# Patient Record
Sex: MALE | Race: WHITE | NOT HISPANIC OR LATINO | Employment: OTHER | URBAN - METROPOLITAN AREA
[De-identification: names, ages, dates, MRNs, and addresses within clinical notes are randomized per-mention and may not be internally consistent; named-entity substitution may affect disease eponyms.]

---

## 2018-09-05 PROBLEM — K63.5 POLYP OF TRANSVERSE COLON: Status: ACTIVE | Noted: 2018-09-05

## 2022-07-11 ENCOUNTER — HOSPITAL ENCOUNTER (INPATIENT)
Facility: HOSPITAL | Age: 62
LOS: 12 days | Discharge: HOME | DRG: 234 | End: 2022-07-23
Attending: EMERGENCY MEDICINE | Admitting: INTERNAL MEDICINE
Payer: COMMERCIAL

## 2022-07-11 DIAGNOSIS — R07.9 CHEST PAIN: ICD-10-CM

## 2022-07-11 DIAGNOSIS — I20.0 UNSTABLE ANGINA: ICD-10-CM

## 2022-07-11 DIAGNOSIS — I49.9 ABNORMAL HEART RHYTHM: ICD-10-CM

## 2022-07-11 DIAGNOSIS — I25.110 ATHEROSCLEROSIS OF NATIVE CORONARY ARTERY OF NATIVE HEART WITH UNSTABLE ANGINA PECTORIS: ICD-10-CM

## 2022-07-11 DIAGNOSIS — I21.4 NON-STEMI (NON-ST ELEVATED MYOCARDIAL INFARCTION): ICD-10-CM

## 2022-07-11 DIAGNOSIS — E11.65 TYPE 2 DIABETES MELLITUS WITH HYPERGLYCEMIA, WITHOUT LONG-TERM CURRENT USE OF INSULIN: ICD-10-CM

## 2022-07-11 DIAGNOSIS — Z99.11 ENCOUNTER FOR WEANING FROM VENTILATOR: ICD-10-CM

## 2022-07-11 DIAGNOSIS — E66.01 MORBID OBESITY WITH BMI OF 45.0-49.9, ADULT: ICD-10-CM

## 2022-07-11 DIAGNOSIS — I25.10 CORONARY ARTERY DISEASE: ICD-10-CM

## 2022-07-11 DIAGNOSIS — I25.110 ATHEROSCLEROTIC HEART DISEASE OF NATIVE CORONARY ARTERY WITH UNSTABLE ANGINA PECTORIS: ICD-10-CM

## 2022-07-11 DIAGNOSIS — E78.2 MIXED HYPERLIPIDEMIA: ICD-10-CM

## 2022-07-11 DIAGNOSIS — I50.22 HEART FAILURE WITH MID-RANGE EJECTION FRACTION: ICD-10-CM

## 2022-07-11 DIAGNOSIS — I49.9 ARRHYTHMIA: ICD-10-CM

## 2022-07-11 DIAGNOSIS — E78.5 HYPERLIPIDEMIA, UNSPECIFIED HYPERLIPIDEMIA TYPE: ICD-10-CM

## 2022-07-11 DIAGNOSIS — E66.01 CLASS 2 SEVERE OBESITY DUE TO EXCESS CALORIES WITH SERIOUS COMORBIDITY AND BODY MASS INDEX (BMI) OF 37.0 TO 37.9 IN ADULT: ICD-10-CM

## 2022-07-11 DIAGNOSIS — I10 HTN (HYPERTENSION), BENIGN: ICD-10-CM

## 2022-07-11 DIAGNOSIS — I21.4 NSTEMI (NON-ST ELEVATED MYOCARDIAL INFARCTION): ICD-10-CM

## 2022-07-11 DIAGNOSIS — Z95.1 S/P CABG (CORONARY ARTERY BYPASS GRAFT): Primary | ICD-10-CM

## 2022-07-11 DIAGNOSIS — Z98.890 STATUS POST CARDIAC SURGERY: ICD-10-CM

## 2022-07-11 PROBLEM — I77.819 DILATATION OF AORTA: Status: ACTIVE | Noted: 2022-07-11

## 2022-07-11 PROBLEM — E11.9 TYPE 2 DIABETES MELLITUS: Status: ACTIVE | Noted: 2022-07-11

## 2022-07-11 LAB
ABO + RH BLD: NORMAL
ALBUMIN SERPL BCP-MCNC: 4 G/DL (ref 3.5–5.2)
ALP SERPL-CCNC: 81 U/L (ref 55–135)
ALT SERPL W/O P-5'-P-CCNC: 34 U/L (ref 10–44)
ANION GAP SERPL CALC-SCNC: 16 MMOL/L (ref 8–16)
APTT BLDCRRT: 38.5 SEC (ref 21–32)
AST SERPL-CCNC: 23 U/L (ref 10–40)
BASOPHILS # BLD AUTO: 0.14 K/UL (ref 0–0.2)
BASOPHILS NFR BLD: 1.2 % (ref 0–1.9)
BILIRUB SERPL-MCNC: 0.4 MG/DL (ref 0.1–1)
BLD GP AB SCN CELLS X3 SERPL QL: NORMAL
BNP SERPL-MCNC: 14 PG/ML (ref 0–99)
BUN SERPL-MCNC: 12 MG/DL (ref 8–23)
CALCIUM SERPL-MCNC: 9.9 MG/DL (ref 8.7–10.5)
CHLORIDE SERPL-SCNC: 98 MMOL/L (ref 95–110)
CO2 SERPL-SCNC: 20 MMOL/L (ref 23–29)
CREAT SERPL-MCNC: 1 MG/DL (ref 0.5–1.4)
DIFFERENTIAL METHOD: ABNORMAL
EOSINOPHIL # BLD AUTO: 0.1 K/UL (ref 0–0.5)
EOSINOPHIL NFR BLD: 0.9 % (ref 0–8)
ERYTHROCYTE [DISTWIDTH] IN BLOOD BY AUTOMATED COUNT: 14.6 % (ref 11.5–14.5)
EST. GFR  (AFRICAN AMERICAN): >60 ML/MIN/1.73 M^2
EST. GFR  (NON AFRICAN AMERICAN): >60 ML/MIN/1.73 M^2
GLUCOSE SERPL-MCNC: 307 MG/DL (ref 70–110)
HCT VFR BLD AUTO: 52 % (ref 40–54)
HGB BLD-MCNC: 16.8 G/DL (ref 14–18)
IMM GRANULOCYTES # BLD AUTO: 0.05 K/UL (ref 0–0.04)
IMM GRANULOCYTES NFR BLD AUTO: 0.4 % (ref 0–0.5)
INR PPP: 1.1 (ref 0.8–1.2)
LYMPHOCYTES # BLD AUTO: 3.6 K/UL (ref 1–4.8)
LYMPHOCYTES NFR BLD: 31 % (ref 18–48)
MCH RBC QN AUTO: 30.9 PG (ref 27–31)
MCHC RBC AUTO-ENTMCNC: 32.3 G/DL (ref 32–36)
MCV RBC AUTO: 96 FL (ref 82–98)
MONOCYTES # BLD AUTO: 0.9 K/UL (ref 0.3–1)
MONOCYTES NFR BLD: 7.7 % (ref 4–15)
NEUTROPHILS # BLD AUTO: 6.8 K/UL (ref 1.8–7.7)
NEUTROPHILS NFR BLD: 58.8 % (ref 38–73)
NRBC BLD-RTO: 0 /100 WBC
PLATELET # BLD AUTO: 421 K/UL (ref 150–450)
PMV BLD AUTO: 9.4 FL (ref 9.2–12.9)
POCT GLUCOSE: 197 MG/DL (ref 70–110)
POCT GLUCOSE: 221 MG/DL (ref 70–110)
POTASSIUM SERPL-SCNC: 4.5 MMOL/L (ref 3.5–5.1)
PROT SERPL-MCNC: 7.6 G/DL (ref 6–8.4)
PROTHROMBIN TIME: 11 SEC (ref 9–12.5)
RBC # BLD AUTO: 5.44 M/UL (ref 4.6–6.2)
SODIUM SERPL-SCNC: 134 MMOL/L (ref 136–145)
TROPONIN I SERPL DL<=0.01 NG/ML-MCNC: 0.04 NG/ML (ref 0–0.03)
WBC # BLD AUTO: 11.5 K/UL (ref 3.9–12.7)

## 2022-07-11 PROCEDURE — 36415 COLL VENOUS BLD VENIPUNCTURE: CPT | Performed by: INTERNAL MEDICINE

## 2022-07-11 PROCEDURE — 93458 PR CATH PLACE/CORON ANGIO, IMG SUPER/INTERP,W LEFT HEART VENTRICULOGRAPHY: ICD-10-PCS | Mod: 26,,, | Performed by: INTERNAL MEDICINE

## 2022-07-11 PROCEDURE — 63600175 PHARM REV CODE 636 W HCPCS: Performed by: EMERGENCY MEDICINE

## 2022-07-11 PROCEDURE — 25000003 PHARM REV CODE 250: Performed by: INTERNAL MEDICINE

## 2022-07-11 PROCEDURE — 84484 ASSAY OF TROPONIN QUANT: CPT | Performed by: EMERGENCY MEDICINE

## 2022-07-11 PROCEDURE — 20600001 HC STEP DOWN PRIVATE ROOM

## 2022-07-11 PROCEDURE — 85610 PROTHROMBIN TIME: CPT | Performed by: EMERGENCY MEDICINE

## 2022-07-11 PROCEDURE — C1894 INTRO/SHEATH, NON-LASER: HCPCS | Performed by: INTERNAL MEDICINE

## 2022-07-11 PROCEDURE — 93567 NJX CAR CTH SPRVLV AORTGRPHY: CPT | Mod: ,,, | Performed by: INTERNAL MEDICINE

## 2022-07-11 PROCEDURE — C1887 CATHETER, GUIDING: HCPCS | Performed by: INTERNAL MEDICINE

## 2022-07-11 PROCEDURE — 93010 EKG 12-LEAD: ICD-10-PCS | Mod: ,,, | Performed by: INTERNAL MEDICINE

## 2022-07-11 PROCEDURE — 25000003 PHARM REV CODE 250

## 2022-07-11 PROCEDURE — 85025 COMPLETE CBC W/AUTO DIFF WBC: CPT | Performed by: EMERGENCY MEDICINE

## 2022-07-11 PROCEDURE — 93458 L HRT ARTERY/VENTRICLE ANGIO: CPT | Mod: 26,,, | Performed by: INTERNAL MEDICINE

## 2022-07-11 PROCEDURE — 93010 ELECTROCARDIOGRAM REPORT: CPT | Mod: ,,, | Performed by: INTERNAL MEDICINE

## 2022-07-11 PROCEDURE — 25000003 PHARM REV CODE 250: Performed by: EMERGENCY MEDICINE

## 2022-07-11 PROCEDURE — 99153 MOD SED SAME PHYS/QHP EA: CPT | Performed by: INTERNAL MEDICINE

## 2022-07-11 PROCEDURE — 85730 THROMBOPLASTIN TIME PARTIAL: CPT | Performed by: EMERGENCY MEDICINE

## 2022-07-11 PROCEDURE — 93005 ELECTROCARDIOGRAM TRACING: CPT

## 2022-07-11 PROCEDURE — 99152 MOD SED SAME PHYS/QHP 5/>YRS: CPT | Performed by: INTERNAL MEDICINE

## 2022-07-11 PROCEDURE — 25000242 PHARM REV CODE 250 ALT 637 W/ HCPCS: Performed by: INTERNAL MEDICINE

## 2022-07-11 PROCEDURE — 25000003 PHARM REV CODE 250: Performed by: STUDENT IN AN ORGANIZED HEALTH CARE EDUCATION/TRAINING PROGRAM

## 2022-07-11 PROCEDURE — 25000242 PHARM REV CODE 250 ALT 637 W/ HCPCS

## 2022-07-11 PROCEDURE — 83880 ASSAY OF NATRIURETIC PEPTIDE: CPT | Performed by: EMERGENCY MEDICINE

## 2022-07-11 PROCEDURE — 25500020 PHARM REV CODE 255: Performed by: INTERNAL MEDICINE

## 2022-07-11 PROCEDURE — 63600175 PHARM REV CODE 636 W HCPCS: Performed by: INTERNAL MEDICINE

## 2022-07-11 PROCEDURE — 99223 1ST HOSP IP/OBS HIGH 75: CPT | Mod: ,,, | Performed by: INTERNAL MEDICINE

## 2022-07-11 PROCEDURE — 99223 PR INITIAL HOSPITAL CARE,LEVL III: ICD-10-PCS | Mod: ,,, | Performed by: INTERNAL MEDICINE

## 2022-07-11 PROCEDURE — C9399 UNCLASSIFIED DRUGS OR BIOLOG: HCPCS | Performed by: STUDENT IN AN ORGANIZED HEALTH CARE EDUCATION/TRAINING PROGRAM

## 2022-07-11 PROCEDURE — 99152 PR MOD CONSCIOUS SEDATION, SAME PHYS, 5+ YRS, FIRST 15 MIN: ICD-10-PCS | Mod: ,,, | Performed by: INTERNAL MEDICINE

## 2022-07-11 PROCEDURE — 99291 CRITICAL CARE FIRST HOUR: CPT

## 2022-07-11 PROCEDURE — 93567 PR INJECT SUPRAVALVULAR AORTOGRAPHY DURING HEART CATH: ICD-10-PCS | Mod: ,,, | Performed by: INTERNAL MEDICINE

## 2022-07-11 PROCEDURE — 93567 NJX CAR CTH SPRVLV AORTGRPHY: CPT | Performed by: INTERNAL MEDICINE

## 2022-07-11 PROCEDURE — 80053 COMPREHEN METABOLIC PANEL: CPT | Performed by: EMERGENCY MEDICINE

## 2022-07-11 PROCEDURE — 93458 L HRT ARTERY/VENTRICLE ANGIO: CPT | Performed by: INTERNAL MEDICINE

## 2022-07-11 PROCEDURE — C1769 GUIDE WIRE: HCPCS | Performed by: INTERNAL MEDICINE

## 2022-07-11 PROCEDURE — 99291 CRITICAL CARE FIRST HOUR: CPT | Mod: CS,,, | Performed by: EMERGENCY MEDICINE

## 2022-07-11 PROCEDURE — 99152 MOD SED SAME PHYS/QHP 5/>YRS: CPT | Mod: ,,, | Performed by: INTERNAL MEDICINE

## 2022-07-11 PROCEDURE — 86850 RBC ANTIBODY SCREEN: CPT | Performed by: EMERGENCY MEDICINE

## 2022-07-11 PROCEDURE — 99291 PR CRITICAL CARE, E/M 30-74 MINUTES: ICD-10-PCS | Mod: CS,,, | Performed by: EMERGENCY MEDICINE

## 2022-07-11 RX ORDER — GLUCAGON 1 MG
1 KIT INJECTION
Status: DISCONTINUED | OUTPATIENT
Start: 2022-07-11 | End: 2022-07-18

## 2022-07-11 RX ORDER — LIDOCAINE HYDROCHLORIDE 20 MG/ML
INJECTION, SOLUTION INFILTRATION; PERINEURAL
Status: DISCONTINUED | OUTPATIENT
Start: 2022-07-11 | End: 2022-07-12

## 2022-07-11 RX ORDER — METFORMIN HYDROCHLORIDE 1000 MG/1
1000 TABLET ORAL 2 TIMES DAILY WITH MEALS
COMMUNITY
End: 2022-09-30 | Stop reason: SDUPTHER

## 2022-07-11 RX ORDER — HEPARIN SODIUM,PORCINE/D5W 25000/250
0-40 INTRAVENOUS SOLUTION INTRAVENOUS CONTINUOUS
Status: DISCONTINUED | OUTPATIENT
Start: 2022-07-11 | End: 2022-07-18

## 2022-07-11 RX ORDER — MORPHINE SULFATE 2 MG/ML
2 INJECTION, SOLUTION INTRAMUSCULAR; INTRAVENOUS ONCE
Status: COMPLETED | OUTPATIENT
Start: 2022-07-11 | End: 2022-07-11

## 2022-07-11 RX ORDER — SODIUM CHLORIDE 0.9 % (FLUSH) 0.9 %
10 SYRINGE (ML) INJECTION
Status: DISCONTINUED | OUTPATIENT
Start: 2022-07-11 | End: 2022-07-23 | Stop reason: HOSPADM

## 2022-07-11 RX ORDER — ATORVASTATIN CALCIUM 20 MG/1
40 TABLET, FILM COATED ORAL DAILY
Status: DISCONTINUED | OUTPATIENT
Start: 2022-07-12 | End: 2022-07-11

## 2022-07-11 RX ORDER — ASPIRIN 325 MG
325 TABLET ORAL
Status: ACTIVE | OUTPATIENT
Start: 2022-07-11 | End: 2022-07-12

## 2022-07-11 RX ORDER — NITROGLYCERIN 0.4 MG/1
0.4 TABLET SUBLINGUAL
Status: COMPLETED | OUTPATIENT
Start: 2022-07-11 | End: 2022-07-11

## 2022-07-11 RX ORDER — NITROGLYCERIN 0.4 MG/1
0.4 TABLET SUBLINGUAL EVERY 5 MIN PRN
Status: DISCONTINUED | OUTPATIENT
Start: 2022-07-11 | End: 2022-07-18

## 2022-07-11 RX ORDER — INSULIN ASPART 100 [IU]/ML
0-5 INJECTION, SOLUTION INTRAVENOUS; SUBCUTANEOUS
Status: DISCONTINUED | OUTPATIENT
Start: 2022-07-11 | End: 2022-07-11

## 2022-07-11 RX ORDER — MIDAZOLAM HYDROCHLORIDE 1 MG/ML
INJECTION, SOLUTION INTRAMUSCULAR; INTRAVENOUS
Status: DISCONTINUED | OUTPATIENT
Start: 2022-07-11 | End: 2022-07-12

## 2022-07-11 RX ORDER — INSULIN ASPART 100 [IU]/ML
1-10 INJECTION, SOLUTION INTRAVENOUS; SUBCUTANEOUS
Status: DISCONTINUED | OUTPATIENT
Start: 2022-07-11 | End: 2022-07-18

## 2022-07-11 RX ORDER — HEPARIN SODIUM 1000 [USP'U]/ML
INJECTION, SOLUTION INTRAVENOUS; SUBCUTANEOUS
Status: DISCONTINUED | OUTPATIENT
Start: 2022-07-11 | End: 2022-07-12

## 2022-07-11 RX ORDER — DIPHENHYDRAMINE HCL 50 MG
50 CAPSULE ORAL ONCE
Status: CANCELLED | OUTPATIENT
Start: 2022-07-11 | End: 2022-07-11

## 2022-07-11 RX ORDER — LOSARTAN POTASSIUM 50 MG/1
50 TABLET ORAL DAILY
Status: DISCONTINUED | OUTPATIENT
Start: 2022-07-12 | End: 2022-07-12

## 2022-07-11 RX ORDER — ASPIRIN 325 MG
325 TABLET ORAL
Status: COMPLETED | OUTPATIENT
Start: 2022-07-11 | End: 2022-07-11

## 2022-07-11 RX ORDER — AMLODIPINE BESYLATE 10 MG/1
10 TABLET ORAL DAILY
Status: DISCONTINUED | OUTPATIENT
Start: 2022-07-11 | End: 2022-07-13

## 2022-07-11 RX ORDER — IBUPROFEN 200 MG
24 TABLET ORAL
Status: DISCONTINUED | OUTPATIENT
Start: 2022-07-11 | End: 2022-07-18

## 2022-07-11 RX ORDER — DIPHENHYDRAMINE HCL 50 MG
50 CAPSULE ORAL
Status: COMPLETED | OUTPATIENT
Start: 2022-07-11 | End: 2022-07-11

## 2022-07-11 RX ORDER — HYDROCHLOROTHIAZIDE 12.5 MG/1
12.5 TABLET ORAL DAILY
Status: DISCONTINUED | OUTPATIENT
Start: 2022-07-12 | End: 2022-07-12

## 2022-07-11 RX ORDER — NAPROXEN SODIUM 220 MG/1
81 TABLET, FILM COATED ORAL DAILY
Status: DISCONTINUED | OUTPATIENT
Start: 2022-07-12 | End: 2022-07-17

## 2022-07-11 RX ORDER — IBUPROFEN 200 MG
16 TABLET ORAL
Status: DISCONTINUED | OUTPATIENT
Start: 2022-07-11 | End: 2022-07-18

## 2022-07-11 RX ORDER — ATORVASTATIN CALCIUM 20 MG/1
40 TABLET, FILM COATED ORAL DAILY
Status: DISCONTINUED | OUTPATIENT
Start: 2022-07-11 | End: 2022-07-18

## 2022-07-11 RX ORDER — FENTANYL CITRATE 50 UG/ML
INJECTION, SOLUTION INTRAMUSCULAR; INTRAVENOUS
Status: DISCONTINUED | OUTPATIENT
Start: 2022-07-11 | End: 2022-07-13

## 2022-07-11 RX ORDER — HEPARIN SOD,PORCINE/0.9 % NACL 1000/500ML
INTRAVENOUS SOLUTION INTRAVENOUS
Status: DISCONTINUED | OUTPATIENT
Start: 2022-07-11 | End: 2022-07-12

## 2022-07-11 RX ORDER — SODIUM CHLORIDE 9 MG/ML
INJECTION, SOLUTION INTRAVENOUS CONTINUOUS
Status: ACTIVE | OUTPATIENT
Start: 2022-07-11 | End: 2022-07-11

## 2022-07-11 RX ADMIN — ATORVASTATIN CALCIUM 40 MG: 20 TABLET, FILM COATED ORAL at 05:07

## 2022-07-11 RX ADMIN — ASPIRIN 325 MG ORAL TABLET 325 MG: 325 PILL ORAL at 11:07

## 2022-07-11 RX ADMIN — DIPHENHYDRAMINE HYDROCHLORIDE 50 MG: 50 CAPSULE ORAL at 01:07

## 2022-07-11 RX ADMIN — NITROGLYCERIN 0.4 MG: 0.4 TABLET, ORALLY DISINTEGRATING SUBLINGUAL at 08:07

## 2022-07-11 RX ADMIN — TICAGRELOR 180 MG: 90 TABLET ORAL at 01:07

## 2022-07-11 RX ADMIN — INSULIN ASPART 4 UNITS: 100 INJECTION, SOLUTION INTRAVENOUS; SUBCUTANEOUS at 05:07

## 2022-07-11 RX ADMIN — HEPARIN SODIUM 12 UNITS/KG/HR: 5000 INJECTION INTRAVENOUS; SUBCUTANEOUS at 06:07

## 2022-07-11 RX ADMIN — NITROGLYCERIN 0.4 MG: 0.4 TABLET, ORALLY DISINTEGRATING SUBLINGUAL at 11:07

## 2022-07-11 RX ADMIN — SODIUM CHLORIDE: 0.9 INJECTION, SOLUTION INTRAVENOUS at 05:07

## 2022-07-11 RX ADMIN — NITROGLYCERIN 0.4 MG: 0.4 TABLET, ORALLY DISINTEGRATING SUBLINGUAL at 10:07

## 2022-07-11 RX ADMIN — AMLODIPINE BESYLATE 10 MG: 10 TABLET ORAL at 05:07

## 2022-07-11 RX ADMIN — INSULIN DETEMIR 8 UNITS: 100 INJECTION, SOLUTION SUBCUTANEOUS at 08:07

## 2022-07-11 RX ADMIN — INSULIN ASPART 1 UNITS: 100 INJECTION, SOLUTION INTRAVENOUS; SUBCUTANEOUS at 08:07

## 2022-07-11 RX ADMIN — MORPHINE SULFATE 2 MG: 2 INJECTION, SOLUTION INTRAMUSCULAR; INTRAVENOUS at 10:07

## 2022-07-11 NOTE — BRIEF OP NOTE
Brief Operative Note:    : Bjorn Cheatham MD     Referring Physician: Self,Aaareferral     All Operators: Surgeon(s):  Bjorn Cheatham MD     Preoperative Diagnosis: Chest pain [R07.9]NSTEMI (non-ST elevated myocardial infarction) [I21.4]     Postop Diagnosis: Chest pain [R07.9]NSTEMI (non-ST elevated myocardial infarction) [I21.4]    Treatments/Procedures: Procedure(s) (LRB):  Left heart cath (N/A)  AORTOGRAM (N/A)    Access: Right radial artery    Findings:Severe coronary artery disease is present.     See catheterization report for full details.    Intervention: none     See catheterization report for full details.    Closure device: TR band        Plan:  - Post cath protocol   - IVF @ 100 cc/kg/hr x 2 hours  - Bed rest x 2 hours   - Continue aspirin 81 mg daily   - Hold Ticagrelor ( to be evaluated for surgery )   - Echo to assess LV function and to assess for bicuspid aortic valve  - Recommend surgical evaluation for CABG ( 3 V CAD ) and for aortic root repair ( dilated aortic root )     Estimated Blood loss: 20 cc    Specimens removed: No    Britni Carlin MD

## 2022-07-11 NOTE — SUBJECTIVE & OBJECTIVE
Past Medical History:   Diagnosis Date    Diabetes mellitus type 2, uncontrolled, without complications     6.7% Metf 1 g qd, eye 2015, [facility]-2015,     Elevated platelet count 7/23/2015    H/O splenectomy 4/24/2015    doesn't like pneumovax bc caused side effects    HLD (hyperlipidemia) 1/22/2015    goal LDL < 100, reluctant to take statin    HTN (hypertension), benign 1/22/2015    losartan 100    Morbid obesity with BMI of 45.0-49.9, adult 6/11/2014    Polyp of transverse colon 9/5/2018 2018, repeat 2023    Severe uncontrolled hypertension 6/11/2014       Past Surgical History:   Procedure Laterality Date    splenecectomy         Review of patient's allergies indicates:   Allergen Reactions    Penicillins      Other reaction(s): Itching  Other reaction(s): Hives       No current facility-administered medications on file prior to encounter.     Current Outpatient Medications on File Prior to Encounter   Medication Sig    amlodipine (NORVASC) 10 MG tablet Take 1 tablet (10 mg total) by mouth once daily.    LISINOPRIL ORAL Take 12.5 mg by mouth once daily.    losartan (COZAAR) 100 MG tablet Take 1 tablet (100 mg total) by mouth once daily.    meloxicam (MOBIC) 15 MG tablet TAKE 1 TABLET BY MOUTH EVERY DAY FOR 14 DAYS WITH FOOD (Patient taking differently:  Pt using PRN)    metFORMIN (GLUCOPHAGE) 1000 MG tablet Take 1,000 mg by mouth 2 (two) times daily with meals.    naproxen sodium (ANAPROX) 220 MG tablet Take 220 mg by mouth every 12 (twelve) hours.     Family History       Problem Relation (Age of Onset)    Colon cancer Father (65)    Heart attack Mother (58)    Heart disease Mother    Heart failure Mother    Hyperlipidemia Mother    Hypertension Mother    Lung cancer Mother    Stroke Mother          Tobacco Use    Smoking status: Former Smoker     Types: Cigarettes    Smokeless tobacco: Never Used   Substance and Sexual Activity    Alcohol use: Yes     Alcohol/week: 0.0 standard drinks     Comment: 1 a week     Drug use: Not on file    Sexual activity: Not on file     Review of Systems   Constitutional: Negative for chills and fever.   Cardiovascular:  Positive for chest pain and dyspnea on exertion. Negative for leg swelling.   Respiratory:  Positive for shortness of breath. Negative for cough.    Endocrine: Negative for cold intolerance.   Musculoskeletal:  Negative for back pain and falls.   Gastrointestinal:  Negative for abdominal pain.   Objective:     Vital Signs (Most Recent):  Temp: 96.7 °F (35.9 °C) (07/11/22 1644)  Pulse: 84 (07/11/22 1645)  Resp: (!) 21 (07/11/22 1630)  BP: (!) 157/97 (07/11/22 1645)  SpO2: 96 % (07/11/22 1645)   Vital Signs (24h Range):  Temp:  [96.7 °F (35.9 °C)-98.7 °F (37.1 °C)] 96.7 °F (35.9 °C)  Pulse:  [55-90] 84  Resp:  [18-22] 21  SpO2:  [94 %-97 %] 96 %  BP: (111-181)/() 157/97     Weight: 130.6 kg (288 lb)  Body mass index is 39.06 kg/m².    SpO2: 96 %  O2 Device (Oxygen Therapy): nasal cannula    No intake or output data in the 24 hours ending 07/11/22 1706    Lines/Drains/Airways       Peripheral Intravenous Line  Duration                  Peripheral IV - Single Lumen 07/11/22 1109 20 G Left Antecubital <1 day                    Physical Exam  Vitals and nursing note reviewed.   Constitutional:       General: He is not in acute distress.     Appearance: He is not ill-appearing.   HENT:      Head: Normocephalic and atraumatic.      Mouth/Throat:      Mouth: Mucous membranes are moist.   Eyes:      Extraocular Movements: Extraocular movements intact.      Pupils: Pupils are equal, round, and reactive to light.   Cardiovascular:      Rate and Rhythm: Normal rate and regular rhythm.      Pulses:           Radial pulses are 2+ on the right side and 2+ on the left side.        Dorsalis pedis pulses are 2+ on the right side and 1+ on the left side.        Posterior tibial pulses are 2+ on the right side and 2+ on the left side.      Heart sounds: No murmur heard.  Pulmonary:       Effort: Pulmonary effort is normal.      Breath sounds: Normal breath sounds.   Abdominal:      General: Abdomen is flat.      Palpations: Abdomen is soft.   Musculoskeletal:      Right lower leg: No edema.      Left lower leg: No edema.       Significant Labs: All pertinent lab results from the last 24 hours have been reviewed.    Significant Imaging:  Reviewed

## 2022-07-11 NOTE — ASSESSMENT & PLAN NOTE
Last A1C 6.6. takes metformin 1000 BID at home.     - repeat A1C  - LDSSI  - detemir 8 qhs  - poct glucose ac/hs   - goal 140-180

## 2022-07-11 NOTE — HPI
62-year-old male with NIDDM2, HTN, HLD, known moderate CAD who presents to the emergency department for midsternal, throbbing, chest pain that started yesterday while at rest.  Patient does not remember what happened with onset of pain.  He denies any associated nausea, vomiting, cough, shortness of breath.  He notes severe radiation of pain to his bilateral upper arms, right worse than left.  He has been able to walk up stairs or do chores without pain but does admit to having heavy arm pain for the last few weeks. He denies, LE swelling, calf pain, or back pain.  Patient states that he was postop follow-up in December with his cardiologist who he sees at [facility].  States he did not have this follow-up, but is scheduled for this month.  He notes distant history of cigarette smoking, over 30 years ago.  He took 2 Aleve prior to arrival to the emergency department today without relief of pain. In the ED his BP was elevated to 180/98 but otherwise he was HDS. Troponin was 0.037. ECG with twi and dynamic st changes. He was taken for LHC which showed multivessel disease as well as dilated ascending aorta. He was admitted to CCU for further workup and evaluation.

## 2022-07-11 NOTE — CONSULTS
Cardiac Surgery   Consultation Note      SUBJECTIVE:     Chief Complaint/Reason for Admission: chest pain    History of Present Illness: Isaiah Dorsey Jr. is a 62 y.o. male with past medical history significant for hypertension, diabetes, and coronary artery disease who presents with acute onset chest pain with radiation to his upper extremities. Patient reports pain began evening prior to admission. He reports no exacerbating or alleviating factors, though he did try OTC NSAIDs. No prior history of similar pain, however he reports intermittent dizziness and feeling light-headed for many months. Presented to the emergency department for further medical evaluation.       No current facility-administered medications on file prior to encounter.     Current Outpatient Medications on File Prior to Encounter   Medication Sig    amlodipine (NORVASC) 10 MG tablet Take 1 tablet (10 mg total) by mouth once daily.    LISINOPRIL ORAL Take 12.5 mg by mouth once daily.    losartan (COZAAR) 100 MG tablet Take 1 tablet (100 mg total) by mouth once daily.    meloxicam (MOBIC) 15 MG tablet TAKE 1 TABLET BY MOUTH EVERY DAY FOR 14 DAYS WITH FOOD (Patient taking differently:  Pt using PRN)    metFORMIN (GLUCOPHAGE) 1000 MG tablet Take 1,000 mg by mouth 2 (two) times daily with meals.    naproxen sodium (ANAPROX) 220 MG tablet Take 220 mg by mouth every 12 (twelve) hours.       Review of patient's allergies indicates:   Allergen Reactions    Penicillins      Other reaction(s): Itching  Other reaction(s): Hives       Past Medical History:   Diagnosis Date    Diabetes mellitus type 2, uncontrolled, without complications     6.7% Metf 1 g qd, eye 2015, [facility]2015,     Elevated platelet count 7/23/2015    H/O splenectomy 4/24/2015    doesn't like pneumovax bc caused side effects    HLD (hyperlipidemia) 1/22/2015    goal LDL < 100, reluctant to take statin    HTN (hypertension), benign 1/22/2015    losartan 100    Morbid obesity with BMI of  45.0-49.9, adult 6/11/2014    Polyp of transverse colon 9/5/2018 2018, repeat 2023    Severe uncontrolled hypertension 6/11/2014     Past Surgical History:   Procedure Laterality Date    splenecectomy       Family History   Problem Relation Age of Onset    Colon cancer Father 65    Lung cancer Mother     Hypertension Mother     Heart attack Mother 58    Heart disease Mother     Stroke Mother     Heart failure Mother     Hyperlipidemia Mother      Social History     Tobacco Use    Smoking status: Former Smoker     Types: Cigarettes    Smokeless tobacco: Never Used   Substance Use Topics    Alcohol use: Yes     Alcohol/week: 0.0 standard drinks     Comment: 1 a week        Review of Systems   Constitutional: Negative.    HENT: Negative.    Eyes: Negative.    Respiratory: Negative.    Cardiovascular: Positive for chest pain.   Gastrointestinal: Negative.    Endocrine: Negative.    Genitourinary: Negative.    Musculoskeletal: Negative.    Skin: Negative.    Allergic/Immunologic: Negative.    Neurological: Positive for dizziness and light-headedness.   Hematological: Negative.    Psychiatric/Behavioral: Negative.      OBJECTIVE:     Vital Signs (Most Recent)  Temp: 96.7 °F (35.9 °C) (07/11/22 1644)  Pulse: 84 (07/11/22 1645)  Resp: (!) 21 (07/11/22 1630)  BP: (!) 157/97 (07/11/22 1645)  SpO2: 96 % (07/11/22 1645)    Physical Exam  Constitutional:       General: He is not in acute distress.     Appearance: Normal appearance. He is obese. He is not ill-appearing, toxic-appearing or diaphoretic.   HENT:      Head: Normocephalic and atraumatic.      Right Ear: Tympanic membrane normal.      Left Ear: Tympanic membrane normal.      Nose: Nose normal.      Mouth/Throat:      Mouth: Mucous membranes are dry.      Pharynx: No oropharyngeal exudate.   Eyes:      General: No scleral icterus.        Right eye: No discharge.         Left eye: No discharge.      Extraocular Movements: Extraocular movements intact.       Conjunctiva/sclera: Conjunctivae normal.      Pupils: Pupils are equal, round, and reactive to light.   Cardiovascular:      Rate and Rhythm: Normal rate and regular rhythm.      Pulses: Normal pulses.      Heart sounds: Normal heart sounds.   Pulmonary:      Effort: Pulmonary effort is normal.      Breath sounds: Normal breath sounds.   Abdominal:      General: Abdomen is flat.      Palpations: Abdomen is soft.   Musculoskeletal:         General: No swelling. Normal range of motion.      Cervical back: Normal range of motion and neck supple.   Skin:     General: Skin is warm and dry.      Capillary Refill: Capillary refill takes less than 2 seconds.      Coloration: Skin is not jaundiced.   Neurological:      General: No focal deficit present.      Mental Status: He is alert.   Psychiatric:         Mood and Affect: Mood normal.       Laboratory  I have reviewed all pertinent lab results within the past 24 hours.    Diagnostic Results:  I have reviewed all pertinent diagnostic results within the past 24 hours.    ASSESSMENT/PLAN:     A/P:  Mr. Dorsey is a 62 year old man with multi-vessel severe coronary artery disease who was admitted with NSTEMI (troponin max 0.037). Given ticagrelor on July 11, 2022 at 1315 and underwent diagnostic angiogram, which was personally reviewed. He also has a history of diabetes and takes metformin at home (recent HbA1c pending). Remote history of smoking from approximately 18 to 30 years of age (~1/2 pack per day). BMI 39.     Angiogram findings discussed with patient. Will obtain non-contrast CT chest, echocardiogram, and carotid duplex to complete pre-operative workup. Please hold plavix and continue heparin infusion.     Discussed with Dr. Rhodes  Further plan of care to follow     I have seen the patient and reviewed the fellow's note above. I have personally interviewed and examined the patient at bedside and agree with the findings.     Mr. Dorsey is a pleasant 62 year old male  with hypertension, diabetes (HbA1C 7.6), and BMI of 39, who presented with unstable angina (troponin 0.037) vs NSTEMI, given Brilinta, and underwent coronary angiogram showing multivessel coronary artery disease.  Coronary angiogram details: 90% early mid LAD lesion with a moderate sized mid and distal LAD (wrap around LAD), 99% ostial LCx which has diffuse disease, and 90% proximal lesion in a small to moderate sized right posterolateral ventricular artery.      Given the severity of disease and the symptoms, I recommend coronary artery bypass surgery x 1 or 2 (LIMA-LAD, possible SVG-RPL), on pump vs off pump.  I had a lengthy discussion with him about the risks vs. benefits of the surgery.  We discussed the risks including the predicted chance of mortality as well as morbidity such as stroke, kidney injury, respiratory failure, limb ischemia, myocardial infarction, sternal wound infection, and bleeding.  The Society of Thoracic Surgery (STS) risk score was also discussed.  With this history, I noted the patient has a higher chance of sternal complications including wound infection due to his BMI of 39 and diabetes.  He has a higher chance of respiratory complications due to his BMI of 39  Additionally, we discussed the likely length of stay in the ICU and in the hospital, as well as the overall recovery period.  Mr. Dorsey is in agreement and we will proceed with surgery on Monday, July 18, 2022, since he was given Brilinta on July 11, 2022, and the risk of bleeding remains high for about one week after receiving this medication.    We will obtain a CT chest noncontrast, carotid ultrasound, and transthoracic echocardiogram prior to surgery.  He should continue heparin drip or therapeutic lovenox until midnight before surgery and should stay admitted due to the severity of the disease.      Blu Rhodes MD  Cardiothoracic Surgery  [facility]

## 2022-07-11 NOTE — PLAN OF CARE
Pt admitted with chest pain with radiation to BUEs. Troponin was 0.037 and pt was taken for LCH which showed multivessel dx and dilated ascending aorta. Admitted for CABG eval. Dsg to angio site on R wrist c/d/i. Pt started on heparin drip, next PTT due 0000. Blood sugar checks ACHS.Pt ambulates ind to the bathroom with steady assist. All patient needs met at this time.

## 2022-07-11 NOTE — ED PROVIDER NOTES
Encounter Date: 7/11/2022       History     Chief Complaint   Patient presents with    Chest Pain     BEGAN YESTERDAY, diaphoretic in triage      62-year-old male with history of diabetes, hypertension, hyperlipidemia, known coronary blockage (reported 65%) presents to the emergency department for midsternal chest pain that started yesterday.  Patient does not remember what happened with onset of pain.  He denies any associated nausea, vomiting, cough, shortness of breath.  He notes severe radiation of pain to his bilateral upper arms, right worse than left.  No lower extremity swelling or calf pain.  Patient states that he was postop follow-up in December with his cardiologist who he sees at [facility].  States he did not have this follow-up, but is scheduled for this month.  He notes distant history of cigarette smoking, over 30 years ago.  He took 2 Aleve prior to arrival to the emergency department today without relief of pain.    The history is provided by the patient. No  was used.     Review of patient's allergies indicates:   Allergen Reactions    Penicillins      Other reaction(s): Itching  Other reaction(s): Hives     Past Medical History:   Diagnosis Date    Diabetes mellitus type 2, uncontrolled, without complications     6.7% Metf 1 g qd, eye 2015, [facility],     Elevated platelet count 7/23/2015    H/O splenectomy 4/24/2015    doesn't like pneumovax bc caused side effects    HLD (hyperlipidemia) 1/22/2015    goal LDL < 100, reluctant to take statin    HTN (hypertension), benign 1/22/2015    losartan 100    Morbid obesity with BMI of 45.0-49.9, adult 6/11/2014    Polyp of transverse colon 9/5/2018 2018, repeat 2023    Severe uncontrolled hypertension 6/11/2014     Past Surgical History:   Procedure Laterality Date    splenecectomy       Family History   Problem Relation Age of Onset    Colon cancer Father 65    Lung cancer Mother     Hypertension Mother      Heart attack Mother 58    Heart disease Mother     Stroke Mother     Heart failure Mother     Hyperlipidemia Mother      Social History     Tobacco Use    Smoking status: Former Smoker     Types: Cigarettes    Smokeless tobacco: Never Used   Substance Use Topics    Alcohol use: Yes     Alcohol/week: 0.0 standard drinks     Comment: 1 a week     Review of Systems   Constitutional: Positive for diaphoresis. Negative for chills and fever.   HENT: Negative for rhinorrhea and sore throat.    Respiratory: Negative for cough and shortness of breath.    Cardiovascular: Positive for chest pain. Negative for leg swelling.   Gastrointestinal: Negative for abdominal pain, diarrhea, nausea and vomiting.   Genitourinary: Negative for dysuria and hematuria.   Musculoskeletal: Negative for back pain and neck pain.        Bilateral arm pain   Skin: Negative for rash and wound.   Neurological: Positive for weakness. Negative for seizures and headaches.   Hematological: Does not bruise/bleed easily.   Psychiatric/Behavioral: Negative for agitation and behavioral problems.       Physical Exam     Initial Vitals [07/11/22 1030]   BP Pulse Resp Temp SpO2   (!) 181/98 (!) 55 20 98.7 °F (37.1 °C) 97 %      MAP       --         Physical Exam    Nursing note and vitals reviewed.  Constitutional: He appears well-developed and well-nourished. He is not diaphoretic. No distress.   HENT:   Head: Normocephalic and atraumatic.   Eyes: Conjunctivae and EOM are normal.   Neck: Neck supple.   Normal range of motion.  Cardiovascular: Normal rate, regular rhythm, normal heart sounds and intact distal pulses.   Pulmonary/Chest: Breath sounds normal. No respiratory distress. He has no wheezes. He has no rhonchi. He has no rales.   Abdominal: Abdomen is soft. Bowel sounds are normal. He exhibits no distension. There is no abdominal tenderness. There is no rebound and no guarding.   Musculoskeletal:         General: No tenderness or edema. Normal  range of motion.      Cervical back: Normal range of motion and neck supple.     Neurological: He is alert. He has normal strength.   Skin: Skin is warm and dry.   Psychiatric: Thought content normal.   Appears anxious         ED Course   Critical Care    Date/Time: 7/11/2022 1:00 PM  Performed by: Lola Figueroa MD  Authorized by: Lola Figueroa MD   Direct patient critical care time: 10 minutes  Additional history critical care time: 10 minutes  Ordering / reviewing critical care time: 10 minutes  Documentation critical care time: 10 minutes  Consulting other physicians critical care time: 5 minutes  Total critical care time (exclusive of procedural time) : 45 minutes  Critical care time was exclusive of separately billable procedures and treating other patients and teaching time.  Critical care was necessary to treat or prevent imminent or life-threatening deterioration of the following conditions: cardiac failure.  Critical care was time spent personally by me on the following activities: development of treatment plan with patient or surrogate, discussions with consultants, interpretation of cardiac output measurements, evaluation of patient's response to treatment, examination of patient, obtaining history from patient or surrogate, ordering and performing treatments and interventions, ordering and review of laboratory studies, ordering and review of radiographic studies, pulse oximetry, re-evaluation of patient's condition and review of old charts.        Labs Reviewed   CBC W/ AUTO DIFFERENTIAL - Abnormal; Notable for the following components:       Result Value    RDW 14.6 (*)     Immature Grans (Abs) 0.05 (*)     All other components within normal limits   COMPREHENSIVE METABOLIC PANEL - Abnormal; Notable for the following components:    Sodium 134 (*)     CO2 20 (*)     Glucose 307 (*)     All other components within normal limits   TROPONIN I - Abnormal; Notable for the following components:     Troponin I 0.037 (*)     All other components within normal limits   B-TYPE NATRIURETIC PEPTIDE   APTT   PROTIME-INR   URINALYSIS, REFLEX TO URINE CULTURE   SARS-COV-2 RDRP GENE   TYPE & SCREEN     EKG Readings: (Independently Interpreted)   Initial Reading: No STEMI. Previous EKG: Compared with most recent EKG Previous EKG Date: 2016. Rhythm: Normal Sinus Rhythm. Heart Rate: 60. ST Segment Depression: V3. T Waves Flipped: III, V1 and V2.   Only recent EKG is from 2016 in the system. There are new changes since this EKG.      ECG Results          EKG 12-lead (Final result)  Result time 07/11/22 11:38:05    Final result by Interface, Lab In [facility] (07/11/22 11:38:05)                 Narrative:    Test Reason : R07.9,    Vent. Rate : 060 BPM     Atrial Rate : 060 BPM     P-R Int : 170 ms          QRS Dur : 098 ms      QT Int : 418 ms       P-R-T Axes : 049 025 014 degrees     QTc Int : 418 ms    Sinus rhythm with occasional Premature ventricular complexes  Possible Left atrial enlargement  Nonspecific ST and T wave abnormality  Abnormal ECG  When compared with ECG of 08-JUL-2016 15:30,  Vent. rate has decreased BY  35 BPM  ST now depressed in Anterior leads  T wave inversion now evident in Anterior leads  Confirmed by Donnell Concepcion MD ([id number]) on 7/11/2022 11:37:55 AM    Referred By: [email or web address]   SELF           Confirmed By:Osvaldo Concepcion MD                            Imaging Results          X-Ray Chest AP Portable (Final result)  Result time 07/11/22 11:45:34    Final result by Damian Palacio MD (07/11/22 11:45:34)                 Impression:      No significant intrathoracic abnormality directly referable to the current history of chest pain.  Allowing for differences in projection and an even poorer inspiratory depth level on the current exam, there has been no significant detrimental interval change in the appearance of the chest since 06/05/2014.      Electronically signed by: Damian Palacio  MD  Date:    07/11/2022  Time:    11:45             Narrative:    EXAMINATION:  XR CHEST AP PORTABLE    COMPARISON:  Comparison is made to 06/05/2014.  Clinical information of chest pain.    FINDINGS:  Cardiomediastinal silhouette is magnified both by projection and by a poor inspiratory depth level, and allowing for these technical factors I do not believe that the heart is significantly enlarged or that the cardiomediastinal silhouette has changed appreciably since the examination referenced above.  Pulmonary vascularity is normal.  Lung zones are essentially clear allowing for the poor inspiratory depth level, and are free of significant airspace consolidation or volume loss.  No pleural fluid.  No pneumothorax.  Surgical clip in the left upper abdominal quadrant again incidentally noted.                                 Medications   aspirin tablet 325 mg ( Oral Canceled Entry 7/11/22 1315)   heparin 25,000 units in dextrose 5% (100 units/ml) IV bolus from bag INITIAL BOLUS (max bolus 4000 units) (has no administration in time range)   heparin 25,000 units in dextrose 5% 250 mL (100 units/mL) infusion LOW INTENSITY nomogram - [facility] (has no administration in time range)   heparin 25,000 units in dextrose 5% (100 units/ml) IV bolus from bag - ADDITIONAL PRN BOLUS - 60 units/kg (max bolus 4000 units) (has no administration in time range)   heparin 25,000 units in dextrose 5% (100 units/ml) IV bolus from bag - ADDITIONAL PRN BOLUS - 30 units/kg (max bolus 4000 units) (has no administration in time range)   sodium chloride 0.9% flush 10 mL (has no administration in time range)   heparin infusion 1,000 units/500 ml in 0.9% NaCl (on sterile field) (1,500 mLs Intra-Catheter Given 7/11/22 1433)   heparin (porcine) 750 Units, verapamiL 1.25 mg, nitroGLYCERIN 1.25 mg in sodium chloride 0.9% 250 mL (250 mLs Intra-arterial Given 7/11/22 1433)   LIDOcaine HCL 20 mg/ml (2%) injection (10 mLs Other Given 7/11/22 1434)   fentaNYL  50 mcg/mL injection (50 mcg Intravenous Given 7/11/22 1443)   midazolam injection (0.5 mg  Given 7/11/22 1504)   nitroglycerin 200mcg/mL syringe (3 mLs Intravenous Given 7/11/22 1447)   heparin (porcine) injection (5,000 Units Intravenous Given 7/11/22 1449)   iohexoL (OMNIPAQUE 350) injection (80 mLs Intra-arterial Given 7/11/22 1514)   0.9%  NaCl infusion (has no administration in time range)   nitroGLYCERIN SL tablet 0.4 mg (0.4 mg Sublingual Given 7/11/22 1157)   aspirin tablet 325 mg (325 mg Oral Given 7/11/22 1157)   diphenhydrAMINE capsule 50 mg (50 mg Oral Given 7/11/22 1315)   ticagrelor tablet 180 mg (180 mg Oral Given 7/11/22 1315)     Medical Decision Making:   Initial Assessment:   62-year-old male with history of diabetes, hypertension, hyperlipidemia, known coronary blockage (reported 65%) presents to the emergency department for midsternal chest pain that started yesterday .  Initial vital signs stable.  Differential Diagnosis:   ACS, pneumonia, PE, pneumothorax, pulmonary edema/CHF, pericarditis  Clinical Tests:   Lab Tests: Reviewed and Ordered       <> Summary of Lab:   Troponin elevated.  ED Management:    Patient with new EKG depressions compared to prior from 2016.  Cardiology was consulted for his persistent chest pain despite nitroglycerin.  Had significant drop in blood pressure after nitroglycerin, will hold further doses at this time.  Plan for admission to cardiology service for angiogram.            Attending Attestation:   Physician Attestation Statement for Resident:  As the supervising MD   Physician Attestation Statement: I have personally seen and examined this patient.   I agree with the above history. -:   As the supervising MD I agree with the above PE.    As the supervising MD I agree with the above treatment, course, plan, and disposition.   -: D/w Cardiology, they evaluated pt and recommend starting ACS protocol for unstable angina and they plan to take pt to cath lab.   I  have reviewed and agree with the residents interpretation of the following: lab data, x-rays and EKG.  I have reviewed the following: old records at this facility.                ED Course as of 07/11/22 1545   Mon Jul 11, 2022   1126 WBC: 11.50 [KL]   1159 BNP: 14 [KL]   1212 Troponin I(!): 0.037 [KL]   1212 Discussed with charge to have patient moved to monitored bed. Making space now.  [KL]   1216 Patient had NTG X2. BP is stable. Pain from 10 now to a 7.  [KL]   1230 Discussed with cardiology.  They will come evaluate the patient. [KL]   1232 EKG after nitroglycerin without changes. [KL]      ED Course User Index  [KL] Rose Lebron MD             Clinical Impression:   Final diagnoses:  [R07.9] Chest pain  [I20.0] Unstable angina          ED Disposition Condition    Admit               Rose Lebron MD  Resident  07/11/22 1542       Lola Figueroa MD  07/11/22 1986

## 2022-07-11 NOTE — Clinical Note
80 ml of contrast were injected throughout the case. 120 mL of contrast was the total wasted during the case. 200 mL was the total amount used during the case.

## 2022-07-11 NOTE — ASSESSMENT & PLAN NOTE
BP checked in both arms and equivalent. Strong pulses in both upper and lower extremities. Low concern for dissection however will rule out given dilation of ascending aorta seen on LHC + elevated BP on arrival.     - fu CTA  - BP control

## 2022-07-11 NOTE — Clinical Note
The catheter was inserted into the aorta. An angiography was performed of the aorta. The angiography was performed via power injection. The injected amount was 50 mL contrast at 25 mL/s. The PSI from the power injection was 1000.

## 2022-07-11 NOTE — LETTER
July 12, 2022                 [facility]  [address] [place]  [address]  Phone: [phone]  Fax: [phone] July 12, 2022     Patient: Isaiah Dorsey Jr.   YOB: 1960       To Whom It May Concern:    Isaiah Dorsey was admitted to the hospital on 7/11/2022 11:02 AM. Please excuse his son, Gurmeet Dorsey, from work from 7/11/22 - 7/25/22 to help his father through his illness and recovery. If you have any questions please do not hesitate to reach out.       Sincerely,    Vivian Horton DO  Department of Hospital Medicine

## 2022-07-11 NOTE — NURSING
Pt arrived to room 0326 from cath lab w/ tele monitor on- changed pt's monitor to CICU monitor CTM department is aware new pt's arrival to floor. Pt has rt radial d- statr incision  with no bleeding or hematoma noted. Initiate post cath orders. Pt verbalizes complete understanding of post cath care. Admit incomplete - informed primary nurse status of home medications-awaiting for wife to bring updated list to bedside.

## 2022-07-11 NOTE — H&P (VIEW-ONLY)
Cardiac Surgery   Consultation Note      SUBJECTIVE:     Chief Complaint/Reason for Admission: chest pain    History of Present Illness: Isaiah Dorsey Jr. is a 62 y.o. male with past medical history significant for hypertension, diabetes, and coronary artery disease who presents with acute onset chest pain with radiation to his upper extremities. Patient reports pain began evening prior to admission. He reports no exacerbating or alleviating factors, though he did try OTC NSAIDs. No prior history of similar pain, however he reports intermittent dizziness and feeling light-headed for many months. Presented to the emergency department for further medical evaluation.       No current facility-administered medications on file prior to encounter.     Current Outpatient Medications on File Prior to Encounter   Medication Sig    amlodipine (NORVASC) 10 MG tablet Take 1 tablet (10 mg total) by mouth once daily.    LISINOPRIL ORAL Take 12.5 mg by mouth once daily.    losartan (COZAAR) 100 MG tablet Take 1 tablet (100 mg total) by mouth once daily.    meloxicam (MOBIC) 15 MG tablet TAKE 1 TABLET BY MOUTH EVERY DAY FOR 14 DAYS WITH FOOD (Patient taking differently:  Pt using PRN)    metFORMIN (GLUCOPHAGE) 1000 MG tablet Take 1,000 mg by mouth 2 (two) times daily with meals.    naproxen sodium (ANAPROX) 220 MG tablet Take 220 mg by mouth every 12 (twelve) hours.       Review of patient's allergies indicates:   Allergen Reactions    Penicillins      Other reaction(s): Itching  Other reaction(s): Hives       Past Medical History:   Diagnosis Date    Diabetes mellitus type 2, uncontrolled, without complications     6.7% Metf 1 g qd, eye 2015, [facility]-2015,     Elevated platelet count 7/23/2015    H/O splenectomy 4/24/2015    doesn't like pneumovax bc caused side effects    HLD (hyperlipidemia) 1/22/2015    goal LDL < 100, reluctant to take statin    HTN (hypertension), benign 1/22/2015    losartan 100    Morbid obesity with BMI of  45.0-49.9, adult 6/11/2014    Polyp of transverse colon 9/5/2018 2018, repeat 2023    Severe uncontrolled hypertension 6/11/2014     Past Surgical History:   Procedure Laterality Date    splenecectomy       Family History   Problem Relation Age of Onset    Colon cancer Father 65    Lung cancer Mother     Hypertension Mother     Heart attack Mother 58    Heart disease Mother     Stroke Mother     Heart failure Mother     Hyperlipidemia Mother      Social History     Tobacco Use    Smoking status: Former Smoker     Types: Cigarettes    Smokeless tobacco: Never Used   Substance Use Topics    Alcohol use: Yes     Alcohol/week: 0.0 standard drinks     Comment: 1 a week        Review of Systems   Constitutional: Negative.    HENT: Negative.    Eyes: Negative.    Respiratory: Negative.    Cardiovascular: Positive for chest pain.   Gastrointestinal: Negative.    Endocrine: Negative.    Genitourinary: Negative.    Musculoskeletal: Negative.    Skin: Negative.    Allergic/Immunologic: Negative.    Neurological: Positive for dizziness and light-headedness.   Hematological: Negative.    Psychiatric/Behavioral: Negative.      OBJECTIVE:     Vital Signs (Most Recent)  Temp: 96.7 °F (35.9 °C) (07/11/22 1644)  Pulse: 84 (07/11/22 1645)  Resp: (!) 21 (07/11/22 1630)  BP: (!) 157/97 (07/11/22 1645)  SpO2: 96 % (07/11/22 1645)    Physical Exam  Constitutional:       General: He is not in acute distress.     Appearance: Normal appearance. He is obese. He is not ill-appearing, toxic-appearing or diaphoretic.   HENT:      Head: Normocephalic and atraumatic.      Right Ear: Tympanic membrane normal.      Left Ear: Tympanic membrane normal.      Nose: Nose normal.      Mouth/Throat:      Mouth: Mucous membranes are dry.      Pharynx: No oropharyngeal exudate.   Eyes:      General: No scleral icterus.        Right eye: No discharge.         Left eye: No discharge.      Extraocular Movements: Extraocular movements intact.       Conjunctiva/sclera: Conjunctivae normal.      Pupils: Pupils are equal, round, and reactive to light.   Cardiovascular:      Rate and Rhythm: Normal rate and regular rhythm.      Pulses: Normal pulses.      Heart sounds: Normal heart sounds.   Pulmonary:      Effort: Pulmonary effort is normal.      Breath sounds: Normal breath sounds.   Abdominal:      General: Abdomen is flat.      Palpations: Abdomen is soft.   Musculoskeletal:         General: No swelling. Normal range of motion.      Cervical back: Normal range of motion and neck supple.   Skin:     General: Skin is warm and dry.      Capillary Refill: Capillary refill takes less than 2 seconds.      Coloration: Skin is not jaundiced.   Neurological:      General: No focal deficit present.      Mental Status: He is alert.   Psychiatric:         Mood and Affect: Mood normal.       Laboratory  I have reviewed all pertinent lab results within the past 24 hours.    Diagnostic Results:  I have reviewed all pertinent diagnostic results within the past 24 hours.    ASSESSMENT/PLAN:     A/P:  Mr. Dorsey is a 62 year old man with multi-vessel severe coronary artery disease who was admitted with NSTEMI (troponin max 0.037). Given ticagrelor on July 11, 2022 at 1315 and underwent diagnostic angiogram, which was personally reviewed. He also has a history of diabetes and takes metformin at home (recent HbA1c pending). Remote history of smoking from approximately 18 to 30 years of age (~1/2 pack per day). BMI 39.     Angiogram findings discussed with patient. Will obtain non-contrast CT chest, echocardiogram, and carotid duplex to complete pre-operative workup. Please hold plavix and continue heparin infusion.     Discussed with Dr. Rhodes  Further plan of care to follow     I have seen the patient and reviewed the fellow's note above. I have personally interviewed and examined the patient at bedside and agree with the findings.     Mr. Dorsey is a pleasant 62 year old male  with hypertension, diabetes (HbA1C 7.6), and BMI of 39, who presented with unstable angina (troponin 0.037) vs NSTEMI, given Brilinta, and underwent coronary angiogram showing multivessel coronary artery disease.  Coronary angiogram details: 90% early mid LAD lesion with a moderate sized mid and distal LAD (wrap around LAD), 99% ostial LCx which has diffuse disease, and 90% proximal lesion in a small to moderate sized right posterolateral ventricular artery.      Given the severity of disease and the symptoms, I recommend coronary artery bypass surgery x 1 or 2 (LIMA-LAD, possible SVG-RPL), on pump vs off pump.  I had a lengthy discussion with him about the risks vs. benefits of the surgery.  We discussed the risks including the predicted chance of mortality as well as morbidity such as stroke, kidney injury, respiratory failure, limb ischemia, myocardial infarction, sternal wound infection, and bleeding.  The Society of Thoracic Surgery (STS) risk score was also discussed.  With this history, I noted the patient has a higher chance of sternal complications including wound infection due to his BMI of 39 and diabetes.  He has a higher chance of respiratory complications due to his BMI of 39  Additionally, we discussed the likely length of stay in the ICU and in the hospital, as well as the overall recovery period.  Mr. Dorsey is in agreement and we will proceed with surgery on Monday, July 18, 2022, since he was given Brilinta on July 11, 2022, and the risk of bleeding remains high for about one week after receiving this medication.    We will obtain a CT chest noncontrast, carotid ultrasound, and transthoracic echocardiogram prior to surgery.  He should continue heparin drip or therapeutic lovenox until midnight before surgery and should stay admitted due to the severity of the disease.      Blu Rhoeds MD  Cardiothoracic Surgery  [facility]

## 2022-07-11 NOTE — ASSESSMENT & PLAN NOTE
Home regimen: amlodipine 10, HCTZ, ACE/ARB    - will need to clarify home regimen  - resume norvasc and HCTZ

## 2022-07-11 NOTE — ASSESSMENT & PLAN NOTE
Hx of DM2, HTN, obesity here with chest pain and elevated troponin. LHC on 7/11 with diffuse disease. LAD with 90% ostial to prox and 70% mid LAD, D1 70%, D2 60%, ost to prox LCX with 99% stenosis.     - continue heparin gtt  - TTE  - aspirin 81 mg qd  - atorvastatin 40 mg qd. Check LDL. Goal <70.  - CTS consulted, appreciate assistance   - check TTE

## 2022-07-11 NOTE — H&P
[facility] - Cardiology Stepdown  Cardiology  History and Physical     Patient Name: Isaiah Dorsey Jr.  MRN: [id number]  Admission Date: 7/11/2022  Code Status: Full Code   Attending Provider: Ivan Del Cid MD   Primary Care Physician: Primary Doctor No  Principal Problem:Atherosclerotic heart disease of native coronary artery with unstable angina pectoris    Patient information was obtained from patient and ER records.     Subjective:     Chief Complaint:  Chest Pain     HPI:  62-year-old male with NIDDM2, HTN, HLD, known moderate CAD who presents to the emergency department for midsternal, throbbing, chest pain that started yesterday while at rest.  Patient does not remember what happened with onset of pain.  He denies any associated nausea, vomiting, cough, shortness of breath.  He notes severe radiation of pain to his bilateral upper arms, right worse than left.  He has been able to walk up stairs or do chores without pain but does admit to having heavy arm pain for the last few weeks. He denies, LE swelling, calf pain, or back pain.  Patient states that he was postop follow-up in December with his cardiologist who he sees at [facility].  States he did not have this follow-up, but is scheduled for this month.  He notes distant history of cigarette smoking, over 30 years ago.  He took 2 Aleve prior to arrival to the emergency department today without relief of pain. In the ED his BP was elevated to 180/98 but otherwise he was HDS. Troponin was 0.037. ECG with twi and dynamic st changes. He was taken for LHC which showed multivessel disease as well as dilated ascending aorta. He was admitted to CCU for further workup and evaluation.       Past Medical History:   Diagnosis Date    Diabetes mellitus type 2, uncontrolled, without complications     6.7% Metf 1 g qd, eye 2015, [facility]-2015,     Elevated platelet count 7/23/2015    H/O splenectomy 4/24/2015    doesn't like pneumovax bc caused side effects    HLD  (hyperlipidemia) 1/22/2015    goal LDL < 100, reluctant to take statin    HTN (hypertension), benign 1/22/2015    losartan 100    Morbid obesity with BMI of 45.0-49.9, adult 6/11/2014    Polyp of transverse colon 9/5/2018 2018, repeat 2023    Severe uncontrolled hypertension 6/11/2014       Past Surgical History:   Procedure Laterality Date    splenecectomy         Review of patient's allergies indicates:   Allergen Reactions    Penicillins      Other reaction(s): Itching  Other reaction(s): Hives       No current facility-administered medications on file prior to encounter.     Current Outpatient Medications on File Prior to Encounter   Medication Sig    amlodipine (NORVASC) 10 MG tablet Take 1 tablet (10 mg total) by mouth once daily.    LISINOPRIL ORAL Take 12.5 mg by mouth once daily.    losartan (COZAAR) 100 MG tablet Take 1 tablet (100 mg total) by mouth once daily.    meloxicam (MOBIC) 15 MG tablet TAKE 1 TABLET BY MOUTH EVERY DAY FOR 14 DAYS WITH FOOD (Patient taking differently:  Pt using PRN)    metFORMIN (GLUCOPHAGE) 1000 MG tablet Take 1,000 mg by mouth 2 (two) times daily with meals.    naproxen sodium (ANAPROX) 220 MG tablet Take 220 mg by mouth every 12 (twelve) hours.     Family History       Problem Relation (Age of Onset)    Colon cancer Father (65)    Heart attack Mother (58)    Heart disease Mother    Heart failure Mother    Hyperlipidemia Mother    Hypertension Mother    Lung cancer Mother    Stroke Mother          Tobacco Use    Smoking status: Former Smoker     Types: Cigarettes    Smokeless tobacco: Never Used   Substance and Sexual Activity    Alcohol use: Yes     Alcohol/week: 0.0 standard drinks     Comment: 1 a week    Drug use: Not on file    Sexual activity: Not on file     Review of Systems   Constitutional: Negative for chills and fever.   Cardiovascular:  Positive for chest pain and dyspnea on exertion. Negative for leg swelling.   Respiratory:  Positive for  shortness of breath. Negative for cough.    Endocrine: Negative for cold intolerance.   Musculoskeletal:  Negative for back pain and falls.   Gastrointestinal:  Negative for abdominal pain.   Objective:     Vital Signs (Most Recent):  Temp: 96.7 °F (35.9 °C) (07/11/22 1644)  Pulse: 84 (07/11/22 1645)  Resp: (!) 21 (07/11/22 1630)  BP: (!) 157/97 (07/11/22 1645)  SpO2: 96 % (07/11/22 1645)   Vital Signs (24h Range):  Temp:  [96.7 °F (35.9 °C)-98.7 °F (37.1 °C)] 96.7 °F (35.9 °C)  Pulse:  [55-90] 84  Resp:  [18-22] 21  SpO2:  [94 %-97 %] 96 %  BP: (111-181)/() 157/97     Weight: 130.6 kg (288 lb)  Body mass index is 39.06 kg/m².    SpO2: 96 %  O2 Device (Oxygen Therapy): nasal cannula    No intake or output data in the 24 hours ending 07/11/22 1706    Lines/Drains/Airways       Peripheral Intravenous Line  Duration                  Peripheral IV - Single Lumen 07/11/22 1109 20 G Left Antecubital <1 day                    Physical Exam  Vitals and nursing note reviewed.   Constitutional:       General: He is not in acute distress.     Appearance: He is not ill-appearing.   HENT:      Head: Normocephalic and atraumatic.      Mouth/Throat:      Mouth: Mucous membranes are moist.   Eyes:      Extraocular Movements: Extraocular movements intact.      Pupils: Pupils are equal, round, and reactive to light.   Cardiovascular:      Rate and Rhythm: Normal rate and regular rhythm.      Pulses:           Radial pulses are 2+ on the right side and 2+ on the left side.        Dorsalis pedis pulses are 2+ on the right side and 1+ on the left side.        Posterior tibial pulses are 2+ on the right side and 2+ on the left side.      Heart sounds: No murmur heard.  Pulmonary:      Effort: Pulmonary effort is normal.      Breath sounds: Normal breath sounds.   Abdominal:      General: Abdomen is flat.      Palpations: Abdomen is soft.   Musculoskeletal:      Right lower leg: No edema.      Left lower leg: No edema.        Significant Labs: All pertinent lab results from the last 24 hours have been reviewed.    Significant Imaging:  Reviewed    Assessment and Plan:     * Atherosclerotic heart disease of native coronary artery with unstable angina pectoris  Hx of DM2, HTN, obesity here with chest pain and elevated troponin. [facility] on 7/11 with diffuse disease. LAD with 90% ostial to prox and 70% mid LAD, D1 70%, D2 60%, ost to prox LCX with 99% stenosis.     - continue heparin gtt  - TTE  - aspirin 81 mg qd  - atorvastatin 40 mg qd. Check LDL. Goal <70.  - CTS consulted, appreciate assistance   - check TTE           Dilatation of aorta  BP checked in both arms and equivalent. Strong pulses in both upper and lower extremities. Low concern for dissection however will rule out given dilation of ascending aorta seen on [facility] + elevated BP on arrival.     - fu CTA  - BP control    Type 2 diabetes mellitus  Last A1C 6.6. takes metformin 1000 BID at home.     - repeat A1C  - LDSSI  - detemir 8 qhs  - poct glucose ac/hs   - goal 140-180    HLD (hyperlipidemia)  - HI statin  - recheck lipid panel  - goal <70    HTN (hypertension), benign  Home regimen: amlodipine 10, HCTZ, ACE/ARB    - will need to clarify home regimen  - resume norvasc and HCTZ        VTE Risk Mitigation (From admission, onward)         Ordered     heparin (porcine) injection  As needed (PRN)         07/11/22 1449     heparin (porcine) 750 Units, verapamiL 1.25 mg, nitroGLYCERIN 1.25 mg in sodium chloride 0.9% 250 mL  As needed (PRN)         07/11/22 1434     heparin infusion 1,000 units/500 ml in 0.9% NaCl (on sterile field)  As needed (PRN)         07/11/22 1433     heparin 25,000 units in dextrose 5% (100 units/ml) IV bolus from bag - ADDITIONAL PRN BOLUS - 60 units/kg (max bolus 4000 units)  As needed (PRN)        Question:  Heparin Infusion Adjustment (DO NOT MODIFY ANSWER)  Answer:  [email or web address]\epic\Images\Pharmacy\HeparinInfusions\heparin LOW INTENSITY nomogram for  [facility] [id number]    07/11/22 1300     heparin 25,000 units in dextrose 5% (100 units/ml) IV bolus from bag - ADDITIONAL PRN BOLUS - 30 units/kg (max bolus 4000 units)  As needed (PRN)        Question:  Heparin Infusion Adjustment (DO NOT MODIFY ANSWER)  Answer:  [email or web address]\[email or web address]\Images\Pharmacy\HeparinInfusions\heparin LOW INTENSITY nomogram for [facility] [id number]    07/11/22 1300     heparin 25,000 units in dextrose 5% (100 units/ml) IV bolus from bag INITIAL BOLUS (max bolus 4000 units)  Once        Question:  Heparin Infusion Adjustment (DO NOT MODIFY ANSWER)  Answer:  [email or web address]\epic\Images\Pharmacy\HeparinInfusions\heparin LOW INTENSITY nomogram for [facility] [id number]    07/11/22 1300     heparin 25,000 units in dextrose 5% 250 mL (100 units/mL) infusion LOW INTENSITY nomogram - [facility]  Continuous        Question Answer Comment   Heparin Infusion Adjustment (DO NOT MODIFY ANSWER) \\[email or web address]\epic\Images\Pharmacy\HeparinInfusions\heparin LOW INTENSITY nomogram for [facility] [id number]    Begin at (in units/kg/hr) 12        07/11/22 1300     IP VTE HIGH RISK PATIENT  Once         07/11/22 1318     Place sequential compression device  Until discontinued         07/11/22 1318                Vivian Horton DO  Cardiology   [facility] - Cardiology Stepdown

## 2022-07-12 DIAGNOSIS — I25.10 CORONARY ARTERY DISEASE INVOLVING NATIVE CORONARY ARTERY OF NATIVE HEART WITHOUT ANGINA PECTORIS: Primary | ICD-10-CM

## 2022-07-12 LAB
ALBUMIN SERPL BCP-MCNC: 3.6 G/DL (ref 3.5–5.2)
ALP SERPL-CCNC: 73 U/L (ref 55–135)
ALT SERPL W/O P-5'-P-CCNC: 47 U/L (ref 10–44)
ANION GAP SERPL CALC-SCNC: 13 MMOL/L (ref 8–16)
APTT BLDCRRT: 27.4 SEC (ref 21–32)
APTT BLDCRRT: 29.3 SEC (ref 21–32)
APTT BLDCRRT: 31.6 SEC (ref 21–32)
APTT BLDCRRT: 32.5 SEC (ref 21–32)
ASCENDING AORTA: 4.79 CM
AST SERPL-CCNC: 180 U/L (ref 10–40)
AV INDEX (PROSTH): 0.68
AV MEAN GRADIENT: 5 MMHG
AV PEAK GRADIENT: 11 MMHG
AV VALVE AREA: 2.67 CM2
AV VELOCITY RATIO: 0.63
BASOPHILS # BLD AUTO: 0.11 K/UL (ref 0–0.2)
BASOPHILS # BLD AUTO: 0.12 K/UL (ref 0–0.2)
BASOPHILS NFR BLD: 0.7 % (ref 0–1.9)
BASOPHILS NFR BLD: 0.7 % (ref 0–1.9)
BILIRUB SERPL-MCNC: 0.5 MG/DL (ref 0.1–1)
BSA FOR ECHO PROCEDURE: 2.56 M2
BUN SERPL-MCNC: 10 MG/DL (ref 8–23)
CALCIUM SERPL-MCNC: 9.5 MG/DL (ref 8.7–10.5)
CHLORIDE SERPL-SCNC: 99 MMOL/L (ref 95–110)
CHOLEST SERPL-MCNC: 180 MG/DL (ref 120–199)
CHOLEST/HDLC SERPL: 4.5 {RATIO} (ref 2–5)
CO2 SERPL-SCNC: 20 MMOL/L (ref 23–29)
CREAT SERPL-MCNC: 0.7 MG/DL (ref 0.5–1.4)
CV ECHO LV RWT: 0.42 CM
DIFFERENTIAL METHOD: ABNORMAL
DIFFERENTIAL METHOD: ABNORMAL
DOP CALC AO PEAK VEL: 1.69 M/S
DOP CALC AO VTI: 25.35 CM
DOP CALC LVOT AREA: 3.9 CM2
DOP CALC LVOT DIAMETER: 2.23 CM
DOP CALC LVOT PEAK VEL: 1.07 M/S
DOP CALC LVOT STROKE VOLUME: 67.61 CM3
DOP CALCLVOT PEAK VEL VTI: 17.32 CM
E WAVE DECELERATION TIME: 145.19 MSEC
E/A RATIO: 0.6
E/E' RATIO: 5.58 M/S
ECHO LV POSTERIOR WALL: 1.06 CM (ref 0.6–1.1)
EJECTION FRACTION: 45 %
EOSINOPHIL # BLD AUTO: 0.1 K/UL (ref 0–0.5)
EOSINOPHIL # BLD AUTO: 0.1 K/UL (ref 0–0.5)
EOSINOPHIL NFR BLD: 0.4 % (ref 0–8)
EOSINOPHIL NFR BLD: 0.6 % (ref 0–8)
ERYTHROCYTE [DISTWIDTH] IN BLOOD BY AUTOMATED COUNT: 14.6 % (ref 11.5–14.5)
ERYTHROCYTE [DISTWIDTH] IN BLOOD BY AUTOMATED COUNT: 14.8 % (ref 11.5–14.5)
EST. GFR  (AFRICAN AMERICAN): >60 ML/MIN/1.73 M^2
EST. GFR  (NON AFRICAN AMERICAN): >60 ML/MIN/1.73 M^2
ESTIMATED AVG GLUCOSE: 171 MG/DL (ref 68–131)
FRACTIONAL SHORTENING: 29 % (ref 28–44)
GLUCOSE SERPL-MCNC: 229 MG/DL (ref 70–110)
HBA1C MFR BLD: 7.6 % (ref 4–5.6)
HCT VFR BLD AUTO: 46.6 % (ref 40–54)
HCT VFR BLD AUTO: 49.3 % (ref 40–54)
HDLC SERPL-MCNC: 40 MG/DL (ref 40–75)
HDLC SERPL: 22.2 % (ref 20–50)
HGB BLD-MCNC: 15.4 G/DL (ref 14–18)
HGB BLD-MCNC: 16.5 G/DL (ref 14–18)
IMM GRANULOCYTES # BLD AUTO: 0.05 K/UL (ref 0–0.04)
IMM GRANULOCYTES # BLD AUTO: 0.06 K/UL (ref 0–0.04)
IMM GRANULOCYTES NFR BLD AUTO: 0.3 % (ref 0–0.5)
IMM GRANULOCYTES NFR BLD AUTO: 0.4 % (ref 0–0.5)
INTERVENTRICULAR SEPTUM: 1.19 CM (ref 0.6–1.1)
IVRT: 105.61 MSEC
LA MAJOR: 5.98 CM
LA MINOR: 5.8 CM
LA WIDTH: 4.05 CM
LDLC SERPL CALC-MCNC: 108.8 MG/DL (ref 63–159)
LEFT ATRIUM SIZE: 4.37 CM
LEFT ATRIUM VOLUME INDEX MOD: 22.8 ML/M2
LEFT ATRIUM VOLUME INDEX: 35.7 ML/M2
LEFT ATRIUM VOLUME MOD: 56.46 CM3
LEFT ATRIUM VOLUME: 88.59 CM3
LEFT CBA DIAS: 11 CM/S
LEFT CBA SYS: 37 CM/S
LEFT CCA DIST DIAS: 16 CM/S
LEFT CCA DIST SYS: 57 CM/S
LEFT CCA MID DIAS: 18 CM/S
LEFT CCA MID SYS: 58 CM/S
LEFT CCA PROX DIAS: 15 CM/S
LEFT CCA PROX SYS: 68 CM/S
LEFT ECA DIAS: 29 CM/S
LEFT ECA SYS: 150 CM/S
LEFT ICA DIST DIAS: 25 CM/S
LEFT ICA DIST SYS: 60 CM/S
LEFT ICA MID DIAS: 22 CM/S
LEFT ICA MID SYS: 65 CM/S
LEFT ICA PROX DIAS: 25 CM/S
LEFT ICA PROX SYS: 60 CM/S
LEFT INTERNAL DIMENSION IN SYSTOLE: 3.6 CM (ref 2.1–4)
LEFT VENTRICLE DIASTOLIC VOLUME INDEX: 47.7 ML/M2
LEFT VENTRICLE DIASTOLIC VOLUME: 118.3 ML
LEFT VENTRICLE MASS INDEX: 89 G/M2
LEFT VENTRICLE SYSTOLIC VOLUME INDEX: 16.7 ML/M2
LEFT VENTRICLE SYSTOLIC VOLUME: 41.4 ML
LEFT VENTRICULAR INTERNAL DIMENSION IN DIASTOLE: 5.1 CM (ref 3.5–6)
LEFT VENTRICULAR MASS: 220.59 G
LEFT VERTEBRAL DIAS: 9 CM/S
LEFT VERTEBRAL SYS: 47 CM/S
LV LATERAL E/E' RATIO: 5.3 M/S
LV SEPTAL E/E' RATIO: 5.89 M/S
LYMPHOCYTES # BLD AUTO: 3.9 K/UL (ref 1–4.8)
LYMPHOCYTES # BLD AUTO: 4.4 K/UL (ref 1–4.8)
LYMPHOCYTES NFR BLD: 23.1 % (ref 18–48)
LYMPHOCYTES NFR BLD: 27.1 % (ref 18–48)
MAGNESIUM SERPL-MCNC: 1.8 MG/DL (ref 1.6–2.6)
MCH RBC QN AUTO: 30.3 PG (ref 27–31)
MCH RBC QN AUTO: 30.5 PG (ref 27–31)
MCHC RBC AUTO-ENTMCNC: 33 G/DL (ref 32–36)
MCHC RBC AUTO-ENTMCNC: 33.5 G/DL (ref 32–36)
MCV RBC AUTO: 91 FL (ref 82–98)
MCV RBC AUTO: 92 FL (ref 82–98)
MONOCYTES # BLD AUTO: 1.4 K/UL (ref 0.3–1)
MONOCYTES # BLD AUTO: 1.4 K/UL (ref 0.3–1)
MONOCYTES NFR BLD: 8.2 % (ref 4–15)
MONOCYTES NFR BLD: 8.7 % (ref 4–15)
MV A" WAVE DURATION": 19.12 MSEC
MV PEAK A VEL: 0.88 M/S
MV PEAK E VEL: 0.53 M/S
MV STENOSIS PRESSURE HALF TIME: 42.11 MS
MV VALVE AREA P 1/2 METHOD: 5.22 CM2
NEUTROPHILS # BLD AUTO: 10 K/UL (ref 1.8–7.7)
NEUTROPHILS # BLD AUTO: 11.2 K/UL (ref 1.8–7.7)
NEUTROPHILS NFR BLD: 62.6 % (ref 38–73)
NEUTROPHILS NFR BLD: 67.2 % (ref 38–73)
NONHDLC SERPL-MCNC: 140 MG/DL
NRBC BLD-RTO: 0 /100 WBC
NRBC BLD-RTO: 0 /100 WBC
OHS CV CAROTID RIGHT ICA EDV HIGHEST: 19
OHS CV CAROTID ULTRASOUND LEFT ICA/CCA RATIO: 1.14
OHS CV CAROTID ULTRASOUND RIGHT ICA/CCA RATIO: 0.66
OHS CV PV CAROTID LEFT HIGHEST CCA: 68
OHS CV PV CAROTID LEFT HIGHEST ICA: 65
OHS CV PV CAROTID RIGHT HIGHEST CCA: 71
OHS CV PV CAROTID RIGHT HIGHEST ICA: 47
OHS CV US CAROTID LEFT HIGHEST EDV: 25
PHOSPHATE SERPL-MCNC: 2.7 MG/DL (ref 2.7–4.5)
PISA TR MAX VEL: 2.14 M/S
PLATELET # BLD AUTO: 405 K/UL (ref 150–450)
PLATELET # BLD AUTO: 414 K/UL (ref 150–450)
PMV BLD AUTO: 9.4 FL (ref 9.2–12.9)
PMV BLD AUTO: 9.5 FL (ref 9.2–12.9)
POCT GLUCOSE: 217 MG/DL (ref 70–110)
POCT GLUCOSE: 228 MG/DL (ref 70–110)
POCT GLUCOSE: 231 MG/DL (ref 70–110)
POCT GLUCOSE: 279 MG/DL (ref 70–110)
POTASSIUM SERPL-SCNC: 4 MMOL/L (ref 3.5–5.1)
PROT SERPL-MCNC: 6.7 G/DL (ref 6–8.4)
PULM VEIN S/D RATIO: 1.74
PV PEAK D VEL: 0.19 M/S
PV PEAK S VEL: 0.33 M/S
RA MAJOR: 5.04 CM
RA PRESSURE: 3 MMHG
RA WIDTH: 2.89 CM
RBC # BLD AUTO: 5.08 M/UL (ref 4.6–6.2)
RBC # BLD AUTO: 5.41 M/UL (ref 4.6–6.2)
RIGHT ARM DIASTOLIC BLOOD PRESSURE: 101 MMHG
RIGHT ARM SYSTOLIC BLOOD PRESSURE: 160 MMHG
RIGHT CBA DIAS: 15 CM/S
RIGHT CBA SYS: 54 CM/S
RIGHT CCA DIST DIAS: 17 CM/S
RIGHT CCA DIST SYS: 71 CM/S
RIGHT CCA MID DIAS: 14 CM/S
RIGHT CCA MID SYS: 57 CM/S
RIGHT CCA PROX DIAS: 10 CM/S
RIGHT CCA PROX SYS: 59 CM/S
RIGHT ECA DIAS: 15 CM/S
RIGHT ECA SYS: 90 CM/S
RIGHT ICA DIST DIAS: 16 CM/S
RIGHT ICA DIST SYS: 43 CM/S
RIGHT ICA MID DIAS: 19 CM/S
RIGHT ICA MID SYS: 47 CM/S
RIGHT ICA PROX DIAS: 8 CM/S
RIGHT ICA PROX SYS: 38 CM/S
RIGHT VENTRICULAR END-DIASTOLIC DIMENSION: 3.28 CM
RIGHT VERTEBRAL DIAS: 10 CM/S
RIGHT VERTEBRAL SYS: 35 CM/S
RV TISSUE DOPPLER FREE WALL SYSTOLIC VELOCITY 1 (APICAL 4 CHAMBER VIEW): 20.85 CM/S
SINUS: 4.11 CM
SODIUM SERPL-SCNC: 132 MMOL/L (ref 136–145)
STJ: 3.68 CM
TDI LATERAL: 0.1 M/S
TDI SEPTAL: 0.09 M/S
TDI: 0.1 M/S
TR MAX PG: 18 MMHG
TRICUSPID ANNULAR PLANE SYSTOLIC EXCURSION: 2.77 CM
TRIGL SERPL-MCNC: 156 MG/DL (ref 30–150)
TROPONIN I SERPL DL<=0.01 NG/ML-MCNC: 17.18 NG/ML (ref 0–0.03)
TV REST PULMONARY ARTERY PRESSURE: 21 MMHG
WBC # BLD AUTO: 16.03 K/UL (ref 3.9–12.7)
WBC # BLD AUTO: 16.65 K/UL (ref 3.9–12.7)

## 2022-07-12 PROCEDURE — 84484 ASSAY OF TROPONIN QUANT: CPT | Performed by: NURSE PRACTITIONER

## 2022-07-12 PROCEDURE — 63600175 PHARM REV CODE 636 W HCPCS: Performed by: EMERGENCY MEDICINE

## 2022-07-12 PROCEDURE — 84100 ASSAY OF PHOSPHORUS: CPT | Performed by: INTERNAL MEDICINE

## 2022-07-12 PROCEDURE — 85730 THROMBOPLASTIN TIME PARTIAL: CPT | Mod: 91 | Performed by: INTERNAL MEDICINE

## 2022-07-12 PROCEDURE — 85025 COMPLETE CBC W/AUTO DIFF WBC: CPT | Mod: 91 | Performed by: EMERGENCY MEDICINE

## 2022-07-12 PROCEDURE — 25000003 PHARM REV CODE 250: Performed by: EMERGENCY MEDICINE

## 2022-07-12 PROCEDURE — 63600175 PHARM REV CODE 636 W HCPCS: Performed by: INTERNAL MEDICINE

## 2022-07-12 PROCEDURE — 20600001 HC STEP DOWN PRIVATE ROOM

## 2022-07-12 PROCEDURE — 80053 COMPREHEN METABOLIC PANEL: CPT | Performed by: INTERNAL MEDICINE

## 2022-07-12 PROCEDURE — 83036 HEMOGLOBIN GLYCOSYLATED A1C: CPT | Performed by: STUDENT IN AN ORGANIZED HEALTH CARE EDUCATION/TRAINING PROGRAM

## 2022-07-12 PROCEDURE — 80061 LIPID PANEL: CPT | Performed by: EMERGENCY MEDICINE

## 2022-07-12 PROCEDURE — 25000003 PHARM REV CODE 250: Performed by: STUDENT IN AN ORGANIZED HEALTH CARE EDUCATION/TRAINING PROGRAM

## 2022-07-12 PROCEDURE — 85730 THROMBOPLASTIN TIME PARTIAL: CPT | Performed by: EMERGENCY MEDICINE

## 2022-07-12 PROCEDURE — 63600175 PHARM REV CODE 636 W HCPCS: Performed by: STUDENT IN AN ORGANIZED HEALTH CARE EDUCATION/TRAINING PROGRAM

## 2022-07-12 PROCEDURE — 36415 COLL VENOUS BLD VENIPUNCTURE: CPT | Performed by: NURSE PRACTITIONER

## 2022-07-12 PROCEDURE — 25000003 PHARM REV CODE 250: Performed by: INTERNAL MEDICINE

## 2022-07-12 PROCEDURE — 85025 COMPLETE CBC W/AUTO DIFF WBC: CPT | Performed by: STUDENT IN AN ORGANIZED HEALTH CARE EDUCATION/TRAINING PROGRAM

## 2022-07-12 PROCEDURE — 83735 ASSAY OF MAGNESIUM: CPT | Performed by: INTERNAL MEDICINE

## 2022-07-12 PROCEDURE — 36415 COLL VENOUS BLD VENIPUNCTURE: CPT | Performed by: INTERNAL MEDICINE

## 2022-07-12 RX ORDER — HYDROCHLOROTHIAZIDE 12.5 MG/1
25 TABLET ORAL DAILY
Status: DISCONTINUED | OUTPATIENT
Start: 2022-07-13 | End: 2022-07-13

## 2022-07-12 RX ORDER — ACETAMINOPHEN 325 MG/1
650 TABLET ORAL EVERY 6 HOURS PRN
Status: DISCONTINUED | OUTPATIENT
Start: 2022-07-12 | End: 2022-07-18

## 2022-07-12 RX ORDER — INSULIN ASPART 100 [IU]/ML
3 INJECTION, SOLUTION INTRAVENOUS; SUBCUTANEOUS
Status: DISCONTINUED | OUTPATIENT
Start: 2022-07-12 | End: 2022-07-13

## 2022-07-12 RX ORDER — LOSARTAN POTASSIUM 50 MG/1
100 TABLET ORAL DAILY
Status: DISCONTINUED | OUTPATIENT
Start: 2022-07-12 | End: 2022-07-17

## 2022-07-12 RX ADMIN — INSULIN ASPART 3 UNITS: 100 INJECTION, SOLUTION INTRAVENOUS; SUBCUTANEOUS at 05:07

## 2022-07-12 RX ADMIN — INSULIN ASPART 3 UNITS: 100 INJECTION, SOLUTION INTRAVENOUS; SUBCUTANEOUS at 12:07

## 2022-07-12 RX ADMIN — HEPARIN SODIUM 15 UNITS/KG/HR: 5000 INJECTION INTRAVENOUS; SUBCUTANEOUS at 07:07

## 2022-07-12 RX ADMIN — AMLODIPINE BESYLATE 10 MG: 10 TABLET ORAL at 09:07

## 2022-07-12 RX ADMIN — ASPIRIN 81 MG CHEWABLE TABLET 81 MG: 81 TABLET CHEWABLE at 09:07

## 2022-07-12 RX ADMIN — ACETAMINOPHEN 650 MG: 325 TABLET ORAL at 09:07

## 2022-07-12 RX ADMIN — LOSARTAN POTASSIUM 100 MG: 50 TABLET, FILM COATED ORAL at 09:07

## 2022-07-12 RX ADMIN — HYDROCHLOROTHIAZIDE 12.5 MG: 12.5 TABLET ORAL at 09:07

## 2022-07-12 RX ADMIN — INSULIN ASPART 6 UNITS: 100 INJECTION, SOLUTION INTRAVENOUS; SUBCUTANEOUS at 05:07

## 2022-07-12 RX ADMIN — NITROGLYCERIN 0.4 MG: 0.4 TABLET, ORALLY DISINTEGRATING SUBLINGUAL at 09:07

## 2022-07-12 RX ADMIN — ATORVASTATIN CALCIUM 40 MG: 20 TABLET, FILM COATED ORAL at 09:07

## 2022-07-12 RX ADMIN — INSULIN ASPART 4 UNITS: 100 INJECTION, SOLUTION INTRAVENOUS; SUBCUTANEOUS at 09:07

## 2022-07-12 RX ADMIN — INSULIN ASPART 4 UNITS: 100 INJECTION, SOLUTION INTRAVENOUS; SUBCUTANEOUS at 12:07

## 2022-07-12 RX ADMIN — ACETAMINOPHEN 650 MG: 325 TABLET ORAL at 12:07

## 2022-07-12 RX ADMIN — INSULIN ASPART 2 UNITS: 100 INJECTION, SOLUTION INTRAVENOUS; SUBCUTANEOUS at 09:07

## 2022-07-12 RX ADMIN — HEPARIN SODIUM 15 UNITS/KG/HR: 5000 INJECTION INTRAVENOUS; SUBCUTANEOUS at 02:07

## 2022-07-12 RX ADMIN — HEPARIN SODIUM 21 UNITS/KG/HR: 5000 INJECTION INTRAVENOUS; SUBCUTANEOUS at 09:07

## 2022-07-12 NOTE — HOSPITAL COURSE
Admitted to CCU after presenting to the hospital for NSTEMI with troponin elevation to 0.037 and ecg with twi in lead III and st depressions anteroseptal leads. He was found to have multi-vessel coronary disease on LHC with LAD with 90% ostial to prox and 70% mid LAD, D1 70%, D2 60%, ost to prox LCX with 99% stenosis. Cardiothoracic surgery was consulted and recommended to proceed with CABG this admission. He was continued on a heparin gtt while awaiting surgery. He was found to have a dilated aorta during LHC with aneurysm of 4.7cm was seen on ct chest. His LV systolic function resulted at 40-45% in the territory of the left circumflex. He had a persistent leukocytosis without left shift and sinus tachycardia so was started on broad spectrum antibiotics vanc/cefepime. His culture data was negative and peripheral smear did not demonstrate any blasts and was more consistent with a reactive process.

## 2022-07-12 NOTE — PROGRESS NOTES
"[facility] - Cardiology Stepdown  Cardiology  Progress Note    Patient Name: Isaiah Dorsey Jr.  MRN: [id number]  Admission Date: 7/11/2022  Hospital Length of Stay: 1 days  Code Status: Full Code   Attending Physician: Ivan Del Cid MD   Primary Care Physician: Primary Doctor No  Expected Discharge Date:   Principal Problem:Atherosclerotic heart disease of native coronary artery with unstable angina pectoris    Subjective:     Hospital Course:   Admitted to CCU after presenting to the hospital for NSTEMI with troponin elevation to 0.037 and ecg with twi in lead III and st depressions anteroseptal leads. He was found to have multi-vessel coronary disease on LHC with LAD with 90% ostial to prox and 70% mid LAD, D1 70%, D2 60%, ost to prox LCX with 99% stenosis. Cardiothoracic surgery was consulted and recommended to proceed with CABG this admission. He was continued on a heparin gtt while awaiting surgery. He was found to have a dilated aorta during LHC so CTA ordered to evaluate for dissection.       Interval History: overnight patient had substernal chest pressure with radiation to bilateral arms which improved with NG. Ecg without dynamic changes. This AM he reports "soreness" in his chest but no pressure. His BP remains elevated >150 systolic.     Review of Systems   Constitutional: Negative for chills and fever.   Cardiovascular:  Positive for chest pain and dyspnea on exertion. Negative for leg swelling.   Respiratory:  Positive for shortness of breath. Negative for cough.    Endocrine: Negative for cold intolerance.   Musculoskeletal:  Negative for back pain and falls.   Gastrointestinal:  Negative for abdominal pain.   Objective:     Vital Signs (Most Recent):  Temp: 97.5 °F (36.4 °C) (07/12/22 1241)  Pulse: 104 (07/12/22 1241)  Resp: 18 (07/12/22 1241)  BP: (!) 160/101 (07/12/22 1241)  SpO2: 95 % (07/12/22 1241)   Vital Signs (24h Range):  Temp:  [96.7 °F (35.9 °C)-98.8 °F (37.1 °C)] 97.5 °F (36.4 °C)  Pulse:  " [] 104  Resp:  [12-24] 18  SpO2:  [89 %-98 %] 95 %  BP: (133-188)/() 160/101     Weight: 129.3 kg (285 lb 0.9 oz)  Body mass index is 38.66 kg/m².     SpO2: 95 %  O2 Device (Oxygen Therapy): nasal cannula    No intake or output data in the 24 hours ending 07/12/22 1307    Lines/Drains/Airways       Peripheral Intravenous Line  Duration                  Peripheral IV - Single Lumen 07/11/22 1109 20 G Left Antecubital 1 day         Peripheral IV - Single Lumen 07/12/22 1010 18 G;1 3/4 in Anterior;Right Forearm <1 day                    Physical Exam  Vitals and nursing note reviewed.   Constitutional:       General: He is not in acute distress.     Appearance: He is not ill-appearing.   HENT:      Head: Normocephalic and atraumatic.      Mouth/Throat:      Mouth: Mucous membranes are moist.   Eyes:      Extraocular Movements: Extraocular movements intact.      Pupils: Pupils are equal, round, and reactive to light.   Cardiovascular:      Rate and Rhythm: Normal rate and regular rhythm.      Pulses:           Radial pulses are 2+ on the right side and 2+ on the left side.        Dorsalis pedis pulses are 2+ on the right side and 1+ on the left side.        Posterior tibial pulses are 2+ on the right side and 2+ on the left side.      Heart sounds: No murmur heard.  Pulmonary:      Effort: Pulmonary effort is normal.      Breath sounds: Normal breath sounds.   Abdominal:      General: Abdomen is flat.      Palpations: Abdomen is soft.   Musculoskeletal:      Right lower leg: No edema.      Left lower leg: No edema.       Significant Labs: All pertinent lab results from the last 24 hours have been reviewed.    Significant Imaging:  Reviewed    Assessment and Plan:       * Atherosclerotic heart disease of native coronary artery with unstable angina pectoris  Hx of DM2, HTN, obesity here with NSTEMI with trop at 0.03 and st/t changes. [facility] on 7/11 with diffuse disease. LAD with 90% ostial to prox and 70% mid  LAD, D1 70%, D2 60%, ost to prox LCX with 99% stenosis. CTS evaluated patient, will undergo CABG this admission.     - heparin gtt  - fu TTE  - aspirin 81 mg qd  - atorvastatin 40 mg qd. . Goal <70.  - CTS following - CABG evaluation underway           Dilatation of aorta  BP checked in both arms and equivalent. Strong pulses in both upper and lower extremities. Low concern for dissection however will rule out given dilation of ascending aorta seen on LHC + elevated BP on arrival.     - fu CTA  - BP control    Type 2 diabetes mellitus  Last A1C 6.6. takes metformin 1000 BID at home.     - repeat A1C  - LDSSI  - detemir 8 qhs  - poct glucose ac/hs   - goal 140-180    HLD (hyperlipidemia)  .    - HI statin  - goal <70    HTN (hypertension), benign  Home regimen: amlodipine 10, HCTZ, ACE/ARB    - amlodipine 10 mg qd  - HCTZ 25 mg qd  - losartan 100 mg daily   - goal <130/80        VTE Risk Mitigation (From admission, onward)         Ordered     heparin 25,000 units in dextrose 5% (100 units/ml) IV bolus from bag - ADDITIONAL PRN BOLUS - 60 units/kg (max bolus 4000 units)  As needed (PRN)        Question:  Heparin Infusion Adjustment (DO NOT MODIFY ANSWER)  Answer:  [email or web address]\Images\Pharmacy\HeparinInfusions\heparin LOW INTENSITY nomogram for [facility] [id number]    07/11/22 1300     heparin 25,000 units in dextrose 5% (100 units/ml) IV bolus from bag - ADDITIONAL PRN BOLUS - 30 units/kg (max bolus 4000 units)  As needed (PRN)        Question:  Heparin Infusion Adjustment (DO NOT MODIFY ANSWER)  Answer:  [email or web address]\[email or web address]\Images\Pharmacy\HeparinInfusions\heparin LOW INTENSITY nomogram for [facility] [id number].pdf    07/11/22 1300     heparin 25,000 units in dextrose 5% 250 mL (100 units/mL) infusion LOW INTENSITY nomogram - [facility]  Continuous        Question Answer Comment   Heparin Infusion Adjustment (DO NOT MODIFY ANSWER) \\[email or web address]\epic\Images\Pharmacy\HeparinInfusions\heparin LOW INTENSITY nomogram for [facility]  [id number]    Begin at (in units/kg/hr) 12        07/11/22 1300     IP VTE HIGH RISK PATIENT  Once         07/11/22 1318     Place sequential compression device  Until discontinued         07/11/22 1318                Vivian Horton DO  Cardiology  [facility] - Cardiology Stepdown

## 2022-07-12 NOTE — ASSESSMENT & PLAN NOTE
Hx of DM2, HTN, obesity here with NSTEMI with trop at 0.03 and st/t changes. LHC on 7/11 with diffuse disease. LAD with 90% ostial to prox and 70% mid LAD, D1 70%, D2 60%, ost to prox LCX with 99% stenosis. CTS evaluated patient, will undergo CABG this admission.     - heparin gtt  - fu TTE  - aspirin 81 mg qd  - atorvastatin 40 mg qd. . Goal <70.  - CTS following - CABG evaluation underway

## 2022-07-12 NOTE — ASSESSMENT & PLAN NOTE
Home regimen: amlodipine 10, HCTZ, ACE/ARB    - amlodipine 10 mg qd  - HCTZ 25 mg qd  - losartan 100 mg daily   - goal <130/80

## 2022-07-12 NOTE — PROGRESS NOTES
Upon entrance of patient's room patient complaining of 6/10 Chest pain. Patient assessed. MD notified. Orders to be placed. Will administer medication and continue to monitor.      07/11/22 2006   Vital Signs   Temp 98.8 °F (37.1 °C)   Temp src Oral   Pulse 97   Heart Rate Source Monitor   Resp (!) 22   SpO2 96 %   BP (!) 174/107   MAP (mmHg) 135   BP Location Left arm   Patient Position Lying   Assessments (Pre/Post)   Level of Consciousness (AVPU) alert

## 2022-07-12 NOTE — PLAN OF CARE
Patient remains free of falls and injury throughout shift. VSS except hypertension. Continuous telemetry monitoring remains in place. Patient complaints of CP. MD notified. Orders placed Nitroglycerin administered x3 and Morphine administered per MD orders with some relief. Patient not taken to CT due to chest pain complaints at the time of transportation assistance's arrival. Patient Radial puncture site remains CDI without any obvious signs of hematoma formation. Heparin gtt continues to infuse at titrated rate of 15 units/kg/hr.  Patient OOB with assistance to restroom. Bed in low position. Call light within patient's reach.  POC reviewed with patient. Fall precautions maintained. Will continue to monitor.

## 2022-07-12 NOTE — SUBJECTIVE & OBJECTIVE
"Interval History: overnight patient had substernal chest pressure with radiation to bilateral arms which improved with NG. Ecg without dynamic changes. This AM he reports "soreness" in his chest but no pressure. His BP remains elevated >150 systolic.     Review of Systems   Constitutional: Negative for chills and fever.   Cardiovascular:  Positive for chest pain and dyspnea on exertion. Negative for leg swelling.   Respiratory:  Positive for shortness of breath. Negative for cough.    Endocrine: Negative for cold intolerance.   Musculoskeletal:  Negative for back pain and falls.   Gastrointestinal:  Negative for abdominal pain.   Objective:     Vital Signs (Most Recent):  Temp: 97.5 °F (36.4 °C) (07/12/22 1241)  Pulse: 104 (07/12/22 1241)  Resp: 18 (07/12/22 1241)  BP: (!) 160/101 (07/12/22 1241)  SpO2: 95 % (07/12/22 1241)   Vital Signs (24h Range):  Temp:  [96.7 °F (35.9 °C)-98.8 °F (37.1 °C)] 97.5 °F (36.4 °C)  Pulse:  [] 104  Resp:  [12-24] 18  SpO2:  [89 %-98 %] 95 %  BP: (133-188)/() 160/101     Weight: 129.3 kg (285 lb 0.9 oz)  Body mass index is 38.66 kg/m².     SpO2: 95 %  O2 Device (Oxygen Therapy): nasal cannula    No intake or output data in the 24 hours ending 07/12/22 1307    Lines/Drains/Airways       Peripheral Intravenous Line  Duration                  Peripheral IV - Single Lumen 07/11/22 1109 20 G Left Antecubital 1 day         Peripheral IV - Single Lumen 07/12/22 1010 18 G;1 3/4 in Anterior;Right Forearm <1 day                    Physical Exam  Vitals and nursing note reviewed.   Constitutional:       General: He is not in acute distress.     Appearance: He is not ill-appearing.   HENT:      Head: Normocephalic and atraumatic.      Mouth/Throat:      Mouth: Mucous membranes are moist.   Eyes:      Extraocular Movements: Extraocular movements intact.      Pupils: Pupils are equal, round, and reactive to light.   Cardiovascular:      Rate and Rhythm: Normal rate and regular rhythm.    "   Pulses:           Radial pulses are 2+ on the right side and 2+ on the left side.        Dorsalis pedis pulses are 2+ on the right side and 1+ on the left side.        Posterior tibial pulses are 2+ on the right side and 2+ on the left side.      Heart sounds: No murmur heard.  Pulmonary:      Effort: Pulmonary effort is normal.      Breath sounds: Normal breath sounds.   Abdominal:      General: Abdomen is flat.      Palpations: Abdomen is soft.   Musculoskeletal:      Right lower leg: No edema.      Left lower leg: No edema.       Significant Labs: All pertinent lab results from the last 24 hours have been reviewed.    Significant Imaging:  Reviewed

## 2022-07-12 NOTE — PLAN OF CARE
[facility] - Cardiology Stepdown  Initial Discharge Assessment       Primary Care Provider: Primary Doctor No    Admission Diagnosis: Unstable angina [I20.0]  Non-STEMI (non-ST elevated myocardial infarction) [I21.4]  NSTEMI (non-ST elevated myocardial infarction) [I21.4]  Chest pain [R07.9]    Admission Date: 7/11/2022  Expected Discharge Date:     Discharge Barriers Identified: None    Payor: BLUE CROSS BLUE SHIELD / Plan: BLUE CONNECT / Product Type: HMO /     Extended Emergency Contact Information  Primary Emergency Contact: Angie Dorsey  Address: [address] [place] [address] [facility]  Home Phone: [phone]  Mobile Phone: [phone]  Relation: Son    Discharge Plan A: Home with family  Discharge Plan B: Home      [facility]  for [place], [place] - [address] [facility]  [address] [facility] [address]  Phone: [phone] Fax: [phone]      Initial Assessment (most recent)     Adult Discharge Assessment - 07/12/22 1026        Discharge Assessment    Assessment Type Discharge Planning Assessment     Confirmed/corrected address, phone number and insurance Yes     Confirmed Demographics Correct on Facesheet     Source of Information patient     Communicated STELLA with patient/caregiver Yes     Lives With spouse;child(kentrell), adult     Current cognitive status: Alert/Oriented     Walking or Climbing Stairs Difficulty none     Dressing/Bathing Difficulty none     Equipment Currently Used at Home none     Readmission within 30 days? No     Patient currently being followed by outpatient case management? No     Do you currently have service(s) that help you manage your care at home? No     Is the pt/caregiver preference to resume services with current agency No     Do you take prescription medications? Yes     Do you have prescription coverage? Yes     Coverage BCBS     Do you have any problems affording any of your prescribed medications? No     How do you  get to doctors appointments? car, drives self     Are you on dialysis? No     Do you take coumadin? No     Discharge Plan A Home with family     Discharge Plan B Home     DME Needed Upon Discharge  none     Discharge Plan discussed with: Patient;Adult children;Spouse/sig other     Discharge Barriers Identified None                    Pt admitted 7/11/2022 11:02 AM    For Unstable angina [I20.0]  Non-STEMI (non-ST elevated myocardial infarction) [I21.4]  NSTEMI (non-ST elevated myocardial infarction) [I21.4]  Chest pain [R07.9]       DPEA completed with pt, spouse Rica [phone] and son Angie by bedside. Pt lives at home with spouse and is independent in his ADLS. Scheduled to have surgery Monday but as of now plan is home no need.   Discharge packet provided to pt, all questions answered prior to leaving room and contact number provided to reach CM.      PCP [facility] at [facility]     Future Appointments   Date Time Provider Department Center   7/12/2022  3[facility]   7/12/2022  4:00 PM VASCULAR, CARDIOLOGY [facility] VASCCRD [facility]   7/13/2022  8:00 AM Janet Pierce DPM [facility] POD [facility]   7/18/2022  8:15 AM Janet Pierce DPM [facility] POD [facility]   7/26/2022 10:00 AM Gage Tobias MD [facility] CARDIO [facility]         FLOR VallejoN, RN   Case Management [phone]          ]

## 2022-07-12 NOTE — NURSING
T-band  Remove T r band to rt wrist. Applied dressing to rt wrist. No hematoma noted or swelling. Updated primary nurse.

## 2022-07-12 NOTE — PROGRESS NOTES
07/12/22 0408   Vital Signs   Temp 97.8 °F (36.6 °C)   Temp src Oral   Pulse 84   Heart Rate Source Monitor   Resp 12   SpO2 96 %   Pulse Oximetry Type Intermittent   BP (!) 188/103   MAP (mmHg) 139   BP Location Left arm   BP Method Automatic   Patient Position Sitting        Cardiac/Telemetry Details / Alarms   Cardiac/Telemetry Monitor On Yes   Cardiac/Telemetry Audible Yes   Cardiac/Telemetry Alarms Set Yes   Assessments (Pre/Post)   Level of Consciousness (AVPU) alert       Attempted to notify physician on call via the  of Patient's elevated BP and lack of Anti-Hypertensive agents available as PRN medications on the MAR. Return call has yet to be received from MD on call after calling the  twice (0451 and 0525 AM). Patient lying in bed asleep at this time and denies a headache or worsening CP or SOB at this time. Will continue to monitor.

## 2022-07-13 PROBLEM — I50.22 HEART FAILURE WITH MID-RANGE EJECTION FRACTION: Status: ACTIVE | Noted: 2022-07-13

## 2022-07-13 PROBLEM — R65.10 SIRS (SYSTEMIC INFLAMMATORY RESPONSE SYNDROME): Status: ACTIVE | Noted: 2022-07-13

## 2022-07-13 LAB
ALBUMIN SERPL BCP-MCNC: 3.6 G/DL (ref 3.5–5.2)
ALP SERPL-CCNC: 81 U/L (ref 55–135)
ALT SERPL W/O P-5'-P-CCNC: 54 U/L (ref 10–44)
ANION GAP SERPL CALC-SCNC: 10 MMOL/L (ref 8–16)
APTT BLDCRRT: 39 SEC (ref 21–32)
APTT BLDCRRT: 39 SEC (ref 21–32)
AST SERPL-CCNC: 157 U/L (ref 10–40)
BASOPHILS # BLD AUTO: 0.09 K/UL (ref 0–0.2)
BASOPHILS # BLD AUTO: 0.11 K/UL (ref 0–0.2)
BASOPHILS NFR BLD: 0.5 % (ref 0–1.9)
BASOPHILS NFR BLD: 0.7 % (ref 0–1.9)
BILIRUB SERPL-MCNC: 0.8 MG/DL (ref 0.1–1)
BILIRUB UR QL STRIP: NEGATIVE
BUN SERPL-MCNC: 6 MG/DL (ref 8–23)
CALCIUM SERPL-MCNC: 9.4 MG/DL (ref 8.7–10.5)
CHLORIDE SERPL-SCNC: 97 MMOL/L (ref 95–110)
CLARITY UR REFRACT.AUTO: CLEAR
CO2 SERPL-SCNC: 23 MMOL/L (ref 23–29)
COLOR UR AUTO: YELLOW
CREAT SERPL-MCNC: 0.7 MG/DL (ref 0.5–1.4)
DIFFERENTIAL METHOD: ABNORMAL
DIFFERENTIAL METHOD: ABNORMAL
EOSINOPHIL # BLD AUTO: 0 K/UL (ref 0–0.5)
EOSINOPHIL # BLD AUTO: 0 K/UL (ref 0–0.5)
EOSINOPHIL NFR BLD: 0.2 % (ref 0–8)
EOSINOPHIL NFR BLD: 0.2 % (ref 0–8)
ERYTHROCYTE [DISTWIDTH] IN BLOOD BY AUTOMATED COUNT: 14.6 % (ref 11.5–14.5)
ERYTHROCYTE [DISTWIDTH] IN BLOOD BY AUTOMATED COUNT: 14.7 % (ref 11.5–14.5)
EST. GFR  (AFRICAN AMERICAN): >60 ML/MIN/1.73 M^2
EST. GFR  (NON AFRICAN AMERICAN): >60 ML/MIN/1.73 M^2
GLUCOSE SERPL-MCNC: 229 MG/DL (ref 70–110)
GLUCOSE UR QL STRIP: ABNORMAL
HCT VFR BLD AUTO: 48.8 % (ref 40–54)
HCT VFR BLD AUTO: 49.8 % (ref 40–54)
HGB BLD-MCNC: 16.2 G/DL (ref 14–18)
HGB BLD-MCNC: 16.6 G/DL (ref 14–18)
HGB UR QL STRIP: NEGATIVE
IMM GRANULOCYTES # BLD AUTO: 0.07 K/UL (ref 0–0.04)
IMM GRANULOCYTES # BLD AUTO: 0.09 K/UL (ref 0–0.04)
IMM GRANULOCYTES NFR BLD AUTO: 0.4 % (ref 0–0.5)
IMM GRANULOCYTES NFR BLD AUTO: 0.5 % (ref 0–0.5)
KETONES UR QL STRIP: NEGATIVE
LEUKOCYTE ESTERASE UR QL STRIP: NEGATIVE
LYMPHOCYTES # BLD AUTO: 4.1 K/UL (ref 1–4.8)
LYMPHOCYTES # BLD AUTO: 4.2 K/UL (ref 1–4.8)
LYMPHOCYTES NFR BLD: 24 % (ref 18–48)
LYMPHOCYTES NFR BLD: 25.2 % (ref 18–48)
MAGNESIUM SERPL-MCNC: 1.9 MG/DL (ref 1.6–2.6)
MCH RBC QN AUTO: 30.2 PG (ref 27–31)
MCH RBC QN AUTO: 30.2 PG (ref 27–31)
MCHC RBC AUTO-ENTMCNC: 33.2 G/DL (ref 32–36)
MCHC RBC AUTO-ENTMCNC: 33.3 G/DL (ref 32–36)
MCV RBC AUTO: 91 FL (ref 82–98)
MCV RBC AUTO: 91 FL (ref 82–98)
MONOCYTES # BLD AUTO: 1.7 K/UL (ref 0.3–1)
MONOCYTES # BLD AUTO: 1.8 K/UL (ref 0.3–1)
MONOCYTES NFR BLD: 10.5 % (ref 4–15)
MONOCYTES NFR BLD: 10.6 % (ref 4–15)
NEUTROPHILS # BLD AUTO: 10.4 K/UL (ref 1.8–7.7)
NEUTROPHILS # BLD AUTO: 11 K/UL (ref 1.8–7.7)
NEUTROPHILS NFR BLD: 62.9 % (ref 38–73)
NEUTROPHILS NFR BLD: 64.3 % (ref 38–73)
NITRITE UR QL STRIP: NEGATIVE
NRBC BLD-RTO: 0 /100 WBC
NRBC BLD-RTO: 0 /100 WBC
PATH REV BLD -IMP: NORMAL
PH UR STRIP: 6 [PH] (ref 5–8)
PHOSPHATE SERPL-MCNC: 2.5 MG/DL (ref 2.7–4.5)
PLATELET # BLD AUTO: 386 K/UL (ref 150–450)
PLATELET # BLD AUTO: 410 K/UL (ref 150–450)
PMV BLD AUTO: 9.5 FL (ref 9.2–12.9)
PMV BLD AUTO: 9.7 FL (ref 9.2–12.9)
POCT GLUCOSE: 200 MG/DL (ref 70–110)
POCT GLUCOSE: 276 MG/DL (ref 70–110)
POCT GLUCOSE: 289 MG/DL (ref 70–110)
POTASSIUM SERPL-SCNC: 3.5 MMOL/L (ref 3.5–5.1)
PROT SERPL-MCNC: 7.4 G/DL (ref 6–8.4)
PROT UR QL STRIP: NEGATIVE
RBC # BLD AUTO: 5.37 M/UL (ref 4.6–6.2)
RBC # BLD AUTO: 5.49 M/UL (ref 4.6–6.2)
SODIUM SERPL-SCNC: 130 MMOL/L (ref 136–145)
SP GR UR STRIP: 1.01 (ref 1–1.03)
TSH SERPL DL<=0.005 MIU/L-ACNC: 2.84 UIU/ML (ref 0.4–4)
URN SPEC COLLECT METH UR: ABNORMAL
WBC # BLD AUTO: 16.52 K/UL (ref 3.9–12.7)
WBC # BLD AUTO: 17.19 K/UL (ref 3.9–12.7)

## 2022-07-13 PROCEDURE — 85025 COMPLETE CBC W/AUTO DIFF WBC: CPT | Performed by: EMERGENCY MEDICINE

## 2022-07-13 PROCEDURE — 63600175 PHARM REV CODE 636 W HCPCS: Performed by: INTERNAL MEDICINE

## 2022-07-13 PROCEDURE — 94761 N-INVAS EAR/PLS OXIMETRY MLT: CPT

## 2022-07-13 PROCEDURE — 87040 BLOOD CULTURE FOR BACTERIA: CPT | Performed by: STUDENT IN AN ORGANIZED HEALTH CARE EDUCATION/TRAINING PROGRAM

## 2022-07-13 PROCEDURE — 99233 PR SUBSEQUENT HOSPITAL CARE,LEVL III: ICD-10-PCS | Mod: ,,, | Performed by: INTERNAL MEDICINE

## 2022-07-13 PROCEDURE — 83735 ASSAY OF MAGNESIUM: CPT | Performed by: INTERNAL MEDICINE

## 2022-07-13 PROCEDURE — 99233 SBSQ HOSP IP/OBS HIGH 50: CPT | Mod: ,,, | Performed by: INTERNAL MEDICINE

## 2022-07-13 PROCEDURE — 25000003 PHARM REV CODE 250: Performed by: INTERNAL MEDICINE

## 2022-07-13 PROCEDURE — 25000003 PHARM REV CODE 250: Performed by: EMERGENCY MEDICINE

## 2022-07-13 PROCEDURE — 85730 THROMBOPLASTIN TIME PARTIAL: CPT | Mod: 91 | Performed by: INTERNAL MEDICINE

## 2022-07-13 PROCEDURE — 63600175 PHARM REV CODE 636 W HCPCS: Performed by: EMERGENCY MEDICINE

## 2022-07-13 PROCEDURE — 85025 COMPLETE CBC W/AUTO DIFF WBC: CPT | Mod: 91 | Performed by: STUDENT IN AN ORGANIZED HEALTH CARE EDUCATION/TRAINING PROGRAM

## 2022-07-13 PROCEDURE — 84100 ASSAY OF PHOSPHORUS: CPT | Performed by: INTERNAL MEDICINE

## 2022-07-13 PROCEDURE — 85060 PATHOLOGIST REVIEW: ICD-10-PCS | Mod: ,,, | Performed by: PATHOLOGY

## 2022-07-13 PROCEDURE — 85060 BLOOD SMEAR INTERPRETATION: CPT | Mod: ,,, | Performed by: PATHOLOGY

## 2022-07-13 PROCEDURE — 85730 THROMBOPLASTIN TIME PARTIAL: CPT | Performed by: EMERGENCY MEDICINE

## 2022-07-13 PROCEDURE — 63600175 PHARM REV CODE 636 W HCPCS: Performed by: STUDENT IN AN ORGANIZED HEALTH CARE EDUCATION/TRAINING PROGRAM

## 2022-07-13 PROCEDURE — 81003 URINALYSIS AUTO W/O SCOPE: CPT | Performed by: STUDENT IN AN ORGANIZED HEALTH CARE EDUCATION/TRAINING PROGRAM

## 2022-07-13 PROCEDURE — 25000003 PHARM REV CODE 250: Performed by: STUDENT IN AN ORGANIZED HEALTH CARE EDUCATION/TRAINING PROGRAM

## 2022-07-13 PROCEDURE — 36415 COLL VENOUS BLD VENIPUNCTURE: CPT | Performed by: INTERNAL MEDICINE

## 2022-07-13 PROCEDURE — 84443 ASSAY THYROID STIM HORMONE: CPT | Performed by: STUDENT IN AN ORGANIZED HEALTH CARE EDUCATION/TRAINING PROGRAM

## 2022-07-13 PROCEDURE — 93010 EKG 12-LEAD: ICD-10-PCS | Mod: ,,, | Performed by: INTERNAL MEDICINE

## 2022-07-13 PROCEDURE — 80053 COMPREHEN METABOLIC PANEL: CPT | Performed by: INTERNAL MEDICINE

## 2022-07-13 PROCEDURE — 20600001 HC STEP DOWN PRIVATE ROOM

## 2022-07-13 PROCEDURE — 93005 ELECTROCARDIOGRAM TRACING: CPT

## 2022-07-13 PROCEDURE — 93010 ELECTROCARDIOGRAM REPORT: CPT | Mod: ,,, | Performed by: INTERNAL MEDICINE

## 2022-07-13 PROCEDURE — 36415 COLL VENOUS BLD VENIPUNCTURE: CPT | Performed by: STUDENT IN AN ORGANIZED HEALTH CARE EDUCATION/TRAINING PROGRAM

## 2022-07-13 RX ORDER — MAGNESIUM SULFATE 1 G/100ML
1 INJECTION INTRAVENOUS ONCE
Status: COMPLETED | OUTPATIENT
Start: 2022-07-13 | End: 2022-07-13

## 2022-07-13 RX ORDER — HYDROCHLOROTHIAZIDE 12.5 MG/1
12.5 TABLET ORAL DAILY
Status: DISCONTINUED | OUTPATIENT
Start: 2022-07-14 | End: 2022-07-17

## 2022-07-13 RX ORDER — INSULIN ASPART 100 [IU]/ML
6 INJECTION, SOLUTION INTRAVENOUS; SUBCUTANEOUS
Status: DISCONTINUED | OUTPATIENT
Start: 2022-07-13 | End: 2022-07-14

## 2022-07-13 RX ORDER — POTASSIUM CHLORIDE 20 MEQ/1
40 TABLET, EXTENDED RELEASE ORAL ONCE
Status: COMPLETED | OUTPATIENT
Start: 2022-07-13 | End: 2022-07-13

## 2022-07-13 RX ORDER — CEFEPIME HYDROCHLORIDE 1 G/50ML
2 INJECTION, SOLUTION INTRAVENOUS
Status: DISCONTINUED | OUTPATIENT
Start: 2022-07-13 | End: 2022-07-15

## 2022-07-13 RX ORDER — CARVEDILOL 6.25 MG/1
6.25 TABLET ORAL 2 TIMES DAILY
Status: DISCONTINUED | OUTPATIENT
Start: 2022-07-13 | End: 2022-07-13

## 2022-07-13 RX ADMIN — CEFEPIME 2 G: 2 INJECTION, POWDER, FOR SOLUTION INTRAVENOUS at 01:07

## 2022-07-13 RX ADMIN — INSULIN ASPART 6 UNITS: 100 INJECTION, SOLUTION INTRAVENOUS; SUBCUTANEOUS at 12:07

## 2022-07-13 RX ADMIN — LOSARTAN POTASSIUM 100 MG: 50 TABLET, FILM COATED ORAL at 08:07

## 2022-07-13 RX ADMIN — INSULIN ASPART 6 UNITS: 100 INJECTION, SOLUTION INTRAVENOUS; SUBCUTANEOUS at 05:07

## 2022-07-13 RX ADMIN — ACETAMINOPHEN 650 MG: 325 TABLET ORAL at 07:07

## 2022-07-13 RX ADMIN — ATORVASTATIN CALCIUM 40 MG: 20 TABLET, FILM COATED ORAL at 08:07

## 2022-07-13 RX ADMIN — HEPARIN SODIUM 21 UNITS/KG/HR: 5000 INJECTION INTRAVENOUS; SUBCUTANEOUS at 08:07

## 2022-07-13 RX ADMIN — ASPIRIN 81 MG CHEWABLE TABLET 81 MG: 81 TABLET CHEWABLE at 08:07

## 2022-07-13 RX ADMIN — INSULIN ASPART 2 UNITS: 100 INJECTION, SOLUTION INTRAVENOUS; SUBCUTANEOUS at 08:07

## 2022-07-13 RX ADMIN — INSULIN ASPART 2 UNITS: 100 INJECTION, SOLUTION INTRAVENOUS; SUBCUTANEOUS at 09:07

## 2022-07-13 RX ADMIN — POTASSIUM CHLORIDE 40 MEQ: 20 TABLET, EXTENDED RELEASE ORAL at 08:07

## 2022-07-13 RX ADMIN — VANCOMYCIN HYDROCHLORIDE 2500 MG: 1.25 INJECTION, POWDER, LYOPHILIZED, FOR SOLUTION INTRAVENOUS at 02:07

## 2022-07-13 RX ADMIN — CEFEPIME 2 G: 2 INJECTION, POWDER, FOR SOLUTION INTRAVENOUS at 09:07

## 2022-07-13 RX ADMIN — MAGNESIUM SULFATE HEPTAHYDRATE 1 G: 500 INJECTION, SOLUTION INTRAMUSCULAR; INTRAVENOUS at 08:07

## 2022-07-13 RX ADMIN — HEPARIN SODIUM 21 UNITS/KG/HR: 5000 INJECTION INTRAVENOUS; SUBCUTANEOUS at 10:07

## 2022-07-13 RX ADMIN — HYDROCHLOROTHIAZIDE 25 MG: 12.5 TABLET ORAL at 08:07

## 2022-07-13 RX ADMIN — AMLODIPINE BESYLATE 10 MG: 10 TABLET ORAL at 08:07

## 2022-07-13 NOTE — ASSESSMENT & PLAN NOTE
Likely ischemic cardiomyopathy. TTE with EF 40-45% and normal RV function. He is not acutely decompensated.    - start on beta blocker when able. Holding for now given concern for developing infection.   - continue losartan 100 mg qd  - cardiac diet

## 2022-07-13 NOTE — PROGRESS NOTES
07/13/22 1306   Vital Signs   Pulse (!) 126   Heart Rate Source Monitor   /75   MAP (mmHg) 86   BP Location Left arm   BP Method Automatic   Patient Position Lying        Cardiac/Telemetry Details / Alarms   Cardiac/Telemetry Monitor On Yes   Cardiac/Telemetry Audible Yes   Cardiac/Telemetry Alarms Set Yes   Assessments (Pre/Post)   Level of Consciousness (AVPU) alert   CCU provider notified of pt's HR sustaining in 120's-140's. STAT EKG done. Awaiting further orders. Pt voices no complaints at this time.

## 2022-07-13 NOTE — ASSESSMENT & PLAN NOTE
Last A1C 7.6. takes metformin 1000 BID at home.     - LDSSI  - adjust insulin regimen as needed  - poct glucose ac/hs   - goal 140-180

## 2022-07-13 NOTE — ASSESSMENT & PLAN NOTE
Home regimen: amlodipine 10, HCTZ, ACE/ARB. Given our concern for underlying infection, will scale back blood pressure medications.    - hctz 12.5 mg qd  - losartan 100 mg qd  - will need beta blocker for HFmrEF (40-45%)

## 2022-07-13 NOTE — PROGRESS NOTES
[facility] - Cardiology Stepdown  Cardiology  Progress Note    Patient Name: Isaiah Dorsey Jr.  MRN: [id number]  Admission Date: 7/11/2022  Hospital Length of Stay: 2 days  Code Status: Full Code   Attending Physician: Ivan Del Cid MD   Primary Care Physician: Primary Doctor No  Expected Discharge Date:   Principal Problem:Atherosclerotic heart disease of native coronary artery with unstable angina pectoris    Subjective:     Hospital Course:   Admitted to CCU after presenting to the hospital for NSTEMI with troponin elevation to 0.037 and ecg with twi in lead III and st depressions anteroseptal leads. He was found to have multi-vessel coronary disease on LHC with LAD with 90% ostial to prox and 70% mid LAD, D1 70%, D2 60%, ost to prox LCX with 99% stenosis. Cardiothoracic surgery was consulted and recommended to proceed with CABG this admission. He was continued on a heparin gtt while awaiting surgery. He was found to have a dilated aorta during LHC with aneurysm seen on ct chest. He had a persistent leukocytosis and sinus tachycardia so was started on broad spectrum antibiotics.       Interval History: Overnight had episode of chest pain relieved with NG. His HR has been elevated to the 140s and BP 120s systolic. He denies any dysuria, fevers, productive cough, or skin rashes.      Review of Systems   Constitutional: Negative for chills and fever.   Cardiovascular:  Positive for chest pain and dyspnea on exertion. Negative for leg swelling.   Respiratory:  Positive for shortness of breath. Negative for cough.    Endocrine: Negative for cold intolerance.   Musculoskeletal:  Negative for back pain and falls.   Gastrointestinal:  Negative for abdominal pain.   Objective:     Vital Signs (Most Recent):  Temp: 98.7 °F (37.1 °C) (07/13/22 1520)  Pulse: (!) 115 (07/13/22 1520)  Resp: 15 (07/13/22 1520)  BP: 111/68 (07/13/22 1520)  SpO2: (!) 93 % (07/13/22 1520)   Vital Signs (24h Range):  Temp:  [97.3 °F (36.3 °C)-98.7  °F (37.1 °C)] 98.7 °F (37.1 °C)  Pulse:  [] 115  Resp:  [15-20] 15  SpO2:  [93 %-97 %] 93 %  BP: ()/(48-95) 111/68     Weight: 126.7 kg (279 lb 5.2 oz)  Body mass index is 37.88 kg/m².     SpO2: (!) 93 %  O2 Device (Oxygen Therapy): room air      Intake/Output Summary (Last 24 hours) at 7/13/2022 1645  Last data filed at 7/13/2022 1522  Gross per 24 hour   Intake 2044.51 ml   Output 1300 ml   Net 744.51 ml       Lines/Drains/Airways       Peripheral Intravenous Line  Duration                  Peripheral IV - Single Lumen 07/11/22 1109 20 G Left Antecubital 2 days         Peripheral IV - Single Lumen 07/12/22 1010 18 G;1 3/4 in Anterior;Right Forearm 1 day                    Physical Exam  Vitals and nursing note reviewed.   Constitutional:       General: He is not in acute distress.     Appearance: He is not ill-appearing.   HENT:      Head: Normocephalic and atraumatic.      Mouth/Throat:      Mouth: Mucous membranes are moist.   Eyes:      Extraocular Movements: Extraocular movements intact.      Pupils: Pupils are equal, round, and reactive to light.   Cardiovascular:      Rate and Rhythm: Normal rate and regular rhythm.      Pulses:           Radial pulses are 2+ on the right side and 2+ on the left side.        Dorsalis pedis pulses are 2+ on the right side and 1+ on the left side.        Posterior tibial pulses are 2+ on the right side and 2+ on the left side.      Heart sounds: No murmur heard.  Pulmonary:      Effort: Pulmonary effort is normal.      Breath sounds: Normal breath sounds.   Abdominal:      General: Abdomen is flat.      Palpations: Abdomen is soft.   Musculoskeletal:      Right lower leg: No edema.      Left lower leg: No edema.       Significant Labs: All pertinent lab results from the last 24 hours have been reviewed.    Significant Imaging:  Reviewed    Assessment and Plan:         * Atherosclerotic heart disease of native coronary artery with unstable angina pectoris  Hx of  DM2, HTN, obesity here with NSTEMI with trop at 0.03 and st/t changes. [facility] on 7/11 with diffuse disease. LAD with 90% ostial to prox and 70% mid LAD, D1 70%, D2 60%, ost to prox LCX with 99% stenosis. CTS evaluated patient, will undergo CABG this admission. TTE with EF of 40-45% and normal RV systolic function.     - heparin gtt  - aspirin 81 mg qd  - atorvastatin 40 mg qd. . Goal <70.  - CTS following - CABG evaluation underway. Scheduled for 7/18           Heart failure with mid-range ejection fraction  Likely ischemic cardiomyopathy. TTE with EF 40-45% and normal RV function. He is not acutely decompensated.    - start on beta blocker when able. Holding for now given concern for developing infection.   - continue losartan 100 mg qd  - cardiac diet    SIRS (systemic inflammatory response syndrome)  Up-trending leukocytosis w/ sinus tachycardia. Given his hx of splenectomy and recent procedure, concern for underlying infection. He denies symptoms concerning for acute VTE and physical exam unremarkable for localizing infection.    - blood culture, CXR, UA  - broad spectrum abx vanc/cefepime  - monitor closely for signs of developing infection        Dilatation of aorta  BP checked in both arms and equivalent. Strong pulses in both upper and lower extremities. Low concern for dissection; CT chest with mid ascending aortic aneurysm of 4.7 cm.     - maintain adequate blood pressure control   - if he develops signs of dissection would get CTA    Type 2 diabetes mellitus  Last A1C 7.6. takes metformin 1000 BID at home.     - LDSSI  - adjust insulin regimen as needed  - poct glucose ac/hs   - goal 140-180    HLD (hyperlipidemia)  .    - HI statin  - goal <70    HTN (hypertension), benign  Home regimen: amlodipine 10, HCTZ, ACE/ARB. Given our concern for underlying infection, will scale back blood pressure medications.    - hctz 12.5 mg qd  - losartan 100 mg qd  - will need beta blocker for HFmrEF  (40-45%)        VTE Risk Mitigation (From admission, onward)         Ordered     heparin 25,000 units in dextrose 5% (100 units/ml) IV bolus from bag - ADDITIONAL PRN BOLUS - 60 units/kg (max bolus 4000 units)  As needed (PRN)        Question:  Heparin Infusion Adjustment (DO NOT MODIFY ANSWER)  Answer:  [email or web address]\epic\Images\Pharmacy\HeparinInfusions\heparin LOW INTENSITY nomogram for [facility] [id number].pdf    07/11/22 1300     heparin 25,000 units in dextrose 5% (100 units/ml) IV bolus from bag - ADDITIONAL PRN BOLUS - 30 units/kg (max bolus 4000 units)  As needed (PRN)        Question:  Heparin Infusion Adjustment (DO NOT MODIFY ANSWER)  Answer:  \\[email or web address]\epic\Images\Pharmacy\HeparinInfusions\heparin LOW INTENSITY nomogram for [facility] [id number].pdf    07/11/22 1300     heparin 25,000 units in dextrose 5% 250 mL (100 units/mL) infusion LOW INTENSITY nomogram - [facility]  Continuous        Question Answer Comment   Heparin Infusion Adjustment (DO NOT MODIFY ANSWER) \\[email or web address]\epic\Images\Pharmacy\HeparinInfusions\heparin LOW INTENSITY nomogram for [facility] [id number].pdf    Begin at (in units/kg/hr) 12        07/11/22 1300     IP VTE HIGH RISK PATIENT  Once         07/11/22 1318     Place sequential compression device  Until discontinued         07/11/22 1318                Vivian Horton DO  Cardiology  [facility] - Cardiology Stepdown

## 2022-07-13 NOTE — PROGRESS NOTES
Pharmacokinetic Initial Assessment: IV Vancomycin    Assessment/Plan:    Initiate intravenous vancomycin with loading dose of 2500 mg once followed by a maintenance dose of vancomycin 2000mg IV every 12 hours  Desired empiric serum trough concentration is 10 to 20 mcg/mL  Draw vancomycin trough level 60 min prior to third dose on 07/14/22 at approximately 1400  Pharmacy will continue to follow and monitor vancomycin.      Please contact pharmacy at extension [phone] with any questions regarding this assessment.     Thank you for the consult,   Nadine Burr       Patient brief summary:  Isaiah Dorsey Jr. is a 62 y.o. male initiated on antimicrobial therapy with IV Vancomycin for treatment of suspected bacteremia    Drug Allergies:   Review of patient's allergies indicates:   Allergen Reactions    Penicillins      Other reaction(s): Itching  Other reaction(s): Hives       Actual Body Weight:   126.7 kg    Renal Function:   Estimated Creatinine Clearance: 148.5 mL/min (based on SCr of 0.7 mg/dL).,     Dialysis Method (if applicable):  N/A    CBC (last 72 hours):  Recent Labs   Lab Result Units 07/11/22  1109 07/12/22  0038 07/12/22  0436 07/13/22  0120   WBC K/uL 11.50 16.65* 16.03* 17.19*   Hemoglobin g/dL 16.8 15.4 16.5 16.6   Hemoglobin A1C %  --   --  7.6*  --    Hematocrit % 52.0 46.6 49.3 49.8   Platelets K/uL 421 405 414 410   Gran % % 58.8 67.2 62.6 64.3   Lymph % % 31.0 23.1 27.1 24.0   Mono % % 7.7 8.2 8.7 10.6   Eosinophil % % 0.9 0.4 0.6 0.2   Basophil % % 1.2 0.7 0.7 0.5   Differential Method  Automated Automated Automated Automated       Metabolic Panel (last 72 hours):  Recent Labs   Lab Result Units 07/11/22  1109 07/12/22  0436 07/13/22  0120   Sodium mmol/L 134* 132* 130*   Potassium mmol/L 4.5 4.0 3.5   Chloride mmol/L 98 99 97   CO2 mmol/L 20* 20* 23   Glucose mg/dL 307* 229* 229*   BUN mg/dL 12 10 6*   Creatinine mg/dL 1.0 0.7 0.7   Albumin g/dL 4.0 3.6 3.6   Total Bilirubin mg/dL 0.4 0.5  0.8   Alkaline Phosphatase U/L 81 73 81   AST U/L 23 180* 157*   ALT U/L 34 47* 54*   Magnesium mg/dL  --  1.8 1.9   Phosphorus mg/dL  --  2.7 2.5*       Drug levels (last 3 results):  No results for input(s): VANCOMYCINRA, VANCORANDOM, VANCOMYCINPE, VANCOPEAK, VANCOMYCINTR, VANCOTROUGH in the last 72 hours.    Microbiologic Results:  Microbiology Results (last 7 days)     Procedure Component Value Units Date/Time    Blood culture [[id number]] Collected: 07/13/22 1055    Order Status: Sent Specimen: Blood Updated: 07/13/22 1153    Narrative:      Lft hand    Blood culture [[id number]] Collected: 07/13/22 1055    Order Status: Sent Specimen: Blood Updated: 07/13/22 1153    Narrative:      Lft wrist

## 2022-07-13 NOTE — ASSESSMENT & PLAN NOTE
BP checked in both arms and equivalent. Strong pulses in both upper and lower extremities. Low concern for dissection; CT chest with mid ascending aortic aneurysm of 4.7 cm.     - maintain adequate blood pressure control   - if he develops signs of dissection would get CTA

## 2022-07-13 NOTE — ASSESSMENT & PLAN NOTE
Hx of DM2, HTN, obesity here with NSTEMI with trop at 0.03 and st/t changes. LHC on 7/11 with diffuse disease. LAD with 90% ostial to prox and 70% mid LAD, D1 70%, D2 60%, ost to prox LCX with 99% stenosis. CTS evaluated patient, will undergo CABG this admission. TTE with EF of 40-45% and normal RV systolic function.     - heparin gtt  - aspirin 81 mg qd  - atorvastatin 40 mg qd. . Goal <70.  - CTS following - CABG evaluation underway. Scheduled for 7/18

## 2022-07-13 NOTE — PLAN OF CARE
Problem: Adult Inpatient Plan of Care  Goal: Patient-Specific Goal (Individualized)  Outcome: Ongoing, Progressing  Flowsheets (Taken 7/13/2022 3527)  Anxieties, Fears or Concerns: CP  Individualized Care Needs: MOnitor and manage CP. Monitor for symptoms related to elevated HR. Maintain ABX protocol. Maintain BG protocol. Monitor APPT VALUES AND HEPARIN GTT. Pt has had two theurapeutic values.   Patient-Specific Goals (Include Timeframe): Pt will be free of falls and and injuries throughout my shift.       Plan of care discussed with patient. Patient is free of fall/trauma/injury. Denies CP, SOB, or pain/discomfort. All questions addressed. Will continue to monitor throughout my shift.     *please read previous notes.

## 2022-07-13 NOTE — PROGRESS NOTES
07/12/22 2114 07/12/22 2116 07/12/22 2119   Vital Signs   Pulse 104 (!) 111 (!) 113   BP (!) 142/69 124/64 (!) 106/48   MAP (mmHg) 98  --  69   BP Location Right arm Right arm Right arm   Patient Position Lying Lying Lying      07/12/22 2123 07/12/22 2129   Vital Signs   Pulse 106 (!) 116   BP (!) 91/52 (!) 123/95   MAP (mmHg) 67 105   BP Location Right arm Right arm   Patient Position Lying Lying     Episode of sub sternal chest pain radiating to his bilateral arms. MD Loomis notified, eventually relieved to 1/10 pain. Also notified regarding the change in BP after the NG admin.

## 2022-07-13 NOTE — SUBJECTIVE & OBJECTIVE
Interval History: Overnight had episode of chest pain relieved with NG. His HR has been elevated to the 140s and BP 120s systolic. He denies any dysuria, fevers, productive cough, or skin rashes.      Review of Systems   Constitutional: Negative for chills and fever.   Cardiovascular:  Positive for chest pain and dyspnea on exertion. Negative for leg swelling.   Respiratory:  Positive for shortness of breath. Negative for cough.    Endocrine: Negative for cold intolerance.   Musculoskeletal:  Negative for back pain and falls.   Gastrointestinal:  Negative for abdominal pain.   Objective:     Vital Signs (Most Recent):  Temp: 98.7 °F (37.1 °C) (07/13/22 1520)  Pulse: (!) 115 (07/13/22 1520)  Resp: 15 (07/13/22 1520)  BP: 111/68 (07/13/22 1520)  SpO2: (!) 93 % (07/13/22 1520)   Vital Signs (24h Range):  Temp:  [97.3 °F (36.3 °C)-98.7 °F (37.1 °C)] 98.7 °F (37.1 °C)  Pulse:  [] 115  Resp:  [15-20] 15  SpO2:  [93 %-97 %] 93 %  BP: ()/(48-95) 111/68     Weight: 126.7 kg (279 lb 5.2 oz)  Body mass index is 37.88 kg/m².     SpO2: (!) 93 %  O2 Device (Oxygen Therapy): room air      Intake/Output Summary (Last 24 hours) at 7/13/2022 1645  Last data filed at 7/13/2022 1522  Gross per 24 hour   Intake 2044.51 ml   Output 1300 ml   Net 744.51 ml       Lines/Drains/Airways       Peripheral Intravenous Line  Duration                  Peripheral IV - Single Lumen 07/11/22 1109 20 G Left Antecubital 2 days         Peripheral IV - Single Lumen 07/12/22 1010 18 G;1 3/4 in Anterior;Right Forearm 1 day                    Physical Exam  Vitals and nursing note reviewed.   Constitutional:       General: He is not in acute distress.     Appearance: He is not ill-appearing.   HENT:      Head: Normocephalic and atraumatic.      Mouth/Throat:      Mouth: Mucous membranes are moist.   Eyes:      Extraocular Movements: Extraocular movements intact.      Pupils: Pupils are equal, round, and reactive to light.   Cardiovascular:       Rate and Rhythm: Normal rate and regular rhythm.      Pulses:           Radial pulses are 2+ on the right side and 2+ on the left side.        Dorsalis pedis pulses are 2+ on the right side and 1+ on the left side.        Posterior tibial pulses are 2+ on the right side and 2+ on the left side.      Heart sounds: No murmur heard.  Pulmonary:      Effort: Pulmonary effort is normal.      Breath sounds: Normal breath sounds.   Abdominal:      General: Abdomen is flat.      Palpations: Abdomen is soft.   Musculoskeletal:      Right lower leg: No edema.      Left lower leg: No edema.       Significant Labs: All pertinent lab results from the last 24 hours have been reviewed.    Significant Imaging:  Reviewed

## 2022-07-13 NOTE — ASSESSMENT & PLAN NOTE
Up-trending leukocytosis w/ sinus tachycardia. Given his hx of splenectomy and recent procedure, concern for underlying infection. He denies symptoms concerning for acute VTE and physical exam unremarkable for localizing infection.    - blood culture, CXR, UA  - broad spectrum abx vanc/cefepime  - monitor closely for signs of developing infection

## 2022-07-14 LAB
ALBUMIN SERPL BCP-MCNC: 3.2 G/DL (ref 3.5–5.2)
ALP SERPL-CCNC: 72 U/L (ref 55–135)
ALT SERPL W/O P-5'-P-CCNC: 45 U/L (ref 10–44)
ANION GAP SERPL CALC-SCNC: 11 MMOL/L (ref 8–16)
APTT BLDCRRT: 34.5 SEC (ref 21–32)
APTT BLDCRRT: 36.8 SEC (ref 21–32)
AST SERPL-CCNC: 55 U/L (ref 10–40)
BASOPHILS # BLD AUTO: 0.16 K/UL (ref 0–0.2)
BASOPHILS NFR BLD: 0.9 % (ref 0–1.9)
BILIRUB SERPL-MCNC: 1 MG/DL (ref 0.1–1)
BUN SERPL-MCNC: 11 MG/DL (ref 8–23)
CALCIUM SERPL-MCNC: 9.2 MG/DL (ref 8.7–10.5)
CHLORIDE SERPL-SCNC: 99 MMOL/L (ref 95–110)
CO2 SERPL-SCNC: 21 MMOL/L (ref 23–29)
CREAT SERPL-MCNC: 0.8 MG/DL (ref 0.5–1.4)
CRP SERPL-MCNC: 116.7 MG/L (ref 0–8.2)
DIFFERENTIAL METHOD: ABNORMAL
EOSINOPHIL # BLD AUTO: 0.1 K/UL (ref 0–0.5)
EOSINOPHIL NFR BLD: 0.3 % (ref 0–8)
ERYTHROCYTE [DISTWIDTH] IN BLOOD BY AUTOMATED COUNT: 14.6 % (ref 11.5–14.5)
ERYTHROCYTE [SEDIMENTATION RATE] IN BLOOD BY PHOTOMETRIC METHOD: 67 MM/HR (ref 0–23)
EST. GFR  (AFRICAN AMERICAN): >60 ML/MIN/1.73 M^2
EST. GFR  (NON AFRICAN AMERICAN): >60 ML/MIN/1.73 M^2
GLUCOSE SERPL-MCNC: 265 MG/DL (ref 70–110)
HCT VFR BLD AUTO: 48.2 % (ref 40–54)
HGB BLD-MCNC: 16 G/DL (ref 14–18)
IMM GRANULOCYTES # BLD AUTO: 0.1 K/UL (ref 0–0.04)
IMM GRANULOCYTES NFR BLD AUTO: 0.6 % (ref 0–0.5)
LYMPHOCYTES # BLD AUTO: 3.7 K/UL (ref 1–4.8)
LYMPHOCYTES NFR BLD: 21.6 % (ref 18–48)
MAGNESIUM SERPL-MCNC: 2 MG/DL (ref 1.6–2.6)
MCH RBC QN AUTO: 30.1 PG (ref 27–31)
MCHC RBC AUTO-ENTMCNC: 33.2 G/DL (ref 32–36)
MCV RBC AUTO: 91 FL (ref 82–98)
MONOCYTES # BLD AUTO: 1.9 K/UL (ref 0.3–1)
MONOCYTES NFR BLD: 11.1 % (ref 4–15)
NEUTROPHILS # BLD AUTO: 11.2 K/UL (ref 1.8–7.7)
NEUTROPHILS NFR BLD: 65.5 % (ref 38–73)
NRBC BLD-RTO: 0 /100 WBC
PATH REV BLD -IMP: NORMAL
PHOSPHATE SERPL-MCNC: 2.2 MG/DL (ref 2.7–4.5)
PLATELET # BLD AUTO: 407 K/UL (ref 150–450)
PMV BLD AUTO: 10 FL (ref 9.2–12.9)
POCT GLUCOSE: 203 MG/DL (ref 70–110)
POCT GLUCOSE: 211 MG/DL (ref 70–110)
POCT GLUCOSE: 217 MG/DL (ref 70–110)
POCT GLUCOSE: 237 MG/DL (ref 70–110)
POCT GLUCOSE: 259 MG/DL (ref 70–110)
POTASSIUM SERPL-SCNC: 3.6 MMOL/L (ref 3.5–5.1)
PROCALCITONIN SERPL IA-MCNC: 0.06 NG/ML
PROT SERPL-MCNC: 7.1 G/DL (ref 6–8.4)
RBC # BLD AUTO: 5.32 M/UL (ref 4.6–6.2)
SODIUM SERPL-SCNC: 131 MMOL/L (ref 136–145)
VANCOMYCIN TROUGH SERPL-MCNC: 9.9 UG/ML (ref 10–22)
WBC # BLD AUTO: 17.1 K/UL (ref 3.9–12.7)

## 2022-07-14 PROCEDURE — 36415 COLL VENOUS BLD VENIPUNCTURE: CPT | Performed by: EMERGENCY MEDICINE

## 2022-07-14 PROCEDURE — 99231 SBSQ HOSP IP/OBS SF/LOW 25: CPT | Mod: ,,, | Performed by: INTERNAL MEDICINE

## 2022-07-14 PROCEDURE — 85652 RBC SED RATE AUTOMATED: CPT | Performed by: STUDENT IN AN ORGANIZED HEALTH CARE EDUCATION/TRAINING PROGRAM

## 2022-07-14 PROCEDURE — 84145 PROCALCITONIN (PCT): CPT | Performed by: STUDENT IN AN ORGANIZED HEALTH CARE EDUCATION/TRAINING PROGRAM

## 2022-07-14 PROCEDURE — 25000003 PHARM REV CODE 250: Performed by: EMERGENCY MEDICINE

## 2022-07-14 PROCEDURE — 20600001 HC STEP DOWN PRIVATE ROOM

## 2022-07-14 PROCEDURE — 25000003 PHARM REV CODE 250: Performed by: STUDENT IN AN ORGANIZED HEALTH CARE EDUCATION/TRAINING PROGRAM

## 2022-07-14 PROCEDURE — 63600175 PHARM REV CODE 636 W HCPCS: Performed by: STUDENT IN AN ORGANIZED HEALTH CARE EDUCATION/TRAINING PROGRAM

## 2022-07-14 PROCEDURE — C9399 UNCLASSIFIED DRUGS OR BIOLOG: HCPCS | Performed by: STUDENT IN AN ORGANIZED HEALTH CARE EDUCATION/TRAINING PROGRAM

## 2022-07-14 PROCEDURE — 84100 ASSAY OF PHOSPHORUS: CPT | Performed by: INTERNAL MEDICINE

## 2022-07-14 PROCEDURE — 83735 ASSAY OF MAGNESIUM: CPT | Performed by: INTERNAL MEDICINE

## 2022-07-14 PROCEDURE — 85730 THROMBOPLASTIN TIME PARTIAL: CPT | Mod: 91 | Performed by: INTERNAL MEDICINE

## 2022-07-14 PROCEDURE — 86140 C-REACTIVE PROTEIN: CPT | Performed by: STUDENT IN AN ORGANIZED HEALTH CARE EDUCATION/TRAINING PROGRAM

## 2022-07-14 PROCEDURE — 63600175 PHARM REV CODE 636 W HCPCS: Performed by: EMERGENCY MEDICINE

## 2022-07-14 PROCEDURE — 36415 COLL VENOUS BLD VENIPUNCTURE: CPT | Performed by: INTERNAL MEDICINE

## 2022-07-14 PROCEDURE — 80202 ASSAY OF VANCOMYCIN: CPT | Performed by: INTERNAL MEDICINE

## 2022-07-14 PROCEDURE — 25000003 PHARM REV CODE 250: Performed by: INTERNAL MEDICINE

## 2022-07-14 PROCEDURE — 85730 THROMBOPLASTIN TIME PARTIAL: CPT | Performed by: EMERGENCY MEDICINE

## 2022-07-14 PROCEDURE — 99231 PR SUBSEQUENT HOSPITAL CARE,LEVL I: ICD-10-PCS | Mod: ,,, | Performed by: INTERNAL MEDICINE

## 2022-07-14 PROCEDURE — 85025 COMPLETE CBC W/AUTO DIFF WBC: CPT | Performed by: EMERGENCY MEDICINE

## 2022-07-14 PROCEDURE — 94760 N-INVAS EAR/PLS OXIMETRY 1: CPT

## 2022-07-14 PROCEDURE — 36415 COLL VENOUS BLD VENIPUNCTURE: CPT | Performed by: STUDENT IN AN ORGANIZED HEALTH CARE EDUCATION/TRAINING PROGRAM

## 2022-07-14 PROCEDURE — 63600175 PHARM REV CODE 636 W HCPCS: Performed by: INTERNAL MEDICINE

## 2022-07-14 PROCEDURE — 80053 COMPREHEN METABOLIC PANEL: CPT | Performed by: INTERNAL MEDICINE

## 2022-07-14 RX ORDER — POTASSIUM CHLORIDE 20 MEQ/1
40 TABLET, EXTENDED RELEASE ORAL ONCE
Status: COMPLETED | OUTPATIENT
Start: 2022-07-14 | End: 2022-07-14

## 2022-07-14 RX ORDER — INSULIN ASPART 100 [IU]/ML
8 INJECTION, SOLUTION INTRAVENOUS; SUBCUTANEOUS
Status: DISCONTINUED | OUTPATIENT
Start: 2022-07-14 | End: 2022-07-15

## 2022-07-14 RX ADMIN — HYDROCHLOROTHIAZIDE 12.5 MG: 12.5 TABLET ORAL at 09:07

## 2022-07-14 RX ADMIN — INSULIN ASPART 4 UNITS: 100 INJECTION, SOLUTION INTRAVENOUS; SUBCUTANEOUS at 09:07

## 2022-07-14 RX ADMIN — INSULIN DETEMIR 7 UNITS: 100 INJECTION, SOLUTION SUBCUTANEOUS at 09:07

## 2022-07-14 RX ADMIN — ACETAMINOPHEN 650 MG: 325 TABLET ORAL at 03:07

## 2022-07-14 RX ADMIN — INSULIN ASPART 4 UNITS: 100 INJECTION, SOLUTION INTRAVENOUS; SUBCUTANEOUS at 07:07

## 2022-07-14 RX ADMIN — LOSARTAN POTASSIUM 100 MG: 50 TABLET, FILM COATED ORAL at 09:07

## 2022-07-14 RX ADMIN — CEFEPIME 2 G: 2 INJECTION, POWDER, FOR SOLUTION INTRAVENOUS at 02:07

## 2022-07-14 RX ADMIN — POTASSIUM CHLORIDE 40 MEQ: 20 TABLET, EXTENDED RELEASE ORAL at 09:07

## 2022-07-14 RX ADMIN — INSULIN ASPART 4 UNITS: 100 INJECTION, SOLUTION INTRAVENOUS; SUBCUTANEOUS at 02:07

## 2022-07-14 RX ADMIN — CEFEPIME 2 G: 2 INJECTION, POWDER, FOR SOLUTION INTRAVENOUS at 06:07

## 2022-07-14 RX ADMIN — ATORVASTATIN CALCIUM 40 MG: 20 TABLET, FILM COATED ORAL at 09:07

## 2022-07-14 RX ADMIN — ASPIRIN 81 MG CHEWABLE TABLET 81 MG: 81 TABLET CHEWABLE at 09:07

## 2022-07-14 RX ADMIN — CEFEPIME 2 G: 2 INJECTION, POWDER, FOR SOLUTION INTRAVENOUS at 11:07

## 2022-07-14 RX ADMIN — HEPARIN SODIUM 23 UNITS/KG/HR: 5000 INJECTION INTRAVENOUS; SUBCUTANEOUS at 11:07

## 2022-07-14 RX ADMIN — VANCOMYCIN HYDROCHLORIDE 2000 MG: 500 INJECTION, POWDER, LYOPHILIZED, FOR SOLUTION INTRAVENOUS at 03:07

## 2022-07-14 RX ADMIN — INSULIN ASPART 8 UNITS: 100 INJECTION, SOLUTION INTRAVENOUS; SUBCUTANEOUS at 07:07

## 2022-07-14 NOTE — ASSESSMENT & PLAN NOTE
Up-trending leukocytosis w/ sinus tachycardia. Given his hx of splenectomy and recent procedure, concern for underlying infection. He denies symptoms concerning for acute VTE and physical exam unremarkable for localizing infection. Upon chart review, patient has had a leukocytosis without left shift    - fu culture data.  - broad spectrum abx vanc/cefepime (day 2)  - monitor closely for signs of developing infection  - check inflammatory markers, pct  - peripheral smear

## 2022-07-14 NOTE — PLAN OF CARE
Patient remains free from falls and injuries through out shift. Patient AAO. Patient denies chest pain and SOB. IV abx tolerated well. Heparin gtt maintained per order. Plan of care reviewed with patient. Patient verbalizes understanding of plan.  Will continue to monitor.

## 2022-07-14 NOTE — NURSING
Notified CCU team regarding APtt result of this am 34.5 result, advised to follow nomogram protocol of bolus 30 units and increase rate by 2.orders for APTT lab draw put in for 6 pm

## 2022-07-14 NOTE — PROGRESS NOTES
[facility] - Cardiology Stepdown  Cardiology  Progress Note    Patient Name: Isaiah Dorsey Jr.  MRN: [id number]  Admission Date: 7/11/2022  Hospital Length of Stay: 3 days  Code Status: Full Code   Attending Physician: Lisa Baca MD   Primary Care Physician: Primary Doctor No  Expected Discharge Date:   Principal Problem:Atherosclerotic heart disease of native coronary artery with unstable angina pectoris    Subjective:     Hospital Course:   Admitted to CCU after presenting to the hospital for NSTEMI with troponin elevation to 0.037 and ecg with twi in lead III and st depressions anteroseptal leads. He was found to have multi-vessel coronary disease on LHC with LAD with 90% ostial to prox and 70% mid LAD, D1 70%, D2 60%, ost to prox LCX with 99% stenosis. Cardiothoracic surgery was consulted and recommended to proceed with CABG this admission. He was continued on a heparin gtt while awaiting surgery. He was found to have a dilated aorta during LHC with aneurysm seen on ct chest. He had a persistent leukocytosis without left shift and sinus tachycardia so was started on broad spectrum antibiotics vanc/cefepime.       Interval History: No events overnight, denies fever, cough, rash, or dysuria. He continues to have arm pain but denies chest pain. HR low 100s, /65, SpO2 95%.     Review of Systems   Constitutional: Negative for chills and fever.   Cardiovascular:  Negative for chest pain, dyspnea on exertion and leg swelling.   Respiratory:  Negative for cough and shortness of breath.    Endocrine: Negative for cold intolerance.   Musculoskeletal:  Negative for back pain and falls.   Gastrointestinal:  Negative for abdominal pain.   Objective:     Vital Signs (Most Recent):  Temp: 98.6 °F (37 °C) (07/14/22 0817)  Pulse: 102 (07/14/22 0551)  Resp: 20 (07/14/22 0817)  BP: 105/65 (07/14/22 0907)  SpO2: 95 % (07/14/22 0400) Vital Signs (24h Range):  Temp:  [97.8 °F (36.6 °C)-98.7 °F (37.1 °C)] 98.6 °F (37  °C)  Pulse:  [] 102  Resp:  [15-25] 20  SpO2:  [93 %-97 %] 95 %  BP: ()/(60-81) 105/65     Weight: 127 kg (279 lb 15.8 oz)  Body mass index is 37.97 kg/m².     SpO2: 95 %  O2 Device (Oxygen Therapy): room air      Intake/Output Summary (Last 24 hours) at 7/14/2022 0915  Last data filed at 7/14/2022 0551  Gross per 24 hour   Intake 2368.48 ml   Output 2150 ml   Net 218.48 ml       Lines/Drains/Airways       Peripheral Intravenous Line  Duration                  Peripheral IV - Single Lumen 07/12/22 1010 18 G;1 3/4 in Anterior;Right Forearm 1 day         Peripheral IV - Single Lumen 07/14/22 0553 18 G Left;Posterior Forearm <1 day                    Physical Exam  Vitals and nursing note reviewed.   Constitutional:       General: He is not in acute distress.     Appearance: He is not ill-appearing.   HENT:      Head: Normocephalic and atraumatic.      Mouth/Throat:      Mouth: Mucous membranes are moist.   Eyes:      Extraocular Movements: Extraocular movements intact.      Pupils: Pupils are equal, round, and reactive to light.   Cardiovascular:      Rate and Rhythm: Regular rhythm. Tachycardia present.      Pulses:           Radial pulses are 2+ on the right side and 2+ on the left side.        Dorsalis pedis pulses are 2+ on the right side and 1+ on the left side.        Posterior tibial pulses are 2+ on the right side and 2+ on the left side.      Heart sounds: No murmur heard.  Pulmonary:      Effort: Pulmonary effort is normal.      Breath sounds: Normal breath sounds.   Abdominal:      General: Abdomen is flat.      Palpations: Abdomen is soft.   Musculoskeletal:      Right lower leg: No edema.      Left lower leg: No edema.       Significant Labs: All pertinent lab results from the last 24 hours have been reviewed.    Significant Imaging:  Reviewed    Assessment and Plan:         * Atherosclerotic heart disease of native coronary artery with unstable angina pectoris  Hx of DM2, HTN, obesity here  with NSTEMI with trop at 0.03 and st/t changes. [facility] on 7/11 with diffuse disease. LAD with 90% ostial to prox and 70% mid LAD, D1 70%, D2 60%, ost to prox LCX with 99% stenosis. CTS evaluated patient, will undergo CABG this admission. TTE with mid range EF of 40-45% and normal RV systolic function.     - heparin gtt  - aspirin 81 mg qd  - atorvastatin 40 mg qd. . Goal <70.  - CTS following - CABG evaluation underway. Scheduled for 7/18           Heart failure with mid-range ejection fraction  Likely ischemic cardiomyopathy. TTE with EF 40-45% and normal RV function. He is not acutely decompensated.    - start on beta blocker when able. Holding for now given concern for developing infection; plan to start after surgery  - continue losartan 100 mg qd  - cardiac diet    SIRS (systemic inflammatory response syndrome)  Up-trending leukocytosis w/ sinus tachycardia. Given his hx of splenectomy and recent procedure, concern for underlying infection. He denies symptoms concerning for acute VTE and physical exam unremarkable for localizing infection. Upon chart review, patient has had a leukocytosis without left shift    - fu culture data.  - broad spectrum abx vanc/cefepime (day 2)  - monitor closely for signs of developing infection  - check inflammatory markers, pct  - peripheral smear      Dilatation of aorta  BP checked in both arms and equivalent. Strong pulses in both upper and lower extremities. Low concern for dissection; CT chest with mid ascending aortic aneurysm of 4.7 cm.     - maintain adequate blood pressure control   - if he develops signs of dissection would get CTA    Type 2 diabetes mellitus  Last A1C 7.6. takes metformin 1000 BID at home.     - LDSSI  - adjust insulin regimen as needed  - poct glucose ac/hs   - goal 140-180    HLD (hyperlipidemia)  .    - HI statin  - goal <70    HTN (hypertension), benign  Home regimen: amlodipine 10, HCTZ, ACE/ARB. Given our concern for underlying  infection, will scale back blood pressure medications.    - hctz 12.5 mg qd  - losartan 100 mg qd  - will need beta blocker for HFmrEF (40-45%); will start after surgery.        VTE Risk Mitigation (From admission, onward)         Ordered     heparin 25,000 units in dextrose 5% (100 units/ml) IV bolus from bag - ADDITIONAL PRN BOLUS - 60 units/kg (max bolus 4000 units)  As needed (PRN)        Question:  Heparin Infusion Adjustment (DO NOT MODIFY ANSWER)  Answer:  [email or web address]\epic\Images\Pharmacy\HeparinInfusions\heparin LOW INTENSITY nomogram for [facility] [id number]    07/11/22 1300     heparin 25,000 units in dextrose 5% (100 units/ml) IV bolus from bag - ADDITIONAL PRN BOLUS - 30 units/kg (max bolus 4000 units)  As needed (PRN)        Question:  Heparin Infusion Adjustment (DO NOT MODIFY ANSWER)  Answer:  [email or web address]\epic\Images\Pharmacy\HeparinInfusions\heparin LOW INTENSITY nomogram for [facility] [id number]    07/11/22 1300     heparin 25,000 units in dextrose 5% 250 mL (100 units/mL) infusion LOW INTENSITY nomogram - [facility]  Continuous        Question Answer Comment   Heparin Infusion Adjustment (DO NOT MODIFY ANSWER) \\[email or web address]\epic\Images\Pharmacy\HeparinInfusions\heparin LOW INTENSITY nomogram for [facility] [id number].pdf    Begin at (in units/kg/hr) 12        07/11/22 1300     IP VTE HIGH RISK PATIENT  Once         07/11/22 1318     Place sequential compression device  Until discontinued         07/11/22 1318                Vivian Horton DO  Cardiology  [facility] - [facility]

## 2022-07-14 NOTE — SUBJECTIVE & OBJECTIVE
Interval History: No events overnight, denies fever, cough, rash, or dysuria. He continues to have arm pain but denies chest pain. HR low 100s, /65, SpO2 95%.     Review of Systems   Constitutional: Negative for chills and fever.   Cardiovascular:  Negative for chest pain, dyspnea on exertion and leg swelling.   Respiratory:  Negative for cough and shortness of breath.    Endocrine: Negative for cold intolerance.   Musculoskeletal:  Negative for back pain and falls.   Gastrointestinal:  Negative for abdominal pain.   Objective:     Vital Signs (Most Recent):  Temp: 98.6 °F (37 °C) (07/14/22 0817)  Pulse: 102 (07/14/22 0551)  Resp: 20 (07/14/22 0817)  BP: 105/65 (07/14/22 0907)  SpO2: 95 % (07/14/22 0400) Vital Signs (24h Range):  Temp:  [97.8 °F (36.6 °C)-98.7 °F (37.1 °C)] 98.6 °F (37 °C)  Pulse:  [] 102  Resp:  [15-25] 20  SpO2:  [93 %-97 %] 95 %  BP: ()/(60-81) 105/65     Weight: 127 kg (279 lb 15.8 oz)  Body mass index is 37.97 kg/m².     SpO2: 95 %  O2 Device (Oxygen Therapy): room air      Intake/Output Summary (Last 24 hours) at 7/14/2022 0915  Last data filed at 7/14/2022 0551  Gross per 24 hour   Intake 2368.48 ml   Output 2150 ml   Net 218.48 ml       Lines/Drains/Airways       Peripheral Intravenous Line  Duration                  Peripheral IV - Single Lumen 07/12/22 1010 18 G;1 3/4 in Anterior;Right Forearm 1 day         Peripheral IV - Single Lumen 07/14/22 0553 18 G Left;Posterior Forearm <1 day                    Physical Exam  Vitals and nursing note reviewed.   Constitutional:       General: He is not in acute distress.     Appearance: He is not ill-appearing.   HENT:      Head: Normocephalic and atraumatic.      Mouth/Throat:      Mouth: Mucous membranes are moist.   Eyes:      Extraocular Movements: Extraocular movements intact.      Pupils: Pupils are equal, round, and reactive to light.   Cardiovascular:      Rate and Rhythm: Regular rhythm. Tachycardia present.      Pulses:            Radial pulses are 2+ on the right side and 2+ on the left side.        Dorsalis pedis pulses are 2+ on the right side and 1+ on the left side.        Posterior tibial pulses are 2+ on the right side and 2+ on the left side.      Heart sounds: No murmur heard.  Pulmonary:      Effort: Pulmonary effort is normal.      Breath sounds: Normal breath sounds.   Abdominal:      General: Abdomen is flat.      Palpations: Abdomen is soft.   Musculoskeletal:      Right lower leg: No edema.      Left lower leg: No edema.       Significant Labs: All pertinent lab results from the last 24 hours have been reviewed.    Significant Imaging:  Reviewed

## 2022-07-14 NOTE — ASSESSMENT & PLAN NOTE
Home regimen: amlodipine 10, HCTZ, ACE/ARB. Given our concern for underlying infection, will scale back blood pressure medications.    - hctz 12.5 mg qd  - losartan 100 mg qd  - will need beta blocker for HFmrEF (40-45%); will start after surgery.

## 2022-07-14 NOTE — ASSESSMENT & PLAN NOTE
Likely ischemic cardiomyopathy. TTE with EF 40-45% and normal RV function. He is not acutely decompensated.    - start on beta blocker when able. Holding for now given concern for developing infection; plan to start after surgery  - continue losartan 100 mg qd  - cardiac diet

## 2022-07-14 NOTE — ASSESSMENT & PLAN NOTE
Hx of DM2, HTN, obesity here with NSTEMI with trop at 0.03 and st/t changes. LHC on 7/11 with diffuse disease. LAD with 90% ostial to prox and 70% mid LAD, D1 70%, D2 60%, ost to prox LCX with 99% stenosis. CTS evaluated patient, will undergo CABG this admission. TTE with mid range EF of 40-45% and normal RV systolic function.     - heparin gtt  - aspirin 81 mg qd  - atorvastatin 40 mg qd. . Goal <70.  - CTS following - CABG evaluation underway. Scheduled for 7/18

## 2022-07-15 ENCOUNTER — ANESTHESIA EVENT (OUTPATIENT)
Dept: SURGERY | Facility: HOSPITAL | Age: 62
DRG: 234 | End: 2022-07-15
Payer: COMMERCIAL

## 2022-07-15 LAB
ALBUMIN SERPL BCP-MCNC: 3 G/DL (ref 3.5–5.2)
ALP SERPL-CCNC: 75 U/L (ref 55–135)
ALT SERPL W/O P-5'-P-CCNC: 36 U/L (ref 10–44)
ANION GAP SERPL CALC-SCNC: 11 MMOL/L (ref 8–16)
APTT BLDCRRT: 32.5 SEC (ref 21–32)
APTT BLDCRRT: 36.2 SEC (ref 21–32)
APTT BLDCRRT: 43.5 SEC (ref 21–32)
AST SERPL-CCNC: 32 U/L (ref 10–40)
BASOPHILS # BLD AUTO: 0.16 K/UL (ref 0–0.2)
BASOPHILS NFR BLD: 0.9 % (ref 0–1.9)
BILIRUB SERPL-MCNC: 0.9 MG/DL (ref 0.1–1)
BUN SERPL-MCNC: 10 MG/DL (ref 8–23)
CALCIUM SERPL-MCNC: 9 MG/DL (ref 8.7–10.5)
CHLORIDE SERPL-SCNC: 98 MMOL/L (ref 95–110)
CO2 SERPL-SCNC: 23 MMOL/L (ref 23–29)
CREAT SERPL-MCNC: 0.8 MG/DL (ref 0.5–1.4)
DIFFERENTIAL METHOD: ABNORMAL
EOSINOPHIL # BLD AUTO: 0.1 K/UL (ref 0–0.5)
EOSINOPHIL NFR BLD: 0.4 % (ref 0–8)
ERYTHROCYTE [DISTWIDTH] IN BLOOD BY AUTOMATED COUNT: 14.7 % (ref 11.5–14.5)
EST. GFR  (AFRICAN AMERICAN): >60 ML/MIN/1.73 M^2
EST. GFR  (NON AFRICAN AMERICAN): >60 ML/MIN/1.73 M^2
GLUCOSE SERPL-MCNC: 261 MG/DL (ref 70–110)
HCT VFR BLD AUTO: 47.1 % (ref 40–54)
HGB BLD-MCNC: 15.4 G/DL (ref 14–18)
IMM GRANULOCYTES # BLD AUTO: 0.13 K/UL (ref 0–0.04)
IMM GRANULOCYTES NFR BLD AUTO: 0.8 % (ref 0–0.5)
LYMPHOCYTES # BLD AUTO: 4.1 K/UL (ref 1–4.8)
LYMPHOCYTES NFR BLD: 24.3 % (ref 18–48)
MAGNESIUM SERPL-MCNC: 2.1 MG/DL (ref 1.6–2.6)
MCH RBC QN AUTO: 30.5 PG (ref 27–31)
MCHC RBC AUTO-ENTMCNC: 32.7 G/DL (ref 32–36)
MCV RBC AUTO: 93 FL (ref 82–98)
MONOCYTES # BLD AUTO: 2 K/UL (ref 0.3–1)
MONOCYTES NFR BLD: 11.6 % (ref 4–15)
NEUTROPHILS # BLD AUTO: 10.5 K/UL (ref 1.8–7.7)
NEUTROPHILS NFR BLD: 62 % (ref 38–73)
NRBC BLD-RTO: 0 /100 WBC
PHOSPHATE SERPL-MCNC: 2.4 MG/DL (ref 2.7–4.5)
PLATELET # BLD AUTO: 395 K/UL (ref 150–450)
PMV BLD AUTO: 9.6 FL (ref 9.2–12.9)
POCT GLUCOSE: 225 MG/DL (ref 70–110)
POCT GLUCOSE: 270 MG/DL (ref 70–110)
POCT GLUCOSE: 333 MG/DL (ref 70–110)
POTASSIUM SERPL-SCNC: 3.7 MMOL/L (ref 3.5–5.1)
PROT SERPL-MCNC: 6.9 G/DL (ref 6–8.4)
RBC # BLD AUTO: 5.05 M/UL (ref 4.6–6.2)
SODIUM SERPL-SCNC: 132 MMOL/L (ref 136–145)
VANCOMYCIN SERPL-MCNC: 29.1 UG/ML
WBC # BLD AUTO: 16.89 K/UL (ref 3.9–12.7)

## 2022-07-15 PROCEDURE — 25000003 PHARM REV CODE 250: Performed by: STUDENT IN AN ORGANIZED HEALTH CARE EDUCATION/TRAINING PROGRAM

## 2022-07-15 PROCEDURE — C9399 UNCLASSIFIED DRUGS OR BIOLOG: HCPCS | Performed by: STUDENT IN AN ORGANIZED HEALTH CARE EDUCATION/TRAINING PROGRAM

## 2022-07-15 PROCEDURE — 99231 PR SUBSEQUENT HOSPITAL CARE,LEVL I: ICD-10-PCS | Mod: ,,, | Performed by: INTERNAL MEDICINE

## 2022-07-15 PROCEDURE — 80053 COMPREHEN METABOLIC PANEL: CPT | Performed by: INTERNAL MEDICINE

## 2022-07-15 PROCEDURE — 80202 ASSAY OF VANCOMYCIN: CPT | Performed by: INTERNAL MEDICINE

## 2022-07-15 PROCEDURE — 84100 ASSAY OF PHOSPHORUS: CPT | Performed by: INTERNAL MEDICINE

## 2022-07-15 PROCEDURE — 85025 COMPLETE CBC W/AUTO DIFF WBC: CPT | Performed by: EMERGENCY MEDICINE

## 2022-07-15 PROCEDURE — 63600175 PHARM REV CODE 636 W HCPCS: Performed by: INTERNAL MEDICINE

## 2022-07-15 PROCEDURE — 25000003 PHARM REV CODE 250: Performed by: INTERNAL MEDICINE

## 2022-07-15 PROCEDURE — 25000003 PHARM REV CODE 250: Performed by: EMERGENCY MEDICINE

## 2022-07-15 PROCEDURE — 63600175 PHARM REV CODE 636 W HCPCS: Performed by: EMERGENCY MEDICINE

## 2022-07-15 PROCEDURE — 63600175 PHARM REV CODE 636 W HCPCS: Performed by: STUDENT IN AN ORGANIZED HEALTH CARE EDUCATION/TRAINING PROGRAM

## 2022-07-15 PROCEDURE — 20600001 HC STEP DOWN PRIVATE ROOM

## 2022-07-15 PROCEDURE — 99231 SBSQ HOSP IP/OBS SF/LOW 25: CPT | Mod: ,,, | Performed by: INTERNAL MEDICINE

## 2022-07-15 PROCEDURE — 85730 THROMBOPLASTIN TIME PARTIAL: CPT | Mod: 91 | Performed by: INTERNAL MEDICINE

## 2022-07-15 PROCEDURE — 83735 ASSAY OF MAGNESIUM: CPT | Performed by: INTERNAL MEDICINE

## 2022-07-15 PROCEDURE — 36415 COLL VENOUS BLD VENIPUNCTURE: CPT | Performed by: INTERNAL MEDICINE

## 2022-07-15 PROCEDURE — 94760 N-INVAS EAR/PLS OXIMETRY 1: CPT

## 2022-07-15 PROCEDURE — 85730 THROMBOPLASTIN TIME PARTIAL: CPT | Performed by: EMERGENCY MEDICINE

## 2022-07-15 RX ORDER — VANCOMYCIN HCL IN 5 % DEXTROSE 1G/250ML
1000 PLASTIC BAG, INJECTION (ML) INTRAVENOUS
Status: DISCONTINUED | OUTPATIENT
Start: 2022-07-15 | End: 2022-07-15

## 2022-07-15 RX ORDER — INSULIN ASPART 100 [IU]/ML
10 INJECTION, SOLUTION INTRAVENOUS; SUBCUTANEOUS
Status: DISCONTINUED | OUTPATIENT
Start: 2022-07-15 | End: 2022-07-16

## 2022-07-15 RX ORDER — POTASSIUM CHLORIDE 750 MG/1
30 CAPSULE, EXTENDED RELEASE ORAL ONCE
Status: COMPLETED | OUTPATIENT
Start: 2022-07-15 | End: 2022-07-15

## 2022-07-15 RX ADMIN — POTASSIUM CHLORIDE 30 MEQ: 10 CAPSULE, COATED, EXTENDED RELEASE ORAL at 09:07

## 2022-07-15 RX ADMIN — VANCOMYCIN HYDROCHLORIDE 2000 MG: 500 INJECTION, POWDER, LYOPHILIZED, FOR SOLUTION INTRAVENOUS at 11:07

## 2022-07-15 RX ADMIN — INSULIN ASPART 10 UNITS: 100 INJECTION, SOLUTION INTRAVENOUS; SUBCUTANEOUS at 11:07

## 2022-07-15 RX ADMIN — HEPARIN SODIUM 25 UNITS/KG/HR: 5000 INJECTION INTRAVENOUS; SUBCUTANEOUS at 06:07

## 2022-07-15 RX ADMIN — HEPARIN SODIUM 25 UNITS/KG/HR: 5000 INJECTION INTRAVENOUS; SUBCUTANEOUS at 08:07

## 2022-07-15 RX ADMIN — INSULIN ASPART 8 UNITS: 100 INJECTION, SOLUTION INTRAVENOUS; SUBCUTANEOUS at 04:07

## 2022-07-15 RX ADMIN — INSULIN ASPART 6 UNITS: 100 INJECTION, SOLUTION INTRAVENOUS; SUBCUTANEOUS at 11:07

## 2022-07-15 RX ADMIN — ATORVASTATIN CALCIUM 40 MG: 20 TABLET, FILM COATED ORAL at 09:07

## 2022-07-15 RX ADMIN — LOSARTAN POTASSIUM 100 MG: 50 TABLET, FILM COATED ORAL at 09:07

## 2022-07-15 RX ADMIN — INSULIN ASPART 4 UNITS: 100 INJECTION, SOLUTION INTRAVENOUS; SUBCUTANEOUS at 08:07

## 2022-07-15 RX ADMIN — INSULIN ASPART 2 UNITS: 100 INJECTION, SOLUTION INTRAVENOUS; SUBCUTANEOUS at 10:07

## 2022-07-15 RX ADMIN — HEPARIN SODIUM 25 UNITS/KG/HR: 5000 INJECTION INTRAVENOUS; SUBCUTANEOUS at 07:07

## 2022-07-15 RX ADMIN — CEFEPIME 2 G: 2 INJECTION, POWDER, FOR SOLUTION INTRAVENOUS at 06:07

## 2022-07-15 RX ADMIN — HYDROCHLOROTHIAZIDE 12.5 MG: 12.5 TABLET ORAL at 09:07

## 2022-07-15 RX ADMIN — INSULIN ASPART 10 UNITS: 100 INJECTION, SOLUTION INTRAVENOUS; SUBCUTANEOUS at 04:07

## 2022-07-15 RX ADMIN — VANCOMYCIN HYDROCHLORIDE 2000 MG: 500 INJECTION, POWDER, LYOPHILIZED, FOR SOLUTION INTRAVENOUS at 12:07

## 2022-07-15 RX ADMIN — INSULIN DETEMIR 3 UNITS: 100 INJECTION, SOLUTION SUBCUTANEOUS at 01:07

## 2022-07-15 RX ADMIN — ASPIRIN 81 MG CHEWABLE TABLET 81 MG: 81 TABLET CHEWABLE at 09:07

## 2022-07-15 NOTE — PROGRESS NOTES
Therapy with vancomycin complete and discontinued by provider.  Pharmacy will sign off, please re-consult as needed.

## 2022-07-15 NOTE — SUBJECTIVE & OBJECTIVE
Interval History: No events overnight; continues to have arm pain but denies chest pain, orthopnea, dyspnea, or cough/rashes/dyspnea. His HR is in the low 100s and /75 this morning.     Review of Systems   Constitutional: Negative for chills and fever.   Cardiovascular:  Negative for chest pain, dyspnea on exertion and leg swelling.   Respiratory:  Negative for cough and shortness of breath.    Endocrine: Negative for cold intolerance.   Musculoskeletal:  Negative for back pain and falls.   Gastrointestinal:  Negative for abdominal pain.   Objective:     Vital Signs (Most Recent):  Temp: 98.5 °F (36.9 °C) (07/15/22 0813)  Pulse: 94 (07/15/22 0813)  Resp: 18 (07/15/22 0813)  BP: 125/82 (07/15/22 0813)  SpO2: 99 % (07/15/22 0813) Vital Signs (24h Range):  Temp:  [98 °F (36.7 °C)-99.5 °F (37.5 °C)] 98.5 °F (36.9 °C)  Pulse:  [] 94  Resp:  [18-20] 18  SpO2:  [95 %-99 %] 99 %  BP: (105-138)/(70-92) 125/82     Weight: 126.4 kg (278 lb 10.6 oz)  Body mass index is 37.79 kg/m².     SpO2: 99 %  O2 Device (Oxygen Therapy): room air      Intake/Output Summary (Last 24 hours) at 7/15/2022 1036  Last data filed at 7/15/2022 0825  Gross per 24 hour   Intake 480 ml   Output 2050 ml   Net -1570 ml       Lines/Drains/Airways       Peripheral Intravenous Line  Duration                  Peripheral IV - Single Lumen 07/14/22 0553 18 G Left;Posterior Forearm 1 day         Peripheral IV - Single Lumen 07/14/22 2350 22 G Anterior;Left;Proximal Forearm <1 day                    Physical Exam  Vitals and nursing note reviewed.   Constitutional:       General: He is not in acute distress.     Appearance: He is not ill-appearing.   HENT:      Head: Normocephalic and atraumatic.      Mouth/Throat:      Mouth: Mucous membranes are moist.   Eyes:      Extraocular Movements: Extraocular movements intact.      Pupils: Pupils are equal, round, and reactive to light.   Cardiovascular:      Rate and Rhythm: Regular rhythm. Tachycardia  present.      Pulses:           Radial pulses are 2+ on the right side and 2+ on the left side.        Dorsalis pedis pulses are 2+ on the right side and 1+ on the left side.        Posterior tibial pulses are 2+ on the right side and 2+ on the left side.      Heart sounds: No murmur heard.  Pulmonary:      Effort: Pulmonary effort is normal.      Breath sounds: Normal breath sounds.   Abdominal:      General: Abdomen is flat.      Palpations: Abdomen is soft.   Musculoskeletal:      Right lower leg: No edema.      Left lower leg: No edema.       Significant Labs: All pertinent lab results from the last 24 hours have been reviewed.    Significant Imaging:  Reviewed

## 2022-07-15 NOTE — PLAN OF CARE
[facility] - Cardiology Stepdown  Discharge Reassessment    Primary Care Provider: Primary Doctor No    Expected Discharge Date: 7/22/2022    Reassessment (most recent)     Discharge Reassessment - 07/15/22 1410        Discharge Reassessment    Assessment Type Discharge Planning Reassessment     Communicated STELLA with patient/caregiver Date not available/Unable to determine     Discharge Plan A Home;Home with family     Discharge Plan B Home Health     Discharge Barriers Identified None     Why the patient remains in the hospital Requires continued medical care             CABG scheduled Monday 7/18.  Discharge disposition to be determined after surgery.    Nadine Ramirez LMSW  [facility]  [id number]

## 2022-07-15 NOTE — ASSESSMENT & PLAN NOTE
NIDDM2, takes metformin 1000 BID daily. Last A1C 7.6.  His sugars have persistently elevated during hospital stay.    - LDSSI  - adjust insulin regimen as needed  - poct glucose ac/hs   - goal 140-180

## 2022-07-15 NOTE — ANESTHESIA PREPROCEDURE EVALUATION
[facility]  Anesthesia Pre-Operative Evaluation         Patient Name: Isaiah Dorsey Jr.  YOB: 1960  MRN: [id number]    SUBJECTIVE:     Pre-operative evaluation for Procedure(s) (LRB):  CORONARY ARTERY BYPASS GRAFT (CABG) (Left)  HARVEST-VEIN-ENDOVASCULAR (Left)     07/15/2022    Isaiah Dorsey Jr. is a 62 y.o. male w/ HTN, HLD, DM2, aortic dilatation, CHF (EF 40-45%), and BMI 38 who was admitted to [facility] for NSTEMI.  He was found to have multi-vessel coronary disease on [facility] with LAD with 90% ostial to prox and 70% mid LAD, D1 70%, D2 60%, ost to prox LCX with 99% stenosis. Cardiothoracic surgery was consulted and recommended to proceed with CABG this admission. He now presents for the above procedure.    Echo 7/11/22  · The left ventricle is normal in size with mildly decreased systolic function. The estimated ejection fraction is 40-45%.  · There are segmental left ventricular wall motion abnormalities in LCX distribution.  · Grade I left ventricular diastolic dysfunction.  · Normal right ventricular size with normal right ventricular systolic function.  · Mild left atrial enlargement.  · Mild tricuspid regurgitation.  · The estimated PA systolic pressure is 21 mmHg.  · Normal central venous pressure (3 mmHg).    [facility] 7/11/22  · The pre-procedure left ventricular end diastolic pressure was 25.  · The Ost LAD to Prox LAD lesion was 90% stenosed.  · The Mid LAD lesion was 70% stenosed.  · The 1st Diag lesion was 90% stenosed.  · The 2nd Diag lesion was 60% stenosed.  · The RPAV lesion was 90% stenosed.  · The Ost Cx to Prox Cx lesion was 99% stenosed.  · The estimated blood loss was none.  · Aortogram demonstrated no obvious dissection and a dilated ascending aorta.    LDA:  18G PIVC L post forearm  22G PIVC L prox forearm    Prev airway: None documented.     Drips:   Heparin gtt    Patient Active Problem List   Diagnosis    Morbid obesity with BMI of 45.0-49.9, adult    Diabetes  mellitus type 2, uncontrolled, without complications    HTN (hypertension), benign    HLD (hyperlipidemia)    H/O splenectomy    Elevated platelet count    Polyp of transverse colon    Atherosclerotic heart disease of native coronary artery with unstable angina pectoris    Type 2 diabetes mellitus    Dilatation of aorta    SIRS (systemic inflammatory response syndrome)    Heart failure with mid-range ejection fraction       Review of patient's allergies indicates:   Allergen Reactions    Penicillins      Other reaction(s): Itching  Other reaction(s): Hives       Current Inpatient Medications:   aspirin  81 mg Oral Daily    atorvastatin  40 mg Oral Daily    ceFEPime (MAXIPIME) IVPB  2 g Intravenous Q8H    hydroCHLOROthiazide  12.5 mg Oral Daily    insulin aspart U-100  10 Units Subcutaneous TIDWM    insulin detemir U-100  10 Units Subcutaneous BID    losartan  100 mg Oral Daily    potassium chloride  30 mEq Oral Once    vancomycin (VANCOCIN) IVPB  15 mg/kg Intravenous Q12H       No current facility-administered medications on file prior to encounter.     Current Outpatient Medications on File Prior to Encounter   Medication Sig Dispense Refill    amlodipine (NORVASC) 10 MG tablet Take 1 tablet (10 mg total) by mouth once daily. 90 tablet 3    LISINOPRIL ORAL Take 12.5 mg by mouth once daily.      losartan (COZAAR) 100 MG tablet Take 1 tablet (100 mg total) by mouth once daily. 90 tablet 3    meloxicam (MOBIC) 15 MG tablet TAKE 1 TABLET BY MOUTH EVERY DAY FOR 14 DAYS WITH FOOD (Patient taking differently:  Pt using PRN) 30 tablet 0    metFORMIN (GLUCOPHAGE) 1000 MG tablet Take 1,000 mg by mouth 2 (two) times daily with meals.      naproxen sodium (ANAPROX) 220 MG tablet Take 220 mg by mouth every 12 (twelve) hours.         Past Surgical History:   Procedure Laterality Date    AORTOGRAPHY N/A 7/11/2022    Procedure: AORTOGRAM;  Surgeon: Bjorn Cheatham MD;  Location: [facility] CATH LAB;   Service: Cardiology;  Laterality: N/A;    LEFT HEART CATHETERIZATION N/A 7/11/2022    Procedure: Left heart cath;  Surgeon: Bjorn Cheatham MD;  Location: [facility] CATH LAB;  Service: Cardiology;  Laterality: N/A;    splenecectomy         Social History     Socioeconomic History    Marital status:    Tobacco Use    Smoking status: Former Smoker     Types: Cigarettes    Smokeless tobacco: Never Used   Substance and Sexual Activity    Alcohol use: Yes     Alcohol/week: 0.0 standard drinks     Comment: 1 a week   Social History Narrative     , Quit smoking 20 yrs, ETOH 2 beers on weekend, colonoscopy 2011       OBJECTIVE:     Vital Signs Range (Last 24H):  Temp:  [36.7 °C (98 °F)-37.5 °C (99.5 °F)]   Pulse:  []   Resp:  [18-20]   BP: (105-138)/(65-92)   SpO2:  [95 %-99 %]       CBC:   Recent Labs     07/14/22  0545 07/15/22  0317   WBC 17.10* 16.89*   RBC 5.32 5.05   HGB 16.0 15.4   HCT 48.2 47.1    395   MCV 91 93   MCH 30.1 30.5   MCHC 33.2 32.7       CMP:   Recent Labs     07/14/22  0545 07/15/22  0317   * 132*   K 3.6 3.7   CL 99 98   CO2 21* 23   BUN 11 10   CREATININE 0.8 0.8   * 261*   MG 2.0 2.1   PHOS 2.2* 2.4*   CALCIUM 9.2 9.0   ALBUMIN 3.2* 3.0*   PROT 7.1 6.9   ALKPHOS 72 75   ALT 45* 36   AST 55* 32   BILITOT 1.0 0.9       INR:  Recent Labs     07/14/22  0545 07/14/22  1803 07/15/22  0317   APTT 34.5* 36.8* 32.5*       Diagnostic Studies: No relevant studies.    EKG:   Results for orders placed or performed during the hospital encounter of 07/11/22   EKG 12-lead    Collection Time: 07/13/22  1:05 PM    Narrative    Test Reason : I49.9,    Vent. Rate : 124 BPM     Atrial Rate : 124 BPM     P-R Int : 148 ms          QRS Dur : 092 ms      QT Int : 324 ms       P-R-T Axes : 044 083 020 degrees     QTc Int : 465 ms    Sinus tachycardia with Premature atrial complexes  Possible Left atrial enlargement  Nonspecific ST abnormality  Abnormal ECG  When compared with ECG  of 11-JUL-2022 20:14,  Premature atrial complexes are now Present  Confirmed by Neftaly MEYER MD ([id number]) on 7/13/2022 2:52:06 PM    Referred By: KEISHA   SELF           Confirmed By:Neftaly MEYER MD        2D ECHO:   Results for orders placed during the hospital encounter of 07/11/22    Echo    Interpretation Summary  · The left ventricle is normal in size with mildly decreased systolic function. The estimated ejection fraction is 40-45%.  · There are segmental left ventricular wall motion abnormalities in LCX distribution.  · Grade I left ventricular diastolic dysfunction.  · Normal right ventricular size with normal right ventricular systolic function.  · Mild left atrial enlargement.  · Mild tricuspid regurgitation.  · The estimated PA systolic pressure is 21 mmHg.  · Normal central venous pressure (3 mmHg).         ASSESSMENT/PLAN:         Pre-op Assessment    I have reviewed the Patient Summary Reports.     I have reviewed the Nursing Notes. I have reviewed the NPO Status.   I have reviewed the Medications.     Review of Systems  Anesthesia Hx:  No problems with previous Anesthesia Denies Hx of Anesthetic complications  History of prior surgery of interest to airway management or planning: Denies Family Hx of Anesthesia complications.   Denies Personal Hx of Anesthesia complications.   Social:  Non-Smoker, No Alcohol Use    Hematology/Oncology:         -- Denies Anemia: Denies Current/Recent Cancer   Cardiovascular:   Hypertension Past MI Denies CAD.    Denies Dysrhythmias.   Denies CHF. hyperlipidemia    Pulmonary:   Denies COPD.  Denies Asthma.  Denies Sleep Apnea.    Renal/:   Denies Chronic Renal Disease.     Hepatic/GI:   Denies GERD. Denies Liver Disease.    Musculoskeletal:   Denies Arthritis.     Neurological:   Denies CVA. Denies Neuromuscular Disease.  Denies Seizures.   Denies Chronic Pain Syndrome   Endocrine:   Diabetes, type 2 Denies Hyperthyroidism.  Obesity / BMI > 30  Psych:   denies anxiety  denies depression          Physical Exam  General: Well nourished, Cooperative, Alert and Oriented    Airway:  Mallampati: I / I  Mouth Opening: Normal  TM Distance: Normal  Tongue: Normal  Neck ROM: Normal ROM    Dental:  Intact, Caps / Implants  Implants on bottom left teeth   Chest/Lungs:  Clear to auscultation, Normal Respiratory Rate    Heart:  Rate: Normal  Rhythm: Regular Rhythm        Anesthesia Plan  Type of Anesthesia, risks & benefits discussed:    Anesthesia Type: Gen ETT  Intra-op Monitoring Plan: Standard ASA Monitors, Art Line, LISSETTE and Central Line  Post Op Pain Control Plan: multimodal analgesia and IV/PO Opioids PRN  Induction:  IV  Airway Plan: Direct and Video, Post-Induction  Informed Consent: Informed consent signed with the Patient and all parties understand the risks and agree with anesthesia plan.  All questions answered.   ASA Score: 4  Day of Surgery Review of History & Physical: H&P Update referred to the surgeon/provider.    Ready For Surgery From Anesthesia Perspective.     .

## 2022-07-15 NOTE — PLAN OF CARE
Problem: Adult Inpatient Plan of Care  Goal: Plan of Care Review  Outcome: Ongoing, Progressing     Problem: Adult Inpatient Plan of Care  Goal: Patient-Specific Goal (Individualized)  Outcome: Ongoing, Progressing     Problem: Diabetes Comorbidity  Goal: Blood Glucose Level Within Targeted Range  Outcome: Ongoing, Progressing     Problem: Chest Pain  Goal: Resolution of Chest Pain Symptoms  Outcome: Ongoing, Progressing    Patient AAOx 3 and able to make needs known.  Patient denies pain and ambulates without assistance.  APTT: 32.5 @ 0317 and @1245 43.5.  No changes to heparin drip, heparin drip infusing at 25 units/kg/hr at 24.6 ml.hr. Next APTT is due at 1845. CBG monitored and CBG ranges 225-270,  stat dose of det arnaud 3 units administered.  Determir  13 units and as part increased to 10 units. DC cefepime . Last Vanc trough 7/14: 9.8.  Next Vanc trough due 7/16 @1100. Spouse at bedside.

## 2022-07-15 NOTE — PROGRESS NOTES
Pharmacokinetic Assessment Follow Up: IV Vancomycin    Vancomycin serum concentration assessment(s):    The trough level was drawn correctly and can be used to guide therapy at this time. The measurement is below the desired definitive target range of 10 to 20 mcg/mL.    Vancomycin Regimen Plan:    Continue regimen to Vancomycin 2000 mg IV every 12 hours with next concentration measured with AM draw on 07/15.    Drug levels (last 3 results):  Recent Labs   Lab Result Units 07/14/22  1332   Vancomycin-Trough ug/mL 9.9*       Pharmacy will continue to follow and monitor vancomycin.    Please contact pharmacy at extension [phone] for questions regarding this assessment.    Thank you for the consult,   Mian Gregorio       Patient brief summary:  Isaiah Dorsey Jr. is a 62 y.o. male initiated on antimicrobial therapy with IV Vancomycin for treatment of bacteremia        Drug Allergies:   Review of patient's allergies indicates:   Allergen Reactions    Penicillins      Other reaction(s): Itching  Other reaction(s): Hives       Actual Body Weight:   127 kg    Renal Function:   Estimated Creatinine Clearance: 130.2 mL/min (based on SCr of 0.8 mg/dL).,       CBC (last 72 hours):  Recent Labs   Lab Result Units 07/12/22  0038 07/12/22  0436 07/13/22  0120 07/13/22  1825 07/14/22  0545   WBC K/uL 16.65* 16.03* 17.19* 16.52* 17.10*   Hemoglobin g/dL 15.4 16.5 16.6 16.2 16.0   Hemoglobin A1C %  --  7.6*  --   --   --    Hematocrit % 46.6 49.3 49.8 48.8 48.2   Platelets K/uL 405 414 410 386 407   Gran % % 67.2 62.6 64.3 62.9 65.5   Lymph % % 23.1 27.1 24.0 25.2 21.6   Mono % % 8.2 8.7 10.6 10.5 11.1   Eosinophil % % 0.4 0.6 0.2 0.2 0.3   Basophil % % 0.7 0.7 0.5 0.7 0.9   Differential Method  Automated Automated Automated Automated Automated       Metabolic Panel (last 72 hours):  Recent Labs   Lab Result Units 07/12/22  0436 07/13/22  0120 07/13/22  1909 07/14/22  0545   Sodium mmol/L 132* 130*  --  131*   Potassium mmol/L 4.0 3.5   --  3.6   Chloride mmol/L 99 97  --  99   CO2 mmol/L 20* 23  --  21*   Glucose mg/dL 229* 229*  --  265*   Glucose, UA   --   --  2+*  --    BUN mg/dL 10 6*  --  11   Creatinine mg/dL 0.7 0.7  --  0.8   Albumin g/dL 3.6 3.6  --  3.2*   Total Bilirubin mg/dL 0.5 0.8  --  1.0   Alkaline Phosphatase U/L 73 81  --  72   AST U/L 180* 157*  --  55*   ALT U/L 47* 54*  --  45*   Magnesium mg/dL 1.8 1.9  --  2.0   Phosphorus mg/dL 2.7 2.5*  --  2.2*       Vancomycin Administrations:  vancomycin given in the last 96 hours                     vancomycin 2 g in dextrose 5 % 500 mL IVPB (mg) 2,000 mg New Bag 07/14/22 0309    vancomycin (VANCOCIN) 2,500 mg in dextrose 5 % 500 mL IVPB (mg) 2,500 mg New Bag 07/13/22 1440                    Microbiologic Results:  Microbiology Results (last 7 days)       Procedure Component Value Units Date/Time    Blood culture [[id number]] Collected: 07/13/22 1055    Order Status: Completed Specimen: Blood Updated: 07/14/22 1412     Blood Culture, Routine No Growth to date      No Growth to date    Narrative:      Lft hand    Blood culture [[id number]] Collected: 07/13/22 1055    Order Status: Completed Specimen: Blood Updated: 07/14/22 1412     Blood Culture, Routine No Growth to date      No Growth to date    Narrative:      Lft wrist

## 2022-07-15 NOTE — ASSESSMENT & PLAN NOTE
Persistent leukocytosis w/ sinus tachycardia. Given his hx of splenectomy and recent procedure, there was initial concern for underlying infection though his ROS was grossly negative. He denies symptoms concerning for acute VTE and physical exam unremarkable for localizing infection. Upon chart review, patient has had a leukocytosis without left shift in the past.   Peripheral smear - no blasts, consistent w/ reactive process; pct negative; ESR/CRP elevated    - fu culture data - if remains negative x48 hrs will dc abx as leukocytosis is likely a stress response from his infarct  - broad spectrum abx vanc/cefepime  - monitor closely for signs of developing infection

## 2022-07-15 NOTE — ASSESSMENT & PLAN NOTE
Hx of DM2, HTN, obesity here with NSTEMI with trop at 0.03 and st/t changes. LHC on 7/11 with diffuse disease. LAD with 90% ostial to prox and 70% mid LAD, D1 70%, D2 60%, ost to prox LCX with 99% stenosis. CTS evaluated patient, will undergo CABG this admission. TTE with mid range EF of 40-45% and normal RV systolic function.     - heparin gtt  - aspirin 81 mg qd  - atorvastatin 40 mg qd. . Goal <70.  - losartan 100 mg qd  - will need beta blocker after surgery  - CTS following - CABG evaluation underway. Scheduled for 7/18

## 2022-07-15 NOTE — PROGRESS NOTES
[facility]  Cardiology  Progress Note    Patient Name: Isaiah Dorsey Jr.  MRN: [id number]  Admission Date: 7/11/2022  Hospital Length of Stay: 4 days  Code Status: Full Code   Attending Physician: Lisa Baca MD   Primary Care Physician: Primary Doctor No  Expected Discharge Date:   Principal Problem:Atherosclerotic heart disease of native coronary artery with unstable angina pectoris    Subjective:     Hospital Course:   Admitted to CCU after presenting to the hospital for NSTEMI with troponin elevation to 0.037 and ecg with twi in lead III and st depressions anteroseptal leads. He was found to have multi-vessel coronary disease on LHC with LAD with 90% ostial to prox and 70% mid LAD, D1 70%, D2 60%, ost to prox LCX with 99% stenosis. Cardiothoracic surgery was consulted and recommended to proceed with CABG this admission. He was continued on a heparin gtt while awaiting surgery. He was found to have a dilated aorta during LHC with aneurysm of 4.7cm was seen on ct chest. His LV systolic function resulted at 40-45% in the territory of the left circumflex. He had a persistent leukocytosis without left shift and sinus tachycardia so was started on broad spectrum antibiotics vanc/cefepime. His culture data was negative and peripheral smear did not demonstrate any blasts and was more consistent with a reactive process.       Interval History: No events overnight; continues to have arm pain but denies chest pain, orthopnea, dyspnea, or cough/rashes/dyspnea. His HR is in the low 100s and /75 this morning.     Review of Systems   Constitutional: Negative for chills and fever.   Cardiovascular:  Negative for chest pain, dyspnea on exertion and leg swelling.   Respiratory:  Negative for cough and shortness of breath.    Endocrine: Negative for cold intolerance.   Musculoskeletal:  Negative for back pain and falls.   Gastrointestinal:  Negative for abdominal pain.   Objective:     Vital Signs  (Most Recent):  Temp: 98.5 °F (36.9 °C) (07/15/22 0813)  Pulse: 94 (07/15/22 0813)  Resp: 18 (07/15/22 0813)  BP: 125/82 (07/15/22 0813)  SpO2: 99 % (07/15/22 0813) Vital Signs (24h Range):  Temp:  [98 °F (36.7 °C)-99.5 °F (37.5 °C)] 98.5 °F (36.9 °C)  Pulse:  [] 94  Resp:  [18-20] 18  SpO2:  [95 %-99 %] 99 %  BP: (105-138)/(70-92) 125/82     Weight: 126.4 kg (278 lb 10.6 oz)  Body mass index is 37.79 kg/m².     SpO2: 99 %  O2 Device (Oxygen Therapy): room air      Intake/Output Summary (Last 24 hours) at 7/15/2022 1036  Last data filed at 7/15/2022 0825  Gross per 24 hour   Intake 480 ml   Output 2050 ml   Net -1570 ml       Lines/Drains/Airways       Peripheral Intravenous Line  Duration                  Peripheral IV - Single Lumen 07/14/22 0553 18 G Left;Posterior Forearm 1 day         Peripheral IV - Single Lumen 07/14/22 2350 22 G Anterior;Left;Proximal Forearm <1 day                    Physical Exam  Vitals and nursing note reviewed.   Constitutional:       General: He is not in acute distress.     Appearance: He is not ill-appearing.   HENT:      Head: Normocephalic and atraumatic.      Mouth/Throat:      Mouth: Mucous membranes are moist.   Eyes:      Extraocular Movements: Extraocular movements intact.      Pupils: Pupils are equal, round, and reactive to light.   Cardiovascular:      Rate and Rhythm: Regular rhythm. Tachycardia present.      Pulses:           Radial pulses are 2+ on the right side and 2+ on the left side.        Dorsalis pedis pulses are 2+ on the right side and 1+ on the left side.        Posterior tibial pulses are 2+ on the right side and 2+ on the left side.      Heart sounds: No murmur heard.  Pulmonary:      Effort: Pulmonary effort is normal.      Breath sounds: Normal breath sounds.   Abdominal:      General: Abdomen is flat.      Palpations: Abdomen is soft.   Musculoskeletal:      Right lower leg: No edema.      Left lower leg: No edema.       Significant Labs: All  pertinent lab results from the last 24 hours have been reviewed.    Significant Imaging:  Reviewed    Assessment and Plan:       * Atherosclerotic heart disease of native coronary artery with unstable angina pectoris  Hx of DM2, HTN, obesity here with NSTEMI with trop at 0.03 and st/t changes. C on 7/11 with diffuse disease. LAD with 90% ostial to prox and 70% mid LAD, D1 70%, D2 60%, ost to prox LCX with 99% stenosis. CTS evaluated patient, will undergo CABG this admission. TTE with mid range EF of 40-45% and normal RV systolic function.     - heparin gtt  - aspirin 81 mg qd  - atorvastatin 40 mg qd. . Goal <70.  - losartan 100 mg qd  - will need beta blocker after surgery  - CTS following - CABG evaluation underway. Scheduled for 7/18           Heart failure with mid-range ejection fraction  Likely ischemic cardiomyopathy. TTE with EF 40-45% and normal RV function. He is not acutely decompensated.    - start on beta blocker when able. Holding for now given concern for developing infection; plan to start after surgery  - continue losartan 100 mg qd  - cardiac diet    SIRS (systemic inflammatory response syndrome)  Persistent leukocytosis w/ sinus tachycardia. Given his hx of splenectomy and recent procedure, there was initial concern for underlying infection though his ROS was grossly negative. He denies symptoms concerning for acute VTE and physical exam unremarkable for localizing infection. Upon chart review, patient has had a leukocytosis without left shift in the past.   Peripheral smear - no blasts, consistent w/ reactive process; pct negative; ESR/CRP elevated    - fu culture data - if remains negative x48 hrs will dc abx as leukocytosis is likely a stress response from his infarct  - broad spectrum abx vanc/cefepime  - monitor closely for signs of developing infection      Dilatation of aorta  BP checked in both arms and equivalent. Strong pulses in both upper and lower extremities. Low concern for  dissection; CT chest with mid ascending aortic aneurysm of 4.7 cm.     - maintain adequate blood pressure control   - if he develops signs of dissection would get CTA    Type 2 diabetes mellitus  NIDDM2, takes metformin 1000 BID daily. Last A1C 7.6.  His sugars have persistently elevated during hospital stay.    - LDSSI  - adjust insulin regimen as needed  - poct glucose ac/hs   - goal 140-180    HLD (hyperlipidemia)  .    - HI statin  - goal <70    HTN (hypertension), benign  Home regimen: amlodipine 10, HCTZ, ACE/ARB. Given our concern for underlying infection, will scale back blood pressure medications.    - hctz 12.5 mg qd  - losartan 100 mg qd  - will need beta blocker for HFmrEF (40-45%); will start after surgery.        VTE Risk Mitigation (From admission, onward)         Ordered     heparin 25,000 units in dextrose 5% (100 units/ml) IV bolus from bag - ADDITIONAL PRN BOLUS - 60 units/kg (max bolus 4000 units)  As needed (PRN)        Question:  Heparin Infusion Adjustment (DO NOT MODIFY ANSWER)  Answer:  \[employer] [facility]\epic\Images\Pharmacy\HeparinInfusions\heparin LOW INTENSITY nomogram for [facility] [id number]    07/11/22 1300     heparin 25,000 units in dextrose 5% (100 units/ml) IV bolus from bag - ADDITIONAL PRN BOLUS - 30 units/kg (max bolus 4000 units)  As needed (PRN)        Question:  Heparin Infusion Adjustment (DO NOT MODIFY ANSWER)  Answer:  \[email or web address]\epic\Images\Pharmacy\HeparinInfusions\heparin LOW INTENSITY nomogram for [facility] [id number].pdf    07/11/22 1300     heparin 25,000 units in dextrose 5% 250 mL (100 units/mL) infusion LOW INTENSITY nomogram - [facility]  Continuous        Question Answer Comment   Heparin Infusion Adjustment (DO NOT MODIFY ANSWER) \\[email or web address]\epic\Images\Pharmacy\HeparinInfusions\heparin LOW INTENSITY nomogram for [facility] [id number]    Begin at (in units/kg/hr) 12        07/11/22 1300     IP VTE HIGH RISK PATIENT  Once         07/11/22 1318     Place sequential compression device   Until discontinued         07/11/22 3094                Vivian Horton DO  Cardiology  [facility]

## 2022-07-15 NOTE — PROGRESS NOTES
Pharmacokinetic Assessment Follow Up: IV Vancomycin    Vancomycin serum concentration assessment(s):    · The trough level was drawn incorrectly after the dose was given, and is therefore falsely high, and cannot be used to guide therapy at this time.  · Renal function is stable.  · Goal 15-20mcg/mL while infectious etiology is unknown.   · The trough level prior of 9.9mg/dL on 07/14 served as a pre third dose trough. This level was drawn early and therefore true trough was likely slightly lower. Since this level was below target range patient likely requires an increased frequency.      Vancomycin Regimen Plan:    · Change regimen of Vancomycin to 1000 mg IV every 8 hours starting 8 hours from the 2000mg given around noon today, with next serum trough concentration measured at 11:00h 60 minutes prior to the third dose to ensure not accumulating and heading toward therapeutic range.     Drug levels (last 3 results):  Recent Labs   Lab Result Units 07/14/22  1332 07/15/22  0317   Vancomycin, Random ug/mL  --  29.1   Vancomycin-Trough ug/mL 9.9*  --        Pharmacy will continue to follow and monitor vancomycin.    Please contact pharmacy at extension [phone] for questions regarding this assessment.    Thank you for the consult,   Olesya Patel, PharmD, BCPS, BCCP Cardiology Clinical Pharmacy Specialist  EXT [phone]       Patient brief summary:  Isaiah Dorsey Jr. is a 62 y.o. male initiated on antimicrobial therapy with IV Vancomycin for treatment of bacteremia/infection of unknown etiology.    The patient's current regimen was vancomycin 2000mg IV q12h     Drug Allergies:   Review of patient's allergies indicates:   Allergen Reactions    Penicillins      Other reaction(s): Itching  Other reaction(s): Hives       Actual Body Weight:   126.4kg    Renal Function:   Estimated Creatinine Clearance: 129.8 mL/min (based on SCr of 0.8 mg/dL).,     Dialysis Method (if applicable):  N/A    CBC (last 72  hours):  Recent Labs   Lab Result Units 07/13/22  0120 07/13/22  1825 07/14/22  0545 07/15/22  0317   WBC K/uL 17.19* 16.52* 17.10* 16.89*   Hemoglobin g/dL 16.6 16.2 16.0 15.4   Hematocrit % 49.8 48.8 48.2 47.1   Platelets K/uL 410 386 407 395   Gran % % 64.3 62.9 65.5 62.0   Lymph % % 24.0 25.2 21.6 24.3   Mono % % 10.6 10.5 11.1 11.6   Eosinophil % % 0.2 0.2 0.3 0.4   Basophil % % 0.5 0.7 0.9 0.9   Differential Method  Automated Automated Automated Automated       Metabolic Panel (last 72 hours):  Recent Labs   Lab Result Units 07/13/22  0120 07/13/22  1909 07/14/22  0545 07/15/22  0317   Sodium mmol/L 130*  --  131* 132*   Potassium mmol/L 3.5  --  3.6 3.7   Chloride mmol/L 97  --  99 98   CO2 mmol/L 23  --  21* 23   Glucose mg/dL 229*  --  265* 261*   Glucose, UA   --  2+*  --   --    BUN mg/dL 6*  --  11 10   Creatinine mg/dL 0.7  --  0.8 0.8   Albumin g/dL 3.6  --  3.2* 3.0*   Total Bilirubin mg/dL 0.8  --  1.0 0.9   Alkaline Phosphatase U/L 81  --  72 75   AST U/L 157*  --  55* 32   ALT U/L 54*  --  45* 36   Magnesium mg/dL 1.9  --  2.0 2.1   Phosphorus mg/dL 2.5*  --  2.2* 2.4*       Vancomycin Administrations:  vancomycin given in the last 96 hours                   vancomycin 2 g in dextrose 5 % 500 mL IVPB (mg) 2,000 mg New Bag 07/15/22 1146    vancomycin 2 g in dextrose 5 % 500 mL IVPB (mg) 2,000 mg New Bag 07/15/22 0001     2,000 mg New Bag 07/14/22 0309    vancomycin (VANCOCIN) 2,500 mg in dextrose 5 % 500 mL IVPB (mg) 2,500 mg New Bag 07/13/22 1440                Microbiologic Results:  Microbiology Results (last 7 days)     Procedure Component Value Units Date/Time    Blood culture [[id number]] Collected: 07/13/22 1055    Order Status: Completed Specimen: Blood Updated: 07/14/22 1412     Blood Culture, Routine No Growth to date      No Growth to date    Narrative:      Lft hand    Blood culture [[id number]] Collected: 07/13/22 1055    Order Status: Completed Specimen: Blood Updated: 07/14/22 1412      Blood Culture, Routine No Growth to date      No Growth to date    Narrative:      Lft wrist

## 2022-07-16 LAB
ALBUMIN SERPL BCP-MCNC: 2.9 G/DL (ref 3.5–5.2)
ALP SERPL-CCNC: 75 U/L (ref 55–135)
ALT SERPL W/O P-5'-P-CCNC: 28 U/L (ref 10–44)
ANION GAP SERPL CALC-SCNC: 9 MMOL/L (ref 8–16)
APTT BLDCRRT: 45.1 SEC (ref 21–32)
APTT BLDCRRT: 60 SEC (ref 21–32)
AST SERPL-CCNC: 22 U/L (ref 10–40)
BASOPHILS # BLD AUTO: 0.14 K/UL (ref 0–0.2)
BASOPHILS NFR BLD: 1 % (ref 0–1.9)
BILIRUB SERPL-MCNC: 0.8 MG/DL (ref 0.1–1)
BUN SERPL-MCNC: 10 MG/DL (ref 8–23)
CALCIUM SERPL-MCNC: 9.3 MG/DL (ref 8.7–10.5)
CHLORIDE SERPL-SCNC: 97 MMOL/L (ref 95–110)
CO2 SERPL-SCNC: 26 MMOL/L (ref 23–29)
CREAT SERPL-MCNC: 0.8 MG/DL (ref 0.5–1.4)
DIFFERENTIAL METHOD: ABNORMAL
EOSINOPHIL # BLD AUTO: 0.1 K/UL (ref 0–0.5)
EOSINOPHIL NFR BLD: 0.9 % (ref 0–8)
ERYTHROCYTE [DISTWIDTH] IN BLOOD BY AUTOMATED COUNT: 14.7 % (ref 11.5–14.5)
EST. GFR  (AFRICAN AMERICAN): >60 ML/MIN/1.73 M^2
EST. GFR  (NON AFRICAN AMERICAN): >60 ML/MIN/1.73 M^2
GLUCOSE SERPL-MCNC: 195 MG/DL (ref 70–110)
HCT VFR BLD AUTO: 46.3 % (ref 40–54)
HGB BLD-MCNC: 15.3 G/DL (ref 14–18)
IMM GRANULOCYTES # BLD AUTO: 0.08 K/UL (ref 0–0.04)
IMM GRANULOCYTES NFR BLD AUTO: 0.6 % (ref 0–0.5)
LYMPHOCYTES # BLD AUTO: 3.7 K/UL (ref 1–4.8)
LYMPHOCYTES NFR BLD: 26.1 % (ref 18–48)
MAGNESIUM SERPL-MCNC: 2.1 MG/DL (ref 1.6–2.6)
MCH RBC QN AUTO: 30.4 PG (ref 27–31)
MCHC RBC AUTO-ENTMCNC: 33 G/DL (ref 32–36)
MCV RBC AUTO: 92 FL (ref 82–98)
MONOCYTES # BLD AUTO: 1.7 K/UL (ref 0.3–1)
MONOCYTES NFR BLD: 11.7 % (ref 4–15)
NEUTROPHILS # BLD AUTO: 8.6 K/UL (ref 1.8–7.7)
NEUTROPHILS NFR BLD: 59.7 % (ref 38–73)
NRBC BLD-RTO: 0 /100 WBC
PHOSPHATE SERPL-MCNC: 2.9 MG/DL (ref 2.7–4.5)
PLATELET # BLD AUTO: 449 K/UL (ref 150–450)
PMV BLD AUTO: 9.5 FL (ref 9.2–12.9)
POCT GLUCOSE: 150 MG/DL (ref 70–110)
POCT GLUCOSE: 172 MG/DL (ref 70–110)
POCT GLUCOSE: 174 MG/DL (ref 70–110)
POCT GLUCOSE: 235 MG/DL (ref 70–110)
POCT GLUCOSE: 265 MG/DL (ref 70–110)
POTASSIUM SERPL-SCNC: 3.5 MMOL/L (ref 3.5–5.1)
POTASSIUM SERPL-SCNC: 3.8 MMOL/L (ref 3.5–5.1)
PROT SERPL-MCNC: 7.1 G/DL (ref 6–8.4)
RBC # BLD AUTO: 5.04 M/UL (ref 4.6–6.2)
SODIUM SERPL-SCNC: 132 MMOL/L (ref 136–145)
VANCOMYCIN TROUGH SERPL-MCNC: 6.4 UG/ML (ref 10–22)
WBC # BLD AUTO: 14.35 K/UL (ref 3.9–12.7)

## 2022-07-16 PROCEDURE — 84100 ASSAY OF PHOSPHORUS: CPT | Performed by: INTERNAL MEDICINE

## 2022-07-16 PROCEDURE — 20600001 HC STEP DOWN PRIVATE ROOM

## 2022-07-16 PROCEDURE — 80202 ASSAY OF VANCOMYCIN: CPT | Performed by: INTERNAL MEDICINE

## 2022-07-16 PROCEDURE — 85025 COMPLETE CBC W/AUTO DIFF WBC: CPT | Performed by: EMERGENCY MEDICINE

## 2022-07-16 PROCEDURE — 25000003 PHARM REV CODE 250: Performed by: EMERGENCY MEDICINE

## 2022-07-16 PROCEDURE — 25000003 PHARM REV CODE 250

## 2022-07-16 PROCEDURE — 83735 ASSAY OF MAGNESIUM: CPT | Performed by: INTERNAL MEDICINE

## 2022-07-16 PROCEDURE — 25000003 PHARM REV CODE 250: Performed by: INTERNAL MEDICINE

## 2022-07-16 PROCEDURE — 84132 ASSAY OF SERUM POTASSIUM: CPT

## 2022-07-16 PROCEDURE — 85730 THROMBOPLASTIN TIME PARTIAL: CPT | Performed by: INTERNAL MEDICINE

## 2022-07-16 PROCEDURE — 36415 COLL VENOUS BLD VENIPUNCTURE: CPT | Performed by: INTERNAL MEDICINE

## 2022-07-16 PROCEDURE — 25000003 PHARM REV CODE 250: Performed by: STUDENT IN AN ORGANIZED HEALTH CARE EDUCATION/TRAINING PROGRAM

## 2022-07-16 PROCEDURE — 63600175 PHARM REV CODE 636 W HCPCS: Performed by: EMERGENCY MEDICINE

## 2022-07-16 PROCEDURE — 63600175 PHARM REV CODE 636 W HCPCS

## 2022-07-16 PROCEDURE — 80053 COMPREHEN METABOLIC PANEL: CPT | Performed by: INTERNAL MEDICINE

## 2022-07-16 PROCEDURE — 99231 SBSQ HOSP IP/OBS SF/LOW 25: CPT | Mod: ,,, | Performed by: INTERNAL MEDICINE

## 2022-07-16 PROCEDURE — 36415 COLL VENOUS BLD VENIPUNCTURE: CPT

## 2022-07-16 PROCEDURE — 99231 PR SUBSEQUENT HOSPITAL CARE,LEVL I: ICD-10-PCS | Mod: ,,, | Performed by: INTERNAL MEDICINE

## 2022-07-16 RX ORDER — FUROSEMIDE 10 MG/ML
40 INJECTION INTRAMUSCULAR; INTRAVENOUS ONCE
Status: COMPLETED | OUTPATIENT
Start: 2022-07-16 | End: 2022-07-16

## 2022-07-16 RX ORDER — INSULIN ASPART 100 [IU]/ML
12 INJECTION, SOLUTION INTRAVENOUS; SUBCUTANEOUS
Status: DISCONTINUED | OUTPATIENT
Start: 2022-07-16 | End: 2022-07-18

## 2022-07-16 RX ORDER — POTASSIUM CHLORIDE 750 MG/1
30 CAPSULE, EXTENDED RELEASE ORAL 3 TIMES DAILY
Status: COMPLETED | OUTPATIENT
Start: 2022-07-16 | End: 2022-07-16

## 2022-07-16 RX ADMIN — FUROSEMIDE 40 MG: 10 INJECTION, SOLUTION INTRAMUSCULAR; INTRAVENOUS at 10:07

## 2022-07-16 RX ADMIN — POTASSIUM CHLORIDE 30 MEQ: 10 CAPSULE, COATED, EXTENDED RELEASE ORAL at 02:07

## 2022-07-16 RX ADMIN — LOSARTAN POTASSIUM 100 MG: 50 TABLET, FILM COATED ORAL at 10:07

## 2022-07-16 RX ADMIN — INSULIN ASPART 6 UNITS: 100 INJECTION, SOLUTION INTRAVENOUS; SUBCUTANEOUS at 12:07

## 2022-07-16 RX ADMIN — INSULIN ASPART 1 UNITS: 100 INJECTION, SOLUTION INTRAVENOUS; SUBCUTANEOUS at 09:07

## 2022-07-16 RX ADMIN — HYDROCHLOROTHIAZIDE 12.5 MG: 12.5 TABLET ORAL at 10:07

## 2022-07-16 RX ADMIN — HEPARIN SODIUM 27 UNITS/KG/HR: 5000 INJECTION INTRAVENOUS; SUBCUTANEOUS at 05:07

## 2022-07-16 RX ADMIN — INSULIN ASPART 12 UNITS: 100 INJECTION, SOLUTION INTRAVENOUS; SUBCUTANEOUS at 12:07

## 2022-07-16 RX ADMIN — INSULIN ASPART 12 UNITS: 100 INJECTION, SOLUTION INTRAVENOUS; SUBCUTANEOUS at 04:07

## 2022-07-16 RX ADMIN — HEPARIN SODIUM 27 UNITS/KG/HR: 5000 INJECTION INTRAVENOUS; SUBCUTANEOUS at 04:07

## 2022-07-16 RX ADMIN — ASPIRIN 81 MG CHEWABLE TABLET 81 MG: 81 TABLET CHEWABLE at 10:07

## 2022-07-16 RX ADMIN — POTASSIUM CHLORIDE 30 MEQ: 10 CAPSULE, COATED, EXTENDED RELEASE ORAL at 10:07

## 2022-07-16 RX ADMIN — POTASSIUM CHLORIDE 30 MEQ: 10 CAPSULE, COATED, EXTENDED RELEASE ORAL at 09:07

## 2022-07-16 RX ADMIN — ATORVASTATIN CALCIUM 40 MG: 20 TABLET, FILM COATED ORAL at 10:07

## 2022-07-16 NOTE — PROGRESS NOTES
[facility] - Cardiology Stepdown  Cardiology  Progress Note    Patient Name: Isaiah Dorsey Jr.  MRN: [id number]  Admission Date: 7/11/2022  Hospital Length of Stay: 5 days  Code Status: Full Code   Attending Physician: Lisa Baca MD   Primary Care Physician: Primary Doctor No  Expected Discharge Date: 7/22/2022  Principal Problem:Atherosclerotic heart disease of native coronary artery with unstable angina pectoris    Subjective:     Hospital Course:   Admitted to CCU after presenting to the hospital for NSTEMI with troponin elevation to 0.037 and ecg with twi in lead III and st depressions anteroseptal leads. He was found to have multi-vessel coronary disease on LHC with LAD with 90% ostial to prox and 70% mid LAD, D1 70%, D2 60%, ost to prox LCX with 99% stenosis. Cardiothoracic surgery was consulted and recommended to proceed with CABG this admission. He was continued on a heparin gtt while awaiting surgery. He was found to have a dilated aorta during LHC with aneurysm of 4.7cm was seen on ct chest. His LV systolic function resulted at 40-45% in the territory of the left circumflex. He had a persistent leukocytosis without left shift and sinus tachycardia so was started on broad spectrum antibiotics vanc/cefepime. His culture data was negative and peripheral smear did not demonstrate any blasts and was more consistent with a reactive process.       Interval History: Infectious workup negative, antibiotics discontinued. Pt w/o complaints this AM. No PRASAD, SOB, CP, edema.    Review of Systems   Constitutional: Negative for chills and fever.   Cardiovascular:  Negative for chest pain, dyspnea on exertion and leg swelling.   Respiratory:  Negative for cough and shortness of breath.    Endocrine: Negative for cold intolerance.   Musculoskeletal:  Negative for back pain and falls.   Gastrointestinal:  Negative for abdominal pain.   Objective:     Vital Signs (Most Recent):  Temp: 98 °F (36.7 °C) (07/16/22  1203)  Pulse: 104 (07/16/22 1203)  Resp: 17 (07/16/22 1203)  BP: 116/79 (07/16/22 1203)  SpO2: (!) 93 % (07/16/22 1203)   Vital Signs (24h Range):  Temp:  [98 °F (36.7 °C)-98.8 °F (37.1 °C)] 98 °F (36.7 °C)  Pulse:  [] 104  Resp:  [16-20] 17  SpO2:  [93 %-95 %] 93 %  BP: (109-116)/(61-79) 116/79     Weight: 126.2 kg (278 lb 3.5 oz)  Body mass index is 37.73 kg/m².     SpO2: (!) 93 %  O2 Device (Oxygen Therapy): room air      Intake/Output Summary (Last 24 hours) at 7/16/2022 1421  Last data filed at 7/16/2022 1239  Gross per 24 hour   Intake 300 ml   Output 1975 ml   Net -1675 ml       Lines/Drains/Airways       Peripheral Intravenous Line  Duration                  Peripheral IV - Single Lumen 07/14/22 0553 18 G Left;Posterior Forearm 2 days         Peripheral IV - Single Lumen 07/14/22 2350 22 G Anterior;Left;Proximal Forearm 1 day                    Physical Exam  Vitals and nursing note reviewed.   Constitutional:       General: He is not in acute distress.     Appearance: He is not ill-appearing.   HENT:      Head: Normocephalic and atraumatic.      Mouth/Throat:      Mouth: Mucous membranes are moist.   Eyes:      Extraocular Movements: Extraocular movements intact.      Pupils: Pupils are equal, round, and reactive to light.   Cardiovascular:      Rate and Rhythm: Regular rhythm. Tachycardia present.      Pulses:           Radial pulses are 2+ on the right side and 2+ on the left side.        Dorsalis pedis pulses are 2+ on the right side and 1+ on the left side.        Posterior tibial pulses are 2+ on the right side and 2+ on the left side.      Heart sounds: No murmur heard.  Pulmonary:      Effort: Pulmonary effort is normal.      Breath sounds: Normal breath sounds.   Abdominal:      General: Abdomen is flat.      Palpations: Abdomen is soft.   Musculoskeletal:      Right lower leg: No edema.      Left lower leg: No edema.       Significant Labs: Blood Culture: No results for input(s): LABBLOO in  the last 48 hours., CMP   Recent Labs   Lab 07/15/22  0317 07/16/22  0318   * 132*   K 3.7 3.5   CL 98 97   CO2 23 26   * 195*   BUN 10 10   CREATININE 0.8 0.8   CALCIUM 9.0 9.3   PROT 6.9 7.1   ALBUMIN 3.0* 2.9*   BILITOT 0.9 0.8   ALKPHOS 75 75   AST 32 22   ALT 36 28   ANIONGAP 11 9   ESTGFRAFRICA >60.0 >60.0   EGFRNONAA >60.0 >60.0   , and CBC   Recent Labs   Lab 07/15/22  0317 07/16/22  0318   WBC 16.89* 14.35*   HGB 15.4 15.3   HCT 47.1 46.3    449       Significant Imaging: No new imaging    Assessment and Plan:     * Atherosclerotic heart disease of native coronary artery with unstable angina pectoris  Hx of DM2, HTN, obesity here with NSTEMI with trop at 0.03 and st/t changes. C on 7/11 with diffuse disease. LAD with 90% ostial to prox and 70% mid LAD, D1 70%, D2 60%, ost to prox LCX with 99% stenosis. CTS evaluated patient, will undergo CABG this admission. TTE with mid range EF of 40-45% and normal RV systolic function.     - heparin gtt  - aspirin 81 mg qd  - atorvastatin 40 mg qd. . Goal <70.  - losartan 100 mg qd  - will need beta blocker after surgery  - CTS following - CABG evaluation underway. Scheduled for 7/18           Heart failure with mid-range ejection fraction  Likely ischemic cardiomyopathy. TTE with EF 40-45% and normal RV function. He is not acutely decompensated.    - start on beta blocker when able. Holding for now given concern for developing infection; plan to start after surgery  - continue losartan 100 mg qd  - cardiac diet    SIRS (systemic inflammatory response syndrome)  Persistent leukocytosis w/ sinus tachycardia. Given his hx of splenectomy and recent procedure, there was initial concern for underlying infection though his ROS was grossly negative. He denies symptoms concerning for acute VTE and physical exam unremarkable for localizing infection. Upon chart review, patient has had a leukocytosis without left shift in the past.   Peripheral smear -  no blasts, consistent w/ reactive process; pct negative; ESR/CRP elevated    - fu culture data - if remains negative x48 hrs will dc abx as leukocytosis is likely a stress response from his infarct  - broad spectrum abx vanc/cefepime  - monitor closely for signs of developing infection      Dilatation of aorta  BP checked in both arms and equivalent. Strong pulses in both upper and lower extremities. Low concern for dissection; CT chest with mid ascending aortic aneurysm of 4.7 cm.     - maintain adequate blood pressure control   - if he develops signs of dissection would get CTA    Type 2 diabetes mellitus  NIDDM2, takes metformin 1000 BID daily. Last A1C 7.6.  His sugars have persistently elevated during hospital stay.    - LDSSI  - adjust insulin regimen as needed  - poct glucose ac/hs   - goal 140-180    HLD (hyperlipidemia)  .    - HI statin  - goal <70    HTN (hypertension), benign  Home regimen: amlodipine 10, HCTZ, ACE/ARB. Given our concern for underlying infection, will scale back blood pressure medications.    - hctz 12.5 mg qd  - losartan 100 mg qd  - will need beta blocker for HFmrEF (40-45%); will start after surgery.        VTE Risk Mitigation (From admission, onward)         Ordered     heparin 25,000 units in dextrose 5% (100 units/ml) IV bolus from bag - ADDITIONAL PRN BOLUS - 60 units/kg (max bolus 4000 units)  As needed (PRN)        Question:  Heparin Infusion Adjustment (DO NOT MODIFY ANSWER)  Answer:  \[email or web address]\epic\Images\Pharmacy\HeparinInfusions\heparin LOW INTENSITY nomogram for [facility] [id number].pdf    07/11/22 1300     heparin 25,000 units in dextrose 5% (100 units/ml) IV bolus from bag - ADDITIONAL PRN BOLUS - 30 units/kg (max bolus 4000 units)  As needed (PRN)        Question:  Heparin Infusion Adjustment (DO NOT MODIFY ANSWER)  Answer:  \[email or web address]\epic\Images\Pharmacy\HeparinInfusions\heparin LOW INTENSITY nomogram for [facility] [id number].pdf    07/11/22 1300     heparin 25,000 units in  dextrose 5% 250 mL (100 units/mL) infusion LOW INTENSITY nomogram - [facility]  Continuous        Question Answer Comment   Heparin Infusion Adjustment (DO NOT MODIFY ANSWER) \\[email or web address]\epic\Images\Pharmacy\HeparinInfusions\heparin LOW INTENSITY nomogram for [facility] [id number].pdf    Begin at (in units/kg/hr) 12        07/11/22 1300     IP VTE HIGH RISK PATIENT  Once         07/11/22 1318     Place sequential compression device  Until discontinued         07/11/22 1318                Yuri Ba MD  Cardiology  [facility] - Cardiology Stepdown

## 2022-07-16 NOTE — SUBJECTIVE & OBJECTIVE
Interval History: Infectious workup negative, antibiotics discontinued. Pt w/o complaints this AM. No PRASAD, SOB, CP, edema.    Review of Systems   Constitutional: Negative for chills and fever.   Cardiovascular:  Negative for chest pain, dyspnea on exertion and leg swelling.   Respiratory:  Negative for cough and shortness of breath.    Endocrine: Negative for cold intolerance.   Musculoskeletal:  Negative for back pain and falls.   Gastrointestinal:  Negative for abdominal pain.   Objective:     Vital Signs (Most Recent):  Temp: 98 °F (36.7 °C) (07/16/22 1203)  Pulse: 104 (07/16/22 1203)  Resp: 17 (07/16/22 1203)  BP: 116/79 (07/16/22 1203)  SpO2: (!) 93 % (07/16/22 1203)   Vital Signs (24h Range):  Temp:  [98 °F (36.7 °C)-98.8 °F (37.1 °C)] 98 °F (36.7 °C)  Pulse:  [] 104  Resp:  [16-20] 17  SpO2:  [93 %-95 %] 93 %  BP: (109-116)/(61-79) 116/79     Weight: 126.2 kg (278 lb 3.5 oz)  Body mass index is 37.73 kg/m².     SpO2: (!) 93 %  O2 Device (Oxygen Therapy): room air      Intake/Output Summary (Last 24 hours) at 7/16/2022 1421  Last data filed at 7/16/2022 1239  Gross per 24 hour   Intake 300 ml   Output 1975 ml   Net -1675 ml       Lines/Drains/Airways       Peripheral Intravenous Line  Duration                  Peripheral IV - Single Lumen 07/14/22 0553 18 G Left;Posterior Forearm 2 days         Peripheral IV - Single Lumen 07/14/22 2350 22 G Anterior;Left;Proximal Forearm 1 day                    Physical Exam  Vitals and nursing note reviewed.   Constitutional:       General: He is not in acute distress.     Appearance: He is not ill-appearing.   HENT:      Head: Normocephalic and atraumatic.      Mouth/Throat:      Mouth: Mucous membranes are moist.   Eyes:      Extraocular Movements: Extraocular movements intact.      Pupils: Pupils are equal, round, and reactive to light.   Cardiovascular:      Rate and Rhythm: Regular rhythm. Tachycardia present.      Pulses:           Radial pulses are 2+ on the  right side and 2+ on the left side.        Dorsalis pedis pulses are 2+ on the right side and 1+ on the left side.        Posterior tibial pulses are 2+ on the right side and 2+ on the left side.      Heart sounds: No murmur heard.  Pulmonary:      Effort: Pulmonary effort is normal.      Breath sounds: Normal breath sounds.   Abdominal:      General: Abdomen is flat.      Palpations: Abdomen is soft.   Musculoskeletal:      Right lower leg: No edema.      Left lower leg: No edema.       Significant Labs: Blood Culture: No results for input(s): LABBLOO in the last 48 hours., CMP   Recent Labs   Lab 07/15/22  0317 07/16/22  0318   * 132*   K 3.7 3.5   CL 98 97   CO2 23 26   * 195*   BUN 10 10   CREATININE 0.8 0.8   CALCIUM 9.0 9.3   PROT 6.9 7.1   ALBUMIN 3.0* 2.9*   BILITOT 0.9 0.8   ALKPHOS 75 75   AST 32 22   ALT 36 28   ANIONGAP 11 9   ESTGFRAFRICA >60.0 >60.0   EGFRNONAA >60.0 >60.0   , and CBC   Recent Labs   Lab 07/15/22  0317 07/16/22  0318   WBC 16.89* 14.35*   HGB 15.4 15.3   HCT 47.1 46.3    449       Significant Imaging: No new imaging

## 2022-07-17 LAB
ALBUMIN SERPL BCP-MCNC: 3 G/DL (ref 3.5–5.2)
ALP SERPL-CCNC: 74 U/L (ref 55–135)
ALT SERPL W/O P-5'-P-CCNC: 26 U/L (ref 10–44)
ANION GAP SERPL CALC-SCNC: 10 MMOL/L (ref 8–16)
APTT BLDCRRT: 31 SEC (ref 21–32)
APTT BLDCRRT: 44.4 SEC (ref 21–32)
APTT BLDCRRT: 46.1 SEC (ref 21–32)
AST SERPL-CCNC: 17 U/L (ref 10–40)
BASOPHILS # BLD AUTO: 0.15 K/UL (ref 0–0.2)
BASOPHILS NFR BLD: 1.2 % (ref 0–1.9)
BILIRUB SERPL-MCNC: 0.7 MG/DL (ref 0.1–1)
BUN SERPL-MCNC: 12 MG/DL (ref 8–23)
CALCIUM SERPL-MCNC: 9.8 MG/DL (ref 8.7–10.5)
CHLORIDE SERPL-SCNC: 99 MMOL/L (ref 95–110)
CO2 SERPL-SCNC: 26 MMOL/L (ref 23–29)
CREAT SERPL-MCNC: 0.8 MG/DL (ref 0.5–1.4)
DIFFERENTIAL METHOD: ABNORMAL
EOSINOPHIL # BLD AUTO: 0.2 K/UL (ref 0–0.5)
EOSINOPHIL NFR BLD: 1.8 % (ref 0–8)
ERYTHROCYTE [DISTWIDTH] IN BLOOD BY AUTOMATED COUNT: 14.7 % (ref 11.5–14.5)
EST. GFR  (AFRICAN AMERICAN): >60 ML/MIN/1.73 M^2
EST. GFR  (NON AFRICAN AMERICAN): >60 ML/MIN/1.73 M^2
GLUCOSE SERPL-MCNC: 156 MG/DL (ref 70–110)
HCT VFR BLD AUTO: 50.7 % (ref 40–54)
HGB BLD-MCNC: 16.3 G/DL (ref 14–18)
IMM GRANULOCYTES # BLD AUTO: 0.08 K/UL (ref 0–0.04)
IMM GRANULOCYTES NFR BLD AUTO: 0.6 % (ref 0–0.5)
LYMPHOCYTES # BLD AUTO: 3.6 K/UL (ref 1–4.8)
LYMPHOCYTES NFR BLD: 29.3 % (ref 18–48)
MAGNESIUM SERPL-MCNC: 2.2 MG/DL (ref 1.6–2.6)
MCH RBC QN AUTO: 30.7 PG (ref 27–31)
MCHC RBC AUTO-ENTMCNC: 32.1 G/DL (ref 32–36)
MCV RBC AUTO: 96 FL (ref 82–98)
MONOCYTES # BLD AUTO: 1.6 K/UL (ref 0.3–1)
MONOCYTES NFR BLD: 12.6 % (ref 4–15)
NEUTROPHILS # BLD AUTO: 6.7 K/UL (ref 1.8–7.7)
NEUTROPHILS NFR BLD: 54.5 % (ref 38–73)
NRBC BLD-RTO: 0 /100 WBC
PHOSPHATE SERPL-MCNC: 3.1 MG/DL (ref 2.7–4.5)
PLATELET # BLD AUTO: 438 K/UL (ref 150–450)
PMV BLD AUTO: 10.4 FL (ref 9.2–12.9)
POCT GLUCOSE: 178 MG/DL (ref 70–110)
POCT GLUCOSE: 221 MG/DL (ref 70–110)
POCT GLUCOSE: 236 MG/DL (ref 70–110)
POTASSIUM SERPL-SCNC: 4.4 MMOL/L (ref 3.5–5.1)
PROT SERPL-MCNC: 7.2 G/DL (ref 6–8.4)
RBC # BLD AUTO: 5.31 M/UL (ref 4.6–6.2)
SODIUM SERPL-SCNC: 135 MMOL/L (ref 136–145)
WBC # BLD AUTO: 12.34 K/UL (ref 3.9–12.7)

## 2022-07-17 PROCEDURE — 85730 THROMBOPLASTIN TIME PARTIAL: CPT | Mod: 91 | Performed by: THORACIC SURGERY (CARDIOTHORACIC VASCULAR SURGERY)

## 2022-07-17 PROCEDURE — 85025 COMPLETE CBC W/AUTO DIFF WBC: CPT | Performed by: EMERGENCY MEDICINE

## 2022-07-17 PROCEDURE — 63600175 PHARM REV CODE 636 W HCPCS: Performed by: EMERGENCY MEDICINE

## 2022-07-17 PROCEDURE — 25000003 PHARM REV CODE 250: Performed by: INTERNAL MEDICINE

## 2022-07-17 PROCEDURE — 83735 ASSAY OF MAGNESIUM: CPT | Performed by: INTERNAL MEDICINE

## 2022-07-17 PROCEDURE — 80053 COMPREHEN METABOLIC PANEL: CPT | Performed by: INTERNAL MEDICINE

## 2022-07-17 PROCEDURE — 20600001 HC STEP DOWN PRIVATE ROOM

## 2022-07-17 PROCEDURE — 85730 THROMBOPLASTIN TIME PARTIAL: CPT | Performed by: EMERGENCY MEDICINE

## 2022-07-17 PROCEDURE — 36415 COLL VENOUS BLD VENIPUNCTURE: CPT | Performed by: THORACIC SURGERY (CARDIOTHORACIC VASCULAR SURGERY)

## 2022-07-17 PROCEDURE — 25000003 PHARM REV CODE 250: Performed by: EMERGENCY MEDICINE

## 2022-07-17 PROCEDURE — 84100 ASSAY OF PHOSPHORUS: CPT | Performed by: INTERNAL MEDICINE

## 2022-07-17 PROCEDURE — 36415 COLL VENOUS BLD VENIPUNCTURE: CPT | Performed by: EMERGENCY MEDICINE

## 2022-07-17 RX ADMIN — INSULIN ASPART 2 UNITS: 100 INJECTION, SOLUTION INTRAVENOUS; SUBCUTANEOUS at 09:07

## 2022-07-17 RX ADMIN — HEPARIN SODIUM 27 UNITS/KG/HR: 5000 INJECTION INTRAVENOUS; SUBCUTANEOUS at 03:07

## 2022-07-17 RX ADMIN — INSULIN ASPART 4 UNITS: 100 INJECTION, SOLUTION INTRAVENOUS; SUBCUTANEOUS at 01:07

## 2022-07-17 RX ADMIN — ATORVASTATIN CALCIUM 40 MG: 20 TABLET, FILM COATED ORAL at 09:07

## 2022-07-17 RX ADMIN — HEPARIN SODIUM 27 UNITS/KG/HR: 5000 INJECTION INTRAVENOUS; SUBCUTANEOUS at 01:07

## 2022-07-17 RX ADMIN — INSULIN ASPART 12 UNITS: 100 INJECTION, SOLUTION INTRAVENOUS; SUBCUTANEOUS at 01:07

## 2022-07-17 RX ADMIN — INSULIN ASPART 12 UNITS: 100 INJECTION, SOLUTION INTRAVENOUS; SUBCUTANEOUS at 05:07

## 2022-07-17 RX ADMIN — INSULIN ASPART 12 UNITS: 100 INJECTION, SOLUTION INTRAVENOUS; SUBCUTANEOUS at 09:07

## 2022-07-17 RX ADMIN — INSULIN ASPART 4 UNITS: 100 INJECTION, SOLUTION INTRAVENOUS; SUBCUTANEOUS at 05:07

## 2022-07-17 NOTE — PLAN OF CARE
BG monitored . Continue on heparin gtt 27 units/kg/hr. NPO start at midnight. Will notify night RN heparin gtt need to be hold at midnight per order. Pt educated on fall risk and remained free from falls/trauma/injury. Denies chest pain, SOB, palpitations, dizziness, pain, or discomfort. Plan of care reviewed with pt, all questions answered. Bed locked in lowest position, call bell within reach, no acute distress noted, will continue to monitor.

## 2022-07-17 NOTE — PROGRESS NOTES
Cardiac Surgery Progress Note     ADELE overnight     Gtts  -     A/P  Preop CABG     Transfer to CTS team  Heparin gtt, hold at midnight  NPO at midnight  Consents obtained  To OR tomorrow    Arlyn Koenig MD, PGY-6  Cardiothoracic Surgery   [phone]

## 2022-07-18 ENCOUNTER — ANESTHESIA (OUTPATIENT)
Dept: SURGERY | Facility: HOSPITAL | Age: 62
DRG: 234 | End: 2022-07-18
Payer: COMMERCIAL

## 2022-07-18 PROBLEM — Z95.1 S/P CABG (CORONARY ARTERY BYPASS GRAFT): Status: ACTIVE | Noted: 2022-07-18

## 2022-07-18 PROBLEM — E11.65 TYPE 2 DIABETES MELLITUS WITH HYPERGLYCEMIA, WITHOUT LONG-TERM CURRENT USE OF INSULIN: Status: ACTIVE | Noted: 2022-07-11

## 2022-07-18 LAB
ABO + RH BLD: NORMAL
ALBUMIN SERPL BCP-MCNC: 2.5 G/DL (ref 3.5–5.2)
ALBUMIN SERPL BCP-MCNC: 3 G/DL (ref 3.5–5.2)
ALBUMIN SERPL BCP-MCNC: 3.7 G/DL (ref 3.5–5.2)
ALLENS TEST: ABNORMAL
ALP SERPL-CCNC: 47 U/L (ref 55–135)
ALP SERPL-CCNC: 64 U/L (ref 55–135)
ALP SERPL-CCNC: 73 U/L (ref 55–135)
ALT SERPL W/O P-5'-P-CCNC: 17 U/L (ref 10–44)
ALT SERPL W/O P-5'-P-CCNC: 21 U/L (ref 10–44)
ALT SERPL W/O P-5'-P-CCNC: 24 U/L (ref 10–44)
ANION GAP SERPL CALC-SCNC: 10 MMOL/L (ref 8–16)
ANION GAP SERPL CALC-SCNC: 12 MMOL/L (ref 8–16)
ANION GAP SERPL CALC-SCNC: 14 MMOL/L (ref 8–16)
APTT BLDCRRT: 21.3 SEC (ref 21–32)
APTT BLDCRRT: 25.5 SEC (ref 21–32)
APTT BLDCRRT: 28.5 SEC (ref 21–32)
AST SERPL-CCNC: 16 U/L (ref 10–40)
AST SERPL-CCNC: 20 U/L (ref 10–40)
AST SERPL-CCNC: 20 U/L (ref 10–40)
BACTERIA BLD CULT: NORMAL
BACTERIA BLD CULT: NORMAL
BASOPHILS # BLD AUTO: 0.03 K/UL (ref 0–0.2)
BASOPHILS # BLD AUTO: 0.14 K/UL (ref 0–0.2)
BASOPHILS # BLD AUTO: 0.14 K/UL (ref 0–0.2)
BASOPHILS NFR BLD: 0.2 % (ref 0–1.9)
BASOPHILS NFR BLD: 0.5 % (ref 0–1.9)
BASOPHILS NFR BLD: 1.1 % (ref 0–1.9)
BILIRUB SERPL-MCNC: 0.3 MG/DL (ref 0.1–1)
BILIRUB SERPL-MCNC: 0.5 MG/DL (ref 0.1–1)
BILIRUB SERPL-MCNC: 0.6 MG/DL (ref 0.1–1)
BLD GP AB SCN CELLS X3 SERPL QL: NORMAL
BUN SERPL-MCNC: 10 MG/DL (ref 8–23)
BUN SERPL-MCNC: 11 MG/DL (ref 8–23)
BUN SERPL-MCNC: 8 MG/DL (ref 8–23)
CALCIUM SERPL-MCNC: 8.6 MG/DL (ref 8.7–10.5)
CALCIUM SERPL-MCNC: 8.8 MG/DL (ref 8.7–10.5)
CALCIUM SERPL-MCNC: 9.8 MG/DL (ref 8.7–10.5)
CHLORIDE SERPL-SCNC: 100 MMOL/L (ref 95–110)
CHLORIDE SERPL-SCNC: 102 MMOL/L (ref 95–110)
CHLORIDE SERPL-SCNC: 98 MMOL/L (ref 95–110)
CO2 SERPL-SCNC: 16 MMOL/L (ref 23–29)
CO2 SERPL-SCNC: 19 MMOL/L (ref 23–29)
CO2 SERPL-SCNC: 25 MMOL/L (ref 23–29)
CREAT SERPL-MCNC: 0.8 MG/DL (ref 0.5–1.4)
DELSYS: ABNORMAL
DIFFERENTIAL METHOD: ABNORMAL
EOSINOPHIL # BLD AUTO: 0 K/UL (ref 0–0.5)
EOSINOPHIL # BLD AUTO: 0.1 K/UL (ref 0–0.5)
EOSINOPHIL # BLD AUTO: 0.2 K/UL (ref 0–0.5)
EOSINOPHIL NFR BLD: 0 % (ref 0–8)
EOSINOPHIL NFR BLD: 0.4 % (ref 0–8)
EOSINOPHIL NFR BLD: 1.8 % (ref 0–8)
ERYTHROCYTE [DISTWIDTH] IN BLOOD BY AUTOMATED COUNT: 14.4 % (ref 11.5–14.5)
ERYTHROCYTE [DISTWIDTH] IN BLOOD BY AUTOMATED COUNT: 14.5 % (ref 11.5–14.5)
ERYTHROCYTE [DISTWIDTH] IN BLOOD BY AUTOMATED COUNT: 14.6 % (ref 11.5–14.5)
ERYTHROCYTE [SEDIMENTATION RATE] IN BLOOD BY WESTERGREN METHOD: 18 MM/H
ERYTHROCYTE [SEDIMENTATION RATE] IN BLOOD BY WESTERGREN METHOD: 20 MM/H
ERYTHROCYTE [SEDIMENTATION RATE] IN BLOOD BY WESTERGREN METHOD: 24 MM/H
ERYTHROCYTE [SEDIMENTATION RATE] IN BLOOD BY WESTERGREN METHOD: 25 MM/H
ERYTHROCYTE [SEDIMENTATION RATE] IN BLOOD BY WESTERGREN METHOD: 25 MM/H
ERYTHROCYTE [SEDIMENTATION RATE] IN BLOOD BY WESTERGREN METHOD: 26 MM/H
ERYTHROCYTE [SEDIMENTATION RATE] IN BLOOD BY WESTERGREN METHOD: 28 MM/H
EST. GFR  (AFRICAN AMERICAN): >60 ML/MIN/1.73 M^2
EST. GFR  (NON AFRICAN AMERICAN): >60 ML/MIN/1.73 M^2
FIBRINOGEN PPP-MCNC: 446 MG/DL (ref 182–400)
FIBRINOGEN PPP-MCNC: 540 MG/DL (ref 182–400)
FIO2: 100
FIO2: 100
FIO2: 40
FIO2: 60
FIO2: 60
FIO2: 80
FIO2: 91
FLOW: 5
FLOW: 5
GLUCOSE SERPL-MCNC: 160 MG/DL (ref 70–110)
GLUCOSE SERPL-MCNC: 218 MG/DL (ref 70–110)
GLUCOSE SERPL-MCNC: 248 MG/DL (ref 70–110)
GLUCOSE SERPL-MCNC: 250 MG/DL (ref 70–110)
HCO3 UR-SCNC: 15.7 MMOL/L (ref 24–28)
HCO3 UR-SCNC: 16.4 MMOL/L (ref 24–28)
HCO3 UR-SCNC: 16.9 MMOL/L (ref 24–28)
HCO3 UR-SCNC: 17.1 MMOL/L (ref 24–28)
HCO3 UR-SCNC: 17.1 MMOL/L (ref 24–28)
HCO3 UR-SCNC: 17.2 MMOL/L (ref 24–28)
HCO3 UR-SCNC: 17.5 MMOL/L (ref 24–28)
HCO3 UR-SCNC: 17.6 MMOL/L (ref 24–28)
HCO3 UR-SCNC: 17.8 MMOL/L (ref 24–28)
HCO3 UR-SCNC: 18.6 MMOL/L (ref 24–28)
HCO3 UR-SCNC: 18.7 MMOL/L (ref 24–28)
HCO3 UR-SCNC: 20.8 MMOL/L (ref 24–28)
HCO3 UR-SCNC: 23.1 MMOL/L (ref 24–28)
HCO3 UR-SCNC: 26 MMOL/L (ref 24–28)
HCT VFR BLD AUTO: 31.6 % (ref 40–54)
HCT VFR BLD AUTO: 41.4 % (ref 40–54)
HCT VFR BLD AUTO: 46.5 % (ref 40–54)
HCT VFR BLD CALC: 31 %PCV (ref 36–54)
HCT VFR BLD CALC: 33 %PCV (ref 36–54)
HCT VFR BLD CALC: 33 %PCV (ref 36–54)
HCT VFR BLD CALC: 35 %PCV (ref 36–54)
HCT VFR BLD CALC: 36 %PCV (ref 36–54)
HCT VFR BLD CALC: 37 %PCV (ref 36–54)
HCT VFR BLD CALC: 38 %PCV (ref 36–54)
HCT VFR BLD CALC: 38 %PCV (ref 36–54)
HCT VFR BLD CALC: 40 %PCV (ref 36–54)
HCT VFR BLD CALC: 40 %PCV (ref 36–54)
HCT VFR BLD CALC: 41 %PCV (ref 36–54)
HCT VFR BLD CALC: 45 %PCV (ref 36–54)
HGB BLD-MCNC: 10.6 G/DL (ref 14–18)
HGB BLD-MCNC: 13.2 G/DL (ref 14–18)
HGB BLD-MCNC: 14.9 G/DL (ref 14–18)
IMM GRANULOCYTES # BLD AUTO: 0.09 K/UL (ref 0–0.04)
IMM GRANULOCYTES # BLD AUTO: 0.1 K/UL (ref 0–0.04)
IMM GRANULOCYTES # BLD AUTO: 0.32 K/UL (ref 0–0.04)
IMM GRANULOCYTES NFR BLD AUTO: 0.6 % (ref 0–0.5)
IMM GRANULOCYTES NFR BLD AUTO: 0.7 % (ref 0–0.5)
IMM GRANULOCYTES NFR BLD AUTO: 1.1 % (ref 0–0.5)
INR PPP: 1.1 (ref 0.8–1.2)
INR PPP: 1.1 (ref 0.8–1.2)
LDH SERPL L TO P-CCNC: 0.81 MMOL/L (ref 0.36–1.25)
LDH SERPL L TO P-CCNC: 1.49 MMOL/L (ref 0.36–1.25)
LDH SERPL L TO P-CCNC: 4 MMOL/L (ref 0.36–1.25)
LDH SERPL L TO P-CCNC: 4.14 MMOL/L (ref 0.36–1.25)
LDH SERPL L TO P-CCNC: 4.17 MMOL/L (ref 0.36–1.25)
LDH SERPL L TO P-CCNC: 4.6 MMOL/L (ref 0.36–1.25)
LDH SERPL L TO P-CCNC: 5.3 MMOL/L (ref 0.36–1.25)
LDH SERPL L TO P-CCNC: 5.71 MMOL/L (ref 0.36–1.25)
LDH SERPL L TO P-CCNC: 6.83 MMOL/L (ref 0.36–1.25)
LDH SERPL L TO P-CCNC: 7.12 MMOL/L (ref 0.36–1.25)
LDH SERPL L TO P-CCNC: 7.82 MMOL/L (ref 0.36–1.25)
LDH SERPL L TO P-CCNC: 8.16 MMOL/L (ref 0.36–1.25)
LDH SERPL L TO P-CCNC: 8.88 MMOL/L (ref 0.36–1.25)
LDH SERPL L TO P-CCNC: 9.03 MMOL/L (ref 0.36–1.25)
LDH SERPL L TO P-CCNC: 9.73 MMOL/L (ref 0.36–1.25)
LYMPHOCYTES # BLD AUTO: 0.9 K/UL (ref 1–4.8)
LYMPHOCYTES # BLD AUTO: 2.6 K/UL (ref 1–4.8)
LYMPHOCYTES # BLD AUTO: 3 K/UL (ref 1–4.8)
LYMPHOCYTES NFR BLD: 24.2 % (ref 18–48)
LYMPHOCYTES NFR BLD: 5.2 % (ref 18–48)
LYMPHOCYTES NFR BLD: 9 % (ref 18–48)
MAGNESIUM SERPL-MCNC: 2 MG/DL (ref 1.6–2.6)
MAGNESIUM SERPL-MCNC: 2.2 MG/DL (ref 1.6–2.6)
MAGNESIUM SERPL-MCNC: 2.9 MG/DL (ref 1.6–2.6)
MCH RBC QN AUTO: 30.2 PG (ref 27–31)
MCH RBC QN AUTO: 30.5 PG (ref 27–31)
MCH RBC QN AUTO: 30.8 PG (ref 27–31)
MCHC RBC AUTO-ENTMCNC: 31.9 G/DL (ref 32–36)
MCHC RBC AUTO-ENTMCNC: 32 G/DL (ref 32–36)
MCHC RBC AUTO-ENTMCNC: 33.5 G/DL (ref 32–36)
MCV RBC AUTO: 90 FL (ref 82–98)
MCV RBC AUTO: 95 FL (ref 82–98)
MCV RBC AUTO: 97 FL (ref 82–98)
MODE: ABNORMAL
MONOCYTES # BLD AUTO: 1.3 K/UL (ref 0.3–1)
MONOCYTES # BLD AUTO: 1.7 K/UL (ref 0.3–1)
MONOCYTES # BLD AUTO: 1.7 K/UL (ref 0.3–1)
MONOCYTES NFR BLD: 13.2 % (ref 4–15)
MONOCYTES NFR BLD: 6.1 % (ref 4–15)
MONOCYTES NFR BLD: 7.5 % (ref 4–15)
NEUTROPHILS # BLD AUTO: 15.1 K/UL (ref 1.8–7.7)
NEUTROPHILS # BLD AUTO: 23.4 K/UL (ref 1.8–7.7)
NEUTROPHILS # BLD AUTO: 7.4 K/UL (ref 1.8–7.7)
NEUTROPHILS NFR BLD: 59 % (ref 38–73)
NEUTROPHILS NFR BLD: 82.9 % (ref 38–73)
NEUTROPHILS NFR BLD: 86.5 % (ref 38–73)
NRBC BLD-RTO: 0 /100 WBC
PCO2 BLDA: 31.3 MMHG (ref 35–45)
PCO2 BLDA: 31.5 MMHG (ref 35–45)
PCO2 BLDA: 31.8 MMHG (ref 35–45)
PCO2 BLDA: 32.1 MMHG (ref 35–45)
PCO2 BLDA: 32.4 MMHG (ref 35–45)
PCO2 BLDA: 33.8 MMHG (ref 35–45)
PCO2 BLDA: 35.4 MMHG (ref 35–45)
PCO2 BLDA: 36 MMHG (ref 35–45)
PCO2 BLDA: 36.7 MMHG (ref 35–45)
PCO2 BLDA: 40 MMHG (ref 35–45)
PCO2 BLDA: 41.4 MMHG (ref 35–45)
PCO2 BLDA: 43.8 MMHG (ref 35–45)
PCO2 BLDA: 44.8 MMHG (ref 35–45)
PCO2 BLDA: 45.3 MMHG (ref 35–45)
PEEP: 10
PEEP: 12
PH SMN: 7.25 [PH] (ref 7.35–7.45)
PH SMN: 7.26 [PH] (ref 7.35–7.45)
PH SMN: 7.28 [PH] (ref 7.35–7.45)
PH SMN: 7.3 [PH] (ref 7.35–7.45)
PH SMN: 7.31 [PH] (ref 7.35–7.45)
PH SMN: 7.32 [PH] (ref 7.35–7.45)
PH SMN: 7.32 [PH] (ref 7.35–7.45)
PH SMN: 7.33 [PH] (ref 7.35–7.45)
PH SMN: 7.35 [PH] (ref 7.35–7.45)
PH SMN: 7.38 [PH] (ref 7.35–7.45)
PHOSPHATE SERPL-MCNC: 2.6 MG/DL (ref 2.7–4.5)
PHOSPHATE SERPL-MCNC: 2.9 MG/DL (ref 2.7–4.5)
PHOSPHATE SERPL-MCNC: 3.7 MG/DL (ref 2.7–4.5)
PLATELET # BLD AUTO: 308 K/UL (ref 150–450)
PLATELET # BLD AUTO: 376 K/UL (ref 150–450)
PLATELET # BLD AUTO: 503 K/UL (ref 150–450)
PMV BLD AUTO: 10 FL (ref 9.2–12.9)
PMV BLD AUTO: 9.3 FL (ref 9.2–12.9)
PMV BLD AUTO: 9.9 FL (ref 9.2–12.9)
PO2 BLDA: 124 MMHG (ref 80–100)
PO2 BLDA: 137 MMHG (ref 80–100)
PO2 BLDA: 176 MMHG (ref 80–100)
PO2 BLDA: 256 MMHG (ref 80–100)
PO2 BLDA: 49 MMHG (ref 80–100)
PO2 BLDA: 67 MMHG (ref 80–100)
PO2 BLDA: 79 MMHG (ref 80–100)
PO2 BLDA: 82 MMHG (ref 80–100)
PO2 BLDA: 85 MMHG (ref 80–100)
PO2 BLDA: 88 MMHG (ref 80–100)
PO2 BLDA: 92 MMHG (ref 80–100)
PO2 BLDA: 93 MMHG (ref 80–100)
PO2 BLDA: 94 MMHG (ref 80–100)
PO2 BLDA: 95 MMHG (ref 80–100)
POC BE: -10 MMOL/L
POC BE: -11 MMOL/L
POC BE: -3 MMOL/L
POC BE: -6 MMOL/L
POC BE: -7 MMOL/L
POC BE: -8 MMOL/L
POC BE: -9 MMOL/L
POC BE: 1 MMOL/L
POC IONIZED CALCIUM: 1.04 MMOL/L (ref 1.06–1.42)
POC IONIZED CALCIUM: 1.1 MMOL/L (ref 1.06–1.42)
POC IONIZED CALCIUM: 1.12 MMOL/L (ref 1.06–1.42)
POC IONIZED CALCIUM: 1.12 MMOL/L (ref 1.06–1.42)
POC IONIZED CALCIUM: 1.13 MMOL/L (ref 1.06–1.42)
POC IONIZED CALCIUM: 1.14 MMOL/L (ref 1.06–1.42)
POC IONIZED CALCIUM: 1.15 MMOL/L (ref 1.06–1.42)
POC IONIZED CALCIUM: 1.16 MMOL/L (ref 1.06–1.42)
POC IONIZED CALCIUM: 1.16 MMOL/L (ref 1.06–1.42)
POC IONIZED CALCIUM: 1.21 MMOL/L (ref 1.06–1.42)
POC SATURATED O2: 100 % (ref 95–100)
POC SATURATED O2: 81 % (ref 95–100)
POC SATURATED O2: 92 % (ref 95–100)
POC SATURATED O2: 94 % (ref 95–100)
POC SATURATED O2: 94 % (ref 95–100)
POC SATURATED O2: 96 % (ref 95–100)
POC SATURATED O2: 97 % (ref 95–100)
POC SATURATED O2: 98 % (ref 95–100)
POC SATURATED O2: 99 % (ref 95–100)
POC SATURATED O2: 99 % (ref 95–100)
POC TCO2: 17 MMOL/L (ref 23–27)
POC TCO2: 17 MMOL/L (ref 23–27)
POC TCO2: 18 MMOL/L (ref 23–27)
POC TCO2: 19 MMOL/L (ref 23–27)
POC TCO2: 20 MMOL/L (ref 23–27)
POC TCO2: 20 MMOL/L (ref 23–27)
POC TCO2: 22 MMOL/L (ref 23–27)
POC TCO2: 24 MMOL/L (ref 23–27)
POC TCO2: 27 MMOL/L (ref 23–27)
POCT GLUCOSE: 154 MG/DL (ref 70–110)
POCT GLUCOSE: 169 MG/DL (ref 70–110)
POCT GLUCOSE: 204 MG/DL (ref 70–110)
POCT GLUCOSE: 212 MG/DL (ref 70–110)
POCT GLUCOSE: 218 MG/DL (ref 70–110)
POCT GLUCOSE: 249 MG/DL (ref 70–110)
POCT GLUCOSE: 259 MG/DL (ref 70–110)
POCT GLUCOSE: 273 MG/DL (ref 70–110)
POCT GLUCOSE: 277 MG/DL (ref 70–110)
POCT GLUCOSE: 279 MG/DL (ref 70–110)
POCT GLUCOSE: 297 MG/DL (ref 70–110)
POCT GLUCOSE: 298 MG/DL (ref 70–110)
POCT GLUCOSE: 299 MG/DL (ref 70–110)
POCT GLUCOSE: 303 MG/DL (ref 70–110)
POCT GLUCOSE: 432 MG/DL (ref 70–110)
POTASSIUM BLD-SCNC: 2.9 MMOL/L (ref 3.5–5.1)
POTASSIUM BLD-SCNC: 3.1 MMOL/L (ref 3.5–5.1)
POTASSIUM BLD-SCNC: 3.2 MMOL/L (ref 3.5–5.1)
POTASSIUM BLD-SCNC: 3.3 MMOL/L (ref 3.5–5.1)
POTASSIUM BLD-SCNC: 3.4 MMOL/L (ref 3.5–5.1)
POTASSIUM BLD-SCNC: 3.5 MMOL/L (ref 3.5–5.1)
POTASSIUM BLD-SCNC: 3.5 MMOL/L (ref 3.5–5.1)
POTASSIUM BLD-SCNC: 3.7 MMOL/L (ref 3.5–5.1)
POTASSIUM BLD-SCNC: 4 MMOL/L (ref 3.5–5.1)
POTASSIUM BLD-SCNC: 4.7 MMOL/L (ref 3.5–5.1)
POTASSIUM SERPL-SCNC: 3.9 MMOL/L (ref 3.5–5.1)
POTASSIUM SERPL-SCNC: 4 MMOL/L (ref 3.5–5.1)
POTASSIUM SERPL-SCNC: 4 MMOL/L (ref 3.5–5.1)
PROT SERPL-MCNC: 5.9 G/DL (ref 6–8.4)
PROT SERPL-MCNC: 6.4 G/DL (ref 6–8.4)
PROT SERPL-MCNC: 7.2 G/DL (ref 6–8.4)
PROTHROMBIN TIME: 11.4 SEC (ref 9–12.5)
PROTHROMBIN TIME: 11.6 SEC (ref 9–12.5)
PS: 5
RBC # BLD AUTO: 3.51 M/UL (ref 4.6–6.2)
RBC # BLD AUTO: 4.28 M/UL (ref 4.6–6.2)
RBC # BLD AUTO: 4.88 M/UL (ref 4.6–6.2)
SAMPLE: ABNORMAL
SAMPLE: NORMAL
SARS-COV-2 RDRP RESP QL NAA+PROBE: NEGATIVE
SITE: ABNORMAL
SODIUM BLD-SCNC: 133 MMOL/L (ref 136–145)
SODIUM BLD-SCNC: 135 MMOL/L (ref 136–145)
SODIUM BLD-SCNC: 136 MMOL/L (ref 136–145)
SODIUM BLD-SCNC: 137 MMOL/L (ref 136–145)
SODIUM BLD-SCNC: 138 MMOL/L (ref 136–145)
SODIUM BLD-SCNC: 140 MMOL/L (ref 136–145)
SODIUM SERPL-SCNC: 131 MMOL/L (ref 136–145)
SODIUM SERPL-SCNC: 132 MMOL/L (ref 136–145)
SODIUM SERPL-SCNC: 133 MMOL/L (ref 136–145)
SP02: 92
SP02: 92
SP02: 97
SP02: 98
SPONT RATE: 20
VT: 530
WBC # BLD AUTO: 12.47 K/UL (ref 3.9–12.7)
WBC # BLD AUTO: 17.42 K/UL (ref 3.9–12.7)
WBC # BLD AUTO: 28.21 K/UL (ref 3.9–12.7)

## 2022-07-18 PROCEDURE — 80053 COMPREHEN METABOLIC PANEL: CPT | Mod: 91 | Performed by: THORACIC SURGERY (CARDIOTHORACIC VASCULAR SURGERY)

## 2022-07-18 PROCEDURE — 63600175 PHARM REV CODE 636 W HCPCS: Mod: JG

## 2022-07-18 PROCEDURE — 93010 ELECTROCARDIOGRAM REPORT: CPT | Mod: ,,, | Performed by: INTERNAL MEDICINE

## 2022-07-18 PROCEDURE — 36620 INSERTION CATHETER ARTERY: CPT | Mod: 59,,, | Performed by: ANESTHESIOLOGY

## 2022-07-18 PROCEDURE — 83605 ASSAY OF LACTIC ACID: CPT

## 2022-07-18 PROCEDURE — 27000191 HC C-V MONITORING

## 2022-07-18 PROCEDURE — 25000003 PHARM REV CODE 250: Performed by: STUDENT IN AN ORGANIZED HEALTH CARE EDUCATION/TRAINING PROGRAM

## 2022-07-18 PROCEDURE — P9045 ALBUMIN (HUMAN), 5%, 250 ML: HCPCS | Mod: JG

## 2022-07-18 PROCEDURE — 94761 N-INVAS EAR/PLS OXIMETRY MLT: CPT

## 2022-07-18 PROCEDURE — 80053 COMPREHEN METABOLIC PANEL: CPT | Mod: 91 | Performed by: STUDENT IN AN ORGANIZED HEALTH CARE EDUCATION/TRAINING PROGRAM

## 2022-07-18 PROCEDURE — 85730 THROMBOPLASTIN TIME PARTIAL: CPT | Mod: 91 | Performed by: EMERGENCY MEDICINE

## 2022-07-18 PROCEDURE — 37000008 HC ANESTHESIA 1ST 15 MINUTES: Performed by: THORACIC SURGERY (CARDIOTHORACIC VASCULAR SURGERY)

## 2022-07-18 PROCEDURE — 84100 ASSAY OF PHOSPHORUS: CPT | Mod: 91 | Performed by: STUDENT IN AN ORGANIZED HEALTH CARE EDUCATION/TRAINING PROGRAM

## 2022-07-18 PROCEDURE — 82330 ASSAY OF CALCIUM: CPT

## 2022-07-18 PROCEDURE — 99900026 HC AIRWAY MAINTENANCE (STAT)

## 2022-07-18 PROCEDURE — 63600175 PHARM REV CODE 636 W HCPCS: Performed by: THORACIC SURGERY (CARDIOTHORACIC VASCULAR SURGERY)

## 2022-07-18 PROCEDURE — 85384 FIBRINOGEN ACTIVITY: CPT | Mod: 91 | Performed by: STUDENT IN AN ORGANIZED HEALTH CARE EDUCATION/TRAINING PROGRAM

## 2022-07-18 PROCEDURE — 99291 CRITICAL CARE FIRST HOUR: CPT | Mod: ,,, | Performed by: ANESTHESIOLOGY

## 2022-07-18 PROCEDURE — 27000221 HC OXYGEN, UP TO 24 HOURS

## 2022-07-18 PROCEDURE — 85025 COMPLETE CBC W/AUTO DIFF WBC: CPT | Mod: 91 | Performed by: EMERGENCY MEDICINE

## 2022-07-18 PROCEDURE — 82962 GLUCOSE BLOOD TEST: CPT | Performed by: THORACIC SURGERY (CARDIOTHORACIC VASCULAR SURGERY)

## 2022-07-18 PROCEDURE — U0002 COVID-19 LAB TEST NON-CDC: HCPCS | Performed by: THORACIC SURGERY (CARDIOTHORACIC VASCULAR SURGERY)

## 2022-07-18 PROCEDURE — 25000003 PHARM REV CODE 250: Performed by: THORACIC SURGERY (CARDIOTHORACIC VASCULAR SURGERY)

## 2022-07-18 PROCEDURE — 93325 DOPPLER ECHO COLOR FLOW MAPG: CPT | Mod: 26,,, | Performed by: ANESTHESIOLOGY

## 2022-07-18 PROCEDURE — 63600175 PHARM REV CODE 636 W HCPCS: Performed by: ANESTHESIOLOGY

## 2022-07-18 PROCEDURE — 85384 FIBRINOGEN ACTIVITY: CPT

## 2022-07-18 PROCEDURE — 86920 COMPATIBILITY TEST SPIN: CPT | Performed by: ANESTHESIOLOGY

## 2022-07-18 PROCEDURE — 27202608 HC CANNULA, MISC

## 2022-07-18 PROCEDURE — 27000175 HC ADULT BYPASS PUMP

## 2022-07-18 PROCEDURE — 36556 PR INSERT NON-TUNNEL CV CATH 5+ YRS OLD: ICD-10-PCS | Mod: 59,,, | Performed by: ANESTHESIOLOGY

## 2022-07-18 PROCEDURE — 27200953 HC CARDIOPLEGIA SYSTEM

## 2022-07-18 PROCEDURE — 25000003 PHARM REV CODE 250

## 2022-07-18 PROCEDURE — 37000009 HC ANESTHESIA EA ADD 15 MINS: Performed by: THORACIC SURGERY (CARDIOTHORACIC VASCULAR SURGERY)

## 2022-07-18 PROCEDURE — 85025 COMPLETE CBC W/AUTO DIFF WBC: CPT | Mod: 91 | Performed by: STUDENT IN AN ORGANIZED HEALTH CARE EDUCATION/TRAINING PROGRAM

## 2022-07-18 PROCEDURE — 85014 HEMATOCRIT: CPT

## 2022-07-18 PROCEDURE — 93010 EKG 12-LEAD: ICD-10-PCS | Mod: ,,, | Performed by: INTERNAL MEDICINE

## 2022-07-18 PROCEDURE — 93312 ECHO TRANSESOPHAGEAL: CPT | Mod: 26,59,, | Performed by: ANESTHESIOLOGY

## 2022-07-18 PROCEDURE — 93005 ELECTROCARDIOGRAM TRACING: CPT

## 2022-07-18 PROCEDURE — 20000000 HC ICU ROOM

## 2022-07-18 PROCEDURE — 99291 PR CRITICAL CARE, E/M 30-74 MINUTES: ICD-10-PCS | Mod: ,,, | Performed by: ANESTHESIOLOGY

## 2022-07-18 PROCEDURE — 36620 PR INSERT CATH,ART,PERCUT,SHORTTERM: ICD-10-PCS | Mod: 59,,, | Performed by: ANESTHESIOLOGY

## 2022-07-18 PROCEDURE — 63600175 PHARM REV CODE 636 W HCPCS

## 2022-07-18 PROCEDURE — 25000003 PHARM REV CODE 250: Performed by: ANESTHESIOLOGY

## 2022-07-18 PROCEDURE — 83735 ASSAY OF MAGNESIUM: CPT | Mod: 91 | Performed by: INTERNAL MEDICINE

## 2022-07-18 PROCEDURE — 94002 VENT MGMT INPAT INIT DAY: CPT

## 2022-07-18 PROCEDURE — 86850 RBC ANTIBODY SCREEN: CPT | Performed by: STUDENT IN AN ORGANIZED HEALTH CARE EDUCATION/TRAINING PROGRAM

## 2022-07-18 PROCEDURE — C1729 CATH, DRAINAGE: HCPCS | Performed by: THORACIC SURGERY (CARDIOTHORACIC VASCULAR SURGERY)

## 2022-07-18 PROCEDURE — 27201423 OPTIME MED/SURG SUP & DEVICES STERILE SUPPLY: Performed by: THORACIC SURGERY (CARDIOTHORACIC VASCULAR SURGERY)

## 2022-07-18 PROCEDURE — 63600531 PHARM REV CODE 636 NO ALT 250 W HCPCS: Mod: JG | Performed by: ANESTHESIOLOGY

## 2022-07-18 PROCEDURE — 93320 DOPPLER ECHO COMPLETE: CPT | Mod: 26,,, | Performed by: ANESTHESIOLOGY

## 2022-07-18 PROCEDURE — 27100088 HC CELL SAVER

## 2022-07-18 PROCEDURE — 27100026 HC SHUNT SENSOR, TERUMO

## 2022-07-18 PROCEDURE — 99223 PR INITIAL HOSPITAL CARE,LEVL III: ICD-10-PCS | Mod: ,,, | Performed by: NURSE PRACTITIONER

## 2022-07-18 PROCEDURE — 85025 COMPLETE CBC W/AUTO DIFF WBC: CPT

## 2022-07-18 PROCEDURE — 36415 COLL VENOUS BLD VENIPUNCTURE: CPT | Performed by: EMERGENCY MEDICINE

## 2022-07-18 PROCEDURE — D9220A PRA ANESTHESIA: Mod: ,,, | Performed by: ANESTHESIOLOGY

## 2022-07-18 PROCEDURE — 84295 ASSAY OF SERUM SODIUM: CPT

## 2022-07-18 PROCEDURE — 85610 PROTHROMBIN TIME: CPT

## 2022-07-18 PROCEDURE — 85610 PROTHROMBIN TIME: CPT | Mod: 91 | Performed by: STUDENT IN AN ORGANIZED HEALTH CARE EDUCATION/TRAINING PROGRAM

## 2022-07-18 PROCEDURE — 33533 PR CABG, ARTERIAL, SINGLE: ICD-10-PCS | Mod: ,,, | Performed by: THORACIC SURGERY (CARDIOTHORACIC VASCULAR SURGERY)

## 2022-07-18 PROCEDURE — 84100 ASSAY OF PHOSPHORUS: CPT

## 2022-07-18 PROCEDURE — 82803 BLOOD GASES ANY COMBINATION: CPT

## 2022-07-18 PROCEDURE — 85730 THROMBOPLASTIN TIME PARTIAL: CPT | Mod: 91 | Performed by: STUDENT IN AN ORGANIZED HEALTH CARE EDUCATION/TRAINING PROGRAM

## 2022-07-18 PROCEDURE — 85730 THROMBOPLASTIN TIME PARTIAL: CPT

## 2022-07-18 PROCEDURE — 37799 UNLISTED PX VASCULAR SURGERY: CPT

## 2022-07-18 PROCEDURE — 84100 ASSAY OF PHOSPHORUS: CPT | Mod: 91 | Performed by: INTERNAL MEDICINE

## 2022-07-18 PROCEDURE — 36000712 HC OR TIME LEV V 1ST 15 MIN: Performed by: THORACIC SURGERY (CARDIOTHORACIC VASCULAR SURGERY)

## 2022-07-18 PROCEDURE — 83735 ASSAY OF MAGNESIUM: CPT | Mod: 91 | Performed by: STUDENT IN AN ORGANIZED HEALTH CARE EDUCATION/TRAINING PROGRAM

## 2022-07-18 PROCEDURE — 93312 TEE: ICD-10-PCS | Mod: 26,59,, | Performed by: ANESTHESIOLOGY

## 2022-07-18 PROCEDURE — D9220A PRA ANESTHESIA: ICD-10-PCS | Mod: ,,, | Performed by: ANESTHESIOLOGY

## 2022-07-18 PROCEDURE — 36556 INSERT NON-TUNNEL CV CATH: CPT | Mod: 59,,, | Performed by: ANESTHESIOLOGY

## 2022-07-18 PROCEDURE — 85520 HEPARIN ASSAY: CPT

## 2022-07-18 PROCEDURE — 80053 COMPREHEN METABOLIC PANEL: CPT | Performed by: INTERNAL MEDICINE

## 2022-07-18 PROCEDURE — 93325 PR DOPPLER COLOR FLOW VELOCITY MAP: ICD-10-PCS | Mod: 26,,, | Performed by: ANESTHESIOLOGY

## 2022-07-18 PROCEDURE — 83735 ASSAY OF MAGNESIUM: CPT

## 2022-07-18 PROCEDURE — 33533 CABG ARTERIAL SINGLE: CPT | Mod: ,,, | Performed by: THORACIC SURGERY (CARDIOTHORACIC VASCULAR SURGERY)

## 2022-07-18 PROCEDURE — 84132 ASSAY OF SERUM POTASSIUM: CPT

## 2022-07-18 PROCEDURE — 99900035 HC TECH TIME PER 15 MIN (STAT)

## 2022-07-18 PROCEDURE — 36000713 HC OR TIME LEV V EA ADD 15 MIN: Performed by: THORACIC SURGERY (CARDIOTHORACIC VASCULAR SURGERY)

## 2022-07-18 PROCEDURE — 27201037 HC PRESSURE MONITORING SET UP

## 2022-07-18 PROCEDURE — 93320 CENTRAL LINE: ICD-10-PCS | Mod: 26,,, | Performed by: ANESTHESIOLOGY

## 2022-07-18 PROCEDURE — 99223 1ST HOSP IP/OBS HIGH 75: CPT | Mod: ,,, | Performed by: NURSE PRACTITIONER

## 2022-07-18 RX ORDER — OXYCODONE HYDROCHLORIDE 5 MG/1
5 TABLET ORAL EVERY 4 HOURS PRN
Status: DISCONTINUED | OUTPATIENT
Start: 2022-07-18 | End: 2022-07-23 | Stop reason: HOSPADM

## 2022-07-18 RX ORDER — LIDOCAINE HYDROCHLORIDE 20 MG/ML
INJECTION INTRAVENOUS
Status: DISCONTINUED | OUTPATIENT
Start: 2022-07-18 | End: 2022-07-18

## 2022-07-18 RX ORDER — PROTAMINE SULFATE 10 MG/ML
INJECTION, SOLUTION INTRAVENOUS
Status: DISCONTINUED | OUTPATIENT
Start: 2022-07-18 | End: 2022-07-18

## 2022-07-18 RX ORDER — DEXMEDETOMIDINE HYDROCHLORIDE 4 UG/ML
0-1.4 INJECTION, SOLUTION INTRAVENOUS CONTINUOUS
Status: DISCONTINUED | OUTPATIENT
Start: 2022-07-18 | End: 2022-07-19

## 2022-07-18 RX ORDER — HEPARIN SOD,PORCINE/0.9 % NACL 1000/500ML
INTRAVENOUS SOLUTION INTRAVENOUS
Status: DISCONTINUED | OUTPATIENT
Start: 2022-07-18 | End: 2022-07-18 | Stop reason: HOSPADM

## 2022-07-18 RX ORDER — PROPOFOL 10 MG/ML
VIAL (ML) INTRAVENOUS
Status: DISCONTINUED | OUTPATIENT
Start: 2022-07-18 | End: 2022-07-18

## 2022-07-18 RX ORDER — OXYCODONE HYDROCHLORIDE 10 MG/1
10 TABLET ORAL EVERY 4 HOURS PRN
Status: DISCONTINUED | OUTPATIENT
Start: 2022-07-18 | End: 2022-07-23 | Stop reason: HOSPADM

## 2022-07-18 RX ORDER — HEPARIN SODIUM 1000 [USP'U]/ML
INJECTION, SOLUTION INTRAVENOUS; SUBCUTANEOUS
Status: DISCONTINUED | OUTPATIENT
Start: 2022-07-18 | End: 2022-07-18

## 2022-07-18 RX ORDER — INDOMETHACIN 25 MG/1
50 CAPSULE ORAL ONCE
Status: COMPLETED | OUTPATIENT
Start: 2022-07-18 | End: 2022-07-18

## 2022-07-18 RX ORDER — NITROGLYCERIN 5 MG/ML
INJECTION, SOLUTION INTRAVENOUS
Status: DISCONTINUED | OUTPATIENT
Start: 2022-07-18 | End: 2022-07-18

## 2022-07-18 RX ORDER — PHENYLEPHRINE HYDROCHLORIDE 10 MG/ML
INJECTION INTRAVENOUS
Status: DISCONTINUED | OUTPATIENT
Start: 2022-07-18 | End: 2022-07-18

## 2022-07-18 RX ORDER — TRANEXAMIC ACID 100 MG/ML
INJECTION, SOLUTION INTRAVENOUS CONTINUOUS PRN
Status: DISCONTINUED | OUTPATIENT
Start: 2022-07-18 | End: 2022-07-18

## 2022-07-18 RX ORDER — PAPAVERINE HYDROCHLORIDE 30 MG/ML
INJECTION INTRAMUSCULAR; INTRAVENOUS
Status: DISCONTINUED | OUTPATIENT
Start: 2022-07-18 | End: 2022-07-18 | Stop reason: HOSPADM

## 2022-07-18 RX ORDER — POTASSIUM CHLORIDE 14.9 MG/ML
20 INJECTION INTRAVENOUS
Status: DISCONTINUED | OUTPATIENT
Start: 2022-07-18 | End: 2022-07-21

## 2022-07-18 RX ORDER — LIDOCAINE HYDROCHLORIDE 10 MG/ML
100 INJECTION INFILTRATION; PERINEURAL ONCE
Status: DISCONTINUED | OUTPATIENT
Start: 2022-07-18 | End: 2022-07-23 | Stop reason: HOSPADM

## 2022-07-18 RX ORDER — NOREPINEPHRINE BITARTRATE/D5W 4MG/250ML
0-3 PLASTIC BAG, INJECTION (ML) INTRAVENOUS CONTINUOUS
Status: DISCONTINUED | OUTPATIENT
Start: 2022-07-18 | End: 2022-07-19

## 2022-07-18 RX ORDER — ALBUMIN HUMAN 50 G/1000ML
SOLUTION INTRAVENOUS
Status: COMPLETED
Start: 2022-07-18 | End: 2022-07-18

## 2022-07-18 RX ORDER — ONDANSETRON 2 MG/ML
INJECTION INTRAMUSCULAR; INTRAVENOUS
Status: DISCONTINUED | OUTPATIENT
Start: 2022-07-18 | End: 2022-07-18

## 2022-07-18 RX ORDER — EPINEPHRINE 0.1 MG/ML
INJECTION INTRAVENOUS
Status: DISCONTINUED | OUTPATIENT
Start: 2022-07-18 | End: 2022-07-18

## 2022-07-18 RX ORDER — CALCIUM GLUCONATE 20 MG/ML
1 INJECTION, SOLUTION INTRAVENOUS
Status: DISCONTINUED | OUTPATIENT
Start: 2022-07-18 | End: 2022-07-21

## 2022-07-18 RX ORDER — DOCUSATE SODIUM 100 MG/1
100 CAPSULE, LIQUID FILLED ORAL 2 TIMES DAILY
Status: DISCONTINUED | OUTPATIENT
Start: 2022-07-18 | End: 2022-07-23 | Stop reason: HOSPADM

## 2022-07-18 RX ORDER — SODIUM CHLORIDE 9 MG/ML
INJECTION, SOLUTION INTRAVENOUS CONTINUOUS
Status: CANCELLED | OUTPATIENT
Start: 2022-07-18

## 2022-07-18 RX ORDER — ROCURONIUM BROMIDE 10 MG/ML
INJECTION, SOLUTION INTRAVENOUS
Status: DISCONTINUED | OUTPATIENT
Start: 2022-07-18 | End: 2022-07-18

## 2022-07-18 RX ORDER — BISACODYL 10 MG
10 SUPPOSITORY, RECTAL RECTAL DAILY PRN
Status: DISCONTINUED | OUTPATIENT
Start: 2022-07-18 | End: 2022-07-23 | Stop reason: HOSPADM

## 2022-07-18 RX ORDER — TRANEXAMIC ACID 100 MG/ML
INJECTION, SOLUTION INTRAVENOUS
Status: DISCONTINUED | OUTPATIENT
Start: 2022-07-18 | End: 2022-07-18

## 2022-07-18 RX ORDER — PROPOFOL 10 MG/ML
0-50 INJECTION, EMULSION INTRAVENOUS CONTINUOUS
Status: DISCONTINUED | OUTPATIENT
Start: 2022-07-18 | End: 2022-07-19

## 2022-07-18 RX ORDER — KETAMINE HYDROCHLORIDE 100 MG/ML
INJECTION, SOLUTION INTRAMUSCULAR; INTRAVENOUS
Status: DISCONTINUED | OUTPATIENT
Start: 2022-07-18 | End: 2022-07-18

## 2022-07-18 RX ORDER — ALBUMIN HUMAN 50 G/1000ML
25 SOLUTION INTRAVENOUS ONCE
Status: COMPLETED | OUTPATIENT
Start: 2022-07-18 | End: 2022-07-18

## 2022-07-18 RX ORDER — MILRINONE LACTATE 0.2 MG/ML
0.12 INJECTION, SOLUTION INTRAVENOUS CONTINUOUS
Status: DISCONTINUED | OUTPATIENT
Start: 2022-07-18 | End: 2022-07-20

## 2022-07-18 RX ORDER — VASOPRESSIN 20 [USP'U]/ML
INJECTION, SOLUTION INTRAMUSCULAR; SUBCUTANEOUS
Status: DISCONTINUED | OUTPATIENT
Start: 2022-07-18 | End: 2022-07-18

## 2022-07-18 RX ORDER — ONDANSETRON 2 MG/ML
4 INJECTION INTRAMUSCULAR; INTRAVENOUS EVERY 12 HOURS PRN
Status: DISCONTINUED | OUTPATIENT
Start: 2022-07-18 | End: 2022-07-23 | Stop reason: HOSPADM

## 2022-07-18 RX ORDER — MORPHINE SULFATE 2 MG/ML
2 INJECTION, SOLUTION INTRAMUSCULAR; INTRAVENOUS
Status: DISCONTINUED | OUTPATIENT
Start: 2022-07-18 | End: 2022-07-18

## 2022-07-18 RX ORDER — POTASSIUM CHLORIDE 14.9 MG/ML
60 INJECTION INTRAVENOUS
Status: DISCONTINUED | OUTPATIENT
Start: 2022-07-18 | End: 2022-07-21

## 2022-07-18 RX ORDER — FENTANYL CITRATE 50 UG/ML
INJECTION, SOLUTION INTRAMUSCULAR; INTRAVENOUS
Status: DISCONTINUED | OUTPATIENT
Start: 2022-07-18 | End: 2022-07-18

## 2022-07-18 RX ORDER — MUPIROCIN 20 MG/G
OINTMENT TOPICAL 2 TIMES DAILY
Status: DISPENSED | OUTPATIENT
Start: 2022-07-18 | End: 2022-07-23

## 2022-07-18 RX ORDER — ESMOLOL HYDROCHLORIDE 10 MG/ML
INJECTION INTRAVENOUS
Status: DISCONTINUED | OUTPATIENT
Start: 2022-07-18 | End: 2022-07-18

## 2022-07-18 RX ORDER — LIDOCAINE HYDROCHLORIDE 20 MG/ML
INJECTION INTRAVENOUS
Status: COMPLETED
Start: 2022-07-18 | End: 2022-07-18

## 2022-07-18 RX ORDER — CISATRACURIUM BESYLATE 2 MG/ML
20 INJECTION, SOLUTION INTRAVENOUS ONCE
Status: COMPLETED | OUTPATIENT
Start: 2022-07-18 | End: 2022-07-18

## 2022-07-18 RX ORDER — ASPIRIN 325 MG
325 TABLET ORAL ONCE
Status: COMPLETED | OUTPATIENT
Start: 2022-07-18 | End: 2022-07-18

## 2022-07-18 RX ORDER — MAGNESIUM SULFATE HEPTAHYDRATE 40 MG/ML
2 INJECTION, SOLUTION INTRAVENOUS
Status: DISCONTINUED | OUTPATIENT
Start: 2022-07-18 | End: 2022-07-21

## 2022-07-18 RX ORDER — ATORVASTATIN CALCIUM 20 MG/1
40 TABLET, FILM COATED ORAL NIGHTLY
Status: DISCONTINUED | OUTPATIENT
Start: 2022-07-18 | End: 2022-07-23 | Stop reason: HOSPADM

## 2022-07-18 RX ORDER — ASPIRIN 325 MG
325 TABLET ORAL DAILY
Status: DISCONTINUED | OUTPATIENT
Start: 2022-07-19 | End: 2022-07-23 | Stop reason: HOSPADM

## 2022-07-18 RX ORDER — ALBUMIN HUMAN 50 G/1000ML
12.5 SOLUTION INTRAVENOUS ONCE
Status: COMPLETED | OUTPATIENT
Start: 2022-07-18 | End: 2022-07-18

## 2022-07-18 RX ORDER — CLINDAMYCIN PHOSPHATE 900 MG/50ML
INJECTION, SOLUTION INTRAVENOUS
Status: DISCONTINUED | OUTPATIENT
Start: 2022-07-18 | End: 2022-07-18

## 2022-07-18 RX ORDER — ACETAMINOPHEN 500 MG
1000 TABLET ORAL EVERY 8 HOURS
Status: DISCONTINUED | OUTPATIENT
Start: 2022-07-18 | End: 2022-07-23 | Stop reason: HOSPADM

## 2022-07-18 RX ORDER — MAGNESIUM SULFATE HEPTAHYDRATE 40 MG/ML
INJECTION, SOLUTION INTRAVENOUS
Status: DISCONTINUED | OUTPATIENT
Start: 2022-07-18 | End: 2022-07-18

## 2022-07-18 RX ORDER — CALCIUM GLUCONATE 20 MG/ML
3 INJECTION, SOLUTION INTRAVENOUS
Status: DISCONTINUED | OUTPATIENT
Start: 2022-07-18 | End: 2022-07-21

## 2022-07-18 RX ORDER — CALCIUM GLUCONATE 20 MG/ML
2 INJECTION, SOLUTION INTRAVENOUS
Status: DISCONTINUED | OUTPATIENT
Start: 2022-07-18 | End: 2022-07-21

## 2022-07-18 RX ORDER — HYDROMORPHONE HYDROCHLORIDE 1 MG/ML
1 INJECTION, SOLUTION INTRAMUSCULAR; INTRAVENOUS; SUBCUTANEOUS
Status: DISCONTINUED | OUTPATIENT
Start: 2022-07-18 | End: 2022-07-19

## 2022-07-18 RX ORDER — CLINDAMYCIN PHOSPHATE 900 MG/50ML
900 INJECTION, SOLUTION INTRAVENOUS
Status: COMPLETED | OUTPATIENT
Start: 2022-07-18 | End: 2022-07-20

## 2022-07-18 RX ORDER — POTASSIUM CHLORIDE 29.8 MG/ML
40 INJECTION INTRAVENOUS
Status: DISCONTINUED | OUTPATIENT
Start: 2022-07-18 | End: 2022-07-21

## 2022-07-18 RX ORDER — MAGNESIUM SULFATE HEPTAHYDRATE 40 MG/ML
4 INJECTION, SOLUTION INTRAVENOUS
Status: DISCONTINUED | OUTPATIENT
Start: 2022-07-18 | End: 2022-07-21

## 2022-07-18 RX ORDER — ACETAMINOPHEN 10 MG/ML
INJECTION, SOLUTION INTRAVENOUS
Status: DISCONTINUED | OUTPATIENT
Start: 2022-07-18 | End: 2022-07-18

## 2022-07-18 RX ADMIN — PHENYLEPHRINE HYDROCHLORIDE 100 MCG: 10 INJECTION INTRAVENOUS at 09:07

## 2022-07-18 RX ADMIN — MUPIROCIN: 20 OINTMENT TOPICAL at 09:07

## 2022-07-18 RX ADMIN — SODIUM CHLORIDE 1 MG/KG/HR: 9 INJECTION, SOLUTION INTRAVENOUS at 07:07

## 2022-07-18 RX ADMIN — FENTANYL CITRATE 150 MCG: 50 INJECTION, SOLUTION INTRAMUSCULAR; INTRAVENOUS at 07:07

## 2022-07-18 RX ADMIN — LIDOCAINE HYDROCHLORIDE 100 MG: 20 INJECTION, SOLUTION INTRAVENOUS at 09:07

## 2022-07-18 RX ADMIN — CISATRACURIUM BESYLATE 20 MG: 2 INJECTION, SOLUTION INTRAVENOUS at 11:07

## 2022-07-18 RX ADMIN — SODIUM BICARBONATE 50 MEQ: 84 INJECTION, SOLUTION INTRAVENOUS at 03:07

## 2022-07-18 RX ADMIN — ROCURONIUM BROMIDE 25 MG: 10 INJECTION, SOLUTION INTRAVENOUS at 08:07

## 2022-07-18 RX ADMIN — ASPIRIN 325 MG ORAL TABLET 325 MG: 325 PILL ORAL at 03:07

## 2022-07-18 RX ADMIN — KETAMINE HYDROCHLORIDE 20 MG: 100 INJECTION, SOLUTION, CONCENTRATE INTRAMUSCULAR; INTRAVENOUS at 07:07

## 2022-07-18 RX ADMIN — ANTITHROMBIN III (HUMAN) 2000 UNITS: KIT at 09:07

## 2022-07-18 RX ADMIN — ACETAMINOPHEN 1000 MG: 10 INJECTION, SOLUTION INTRAVENOUS at 08:07

## 2022-07-18 RX ADMIN — PHENYLEPHRINE HYDROCHLORIDE 100 MCG: 10 INJECTION INTRAVENOUS at 07:07

## 2022-07-18 RX ADMIN — ALBUMIN (HUMAN) 25 G: 12.5 SOLUTION INTRAVENOUS at 12:07

## 2022-07-18 RX ADMIN — EPINEPHRINE 20 MCG: 0.1 INJECTION, SOLUTION ENDOTRACHEAL; INTRACARDIAC; INTRAVENOUS at 09:07

## 2022-07-18 RX ADMIN — ACETAMINOPHEN 1000 MG: 500 TABLET ORAL at 09:07

## 2022-07-18 RX ADMIN — LIDOCAINE HYDROCHLORIDE 100 MG: 20 INJECTION, SOLUTION INTRAVENOUS at 07:07

## 2022-07-18 RX ADMIN — AMIODARONE HYDROCHLORIDE 1 MG/MIN: 1.8 INJECTION, SOLUTION INTRAVENOUS at 11:07

## 2022-07-18 RX ADMIN — ROCURONIUM BROMIDE 25 MG: 10 INJECTION, SOLUTION INTRAVENOUS at 09:07

## 2022-07-18 RX ADMIN — ATORVASTATIN CALCIUM 40 MG: 20 TABLET, FILM COATED ORAL at 09:07

## 2022-07-18 RX ADMIN — MILRINONE LACTATE IN DEXTROSE 0.12 MCG/KG/MIN: 200 INJECTION, SOLUTION INTRAVENOUS at 08:07

## 2022-07-18 RX ADMIN — ALBUMIN HUMAN 25 G: 50 SOLUTION INTRAVENOUS at 08:07

## 2022-07-18 RX ADMIN — FENTANYL CITRATE 100 MCG: 50 INJECTION, SOLUTION INTRAMUSCULAR; INTRAVENOUS at 08:07

## 2022-07-18 RX ADMIN — INSULIN HUMAN 20.5 UNITS/HR: 1 INJECTION, SOLUTION INTRAVENOUS at 06:07

## 2022-07-18 RX ADMIN — ALBUMIN HUMAN 25 G: 50 SOLUTION INTRAVENOUS at 11:07

## 2022-07-18 RX ADMIN — CALCIUM CHLORIDE 1 G: 100 INJECTION, SOLUTION INTRAVENOUS at 09:07

## 2022-07-18 RX ADMIN — MAGNESIUM SULFATE 2 G: 2 INJECTION INTRAVENOUS at 09:07

## 2022-07-18 RX ADMIN — HYDROMORPHONE HYDROCHLORIDE 1 MG: 1 INJECTION, SOLUTION INTRAMUSCULAR; INTRAVENOUS; SUBCUTANEOUS at 07:07

## 2022-07-18 RX ADMIN — PROTAMINE SULFATE 400 MG: 10 INJECTION, SOLUTION INTRAVENOUS at 10:07

## 2022-07-18 RX ADMIN — NITROGLYCERIN 50 MCG: 5 INJECTION, SOLUTION INTRAVENOUS at 10:07

## 2022-07-18 RX ADMIN — ALBUMIN (HUMAN) 25 G: 12.5 SOLUTION INTRAVENOUS at 08:07

## 2022-07-18 RX ADMIN — EPINEPHRINE 0.06 MCG/KG/MIN: 1 INJECTION INTRAMUSCULAR; INTRAVENOUS; SUBCUTANEOUS at 09:07

## 2022-07-18 RX ADMIN — AMIODARONE HYDROCHLORIDE 1 MG/MIN: 1.8 INJECTION, SOLUTION INTRAVENOUS at 02:07

## 2022-07-18 RX ADMIN — MILRINONE LACTATE IN DEXTROSE 0.12 MCG/KG/MIN: 200 INJECTION, SOLUTION INTRAVENOUS at 05:07

## 2022-07-18 RX ADMIN — LIDOCAINE HYDROCHLORIDE 100 MG: 20 INJECTION INTRAVENOUS at 11:07

## 2022-07-18 RX ADMIN — HEPARIN SODIUM 58000 UNITS: 1000 INJECTION, SOLUTION INTRAVENOUS; SUBCUTANEOUS at 08:07

## 2022-07-18 RX ADMIN — ALBUMIN (HUMAN) 12.5 G: 12.5 SOLUTION INTRAVENOUS at 03:07

## 2022-07-18 RX ADMIN — AMIODARONE HYDROCHLORIDE 150 MG: 1.5 INJECTION, SOLUTION INTRAVENOUS at 11:07

## 2022-07-18 RX ADMIN — NOREPINEPHRINE BITARTRATE 0.05 MCG/KG/MIN: 1 INJECTION, SOLUTION, CONCENTRATE INTRAVENOUS at 07:07

## 2022-07-18 RX ADMIN — DEXMEDETOMIDINE HYDROCHLORIDE 0.6 MCG/KG/HR: 4 INJECTION INTRAVENOUS at 03:07

## 2022-07-18 RX ADMIN — OXYCODONE HYDROCHLORIDE 10 MG: 10 TABLET ORAL at 09:07

## 2022-07-18 RX ADMIN — CLINDAMYCIN PHOSPHATE 900 MG: 18 INJECTION, SOLUTION INTRAVENOUS at 08:07

## 2022-07-18 RX ADMIN — AMIODARONE HYDROCHLORIDE 0.5 MG/MIN: 1.8 INJECTION, SOLUTION INTRAVENOUS at 10:07

## 2022-07-18 RX ADMIN — PHENYLEPHRINE HYDROCHLORIDE 100 MCG: 10 INJECTION INTRAVENOUS at 08:07

## 2022-07-18 RX ADMIN — PROPOFOL 50 MCG/KG/MIN: 10 INJECTION, EMULSION INTRAVENOUS at 10:07

## 2022-07-18 RX ADMIN — ROCURONIUM BROMIDE 100 MG: 10 INJECTION, SOLUTION INTRAVENOUS at 07:07

## 2022-07-18 RX ADMIN — ALBUMIN (HUMAN) 25 G: 12.5 SOLUTION INTRAVENOUS at 11:07

## 2022-07-18 RX ADMIN — HEPARIN SODIUM 10000 UNITS: 1000 INJECTION, SOLUTION INTRAVENOUS; SUBCUTANEOUS at 09:07

## 2022-07-18 RX ADMIN — SODIUM CHLORIDE 2 UNITS/HR: 9 INJECTION, SOLUTION INTRAVENOUS at 08:07

## 2022-07-18 RX ADMIN — TRANEXAMIC ACID 972 MG: 100 INJECTION, SOLUTION INTRAVENOUS at 07:07

## 2022-07-18 RX ADMIN — MILRINONE LACTATE IN DEXTROSE 0.12 MCG/KG/MIN: 200 INJECTION, SOLUTION INTRAVENOUS at 06:07

## 2022-07-18 RX ADMIN — PROPOFOL 50 MCG/KG/MIN: 10 INJECTION, EMULSION INTRAVENOUS at 09:07

## 2022-07-18 RX ADMIN — KETAMINE HYDROCHLORIDE 10 MG: 100 INJECTION, SOLUTION, CONCENTRATE INTRAMUSCULAR; INTRAVENOUS at 09:07

## 2022-07-18 RX ADMIN — KETAMINE HYDROCHLORIDE 10 MG: 100 INJECTION, SOLUTION, CONCENTRATE INTRAMUSCULAR; INTRAVENOUS at 10:07

## 2022-07-18 RX ADMIN — PROPOFOL 50 MCG/KG/MIN: 10 INJECTION, EMULSION INTRAVENOUS at 12:07

## 2022-07-18 RX ADMIN — KETAMINE HYDROCHLORIDE 10 MG: 100 INJECTION, SOLUTION, CONCENTRATE INTRAMUSCULAR; INTRAVENOUS at 08:07

## 2022-07-18 RX ADMIN — PROPOFOL 20 MG: 10 INJECTION, EMULSION INTRAVENOUS at 08:07

## 2022-07-18 RX ADMIN — EPINEPHRINE 0.02 MCG/KG/MIN: 1 INJECTION INTRAMUSCULAR; INTRAVENOUS; SUBCUTANEOUS at 08:07

## 2022-07-18 RX ADMIN — POTASSIUM CHLORIDE 40 MEQ: 29.8 INJECTION, SOLUTION INTRAVENOUS at 05:07

## 2022-07-18 RX ADMIN — ESMOLOL HYDROCHLORIDE 10 MG: 10 INJECTION INTRAVENOUS at 07:07

## 2022-07-18 RX ADMIN — VASOPRESSIN 2 UNITS: 20 INJECTION, SOLUTION INTRAMUSCULAR; SUBCUTANEOUS at 09:07

## 2022-07-18 RX ADMIN — PROPOFOL 30 MG: 10 INJECTION, EMULSION INTRAVENOUS at 08:07

## 2022-07-18 RX ADMIN — PROPOFOL 100 MG: 10 INJECTION, EMULSION INTRAVENOUS at 07:07

## 2022-07-18 RX ADMIN — MUPIROCIN: 20 OINTMENT TOPICAL at 12:07

## 2022-07-18 RX ADMIN — DOCUSATE SODIUM 100 MG: 100 CAPSULE ORAL at 09:07

## 2022-07-18 RX ADMIN — DEXMEDETOMIDINE HYDROCHLORIDE 1.4 MCG/KG/HR: 4 INJECTION INTRAVENOUS at 08:07

## 2022-07-18 RX ADMIN — CLINDAMYCIN IN 5 PERCENT DEXTROSE 900 MG: 18 INJECTION, SOLUTION INTRAVENOUS at 03:07

## 2022-07-18 NOTE — SUBJECTIVE & OBJECTIVE
Interval HPI:   Overnight events: Admitted to SICU s/p CABG. Remains intubated. BG above goal ranges on IV intensive insulin protocol.   Eating:   NPO  Nausea: No  Hypoglycemia and intervention: No  Fever: No  TPN and/or TF: No  If yes, type of TF/TPN and rate: n/a    PMH, PSH, FH, SH reviewed     ROS:  Unable to obtain due to: Sedation,Intubation,Altered mental status,Critical illness,Reviewed ROS from note dated 7/18/22 by  Laureano Goode MD    Review of Systems    Current Medications and/or Treatments Impacting Glycemic Control  Immunotherapy:    Immunosuppressants       None          Steroids:   Hormones (From admission, onward)                None          Pressors:    Autonomic Drugs (From admission, onward)                Start     Stop Route Frequency Ordered    07/18/22 1145  EPINEPHrine 5 mg in dextrose 5% 250 mL infusion (premix)        Question Answer Comment   Begin at (in mcg/kg/min): 0.01    Titrate by: (in mcg/kg/min) 0.01    Titrate interval: (in minutes) 5    Titrate to maintain: (SBP or MAP or Cardiac Index) CARDIAC INDEX    Greater than: (in mmHg)     Cardiac index greater than: (in L/min) 2.2    Maximum dose: (in mcg/kg/min) 1        -- IV Continuous 07/18/22 1125    07/18/22 1145  NORepinephrine 4 mg in dextrose 5% 250 mL infusion (premix) (titrating)        Question Answer Comment   Begin at (in mcg/kg/min): 0.02    Titrate by: (in mcg/kg/min) 0.02    Titrate interval: (in minutes) 5    Titrate to maintain: (MAP or SBP) MAP    Greater than: (in mmHg) 65    Maximum dose: (in mcg/kg/min) 2        -- IV Continuous 07/18/22 1125          Hyperglycemia/Diabetes Medications:   Antihyperglycemics (From admission, onward)                Start     Stop Route Frequency Ordered    07/18/22 1145  insulin regular in 0.9 % NaCl 100 unit/100 mL (1 unit/mL) infusion        Question Answer Comment   Insulin rate changes (DO NOT MODIFY ANSWER) \\[email or web address]\[email or web address]\Images\Pharmacy\InsulinInfusions\CTS INSULIN  [id number].pdf    Enter initial dose (Units/hr): 1        -- IV Continuous 07/18/22 1124             PHYSICAL EXAMINATION:  Vitals:    07/18/22 1418   BP:    Pulse: 89   Resp: (!) 28   Temp: (!) 95.9 °F (35.5 °C)     Body mass index is 37.85 kg/m².    Physical Exam  Constitutional: Well developed, obese, NAD.  ENT: External ears no masses with nose patent  Neck: Supple; trachea midline  Cardiovascular: Normal heart sounds, no LE edema. DP +2 bilaterally.  Lungs: Normal effort; lungs anterior bilaterally clear to auscultation.  Intubated on a ventilator.  Abdomen: Soft, no masses, no hernias.  Hypoactive BS noted.  MS: No clubbing or cyanosis of nails noted; unable to assess gait.  Skin: No rashes, lesions, or ulcers; no nodules. Mid-sternal incision with telfa island dressing, CDI.  CT x 2.  LLE wrapped with ACE wrap.  Psychiatric: ODETTE  Neurological: ODETTE  Foot: Nails in good condition, no amputations noted

## 2022-07-18 NOTE — PROGRESS NOTES
Pt clipped, showered, and in new gown. Covid Rapid back with negative result. Report given to [facility] RN. See pre-op checklist

## 2022-07-18 NOTE — TRANSFER OF CARE
Anesthesia Transfer of Care Note    Patient: Isaiah Dorsey Jr.    Procedure(s) Performed: Procedure(s) (LRB):  CORONARY ARTERY BYPASS GRAFT (CABG) (Left)  HARVEST-VEIN-ENDOVASCULAR (Left)    Patient location: ICU    Anesthesia Type: general    Transport from OR: Transported from OR intubated on 100% O2 by AMBU with adequate controlled ventilation    Post pain: adequate analgesia    Post assessment: no apparent anesthetic complications and tolerated procedure well    Post vital signs: unstable    Level of consciousness: sedated    Nausea/Vomiting: no nausea/vomiting    Complications: none    Transfer of care protocol was followedComments: Patient with AF which resolved following defib X2.       Last vitals:   Visit Vitals  BP (!) 89/51 (BP Location: Right arm, Patient Position: Lying)   Pulse (!) 147   Temp 36.9 °C (98.5 °F) (Oral)   Resp (!) 28   Ht 6' (1.829 m)   Wt 126.6 kg (279 lb 1.6 oz)   SpO2 (!) 73%   BMI 37.85 kg/m²

## 2022-07-18 NOTE — INTERVAL H&P NOTE
The patient has been examined and the H&P has been reviewed:    I concur with the findings and no changes have occurred since H&P was written.    Surgery risks, benefits and alternative options discussed and understood by patient/family.          Active Hospital Problems    Diagnosis  POA    *Atherosclerotic heart disease of native coronary artery with unstable angina pectoris [I25.110]  Unknown    SIRS (systemic inflammatory response syndrome) [R65.10]  Unknown    Heart failure with mid-range ejection fraction [I50.9]  Unknown    Type 2 diabetes mellitus [E11.9]  Unknown    Dilatation of aorta [I77.819]  Unknown    HTN (hypertension), benign [I10]  Yes    HLD (hyperlipidemia) [E78.5]  Yes      Resolved Hospital Problems   No resolved problems to display.

## 2022-07-18 NOTE — PLAN OF CARE
Pt AAOx4 and VSS. Pt scheduled for CABG on 7/18. Heparin gtt stopped at midnight. Pt educated on fall risk overnight,pt remained free from falls/trauma/injury. Denies chest pain, SOB, palpitations, dizziness, pain, or discomfort. Plan of care reviewed with pt, all questions answered. Bed locked in lowest position, call bell within reach, no acute distress noted, will continue to monitor.

## 2022-07-18 NOTE — ASSESSMENT & PLAN NOTE
BG goal 110-140 (CTS protocol)     Continue IV insulin infusion protocol  Requires intensive BG monitoring while on protocol     ** Please call Endocrine for any BG related issues **    ** Please notify Endocrine for any change and/or advance in diet**    Discharge planning: TBD    Lab Results   Component Value Date    HGBA1C 7.6 (H) 07/12/2022

## 2022-07-18 NOTE — HPI
Reason for Consult: Management of T2DM, Hyperglycemia     Surgical Procedure and Date: CABG on 7/18/22    Diabetes diagnosis year: ODETTE (intubated)     Home Diabetes Medications:  Metformin (per chart review) unsure of dose    How often checking glucose at home?  ODETTE    BG readings on regimen: ODETTE  Hypoglycemia on the regimen?  ODETTE  Missed doses on regimen? ODETTE    Diabetes Complications include:     Hyperglycemia    Complicating diabetes co morbidities:   History of MI, CHF, HTN, HLD       HPI:   Patient is a 62 y.o. male with a diagnosis of HTN, HLD, DM2, aortic dilatation, CHF (EF 40-45%), and BMI 38 who was admitted to [facility] for NSTEMI.  He was found to have multi-vessel coronary disease on [facility] with LAD with 90% ostial to prox and 70% mid LAD, D1 70%, D2 60%, ost to prox LCX with 99% stenosis. Cardiothoracic surgery was consulted and recommended to proceed with CABG this admission. He now presents for the above procedure. Endocrinology consulted for management of T2DM.      Lab Results   Component Value Date    HGBA1C 7.6 (H) 07/12/2022

## 2022-07-18 NOTE — RESPIRATORY THERAPY
Received patient from the OR intubated with 7.5 ETT secured at 23 cm at the lips. Placed on ventilator with documented settings. ABG done. EKG done. Ambu and mask at bedside. Will continue to monitor.

## 2022-07-18 NOTE — ANESTHESIA PROCEDURE NOTES
Central Line    Diagnosis: CAD  Patient location during procedure: done in OR  Timeout: 7/18/2022 7:30 AM  Procedure end time: 7/18/2022 7:45 AM    Staffing  Authorizing Provider: Keila Sanchez MD  Performing Provider: Keila Sanchez MD    Anesthesiologist was present at the time of the procedure.  Preanesthetic Checklist  Completed: patient identified, IV checked, site marked, risks and benefits discussed, surgical consent, monitors and equipment checked, pre-op evaluation, timeout performed and anesthesia consent given  Indication   Indication: hemodynamic monitoring, vascular access, med administration     Anesthesia   general anesthesia    Central Line   Skin Prep: skin prepped with ChloraPrep, skin prep agent completely dried prior to procedure  Sterile Barriers Followed: Yes    All five maximal barriers used- gloves, gown, cap, mask, and large sterile sheet    hand hygiene performed prior to central venous catheter insertion  Location: right internal jugular.   Catheter type: double lumen  Catheter Size: 9 Fr  Ultrasound: vascular probe with ultrasound   Vessel Caliber: large, patent  Needle advanced into vessel with real time Ultrasound guidance.  Guidewire confirmed in vessel.  sterile gel and probe cover used in ultrasound-guided central venous catheter insertion  Manometry: none  Insertion Attempts: 1   Securement:line sutured, chlorhexidine patch, sterile dressing applied and blood return through all ports    Post-Procedure    Adverse Events:none      Guidewire Guidewire removed intact.

## 2022-07-18 NOTE — CONSULTS
[facility] - Surgical Intensive Care  Endocrinology  Diabetes Consult Note    Consult Requested by: Blu Rhodes MD   Reason for admit: Atherosclerotic heart disease of native coronary artery with unstable angina pectoris    HISTORY OF PRESENT ILLNESS:  Reason for Consult: Management of T2DM, Hyperglycemia     Surgical Procedure and Date: CABG on 7/18/22    Diabetes diagnosis year: ODETTE (intubated)     Home Diabetes Medications:  Metformin (per chart review) unsure of dose    How often checking glucose at home?  ODETTE    BG readings on regimen: ODETTE  Hypoglycemia on the regimen?  ODETTE  Missed doses on regimen? ODETTE    Diabetes Complications include:     Hyperglycemia    Complicating diabetes co morbidities:   History of MI, CHF, HTN, HLD       HPI:   Patient is a 62 y.o. male with a diagnosis of HTN, HLD, DM2, aortic dilatation, CHF (EF 40-45%), and BMI 38 who [place] [facility] [place].  He was found to have multi-vessel coronary disease on [facility] with LAD with 90% ostial to prox and 70% mid LAD, D1 70%, D2 60%, ost to prox LCX with 99% stenosis. Cardiothoracic surgery was consulted and recommended to proceed with CABG this admission. He now presents for the above procedure. Endocrinology consulted for management of T2DM.      Lab Results   Component Value Date    HGBA1C 7.6 (H) 07/12/2022           Interval HPI:   Overnight events: Admitted to SICU s/p CABG. Remains intubated. BG above goal ranges on IV intensive insulin protocol.   Eating:   NPO  Nausea: No  Hypoglycemia and intervention: No  Fever: No  TPN and/or TF: No  If yes, type of TF/TPN and rate: n/a    PMH, PSH, FH, SH reviewed     ROS:  Unable to obtain due to: Sedation,Intubation,Altered mental status,Critical illness,Reviewed ROS from note dated 7/18/22 by  Laureano Goode MD    Review of Systems    Current Medications and/or Treatments Impacting Glycemic Control  Immunotherapy:    Immunosuppressants       None          Steroids:   Hormones (From  admission, onward)                None          Pressors:    Autonomic Drugs (From admission, onward)                Start     Stop Route Frequency Ordered    07/18/22 1145  EPINEPHrine 5 mg in dextrose 5% 250 mL infusion (premix)        Question Answer Comment   Begin at (in mcg/kg/min): 0.01    Titrate by: (in mcg/kg/min) 0.01    Titrate interval: (in minutes) 5    Titrate to maintain: (SBP or MAP or Cardiac Index) CARDIAC INDEX    Greater than: (in mmHg)     Cardiac index greater than: (in L/min) 2.2    Maximum dose: (in mcg/kg/min) 1        -- IV Continuous 07/18/22 1125    07/18/22 1145  NORepinephrine 4 mg in dextrose 5% 250 mL infusion (premix) (titrating)        Question Answer Comment   Begin at (in mcg/kg/min): 0.02    Titrate by: (in mcg/kg/min) 0.02    Titrate interval: (in minutes) 5    Titrate to maintain: (MAP or SBP) MAP    Greater than: (in mmHg) 65    Maximum dose: (in mcg/kg/min) 2        -- IV Continuous 07/18/22 1125          Hyperglycemia/Diabetes Medications:   Antihyperglycemics (From admission, onward)                Start     Stop Route Frequency Ordered    07/18/22 1145  insulin regular in 0.9 % NaCl 100 unit/100 mL (1 unit/mL) infusion        Question Answer Comment   Insulin rate changes (DO NOT MODIFY ANSWER) [email or web address] [id number]    Enter initial dose (Units/hr): 1        -- IV Continuous 07/18/22 1124             PHYSICAL EXAMINATION:  Vitals:    07/18/22 1418   BP:    Pulse: 89   Resp: (!) 28   Temp: (!) 95.9 °F (35.5 °C)     Body mass index is 37.85 kg/m².    Physical Exam  Constitutional: Well developed, obese, NAD.  ENT: External ears no masses with nose patent  Neck: Supple; trachea midline  Cardiovascular: Normal heart sounds, no LE edema. DP +2 bilaterally.  Lungs: Normal effort; lungs anterior bilaterally clear to auscultation.  Intubated on a ventilator.  Abdomen: Soft, no masses, no hernias.  Hypoactive BS noted.  MS: No  clubbing or cyanosis of nails noted; unable to assess gait.  Skin: No rashes, lesions, or ulcers; no nodules. Mid-sternal incision with telfa island dressing, CDI.  CT x 2.  LLE wrapped with ACE wrap.  Psychiatric: ODETTE  Neurological: ODETTE  Foot: Nails in good condition, no amputations noted        Labs Reviewed and Include   Recent Labs   Lab 07/18/22  1129   *   CALCIUM 8.8   ALBUMIN 2.5*   PROT 5.9*   *   K 3.9   CO2 19*      BUN 10   CREATININE 0.8   ALKPHOS 64   ALT 21   AST 20   BILITOT 0.5     Lab Results   Component Value Date    WBC 28.21 (H) 07/18/2022    HGB 13.2 (L) 07/18/2022    HCT 38 07/18/2022    MCV 97 07/18/2022     07/18/2022     Recent Labs   Lab 07/13/22  1825   TSH 2.840     Lab Results   Component Value Date    HGBA1C 7.6 (H) 07/12/2022       Nutritional status:   Body mass index is 37.85 kg/m².  Lab Results   Component Value Date    ALBUMIN 2.5 (L) 07/18/2022    ALBUMIN 3.0 (L) 07/18/2022    ALBUMIN 3.0 (L) 07/17/2022     No results found for: PREALBUMIN    Estimated Creatinine Clearance: 129.9 mL/min (based on SCr of 0.8 mg/dL).    Accu-Checks  Recent Labs     07/16/22  2148 07/17/22  0750 07/17/22  1149 07/17/22  1550 07/18/22  0651 07/18/22  1126 07/18/22  1211 07/18/22  1311 07/18/22  1413 07/18/22  1500   POCTGLUCOSE 174* 178* 236* 221* 169* 259* 297* 204* 277* 298*        ASSESSMENT and PLAN    * Atherosclerotic heart disease of native coronary artery with unstable angina pectoris  Managed per primary team  S/p CABG        Type 2 diabetes mellitus with hyperglycemia, without long-term current use of insulin  BG goal 110-140 (CTS protocol)     Continue IV insulin infusion protocol  Requires intensive BG monitoring while on protocol     ** Please call Endocrine for any BG related issues **    ** Please notify Endocrine for any change and/or advance in diet**    Discharge planning: TBD    Lab Results   Component Value Date    HGBA1C 7.6 (H) 07/12/2022         S/P  CABG (coronary artery bypass graft)  Managed per primary team  Optimize BG control        HLD (hyperlipidemia)  May increase insulin resistance.         Class 2 severe obesity due to excess calories with serious comorbidity in adult  Body mass index is 37.85 kg/m².  May increase insulin resistance.             Plan discussed with patient, family, and RN at bedside.     Viktoriya Dougherty NP  Endocrinology  [facility] - Surgical Intensive [facility]

## 2022-07-18 NOTE — ANESTHESIA PROCEDURE NOTES
Intubation    Date/Time: 7/18/2022 7:17 AM  Performed by: Keila Sanchez MD  Authorized by: Keila Sanchez MD     Intubation:     Intubated:  Postinduction    Mask Ventilation:  Easy with oral airway    Attempts:  1    Attempted By:  Staff anesthesiologist    Method of Intubation:  Direct    Blade:  Wander 3    Laryngeal View Grade: Grade IIA - cords partially seen      Difficult Airway Encountered?: No      Complications:  None    Airway Device:  Oral endotracheal tube    Airway Device Size:  7.5    Style/Cuff Inflation:  Cuffed    Inflation Amount (mL):  10    Tube secured:  23    Secured at:  The lips    Placement Verified By:  Capnometry    Complicating Factors:  None    Findings Post-Intubation:  BS equal bilateral

## 2022-07-18 NOTE — ANESTHESIA PROCEDURE NOTES
Arterial    Diagnosis: CAD    Patient location during procedure: done in OR    Staffing  Authorizing Provider: Keila Sanchez MD  Performing Provider: Gómez Salinas MD    Anesthesiologist was present at the time of the procedure.  Arterial  Skin Prep: chlorhexidine gluconate  Local Infiltration: lidocaine  Orientation: left  Location: radial    Catheter Size: 20 G  Catheter placement by Ultrasound guidance. Heme positive aspiration all ports.   Vessel Caliber: small, patent, compressibility poor, normal  Needle advanced into vessel with real time Ultrasound guidance.Insertion Attempts: 1  Assessment  Dressing: secured with tape and tegaderm  Patient: Tolerated well

## 2022-07-18 NOTE — ADDENDUM NOTE
Addendum  created 07/18/22 4080 by Keila Sanchez MD    Clinical Note Signed, Intraprocedure Blocks edited, SmartForm saved

## 2022-07-18 NOTE — OP NOTE
[facility]  Cardiothoracic Surgery Operative Report    Patient Name:  Isaiah Dorsey Jr.; [id number]    Preoperative Diagnosis: Coronary artery disease, unstable angina, ischemic cardiomyopathy, systolic heart failure    Postoperative Diagnosis:  Same    Date of Operation:  07/18/2022     Operation:  Single vessel coronary artery bypass grafting  · Left internal mammary artery to the distal left anterior descending artery  Endoscopic saphenous vein harvest from the left lower extremity    Surgeon:  Blu Rhodes MD    Assistant Surgeon:  none    Fellow:  none    Anesthesiologist:  Keila Sanchez MD    ---------------------------------------------------------------------------------------------------------------------      Indications for surgery: Mr. Dorsey is a pleasant 62 year old male with hypertension, diabetes (HbA1C 7.6), and BMI of 39, who presented with unstable angina (troponin 0.037) vs NSTEMI, given Brilinta, and underwent coronary angiogram showing multivessel coronary artery disease.  Coronary angiogram details: 90% early mid LAD lesion with a moderate sized mid and distal LAD (wrap around LAD), 99% ostial LCx which has diffuse disease, and 90% proximal lesion in a small to moderate sized right posterolateral ventricular artery.  Transthoracic echocardiogram showed 40-45% ejection fraction.     Given the severity of disease and the symptoms, I recommend coronary artery bypass surgery x 1 or 2 (LIMA-LAD, possible SVG-RPL), on pump vs off pump.  The risks and benefits were explained and informed consent was obtained.     Gross findings: No pericardial adhesions.  Small to moderate LAD target, small and nonbypassable RPL.  Moderate left ventricular dysfunction pre and post bypass.      Procedure:  The patient was brought to the holding area and the indications for surgery were reviewed.  Afterwards, the patient was placed supine on the operating room table and prepped and draped in the usual  "sterile fashion. A surgical time out was performed.     A midline incision was followed with division of the sternum. The left internal mammary artery was harvested. Simultaneously, one piece of saphenous vein was harvested endoscopically from the leg. ACT guided heparinization was administered.  The ascending aorta and right atrium were cannulated.  Cardiopulmonary bypass was commenced.  The ascending aorta was cannulated for administration of cardioplegia.  A cross-clamp was applied and 1500cc of antegrade cold blood cardioplegia was administered.     Attention was turned to the distal left anterior descending artery. The heart was repositioned and the LAD was identified. The vessel was opened and the arteriotomy elongated with scissors.  The left internal mammary artery was brought to the field and prepared for use.  The left internal mammary artery was sewn end to side to the LAD with continuous 7-0 prolene. The vessel was briefly unclamped to assess for hemostasis.     A dose of warm "hot shot" cardioplegia was given antegrade.  Air was evacuated and the cross-clamp was removed. All anastomoses were checked for hemostasis and the patient was weaned from bypass on a low to moderate amount of inotropic support.  The total cardiopulmonary bypass time was 24 minutes and cross clamp time was 15 minutes.  The cannulae were removed and heparin was reversed.  Temporary ventricular pacing wires were placed on the heart.  Drainage tubes were placed behind the sternum and in the pleural space. The chest was closed with steel wires and the soft tissue was closed with absorbable suture.  A sterile dressing was applied and the patient was brought to the ICU in stable condition.      I was present in the operating room for the entire operation and immediately available thereafter.    "

## 2022-07-18 NOTE — PROGRESS NOTES
Autotransfusion/Rapid Infusion Record:      07/18/2022  Autotransfusionist:  Emile Benson Jr    Surgeon(s) and Role:     * Blu Rhodes MD - Primary  Anesthesiologist:  Keila Sanchez MD    Past Medical History:   Diagnosis Date    Diabetes mellitus type 2, uncontrolled, without complications     6.7% Metf 1 g qd, eye 2015, [facility]-2015,     Elevated platelet count 7/23/2015    H/O splenectomy 4/24/2015    doesn't like pneumovax bc caused side effects    HLD (hyperlipidemia) 1/22/2015    goal LDL < 100, reluctant to take statin    HTN (hypertension), benign 1/22/2015    losartan 100    Morbid obesity with BMI of 45.0-49.9, adult 6/11/2014    Polyp of transverse colon 9/5/2018 2018, repeat 2023    Severe uncontrolled hypertension 6/11/2014       Procedure(s) (LRB):  CORONARY ARTERY BYPASS GRAFT (CABG) (Left)  HARVEST-VEIN-ENDOVASCULAR (Left)     10:45 AM    Equipment:    Cell Saver     R.I.S.  : [employer] Model: CATSmart or CATSplus : TouchTen   Model: [id number]     Serial number: [id number]   Serial number:    Disposable lot #: [id number] Disposable lot #:          Solutions:  Anticoagulant: ACD-A   Expiration date: 10/2023 Volume used: 200   Wash solution: 0.9% NaCl   Expiration date: 5/2025 Volume used:2148     Cell saver checklist  Time completed: 0736          [x]   Circuit assembled correctly     [x]   Cell saver powered and operational     [x]   Vacuum connected, functional, adjust to max -150mmHg     [x]   Anticoagulant drip rate adjusted     [x]   Transfer bag properly labeled with patient name, expiration time, volume,       anticoagulant, OR number, and initials     [x]   Cell saver disinfected after use (completed at end of case)       Cell Saver volumes:    Total volume processed:     1180 mL     Total volume pRBCs recovered     272 mL     Volume pRBCs infused     272 mL         RIS checklist   Time completed:  []   RIS circuit assembled correctly     []   RIS power and  operational     []   RIS disinfected after use (completed at end of case)       RIS volumes:    Total volume infused:    (see anesthesia record for blood       product information)   mL       Additional comments:    CPB pump volume processed once chest was closed.

## 2022-07-18 NOTE — ANESTHESIA POSTPROCEDURE EVALUATION
Anesthesia Post Evaluation    Patient: Isaiah Dorsey JrAnel    Procedure(s) Performed: Procedure(s) (LRB):  CORONARY ARTERY BYPASS GRAFT (CABG) (Left)  HARVEST-VEIN-ENDOVASCULAR (Left)    Final Anesthesia Type: general      Patient location during evaluation: ICU  Patient participation: No - Unable to Participate, Sedation  Level of consciousness: sedated  Post-procedure vital signs: reviewed and stable  Pain management: adequate  Airway patency: patent    PONV status at discharge: No PONV  Anesthetic complications: no      Cardiovascular status: hemodynamically stable  Respiratory status: intubated            Vitals Value Taken Time   BP 95/55 07/18/22 1147   Temp 98.0 07/18/22 1157   Pulse 87 07/18/22 1157   Resp 20 07/18/22 1157   SpO2 98 % 07/18/22 1157   Vitals shown include unvalidated device data.      No case tracking events are documented in the log.      Pain/Hamilton Score: No data recorded

## 2022-07-18 NOTE — ASSESSMENT & PLAN NOTE
Isaiah Dorsey Jr. is a 62 y.o. male with NIDDM, HTN, HLD, and BMI 37.8 who is now s/p CABG x1 on 7/18/22.      Neuro/Psych:   -- Sedation: Propofol. Wean as tolerated with plans to extubate    -- Pain: PRN Oxy + Dilaudid  -- Scheduled Tylenol             Cards:   -- S/P CABG x1 on 7/18/22  -- HDS on Epi 0.05 and levo 0.14  -- Continue Amio gtt  -- Ventricular pacing wires. Back-up paced at 45 BPM  -- MAP > 65, Syst < 140  -- Cleviprex PRN  -- Aspirin 325mg tonight pending postoperative coags and chest tube output      Pulm:   -- Goal O2 sat > 90%  -- ABG Q1hr x6 then Q3hrs  -- Wean vent as tolerated  -- Plan to extubate tonight if stable  -- Mediastinal chest tubes x2 and pleural chest tube x1 to suction      Renal:  -- Keep soler for strict I/O  -- Trend BUN/Cr       FEN / GI:   -- Replace lytes as needed  -- Nutrition: NPO  -- GI ppx: famotidine  -- Bowel reg: miralax  -- 1500mL 5% Albumin in first 12 hours post-op      ID:   -- Tm: afebrile; WBC stable  -- Michelle-op ancef      Heme/Onc:   -- H/H stable   -- Daily CBC  -- 700mL Cell saver given back  -- Post-op coags pending  -- Aspirin 325mg QD      Endo:   -- BG goal 140-180  -- Insulin gtt      PPx:   Feeding: NPO  Analgesia/Sedation: Propofol / PRN Oxy + Dilaudid  Thromboembolic prevention: SCDs  HOB >30: Yes  Stress Ulcer ppx: famotidine  Glucose control: Critical care goal 140-180 g/dl, ISS     Lines/Drains/Airway: CVC RIJ, Soler, Chest tubes x 3, ventricular pacing wires, R Fem A-line      Dispo/Code Status/Palliative:   -- SICU / Full Code.

## 2022-07-18 NOTE — HPI
Isaiah Dorsey Jr. Is a 62 y.o. male with medical problems including HTN, DM (A1c 7.6), BMI 39 who presented on 7/11/2022 with unstable angina vs NSTEMI who underwent LHC demonstrating multivessel disease. He was given ticagrelor on the 11th. Coronary angiogram details: 90% early mid LAD lesion with a moderate sized mid and distal LAD (wrap around LAD), 99% ostial LCx which has diffuse disease, and 90% proximal lesion in a small to moderate sized right posterolateral ventricular artery.  LISSETTE demonstrated mildly decreased LV function with EF 40-45% and normal right ventricular function. He was initiated on a heparin drip and has remained as an inpatient since admission. He had a persistent leukocytosis without left shift and sinus tachycardia so was started on broad spectrum antibiotics vanc/cefepime on 7/13. His culture data was negative and peripheral smear did not demonstrate any blasts and was more consistent with a reactive process and the antibiotics were discontinued on 7/15.    Mr. Dorsey is now s/p elective CABGx1 on 7/18/22. The procedure was performed without apparent complication and he was transferred to the SICU for postoperative care and management. Intraoperatively he received 2L of crystalloid and 275mL of Cell Saver back. The reported urine output during the case was 250mL. The postoperative echocardiogram revealed normal biventricular function with mild mitral regurgitation. Mr. Dorsey arrives to the SICU intubated, sedated, and hemodynamically unstable on 0.05 of epi, 0.08 of levo, and 0.25 of milrinone.    He was was noted to be tachycardic to the 160s with MAPs in the 50-60s and SpO2 in the 70s. EKG demonstrated AFRVR ABG with pO2 47. CXR confirmed correct placement of the ET tube. He was re-paralyzed and PEEP increased to 12 with recovery of SpO2 to the mid 90s. He underwent electrical cardioversion at 11:42 w/ recovery of MAPs however reverted back to A fib within 5 minutes. He was given  lidocaine and amio bolus, however maintained in in unstable AFRVR and was cardioverted again at 11:53AM. His pressures recovered with MAPs > 65.

## 2022-07-18 NOTE — NURSING
Report received from anesthesia. Pt transported to SICU [id number]. Portable telemetry monitor used for transport. Pt remained intubated; transported with ambubag.  and  at bedside. Upon arrival; pt MAP in 50s, pt in afib in 160s, and O2 sat in 70s. ABG drawn and labs sent; see results for details. Lidocaine given for arrythmia per ; pt rate/rhythm did not respond. Pads put on pt and pt cardioverted x2. Pt rate/rhythm responded briefly, but then returned back into afib. Amio bolus and gtt ordered and started by . Pt then cardioverted for a third time under the order of . Pt now in NSR, pressors titrated down, and oxygenation improved. Will continue q1h gas. All orders received and implemented. \    Skin: No breakdown noted. Waffle inflated. Foams in place.

## 2022-07-18 NOTE — SUBJECTIVE & OBJECTIVE
Follow-up For: Procedure(s) (LRB):  CORONARY ARTERY BYPASS GRAFT (CABG) (Left)  HARVEST-VEIN-ENDOVASCULAR (Left)    Post-Operative Day: Day of Surgery     Past Medical History:   Diagnosis Date    Diabetes mellitus type 2, uncontrolled, without complications     6.7% Metf 1 g qd, eye 2015, [facility]- [facility]-2015,     Elevated platelet count 7/23/2015    H/O splenectomy 4/24/2015    doesn't like pneumovax bc caused side effects    HLD (hyperlipidemia) 1/22/2015    goal LDL < 100, reluctant to take statin    HTN (hypertension), benign 1/22/2015    losartan 100    Morbid obesity with BMI of 45.0-49.9, adult 6/11/2014    Polyp of transverse colon 9/5/2018 2018, repeat 2023    Severe uncontrolled hypertension 6/11/2014       Past Surgical History:   Procedure Laterality Date    AORTOGRAPHY N/A 7/11/2022    Procedure: AORTOGRAM;  Surgeon: Bjorn Cheatham MD;  Location: [facility] CATH LAB;  Service: Cardiology;  Laterality: N/A;    LEFT HEART CATHETERIZATION N/A 7/11/2022    Procedure: Left heart cath;  Surgeon: Bjorn Cheatham MD;  Location: [facility] CATH LAB;  Service: Cardiology;  Laterality: N/A;    splenecectomy         Review of patient's allergies indicates:   Allergen Reactions    Penicillins      Other reaction(s): Itching  Other reaction(s): Hives       Family History       Problem Relation (Age of Onset)    Colon cancer Father (65)    Heart attack Mother (58)    Heart disease Mother    Heart failure Mother    Hyperlipidemia Mother    Hypertension Mother    Lung cancer Mother    Stroke Mother          Tobacco Use    Smoking status: Former Smoker     Types: Cigarettes    Smokeless tobacco: Never Used   Substance and Sexual Activity    Alcohol use: Yes     Alcohol/week: 0.0 standard drinks     Comment: 1 a week    Drug use: Not on file    Sexual activity: Not on file      Review of Systems   Unable to perform ROS: Intubated   Objective:     Vital Signs (Most Recent):  Temp: 98.5 °F (36.9 °C) (07/18/22 0658)  Pulse: (!) 147  (07/18/22 1130)  Resp: (!) 28 (07/18/22 1130)  BP: (!) 89/51 (07/18/22 1130)  SpO2: (!) 73 % (07/18/22 1130)   Vital Signs (24h Range):  Temp:  [97.9 °F (36.6 °C)-98.8 °F (37.1 °C)] 98.5 °F (36.9 °C)  Pulse:  [] 147  Resp:  [12-28] 28  SpO2:  [73 %-96 %] 73 %  BP: ()/(51-84) 89/51     Weight: 126.6 kg (279 lb 1.6 oz)  Body mass index is 37.85 kg/m².      Intake/Output Summary (Last 24 hours) at 7/18/2022 1206  Last data filed at 7/18/2022 1146  Gross per 24 hour   Intake 1234 ml   Output 2010 ml   Net -776 ml       Physical Exam  Vitals reviewed.   Constitutional:       Comments: Intubated paralyzed and sedated   HENT:      Head: Normocephalic and atraumatic.   Cardiovascular:      Rate and Rhythm: Normal rate and regular rhythm.      Comments: Midline island dressing C/D/I.  Mediastinal tubes x2, L pleural to suction   Ventricular Pacing wires present  Pulmonary:      Comments: Intubated  Genitourinary:     Comments: Lal  Skin:     General: Skin is dry.       Vents:  Vent Mode: A/C (07/18/22 1125)  Ventilator Initiated: Yes (07/18/22 1117)  Set Rate: 20 BPM (07/18/22 1125)  Vt Set: 530 mL (07/18/22 1125)  PEEP/CPAP: 10 cmH20 (07/18/22 1125)  Oxygen Concentration (%): 100 (07/18/22 1130)  Peak Airway Pressure: 24 cmH2O (07/18/22 1125)  Plateau Pressure: 28 cmH20 (07/18/22 1125)  Total Ve: 14.5 mL (07/18/22 1125)  Negative Inspiratory Force (cm H2O): 0 (07/18/22 1125)  F/VT Ratio<105 (RSBI): (!) 49.47 (07/18/22 1125)    Lines/Drains/Airways       Central Venous Catheter Line  Duration              Introducer with Double Lumen 07/18/22 0800 right internal jugular <1 day    Percutaneous Central Line Insertion/Assessment - Triple Lumen  07/18/22 0800 right internal jugular <1 day              Drain  Duration                  Chest Tube 07/18/22 0958 3 Left Pleural 19 Fr. <1 day         NG/OG Tube 07/18/22 1203 orogastric 18 Fr. Center mouth <1 day         Urethral Catheter 07/18/22 0727  Non-latex;Straight-tip;Temperature probe 14 Fr. <1 day         Y Chest Tube 1 and 2 07/18/22 0958 1 Right Mediastinal 19 Fr. 2 Left Mediastinal 19 Fr. <1 day              Airway  Duration                  Airway - Non-Surgical 07/18/22 0717 <1 day              Arterial Line  Duration             Arterial Line 07/18/22 0944 Left Radial <1 day    Arterial Line 07/18/22 1100 Right Femoral <1 day              Line  Duration                  Pacer Wires 07/18/22 0946 <1 day              Peripheral Intravenous Line  Duration                  Peripheral IV - Single Lumen 07/14/22 0553 18 G Left;Posterior Forearm 4 days         Peripheral IV - Single Lumen 07/14/22 2350 20 G Anterior;Left;Proximal Forearm 3 days                    Significant Labs:    CBC/Anemia Profile:  Recent Labs   Lab 07/17/22  0442 07/18/22  0419 07/18/22  0950 07/18/22  1125 07/18/22  1128   WBC 12.34 12.47  --   --  28.21*   HGB 16.3 14.9  --   --  13.2*   HCT 50.7 46.5 36 41 41.4    503*  --   --  376   MCV 96 95  --   --  97   RDW 14.7* 14.4  --   --  14.5        Chemistries:  Recent Labs   Lab 07/16/22  1932 07/17/22 0442 07/18/22  0419   NA  --  135* 133*   K 3.8 4.4 4.0   CL  --  99 98   CO2  --  26 25   BUN  --  12 11   CREATININE  --  0.8 0.8   CALCIUM  --  9.8 9.8   ALBUMIN  --  3.0* 3.0*   PROT  --  7.2 7.2   BILITOT  --  0.7 0.6   ALKPHOS  --  74 73   ALT  --  26 24   AST  --  17 16   MG  --  2.2 2.0   PHOS  --  3.1 2.9       All pertinent labs within the past 24 hours have been reviewed.    Significant Imaging: I have reviewed all pertinent imaging results/findings within the past 24 hours.

## 2022-07-18 NOTE — ANESTHESIA PROCEDURE NOTES
LISSETTE    Diagnosis: CAD  Patient location during procedure: OR  Exam type: Baseline    Staffing  Performed: anesthesiologist     Anesthesiologist: Keila Sanchez MD        Anesthesiologist Present  Yes      Setup & Induction  Patient preparation: bite block inserted  Probe Insertion: easy  Exam: completeDoppler Echo: 2D, 3D, color flow mapping, continuous wave Doppler and pulse wave Doppler.  Exam     Left Heart  Left Atruim: normal    Left Ventricle: cm, normal (men 4.2-5.9; women 3.8-5.2)  LV Wall Thickness (posterior wall):cm, normal (men 0.6-1.0 cm; women 0.6-0.9 cm)    LVAD:no  FINDINGS - ESTIMATED EJECTION FRACTION: 40-45%  FINDINGS - REGIONAL WALL MOTION ABNORMALITIES: mild hypokinesis of inferolateral and anterolateral walls.            Right Heart  Right Ventricle: normal  Right Ventricle Function: normal  Right Atria:  normal    Intra Atrial Septum  PFO: no shunt by color flow doppler  no IAS aneurysm  lipomatous hypertrophy  no Atrial Septal Defect (Asd)    Right Ventricle  Size: normal, Free Wall Thickness: normal    Aortic Valve:  Morphology: trileaflet    Regurgitation: trace      Mitral Valve:   Morphology:normal  Jet Description: trace    Tricuspid Valve:  Morphology: normal    Pulmonic Valve:  PULMONIC REGURGITATION: trace.    Great Vessels  Ascending Aorta Atherosclerosis: 2=mild dz (<3mm)  Aortic Arch Atherosclerosis: 2=mild dz (<3mm)  Descending Aorta Atherosclerosis: 2=mild dz (<3mm)      Effusions none    SummaryFindings discussed with surgeon.    Other Findings   Diagnostic intraoperative LISSETTE performed at the request of Dr. Rhodes for CABG.    Baseline exam:  - LV systolic function is mildly depressed with LVEF ~40-45%  - RV systolic function is normal  - Mild hypokinesis of the inferolateral and anterolateral LV walls  - Trace mitral, tricuspid, and aortic insufficiency   - Pulmonic valve is within normal limits in structure and function  - No PFO via color flow doppler  - Ascending aorta  measuring 4.2 cm in ME ascending aorta short axis view at the level of the PA and 4.7 cm in the ME long axis view.   - No effusions    Post-procedure exam:  - s/p 1v LIMA to LAD CABG  - LV systolic function is low normal with LVEF ~50-55%  - RV systolic function is normal  - No regional wall motion abnormalities  - Mild central mitral regurgitation  - Trace tricuspid regurgitation  - Trace aortic insufficiency  - Pulmonic valve is within normal limits in structure and function  - No PFO via color flow doppler  - No new aortic pathology  - No effusions    Stable s/p chest closure  Probe removed atraumatically

## 2022-07-18 NOTE — H&P
[facility] - Surgical Intensive Care  Critical Care - Surgery  History & Physical    Patient Name: Isaiah Dorsey Jr.  MRN: [id number]  Admission Date: 7/11/2022  Code Status: Full Code  Attending Physician: Blu Rhodes MD   Primary Care Provider: Primary Doctor No   Principal Problem: Atherosclerotic heart disease of native coronary artery with unstable angina pectoris    Subjective:     HPI:  Isaiah Dorsey Jr. Is a 62 y.o. male with medical problems including HTN, DM (A1c 7.6), BMI 39 who presented on 7/11/2022 with unstable angina vs NSTEMI who underwent LHC demonstrating multivessel disease. He was given ticagrelor on the 11th. Coronary angiogram details: 90% early mid LAD lesion with a moderate sized mid and distal LAD (wrap around LAD), 99% ostial LCx which has diffuse disease, and 90% proximal lesion in a small to moderate sized right posterolateral ventricular artery.  LISSETTE demonstrated mildly decreased LV function with EF 40-45% and normal right ventricular function. He was initiated on a heparin drip and has remained as an inpatient since admission. He had a persistent leukocytosis without left shift and sinus tachycardia so was started on broad spectrum antibiotics vanc/cefepime on 7/13. His culture data was negative and peripheral smear did not demonstrate any blasts and was more consistent with a reactive process and the antibiotics were discontinued on 7/15.    Mr. Dorsey is now s/p elective CABGx1 on 7/18/22. The procedure was performed without apparent complication and he was transferred to the SICU for postoperative care and management. Intraoperatively he received 2L of crystalloid and 275mL of Cell Saver back. The reported urine output during the case was 250mL. The postoperative echocardiogram revealed normal biventricular function with mild mitral regurgitation. Mr. Dorsey arrives to the SICU intubated, sedated, and hemodynamically unstable on 0.05 of epi, 0.08 of levo, and 0.25 of  milrinone.    He was was noted to be tachycardic to the 160s with MAPs in the 50-60s and SpO2 in the 70s. EKG demonstrated AFRVR ABG with pO2 47. CXR confirmed correct placement of the ET tube. He was re-paralyzed and PEEP increased to 12 with recovery of SpO2 to the mid 90s. He underwent electrical cardioversion at 11:42 w/ recovery of MAPs however reverted back to A fib within 5 minutes. He was given lidocaine and amio bolus, however maintained in in unstable AFRVR and was cardioverted again at 11:53AM. His pressures recovered with MAPs > 65.          Hospital/ICU Course:  No notes on file    Follow-up For: Procedure(s) (LRB):  CORONARY ARTERY BYPASS GRAFT (CABG) (Left)  HARVEST-VEIN-ENDOVASCULAR (Left)    Post-Operative Day: Day of Surgery     Past Medical History:   Diagnosis Date    Diabetes mellitus type 2, uncontrolled, without complications     6.7% Metf 1 g qd, eye 2015, [facility]- [facility]-2015,     Elevated platelet count 7/23/2015    H/O splenectomy 4/24/2015    doesn't like pneumovax bc caused side effects    HLD (hyperlipidemia) 1/22/2015    goal LDL < 100, reluctant to take statin    HTN (hypertension), benign 1/22/2015    losartan 100    Morbid obesity with BMI of 45.0-49.9, adult 6/11/2014    Polyp of transverse colon 9/5/2018 2018, repeat 2023    Severe uncontrolled hypertension 6/11/2014       Past Surgical History:   Procedure Laterality Date    AORTOGRAPHY N/A 7/11/2022    Procedure: AORTOGRAM;  Surgeon: Bjorn Cheatham MD;  Location: [facility] CATH LAB;  Service: Cardiology;  Laterality: N/A;    LEFT HEART CATHETERIZATION N/A 7/11/2022    Procedure: Left heart cath;  Surgeon: Bjorn Cheatham MD;  Location: [facility] CATH LAB;  Service: Cardiology;  Laterality: N/A;    splenecectomy         Review of patient's allergies indicates:   Allergen Reactions    Penicillins      Other reaction(s): Itching  Other reaction(s): Hives       Family History       Problem Relation (Age of Onset)    Colon cancer  Father (65)    Heart attack Mother (58)    Heart disease Mother    Heart failure Mother    Hyperlipidemia Mother    Hypertension Mother    Lung cancer Mother    Stroke Mother          Tobacco Use    Smoking status: Former Smoker     Types: Cigarettes    Smokeless tobacco: Never Used   Substance and Sexual Activity    Alcohol use: Yes     Alcohol/week: 0.0 standard drinks     Comment: 1 a week    Drug use: Not on file    Sexual activity: Not on file      Review of Systems   Unable to perform ROS: Intubated   Objective:     Vital Signs (Most Recent):  Temp: 98.5 °F (36.9 °C) (07/18/22 0658)  Pulse: (!) 147 (07/18/22 1130)  Resp: (!) 28 (07/18/22 1130)  BP: (!) 89/51 (07/18/22 1130)  SpO2: (!) 73 % (07/18/22 1130)   Vital Signs (24h Range):  Temp:  [97.9 °F (36.6 °C)-98.8 °F (37.1 °C)] 98.5 °F (36.9 °C)  Pulse:  [] 147  Resp:  [12-28] 28  SpO2:  [73 %-96 %] 73 %  BP: ()/(51-84) 89/51     Weight: 126.6 kg (279 lb 1.6 oz)  Body mass index is 37.85 kg/m².      Intake/Output Summary (Last 24 hours) at 7/18/2022 1206  Last data filed at 7/18/2022 1146  Gross per 24 hour   Intake 1234 ml   Output 2010 ml   Net -776 ml       Physical Exam  Vitals reviewed.   Constitutional:       Comments: Intubated paralyzed and sedated   HENT:      Head: Normocephalic and atraumatic.   Cardiovascular:      Rate and Rhythm: Normal rate and regular rhythm.      Comments: Midline island dressing C/D/I.  Mediastinal tubes x2, L pleural to suction   Ventricular Pacing wires present  Pulmonary:      Comments: Intubated  Genitourinary:     Comments: Lal  Skin:     General: Skin is dry.       Vents:  Vent Mode: A/C (07/18/22 1125)  Ventilator Initiated: Yes (07/18/22 1117)  Set Rate: 20 BPM (07/18/22 1125)  Vt Set: 530 mL (07/18/22 1125)  PEEP/CPAP: 10 cmH20 (07/18/22 1125)  Oxygen Concentration (%): 100 (07/18/22 1130)  Peak Airway Pressure: 24 cmH2O (07/18/22 1125)  Plateau Pressure: 28 cmH20 (07/18/22 1125)  Total Ve: 14.5 mL  (07/18/22 1125)  Negative Inspiratory Force (cm H2O): 0 (07/18/22 1125)  F/VT Ratio<105 (RSBI): (!) 49.47 (07/18/22 1125)    Lines/Drains/Airways       Central Venous Catheter Line  Duration              Introducer with Double Lumen 07/18/22 0800 right internal jugular <1 day    Percutaneous Central Line Insertion/Assessment - Triple Lumen  07/18/22 0800 right internal jugular <1 day              Drain  Duration                  Chest Tube 07/18/22 0958 3 Left Pleural 19 Fr. <1 day         NG/OG Tube 07/18/22 1203 orogastric 18 Fr. Center mouth <1 day         Urethral Catheter 07/18/22 0727 Non-latex;Straight-tip;Temperature probe 14 Fr. <1 day         Y Chest Tube 1 and 2 07/18/22 0958 1 Right Mediastinal 19 Fr. 2 Left Mediastinal 19 Fr. <1 day              Airway  Duration                  Airway - Non-Surgical 07/18/22 0717 <1 day              Arterial Line  Duration             Arterial Line 07/18/22 0944 Left Radial <1 day    Arterial Line 07/18/22 1100 Right Femoral <1 day              Line  Duration                  Pacer Wires 07/18/22 0946 <1 day              Peripheral Intravenous Line  Duration                  Peripheral IV - Single Lumen 07/14/22 0553 18 G Left;Posterior Forearm 4 days         Peripheral IV - Single Lumen 07/14/22 2350 20 G Anterior;Left;Proximal Forearm 3 days                    Significant Labs:    CBC/Anemia Profile:  Recent Labs   Lab 07/17/22 0442 07/18/22  0419 07/18/22  0950 07/18/22  1125 07/18/22  1128   WBC 12.34 12.47  --   --  28.21*   HGB 16.3 14.9  --   --  13.2*   HCT 50.7 46.5 36 41 41.4    503*  --   --  376   MCV 96 95  --   --  97   RDW 14.7* 14.4  --   --  14.5        Chemistries:  Recent Labs   Lab 07/16/22  1932 07/17/22 0442 07/18/22  0419   NA  --  135* 133*   K 3.8 4.4 4.0   CL  --  99 98   CO2  --  26 25   BUN  --  12 11   CREATININE  --  0.8 0.8   CALCIUM  --  9.8 9.8   ALBUMIN  --  3.0* 3.0*   PROT  --  7.2 7.2   BILITOT  --  0.7 0.6   ALKPHOS  --   74 73   ALT  --  26 24   AST  --  17 16   MG  --  2.2 2.0   PHOS  --  3.1 2.9       All pertinent labs within the past 24 hours have been reviewed.    Significant Imaging: I have reviewed all pertinent imaging results/findings within the past 24 hours.    Assessment/Plan:     * Atherosclerotic heart disease of native coronary artery with unstable angina pectoris  Isaiah Dorsey Jr. is a 62 y.o. male with NIDDM, HTN, HLD, and BMI 37.8 who is now s/p CABG x1 on 7/18/22.      Neuro/Psych:   -- Sedation: Propofol. Wean as tolerated with plans to extubate    -- Pain: PRN Oxy + Dilaudid  -- Scheduled Tylenol             Cards:   -- S/P CABG x1 on 7/18/22  -- HDS on Epi 0.05 and levo 0.14  -- Continue Amio gtt  -- Ventricular pacing wires. Back-up paced at 45 BPM  -- MAP > 65, Syst < 140  -- Cleviprex PRN  -- Aspirin 325mg tonight pending postoperative coags and chest tube output      Pulm:   -- Goal O2 sat > 90%  -- ABG Q1hr x6 then Q3hrs  -- Wean vent as tolerated  -- Plan to extubate tonight if stable  -- Mediastinal chest tubes x2 and pleural chest tube x1 to suction      Renal:  -- Keep soler for strict I/O  -- Trend BUN/Cr       FEN / GI:   -- Replace lytes as needed  -- Nutrition: NPO  -- GI ppx: famotidine  -- Bowel reg: miralax  -- 1500mL 5% Albumin in first 12 hours post-op      ID:   -- Tm: afebrile; WBC stable  -- Michlele-op ancef      Heme/Onc:   -- H/H stable   -- Daily CBC  -- 700mL Cell saver given back  -- Post-op coags pending  -- Aspirin 325mg QD      Endo:   -- BG goal 140-180  -- Insulin gtt      PPx:   Feeding: NPO  Analgesia/Sedation: Propofol / PRN Oxy + Dilaudid  Thromboembolic prevention: SCDs  HOB >30: Yes  Stress Ulcer ppx: famotidine  Glucose control: Critical care goal 140-180 g/dl, ISS     Lines/Drains/Airway: CVC RIJ, Soler, Chest tubes x 3, ventricular pacing wires, R Fem A-line      Dispo/Code Status/Palliative:   -- SICU / Full Code.          Isrrael Fiore MD  Critical Care -  Surgery  [facility] - Surgical Intensive Care

## 2022-07-19 DIAGNOSIS — Z95.1 S/P CABG X 3: Primary | ICD-10-CM

## 2022-07-19 LAB
ALBUMIN SERPL BCP-MCNC: 3.5 G/DL (ref 3.5–5.2)
ALLENS TEST: ABNORMAL
ALP SERPL-CCNC: 44 U/L (ref 55–135)
ALT SERPL W/O P-5'-P-CCNC: 15 U/L (ref 10–44)
ANION GAP SERPL CALC-SCNC: 10 MMOL/L (ref 8–16)
APTT BLDCRRT: 26.1 SEC (ref 21–32)
AST SERPL-CCNC: 22 U/L (ref 10–40)
BASOPHILS # BLD AUTO: 0.04 K/UL (ref 0–0.2)
BASOPHILS NFR BLD: 0.2 % (ref 0–1.9)
BILIRUB SERPL-MCNC: 0.3 MG/DL (ref 0.1–1)
BUN SERPL-MCNC: 8 MG/DL (ref 8–23)
CA-I BLDV-SCNC: 1.19 MMOL/L (ref 1.06–1.42)
CALCIUM SERPL-MCNC: 8.6 MG/DL (ref 8.7–10.5)
CHLORIDE SERPL-SCNC: 104 MMOL/L (ref 95–110)
CO2 SERPL-SCNC: 19 MMOL/L (ref 23–29)
CREAT SERPL-MCNC: 0.7 MG/DL (ref 0.5–1.4)
DELSYS: ABNORMAL
DELSYS: ABNORMAL
DIFFERENTIAL METHOD: ABNORMAL
EOSINOPHIL # BLD AUTO: 0 K/UL (ref 0–0.5)
EOSINOPHIL NFR BLD: 0 % (ref 0–8)
ERYTHROCYTE [DISTWIDTH] IN BLOOD BY AUTOMATED COUNT: 14.5 % (ref 11.5–14.5)
ERYTHROCYTE [SEDIMENTATION RATE] IN BLOOD BY WESTERGREN METHOD: 20 MM/H
ERYTHROCYTE [SEDIMENTATION RATE] IN BLOOD BY WESTERGREN METHOD: 36 MM/H
EST. GFR  (AFRICAN AMERICAN): >60 ML/MIN/1.73 M^2
EST. GFR  (NON AFRICAN AMERICAN): >60 ML/MIN/1.73 M^2
FLOW: 4
FLOW: 4
GLUCOSE SERPL-MCNC: 178 MG/DL (ref 70–110)
HCO3 UR-SCNC: 18.6 MMOL/L (ref 24–28)
HCO3 UR-SCNC: 21 MMOL/L (ref 24–28)
HCT VFR BLD AUTO: 31.3 % (ref 40–54)
HCT VFR BLD CALC: 28 %PCV (ref 36–54)
HCT VFR BLD CALC: 35 %PCV (ref 36–54)
HGB BLD-MCNC: 10.5 G/DL (ref 14–18)
IMM GRANULOCYTES # BLD AUTO: 0.09 K/UL (ref 0–0.04)
IMM GRANULOCYTES NFR BLD AUTO: 0.5 % (ref 0–0.5)
INR PPP: 1.1 (ref 0.8–1.2)
LDH SERPL L TO P-CCNC: 1.78 MMOL/L (ref 0.36–1.25)
LDH SERPL L TO P-CCNC: 2.22 MMOL/L (ref 0.36–1.25)
LYMPHOCYTES # BLD AUTO: 1.5 K/UL (ref 1–4.8)
LYMPHOCYTES NFR BLD: 8.8 % (ref 18–48)
MAGNESIUM SERPL-MCNC: 2 MG/DL (ref 1.6–2.6)
MAGNESIUM SERPL-MCNC: 2.1 MG/DL (ref 1.6–2.6)
MCH RBC QN AUTO: 30.2 PG (ref 27–31)
MCHC RBC AUTO-ENTMCNC: 33.5 G/DL (ref 32–36)
MCV RBC AUTO: 90 FL (ref 82–98)
MODE: ABNORMAL
MODE: ABNORMAL
MONOCYTES # BLD AUTO: 1.8 K/UL (ref 0.3–1)
MONOCYTES NFR BLD: 10.3 % (ref 4–15)
NEUTROPHILS # BLD AUTO: 14 K/UL (ref 1.8–7.7)
NEUTROPHILS NFR BLD: 80.2 % (ref 38–73)
NRBC BLD-RTO: 0 /100 WBC
PCO2 BLDA: 33.5 MMHG (ref 35–45)
PCO2 BLDA: 38.2 MMHG (ref 35–45)
PH SMN: 7.35 [PH] (ref 7.35–7.45)
PH SMN: 7.35 [PH] (ref 7.35–7.45)
PHOSPHATE SERPL-MCNC: 2.7 MG/DL (ref 2.7–4.5)
PHOSPHATE SERPL-MCNC: 3.4 MG/DL (ref 2.7–4.5)
PLATELET # BLD AUTO: 315 K/UL (ref 150–450)
PMV BLD AUTO: 9 FL (ref 9.2–12.9)
PO2 BLDA: 67 MMHG (ref 80–100)
PO2 BLDA: 76 MMHG (ref 80–100)
POC BE: -5 MMOL/L
POC BE: -7 MMOL/L
POC IONIZED CALCIUM: 1.1 MMOL/L (ref 1.06–1.42)
POC IONIZED CALCIUM: 1.17 MMOL/L (ref 1.06–1.42)
POC SATURATED O2: 92 % (ref 95–100)
POC SATURATED O2: 95 % (ref 95–100)
POC TCO2: 20 MMOL/L (ref 23–27)
POC TCO2: 22 MMOL/L (ref 23–27)
POCT GLUCOSE: 116 MG/DL (ref 70–110)
POCT GLUCOSE: 123 MG/DL (ref 70–110)
POCT GLUCOSE: 133 MG/DL (ref 70–110)
POCT GLUCOSE: 149 MG/DL (ref 70–110)
POCT GLUCOSE: 161 MG/DL (ref 70–110)
POCT GLUCOSE: 163 MG/DL (ref 70–110)
POCT GLUCOSE: 172 MG/DL (ref 70–110)
POCT GLUCOSE: 174 MG/DL (ref 70–110)
POCT GLUCOSE: 179 MG/DL (ref 70–110)
POCT GLUCOSE: 189 MG/DL (ref 70–110)
POCT GLUCOSE: 205 MG/DL (ref 70–110)
POCT GLUCOSE: 205 MG/DL (ref 70–110)
POCT GLUCOSE: 224 MG/DL (ref 70–110)
POCT GLUCOSE: 75 MG/DL (ref 70–110)
POCT GLUCOSE: 91 MG/DL (ref 70–110)
POCT GLUCOSE: <20 MG/DL (ref 70–110)
POTASSIUM BLD-SCNC: 3.6 MMOL/L (ref 3.5–5.1)
POTASSIUM BLD-SCNC: 3.8 MMOL/L (ref 3.5–5.1)
POTASSIUM SERPL-SCNC: 4.2 MMOL/L (ref 3.5–5.1)
POTASSIUM SERPL-SCNC: 4.5 MMOL/L (ref 3.5–5.1)
PROT SERPL-MCNC: 6.2 G/DL (ref 6–8.4)
PROTHROMBIN TIME: 11.3 SEC (ref 9–12.5)
RBC # BLD AUTO: 3.48 M/UL (ref 4.6–6.2)
SAMPLE: ABNORMAL
SITE: ABNORMAL
SODIUM BLD-SCNC: 138 MMOL/L (ref 136–145)
SODIUM BLD-SCNC: 141 MMOL/L (ref 136–145)
SODIUM SERPL-SCNC: 133 MMOL/L (ref 136–145)
SP02: 93
SP02: 94
WBC # BLD AUTO: 17.44 K/UL (ref 3.9–12.7)

## 2022-07-19 PROCEDURE — 63600175 PHARM REV CODE 636 W HCPCS

## 2022-07-19 PROCEDURE — 83605 ASSAY OF LACTIC ACID: CPT

## 2022-07-19 PROCEDURE — 82803 BLOOD GASES ANY COMBINATION: CPT

## 2022-07-19 PROCEDURE — 25000003 PHARM REV CODE 250: Performed by: NURSE PRACTITIONER

## 2022-07-19 PROCEDURE — 82330 ASSAY OF CALCIUM: CPT

## 2022-07-19 PROCEDURE — 99291 CRITICAL CARE FIRST HOUR: CPT | Mod: ,,, | Performed by: ANESTHESIOLOGY

## 2022-07-19 PROCEDURE — 94799 UNLISTED PULMONARY SVC/PX: CPT

## 2022-07-19 PROCEDURE — 20000000 HC ICU ROOM

## 2022-07-19 PROCEDURE — 99900035 HC TECH TIME PER 15 MIN (STAT)

## 2022-07-19 PROCEDURE — 82330 ASSAY OF CALCIUM: CPT | Performed by: THORACIC SURGERY (CARDIOTHORACIC VASCULAR SURGERY)

## 2022-07-19 PROCEDURE — 85730 THROMBOPLASTIN TIME PARTIAL: CPT

## 2022-07-19 PROCEDURE — 80053 COMPREHEN METABOLIC PANEL: CPT

## 2022-07-19 PROCEDURE — 63600175 PHARM REV CODE 636 W HCPCS: Performed by: THORACIC SURGERY (CARDIOTHORACIC VASCULAR SURGERY)

## 2022-07-19 PROCEDURE — 84100 ASSAY OF PHOSPHORUS: CPT

## 2022-07-19 PROCEDURE — 83735 ASSAY OF MAGNESIUM: CPT | Mod: 91 | Performed by: INTERNAL MEDICINE

## 2022-07-19 PROCEDURE — 85025 COMPLETE CBC W/AUTO DIFF WBC: CPT

## 2022-07-19 PROCEDURE — 27000221 HC OXYGEN, UP TO 24 HOURS

## 2022-07-19 PROCEDURE — 94761 N-INVAS EAR/PLS OXIMETRY MLT: CPT

## 2022-07-19 PROCEDURE — 25000003 PHARM REV CODE 250

## 2022-07-19 PROCEDURE — 97162 PT EVAL MOD COMPLEX 30 MIN: CPT

## 2022-07-19 PROCEDURE — 63600175 PHARM REV CODE 636 W HCPCS: Performed by: NURSE PRACTITIONER

## 2022-07-19 PROCEDURE — 99232 PR SUBSEQUENT HOSPITAL CARE,LEVL II: ICD-10-PCS | Mod: ,,, | Performed by: NURSE PRACTITIONER

## 2022-07-19 PROCEDURE — 84132 ASSAY OF SERUM POTASSIUM: CPT

## 2022-07-19 PROCEDURE — 85014 HEMATOCRIT: CPT

## 2022-07-19 PROCEDURE — 97530 THERAPEUTIC ACTIVITIES: CPT

## 2022-07-19 PROCEDURE — 85610 PROTHROMBIN TIME: CPT

## 2022-07-19 PROCEDURE — 25000003 PHARM REV CODE 250: Performed by: STUDENT IN AN ORGANIZED HEALTH CARE EDUCATION/TRAINING PROGRAM

## 2022-07-19 PROCEDURE — 37799 UNLISTED PX VASCULAR SURGERY: CPT

## 2022-07-19 PROCEDURE — 99232 SBSQ HOSP IP/OBS MODERATE 35: CPT | Mod: ,,, | Performed by: NURSE PRACTITIONER

## 2022-07-19 PROCEDURE — 83735 ASSAY OF MAGNESIUM: CPT

## 2022-07-19 PROCEDURE — 99291 PR CRITICAL CARE, E/M 30-74 MINUTES: ICD-10-PCS | Mod: ,,, | Performed by: ANESTHESIOLOGY

## 2022-07-19 PROCEDURE — 25000003 PHARM REV CODE 250: Performed by: THORACIC SURGERY (CARDIOTHORACIC VASCULAR SURGERY)

## 2022-07-19 RX ORDER — INSULIN ASPART 100 [IU]/ML
5-8 INJECTION, SOLUTION INTRAVENOUS; SUBCUTANEOUS
Status: DISCONTINUED | OUTPATIENT
Start: 2022-07-19 | End: 2022-07-20

## 2022-07-19 RX ORDER — HYDROMORPHONE HYDROCHLORIDE 1 MG/ML
1 INJECTION, SOLUTION INTRAMUSCULAR; INTRAVENOUS; SUBCUTANEOUS
Status: DISCONTINUED | OUTPATIENT
Start: 2022-07-19 | End: 2022-07-21

## 2022-07-19 RX ORDER — AMIODARONE HYDROCHLORIDE 200 MG/1
400 TABLET ORAL 3 TIMES DAILY
Status: DISCONTINUED | OUTPATIENT
Start: 2022-07-19 | End: 2022-07-23 | Stop reason: HOSPADM

## 2022-07-19 RX ORDER — INSULIN ASPART 100 [IU]/ML
0-10 INJECTION, SOLUTION INTRAVENOUS; SUBCUTANEOUS
Status: DISCONTINUED | OUTPATIENT
Start: 2022-07-19 | End: 2022-07-22

## 2022-07-19 RX ORDER — GLUCAGON 1 MG
1 KIT INJECTION
Status: DISCONTINUED | OUTPATIENT
Start: 2022-07-19 | End: 2022-07-22

## 2022-07-19 RX ORDER — FUROSEMIDE 10 MG/ML
20 INJECTION INTRAMUSCULAR; INTRAVENOUS ONCE
Status: COMPLETED | OUTPATIENT
Start: 2022-07-19 | End: 2022-07-19

## 2022-07-19 RX ORDER — ENOXAPARIN SODIUM 100 MG/ML
40 INJECTION SUBCUTANEOUS EVERY 24 HOURS
Status: DISCONTINUED | OUTPATIENT
Start: 2022-07-19 | End: 2022-07-23 | Stop reason: HOSPADM

## 2022-07-19 RX ORDER — IBUPROFEN 200 MG
16 TABLET ORAL
Status: DISCONTINUED | OUTPATIENT
Start: 2022-07-19 | End: 2022-07-22

## 2022-07-19 RX ORDER — IBUPROFEN 200 MG
24 TABLET ORAL
Status: DISCONTINUED | OUTPATIENT
Start: 2022-07-19 | End: 2022-07-22

## 2022-07-19 RX ORDER — TALC
6 POWDER (GRAM) TOPICAL NIGHTLY PRN
Status: DISCONTINUED | OUTPATIENT
Start: 2022-07-19 | End: 2022-07-23 | Stop reason: HOSPADM

## 2022-07-19 RX ADMIN — ACETAMINOPHEN 1000 MG: 500 TABLET ORAL at 06:07

## 2022-07-19 RX ADMIN — CLINDAMYCIN IN 5 PERCENT DEXTROSE 900 MG: 18 INJECTION, SOLUTION INTRAVENOUS at 05:07

## 2022-07-19 RX ADMIN — CLINDAMYCIN IN 5 PERCENT DEXTROSE 900 MG: 18 INJECTION, SOLUTION INTRAVENOUS at 08:07

## 2022-07-19 RX ADMIN — INSULIN HUMAN 2 UNITS/HR: 1 INJECTION, SOLUTION INTRAVENOUS at 02:07

## 2022-07-19 RX ADMIN — FUROSEMIDE 20 MG: 10 INJECTION, SOLUTION INTRAMUSCULAR; INTRAVENOUS at 05:07

## 2022-07-19 RX ADMIN — INSULIN ASPART 2 UNITS: 100 INJECTION, SOLUTION INTRAVENOUS; SUBCUTANEOUS at 05:07

## 2022-07-19 RX ADMIN — MILRINONE LACTATE IN DEXTROSE 0.12 MCG/KG/MIN: 200 INJECTION, SOLUTION INTRAVENOUS at 06:07

## 2022-07-19 RX ADMIN — CLINDAMYCIN IN 5 PERCENT DEXTROSE 900 MG: 18 INJECTION, SOLUTION INTRAVENOUS at 12:07

## 2022-07-19 RX ADMIN — HYDROMORPHONE HYDROCHLORIDE 1 MG: 1 INJECTION, SOLUTION INTRAMUSCULAR; INTRAVENOUS; SUBCUTANEOUS at 03:07

## 2022-07-19 RX ADMIN — AMIODARONE HYDROCHLORIDE 400 MG: 200 TABLET ORAL at 08:07

## 2022-07-19 RX ADMIN — OXYCODONE HYDROCHLORIDE 10 MG: 10 TABLET ORAL at 02:07

## 2022-07-19 RX ADMIN — HYDROMORPHONE HYDROCHLORIDE 1 MG: 1 INJECTION, SOLUTION INTRAMUSCULAR; INTRAVENOUS; SUBCUTANEOUS at 04:07

## 2022-07-19 RX ADMIN — INSULIN HUMAN 9.6 UNITS/HR: 1 INJECTION, SOLUTION INTRAVENOUS at 06:07

## 2022-07-19 RX ADMIN — EPINEPHRINE 0.03 MCG/KG/MIN: 1 INJECTION INTRAMUSCULAR; INTRAVENOUS; SUBCUTANEOUS at 09:07

## 2022-07-19 RX ADMIN — DOCUSATE SODIUM 100 MG: 100 CAPSULE ORAL at 08:07

## 2022-07-19 RX ADMIN — HYDROMORPHONE HYDROCHLORIDE 1 MG: 1 INJECTION, SOLUTION INTRAMUSCULAR; INTRAVENOUS; SUBCUTANEOUS at 02:07

## 2022-07-19 RX ADMIN — AMIODARONE HYDROCHLORIDE 400 MG: 200 TABLET ORAL at 02:07

## 2022-07-19 RX ADMIN — ATORVASTATIN CALCIUM 40 MG: 20 TABLET, FILM COATED ORAL at 08:07

## 2022-07-19 RX ADMIN — ASPIRIN 325 MG ORAL TABLET 325 MG: 325 PILL ORAL at 08:07

## 2022-07-19 RX ADMIN — MUPIROCIN: 20 OINTMENT TOPICAL at 08:07

## 2022-07-19 RX ADMIN — OXYCODONE HYDROCHLORIDE 10 MG: 10 TABLET ORAL at 06:07

## 2022-07-19 RX ADMIN — AMIODARONE HYDROCHLORIDE 400 MG: 200 TABLET ORAL at 06:07

## 2022-07-19 RX ADMIN — OXYCODONE HYDROCHLORIDE 10 MG: 10 TABLET ORAL at 07:07

## 2022-07-19 RX ADMIN — INSULIN ASPART 8 UNITS: 100 INJECTION, SOLUTION INTRAVENOUS; SUBCUTANEOUS at 05:07

## 2022-07-19 RX ADMIN — SODIUM PHOSPHATE, MONOBASIC, MONOHYDRATE 30 MMOL: 276; 142 INJECTION, SOLUTION INTRAVENOUS at 01:07

## 2022-07-19 RX ADMIN — ACETAMINOPHEN 1000 MG: 500 TABLET ORAL at 02:07

## 2022-07-19 RX ADMIN — HYDROMORPHONE HYDROCHLORIDE 1 MG: 1 INJECTION, SOLUTION INTRAMUSCULAR; INTRAVENOUS; SUBCUTANEOUS at 08:07

## 2022-07-19 RX ADMIN — OXYCODONE HYDROCHLORIDE 10 MG: 10 TABLET ORAL at 10:07

## 2022-07-19 RX ADMIN — MILRINONE LACTATE IN DEXTROSE 0.12 MCG/KG/MIN: 200 INJECTION, SOLUTION INTRAVENOUS at 08:07

## 2022-07-19 RX ADMIN — INSULIN HUMAN 22.25 UNITS/HR: 1 INJECTION, SOLUTION INTRAVENOUS at 12:07

## 2022-07-19 RX ADMIN — INSULIN ASPART 4 UNITS: 100 INJECTION, SOLUTION INTRAVENOUS; SUBCUTANEOUS at 10:07

## 2022-07-19 RX ADMIN — ACETAMINOPHEN 1000 MG: 500 TABLET ORAL at 10:07

## 2022-07-19 RX ADMIN — ENOXAPARIN SODIUM 40 MG: 100 INJECTION SUBCUTANEOUS at 05:07

## 2022-07-19 RX ADMIN — NOREPINEPHRINE BITARTRATE 0.01 MCG/KG/MIN: 4 INJECTION, SOLUTION INTRAVENOUS at 12:07

## 2022-07-19 NOTE — RESPIRATORY THERAPY
Per md extubated on these settings:  Spontaneous/ peep of 8, pressure support of 5, and fio2 of 40. Patient tolerated well and was put on 4L NC.

## 2022-07-19 NOTE — PROGRESS NOTES
[facility]  Critical Care - Surgery  Progress Note    Patient Name: Isaiah Dorsey Jr.  MRN: [id number]  Admission Date: 7/11/2022  Hospital Length of Stay: 8 days  Code Status: Full Code  Attending Provider: Blu Rhodes MD  Primary Care Provider: Primary Doctor No   Principal Problem: Atherosclerotic heart disease of native coronary artery with unstable angina pectoris    Subjective:     Hospital/ICU Course:  No notes on file    Interval History/Significant Events: Maintained in NSR/sinus tach after cardioversion and amio bolus and gtt. Lactate peaked and continued to downtrend, 1.78 this AM. Extubated to 4L by facemask without difficulty. Mr. Dorsey endorses chest soreness this AM which he attributes to the operation.     Follow-up For: Procedure(s) (LRB):  CORONARY ARTERY BYPASS GRAFT (CABG) (Left)  HARVEST-VEIN-ENDOVASCULAR (Left)    Post-Operative Day: 1 Day Post-Op    Objective:     Vital Signs (Most Recent):  Temp: 98.78 °F (37.1 °C) (07/19/22 0430)  Pulse: 105 (07/19/22 0430)  Resp: (!) 22 (07/19/22 0612)  BP: 98/63 (07/19/22 0430)  SpO2: (!) 94 % (07/19/22 0430)   Vital Signs (24h Range):  Temp:  [95.72 °F (35.4 °C)-98.78 °F (37.1 °C)] 98.78 °F (37.1 °C)  Pulse:  [] 105  Resp:  [4-40] 22  SpO2:  [73 %-100 %] 94 %  BP: ()/(51-83) 98/63  Arterial Line BP: ()/(51-85) 105/57     Weight: 126.6 kg (279 lb 1.6 oz)  Body mass index is 37.85 kg/m².      Intake/Output Summary (Last 24 hours) at 7/19/2022 0615  Last data filed at 7/19/2022 0400  Gross per 24 hour   Intake 7394.32 ml   Output 1573 ml   Net 5821.32 ml       Physical Exam  Vitals reviewed.   Constitutional:       General: He is not in acute distress.     Appearance: He is not ill-appearing.   HENT:      Head: Normocephalic and atraumatic.   Eyes:      Extraocular Movements: Extraocular movements intact.      Conjunctiva/sclera: Conjunctivae normal.   Cardiovascular:      Rate and Rhythm: Regular rhythm.  Tachycardia present.      Comments: Midline island dressing C/D/I.  Mediastinal tubes x2, L pleural to suction   Ventricular Pacing wires present  Pulmonary:      Effort: Pulmonary effort is normal. No respiratory distress.      Comments: On 4L by facemask  Genitourinary:     Comments: Lal  Skin:     General: Skin is warm and dry.   Neurological:      General: No focal deficit present.      Mental Status: He is alert and oriented to person, place, and time.     Lines/Drains/Airways       Central Venous Catheter Line  Duration              Introducer with Double Lumen 07/18/22 0800 right internal jugular <1 day    Percutaneous Central Line Insertion/Assessment - Triple Lumen  07/18/22 0800 right internal jugular <1 day              Drain  Duration                  Chest Tube 07/18/22 0958 3 Left Pleural 19 Fr. <1 day         Urethral Catheter 07/18/22 0727 Non-latex;Straight-tip;Temperature probe 14 Fr. <1 day         Y Chest Tube 1 and 2 07/18/22 0958 1 Right Mediastinal 19 Fr. 2 Left Mediastinal 19 Fr. <1 day              Arterial Line  Duration             Arterial Line 07/18/22 0944 Left Radial <1 day    Arterial Line 07/18/22 1100 Right Femoral <1 day              Line  Duration                  Pacer Wires 07/18/22 0946 <1 day              Peripheral Intravenous Line  Duration                  Peripheral IV - Single Lumen 07/14/22 0553 18 G Left;Posterior Forearm 5 days         Peripheral IV - Single Lumen 07/14/22 2350 20 G Anterior;Left;Proximal Forearm 4 days                    Significant Labs:    CBC/Anemia Profile:  Recent Labs   Lab 07/18/22  1128 07/18/22  1212 07/18/22  2111 07/18/22  2223 07/19/22  0208 07/19/22  0233 07/19/22  0542   WBC 28.21*  --  17.42*  --   --  17.44*  --    HGB 13.2*  --  10.6*  --   --  10.5*  --    HCT 41.4   < > 31.6*   < > 28* 31.3* 35*     --  308  --   --  315  --    MCV 97  --  90  --   --  90  --    RDW 14.5  --  14.6*  --   --  14.5  --     < > = values in  this interval not displayed.        Chemistries:  Recent Labs   Lab 07/18/22  1128 07/18/22  1129 07/18/22  2111 07/19/22  0233   NA  --  131* 132* 133*   K  --  3.9 4.0 4.2   CL  --  100 102 104   CO2  --  19* 16* 19*   BUN  --  10 8 8   CREATININE  --  0.8 0.8 0.7   CALCIUM  --  8.8 8.6* 8.6*   ALBUMIN  --  2.5* 3.7 3.5   PROT  --  5.9* 6.4 6.2   BILITOT  --  0.5 0.3 0.3   ALKPHOS  --  64 47* 44*   ALT  --  21 17 15   AST  --  20 20 22   MG 2.9*  --  2.2 2.0   PHOS 3.7  --  2.6* 3.4       All pertinent labs within the past 24 hours have been reviewed.    Significant Imaging:  I have reviewed all pertinent imaging results/findings within the past 24 hours.    Assessment/Plan:     * Atherosclerotic heart disease of native coronary artery with unstable angina pectoris  Isaiah Dorsey . is a 62 y.o. male with NIDDM, HTN, HLD, and BMI 37.8 who is now s/p CABG x1 on 7/18/22.      Neuro/Psych:   -- Sedation: None  -- Pain: PRN Oxy + Dilaudid  -- Scheduled Tylenol             Cards:   -- S/P CABG x1 on 7/18/22  -- HDS on Epi 0.06 and milrinone 0.125; Will plan to wean epi Q30 min by 0.01 if MAP > 65  -- Transition to Amio PO in setting of AFRVR  -- Ventricular pacing wires. Back-up paced at 45 BPM  -- MAP > 65, Syst < 140  -- Cleviprex PRN  -- Aspirin 325mg      Pulm:   -- Goal O2 sat > 90%  -- Wean supplemental O2 as tolerated  -- Mediastinal chest tubes x2 and pleural chest tube x1 to suction      Renal:  -- Keep soler for strict I/O  -- Trend BUN/Cr       FEN / GI:   -- Replace lytes as needed  -- Nutrition: CLD, 1500cc ADAT  -- GI ppx: famotidine  -- Bowel reg: miralax  -- Post-op albumin completed      ID:   -- Tm: afebrile; WBC stable  -- Michelle-op ancef      Heme/Onc:   -- H/H stable   -- Daily CBC  -- Aspirin 325mg QD      Endo:   -- BG goal 140-180  -- Insulin gtt      PPx:   Feeding: CLD, ADAT  Analgesia/Sedation: PRN Oxy + Dilaudid  Thromboembolic prevention: SCDs  HOB >30: Yes  Stress Ulcer ppx:  famotidine  Glucose control: Critical care goal 140-180 g/dl, ISS     Lines/Drains/Airway: CVC RIJ, Lal, Chest tubes x 3, ventricular pacing wires, L radial A line; remove right femoral A line.       Dispo/Code Status/Palliative:   -- SICU / Full Code.           Isrrael Fiore MD  Critical Care - Surgery  [facility]

## 2022-07-19 NOTE — PROGRESS NOTES
[facility]  Endocrinology  Progress Note    Admit Date: 7/11/2022     Reason for Consult: Management of T2DM, Hyperglycemia     Surgical Procedure and Date: CABG on 7/18/22    Diabetes diagnosis year: ODETTE (intubated)     Home Diabetes Medications:  Metformin (per chart review) unsure of dose    How often checking glucose at home?  ODETTE    BG readings on regimen: ODETTE  Hypoglycemia on the regimen?  ODETTE  Missed doses on regimen? ODETTE    Diabetes Complications include:     Hyperglycemia    Complicating diabetes co morbidities:   History of MI, CHF, HTN, HLD       HPI:   Patient is a 62 y.o. male with a diagnosis of HTN, HLD, DM2, aortic dilatation, CHF (EF 40-45%), and BMI 38 who was admitted to [facility] for NSTEMI.  He was found to have multi-vessel coronary disease on C with LAD with 90% ostial to prox and 70% mid LAD, D1 70%, D2 60%, ost to prox LCX with 99% stenosis. Cardiothoracic surgery was consulted and recommended to proceed with CABG this admission. He now presents for the above procedure. Endocrinology consulted for management of T2DM.      Lab Results   Component Value Date    HGBA1C 7.6 (H) 07/12/2022           Interval HPI:   Overnight events: Remains in SICU. POD 1. BG well controlled on IV intensive insulin protocol with infusion rates ranging from 9-22 u/hr. Diet Clear liquid (no sugar)/Bariatric Fluid - 1500mL  Eating:    clear liquids  Nausea: No  Hypoglycemia and intervention: No  Fever: No  TPN and/or TF: No  If yes, type of TF/TPN and rate: n/a    /77   Pulse 109   Temp 99.32 °F (37.4 °C)   Resp (!) 30   Ht 6' (1.829 m)   Wt 126.6 kg (279 lb 1.6 oz)   SpO2 (!) 93%   BMI 37.85 kg/m²     Labs Reviewed and Include    Recent Labs   Lab 07/19/22  0233   *   CALCIUM 8.6*   ALBUMIN 3.5   PROT 6.2   *   K 4.2   CO2 19*      BUN 8   CREATININE 0.7   ALKPHOS 44*   ALT 15   AST 22   BILITOT 0.3     Lab Results   Component Value Date    WBC 17.44 (H)  07/19/2022    HGB 10.5 (L) 07/19/2022    HCT 35 (L) 07/19/2022    MCV 90 07/19/2022     07/19/2022     Recent Labs   Lab 07/13/22  1825   TSH 2.840     Lab Results   Component Value Date    HGBA1C 7.6 (H) 07/12/2022       Nutritional status:   Body mass index is 37.85 kg/m².  Lab Results   Component Value Date    ALBUMIN 3.5 07/19/2022    ALBUMIN 3.7 07/18/2022    ALBUMIN 2.5 (L) 07/18/2022     No results found for: PREALBUMIN    Estimated Creatinine Clearance: 148.5 mL/min (based on SCr of 0.7 mg/dL).    Accu-Checks  Recent Labs     07/18/22  2329 07/19/22  0004 07/19/22  0109 07/19/22  0210 07/19/22  0304 07/19/22  0424 07/19/22  0536 07/19/22  0620 07/19/22  0646 07/19/22  0825   POCTGLUCOSE 218* 205* 163* 179* 123* 133* 116* 91 75 149*       Current Medications and/or Treatments Impacting Glycemic Control  Immunotherapy:    Immunosuppressants       None          Steroids:   Hormones (From admission, onward)                None          Pressors:    Autonomic Drugs (From admission, onward)                Start     Stop Route Frequency Ordered    07/18/22 1145  EPINEPHrine 5 mg in dextrose 5% 250 mL infusion (premix)        Question Answer Comment   Begin at (in mcg/kg/min): 0.01    Titrate by: (in mcg/kg/min) 0.01    Titrate interval: (in minutes) 5    Titrate to maintain: (SBP or MAP or Cardiac Index) CARDIAC INDEX    Greater than: (in mmHg)     Cardiac index greater than: (in L/min) 2.2    Maximum dose: (in mcg/kg/min) 1        -- IV Continuous 07/18/22 1125          Hyperglycemia/Diabetes Medications:   Antihyperglycemics (From admission, onward)                Start     Stop Route Frequency Ordered    07/18/22 1145  insulin regular in 0.9 % NaCl 100 unit/100 mL (1 unit/mL) infusion        Question Answer Comment   Insulin rate changes (DO NOT MODIFY ANSWER) [email or web address] [email or web address]    Enter initial dose (Units/hr): 1        -- IV Continuous 07/18/22  1124            ASSESSMENT and PLAN    * Atherosclerotic heart disease of native coronary artery with unstable angina pectoris  Managed per primary team  S/p CABG        Type 2 diabetes mellitus with hyperglycemia, without long-term current use of insulin  BG goal 110-140 (CTS protocol)   Diet diabetic [facility]; 1500 Calorie; Isolation Tray - Regular [place]; Fluid - 1500mL    Discontinue IV insulin infusion protocol (diet progressed)   Start Transition IV insulin infusion at 2 u/hr with stepdown parameters (rate based off of current IV insulin requirements)  Start Novolog 5-8 units TID with meals (0.3-0.4 u/kg dosing)   Moderate Dose Correction Scale   BG monitoring ac/hs/0200    ** Please call Endocrine for any BG related issues **    ** Please notify Endocrine for any change and/or advance in diet**    Discharge planning: TBD    Lab Results   Component Value Date    HGBA1C 7.6 (H) 07/12/2022         S/P CABG (coronary artery bypass graft)  Managed per primary team  Optimize BG control        HLD (hyperlipidemia)  May increase insulin resistance.         Class 2 severe obesity due to excess calories with serious comorbidity and body mass index (BMI) of 37.0 to 37.9 in adult  Body mass index is 37.85 kg/m².  May increase insulin resistance.             Viktoriya Dougherty NP  Endocrinology  [facility]

## 2022-07-19 NOTE — PLAN OF CARE
PT eval complete, plan of care established    2022    Problem: Physical Therapy  Goal: Physical Therapy Goal  Description: Goals to be met by: 2022     Patient will increase functional independence with mobility by performin. Supine to sit with modified independence  2. Sit to supine with modified independence  3. Sit to stand transfer with modified independence  4. Gait  x 80 feet with supervision using platform rolling walker if needed  5. Ascend/descend 3 stair with no handrails supervision using no AD  6. Lower extremity exercise program x10 reps per handout, with independence   Outcome: Ongoing, Progressing

## 2022-07-19 NOTE — SUBJECTIVE & OBJECTIVE
Interval History/Significant Events: Maintained in NSR/sinus tach after cardioversion and amio bolus and gtt. Lactate peaked and continued to downtrend, 1.78 this AM. Extubated to 4L by facemask without difficulty. Mr. Dorsey endorses chest soreness this AM which he attributes to the operation.     Follow-up For: Procedure(s) (LRB):  CORONARY ARTERY BYPASS GRAFT (CABG) (Left)  HARVEST-VEIN-ENDOVASCULAR (Left)    Post-Operative Day: 1 Day Post-Op    Objective:     Vital Signs (Most Recent):  Temp: 98.78 °F (37.1 °C) (07/19/22 0430)  Pulse: 105 (07/19/22 0430)  Resp: (!) 22 (07/19/22 0612)  BP: 98/63 (07/19/22 0430)  SpO2: (!) 94 % (07/19/22 0430)   Vital Signs (24h Range):  Temp:  [95.72 °F (35.4 °C)-98.78 °F (37.1 °C)] 98.78 °F (37.1 °C)  Pulse:  [] 105  Resp:  [4-40] 22  SpO2:  [73 %-100 %] 94 %  BP: ()/(51-83) 98/63  Arterial Line BP: ()/(51-85) 105/57     Weight: 126.6 kg (279 lb 1.6 oz)  Body mass index is 37.85 kg/m².      Intake/Output Summary (Last 24 hours) at 7/19/2022 0615  Last data filed at 7/19/2022 0400  Gross per 24 hour   Intake 7394.32 ml   Output 1573 ml   Net 5821.32 ml       Physical Exam  Vitals reviewed.   Constitutional:       General: He is not in acute distress.     Appearance: He is not ill-appearing.   HENT:      Head: Normocephalic and atraumatic.   Eyes:      Extraocular Movements: Extraocular movements intact.      Conjunctiva/sclera: Conjunctivae normal.   Cardiovascular:      Rate and Rhythm: Regular rhythm. Tachycardia present.      Comments: Midline island dressing C/D/I.  Mediastinal tubes x2, L pleural to suction   Ventricular Pacing wires present  Pulmonary:      Effort: Pulmonary effort is normal. No respiratory distress.      Comments: On 4L by facemask  Genitourinary:     Comments: Lukasz  Skin:     General: Skin is warm and dry.   Neurological:      General: No focal deficit present.      Mental Status: He is alert and oriented to person, place, and time.      Lines/Drains/Airways       Central Venous Catheter Line  Duration              Introducer with Double Lumen 07/18/22 0800 right internal jugular <1 day    Percutaneous Central Line Insertion/Assessment - Triple Lumen  07/18/22 0800 right internal jugular <1 day              Drain  Duration                  Chest Tube 07/18/22 0958 3 Left Pleural 19 Fr. <1 day         Urethral Catheter 07/18/22 0727 Non-latex;Straight-tip;Temperature probe 14 Fr. <1 day         Y Chest Tube 1 and 2 07/18/22 0958 1 Right Mediastinal 19 Fr. 2 Left Mediastinal 19 Fr. <1 day              Arterial Line  Duration             Arterial Line 07/18/22 0944 Left Radial <1 day    Arterial Line 07/18/22 1100 Right Femoral <1 day              Line  Duration                  Pacer Wires 07/18/22 0946 <1 day              Peripheral Intravenous Line  Duration                  Peripheral IV - Single Lumen 07/14/22 0553 18 G Left;Posterior Forearm 5 days         Peripheral IV - Single Lumen 07/14/22 2350 20 G Anterior;Left;Proximal Forearm 4 days                    Significant Labs:    CBC/Anemia Profile:  Recent Labs   Lab 07/18/22  1128 07/18/22  1212 07/18/22 2111 07/18/22  2223 07/19/22  0208 07/19/22  0233 07/19/22  0542   WBC 28.21*  --  17.42*  --   --  17.44*  --    HGB 13.2*  --  10.6*  --   --  10.5*  --    HCT 41.4   < > 31.6*   < > 28* 31.3* 35*     --  308  --   --  315  --    MCV 97  --  90  --   --  90  --    RDW 14.5  --  14.6*  --   --  14.5  --     < > = values in this interval not displayed.        Chemistries:  Recent Labs   Lab 07/18/22  1128 07/18/22  1129 07/18/22 2111 07/19/22  0233   NA  --  131* 132* 133*   K  --  3.9 4.0 4.2   CL  --  100 102 104   CO2  --  19* 16* 19*   BUN  --  10 8 8   CREATININE  --  0.8 0.8 0.7   CALCIUM  --  8.8 8.6* 8.6*   ALBUMIN  --  2.5* 3.7 3.5   PROT  --  5.9* 6.4 6.2   BILITOT  --  0.5 0.3 0.3   ALKPHOS  --  64 47* 44*   ALT  --  21 17 15   AST  --  20 20 22   MG 2.9*  --  2.2 2.0    PHOS 3.7  --  2.6* 3.4       All pertinent labs within the past 24 hours have been reviewed.    Significant Imaging:  I have reviewed all pertinent imaging results/findings within the past 24 hours.

## 2022-07-19 NOTE — PLAN OF CARE
Recommendations    1) ADAT to cardiac/diabetic diet. Fluid per physician. Appropriate diet education provided 7/19.    2) If pt eating <50% of meals, consider adding Boost Glucose Control BID to optimize PO intake.     3) RD to monitor and f/u.    Goals: Meet % of kcal/protein needs by RD f/u date  Nutrition Goal Status: new  Communication of RD Recs:  (POC)

## 2022-07-19 NOTE — ASSESSMENT & PLAN NOTE
Isaiah Dorsey Jr. is a 62 y.o. male with NIDDM, HTN, HLD, and BMI 37.8 who is now s/p CABG x1 on 7/18/22.      Neuro/Psych:   -- Sedation: None  -- Pain: PRN Oxy + Dilaudid  -- Scheduled Tylenol             Cards:   -- S/P CABG x1 on 7/18/22  -- HDS off epi and milrinone 0.125; discontinue milrinone this am  -- Continue to Amio PO   -- Ventricular pacing wires. Back-up paced at 45 BPM  -- MAP > 65, Syst < 140  -- Cleviprex PRN  -- Metop, Aspirin 325mg      Pulm:   -- Goal O2 sat > 90%  -- Wean supplemental O2 as tolerated  -- Mediastinal chest tubes x2 and pleural chest tube x1 to suction      Renal:  -- Keep soler for strict I/O  -- Trend BUN/Cr   -- Lasix 20 BID      FEN / GI:   -- Replace lytes as needed  -- Nutrition: Cardiac diet, 1500cc  -- GI ppx: famotidine  -- Bowel reg: miralax  -- Post-op albumin completed      ID:   -- Tm: afebrile; WBC stable  -- Michelle-op ancef      Heme/Onc:   -- H/H stable   -- Daily CBC  -- Aspirin 325mg QD      Endo:   -- BG goal 140-180  -- Insulin gtt, endocrine consulted appreciate recommendations      PPx:   Feeding: Cardiac diet  Analgesia/Sedation: PRN Oxy + Dilaudid  Thromboembolic prevention: SCDs, LVX  HOB >30: Yes  Stress Ulcer ppx: famotidine  Glucose control: Critical care goal 140-180 g/dl, ISS     Lines/Drains/Airway: CVC Lukasz BAR, Chest tubes x 3, ventricular pacing wires, L radial A line; remove right femoral A line.       Dispo/Code Status/Palliative:   -- SD to floor today / Full Code.

## 2022-07-19 NOTE — ASSESSMENT & PLAN NOTE
Isaiah Dorsey Jr. is a 62 y.o. male with NIDDM, HTN, HLD, and BMI 37.8 who is now s/p CABG x1 on 7/18/22.      Neuro/Psych:   -- Sedation: None  -- Pain: PRN Oxy + Dilaudid  -- Scheduled Tylenol             Cards:   -- S/P CABG x1 on 7/18/22  -- HDS on Epi 0.06 and milrinone 0.125; Will plan to wean epi Q30 min by 0.01 if MAP > 65  -- Transition to Amio PO in setting of AFRVR  -- Ventricular pacing wires. Back-up paced at 45 BPM  -- MAP > 65, Syst < 140  -- Cleviprex PRN  -- Aspirin 325mg      Pulm:   -- Goal O2 sat > 90%  -- Wean supplemental O2 as tolerated  -- Mediastinal chest tubes x2 and pleural chest tube x1 to suction      Renal:  -- Keep soler for strict I/O  -- Trend BUN/Cr       FEN / GI:   -- Replace lytes as needed  -- Nutrition: CLD, 1500cc ADAT  -- GI ppx: famotidine  -- Bowel reg: miralax  -- Post-op albumin completed      ID:   -- Tm: afebrile; WBC stable  -- Michelle-op ancef      Heme/Onc:   -- H/H stable   -- Daily CBC  -- Aspirin 325mg QD      Endo:   -- BG goal 140-180  -- Insulin gtt      PPx:   Feeding: CLD, ADAT  Analgesia/Sedation: PRN Oxy + Dilaudid  Thromboembolic prevention: SCDs  HOB >30: Yes  Stress Ulcer ppx: famotidine  Glucose control: Critical care goal 140-180 g/dl, ISS     Lines/Drains/Airway: CVC RIJ, Soler, Chest tubes x 3, ventricular pacing wires, L radial A line; remove right femoral A line.       Dispo/Code Status/Palliative:   -- SICU / Full Code.

## 2022-07-19 NOTE — PLAN OF CARE
Patient requires continued medical care  [facility] Surgical Intensive Care  Initial Discharge Assessment       Primary Care Provider: Primary Doctor No    Admission Diagnosis: Unstable angina [I20.0]  Non-STEMI (non-ST elevated myocardial infarction) [I21.4]  NSTEMI (non-ST elevated myocardial infarction) [I21.4]  Chest pain [R07.9]    Admission Date: 7/11/2022  Expected Discharge Date: 7/25/2022    Discharge Barriers Identified: None    Payor: [employer] BLUE SHIELD / Plan: BLUE CONNECT / Product Type: HMO /     Extended Emergency Contact Information  Primary Emergency Contact: Angie Dorsey  Address: [address] [place] [address] [place]  Home Phone: [phone]  Mobile Phone: [phone]  Relation: Son    Discharge Plan A: Home, Home with family  Discharge Plan B: Home Health      [facility]  for [place], [place] - [address]  [address] [address]  Phone: [phone] Fax: [phone]      Initial Assessment (most recent)     Adult Discharge Assessment - 07/12/22 1026        Discharge Assessment    Assessment Type Discharge Planning Assessment     Confirmed/corrected address, phone number and insurance Yes     Confirmed Demographics Correct on Facesheet     Source of Information patient     Communicated STELLA with patient/caregiver Yes     Lives With spouse;child(kentrell), adult     Current cognitive status: Alert/Oriented     Walking or Climbing Stairs Difficulty none     Dressing/Bathing Difficulty none     Equipment Currently Used at Home none     Readmission within 30 days? No     Patient currently being followed by outpatient case management? No     Do you currently have service(s) that help you manage your care at home? No     Is the pt/caregiver preference to resume services with current agency No     Do you take prescription medications? Yes     Do you have prescription coverage? Yes     Coverage BCBS     Do you have any  problems affording any of your prescribed medications? No     How do you get to doctors appointments? car, drives self     Are you on dialysis? No     Do you take coumadin? No     Discharge Plan A Home with family     Discharge Plan B Home     DME Needed Upon Discharge  none     Discharge Plan discussed with: Patient;Adult children;Spouse/sig other     Discharge Barriers Identified None

## 2022-07-19 NOTE — ASSESSMENT & PLAN NOTE
BG goal 110-140 (CTS protocol)   Diet diabetic [facility]; 1500 Calorie; Isolation Tray - Regular [place]; Fluid - 1500mL    Discontinue IV insulin infusion protocol (diet progressed)   Start Transition IV insulin infusion at 2 u/hr with stepdown parameters (rate based off of current IV insulin requirements)  Start Novolog 5-8 units TID with meals (0.3-0.4 u/kg dosing)   Moderate Dose Correction Scale   BG monitoring ac/hs/0200    ** Please call Endocrine for any BG related issues **    ** Please notify Endocrine for any change and/or advance in diet**    Discharge planning: TBD    Lab Results   Component Value Date    HGBA1C 7.6 (H) 07/12/2022

## 2022-07-19 NOTE — CONSULTS
[facility] - [facility] Intensive Care  Adult Nutrition  Consult Note    SUMMARY     Recommendations    1) ADAT to cardiac/diabetic diet. Fluid per physician. Appropriate diet education provided 7/19.    2) If pt eating <50% of meals, consider adding Boost Glucose Control BID to optimize PO intake.     3) RD to monitor and f/u.    Goals: Meet % of kcal/protein needs by RD f/u date  Nutrition Goal Status: new  Communication of RD Recs:  (POC)    Assessment and Plan  Nutrition Problem  Inadequate energy intake    Related to (etiology):   Decreased ability to consume sufficient energy     Signs and Symptoms (as evidenced by):   Clear liquid diet s/p surgery     Interventions/Recommendations (treatment strategy):  Collaboration of nutrition care with other providers  Diet advancement as medically feasible    Nutrition Diagnosis Status:   New    Reason for Assessment    Reason For Assessment: consult (s/p CABG)  Diagnosis:  (atherosclerotic heart disease of native coronary artery with unstable angina pectoris s/p CABG 7/18)  Relevant Medical History: NIDDM, HTN, HLD  Interdisciplinary Rounds: did not attend    General Information Comments: Pt s/p CABG POD#1. Diet advanced to clear liquids this morning, pt tolerating well. Pt reports good appetite/PO intake PTA with no exisiting chewing/swallowing difficulties. # - pt reports intentional ~70# weight loss over the past 5 years after making healthy changes to his diet. NFPE not warranted, pt has no s/s of malnutrition at this time. Of note - pt amenable to cardiac diet education; thorough heart healthy/low sodium and fluid restriction diet education reviewed and handouts were provided. Pt verbalized understanding of materials. Encouraged pt to call with questions.  Nutrition Discharge Planning: adequate nutrition, cardiac diet    Nutrition Risk Screen    Nutrition Risk Screen: no indicators present    Nutrition/Diet History    Spiritual, Cultural Beliefs,  Zoroastrianism Practices, Values that Affect Care: no  Food Allergies: NKFA    Anthropometrics    Temp: 99.5 °F (37.5 °C)  Height Method: Stated  Height: 6' (182.9 cm)  Height (inches): 72 in  Weight Method: Stated  Weight: 126.6 kg (279 lb 1.6 oz)  Weight (lb): 279.11 lb  Ideal Body Weight (IBW), Male: 178 lb  % Ideal Body Weight, Male (lb): 156.8 %  BMI (Calculated): 37.8  BMI Grade: 35 - 39.9 - obesity - grade II    Lab/Procedures/Meds    Pertinent Labs Reviewed: reviewed  Pertinent Labs Comments: Na 133, Glu 178, Ca 8.6  Pertinent Medications Reviewed: reviewed  Pertinent Medications Comments: epinephrine    Estimated/Assessed Needs    Weight Used For Calorie Calculations: 80.9 kg (178 lb 5.6 oz) (IBW used 2/2 obesity)  Energy Calorie Requirements (kcal): 5739-6035 kcal/day (25-30 kcal/kg of IBW)  Energy Need Method: Kcal/kg  Protein Requirements: 122-146g pro/day (1.5-1.8 g/kg of IBW)  Weight Used For Protein Calculations: 80.9 kg (178 lb 5.6 oz) (IBW)  Estimated Fluid Requirement Method:  (1ml/kcal or fluid per physician)  RDA Method (mL): 2023    Nutrition Prescription Ordered    Current Diet Order: clear liquid (no sugar)/bariatric    Evaluation of Received Nutrient/Fluid Intake    I/O: -3.3L since admit  Comments: LBM: 7/17  Tolerance: tolerating  % Intake of Estimated Energy Needs: 0 - 25 %  % Meal Intake: 50 - 75 %    Nutrition Risk    Level of Risk/Frequency of Follow-up:  (1x/week)     Monitor and Evaluation    Food and Nutrient Intake: energy intake, food and beverage intake  Food and Nutrient Adminstration: diet order  Knowledge/Beliefs/Attitudes: food and nutrition knowledge/skill  Physical Activity and Function: nutrition-related ADLs and IADLs  Anthropometric Measurements: weight, weight change  Biochemical Data, Medical Tests and Procedures: electrolyte and renal panel, gastrointestinal profile, glucose/endocrine profile, inflammatory profile, lipid profile  Nutrition-Focused Physical Findings: overall  appearance, extremities, muscles and bones     Nutrition Follow-Up    RD Follow-up?: Yes

## 2022-07-19 NOTE — NURSING
SICU PLAN OF CARE NOTE    SHIFT EVENTS:  See previous notes for shift events. POC reviewed with patient; questions and concerns addressed. See flow sheets for full assessment details. Will continue to monitor patient closely.      Dx: Atherosclerotic heart disease of native coronary artery with unstable angina pectoris    Vital Signs: /67   Pulse 91   Temp 97.16 °F (36.2 °C)   Resp (!) 23   Ht 6' (1.829 m)   Wt 126.6 kg (279 lb 1.6 oz)   SpO2 97%   BMI 37.85 kg/m²     Neuro: Follows Commands and Moves All Extremities    Respiratory: Ventilator Spont at 40%/10 PEEP    Cardiac: NSR    Diet: NPO    Gtts: {SICU GTTS:60112}     Urine Output: Urinary Catheter 940cc/shift    Drains:     Chest Tube, total output 118 cc /  shift    Labs/Accuchecks: ***.    SKIN NOTE:  Skin: ***    Skin precautions maintained including:  Sacrum and heels with foam dressing in place for pressure protection. Frequent weight shift encouraged / assistance provided Q2 hr to prevent further breakdown. Bed plugged in and mattress inflated; continuous rotational turning bed feature / [employer] bed utilized. Adhesive use limited. Heels elevated off bed. Positioned off wounds. Pressure points protected and positioning supports utilized.  Skin-to-device areas padded. Skin-to-skin areas padded

## 2022-07-19 NOTE — PLAN OF CARE
SICU PLAN OF CARE NOTE    SHIFT EVENTS:  POC reviewed with patient and spouse; questions and concerns addressed. PRN oxy and dilaudid administered for pain relief, effective. K and Phos repleted. Epi and Levo titrated to meet map goal >65.See flow sheets for full assessment details. Will continue to monitor patient closely.      Dx: Atherosclerotic heart disease of native coronary artery with unstable angina pectoris    Vital Signs: /74   Pulse 106   Temp 99.14 °F (37.3 °C)   Resp (!) 34   Ht 6' (1.829 m)   Wt 126.6 kg (279 lb 1.6 oz)   SpO2 (!) 93%   BMI 37.85 kg/m²     Neuro: AAO x4, Follows Commands and Moves All Extremities    Respiratory: 4L Simple face mask    Cardiac: NSR/ST/ Map goal >65 / SBP <140    Diet: Clear Liquid    Gtts: Epinephrine, Milrinone and Abx     Urine Output: Urinary Catheter 500 cc/shift    Drains:     2M / 1P CT  85cc serosang output     Labs/Accuchecks q1h  SKIN NOTE:  Skin: Pt has a midsternal incision with island border dressing in place, CDI. Pt has a LLE graft site with compression dressing in place, CDI.    Skin precautions maintained including:  Sacrum and heels with foam dressing in place for pressure protection. Frequent weight shift assistance provided Q2 hr to prevent breakdown.  Adhesive use limited. Heels elevated off bed. Positioned off wounds. Pressure points protected and positioning supports utilized.  Skin-to-device areas padded. Skin-to-skin areas padded

## 2022-07-19 NOTE — NURSING
at bedside for rounds; verbal orders to keep epi at 0.06 mcg/kg/min, restart primacor at 0.125, and to continue to trend ABG with lytes and lactic. All orders received and implemented.

## 2022-07-19 NOTE — PT/OT/SLP EVAL
"Physical Therapy Evaluation and Treatment    Patient Name:  Isaiah Dorsey Jr.   MRN:  [id number]    Recommendations:     Discharge Recommendations:  home health PT   Discharge Equipment Recommendations: none   Barriers to discharge: Inaccessible home    Assessment:     Isaiah Dorsey Jr. is a 62 y.o. male admitted with a medical diagnosis of Atherosclerotic heart disease of native coronary artery with unstable angina pectoris.  He presents with the following impairments/functional limitations:  weakness, impaired endurance, impaired self care skills, impaired functional mobility, gait instability, impaired balance, decreased upper extremity function, pain, decreased lower extremity function, impaired cardiopulmonary response to activity. BP monitored throughout session,  when sitting forward in chair, rechecked and was 168, goal was <140. Scooted back in chair and when scooting back arterial line disconnected at sensor. RN notified and to bedside to address. Notified about high BP with limited mobility. Will progress mobility as appropriate at next session.    Rehab Prognosis: Good; patient would benefit from acute skilled PT services 5 x/week to address these deficits and reach maximum level of function.  Recent Surgery: Procedure(s) (LRB):  CORONARY ARTERY BYPASS GRAFT (CABG) (Left)  HARVEST-VEIN-ENDOVASCULAR (Left) 1 Day Post-Op    Plan:     During this hospitalization, patient to be seen 5 x/week to address the identified rehab impairments via gait training, therapeutic activities, therapeutic exercises, neuromuscular re-education and progress toward the following goals:    · Plan of Care Expires:  08/19/22    Subjective     Chief Complaint: Chest pain  Patient/Family Comments/Goals: "I think you should have a second person"  Pain/Comfort:  · Pain Rating 1: 5/10  · Location - Orientation 1: midline  · Location 1: chest (and R shoulder)  · Pain Addressed 1: Distraction, Cessation of Activity  · Pain Rating " Post-Intervention 1:  (not rated)    Patients cultural, spiritual, Hindu conflicts given the current situation: no    Living Environment:  Patient lives with their spouse (limited in ability to assist) in a 2-story house, number of outside stairs: 2 with no HR, number of inside stairs: 10 with R HR, walk-in shower.  Prior to admission, patient was independent with ADLs, driving, working as an . Patient uses DME as follows: none. DME owned (not currently used): none. Upon discharge, patient will have assistance from significant other.    Objective:     Communicated with nursing prior to session.  Patient found up in chair with telemetry, pulse ox (continuous), blood pressure cuff, peripheral IV, arterial line, chest tube, soler catheter, central line upon PT entry to room.    General Precautions: Standard, fall, sternal   Orthopedic Precautions:N/A   Braces: N/A    Exams:  · Cognitive Exam:  Patient is oriented to Person, Place, Time, Situation, follows commands 100% of the time  · RLE ROM: WFL  · RLE Strength: WFL  · LLE ROM: WFL  · LLE Strength: WFL  · Sensation: Intact light touch to BLEs    Functional Mobility:  · Bed Mobility:     · Seated in chair at start of session and returned to chair  · Transfers:  Deferred due to high BP, scooted back in chair 1 time with contact guard assistance  · Balance:   · Static Sitting: Good, able to maintain for 5 minute(s) with supervision  · Dynamic Sitting: Fair: Patient accepts minimal challenge, stand by assistance    Therapeutic Activities and Exercises:  Patient educated on role of acute care PT and PT POC, safety while in hospital including calling nurse for mobility and call light usage  Educated about sternal precautions, patient verbalized understanding    AM-PAC 6 CLICK MOBILITY  Total Score:15     Patient left up in chair with all lines intact, call button in reach and RN and significant other present.    GOALS:   Multidisciplinary Problems      Physical Therapy Goals        Problem: Physical Therapy    Goal Priority Disciplines Outcome Goal Variances Interventions   Physical Therapy Goal     PT, PT/OT Ongoing, Progressing     Description: Goals to be met by: 2022     Patient will increase functional independence with mobility by performin. Supine to sit with modified independence  2. Sit to supine with modified independence  3. Sit to stand transfer with modified independence  4. Gait  x 80 feet with supervision using platform rolling walker if needed  5. Ascend/descend 3 stair with no handrails supervision using no AD  6. Lower extremity exercise program x10 reps per handout, with independence                    History:     Past Medical History:   Diagnosis Date    Diabetes mellitus type 2, uncontrolled, without complications     6.7% Metf 1 g qd, eye , [facility]     Elevated platelet count 2015    H/O splenectomy 2015    doesn't like pneumovax bc caused side effects    HLD (hyperlipidemia) 2015    goal LDL < 100, reluctant to take statin    HTN (hypertension), benign 2015    losartan 100    Morbid obesity with BMI of 45.0-49.9, adult 2014    Polyp of transverse colon 2018, repeat     Severe uncontrolled hypertension 2014       Past Surgical History:   Procedure Laterality Date    AORTOGRAPHY N/A 2022    Procedure: AORTOGRAM;  Surgeon: Bjorn Cheatham MD;  Location: [facility] CATH LAB;  Service: Cardiology;  Laterality: N/A;    CORONARY ARTERY BYPASS GRAFT (CABG) Left 2022    Procedure: CORONARY ARTERY BYPASS GRAFT (CABG);  Surgeon: Blu Rhodes MD;  Location: [facility] OR [address] [place] [address];  Service: Cardiovascular;  Laterality: Left;  CABG x 1 (LIMA to LAD)    ENDOSCOPIC HARVEST OF VEIN Left 2022    Procedure: HARVEST-VEIN-ENDOVASCULAR;  Surgeon: Blu Rhodes MD;  Location: [facility] OR [address] [place] [address];  Service: Cardiovascular;  Laterality: Left;  Vein [place] Start time:  0823am  Vein [place] Stop time: 0836am    Vein [place] Start time: 0848am  Vein [place] Stop time: 0904am    LEFT HEART CATHETERIZATION N/A 7/11/2022    Procedure: Left heart cath;  Surgeon: Bjorn Cheatham MD;  Location: [facility] C[facility] LAB;  Service: Cardiology;  Laterality: N/A;    splenecectomy         Time Tracking:     PT Received On: 07/19/22  PT Start Time: 1103     PT Stop Time: 1131  PT Total Time (min): 28 min     Billable Minutes: Evaluation 10 min Therapeutic Activity 10 min     07/19/2022

## 2022-07-19 NOTE — SUBJECTIVE & OBJECTIVE
Interval HPI:   Overnight events: Remains in SICU. POD 1. BG well controlled on IV intensive insulin protocol with infusion rates ranging from 9-22 u/hr. Diet Clear liquid (no sugar)/Bariatric Fluid - 1500mL  Eating:    clear liquids  Nausea: No  Hypoglycemia and intervention: No  Fever: No  TPN and/or TF: No  If yes, type of TF/TPN and rate: n/a    /77   Pulse 109   Temp 99.32 °F (37.4 °C)   Resp (!) 30   Ht 6' (1.829 m)   Wt 126.6 kg (279 lb 1.6 oz)   SpO2 (!) 93%   BMI 37.85 kg/m²     Labs Reviewed and Include    Recent Labs   Lab 07/19/22  0233   *   CALCIUM 8.6*   ALBUMIN 3.5   PROT 6.2   *   K 4.2   CO2 19*      BUN 8   CREATININE 0.7   ALKPHOS 44*   ALT 15   AST 22   BILITOT 0.3     Lab Results   Component Value Date    WBC 17.44 (H) 07/19/2022    HGB 10.5 (L) 07/19/2022    HCT 35 (L) 07/19/2022    MCV 90 07/19/2022     07/19/2022     Recent Labs   Lab 07/13/22  1825   TSH 2.840     Lab Results   Component Value Date    HGBA1C 7.6 (H) 07/12/2022       Nutritional status:   Body mass index is 37.85 kg/m².  Lab Results   Component Value Date    ALBUMIN 3.5 07/19/2022    ALBUMIN 3.7 07/18/2022    ALBUMIN 2.5 (L) 07/18/2022     No results found for: PREALBUMIN    Estimated Creatinine Clearance: 148.5 mL/min (based on SCr of 0.7 mg/dL).    Accu-Checks  Recent Labs     07/18/22  2329 07/19/22  0004 07/19/22  0109 07/19/22  0210 07/19/22  0304 07/19/22  0424 07/19/22  0536 07/19/22  0620 07/19/22  0646 07/19/22  0825   POCTGLUCOSE 218* 205* 163* 179* 123* 133* 116* 91 75 149*       Current Medications and/or Treatments Impacting Glycemic Control  Immunotherapy:    Immunosuppressants       None          Steroids:   Hormones (From admission, onward)                None          Pressors:    Autonomic Drugs (From admission, onward)                Start     Stop Route Frequency Ordered    07/18/22 1145  EPINEPHrine 5 mg in dextrose 5% 250 mL infusion (premix)        Question Answer  Comment   Begin at (in mcg/kg/min): 0.01    Titrate by: (in mcg/kg/min) 0.01    Titrate interval: (in minutes) 5    Titrate to maintain: (SBP or MAP or Cardiac Index) CARDIAC INDEX    Greater than: (in mmHg)     Cardiac index greater than: (in L/min) 2.2    Maximum dose: (in mcg/kg/min) 1        -- IV Continuous 07/18/22 1125          Hyperglycemia/Diabetes Medications:   Antihyperglycemics (From admission, onward)                Start     Stop Route Frequency Ordered    07/18/22 1145  insulin regular in 0.9 % NaCl 100 unit/100 mL (1 unit/mL) infusion        Question Answer Comment   Insulin rate changes (DO NOT MODIFY ANSWER) [email or web address]\CTS [email or web address] [id number]    Enter initial dose (Units/hr): 1        -- IV Continuous 07/18/22 1124

## 2022-07-20 ENCOUNTER — RESEARCH ENCOUNTER (OUTPATIENT)
Dept: RESEARCH | Facility: HOSPITAL | Age: 62
End: 2022-07-20
Payer: COMMERCIAL

## 2022-07-20 LAB
ALBUMIN SERPL BCP-MCNC: 3 G/DL (ref 3.5–5.2)
ALP SERPL-CCNC: 54 U/L (ref 55–135)
ALT SERPL W/O P-5'-P-CCNC: 17 U/L (ref 10–44)
ANION GAP SERPL CALC-SCNC: 11 MMOL/L (ref 8–16)
APTT BLDCRRT: 24.2 SEC (ref 21–32)
AST SERPL-CCNC: 24 U/L (ref 10–40)
BASOPHILS # BLD AUTO: 0.13 K/UL (ref 0–0.2)
BASOPHILS NFR BLD: 0.6 % (ref 0–1.9)
BILIRUB SERPL-MCNC: 0.7 MG/DL (ref 0.1–1)
BLD PROD TYP BPU: NORMAL
BLD PROD TYP BPU: NORMAL
BLOOD UNIT EXPIRATION DATE: NORMAL
BLOOD UNIT EXPIRATION DATE: NORMAL
BLOOD UNIT TYPE CODE: 5100
BLOOD UNIT TYPE CODE: 5100
BLOOD UNIT TYPE: NORMAL
BLOOD UNIT TYPE: NORMAL
BUN SERPL-MCNC: 13 MG/DL (ref 8–23)
CALCIUM SERPL-MCNC: 8.6 MG/DL (ref 8.7–10.5)
CHLORIDE SERPL-SCNC: 98 MMOL/L (ref 95–110)
CO2 SERPL-SCNC: 22 MMOL/L (ref 23–29)
CODING SYSTEM: NORMAL
CODING SYSTEM: NORMAL
CREAT SERPL-MCNC: 0.7 MG/DL (ref 0.5–1.4)
DIFFERENTIAL METHOD: ABNORMAL
DISPENSE STATUS: NORMAL
DISPENSE STATUS: NORMAL
EOSINOPHIL # BLD AUTO: 0.3 K/UL (ref 0–0.5)
EOSINOPHIL NFR BLD: 1.5 % (ref 0–8)
ERYTHROCYTE [DISTWIDTH] IN BLOOD BY AUTOMATED COUNT: 15 % (ref 11.5–14.5)
EST. GFR  (AFRICAN AMERICAN): >60 ML/MIN/1.73 M^2
EST. GFR  (NON AFRICAN AMERICAN): >60 ML/MIN/1.73 M^2
GLUCOSE SERPL-MCNC: 187 MG/DL (ref 70–110)
GLUCOSE SERPL-MCNC: 209 MG/DL (ref 70–110)
GLUCOSE SERPL-MCNC: 210 MG/DL (ref 70–110)
GLUCOSE SERPL-MCNC: 226 MG/DL (ref 70–110)
HCO3 UR-SCNC: 24.8 MMOL/L (ref 24–28)
HCO3 UR-SCNC: 25.4 MMOL/L (ref 24–28)
HCO3 UR-SCNC: 28.1 MMOL/L (ref 24–28)
HCT VFR BLD AUTO: 31.3 % (ref 40–54)
HCT VFR BLD CALC: 38 %PCV (ref 36–54)
HCT VFR BLD CALC: 43 %PCV (ref 36–54)
HCT VFR BLD CALC: 44 %PCV (ref 36–54)
HGB BLD-MCNC: 10.2 G/DL (ref 14–18)
IMM GRANULOCYTES # BLD AUTO: 0.12 K/UL (ref 0–0.04)
IMM GRANULOCYTES NFR BLD AUTO: 0.6 % (ref 0–0.5)
INR PPP: 1.1 (ref 0.8–1.2)
LYMPHOCYTES # BLD AUTO: 1.9 K/UL (ref 1–4.8)
LYMPHOCYTES NFR BLD: 9.4 % (ref 18–48)
MAGNESIUM SERPL-MCNC: 2 MG/DL (ref 1.6–2.6)
MCH RBC QN AUTO: 29.8 PG (ref 27–31)
MCHC RBC AUTO-ENTMCNC: 32.6 G/DL (ref 32–36)
MCV RBC AUTO: 92 FL (ref 82–98)
MONOCYTES # BLD AUTO: 1.5 K/UL (ref 0.3–1)
MONOCYTES NFR BLD: 7.5 % (ref 4–15)
NEUTROPHILS # BLD AUTO: 16.1 K/UL (ref 1.8–7.7)
NEUTROPHILS NFR BLD: 80.4 % (ref 38–73)
NRBC BLD-RTO: 0 /100 WBC
PCO2 BLDA: 44.7 MMHG (ref 35–45)
PCO2 BLDA: 45.2 MMHG (ref 35–45)
PCO2 BLDA: 53.8 MMHG (ref 35–45)
PH SMN: 7.28 [PH] (ref 7.35–7.45)
PH SMN: 7.35 [PH] (ref 7.35–7.45)
PH SMN: 7.41 [PH] (ref 7.35–7.45)
PHOSPHATE SERPL-MCNC: 3.7 MG/DL (ref 2.7–4.5)
PLATELET # BLD AUTO: 333 K/UL (ref 150–450)
PMV BLD AUTO: 9.8 FL (ref 9.2–12.9)
PO2 BLDA: 106 MMHG (ref 80–100)
PO2 BLDA: 396 MMHG (ref 80–100)
PO2 BLDA: 91 MMHG (ref 80–100)
POC BE: -1 MMOL/L
POC BE: -1 MMOL/L
POC BE: 3 MMOL/L
POC IONIZED CALCIUM: 1.13 MMOL/L (ref 1.06–1.42)
POC IONIZED CALCIUM: 1.17 MMOL/L (ref 1.06–1.42)
POC IONIZED CALCIUM: 1.34 MMOL/L (ref 1.06–1.42)
POC SATURATED O2: 100 % (ref 95–100)
POC SATURATED O2: 96 % (ref 95–100)
POC SATURATED O2: 98 % (ref 95–100)
POC TCO2: 26 MMOL/L (ref 23–27)
POC TCO2: 27 MMOL/L (ref 23–27)
POC TCO2: 29 MMOL/L (ref 23–27)
POCT GLUCOSE: 195 MG/DL (ref 70–110)
POCT GLUCOSE: 200 MG/DL (ref 70–110)
POCT GLUCOSE: 213 MG/DL (ref 70–110)
POCT GLUCOSE: 217 MG/DL (ref 70–110)
POTASSIUM BLD-SCNC: 3.9 MMOL/L (ref 3.5–5.1)
POTASSIUM BLD-SCNC: 4.1 MMOL/L (ref 3.5–5.1)
POTASSIUM BLD-SCNC: 4.2 MMOL/L (ref 3.5–5.1)
POTASSIUM SERPL-SCNC: 4.3 MMOL/L (ref 3.5–5.1)
PROT SERPL-MCNC: 6 G/DL (ref 6–8.4)
PROTHROMBIN TIME: 11.4 SEC (ref 9–12.5)
RBC # BLD AUTO: 3.42 M/UL (ref 4.6–6.2)
SAMPLE: ABNORMAL
SODIUM BLD-SCNC: 133 MMOL/L (ref 136–145)
SODIUM BLD-SCNC: 133 MMOL/L (ref 136–145)
SODIUM BLD-SCNC: 134 MMOL/L (ref 136–145)
SODIUM SERPL-SCNC: 131 MMOL/L (ref 136–145)
TRANS ERYTHROCYTES VOL PATIENT: NORMAL ML
TRANS ERYTHROCYTES VOL PATIENT: NORMAL ML
WBC # BLD AUTO: 20.07 K/UL (ref 3.9–12.7)

## 2022-07-20 PROCEDURE — 97165 OT EVAL LOW COMPLEX 30 MIN: CPT

## 2022-07-20 PROCEDURE — 93010 EKG 12-LEAD: ICD-10-PCS | Mod: ,,, | Performed by: INTERNAL MEDICINE

## 2022-07-20 PROCEDURE — 99232 SBSQ HOSP IP/OBS MODERATE 35: CPT | Mod: ,,, | Performed by: NURSE PRACTITIONER

## 2022-07-20 PROCEDURE — 85730 THROMBOPLASTIN TIME PARTIAL: CPT

## 2022-07-20 PROCEDURE — 93010 ELECTROCARDIOGRAM REPORT: CPT | Mod: ,,, | Performed by: INTERNAL MEDICINE

## 2022-07-20 PROCEDURE — 25000003 PHARM REV CODE 250: Performed by: THORACIC SURGERY (CARDIOTHORACIC VASCULAR SURGERY)

## 2022-07-20 PROCEDURE — 63600175 PHARM REV CODE 636 W HCPCS

## 2022-07-20 PROCEDURE — 25000003 PHARM REV CODE 250

## 2022-07-20 PROCEDURE — 80053 COMPREHEN METABOLIC PANEL: CPT

## 2022-07-20 PROCEDURE — 99900035 HC TECH TIME PER 15 MIN (STAT)

## 2022-07-20 PROCEDURE — 97530 THERAPEUTIC ACTIVITIES: CPT

## 2022-07-20 PROCEDURE — 97116 GAIT TRAINING THERAPY: CPT

## 2022-07-20 PROCEDURE — 93005 ELECTROCARDIOGRAM TRACING: CPT

## 2022-07-20 PROCEDURE — 84100 ASSAY OF PHOSPHORUS: CPT

## 2022-07-20 PROCEDURE — 99291 PR CRITICAL CARE, E/M 30-74 MINUTES: ICD-10-PCS | Mod: ,,, | Performed by: ANESTHESIOLOGY

## 2022-07-20 PROCEDURE — 99232 PR SUBSEQUENT HOSPITAL CARE,LEVL II: ICD-10-PCS | Mod: ,,, | Performed by: NURSE PRACTITIONER

## 2022-07-20 PROCEDURE — 27000221 HC OXYGEN, UP TO 24 HOURS

## 2022-07-20 PROCEDURE — 25000003 PHARM REV CODE 250: Performed by: STUDENT IN AN ORGANIZED HEALTH CARE EDUCATION/TRAINING PROGRAM

## 2022-07-20 PROCEDURE — 63600175 PHARM REV CODE 636 W HCPCS: Performed by: THORACIC SURGERY (CARDIOTHORACIC VASCULAR SURGERY)

## 2022-07-20 PROCEDURE — 99291 CRITICAL CARE FIRST HOUR: CPT | Mod: ,,, | Performed by: ANESTHESIOLOGY

## 2022-07-20 PROCEDURE — 85610 PROTHROMBIN TIME: CPT

## 2022-07-20 PROCEDURE — 85025 COMPLETE CBC W/AUTO DIFF WBC: CPT

## 2022-07-20 PROCEDURE — 83735 ASSAY OF MAGNESIUM: CPT

## 2022-07-20 PROCEDURE — 94761 N-INVAS EAR/PLS OXIMETRY MLT: CPT

## 2022-07-20 PROCEDURE — 97535 SELF CARE MNGMENT TRAINING: CPT

## 2022-07-20 PROCEDURE — 20600001 HC STEP DOWN PRIVATE ROOM

## 2022-07-20 RX ORDER — FUROSEMIDE 10 MG/ML
40 INJECTION INTRAMUSCULAR; INTRAVENOUS 2 TIMES DAILY
Status: DISCONTINUED | OUTPATIENT
Start: 2022-07-20 | End: 2022-07-22

## 2022-07-20 RX ORDER — FUROSEMIDE 10 MG/ML
40 INJECTION INTRAMUSCULAR; INTRAVENOUS ONCE
Status: COMPLETED | OUTPATIENT
Start: 2022-07-20 | End: 2022-07-20

## 2022-07-20 RX ORDER — METOPROLOL SUCCINATE 50 MG/1
50 TABLET, EXTENDED RELEASE ORAL DAILY
Status: DISCONTINUED | OUTPATIENT
Start: 2022-07-20 | End: 2022-07-21

## 2022-07-20 RX ORDER — FUROSEMIDE 10 MG/ML
20 INJECTION INTRAMUSCULAR; INTRAVENOUS 2 TIMES DAILY
Status: DISCONTINUED | OUTPATIENT
Start: 2022-07-20 | End: 2022-07-20

## 2022-07-20 RX ORDER — INSULIN ASPART 100 [IU]/ML
8-12 INJECTION, SOLUTION INTRAVENOUS; SUBCUTANEOUS
Status: DISCONTINUED | OUTPATIENT
Start: 2022-07-20 | End: 2022-07-21

## 2022-07-20 RX ORDER — POTASSIUM CHLORIDE 20 MEQ/1
20 TABLET, EXTENDED RELEASE ORAL DAILY
Status: DISCONTINUED | OUTPATIENT
Start: 2022-07-20 | End: 2022-07-23 | Stop reason: HOSPADM

## 2022-07-20 RX ORDER — INSULIN ASPART 100 [IU]/ML
6-10 INJECTION, SOLUTION INTRAVENOUS; SUBCUTANEOUS
Status: DISCONTINUED | OUTPATIENT
Start: 2022-07-20 | End: 2022-07-20

## 2022-07-20 RX ADMIN — AMIODARONE HYDROCHLORIDE 400 MG: 200 TABLET ORAL at 04:07

## 2022-07-20 RX ADMIN — FUROSEMIDE 40 MG: 10 INJECTION, SOLUTION INTRAMUSCULAR; INTRAVENOUS at 07:07

## 2022-07-20 RX ADMIN — AMIODARONE HYDROCHLORIDE 1 MG/MIN: 1.8 INJECTION, SOLUTION INTRAVENOUS at 05:07

## 2022-07-20 RX ADMIN — FUROSEMIDE 40 MG: 10 INJECTION, SOLUTION INTRAMUSCULAR; INTRAVENOUS at 04:07

## 2022-07-20 RX ADMIN — INSULIN ASPART 4 UNITS: 100 INJECTION, SOLUTION INTRAVENOUS; SUBCUTANEOUS at 06:07

## 2022-07-20 RX ADMIN — ENOXAPARIN SODIUM 40 MG: 100 INJECTION SUBCUTANEOUS at 04:07

## 2022-07-20 RX ADMIN — AMIODARONE HYDROCHLORIDE 400 MG: 200 TABLET ORAL at 08:07

## 2022-07-20 RX ADMIN — ACETAMINOPHEN 1000 MG: 500 TABLET ORAL at 03:07

## 2022-07-20 RX ADMIN — AMIODARONE HYDROCHLORIDE 150 MG: 1.5 INJECTION, SOLUTION INTRAVENOUS at 04:07

## 2022-07-20 RX ADMIN — ASPIRIN 325 MG ORAL TABLET 325 MG: 325 PILL ORAL at 08:07

## 2022-07-20 RX ADMIN — OXYCODONE HYDROCHLORIDE 10 MG: 10 TABLET ORAL at 08:07

## 2022-07-20 RX ADMIN — ATORVASTATIN CALCIUM 40 MG: 20 TABLET, FILM COATED ORAL at 08:07

## 2022-07-20 RX ADMIN — INSULIN ASPART 6 UNITS: 100 INJECTION, SOLUTION INTRAVENOUS; SUBCUTANEOUS at 12:07

## 2022-07-20 RX ADMIN — INSULIN ASPART 8 UNITS: 100 INJECTION, SOLUTION INTRAVENOUS; SUBCUTANEOUS at 06:07

## 2022-07-20 RX ADMIN — MUPIROCIN: 20 OINTMENT TOPICAL at 08:07

## 2022-07-20 RX ADMIN — CLINDAMYCIN IN 5 PERCENT DEXTROSE 900 MG: 18 INJECTION, SOLUTION INTRAVENOUS at 12:07

## 2022-07-20 RX ADMIN — OXYCODONE HYDROCHLORIDE 10 MG: 10 TABLET ORAL at 04:07

## 2022-07-20 RX ADMIN — OXYCODONE HYDROCHLORIDE 10 MG: 10 TABLET ORAL at 12:07

## 2022-07-20 RX ADMIN — DOCUSATE SODIUM 100 MG: 100 CAPSULE ORAL at 08:07

## 2022-07-20 RX ADMIN — INSULIN ASPART 2 UNITS: 100 INJECTION, SOLUTION INTRAVENOUS; SUBCUTANEOUS at 08:07

## 2022-07-20 RX ADMIN — METOPROLOL SUCCINATE 50 MG: 50 TABLET, EXTENDED RELEASE ORAL at 06:07

## 2022-07-20 RX ADMIN — FUROSEMIDE 20 MG: 10 INJECTION, SOLUTION INTRAMUSCULAR; INTRAVENOUS at 08:07

## 2022-07-20 RX ADMIN — SODIUM PHOSPHATE, MONOBASIC, MONOHYDRATE 30 MMOL: 276; 142 INJECTION, SOLUTION INTRAVENOUS at 01:07

## 2022-07-20 RX ADMIN — INSULIN HUMAN 1.5 UNITS/HR: 1 INJECTION, SOLUTION INTRAVENOUS at 10:07

## 2022-07-20 RX ADMIN — INSULIN ASPART 4 UNITS: 100 INJECTION, SOLUTION INTRAVENOUS; SUBCUTANEOUS at 02:07

## 2022-07-20 RX ADMIN — ACETAMINOPHEN 1000 MG: 500 TABLET ORAL at 10:07

## 2022-07-20 RX ADMIN — ACETAMINOPHEN 1000 MG: 500 TABLET ORAL at 05:07

## 2022-07-20 RX ADMIN — INSULIN ASPART 2 UNITS: 100 INJECTION, SOLUTION INTRAVENOUS; SUBCUTANEOUS at 12:07

## 2022-07-20 RX ADMIN — MILRINONE LACTATE IN DEXTROSE 0.12 MCG/KG/MIN: 200 INJECTION, SOLUTION INTRAVENOUS at 04:07

## 2022-07-20 RX ADMIN — INSULIN ASPART 5 UNITS: 100 INJECTION, SOLUTION INTRAVENOUS; SUBCUTANEOUS at 08:07

## 2022-07-20 RX ADMIN — POTASSIUM CHLORIDE 20 MEQ: 1500 TABLET, EXTENDED RELEASE ORAL at 08:07

## 2022-07-20 NOTE — SUBJECTIVE & OBJECTIVE
Interval History/Significant Events: JUAQUIN. Returned to AFRVR this am after moving to chair with associated dizziness. Amio bolus given x1 with return to NSR.     Follow-up For: Procedure(s) (LRB):  CORONARY ARTERY BYPASS GRAFT (CABG) (Left)  HARVEST-VEIN-ENDOVASCULAR (Left)    Post-Operative Day: 2 Days Post-Op    Objective:     Vital Signs (Most Recent):  Temp: 97.9 °F (36.6 °C) (07/20/22 0730)  Pulse: 73 (07/20/22 1100)  Resp: (!) 27 (07/20/22 1100)  BP: 110/76 (07/20/22 1100)  SpO2: (!) 92 % (07/20/22 1100)   Vital Signs (24h Range):  Temp:  [97.5 °F (36.4 °C)-100.22 °F (37.9 °C)] 97.9 °F (36.6 °C)  Pulse:  [] 73  Resp:  [18-42] 27  SpO2:  [88 %-95 %] 92 %  BP: (101-153)/(63-97) 110/76  Arterial Line BP: ()/() 101/65     Weight: 126.6 kg (279 lb 1.6 oz)  Body mass index is 37.85 kg/m².      Intake/Output Summary (Last 24 hours) at 7/20/2022 1127  Last data filed at 7/20/2022 1100  Gross per 24 hour   Intake 2299.13 ml   Output 1975 ml   Net 324.13 ml       Physical Exam  Vitals reviewed.   Constitutional:       General: He is not in acute distress.     Appearance: He is not ill-appearing.   HENT:      Head: Normocephalic and atraumatic.   Eyes:      Extraocular Movements: Extraocular movements intact.      Conjunctiva/sclera: Conjunctivae normal.   Cardiovascular:      Rate and Rhythm: Regular rhythm. Tachycardia present.      Comments: Midline island dressing C/D/I.  Mediastinal tubes x2, L pleural to suction   Ventricular Pacing wires present  Pulmonary:      Effort: Pulmonary effort is normal. No respiratory distress.      Comments: On 5L by facemask  Genitourinary:     Comments: Lal  Skin:     General: Skin is warm and dry.   Neurological:      General: No focal deficit present.      Mental Status: He is alert and oriented to person, place, and time.       Vents:  Vent Mode: Spont (07/18/22 2301)  Ventilator Initiated: Yes (07/18/22 1117)  Set Rate: 18 BPM (07/18/22 1601)  Vt Set: 530 mL  (07/18/22 1601)  Pressure Support: 5 cmH20 (07/18/22 2301)  PEEP/CPAP: 8 cmH20 (07/18/22 2301)  Oxygen Concentration (%): 100 (07/19/22 2030)  Peak Airway Pressure: 14 cmH2O (07/18/22 2301)  Plateau Pressure: 26 cmH20 (07/18/22 2301)  Total Ve: 1.15 mL (07/18/22 2301)  Negative Inspiratory Force (cm H2O): 0 (07/18/22 2301)  F/VT Ratio<105 (RSBI): (!) 32.21 (07/18/22 2110)    Lines/Drains/Airways       Central Venous Catheter Line  Duration              Introducer with Double Lumen 07/18/22 0800 right internal jugular 2 days    Percutaneous Central Line Insertion/Assessment - Triple Lumen  07/18/22 0800 right internal jugular 2 days              Drain  Duration                  Chest Tube 07/18/22 0958 3 Left Pleural 19 Fr. 2 days         Urethral Catheter 07/18/22 0727 Non-latex;Straight-tip;Temperature probe 14 Fr. 2 days         Y Chest Tube 1 and 2 07/18/22 0958 1 Right Mediastinal 19 Fr. 2 Left Mediastinal 19 Fr. 2 days              Arterial Line  Duration             Arterial Line 07/18/22 0944 Left Radial 2 days              Line  Duration                  Pacer Wires 07/18/22 0946 2 days              Peripheral Intravenous Line  Duration                  Peripheral IV - Single Lumen 07/14/22 0553 18 G Left;Posterior Forearm 6 days         Peripheral IV - Single Lumen 07/14/22 2350 20 G Anterior;Left;Proximal Forearm 5 days                    Significant Labs:    CBC/Anemia Profile:  Recent Labs   Lab 07/18/22 2111 07/18/22 2223 07/19/22 0233 07/19/22  0542 07/20/22  0237   WBC 17.42*  --  17.44*  --  20.07*   HGB 10.6*  --  10.5*  --  10.2*   HCT 31.6*   < > 31.3* 35* 31.3*     --  315  --  333   MCV 90  --  90  --  92   RDW 14.6*  --  14.5  --  15.0*    < > = values in this interval not displayed.        Chemistries:  Recent Labs   Lab 07/18/22 2111 07/19/22 0233 07/19/22  2220 07/20/22 0237   * 133*  --  131*   K 4.0 4.2 4.5 4.3    104  --  98   CO2 16* 19*  --  22*   BUN 8 8  --   13   CREATININE 0.8 0.7  --  0.7   CALCIUM 8.6* 8.6*  --  8.6*   ALBUMIN 3.7 3.5  --  3.0*   PROT 6.4 6.2  --  6.0   BILITOT 0.3 0.3  --  0.7   ALKPHOS 47* 44*  --  54*   ALT 17 15  --  17   AST 20 22  --  24   MG 2.2 2.0 2.1 2.0   PHOS 2.6* 3.4 2.7 3.7       All pertinent labs within the past 24 hours have been reviewed.    Significant Imaging:  I have reviewed all pertinent imaging results/findings within the past 24 hours.

## 2022-07-20 NOTE — PROGRESS NOTES
[facility] - Surgical Intensive Care  Endocrinology  Progress Note    Admit Date: 2022     Reason for Consult: Management of T2DM, Hyperglycemia     Surgical Procedure and Date: CABG on 22    Diabetes diagnosis year: ODETTE (intubated)     Home Diabetes Medications:  Metformin (per chart review) unsure of dose    How often checking glucose at home?  ODETTE    BG readings on regimen: ODETTE  Hypoglycemia on the regimen?  ODETTE  Missed doses on regimen? ODETTE    Diabetes Complications include:     Hyperglycemia    Complicating diabetes co morbidities:   History of MI, CHF, HTN, HLD       HPI:   Patient is a 62 y.o. male with a diagnosis of HTN, HLD, DM2, aortic dilatation, CHF (EF 40-45%), and BMI 38 who was admitted to [facility] for NSTEMI.  He was found to have multi-vessel coronary disease on C with LAD with 90% ostial to prox and 70% mid LAD, D1 70%, D2 60%, ost to prox LCX with 99% stenosis. Cardiothoracic surgery was consulted and recommended to proceed with CABG this admission. He now presents for the above procedure. Endocrinology consulted for management of T2DM.      Lab Results   Component Value Date    HGBA1C 7.6 (H) 2022           Interval HPI:   Overnight events: Remains in SICU. POD 2. BG at higher end or slightly above goal ranges on current IV/SQ insulin regimen. Diet diabetic [facility]; 1500 Calorie; Isolation Tray - Regular [place]; Fluid - 1500mL  Eatin%  Nausea: No  Hypoglycemia and intervention: No  Fever: No  TPN and/or TF: No  If yes, type of TF/TPN and rate: n/a    BP (!) 122/92 (BP Location: Right arm, Patient Position: Sitting)   Pulse 85   Temp 97.9 °F (36.6 °C) (Oral)   Resp (!) 32   Ht 6' (1.829 m)   Wt 126.6 kg (279 lb 1.6 oz)   SpO2 (!) 92%   BMI 37.85 kg/m²     Labs Reviewed and Include    Recent Labs   Lab 22  0237   *   CALCIUM 8.6*   ALBUMIN 3.0*   PROT 6.0   *   K 4.3   CO2 22*   CL 98   BUN 13   CREATININE 0.7   ALKPHOS 54*   ALT 17   AST 24    BILITOT 0.7     Lab Results   Component Value Date    WBC 20.07 (H) 07/20/2022    HGB 10.2 (L) 07/20/2022    HCT 31.3 (L) 07/20/2022    MCV 92 07/20/2022     07/20/2022     Recent Labs   Lab 07/13/22  1825   TSH 2.840     Lab Results   Component Value Date    HGBA1C 7.6 (H) 07/12/2022       Nutritional status:   Body mass index is 37.85 kg/m².  Lab Results   Component Value Date    ALBUMIN 3.0 (L) 07/20/2022    ALBUMIN 3.5 07/19/2022    ALBUMIN 3.7 07/18/2022     No results found for: PREALBUMIN    Estimated Creatinine Clearance: 148.5 mL/min (based on SCr of 0.7 mg/dL).    Accu-Checks  Recent Labs     07/19/22  0825 07/19/22  1004 07/19/22  1005 07/19/22  1200 07/19/22  1316 07/19/22  1434 07/19/22  1715 07/19/22  2224 07/20/22  0236 07/20/22  0737   POCTGLUCOSE 149* <20* 224* 172* 161* 189* 174* 205* 217* 195*       Current Medications and/or Treatments Impacting Glycemic Control  Immunotherapy:    Immunosuppressants       None          Steroids:   Hormones (From admission, onward)                Start     Stop Route Frequency Ordered    07/19/22 1833  melatonin tablet 6 mg         -- Oral Nightly PRN 07/19/22 1733          Pressors:    Autonomic Drugs (From admission, onward)                None          Hyperglycemia/Diabetes Medications:   Antihyperglycemics (From admission, onward)                Start     Stop Route Frequency Ordered    07/19/22 1645  insulin aspart U-100 pen 5-8 Units         -- SubQ 3 times daily with meals 07/19/22 1424    07/19/22 1530  insulin regular in 0.9 % NaCl 100 unit/100 mL (1 unit/mL) infusion        Question:  Enter initial dose (Units/hr):  Answer:  2    -- IV Continuous 07/19/22 1424    07/19/22 1524  insulin aspart U-100 pen 0-10 Units         -- SubQ As needed (PRN) 07/19/22 1424            ASSESSMENT and PLAN    * Atherosclerotic heart disease of native coronary artery with unstable angina pectoris  Managed per primary team  S/p CABG        Type 2 diabetes mellitus  with hyperglycemia, without long-term current use of insulin  BG goal 110-140 (CTS protocol)   Diet diabetic [facility]; 1500 Calorie; Isolation Tray - Regular [place]; Fluid - 1500mL    Continue Transition IV insulin infusion at 2 u/hr with stepdown parameters (rate based off of current IV insulin requirements)  Increase Novolog to 8-12 units TID with meals (based on prn correction scale requirements)   Moderate Dose Correction Scale   BG monitoring ac/hs/0200    ** Please call Endocrine for any BG related issues **    ** Please notify Endocrine for any change and/or advance in diet**    Discharge planning: TBD    Lab Results   Component Value Date    HGBA1C 7.6 (H) 07/12/2022         S/P CABG (coronary artery bypass graft)  Managed per primary team  Optimize BG control        HLD (hyperlipidemia)  May increase insulin resistance.         Class 2 severe obesity due to excess calories with serious comorbidity and body mass index (BMI) of 37.0 to 37.9 in adult  Body mass index is 37.85 kg/m².  May increase insulin resistance.             Viktoriya Dougherty NP  Endocrinology  [facility] - Surgical Intensive Care

## 2022-07-20 NOTE — PT/OT/SLP EVAL
"Occupational Therapy   Evaluation and Treatment with PT  Co-treat performed due to acuity and complexity of pt's medical status with 2 skilled disciplines needed to optimize pts functional performance in ICU setting.    Name: Isaiah Dorsey Jr.  MRN: [id number]  Admitting Diagnosis:  Atherosclerotic heart disease of native coronary artery with unstable angina pectoris 2 Days Post-Op  Length of Stay: 9 days    Recommendations:     Discharge Recommendations: home health OT  Discharge Equipment Recommendations:  none  Barriers to discharge:  None    Plan:     Patient to be seen 5 x/week to address the above listed problems via self-care/home management, therapeutic activities, therapeutic exercises  · Plan of Care Expires: 08/19/22  · Plan of Care Reviewed with: patient, spouse    Assessment:     Isaiah Dorsey Jr. is a 62 y.o. male with a medical diagnosis of Atherosclerotic heart disease of native coronary artery with unstable angina pectoris.  He presents with the following performance deficits affecting function: weakness, impaired endurance, impaired self care skills, impaired functional mobility, gait instability, impaired balance, decreased upper extremity function, decreased lower extremity function, decreased safety awareness, pain, impaired cardiopulmonary response to activity.      Rehab Prognosis: Good; patient would benefit from acute skilled OT services to address these deficits and reach maximum level of function.       Subjective   Communicated with: RN prior to session.  Patient found up in chair with arterial line, blood pressure cuff, chest tube, soler catheter, oxygen, peripheral IV, pulse ox (continuous), telemetry, central line upon OT entry to room.    Chief Complaint: Chest Pain (BEGAN YESTERDAY, diaphoretic in triage )    Patient/Family Comments/goals: "aren't we going to walk?"     Pain/Comfort:  · Pain Rating 1: 2/10  · Location - Orientation 1: midline  · Location 1: chest  · Pain Addressed 1: " Reposition, Distraction, Cessation of Activity, Pre-medicate for activity  · Pain Rating Post-Intervention 1: 0/10    Patients cultural, spiritual, Rastafari conflicts given the current situation: no    Occupational Profile:  Living Environment: pt lives with spouse in 2STH, 2STE, no HR, walk in shower. Steps to 2nd floor, 10 w RHR.   Prior Level of Function: Patient reports being IND with mobility & with ADLs.   Patient uses DME as follows: none.   DME owned (not currently used): none.  Roles/Repsonsibilities:   Hand Dominance: right   Work: yes, .   Drive: yes.   Managing Medicines/Managing Home: yes.   Hobbies:  Enjoys being active and independent.  Equipment Used at Home:  none    Patient reports they will have LIMITED PHYSICAL assistance from spouse upon discharge. But spouse able to be present 24/7.       Objective:     Patient found with: arterial line, blood pressure cuff, chest tube, soler catheter, oxygen, peripheral IV, pulse ox (continuous), telemetry, central line   General Precautions: Standard, Cardiac fall, sternal   Orthopedic Precautions:N/A   Braces: N/A   Respiratory Status:    Nasal cannula, flow 5-6 L/min  Vitals: /76 (BP Location: Right arm, Patient Position: Sitting)   Pulse 85   Temp 98.1 °F (36.7 °C) (Oral)   Resp (!) 30   Ht 6' (1.829 m)   Wt 126.6 kg (279 lb 1.6 oz)   SpO2 95%   BMI 37.85 kg/m²     Cognitive and Psychosocial Function:   · AxOx4 --    · Follows Commands/attention:easily distracted by dyspnea on exertion and follows two-step commands  · Communication:  clear/fluent  · Memory: No Deficits noted  · Safety awareness/insight to disability: intact   · Mood/Affect/Coping skills/emotional control: Cooperative    Hearing: Impaired: mild [place]    Vision:  Intact visual fields    Physical Exam:  · Pt seated in chair upon entry    Functional Mobility/Transfers:   Patient completed Sit <> Stand Transfer with minimum assistance  with  hand-held assist   o x2  trials from bedside chair and from bedside commode   Static Standing Balance: CGA w HH assist  o Pt unable to complete standing ADLs 2* increased respiratory rate, vitals otherwise stable  o Pt assisted to bedside commode at sink level for seated ADLs   Dynamic Standing Balance: CGA w B HH assist   Patient completed Bed <> Chair Transfer using Step Transfer technique with contact guard assistance with hand-held assist   Functional mobility: Pt ambulated bathroom distance with CGA w HH assist to complete grooming in standing at sink, 2* increased dyspnea and audible wheezing, pt assisted to seated rest break at commode to complete ADLs. Pt then ambulated within room household distance with CGA w HH assist, pt with noted shuffled gait, impaired endurance and decreased dynamic balance.      Activities of Daily Living:  · Feeding:  modified independence set up seated in chair  · Grooming: contact guard assistance seated at sink level on bedside commode, pt unable to tolerate completing in standing  · Lower Body Dressing: maximal assistance donning socks seated in chair 2* midline pain  · Toileting:  Lal in place      AMPAC 6 Click ADL:  AMPAC Total Score: 19    Treatment & Education:  -Sternal precautions and application to functional tasks / ADLs reviewed:  including no lifting > 5 lbs, pulling or pushing with BUEs; Modifying daily activities and functional mobility tasks to maintain sternal precautions appropriately; Importance of participation in therapy and engaging in increased OOB mobility with assistance to improve endurance  -OT POC, safety during ADLs and mobility   -Education on energy conservation and task modification to maximize safety and independence  -Questions answered within OT scope of practice.      Patient left up in chair with all lines intact, call button in reach, RN notified and family present    GOALS:   Multidisciplinary Problems     Occupational Therapy Goals        Problem:  Occupational Therapy    Goal Priority Disciplines Outcome Interventions   Occupational Therapy Goal     OT, PT/OT Ongoing, Progressing    Description: Goals set on 7/20 with expiration date 8/3:  Patient will increase functional independence with ADLs by performing:    Supine <> Sit with [place] adhering to sternal precautions.  Grooming while standing at sink with Mod [place].  UB Dressing with Mod [place].  LB Dressing with Mod [place].  Step transfer with Mod [place] with DME as needed.  Pt will demonstrate understanding of education provided regarding energy conservation and task modification through teach-back method.   Pt will demonstrate understanding of education provided regarding sternal precautions and application to functional tasks / ADLs reviewed:  including no lifting > 5 lbs, pulling or pushing with BUEs; Modifying daily activities and functional mobility tasks to maintain sternal precautions appropriately                        History:     Past Medical History:   Diagnosis Date    Diabetes mellitus type 2, uncontrolled, without complications     6.7% Metf 1 g qd, eye 2015, [facility],     Elevated platelet count 7/23/2015    H/O splenectomy 4/24/2015    doesn't like pneumovax bc caused side effects    HLD (hyperlipidemia) 1/22/2015    goal LDL < 100, reluctant to take statin    HTN (hypertension), benign 1/22/2015    losartan 100    Morbid obesity with BMI of 45.0-49.9, adult 6/11/2014    Polyp of transverse colon 9/5/2018 2018, repeat 2023    Severe uncontrolled hypertension 6/11/2014       Past Surgical History:   Procedure Laterality Date    AORTOGRAPHY N/A 7/11/2022    Procedure: AORTOGRAM;  Surgeon: Bjorn Cheatham MD;  Location: [facility] CATH LAB;  Service: Cardiology;  Laterality: N/A;    CORONARY ARTERY BYPASS GRAFT (CABG) Left 7/18/2022    Procedure: CORONARY ARTERY BYPASS GRAFT (CABG);  Surgeon: Blu Rhodes MD;  [facility];   Service: Cardiovascular;  Laterality: Left;  CABG x 1 (LIMA to LAD)    ENDOSCOPIC HARVEST OF VEIN Left 7/18/2022    Procedure: HARVEST-VEIN-ENDOVASCULAR;  Surgeon: Blu Rhodes MD;  Location: [address];  Service: Cardiovascular;  Laterality: Left;  Vein [place] Start time: 0823am  Vein [place] Stop time: 0836am    Vein [place] Start time: 0848am  Vein [place] Stop time: 0904am    LEFT HEART CATHETERIZATION N/A 7/11/2022    Procedure: Left heart cath;  Surgeon: Bjorn Cheatham MD;  [facility];  Service: Cardiology;  Laterality: N/A;    splenecectomy         Time Tracking:       OT Date of Treatment: 07/20/22  OT Start Time: 0913  OT Stop Time: 0953  OT Total Time (min): 40 min  Additional staff present: PT      Billable Minutes:Evaluation 10  Self Care/Home Management 30      7/20/2022

## 2022-07-20 NOTE — PROGRESS NOTES
Study name: Anticoagulation for New-Onset Post-Operative Atrial Fibrillation after CABG- PACES    IRB:  2019.273    PI: Dr. JENARO Holguin    Study Visit: Initial Contact    Who was Present:  Patient, family member, Corrinamimi Curtis, CRC and JUDY Espinoza     07/20/2022: I met with the patient to introduce the PACES study and ask if the patient would be interested in participating in a clinical research trial.     Patient informed that he will be followed daily by the research team postoperatively to assess for atrial fibrillation. Patient and family member were informed that randomization to either a therapy of antiplatelet with an oral anticoagulation medication OR antiplatelet therapy without an oral anticoagulation medication will take place after a protocol defined episode atrial fibrillation has occurred.  He verbalized understanding.    Oral anticoagulation regimen to be ordered at time of randomization as chosen per physician: Dr. Rhodes     Education provided:  Protocol, required testing/procedures, and follow-up requirements/schedule.     Materials dispensed: Coordinator contact information, study brochure, and copy of the ICF.

## 2022-07-20 NOTE — NURSING
When Pt was OOBTC this AM, Pt noted to be in Afib w/RVR on monitor. Notfied CTS, Erika Fiore and John at bedside. EKG obtained and Amio bolus administered. Pt transitioned to Amio gtt 1mg/min.

## 2022-07-20 NOTE — ASSESSMENT & PLAN NOTE
BG goal 110-140 (CTS protocol)   Diet diabetic [facility]; 1500 Calorie; Isolation Tray - Regular [place]; Fluid - 1500mL    Continue Transition IV insulin infusion at 2 u/hr with stepdown parameters (rate based off of current IV insulin requirements)  Increase Novolog to 8-12 units TID with meals (based on prn correction scale requirements)   Moderate Dose Correction Scale   BG monitoring ac/hs/0200    ** Please call Endocrine for any BG related issues **    ** Please notify Endocrine for any change and/or advance in diet**    Discharge planning: TBD    Lab Results   Component Value Date    HGBA1C 7.6 (H) 07/12/2022

## 2022-07-20 NOTE — PLAN OF CARE
Problem: Occupational Therapy  Goal: Occupational Therapy Goal  Description: Goals set on 7/20 with expiration date 8/3:  Patient will increase functional independence with ADLs by performing:    Supine <> Sit with [place] adhering to sternal precautions.  Grooming while standing at sink with Mod [place].  UB Dressing with Mod [place].  LB Dressing with Mod [place].  Step transfer with Mod [place] with DME as needed.  Pt will demonstrate understanding of education provided regarding energy conservation and task modification through teach-back method.   Pt will demonstrate understanding of education provided regarding sternal precautions and application to functional tasks / ADLs reviewed:  including no lifting > 5 lbs, pulling or pushing with BUEs; Modifying daily activities and functional mobility tasks to maintain sternal precautions appropriately       Outcome: Ongoing, Progressing

## 2022-07-20 NOTE — PT/OT/SLP PROGRESS
"Physical Therapy Co-Treatment    Patient Name:  Isaiah Dorsey Jr.   MRN:  [id number]    Recommendations:     Discharge Recommendations:  home health PT   Discharge Equipment Recommendations: none   Barriers to discharge: Inaccessible home    Assessment:     Isaiah Dorsey Jr. is a 62 y.o. male admitted with a medical diagnosis of Atherosclerotic heart disease of native coronary artery with unstable angina pectoris. Patient tolerated session well. Demonstrated SOB with wheezing noted during mobility, provided rest breaks as needed.     He presents with the following impairments/functional limitations: weakness, impaired endurance, impaired self care skills, impaired functional mobility, gait instability, impaired balance, decreased upper extremity function, pain, decreased lower extremity function, impaired cardiopulmonary response to activity. Once medically stable, recommending pt discharge to home health PT.    Rehab Prognosis: Good; patient continues to benefit from acute skilled PT services to address these deficits and reach maximum level of function.  Recent Surgery: Procedure(s) (LRB):  CORONARY ARTERY BYPASS GRAFT (CABG) (Left)  HARVEST-VEIN-ENDOVASCULAR (Left) 2 Days Post-Op    Plan:     During this hospitalization, patient to be seen 5 x/week to address the identified rehab impairments via gait training, therapeutic activities, therapeutic exercises, neuromuscular re-education and progress toward the following goals:    · Plan of Care Expires:  08/19/22    Subjective     Chief Complaint: None verbalized  Patient/Family Comments/Goals: "Aren't we going to walk?"  Pain/Comfort:  · Pain Rating 1: 0/10    Objective:     Communicated with RN prior to session. Patient found up in chair with arterial line, blood pressure cuff, chest tube, soler catheter, oxygen, peripheral IV, pulse ox (continuous), telemetry, central line upon PT entry to room.     General Precautions: Standard, fall, sternal   Orthopedic " Precautions:N/A   Braces: N/A    Functional Mobility:  · Bed Mobility:     · Seated in chair at start of session and returned to chair  · Transfers:     · Sit to Stand: minimum assistance with hand-held assist, 2 trials  · Gait: Patient ambulated 2x10 ft, 20 ft with unilateral-bilateral hand-held assist and contact guard assistance. Patient demonstrates decreased step length, decreased foot clearance and decreased jose. Cued for increased step size. All lines remained intact throughout ambulation trial.  · Balance:   · Static Sitting: Good, supervision  · Dynamic Sitting: not assessed this visit  · Static Standing: Good, contact guard assistance  · Dynamic Standing: Fair: Patient accepts minimal challenge, contact guard assistance    AM-PAC 6 CLICK MOBILITY  Turning over in bed (including adjusting bedclothes, sheets and blankets)?: 2  Sitting down on and standing up from a chair with arms (e.g., wheelchair, bedside commode, etc.): 3  Moving from lying on back to sitting on the side of the bed?: 2  Moving to and from a bed to a chair (including a wheelchair)?: 3  Need to walk in hospital room?: 3  Climbing 3-5 steps with a railing?: 2  Basic Mobility Total Score: 15     Therapeutic Activities and Exercises:  Patient educated on role of acute care PT and PT POC and call light usage  Patient educated on Post-op sternal precautions, including no lifting > 5 lbs, pulling or pushing with BUEs. Patient able to voice 2/3 precautions without assistance.  Patient is clear to stand pivot transfer with RN/PCT, assist x1, clear for BSC  Educated about pursed lip breathing technique and cued for use throughout mobility    Patient left up in chair with all lines intact, call button in reach, RN notified and spouse present.    GOALS:   Multidisciplinary Problems     Physical Therapy Goals        Problem: Physical Therapy    Goal Priority Disciplines Outcome Goal Variances Interventions   Physical Therapy Goal     PT, PT/OT  Ongoing, Progressing     Description: Goals to be met by: 2022     Patient will increase functional independence with mobility by performin. Supine to sit with modified independence  2. Sit to supine with modified independence  3. Sit to stand transfer with modified independence  4. Gait  x 80 feet with supervision using platform rolling walker if needed  5. Ascend/descend 3 stair with no handrails supervision using no AD  6. Lower extremity exercise program x10 reps per handout, with independence                    Time Tracking:     PT Received On: 22  PT Start Time: 913     PT Stop Time: 953  PT Total Time (min): 40 min     Billable Minutes: Gait Training 15 min and Therapeutic Activity 25 min     Treatment Type: Evaluation  PT/PTA: PT     PTA Visit Number: 0     2022    Co-treatment performed for this visit due to patient need for two skilled therapists to ensure patient and staff safety and to accommodate for patient activity tolerance/pain management

## 2022-07-20 NOTE — PLAN OF CARE
SICU PLAN OF CARE NOTE    SHIFT EVENTS:  POC reviewed with patient and family; questions and concerns addressed. Phos repleted overnight. Prn oxy administered for pain relief. Pt noted in NS after amio bolus. Pt up in chair this AM. See flow sheets for full assessment details. Will continue to monitor patient closely.      Dx: Atherosclerotic heart disease of native coronary artery with unstable angina pectoris    Vital Signs: /70   Pulse 89   Temp 97.5 °F (36.4 °C) (Oral)   Resp (!) 23   Ht 6' (1.829 m)   Wt 126.6 kg (279 lb 1.6 oz)   SpO2 (!) 92%   BMI 37.85 kg/m²     Neuro: AAO x4, Follows Commands and Moves All Extremities    Respiratory: Simple Face Mask 10L    Cardiac: NSR    Diet: Diabetic Diet    Gtts: Insulin and Abx     Urine Output: Urinary Catheter 1260 cc/shift with lasix ivp    Drains:     CT 60cc serous output     Labs daily/Accuchecks achs and 0200     SKIN NOTE:  Skin: Pt has no new skin issues.    Skin precautions maintained including:  Sacrum and heels with foam dressing in place for pressure protection. Frequent weight shift encouraged Q2 hr to prevent  breakdown. Bed plugged in and mattress inflated. Adhesive use limited. Heels elevated off bed. Pressure points protected and positioning supports utilized.  Skin-to-device areas padded. Skin-to-skin areas padded

## 2022-07-20 NOTE — SUBJECTIVE & OBJECTIVE
Interval HPI:   Overnight events: Remains in SICU. POD 2. BG at higher end or slightly above goal ranges on current IV/SQ insulin regimen. Diet diabetic [facility]; 1500 Calorie; Isolation Tray - Regular [place]; Fluid - 1500mL  Eatin%  Nausea: No  Hypoglycemia and intervention: No  Fever: No  TPN and/or TF: No  If yes, type of TF/TPN and rate: n/a    BP (!) 122/92 (BP Location: Right arm, Patient Position: Sitting)   Pulse 85   Temp 97.9 °F (36.6 °C) (Oral)   Resp (!) 32   Ht 6' (1.829 m)   Wt 126.6 kg (279 lb 1.6 oz)   SpO2 (!) 92%   BMI 37.85 kg/m²     Labs Reviewed and Include    Recent Labs   Lab 22  0237   *   CALCIUM 8.6*   ALBUMIN 3.0*   PROT 6.0   *   K 4.3   CO2 22*   CL 98   BUN 13   CREATININE 0.7   ALKPHOS 54*   ALT 17   AST 24   BILITOT 0.7     Lab Results   Component Value Date    WBC 20.07 (H) 2022    HGB 10.2 (L) 2022    HCT 31.3 (L) 2022    MCV 92 2022     2022     Recent Labs   Lab 22  1825   TSH 2.840     Lab Results   Component Value Date    HGBA1C 7.6 (H) 2022       Nutritional status:   Body mass index is 37.85 kg/m².  Lab Results   Component Value Date    ALBUMIN 3.0 (L) 2022    ALBUMIN 3.5 2022    ALBUMIN 3.7 2022     No results found for: PREALBUMIN    Estimated Creatinine Clearance: 148.5 mL/min (based on SCr of 0.7 mg/dL).    Accu-Checks  Recent Labs     22  0825 22  1004 22  1005 22  1200 22  1316 22  1434 22  1715 22  2224 22  0236 22  0737   POCTGLUCOSE 149* <20* 224* 172* 161* 189* 174* 205* 217* 195*       Current Medications and/or Treatments Impacting Glycemic Control  Immunotherapy:    Immunosuppressants       None          Steroids:   Hormones (From admission, onward)                Start     Stop Route Frequency Ordered    22 1833  melatonin tablet 6 mg         -- Oral Nightly PRN 22 1735           Pressors:    Autonomic Drugs (From admission, onward)                None          Hyperglycemia/Diabetes Medications:   Antihyperglycemics (From admission, onward)                Start     Stop Route Frequency Ordered    07/19/22 1645  insulin aspart U-100 pen 5-8 Units         -- SubQ 3 times daily with meals 07/19/22 1424 07/19/22 1530  insulin regular in 0.9 % NaCl 100 unit/100 mL (1 unit/mL) infusion        Question:  Enter initial dose (Units/hr):  Answer:  2    -- IV Continuous 07/19/22 1424 07/19/22 1524  insulin aspart U-100 pen 0-10 Units         -- SubQ As needed (PRN) 07/19/22 1424

## 2022-07-21 PROBLEM — I48.0 PAROXYSMAL ATRIAL FIBRILLATION: Status: ACTIVE | Noted: 2022-07-21

## 2022-07-21 PROBLEM — D62 ACUTE BLOOD LOSS ANEMIA: Status: ACTIVE | Noted: 2022-07-21

## 2022-07-21 LAB
ALBUMIN SERPL BCP-MCNC: 3.2 G/DL (ref 3.5–5.2)
ALP SERPL-CCNC: 87 U/L (ref 55–135)
ALT SERPL W/O P-5'-P-CCNC: 56 U/L (ref 10–44)
ANION GAP SERPL CALC-SCNC: 13 MMOL/L (ref 8–16)
APTT BLDCRRT: 28 SEC (ref 21–32)
AST SERPL-CCNC: 53 U/L (ref 10–40)
BASOPHILS # BLD AUTO: 0.13 K/UL (ref 0–0.2)
BASOPHILS NFR BLD: 0.8 % (ref 0–1.9)
BILIRUB SERPL-MCNC: 0.7 MG/DL (ref 0.1–1)
BUN SERPL-MCNC: 17 MG/DL (ref 8–23)
CALCIUM SERPL-MCNC: 9.3 MG/DL (ref 8.7–10.5)
CHLORIDE SERPL-SCNC: 95 MMOL/L (ref 95–110)
CO2 SERPL-SCNC: 24 MMOL/L (ref 23–29)
CREAT SERPL-MCNC: 0.8 MG/DL (ref 0.5–1.4)
DIFFERENTIAL METHOD: ABNORMAL
EOSINOPHIL # BLD AUTO: 0.5 K/UL (ref 0–0.5)
EOSINOPHIL NFR BLD: 2.9 % (ref 0–8)
ERYTHROCYTE [DISTWIDTH] IN BLOOD BY AUTOMATED COUNT: 14.6 % (ref 11.5–14.5)
EST. GFR  (AFRICAN AMERICAN): >60 ML/MIN/1.73 M^2
EST. GFR  (NON AFRICAN AMERICAN): >60 ML/MIN/1.73 M^2
GLUCOSE SERPL-MCNC: 122 MG/DL (ref 70–110)
GLUCOSE SERPL-MCNC: 155 MG/DL (ref 70–110)
GLUCOSE SERPL-MCNC: 241 MG/DL (ref 70–110)
HCT VFR BLD AUTO: 36.1 % (ref 40–54)
HGB BLD-MCNC: 11.5 G/DL (ref 14–18)
IMM GRANULOCYTES # BLD AUTO: 0.15 K/UL (ref 0–0.04)
IMM GRANULOCYTES NFR BLD AUTO: 0.9 % (ref 0–0.5)
INR PPP: 1.1 (ref 0.8–1.2)
LYMPHOCYTES # BLD AUTO: 2.4 K/UL (ref 1–4.8)
LYMPHOCYTES NFR BLD: 14.5 % (ref 18–48)
MAGNESIUM SERPL-MCNC: 2 MG/DL (ref 1.6–2.6)
MCH RBC QN AUTO: 30 PG (ref 27–31)
MCHC RBC AUTO-ENTMCNC: 31.9 G/DL (ref 32–36)
MCV RBC AUTO: 94 FL (ref 82–98)
MONOCYTES # BLD AUTO: 1.3 K/UL (ref 0.3–1)
MONOCYTES NFR BLD: 7.6 % (ref 4–15)
NEUTROPHILS # BLD AUTO: 12.3 K/UL (ref 1.8–7.7)
NEUTROPHILS NFR BLD: 73.3 % (ref 38–73)
NRBC BLD-RTO: 0 /100 WBC
PHOSPHATE SERPL-MCNC: 3.2 MG/DL (ref 2.7–4.5)
PHOSPHATE SERPL-MCNC: 3.2 MG/DL (ref 2.7–4.5)
PLATELET # BLD AUTO: 453 K/UL (ref 150–450)
PMV BLD AUTO: 9.9 FL (ref 9.2–12.9)
POCT GLUCOSE: 134 MG/DL (ref 70–110)
POCT GLUCOSE: 155 MG/DL (ref 70–110)
POTASSIUM SERPL-SCNC: 4.3 MMOL/L (ref 3.5–5.1)
PROT SERPL-MCNC: 7.1 G/DL (ref 6–8.4)
PROTHROMBIN TIME: 11.4 SEC (ref 9–12.5)
RBC # BLD AUTO: 3.83 M/UL (ref 4.6–6.2)
SODIUM SERPL-SCNC: 132 MMOL/L (ref 136–145)
WBC # BLD AUTO: 16.78 K/UL (ref 3.9–12.7)

## 2022-07-21 PROCEDURE — 84100 ASSAY OF PHOSPHORUS: CPT

## 2022-07-21 PROCEDURE — 85730 THROMBOPLASTIN TIME PARTIAL: CPT

## 2022-07-21 PROCEDURE — 63600175 PHARM REV CODE 636 W HCPCS: Performed by: THORACIC SURGERY (CARDIOTHORACIC VASCULAR SURGERY)

## 2022-07-21 PROCEDURE — 25000003 PHARM REV CODE 250: Performed by: STUDENT IN AN ORGANIZED HEALTH CARE EDUCATION/TRAINING PROGRAM

## 2022-07-21 PROCEDURE — 83735 ASSAY OF MAGNESIUM: CPT

## 2022-07-21 PROCEDURE — 20600001 HC STEP DOWN PRIVATE ROOM

## 2022-07-21 PROCEDURE — 85610 PROTHROMBIN TIME: CPT

## 2022-07-21 PROCEDURE — 99232 PR SUBSEQUENT HOSPITAL CARE,LEVL II: ICD-10-PCS | Mod: ,,, | Performed by: NURSE PRACTITIONER

## 2022-07-21 PROCEDURE — 99232 SBSQ HOSP IP/OBS MODERATE 35: CPT | Mod: ,,, | Performed by: NURSE PRACTITIONER

## 2022-07-21 PROCEDURE — 63600175 PHARM REV CODE 636 W HCPCS

## 2022-07-21 PROCEDURE — 80053 COMPREHEN METABOLIC PANEL: CPT

## 2022-07-21 PROCEDURE — 85025 COMPLETE CBC W/AUTO DIFF WBC: CPT

## 2022-07-21 PROCEDURE — 97530 THERAPEUTIC ACTIVITIES: CPT

## 2022-07-21 PROCEDURE — 63600175 PHARM REV CODE 636 W HCPCS: Performed by: NURSE PRACTITIONER

## 2022-07-21 PROCEDURE — 97535 SELF CARE MNGMENT TRAINING: CPT

## 2022-07-21 PROCEDURE — 25000003 PHARM REV CODE 250

## 2022-07-21 PROCEDURE — 25000003 PHARM REV CODE 250: Performed by: THORACIC SURGERY (CARDIOTHORACIC VASCULAR SURGERY)

## 2022-07-21 PROCEDURE — 97110 THERAPEUTIC EXERCISES: CPT

## 2022-07-21 RX ORDER — METOPROLOL SUCCINATE 100 MG/1
100 TABLET, EXTENDED RELEASE ORAL DAILY
Status: DISCONTINUED | OUTPATIENT
Start: 2022-07-21 | End: 2022-07-23 | Stop reason: HOSPADM

## 2022-07-21 RX ORDER — INSULIN ASPART 100 [IU]/ML
8 INJECTION, SOLUTION INTRAVENOUS; SUBCUTANEOUS
Status: DISCONTINUED | OUTPATIENT
Start: 2022-07-21 | End: 2022-07-22

## 2022-07-21 RX ADMIN — ACETAMINOPHEN 1000 MG: 500 TABLET ORAL at 05:07

## 2022-07-21 RX ADMIN — AMIODARONE HYDROCHLORIDE 400 MG: 200 TABLET ORAL at 02:07

## 2022-07-21 RX ADMIN — INSULIN ASPART 4 UNITS: 100 INJECTION, SOLUTION INTRAVENOUS; SUBCUTANEOUS at 11:07

## 2022-07-21 RX ADMIN — INSULIN ASPART 2 UNITS: 100 INJECTION, SOLUTION INTRAVENOUS; SUBCUTANEOUS at 09:07

## 2022-07-21 RX ADMIN — AMIODARONE HYDROCHLORIDE 400 MG: 200 TABLET ORAL at 09:07

## 2022-07-21 RX ADMIN — ENOXAPARIN SODIUM 40 MG: 100 INJECTION SUBCUTANEOUS at 04:07

## 2022-07-21 RX ADMIN — INSULIN ASPART 2 UNITS: 100 INJECTION, SOLUTION INTRAVENOUS; SUBCUTANEOUS at 04:07

## 2022-07-21 RX ADMIN — MUPIROCIN: 20 OINTMENT TOPICAL at 09:07

## 2022-07-21 RX ADMIN — ACETAMINOPHEN 1000 MG: 500 TABLET ORAL at 02:07

## 2022-07-21 RX ADMIN — FUROSEMIDE 40 MG: 10 INJECTION, SOLUTION INTRAMUSCULAR; INTRAVENOUS at 09:07

## 2022-07-21 RX ADMIN — ACETAMINOPHEN 1000 MG: 500 TABLET ORAL at 09:07

## 2022-07-21 RX ADMIN — ATORVASTATIN CALCIUM 40 MG: 20 TABLET, FILM COATED ORAL at 09:07

## 2022-07-21 RX ADMIN — INSULIN ASPART 12 UNITS: 100 INJECTION, SOLUTION INTRAVENOUS; SUBCUTANEOUS at 07:07

## 2022-07-21 RX ADMIN — INSULIN ASPART 8 UNITS: 100 INJECTION, SOLUTION INTRAVENOUS; SUBCUTANEOUS at 12:07

## 2022-07-21 RX ADMIN — DOCUSATE SODIUM 100 MG: 100 CAPSULE ORAL at 09:07

## 2022-07-21 RX ADMIN — FUROSEMIDE 40 MG: 10 INJECTION, SOLUTION INTRAMUSCULAR; INTRAVENOUS at 05:07

## 2022-07-21 RX ADMIN — ASPIRIN 325 MG ORAL TABLET 325 MG: 325 PILL ORAL at 09:07

## 2022-07-21 RX ADMIN — OXYCODONE 5 MG: 5 TABLET ORAL at 12:07

## 2022-07-21 RX ADMIN — METOPROLOL SUCCINATE 100 MG: 100 TABLET, EXTENDED RELEASE ORAL at 09:07

## 2022-07-21 RX ADMIN — OXYCODONE 5 MG: 5 TABLET ORAL at 09:07

## 2022-07-21 RX ADMIN — INSULIN HUMAN 1.5 UNITS/HR: 1 INJECTION, SOLUTION INTRAVENOUS at 07:07

## 2022-07-21 RX ADMIN — INSULIN ASPART 8 UNITS: 100 INJECTION, SOLUTION INTRAVENOUS; SUBCUTANEOUS at 04:07

## 2022-07-21 RX ADMIN — POTASSIUM CHLORIDE 20 MEQ: 1500 TABLET, EXTENDED RELEASE ORAL at 09:07

## 2022-07-21 RX ADMIN — OXYCODONE 5 MG: 5 TABLET ORAL at 11:07

## 2022-07-21 NOTE — PLAN OF CARE
Problem: Occupational Therapy  Goal: Occupational Therapy Goal  Description: Goals set on 7/20 with expiration date 8/3:  Patient will increase functional independence with ADLs by performing:    Supine <> Sit with Grimes adhering to sternal precautions.  Grooming while standing at sink with Mod Grimes.  UB Dressing with Mod Grimes.  LB Dressing with Mod Grimes.  Step transfer with Mod Grimes with DME as needed.  Pt will demonstrate understanding of education provided regarding energy conservation and task modification through teach-back method.   Pt will demonstrate understanding of education provided regarding sternal precautions and application to functional tasks / ADLs reviewed:  including no lifting > 5 lbs, pulling or pushing with BUEs; Modifying daily activities and functional mobility tasks to maintain sternal precautions appropriately       Outcome: Ongoing, Progressing

## 2022-07-21 NOTE — PROGRESS NOTES
[facility] - [facility] Intensive [facility]  Critical Care - Surgery  Progress Note    Patient Name: Isaiah Dorsey Jr.  MRN: [id number]  Admission Date: 7/11/2022  Hospital Length of Stay: 9 days  Code Status: Full Code  Attending Provider: Blu Rhodes MD  Primary Care Provider: Primary Doctor No   Principal Problem: Atherosclerotic heart disease of native coronary artery with unstable angina pectoris    Subjective:     Hospital/ICU Course:  No notes on file    Interval History/Significant Events: NAEON. Returned to AFRVR this am after moving to chair with associated dizziness. Amio bolus given x1 with return to NSR.     Follow-up For: Procedure(s) (LRB):  CORONARY ARTERY BYPASS GRAFT (CABG) (Left)  HARVEST-VEIN-ENDOVASCULAR (Left)    Post-Operative Day: 2 Days Post-Op    Objective:     Vital Signs (Most Recent):  Temp: 97.9 °F (36.6 °C) (07/20/22 0730)  Pulse: 73 (07/20/22 1100)  Resp: (!) 27 (07/20/22 1100)  BP: 110/76 (07/20/22 1100)  SpO2: (!) 92 % (07/20/22 1100)   Vital Signs (24h Range):  Temp:  [97.5 °F (36.4 °C)-100.22 °F (37.9 °C)] 97.9 °F (36.6 °C)  Pulse:  [] 73  Resp:  [18-42] 27  SpO2:  [88 %-95 %] 92 %  BP: (101-153)/(63-97) 110/76  Arterial Line BP: ()/() 101/65     Weight: 126.6 kg (279 lb 1.6 oz)  Body mass index is 37.85 kg/m².      Intake/Output Summary (Last 24 hours) at 7/20/2022 1127  Last data filed at 7/20/2022 1100  Gross per 24 hour   Intake 2299.13 ml   Output 1975 ml   Net 324.13 ml       Physical Exam  Vitals reviewed.   Constitutional:       General: He is not in acute distress.     Appearance: He is not ill-appearing.   HENT:      Head: Normocephalic and atraumatic.   Eyes:      Extraocular Movements: Extraocular movements intact.      Conjunctiva/sclera: Conjunctivae normal.   Cardiovascular:      Rate and Rhythm: Regular rhythm. Tachycardia present.      Comments: Midline island dressing C/D/I.  Mediastinal tubes x2, L pleural to suction   Ventricular Pacing wires  present  Pulmonary:      Effort: Pulmonary effort is normal. No respiratory distress.      Comments: On 5L by facemask  Genitourinary:     Comments: Lal  Skin:     General: Skin is warm and dry.   Neurological:      General: No focal deficit present.      Mental Status: He is alert and oriented to person, place, and time.       Vents:  Vent Mode: Spont (07/18/22 2301)  Ventilator Initiated: Yes (07/18/22 1117)  Set Rate: 18 BPM (07/18/22 1601)  Vt Set: 530 mL (07/18/22 1601)  Pressure Support: 5 cmH20 (07/18/22 2301)  PEEP/CPAP: 8 cmH20 (07/18/22 2301)  Oxygen Concentration (%): 100 (07/19/22 2030)  Peak Airway Pressure: 14 cmH2O (07/18/22 2301)  Plateau Pressure: 26 cmH20 (07/18/22 2301)  Total Ve: 1.15 mL (07/18/22 2301)  Negative Inspiratory Force (cm H2O): 0 (07/18/22 2301)  F/VT Ratio<105 (RSBI): (!) 32.21 (07/18/22 2110)    Lines/Drains/Airways       Central Venous Catheter Line  Duration              Introducer with Double Lumen 07/18/22 0800 right internal jugular 2 days    Percutaneous Central Line Insertion/Assessment - Triple Lumen  07/18/22 0800 right internal jugular 2 days              Drain  Duration                  Chest Tube 07/18/22 0958 3 Left Pleural 19 Fr. 2 days         Urethral Catheter 07/18/22 0727 Non-latex;Straight-tip;Temperature probe 14 Fr. 2 days         Y Chest Tube 1 and 2 07/18/22 0958 1 Right Mediastinal 19 Fr. 2 Left Mediastinal 19 Fr. 2 days              Arterial Line  Duration             Arterial Line 07/18/22 0944 Left Radial 2 days              Line  Duration                  Pacer Wires 07/18/22 0946 2 days              Peripheral Intravenous Line  Duration                  Peripheral IV - Single Lumen 07/14/22 0553 18 G Left;Posterior Forearm 6 days         Peripheral IV - Single Lumen 07/14/22 2350 20 G Anterior;Left;Proximal Forearm 5 days                    Significant Labs:    CBC/Anemia Profile:  Recent Labs   Lab 07/18/22  2111 07/18/22  2223 07/19/22  0233  07/19/22  0542 07/20/22  0237   WBC 17.42*  --  17.44*  --  20.07*   HGB 10.6*  --  10.5*  --  10.2*   HCT 31.6*   < > 31.3* 35* 31.3*     --  315  --  333   MCV 90  --  90  --  92   RDW 14.6*  --  14.5  --  15.0*    < > = values in this interval not displayed.        Chemistries:  Recent Labs   Lab 07/18/22  2111 07/19/22  0233 07/19/22  2220 07/20/22  0237   * 133*  --  131*   K 4.0 4.2 4.5 4.3    104  --  98   CO2 16* 19*  --  22*   BUN 8 8  --  13   CREATININE 0.8 0.7  --  0.7   CALCIUM 8.6* 8.6*  --  8.6*   ALBUMIN 3.7 3.5  --  3.0*   PROT 6.4 6.2  --  6.0   BILITOT 0.3 0.3  --  0.7   ALKPHOS 47* 44*  --  54*   ALT 17 15  --  17   AST 20 22  --  24   MG 2.2 2.0 2.1 2.0   PHOS 2.6* 3.4 2.7 3.7       All pertinent labs within the past 24 hours have been reviewed.    Significant Imaging:  I have reviewed all pertinent imaging results/findings within the past 24 hours.    Assessment/Plan:     * Atherosclerotic heart disease of native coronary artery with unstable angina pectoris  Isaiah Moorekiley Wells is a 62 y.o. male with NIDDM, HTN, HLD, and BMI 37.8 who is now s/p CABG x1 on 7/18/22.      Neuro/Psych:   -- Sedation: None  -- Pain: PRN Oxy + Dilaudid  -- Scheduled Tylenol             Cards:   -- S/P CABG x1 on 7/18/22  -- HDS off epi and milrinone 0.125; discontinue milrinone this am  -- Continue to Amio PO   -- Ventricular pacing wires. Back-up paced at 45 BPM  -- MAP > 65, Syst < 140  -- Cleviprex PRN  -- Metop, Aspirin 325mg      Pulm:   -- Goal O2 sat > 90%  -- Wean supplemental O2 as tolerated  -- Mediastinal chest tubes x2 and pleural chest tube x1 to suction      Renal:  -- Keep soler for strict I/O  -- Trend BUN/Cr   -- Lasix 20 BID      FEN / GI:   -- Replace lytes as needed  -- Nutrition: Cardiac diet, 1500cc  -- GI ppx: famotidine  -- Bowel reg: miralax  -- Post-op albumin completed      ID:   -- Tm: afebrile; WBC stable  -- Michelle-op ancef      Heme/Onc:   -- H/H stable   --  Daily CBC  -- Aspirin 325mg QD      Endo:   -- BG goal 140-180  -- Insulin gtt, endocrine consulted appreciate recommendations      PPx:   Feeding: Cardiac diet  Analgesia/Sedation: PRN Oxy + Dilaudid  Thromboembolic prevention: SCDs, LVX  HOB >30: Yes  Stress Ulcer ppx: famotidine  Glucose control: Critical care goal 140-180 g/dl, ISS     Lines/Drains/Airway: CVC RIJ, Lal, Chest tubes x 3, ventricular pacing wires, L radial A line; remove right femoral A line.       Dispo/Code Status/Palliative:   -- SD to floor today / Full Code.          Isrrael Fiore MD  Critical Care - Surgery  [facility] - [facility]

## 2022-07-21 NOTE — PROGRESS NOTES
[facility] - Cardiology Stepdown  Cardiothoracic Surgery  Progress Note    Patient Name: Isaiah Dorsey Jr.  MRN: [id number]  Admission Date: 7/11/2022  Hospital Length of Stay: 10 days  Code Status: Full Code   Attending Physician: Blu Rhodes MD   Referring Provider: Self, Aaareferral  Principal Problem:Atherosclerotic heart disease of native coronary artery with unstable angina pectoris      Subjective:     Post-Op Info:  Procedure(s) (LRB):  CORONARY ARTERY BYPASS GRAFT (CABG) (Left)  HARVEST-VEIN-ENDOVASCULAR (Left)   3 Days Post-Op     Interval History: NAEON. Stepped down yesterday. Chest tubes, pacer, and soler removed. Wean oxygen as tolerated.     Review of Systems   Constitutional: Negative for malaise/fatigue.   Cardiovascular:  Negative for chest pain and palpitations.   Respiratory:  Negative for shortness of breath.    Hematologic/Lymphatic: Negative for bleeding problem.   Neurological:  Negative for weakness.   Medications:  Continuous Infusions:   insulin regular 1 units/mL infusion orderable (TRANSFER) 1 Units/hr (07/21/22 1129)     Scheduled Meds:   acetaminophen  1,000 mg Oral Q8H    amiodarone  400 mg Oral TID    aspirin  325 mg Oral Daily    atorvastatin  40 mg Oral QHS    docusate sodium  100 mg Oral BID    enoxaparin  40 mg Subcutaneous Daily    furosemide (LASIX) injection  40 mg Intravenous BID    insulin aspart U-100  8 Units Subcutaneous TIDWM    LIDOcaine HCL 10 mg/ml (1%)  100 mg Other Once    metoprolol succinate  100 mg Oral Daily    mupirocin   Nasal BID    potassium chloride  20 mEq Oral Daily     PRN Meds:bisacodyL, calcium gluconate IVPB, calcium gluconate IVPB, calcium gluconate IVPB, dextrose 10%, dextrose 10%, glucagon (human recombinant), glucose, glucose, HYDROmorphone, insulin aspart U-100, magnesium sulfate IVPB, magnesium sulfate IVPB, melatonin, ondansetron, oxyCODONE, oxyCODONE, potassium chloride in water, potassium chloride in water, potassium chloride  in water, sodium chloride 0.9%     Objective:     Vital Signs (Most Recent):  Temp: 97.5 °F (36.4 °C) (07/21/22 0807)  Pulse: 91 (07/21/22 0807)  Resp: 18 (07/21/22 0915)  BP: (!) 135/90 (07/21/22 0807)  SpO2: 96 % (07/21/22 0817)   Vital Signs (24h Range):  Temp:  [97.5 °F (36.4 °C)-98.4 °F (36.9 °C)] 97.5 °F (36.4 °C)  Pulse:  [85-94] 91  Resp:  [17-39] 18  SpO2:  [89 %-96 %] 96 %  BP: (102-135)/(70-90) 135/90  Arterial Line BP: (107)/(68) 107/68     Weight: 131.7 kg (290 lb 5.5 oz)  Body mass index is 39.38 kg/m².    SpO2: 96 %  O2 Device (Oxygen Therapy): nasal cannula    Intake/Output - Last 3 Shifts         07/19 0700 07/20 0659 07/20 0700 07/21 0659 07/21 0700 07/22 0659    P.O. 1440 1440 480    I.V. (mL/kg) 272.2 (2.2) 25.8 (0.2)     Blood       IV Piggyback 473.1      Total Intake(mL/kg) 2185.3 (17.3) 1465.8 (11.6) 480 (3.6)    Urine (mL/kg/hr) 1655 (0.5) 2450 (0.8) 4100 (4.9)    Stool 0 0     Chest Tube 130 30 50    Total Output 1785 2480 4150    Net +400.3 -1014.2 -3670           Stool Occurrence 0 x 0 x             Lines/Drains/Airways       Drain  Duration                  Urethral Catheter 07/18/22 0727 Non-latex;Straight-tip;Temperature probe 14 Fr. 3 days              Line  Duration                  Pacer Wires 07/18/22 0946 3 days              Peripheral Intravenous Line  Duration                  Peripheral IV - Single Lumen 07/14/22 2350 20 G Anterior;Left;Proximal Forearm 6 days         Peripheral IV - Single Lumen 07/20/22 1515 20 G Left Hand <1 day         Peripheral IV - Single Lumen 07/20/22 1558 18 G Left Antecubital <1 day                    Physical Exam  Constitutional:       General: He is not in acute distress.     Appearance: He is obese.   HENT:      Head: Normocephalic and atraumatic.   Eyes:      Pupils: Pupils are equal, round, and reactive to light.   Cardiovascular:      Rate and Rhythm: Normal rate and regular rhythm.   Pulmonary:      Effort: Pulmonary effort is normal. No  respiratory distress.      Comments: NC  Abdominal:      General: There is no distension.   Musculoskeletal:         General: Normal range of motion.      Cervical back: Normal range of motion.   Skin:     Coloration: Skin is not pale.      Comments: Midline sternal incision c/d/i   Neurological:      General: No focal deficit present.      Mental Status: He is alert and oriented to person, place, and time.   Psychiatric:         Mood and Affect: Mood normal.         Behavior: Behavior normal.       Significant Labs:  CBC:   Recent Labs   Lab 07/21/22  0356   WBC 16.78*   RBC 3.83*   HGB 11.5*   HCT 36.1*   *   MCV 94   MCH 30.0   MCHC 31.9*     CMP:   Recent Labs   Lab 07/21/22  0356   *   CALCIUM 9.3   ALBUMIN 3.2*   PROT 7.1   *   K 4.3   CO2 24   CL 95   BUN 17   CREATININE 0.8   ALKPHOS 87   ALT 56*   AST 53*   BILITOT 0.7       Significant Diagnostics:  I have reviewed all pertinent imaging results/findings within the past 24 hours.    Assessment/Plan:     Paroxysmal atrial fibrillation  Post operative   Amiodarone   BB  NSR today     Acute blood loss anemia  Expected post operatively   CBC daily     S/P CABG (coronary artery bypass graft)  ASA   Statin   BB   Lasix and k   Pain regimen   Bowel regimen   SubQ Lovenox   Chest tubes and pacer wires removed   Wean oxygen as tolerated   D/c karlene   PT/OT    Type 2 diabetes mellitus with hyperglycemia, without long-term current use of insulin  Endocrine following     Elevated platelet count  CBC daily    HLD (hyperlipidemia)  Statin     HTN (hypertension), benign  Stable  BB    Morbid obesity with BMI of 45.0-49.9, adult  PT/OT     Dispo: CSU. D/C next couple days     Gabi Dash PA-C  Cardiothoracic Surgery  [facility] - Cardiology Stepdown

## 2022-07-21 NOTE — PLAN OF CARE
Problem: Adult Inpatient Plan of Care  Goal: Plan of Care Review  Outcome: Ongoing, Progressing     Problem: Adult Inpatient Plan of Care  Goal: Patient-Specific Goal (Individualized)  Outcome: Ongoing, Progressing     Problem: Diabetes Comorbidity  Goal: Blood Glucose Level Within Targeted Range  Outcome: Ongoing, Progressing     Problem: Pain Acute  Goal: Acceptable Pain Control and Functional Ability  Outcome: Ongoing, Progressing     Patient Alert  and oriented x 4, patient requires SBA when up to chair for meals.  Monitoring CBG ac/hs & 0200.   Patient appetite is good, patient remains on a diabetic , cardiac diet with 1500 fluid restriction.  Lal dc 'd and voided 300 cc of yellow urine.  Insulin drip decreased to 1 ml/hr and as part 8 units with meals based on diet consumption.  Medicated with Oxy 5 mg once during shift.   Chest tube and pacer wires removed per physician after daily CXR.  Spouse is at bedside.  Discharge is TBD.

## 2022-07-21 NOTE — SUBJECTIVE & OBJECTIVE
Interval History: NAEON. Stepped down yesterday. Chest tubes, pacer, and soler removed. Wean oxygen as tolerated.     Review of Systems   Constitutional: Negative for malaise/fatigue.   Cardiovascular:  Negative for chest pain and palpitations.   Respiratory:  Negative for shortness of breath.    Hematologic/Lymphatic: Negative for bleeding problem.   Neurological:  Negative for weakness.   Medications:  Continuous Infusions:   insulin regular 1 units/mL infusion orderable (TRANSFER) 1 Units/hr (07/21/22 1129)     Scheduled Meds:   acetaminophen  1,000 mg Oral Q8H    amiodarone  400 mg Oral TID    aspirin  325 mg Oral Daily    atorvastatin  40 mg Oral QHS    docusate sodium  100 mg Oral BID    enoxaparin  40 mg Subcutaneous Daily    furosemide (LASIX) injection  40 mg Intravenous BID    insulin aspart U-100  8 Units Subcutaneous TIDWM    LIDOcaine HCL 10 mg/ml (1%)  100 mg Other Once    metoprolol succinate  100 mg Oral Daily    mupirocin   Nasal BID    potassium chloride  20 mEq Oral Daily     PRN Meds:bisacodyL, calcium gluconate IVPB, calcium gluconate IVPB, calcium gluconate IVPB, dextrose 10%, dextrose 10%, glucagon (human recombinant), glucose, glucose, HYDROmorphone, insulin aspart U-100, magnesium sulfate IVPB, magnesium sulfate IVPB, melatonin, ondansetron, oxyCODONE, oxyCODONE, potassium chloride in water, potassium chloride in water, potassium chloride in water, sodium chloride 0.9%     Objective:     Vital Signs (Most Recent):  Temp: 97.5 °F (36.4 °C) (07/21/22 0807)  Pulse: 91 (07/21/22 0807)  Resp: 18 (07/21/22 0915)  BP: (!) 135/90 (07/21/22 0807)  SpO2: 96 % (07/21/22 0817)   Vital Signs (24h Range):  Temp:  [97.5 °F (36.4 °C)-98.4 °F (36.9 °C)] 97.5 °F (36.4 °C)  Pulse:  [85-94] 91  Resp:  [17-39] 18  SpO2:  [89 %-96 %] 96 %  BP: (102-135)/(70-90) 135/90  Arterial Line BP: (107)/(68) 107/68     Weight: 131.7 kg (290 lb 5.5 oz)  Body mass index is 39.38 kg/m².    SpO2: 96 %  O2 Device (Oxygen  Therapy): nasal cannula    Intake/Output - Last 3 Shifts         07/19 0700 07/20 0659 07/20 0700 07/21 0659 07/21 0700 07/22 0659    P.O. 1440 1440 480    I.V. (mL/kg) 272.2 (2.2) 25.8 (0.2)     Blood       IV Piggyback 473.1      Total Intake(mL/kg) 2185.3 (17.3) 1465.8 (11.6) 480 (3.6)    Urine (mL/kg/hr) 1655 (0.5) 2450 (0.8) 4100 (4.9)    Stool 0 0     Chest Tube 130 30 50    Total Output 1785 2480 4150    Net +400.3 -1014.2 -3670           Stool Occurrence 0 x 0 x             Lines/Drains/Airways       Drain  Duration                  Urethral Catheter 07/18/22 0727 Non-latex;Straight-tip;Temperature probe 14 Fr. 3 days              Line  Duration                  Pacer Wires 07/18/22 0946 3 days              Peripheral Intravenous Line  Duration                  Peripheral IV - Single Lumen 07/14/22 2350 20 G Anterior;Left;Proximal Forearm 6 days         Peripheral IV - Single Lumen 07/20/22 1515 20 G Left Hand <1 day         Peripheral IV - Single Lumen 07/20/22 1558 18 G Left Antecubital <1 day                    Physical Exam  Constitutional:       General: He is not in acute distress.     Appearance: He is obese.   HENT:      Head: Normocephalic and atraumatic.   Eyes:      Pupils: Pupils are equal, round, and reactive to light.   Cardiovascular:      Rate and Rhythm: Normal rate and regular rhythm.   Pulmonary:      Effort: Pulmonary effort is normal. No respiratory distress.      Comments: NC  Abdominal:      General: There is no distension.   Musculoskeletal:         General: Normal range of motion.      Cervical back: Normal range of motion.   Skin:     Coloration: Skin is not pale.      Comments: Midline sternal incision c/d/i   Neurological:      General: No focal deficit present.      Mental Status: He is alert and oriented to person, place, and time.   Psychiatric:         Mood and Affect: Mood normal.         Behavior: Behavior normal.       Significant Labs:  CBC:   Recent Labs   Lab  07/21/22  0356   WBC 16.78*   RBC 3.83*   HGB 11.5*   HCT 36.1*   *   MCV 94   MCH 30.0   MCHC 31.9*     CMP:   Recent Labs   Lab 07/21/22 0356   *   CALCIUM 9.3   ALBUMIN 3.2*   PROT 7.1   *   K 4.3   CO2 24   CL 95   BUN 17   CREATININE 0.8   ALKPHOS 87   ALT 56*   AST 53*   BILITOT 0.7       Significant Diagnostics:  I have reviewed all pertinent imaging results/findings within the past 24 hours.

## 2022-07-21 NOTE — ASSESSMENT & PLAN NOTE
BG goal 110-140 (CTS protocol)   Diet diabetic [facility]; 1500 Calorie; Isolation Tray - Regular [place]; Fluid - 1500mL    Reduce Transition IV insulin infusion at 1 u/hr with stepdown parameters. BG below goal th is AM. Will challenge pancrease.   rewrite Novolog to 8 units TID with meals.   Moderate Dose Correction Scale   BG monitoring ac/hs/0200    ** Please call Endocrine for any BG related issues **  ** Please notify Endocrine for any change and/or advance in diet**    Lab Results   Component Value Date    HGBA1C 7.6 (H) 07/12/2022     Discharge Planning:   TBD. Please notify endocrinology prior to discharge.

## 2022-07-21 NOTE — PLAN OF CARE
SICU PLAN OF CARE NOTE    Dx: Atherosclerotic heart disease of native coronary artery with unstable angina pectoris    Shift Events: OOBTC all shift. Ambulated at bedside today with standby assist x 1. CT with minimal serous output noted. Epi-pacer wires secure and isolated to chest. De-lined with PIV access x 2. Transition insulin gtt remains unchanged with SQ adjusted per endocrine AC/HS +0200. Advanced and tolerating Diabetic/cardiac diet but only eating <50%. 1500 ml FR and needs reinforcement due to tendency to drink a lot. IVP Lasix increased to 40 Mg with minimal increase to UOP. Remains on 5L NC with freq IS use. Transfer orders to CSU. Report given to night nurse for transfer after shift change.    Goals of Care: MAP >65, Sats >90%    Neuro: AAO x4, Follows Commands, and Moves All Extremities    Vital Signs: /70 (BP Location: Right arm, Patient Position: Lying)   Pulse 90   Temp 97.9 °F (36.6 °C) (Oral)   Resp (!) 30   Ht 6' (1.829 m)   Wt 126.6 kg (279 lb 1.6 oz)   SpO2 (!) 89%   BMI 37.85 kg/m²     Respiratory: Nasal Cannula 5L    Diet: Cardiac Diet and Diabetic Diet    Gtts: Insulin    Urine Output: Urinary Catheter 845 cc/shift    Drains: Chest Tube, total output 20 cc /  shift    Cardiac: NSR with PACs 70-90s     Labs/Accuchecks: Daily labs. Accuchecks AC/HS +0200    Skin: No new skin breakdown noted. Sacral and heel foams in place. See assessment for details.

## 2022-07-21 NOTE — ASSESSMENT & PLAN NOTE
ASA   Statin   BB   Lasix and k   Pain regimen   Bowel regimen   SubQ Lovenox   Chest tubes and pacer wires removed   Wean oxygen as tolerated   D/c soler   PT/OT

## 2022-07-21 NOTE — NURSING
Transfer Note:  Report called to EVITA Easley on CSU. Pt being transferred to  [id number] with portable telemetry and 5L Nasal Cannula. VSS prior to transfer. Patient and spouse updated on POC and all questions and concerns addressed.     SKIN NOTE:  Skin: Pt has a midsternal incision, SWEETIE, 3 CT with gauze dressing in place, pacing wires scured, and LLE site SWEETIE.    Skin precautions maintained including:  Sacrum and heels with foam dressing in place for pressure protection. Patient encouraged to turn Q2 hr to prevent breakdown. Adhesive use limited. Frequent weight shift assistance provided. Positioned off wounds. Pressure points protected. Positioning supports utilized. Skin-to-device areas padded. Skin-to-skin areas padded

## 2022-07-21 NOTE — PT/OT/SLP PROGRESS
Physical Therapy Treatment    Patient Name:  Isaiah Dorsey Jr.   MRN:  [id number]    Recommendations:     Discharge Recommendations:  home with home health   Discharge Equipment Recommendations: none   Barriers to discharge: Inaccessible home (pt has 2 steps to enter his home and 10 steps inside his home)    Assessment:     Isaiah Dorsey Jr. is a 62 y.o. male admitted with a medical diagnosis of Atherosclerotic heart disease of native coronary artery with unstable angina pectoris.  He presents with the following impairments/functional limitations:  weakness, impaired endurance, impaired self care skills, impaired functional mobility, gait instability, impaired balance, pain .     Pt cesar session well w/ good participation. He is making good progress towards as evident by inc'd gait distance and decreased assistance required for transfers on this date compared to previous session. He also maintains sternal precautions well w/ minimal cues required. He will continue to benefit from acute PT services in order to address the above listed deficits.    Rehab Prognosis: Good; patient would benefit from acute skilled PT services to address these deficits and reach maximum level of function.    Recent Surgery: Procedure(s) (LRB):  CORONARY ARTERY BYPASS GRAFT (CABG) (Left)  HARVEST-VEIN-ENDOVASCULAR (Left) 3 Days Post-Op    Plan:     During this hospitalization, patient to be seen 3 x/week to address the identified rehab impairments via gait training, therapeutic activities, therapeutic exercises, neuromuscular re-education and progress toward the following goals:    · Plan of Care Expires:  08/19/22    Subjective     Chief Complaint: weakness  Patient/Family Comments/goals: return to PLOF  Pain/Comfort:  · Pain Rating 1: 4/10  · Location - Orientation 1: midline  · Location 1: sternal  · Pain Addressed 1: Pre-medicate for activity  · Pain Rating Post-Intervention 1: 4/10      Objective:     Communicated with nursing prior to  session.  Patient found up in chair with soler catheter, oxygen, peripheral IV upon PT entry to room. Pt's wife present t/o session.    General Precautions: Standard, fall, sternal   Orthopedic Precautions:N/A   Braces: N/A  Respiratory Status: Nasal cannula, flow 4 L/min     Functional Mobility:  · Transfers:  · Sit<>Stand to/from bedside chair w/o AD w/ CGA  · Pt maintains sternal precautions  · Gait:   · >400ft w/ pt  Holding IV pole for stability and CGA for safety  · Swing through gait pattern noted  · Mild instability but no overt LOB      AM-PAC 6 CLICK MOBILITY  Turning over in bed (including adjusting bedclothes, sheets and blankets)?: 3  Sitting down on and standing up from a chair with arms (e.g., wheelchair, bedside commode, etc.): 3  Moving from lying on back to sitting on the side of the bed?: 3  Moving to and from a bed to a chair (including a wheelchair)?: 3  Need to walk in hospital room?: 3  Climbing 3-5 steps with a railing?: 3  Basic Mobility Total Score: 18       Therapeutic Activities and Exercises:  Pt was able to state 3/3 sternal precautions but then later asking if he can WB through his elbows for bed mobility and transfers. PT educated pt on not doing this in order to allow for sternal healing. Pt verb understanding.     PT discussed need for stair training next session in order to assure access to his home upon D/C. Both verb understanding.     Patient left up in chair with all lines intact and call button in reach..    GOALS:   Multidisciplinary Problems     Physical Therapy Goals        Problem: Physical Therapy    Goal Priority Disciplines Outcome Goal Variances Interventions   Physical Therapy Goal     PT, PT/OT Ongoing, Progressing     Description: Goals to be met by: 2022     Patient will increase functional independence with mobility by performin. Supine to sit with modified independence  2. Sit to supine with modified independence  3. Sit to stand transfer with modified  independence  4. Gait  x 80 feet with supervision using platform rolling walker if needed  5. Ascend/descend 3 stair with no handrails supervision using no AD  6. Lower extremity exercise program x10 reps per handout, with independence                    Time Tracking:     PT Received On: 07/21/22  PT Start Time: 1308     PT Stop Time: 1331  PT Total Time (min): 23 min     Billable Minutes: Therapeutic Activity 23 and Total Time 23    Treatment Type: Treatment  PT/PTA: PT     PTA Visit Number: 0     07/21/2022

## 2022-07-21 NOTE — PROGRESS NOTES
[facility]  Endocrinology  Progress Note    Admit Date: 2022     Reason for Consult: Management of T2DM, Hyperglycemia     Surgical Procedure and Date: CABG on 22    Diabetes diagnosis year: ODETTE (intubated)     Home Diabetes Medications:  Metformin (per chart review) unsure of dose    How often checking glucose at home?  ODETTE    BG readings on regimen: ODETTE  Hypoglycemia on the regimen?  ODETTE  Missed doses on regimen? ODETTE    Diabetes Complications include:     Hyperglycemia    Complicating diabetes co morbidities:   History of MI, CHF, HTN, HLD       HPI:   Patient is a 62 y.o. male with a diagnosis of HTN, HLD, DM2, aortic dilatation, CHF (EF 40-45%), and BMI 38 who [place] [facility] [place].  He was found to have multi-vessel coronary disease on C with LAD with 90% ostial to prox and 70% mid LAD, D1 70%, D2 60%, ost to prox LCX with 99% stenosis. Cardiothoracic surgery was consulted and recommended to proceed with CABG this admission. He now presents for the above procedure. Endocrinology consulted for management of T2DM.      Lab Results   Component Value Date    HGBA1C 7.6 (H) 2022           Interval HPI:   Overnight events:  BG stable and has trended downwards to slightly below goal overnight. Patient has stepped down on IV insulin infusion parameters overnight. Endocrinology will continue to follow, and manage glycemic control while inpatient.   Diet diabetic [facility]; 1500 Calorie; Isolation Tray - Regular [place]; Fluid - 1500mL  3 Days Post-Op    Eatin% - 100%  Nausea: No  Hypoglycemia and intervention: No  Fever: No  TPN and/or TF: No  If yes, type of TF/TPN and rate: None    BP (!) 135/90 (BP Location: Right arm, Patient Position: Lying)   Pulse 91   Temp 97.5 °F (36.4 °C) (Oral)   Resp 18   Ht 6' (1.829 m)   Wt 126.5 kg (278 lb 14.1 oz)   SpO2 96%   BMI 37.82 kg/m²     Labs Reviewed and Include    Recent Labs   Lab 22  0356   *    CALCIUM 9.3   ALBUMIN 3.2*   PROT 7.1   *   K 4.3   CO2 24   CL 95   BUN 17   CREATININE 0.8   ALKPHOS 87   ALT 56*   AST 53*   BILITOT 0.7     Lab Results   Component Value Date    WBC 16.78 (H) 07/21/2022    HGB 11.5 (L) 07/21/2022    HCT 36.1 (L) 07/21/2022    MCV 94 07/21/2022     (H) 07/21/2022     No results for input(s): TSH, FREET4 in the last 168 hours.  Lab Results   Component Value Date    HGBA1C 7.6 (H) 07/12/2022       Nutritional status:   Body mass index is 37.82 kg/m².  Lab Results   Component Value Date    ALBUMIN 3.2 (L) 07/21/2022    ALBUMIN 3.0 (L) 07/20/2022    ALBUMIN 3.5 07/19/2022     No results found for: PREALBUMIN    Estimated Creatinine Clearance: 129.9 mL/min (based on SCr of 0.8 mg/dL).    Accu-Checks  Recent Labs     07/19/22  1200 07/19/22  1316 07/19/22  1434 07/19/22  1715 07/19/22  2224 07/20/22  0236 07/20/22  0737 07/20/22  1215 07/20/22  1611 07/21/22  0754   POCTGLUCOSE 172* 161* 189* 174* 205* 217* 195* 200* 213* 134*       Current Medications and/or Treatments Impacting Glycemic Control  Immunotherapy:    Immunosuppressants       None          Steroids:   Hormones (From admission, onward)                Start     Stop Route Frequency Ordered    07/19/22 1833  melatonin tablet 6 mg         -- Oral Nightly PRN 07/19/22 1733          Pressors:    Autonomic Drugs (From admission, onward)                None          Hyperglycemia/Diabetes Medications:   Antihyperglycemics (From admission, onward)                Start     Stop Route Frequency Ordered    07/20/22 1645  insulin aspart U-100 pen 8-12 Units         -- SubQ 3 times daily with meals 07/20/22 1303    07/19/22 1530  insulin regular in 0.9 % NaCl 100 unit/100 mL (1 unit/mL) infusion        Question:  Enter initial dose (Units/hr):  Answer:  2    -- IV Continuous 07/19/22 1424    07/19/22 1524  insulin aspart U-100 pen 0-10 Units         -- SubQ As needed (PRN) 07/19/22 1424            ASSESSMENT and  PLAN    * Atherosclerotic heart disease of native coronary artery with unstable angina pectoris  Managed per primary team  S/p CABG        Type 2 diabetes mellitus with hyperglycemia, without long-term current use of insulin  BG goal 110-140 (CTS protocol)   Diet diabetic [facility]; 1500 Calorie; Isolation Tray - Regular [place]; Fluid - 1500mL    Reduce Transition IV insulin infusion at 1 u/hr with stepdown parameters. BG below goal th is AM. Will challenge pancrease.   rewrite Novolog to 8 units TID with meals.   Moderate Dose Correction Scale   BG monitoring ac/hs/0200    ** Please call Endocrine for any BG related issues **  ** Please notify Endocrine for any change and/or advance in diet**    Lab Results   Component Value Date    HGBA1C 7.6 (H) 07/12/2022     Discharge Planning:   TBD. Please notify endocrinology prior to discharge.       HTN (hypertension), benign  Managed per primary team  Condition may cause insulin resistance         HLD (hyperlipidemia)  May increase insulin resistance.         Class 2 severe obesity due to excess calories with serious comorbidity and body mass index (BMI) of 37.0 to 37.9 in adult  Body mass index is 37.82 kg/m².  May increase insulin resistance.                 Gabe Golden, NP  Endocrinology  [facility] - [facility]

## 2022-07-21 NOTE — SUBJECTIVE & OBJECTIVE
Interval HPI:   Overnight events:  BG stable and has trended downwards to slightly below goal overnight. Patient has stepped down on IV insulin infusion parameters overnight. Endocrinology will continue to follow, and manage glycemic control while inpatient.   Diet diabetic [facility]; 1500 Calorie; Isolation Tray - Regular [place]; Fluid - 1500mL  3 Days Post-Op    Eatin% - 100%  Nausea: No  Hypoglycemia and intervention: No  Fever: No  TPN and/or TF: No  If yes, type of TF/TPN and rate: None    BP (!) 135/90 (BP Location: Right arm, Patient Position: Lying)   Pulse 91   Temp 97.5 °F (36.4 °C) (Oral)   Resp 18   Ht 6' (1.829 m)   Wt 126.5 kg (278 lb 14.1 oz)   SpO2 96%   BMI 37.82 kg/m²     Labs Reviewed and Include    Recent Labs   Lab 22  0356   *   CALCIUM 9.3   ALBUMIN 3.2*   PROT 7.1   *   K 4.3   CO2 24   CL 95   BUN 17   CREATININE 0.8   ALKPHOS 87   ALT 56*   AST 53*   BILITOT 0.7     Lab Results   Component Value Date    WBC 16.78 (H) 2022    HGB 11.5 (L) 2022    HCT 36.1 (L) 2022    MCV 94 2022     (H) 2022     No results for input(s): TSH, FREET4 in the last 168 hours.  Lab Results   Component Value Date    HGBA1C 7.6 (H) 2022       Nutritional status:   Body mass index is 37.82 kg/m².  Lab Results   Component Value Date    ALBUMIN 3.2 (L) 2022    ALBUMIN 3.0 (L) 2022    ALBUMIN 3.5 2022     No results found for: PREALBUMIN    Estimated Creatinine Clearance: 129.9 mL/min (based on SCr of 0.8 mg/dL).    Accu-Checks  Recent Labs     22  1200 22  1316 22  1434 22  1715 22  2224 22  0236 22  0737 22  1215 22  1611 22  0754   POCTGLUCOSE 172* 161* 189* 174* 205* 217* 195* 200* 213* 134*       Current Medications and/or Treatments Impacting Glycemic Control  Immunotherapy:    Immunosuppressants       None          Steroids:   Hormones (From admission,  onward)                Start     Stop Route Frequency Ordered    07/19/22 1833  melatonin tablet 6 mg         -- Oral Nightly PRN 07/19/22 1733          Pressors:    Autonomic Drugs (From admission, onward)                None          Hyperglycemia/Diabetes Medications:   Antihyperglycemics (From admission, onward)                Start     Stop Route Frequency Ordered    07/20/22 1645  insulin aspart U-100 pen 8-12 Units         -- SubQ 3 times daily with meals 07/20/22 1303    07/19/22 1530  insulin regular in 0.9 % NaCl 100 unit/100 mL (1 unit/mL) infusion        Question:  Enter initial dose (Units/hr):  Answer:  2    -- IV Continuous 07/19/22 1424    07/19/22 1524  insulin aspart U-100 pen 0-10 Units         -- SubQ As needed (PRN) 07/19/22 1424

## 2022-07-21 NOTE — PT/OT/SLP PROGRESS
Occupational Therapy   Treatment    Name: Isaiah Dorsey Jr.  MRN: [id number]  Admitting Diagnosis:  Atherosclerotic heart disease of native coronary artery with unstable angina pectoris  3 Days Post-Op    Recommendations:     Discharge Recommendations: home  Discharge Equipment Recommendations:  none  Barriers to discharge:  None    Assessment:     Isaiah Dorsey Jr. is a 62 y.o. male with a medical diagnosis of Atherosclerotic heart disease of native coronary artery with unstable angina pectoris.  He presents with progress towards goals as evidenced by increased tolerance for activity as pt able to mobilize to/from bathroom for standing grooming tasks, then mobilize in room. Performance deficits affecting function are weakness, impaired balance, impaired endurance, impaired cardiopulmonary response to activity, impaired self care skills, impaired functional mobility, gait instability.     Rehab Prognosis:  Good; patient would benefit from acute skilled OT services to address these deficits and reach maximum level of function.       Plan:     Patient to be seen 5 x/week to address the above listed problems via self-care/home management, therapeutic activities, therapeutic exercises  · Plan of Care Expires: 08/19/22  · Plan of Care Reviewed with: patient    Subjective     Pain/Comfort:  · Pain Rating 1: 4/10  · Location - Orientation 1: midline  · Location 1: chest  · Pain Addressed 1: Reposition, Distraction  · Pain Rating Post-Intervention 1: 4/10    Objective:     Communicated with: RN prior to session.  Patient found supine with telemetry, chest tube, soler catheter, peripheral IV upon OT entry to room.    General Precautions: Standard, fall, sternal   Orthopedic Precautions:N/A   Braces:    Respiratory Status: Nasal cannula, flow 3 L/min     Occupational Performance:     Bed Mobility:    · Patient completed Supine to Sit with contact guard assistance     Functional Mobility/Transfers:  · Patient completed Sit <>  Stand Transfer with stand by assistance  with  no assistive device   · Patient completed Toilet Transfer Step Transfer technique with stand by assistance with  no AD  · Functional Mobility: SBA with no AD    Activities of Daily Living:  · Feeding:  independence    · Grooming: independence    · Lower Body Dressing: minimum assistance    · Toileting: minimum assistance        AMPAC 6 Click ADL: 20    Treatment & Education:  Pt ed on OT POC  Pt re-ed on sternal precautions during ADL  Pt ed on ROM ex's 3x daily for increased overall strength and endurance    Patient left up in chair with all lines intact, call button in reach and RN notifiedEducation:      GOALS:   Multidisciplinary Problems     Occupational Therapy Goals        Problem: Occupational Therapy    Goal Priority Disciplines Outcome Interventions   Occupational Therapy Goal     OT, PT/OT Ongoing, Progressing    Description: Goals set on 7/20 with expiration date 8/3:  Patient will increase functional independence with ADLs by performing:    Supine <> Sit with [place] adhering to sternal precautions.  Grooming while standing at sink with Mod [place].  UB Dressing with Mod [place].  LB Dressing with Mod [place].  Step transfer with Mod [place] with DME as needed.  Pt will demonstrate understanding of education provided regarding energy conservation and task modification through teach-back method.   Pt will demonstrate understanding of education provided regarding sternal precautions and application to functional tasks / ADLs reviewed:  including no lifting > 5 lbs, pulling or pushing with BUEs; Modifying daily activities and functional mobility tasks to maintain sternal precautions appropriately                        Time Tracking:     OT Date of Treatment: 07/21/22  OT Start Time: 0826  OT Stop Time: 0850  OT Total Time (min): 24 min    Billable Minutes:Self Care/Home Management 14  Therapeutic Exercise 10                7/21/2022

## 2022-07-22 ENCOUNTER — RESEARCH ENCOUNTER (OUTPATIENT)
Dept: RESEARCH | Facility: HOSPITAL | Age: 62
End: 2022-07-22
Payer: COMMERCIAL

## 2022-07-22 LAB
ALBUMIN SERPL BCP-MCNC: 2.9 G/DL (ref 3.5–5.2)
ALP SERPL-CCNC: 67 U/L (ref 55–135)
ALT SERPL W/O P-5'-P-CCNC: 50 U/L (ref 10–44)
ANION GAP SERPL CALC-SCNC: 11 MMOL/L (ref 8–16)
AST SERPL-CCNC: 32 U/L (ref 10–40)
BASOPHILS # BLD AUTO: 0.13 K/UL (ref 0–0.2)
BASOPHILS NFR BLD: 0.9 % (ref 0–1.9)
BILIRUB SERPL-MCNC: 0.7 MG/DL (ref 0.1–1)
BUN SERPL-MCNC: 20 MG/DL (ref 8–23)
CALCIUM SERPL-MCNC: 8.8 MG/DL (ref 8.7–10.5)
CHLORIDE SERPL-SCNC: 98 MMOL/L (ref 95–110)
CO2 SERPL-SCNC: 26 MMOL/L (ref 23–29)
CREAT SERPL-MCNC: 0.8 MG/DL (ref 0.5–1.4)
DIFFERENTIAL METHOD: ABNORMAL
EOSINOPHIL # BLD AUTO: 0.4 K/UL (ref 0–0.5)
EOSINOPHIL NFR BLD: 2.6 % (ref 0–8)
ERYTHROCYTE [DISTWIDTH] IN BLOOD BY AUTOMATED COUNT: 14.4 % (ref 11.5–14.5)
EST. GFR  (AFRICAN AMERICAN): >60 ML/MIN/1.73 M^2
EST. GFR  (NON AFRICAN AMERICAN): >60 ML/MIN/1.73 M^2
GLUCOSE SERPL-MCNC: 96 MG/DL (ref 70–110)
HCT VFR BLD AUTO: 36.4 % (ref 40–54)
HGB BLD-MCNC: 11.7 G/DL (ref 14–18)
IMM GRANULOCYTES # BLD AUTO: 0.17 K/UL (ref 0–0.04)
IMM GRANULOCYTES NFR BLD AUTO: 1.2 % (ref 0–0.5)
LYMPHOCYTES # BLD AUTO: 2.6 K/UL (ref 1–4.8)
LYMPHOCYTES NFR BLD: 18 % (ref 18–48)
MAGNESIUM SERPL-MCNC: 2 MG/DL (ref 1.6–2.6)
MCH RBC QN AUTO: 30.4 PG (ref 27–31)
MCHC RBC AUTO-ENTMCNC: 32.1 G/DL (ref 32–36)
MCV RBC AUTO: 95 FL (ref 82–98)
MONOCYTES # BLD AUTO: 1.1 K/UL (ref 0.3–1)
MONOCYTES NFR BLD: 7.8 % (ref 4–15)
NEUTROPHILS # BLD AUTO: 10.1 K/UL (ref 1.8–7.7)
NEUTROPHILS NFR BLD: 69.5 % (ref 38–73)
NRBC BLD-RTO: 0 /100 WBC
PHOSPHATE SERPL-MCNC: 3.2 MG/DL (ref 2.7–4.5)
PLATELET # BLD AUTO: 434 K/UL (ref 150–450)
PMV BLD AUTO: 9.7 FL (ref 9.2–12.9)
POCT GLUCOSE: 104 MG/DL (ref 70–110)
POCT GLUCOSE: 127 MG/DL (ref 70–110)
POCT GLUCOSE: 127 MG/DL (ref 70–110)
POCT GLUCOSE: 134 MG/DL (ref 70–110)
POCT GLUCOSE: 150 MG/DL (ref 70–110)
POCT GLUCOSE: 168 MG/DL (ref 70–110)
POCT GLUCOSE: 174 MG/DL (ref 70–110)
POCT GLUCOSE: 187 MG/DL (ref 70–110)
POCT GLUCOSE: 229 MG/DL (ref 70–110)
POCT GLUCOSE: 241 MG/DL (ref 70–110)
POTASSIUM SERPL-SCNC: 3.7 MMOL/L (ref 3.5–5.1)
PROT SERPL-MCNC: 6.6 G/DL (ref 6–8.4)
RBC # BLD AUTO: 3.85 M/UL (ref 4.6–6.2)
SODIUM SERPL-SCNC: 135 MMOL/L (ref 136–145)
WBC # BLD AUTO: 14.57 K/UL (ref 3.9–12.7)

## 2022-07-22 PROCEDURE — 20600001 HC STEP DOWN PRIVATE ROOM

## 2022-07-22 PROCEDURE — 63600175 PHARM REV CODE 636 W HCPCS: Performed by: NURSE PRACTITIONER

## 2022-07-22 PROCEDURE — 93010 EKG 12-LEAD: ICD-10-PCS | Mod: ,,, | Performed by: INTERNAL MEDICINE

## 2022-07-22 PROCEDURE — 83735 ASSAY OF MAGNESIUM: CPT

## 2022-07-22 PROCEDURE — 25000003 PHARM REV CODE 250: Performed by: THORACIC SURGERY (CARDIOTHORACIC VASCULAR SURGERY)

## 2022-07-22 PROCEDURE — 25000003 PHARM REV CODE 250: Performed by: STUDENT IN AN ORGANIZED HEALTH CARE EDUCATION/TRAINING PROGRAM

## 2022-07-22 PROCEDURE — 36415 COLL VENOUS BLD VENIPUNCTURE: CPT

## 2022-07-22 PROCEDURE — 63600175 PHARM REV CODE 636 W HCPCS

## 2022-07-22 PROCEDURE — 63600175 PHARM REV CODE 636 W HCPCS: Performed by: THORACIC SURGERY (CARDIOTHORACIC VASCULAR SURGERY)

## 2022-07-22 PROCEDURE — 99232 SBSQ HOSP IP/OBS MODERATE 35: CPT | Mod: ,,, | Performed by: NURSE PRACTITIONER

## 2022-07-22 PROCEDURE — 99232 PR SUBSEQUENT HOSPITAL CARE,LEVL II: ICD-10-PCS | Mod: ,,, | Performed by: NURSE PRACTITIONER

## 2022-07-22 PROCEDURE — 85025 COMPLETE CBC W/AUTO DIFF WBC: CPT

## 2022-07-22 PROCEDURE — 25000003 PHARM REV CODE 250

## 2022-07-22 PROCEDURE — 80053 COMPREHEN METABOLIC PANEL: CPT

## 2022-07-22 PROCEDURE — 25000003 PHARM REV CODE 250: Performed by: NURSE PRACTITIONER

## 2022-07-22 PROCEDURE — 93010 ELECTROCARDIOGRAM REPORT: CPT | Mod: ,,, | Performed by: INTERNAL MEDICINE

## 2022-07-22 PROCEDURE — 93005 ELECTROCARDIOGRAM TRACING: CPT

## 2022-07-22 PROCEDURE — 84100 ASSAY OF PHOSPHORUS: CPT

## 2022-07-22 RX ORDER — OXYCODONE HYDROCHLORIDE 5 MG/1
5 TABLET ORAL EVERY 4 HOURS PRN
Qty: 42 TABLET | Refills: 0 | Status: SHIPPED | OUTPATIENT
Start: 2022-07-22 | End: 2022-07-29

## 2022-07-22 RX ORDER — POTASSIUM CHLORIDE 20 MEQ/1
20 TABLET, EXTENDED RELEASE ORAL DAILY
Qty: 5 TABLET | Refills: 0 | Status: SHIPPED | OUTPATIENT
Start: 2022-07-23 | End: 2022-07-28

## 2022-07-22 RX ORDER — METOPROLOL SUCCINATE 100 MG/1
100 TABLET, EXTENDED RELEASE ORAL DAILY
Qty: 30 TABLET | Refills: 11 | Status: SHIPPED | OUTPATIENT
Start: 2022-07-23 | End: 2022-09-30

## 2022-07-22 RX ORDER — GLUCAGON 1 MG
1 KIT INJECTION
Status: DISCONTINUED | OUTPATIENT
Start: 2022-07-22 | End: 2022-07-23 | Stop reason: HOSPADM

## 2022-07-22 RX ORDER — ATORVASTATIN CALCIUM 40 MG/1
40 TABLET, FILM COATED ORAL NIGHTLY
Qty: 30 TABLET | Refills: 11 | Status: SHIPPED | OUTPATIENT
Start: 2022-07-22 | End: 2022-09-30

## 2022-07-22 RX ORDER — IBUPROFEN 200 MG
16 TABLET ORAL
Status: DISCONTINUED | OUTPATIENT
Start: 2022-07-22 | End: 2022-07-23 | Stop reason: HOSPADM

## 2022-07-22 RX ORDER — ASPIRIN 325 MG
325 TABLET ORAL DAILY
Qty: 30 TABLET | Refills: 11 | Status: SHIPPED | OUTPATIENT
Start: 2022-07-23 | End: 2022-12-16

## 2022-07-22 RX ORDER — FUROSEMIDE 40 MG/1
40 TABLET ORAL DAILY
Qty: 5 TABLET | Refills: 0 | Status: SHIPPED | OUTPATIENT
Start: 2022-07-22 | End: 2022-09-30

## 2022-07-22 RX ORDER — IBUPROFEN 200 MG
24 TABLET ORAL
Status: DISCONTINUED | OUTPATIENT
Start: 2022-07-22 | End: 2022-07-23 | Stop reason: HOSPADM

## 2022-07-22 RX ORDER — DOCUSATE SODIUM 100 MG/1
100 CAPSULE, LIQUID FILLED ORAL 2 TIMES DAILY PRN
Qty: 30 CAPSULE | Refills: 0 | Status: SHIPPED | OUTPATIENT
Start: 2022-07-22 | End: 2022-08-31

## 2022-07-22 RX ORDER — INSULIN ASPART 100 [IU]/ML
1-10 INJECTION, SOLUTION INTRAVENOUS; SUBCUTANEOUS
Status: DISCONTINUED | OUTPATIENT
Start: 2022-07-22 | End: 2022-07-23 | Stop reason: HOSPADM

## 2022-07-22 RX ORDER — AMIODARONE HYDROCHLORIDE 400 MG/1
TABLET ORAL
Qty: 30 TABLET | Refills: 3 | Status: SHIPPED | OUTPATIENT
Start: 2022-07-22 | End: 2022-08-31

## 2022-07-22 RX ORDER — ACETAMINOPHEN 500 MG
1000 TABLET ORAL EVERY 6 HOURS PRN
Qty: 30 TABLET | Refills: 0 | Status: SHIPPED | OUTPATIENT
Start: 2022-07-22 | End: 2022-08-31

## 2022-07-22 RX ORDER — FUROSEMIDE 10 MG/ML
20 INJECTION INTRAMUSCULAR; INTRAVENOUS 2 TIMES DAILY
Status: DISCONTINUED | OUTPATIENT
Start: 2022-07-22 | End: 2022-07-23 | Stop reason: HOSPADM

## 2022-07-22 RX ADMIN — FUROSEMIDE 20 MG: 10 INJECTION, SOLUTION INTRAMUSCULAR; INTRAVENOUS at 10:07

## 2022-07-22 RX ADMIN — AMIODARONE HYDROCHLORIDE 400 MG: 200 TABLET ORAL at 02:07

## 2022-07-22 RX ADMIN — FUROSEMIDE 20 MG: 10 INJECTION, SOLUTION INTRAMUSCULAR; INTRAVENOUS at 04:07

## 2022-07-22 RX ADMIN — ASPIRIN 325 MG ORAL TABLET 325 MG: 325 PILL ORAL at 10:07

## 2022-07-22 RX ADMIN — MUPIROCIN: 20 OINTMENT TOPICAL at 11:07

## 2022-07-22 RX ADMIN — INSULIN ASPART 4 UNITS: 100 INJECTION, SOLUTION INTRAVENOUS; SUBCUTANEOUS at 11:07

## 2022-07-22 RX ADMIN — SITAGLIPTIN 100 MG: 50 TABLET, FILM COATED ORAL at 11:07

## 2022-07-22 RX ADMIN — ENOXAPARIN SODIUM 40 MG: 100 INJECTION SUBCUTANEOUS at 04:07

## 2022-07-22 RX ADMIN — ONDANSETRON 4 MG: 2 INJECTION INTRAMUSCULAR; INTRAVENOUS at 11:07

## 2022-07-22 RX ADMIN — ACETAMINOPHEN 1000 MG: 500 TABLET ORAL at 11:07

## 2022-07-22 RX ADMIN — AMIODARONE HYDROCHLORIDE 400 MG: 200 TABLET ORAL at 10:07

## 2022-07-22 RX ADMIN — DOCUSATE SODIUM 100 MG: 100 CAPSULE ORAL at 11:07

## 2022-07-22 RX ADMIN — DOCUSATE SODIUM 100 MG: 100 CAPSULE ORAL at 10:07

## 2022-07-22 RX ADMIN — ACETAMINOPHEN 1000 MG: 500 TABLET ORAL at 05:07

## 2022-07-22 RX ADMIN — OXYCODONE HYDROCHLORIDE 10 MG: 10 TABLET ORAL at 11:07

## 2022-07-22 RX ADMIN — POTASSIUM CHLORIDE 20 MEQ: 1500 TABLET, EXTENDED RELEASE ORAL at 10:07

## 2022-07-22 RX ADMIN — AMIODARONE HYDROCHLORIDE 400 MG: 200 TABLET ORAL at 11:07

## 2022-07-22 RX ADMIN — METOPROLOL SUCCINATE 100 MG: 100 TABLET, EXTENDED RELEASE ORAL at 10:07

## 2022-07-22 RX ADMIN — ATORVASTATIN CALCIUM 40 MG: 20 TABLET, FILM COATED ORAL at 11:07

## 2022-07-22 NOTE — PROGRESS NOTES
[facility] - [facility]  Cardiothoracic Surgery  Progress Note    Patient Name: Isaiah Dorsey Jr.  MRN: [id number]  Admission Date: 7/11/2022  Hospital Length of Stay: 11 days  Code Status: Full Code   Attending Physician: Blu Rhodes MD   Referring Provider: Self, Aaareferral  Principal Problem:Atherosclerotic heart disease of native coronary artery with unstable angina pectoris      Subjective:     Post-Op Info:  Procedure(s) (LRB):  CORONARY ARTERY BYPASS GRAFT (CABG) (Left)  HARVEST-VEIN-ENDOVASCULAR (Left)   4 Days Post-Op     Interval History: NAEON. Off oxygen today. Insulin gtt turned off. Lasix decreased. Working well with therapy.      Review of Systems   Constitutional: Negative for malaise/fatigue.   Cardiovascular:  Negative for chest pain and palpitations.   Respiratory:  Negative for shortness of breath.    Neurological:  Negative for weakness.   Medications:  Continuous Infusions:  Scheduled Meds:   acetaminophen  1,000 mg Oral Q8H    amiodarone  400 mg Oral TID    aspirin  325 mg Oral Daily    atorvastatin  40 mg Oral QHS    docusate sodium  100 mg Oral BID    enoxaparin  40 mg Subcutaneous Daily    furosemide (LASIX) injection  20 mg Intravenous BID    LIDOcaine HCL 10 mg/ml (1%)  100 mg Other Once    metoprolol succinate  100 mg Oral Daily    mupirocin   Nasal BID    potassium chloride  20 mEq Oral Daily    SITagliptin  100 mg Oral Daily     PRN Meds:bisacodyL, dextrose 10%, dextrose 10%, glucagon (human recombinant), glucose, glucose, insulin aspart U-100, melatonin, ondansetron, oxyCODONE, oxyCODONE, sodium chloride 0.9%     Objective:     Vital Signs (Most Recent):  Temp: 97.5 °F (36.4 °C) (07/22/22 0826)  Pulse: 83 (07/22/22 0826)  Resp: 18 (07/22/22 0826)  BP: 119/78 (07/22/22 0826)  SpO2: 99 % (07/22/22 0826) Vital Signs (24h Range):  Temp:  [97.3 °F (36.3 °C)-98.9 °F (37.2 °C)] 97.5 °F (36.4 °C)  Pulse:  [80-88] 83  Resp:  [17-20] 18  SpO2:  [92 %-99 %] 99 %  BP:  (105-128)/(58-84) 119/78     Weight: 128.5 kg (283 lb 4.7 oz)  Body mass index is 38.42 kg/m².    SpO2: 99 %  O2 Device (Oxygen Therapy): nasal cannula    Intake/Output - Last 3 Shifts         07/20 0700  07/21 0659 07/21 0700 07/22 0659 07/22 0700 07/23 0659    P.O. 1440 1420     I.V. (mL/kg) 25.8 (0.2)      IV Piggyback       Total Intake(mL/kg) 1465.8 (11.6) 1420 (11.1)     Urine (mL/kg/hr) 2450 (0.8) 7675 (2.5)     Stool 0      Chest Tube 30 50     Total Output 2480 7725     Net -1014.2 -6305            Stool Occurrence 0 x              Lines/Drains/Airways       Line  Duration                  Pacer Wires 07/18/22 0946 4 days              Peripheral Intravenous Line  Duration                  Peripheral IV - Single Lumen 07/14/22 2350 20 G Anterior;Left;Proximal Forearm 7 days         Peripheral IV - Single Lumen 07/20/22 1515 20 G Left Hand 1 day         Peripheral IV - Single Lumen 07/20/22 1558 18 G Left Antecubital 1 day                    Physical Exam  Constitutional:       General: He is not in acute distress.  HENT:      Head: Normocephalic and atraumatic.   Eyes:      Pupils: Pupils are equal, round, and reactive to light.   Cardiovascular:      Rate and Rhythm: Normal rate and regular rhythm.   Pulmonary:      Effort: Pulmonary effort is normal. No respiratory distress.   Abdominal:      General: There is no distension.   Musculoskeletal:         General: No swelling. Normal range of motion.      Cervical back: Normal range of motion.   Skin:     Coloration: Skin is not pale.      Comments: Midline sternal incision c/d/i   Neurological:      General: No focal deficit present.      Mental Status: He is alert and oriented to person, place, and time.   Psychiatric:         Mood and Affect: Mood normal.         Behavior: Behavior normal.       Significant Labs:  CBC:   Recent Labs   Lab 07/22/22  0442   WBC 14.57*   RBC 3.85*   HGB 11.7*   HCT 36.4*      MCV 95   MCH 30.4   MCHC 32.1     CMP:    Recent Labs   Lab 07/22/22  0442   GLU 96   CALCIUM 8.8   ALBUMIN 2.9*   PROT 6.6   *   K 3.7   CO2 26   CL 98   BUN 20   CREATININE 0.8   ALKPHOS 67   ALT 50*   AST 32   BILITOT 0.7       Significant Diagnostics:  I have reviewed all pertinent imaging results/findings within the past 24 hours.    Assessment/Plan:     Paroxysmal atrial fibrillation  Post operative   Amiodarone   BB  NSR today     Acute blood loss anemia  Expected post operatively   CBC daily     S/P CABG (coronary artery bypass graft)  ASA   Statin   BB   Lasix and k   Pain regimen   Bowel regimen   SubQ Lovenox   Chest tubes and pacer wires removed   On room air   PT/OT    Type 2 diabetes mellitus with hyperglycemia, without long-term current use of insulin  Endocrine following     Elevated platelet count  CBC daily    HLD (hyperlipidemia)  Statin     HTN (hypertension), benign  Stable  BB    Morbid obesity with BMI of 45.0-49.9, adult  PT/OT       Dispo: CSU. D/C possibly tomorrow     Gabi Dash PA-C  Cardiothoracic Surgery  [facility]

## 2022-07-22 NOTE — SUBJECTIVE & OBJECTIVE
Interval History: NAEON. Off oxygen today. Insulin gtt turned off. Lasix decreased. Working well with therapy.      Review of Systems   Constitutional: Negative for malaise/fatigue.   Cardiovascular:  Negative for chest pain and palpitations.   Respiratory:  Negative for shortness of breath.    Neurological:  Negative for weakness.   Medications:  Continuous Infusions:  Scheduled Meds:   acetaminophen  1,000 mg Oral Q8H    amiodarone  400 mg Oral TID    aspirin  325 mg Oral Daily    atorvastatin  40 mg Oral QHS    docusate sodium  100 mg Oral BID    enoxaparin  40 mg Subcutaneous Daily    furosemide (LASIX) injection  20 mg Intravenous BID    LIDOcaine HCL 10 mg/ml (1%)  100 mg Other Once    metoprolol succinate  100 mg Oral Daily    mupirocin   Nasal BID    potassium chloride  20 mEq Oral Daily    SITagliptin  100 mg Oral Daily     PRN Meds:bisacodyL, dextrose 10%, dextrose 10%, glucagon (human recombinant), glucose, glucose, insulin aspart U-100, melatonin, ondansetron, oxyCODONE, oxyCODONE, sodium chloride 0.9%     Objective:     Vital Signs (Most Recent):  Temp: 97.5 °F (36.4 °C) (07/22/22 0826)  Pulse: 83 (07/22/22 0826)  Resp: 18 (07/22/22 0826)  BP: 119/78 (07/22/22 0826)  SpO2: 99 % (07/22/22 0826) Vital Signs (24h Range):  Temp:  [97.3 °F (36.3 °C)-98.9 °F (37.2 °C)] 97.5 °F (36.4 °C)  Pulse:  [80-88] 83  Resp:  [17-20] 18  SpO2:  [92 %-99 %] 99 %  BP: (105-128)/(58-84) 119/78     Weight: 128.5 kg (283 lb 4.7 oz)  Body mass index is 38.42 kg/m².    SpO2: 99 %  O2 Device (Oxygen Therapy): nasal cannula    Intake/Output - Last 3 Shifts         07/20 0700 07/21 0659 07/21 0700 07/22 0659 07/22 0700 07/23 0659    P.O. 1440 1420     I.V. (mL/kg) 25.8 (0.2)      IV Piggyback       Total Intake(mL/kg) 1465.8 (11.6) 1420 (11.1)     Urine (mL/kg/hr) 2450 (0.8) 7675 (2.5)     Stool 0      Chest Tube 30 50     Total Output 2480 7748     Net -1014.2 -6305            Stool Occurrence 0 x               Lines/Drains/Airways       Line  Duration                  Pacer Wires 07/18/22 0946 4 days              Peripheral Intravenous Line  Duration                  Peripheral IV - Single Lumen 07/14/22 2350 20 G Anterior;Left;Proximal Forearm 7 days         Peripheral IV - Single Lumen 07/20/22 1515 20 G Left Hand 1 day         Peripheral IV - Single Lumen 07/20/22 1558 18 G Left Antecubital 1 day                    Physical Exam  Constitutional:       General: He is not in acute distress.  HENT:      Head: Normocephalic and atraumatic.   Eyes:      Pupils: Pupils are equal, round, and reactive to light.   Cardiovascular:      Rate and Rhythm: Normal rate and regular rhythm.   Pulmonary:      Effort: Pulmonary effort is normal. No respiratory distress.   Abdominal:      General: There is no distension.   Musculoskeletal:         General: No swelling. Normal range of motion.      Cervical back: Normal range of motion.   Skin:     Coloration: Skin is not pale.      Comments: Midline sternal incision c/d/i   Neurological:      General: No focal deficit present.      Mental Status: He is alert and oriented to person, place, and time.   Psychiatric:         Mood and Affect: Mood normal.         Behavior: Behavior normal.       Significant Labs:  CBC:   Recent Labs   Lab 07/22/22 0442   WBC 14.57*   RBC 3.85*   HGB 11.7*   HCT 36.4*      MCV 95   MCH 30.4   MCHC 32.1     CMP:   Recent Labs   Lab 07/22/22 0442   GLU 96   CALCIUM 8.8   ALBUMIN 2.9*   PROT 6.6   *   K 3.7   CO2 26   CL 98   BUN 20   CREATININE 0.8   ALKPHOS 67   ALT 50*   AST 32   BILITOT 0.7       Significant Diagnostics:  I have reviewed all pertinent imaging results/findings within the past 24 hours.

## 2022-07-22 NOTE — HPI
Mr. Dorsey is a pleasant 62 year old male with hypertension, diabetes (HbA1C 7.6), and BMI of 39, who presented with unstable angina (troponin 0.037) vs NSTEMI, given Brilinta, and underwent coronary angiogram showing multivessel coronary artery disease.  Coronary angiogram details: 90% early mid LAD lesion with a moderate sized mid and distal LAD (wrap around LAD), 99% ostial LCx which has diffuse disease, and 90% proximal lesion in a small to moderate sized right posterolateral ventricular artery.  Transthoracic echocardiogram showed 40-45% ejection fraction.     Given the severity of disease and the symptoms, I recommend coronary artery bypass surgery x 1 or 2 (LIMA-LAD, possible SVG-RPL), on pump vs off pump.  The risks and benefits were explained and informed consent was obtained.

## 2022-07-22 NOTE — PLAN OF CARE
Problem: Adult Inpatient Plan of Care  Goal: Plan of Care Review  Outcome: Ongoing, Progressing     Problem: Diabetes Comorbidity  Goal: Blood Glucose Level Within Targeted Range  Outcome: Ongoing, Progressing  Intervention: Monitor and Manage Glycemia  Flowsheets (Taken 7/22/2022 0103)  Glycemic Management: blood glucose monitored     Problem: Fall Injury Risk  Goal: Absence of Fall and Fall-Related Injury  Outcome: Ongoing, Progressing  Intervention: Identify and Manage Contributors  Flowsheets (Taken 7/22/2022 0103)  Self-Care Promotion: independence encouraged  Medication Review/Management: medications reviewed  Intervention: Promote Injury-Free Environment  Flowsheets (Taken 7/22/2022 0103)  Safety Promotion/Fall Prevention:   assistive device/personal item within reach   side rails raised x 2   nonskid shoes/socks when out of bed     Problem: Infection  Goal: Absence of Infection Signs and Symptoms  Outcome: Ongoing, Progressing  Intervention: Prevent or Manage Infection  Flowsheets (Taken 7/22/2022 0103)  Infection Management: aseptic technique maintained  Isolation Precautions: precautions initiated     Problem: Skin Injury Risk Increased  Goal: Skin Health and Integrity  Outcome: Ongoing, Progressing  Intervention: Optimize Skin Protection  Flowsheets (Taken 7/22/2022 0103)  Pressure Reduction Techniques: frequent weight shift encouraged  Skin Protection:   adhesive use limited   incontinence pads utilized  Head of Bed (HOB) Positioning: HOB elevated

## 2022-07-22 NOTE — HOSPITAL COURSE
On 7/18/22, the patient was taken to the Operating Room for the above stated procedure. Please see the previously dictated operative report for complete details. Postoperatively, the patient was taken from the  Operating Room to the ICU where the vital signs were monitored and pain was kept under control. The patient was weaned from the drips and extubated in the ICU per protocol. Once hemodynamically stable, the patient was transferred to the Cardiac Step-Down floor for continued strengthening and ambulation. Post operative course complicated by atrial fibrillation for which patient was started on amiodarone. Is enrolled in the PACES trial for which patient will be sent home with on Aspirin and Xarelto. On postoperative day 5, the patient was ready for discharge to home. At the time of discharge, the patient was ambulating unassisted. Pain was well controlled with oral analgesics and the patient was tolerating the diet.     MOBILITY AND ACTIVITY: As tolerated. Patient may shower. No heavy lifting of greater than 5 pounds and no driving.     DIET: An 1800-calorie ADA with a 1500 mL fluid restriction.     WOUND CARE INSTRUCTIONS: Check for redness, swelling and drainage around the  incision or wound. Patient is to call for any obvious bleeding, drainage, pus from the wound, unusual problems or difficulties or temperature of greater than 101   degrees.     FOLLOWUP: Follow up with Dr. Rhodes in approximately 5 weeks. Prior to this  appointment, the patient will have an EKG.     Patient not placed on Ace-Inhibitor at the time of discharge due to potential for hypotension       DISCHARGE CONDITION: At the time of discharge, the patient was in sinus rhythm and afebrile with stable vital signs.

## 2022-07-22 NOTE — PROGRESS NOTES
Study name: Anticoagulation for New-Onset Post-Operative Atrial Fibrillation after CABG- PACES    IRB:  2019.273    Sponsored by: [employer] [place] ([employer])    PI: Dr. JENARO Holguin    Study Visit: Informed Consent     Who was present: Patient, family member, Corrina Hufftatianna, CRC and JUDY Espinoza     Date Consent signed: [id number]    Is LAR Consenting for Subject: NA    07/22/2022: I met with the patient for the PACES Study.  Patient has had 60 minutes of continuous atrial fibrillation and repeated episodes to meet criteria for randomization.     Prior to the Informed Consent (IC) being signed, or any study protocol required data collection, testing, procedure, or intervention being performed, the following was done and/or discussed:   Patient was given a copy of the IC for review    Purpose of the study and qualifications to participate    Study design, Follow up schedule, and tests or procedures done at each visit   Confidentiality and HIPAA Authorization for Release of Medical Records for the research trial/ subject's rights/research related injury   Risk, Benefits, Alternative Treatments, Compensation and Costs   Participation in the research trial is voluntary and patient may withdraw at anytime   Contact information for study related questions    Patient verbalizes understanding of the above: Yes  Contact information for CRC and PI given to patient: Yes  Patient able to adequately summarize: the purpose of the study, the risks associated with the study, and all procedures, testing, and follow-ups associated with the study: Yes    Patient signed the informed consent form for the Anticoagulation for New-Onset Post-Operative Atrial Fibrillation after CABG- PACES  research study with an IRB approval date of 11/29/2021 ([facility]) and 11/29/2021 ([facility]).  Each page of the consent form was reviewed with patient and family member; all questions answered satisfactorily. Family member  expressed concerns and questions address. Patient did not have any concerns at this time.  He spoke to Dr. Rhodes today and felt confident in participation. Patient signed the consent form and received a copy of same. The original consent was scanned into electronic medical records (EPIC) and filed into the subject's research study binder.       Randomization/treatment assignment: Oral anticoagulation therapy  Patient informed of randomization assignment and verbalized understanding.   Patient contact information reaffirmed and updated.     11:35 am:  JUDY Espinoza review patient Medical History, Demographics and Authorization for Release of Information with the patient.

## 2022-07-22 NOTE — ASSESSMENT & PLAN NOTE
BG goal 110-140 (CTS protocol)   Diet diabetic [facility]; 1500 Calorie; Isolation Tray - Regular [place]; Fluid - 1500mL    - Discontinue IV insulin orders.   - Discontinue Novolog 6-8 units TIDWM.   - Start Januvia 100 mg daily. Patient denies PMH of medullary thyroid CA and/or pancreatitis.   - BG Monitoring AC/HS     ** Please call Endocrine for any BG related issues **  ** Please notify Endocrine for any change and/or advance in diet**    Lab Results   Component Value Date    HGBA1C 7.6 (H) 07/12/2022     Discharge Planning:   - Insurance preferred diabetes testing supplies  - Continue Metformin 1000 mg BID.   - Start Januvia 100 mg daily (Insurance preferred DPP-4 inhibitor).   - BG Monitoring AC/HS   - Patient is to keep blood sugar logs, and follow-up with PCP for continued DM management and care.

## 2022-07-22 NOTE — PLAN OF CARE
07/22/22 1632   Post-Acute Status   Post-Acute Authorization Home Health   Home Health Status Set-up Complete/Auth obtained     Pt accepted by [facility] for admit Monday 7/25.    Nadine Ramirez LMSW  [facility]  [phone]

## 2022-07-22 NOTE — ASSESSMENT & PLAN NOTE
ASA   Statin   BB   Lasix and k   Pain regimen   Bowel regimen   SubQ Lovenox   Chest tubes and pacer wires removed   On room air   PT/OT

## 2022-07-22 NOTE — PROGRESS NOTES
[facility] - [facility] Step[facility]  Endocrinology  Progress Note    Admit Date: 2022     Reason for Consult: Management of T2DM, Hyperglycemia     Surgical Procedure and Date: CABG on 22    Diabetes diagnosis year: ODETTE (intubated)     Home Diabetes Medications:  Metformin (per chart review) unsure of dose    How often checking glucose at home?  ODETTE    BG readings on regimen: ODETTE  Hypoglycemia on the regimen?  ODETTE  Missed doses on regimen? ODETTE    Diabetes Complications include:     Hyperglycemia    Complicating diabetes co morbidities:   History of MI, CHF, HTN, HLD       HPI:   Patient is a 62 y.o. male with a diagnosis of HTN, HLD, DM2, aortic dilatation, CHF (EF 40-45%), and BMI 38 who [place] [facility].  He was found to have multi-vessel coronary disease on C with LAD with 90% ostial to prox and 70% mid LAD, D1 70%, D2 60%, ost to prox LCX with 99% stenosis. Cardiothoracic surgery was consulted and recommended to proceed with CABG this admission. He now presents for the above procedure. Endocrinology consulted for management of T2DM.      Lab Results   Component Value Date    HGBA1C 7.6 (H) 2022           Interval HPI:   Overnight events:   BG trends stable while on current SQ insulin regimen. Patient has stepped down on IV insulin infusion protocol overnight. Endocrinology will continue to follow, and manage glycemic control while inpatient.   Diet diabetic [facility]; 1500 Calorie; Isolation Tray - Regular [place]; Fluid - 1500mL  4 Days Post-Op    Eatin%  Nausea: No  Hypoglycemia and intervention: No  Fever: No  TPN and/or TF: No  If yes, type of TF/TPN and rate: None    /78 (BP Location: Right arm, Patient Position: Sitting)   Pulse 83   Temp 97.5 °F (36.4 °C) (Tympanic)   Resp 18   Ht 6' (1.829 m)   Wt 128.5 kg (283 lb 4.7 oz)   SpO2 99%   BMI 38.42 kg/m²     Labs Reviewed and Include    Recent Labs   Lab 22  0442   GLU 96   CALCIUM 8.8   ALBUMIN 2.9*    PROT 6.6   *   K 3.7   CO2 26   CL 98   BUN 20   CREATININE 0.8   ALKPHOS 67   ALT 50*   AST 32   BILITOT 0.7     Lab Results   Component Value Date    WBC 14.57 (H) 07/22/2022    HGB 11.7 (L) 07/22/2022    HCT 36.4 (L) 07/22/2022    MCV 95 07/22/2022     07/22/2022     No results for input(s): TSH, FREET4 in the last 168 hours.  Lab Results   Component Value Date    HGBA1C 7.6 (H) 07/12/2022       Nutritional status:   Body mass index is 38.42 kg/m².  Lab Results   Component Value Date    ALBUMIN 2.9 (L) 07/22/2022    ALBUMIN 3.2 (L) 07/21/2022    ALBUMIN 3.0 (L) 07/20/2022     No results found for: PREALBUMIN    Estimated Creatinine Clearance: 131 mL/min (based on SCr of 0.8 mg/dL).    Accu-Checks  Recent Labs     07/20/22  0236 07/20/22  0737 07/20/22  1215 07/20/22  1611 07/20/22  2221 07/21/22  0243 07/21/22  0754 07/21/22  1127 07/21/22  1515 07/22/22  0828   POCTGLUCOSE 217* 195* 200* 213* 127* 134* 134* 241* 155* 127*       Current Medications and/or Treatments Impacting Glycemic Control  Immunotherapy:    Immunosuppressants       None          Steroids:   Hormones (From admission, onward)                Start     Stop Route Frequency Ordered    07/19/22 1833  melatonin tablet 6 mg         -- Oral Nightly PRN 07/19/22 1733          Pressors:    Autonomic Drugs (From admission, onward)                None          Hyperglycemia/Diabetes Medications:   Antihyperglycemics (From admission, onward)                Start     Stop Route Frequency Ordered    07/21/22 1130  insulin aspart U-100 pen 8 Units         -- SubQ 3 times daily with meals 07/21/22 1014    07/21/22 1015  insulin regular in 0.9 % NaCl 100 unit/100 mL (1 unit/mL) infusion        Question:  Enter initial dose (Units/hr):  Answer:  1    -- IV Continuous 07/21/22 1014    07/19/22 1524  insulin aspart U-100 pen 0-10 Units         -- SubQ As needed (PRN) 07/19/22 1424            ASSESSMENT and PLAN    * Atherosclerotic heart disease of  native coronary artery with unstable angina pectoris  Managed per primary team  S/p CABG        Type 2 diabetes mellitus with hyperglycemia, without long-term current use of insulin  BG goal 110-140 (CTS protocol)   Diet diabetic [facility]; 1500 Calorie; Isolation Tray - Regular [place]; Fluid - 1500mL    - Discontinue IV insulin orders.   - Discontinue Novolog 6-8 units TIDWM.   - Start Januvia 100 mg daily. Patient denies PMH of medullary thyroid CA and/or pancreatitis.   - BG Monitoring AC/HS     ** Please call Endocrine for any BG related issues **  ** Please notify Endocrine for any change and/or advance in diet**    Lab Results   Component Value Date    HGBA1C 7.6 (H) 07/12/2022     Discharge Planning:   - Insurance preferred diabetes testing supplies  - Continue Metformin 1000 mg BID.   - Start Januvia 100 mg daily (Insurance preferred DPP-4 inhibitor).   - BG Monitoring AC/HS   - Patient is to keep blood sugar logs, and follow-up with PCP for continued DM management and care.       HTN (hypertension), benign  Managed per primary team  Condition may cause insulin resistance         HLD (hyperlipidemia)  May increase insulin resistance.         Morbid obesity with BMI of 45.0-49.9, adult  Body mass index is 38.42 kg/m².  May increase insulin resistance.                Gabe Golden, NP  Endocrinology  [facility]

## 2022-07-22 NOTE — PLAN OF CARE
[facility] - [facility]  Discharge Reassessment    Primary Care Provider: Primary Doctor No    Expected Discharge Date: 7/23/2022    Reassessment (most recent)     Discharge Reassessment - 07/22/22 1324        Discharge Reassessment    Assessment Type Discharge Planning Reassessment     Discharge Plan discussed with: Patient;Spouse/sig other     Communicated STELLA with patient/caregiver Yes     Discharge Plan A Home Health     Discharge Plan B Home with family;Home     Why the patient remains in the hospital Requires continued medical care        Post-Acute Status    Post-Acute Authorization Home Health     Home Health Status Referrals Sent               SW met with pt and wife at bedside who voiced agreement with plan for HH with no preference of provider.  Referrals sent to [facility] and [facility] via [employer].    Nadine Ramirez LMSW  [facility]  [phone]

## 2022-07-22 NOTE — SUBJECTIVE & OBJECTIVE
Interval HPI:   Overnight events:   BG trends stable while on current SQ insulin regimen. Patient has stepped down on IV insulin infusion protocol overnight. Endocrinology will continue to follow, and manage glycemic control while inpatient.   Diet diabetic [facility]; 1500 Calorie; Isolation Tray - Regular [place]; Fluid - 1500mL  4 Days Post-Op    Eatin%  Nausea: No  Hypoglycemia and intervention: No  Fever: No  TPN and/or TF: No  If yes, type of TF/TPN and rate: None    /78 (BP Location: Right arm, Patient Position: Sitting)   Pulse 83   Temp 97.5 °F (36.4 °C) (Tympanic)   Resp 18   Ht 6' (1.829 m)   Wt 128.5 kg (283 lb 4.7 oz)   SpO2 99%   BMI 38.42 kg/m²     Labs Reviewed and Include    Recent Labs   Lab 22  0442   GLU 96   CALCIUM 8.8   ALBUMIN 2.9*   PROT 6.6   *   K 3.7   CO2 26   CL 98   BUN 20   CREATININE 0.8   ALKPHOS 67   ALT 50*   AST 32   BILITOT 0.7     Lab Results   Component Value Date    WBC 14.57 (H) 2022    HGB 11.7 (L) 2022    HCT 36.4 (L) 2022    MCV 95 2022     2022     No results for input(s): TSH, FREET4 in the last 168 hours.  Lab Results   Component Value Date    HGBA1C 7.6 (H) 2022       Nutritional status:   Body mass index is 38.42 kg/m².  Lab Results   Component Value Date    ALBUMIN 2.9 (L) 2022    ALBUMIN 3.2 (L) 2022    ALBUMIN 3.0 (L) 2022     No results found for: PREALBUMIN    Estimated Creatinine Clearance: 131 mL/min (based on SCr of 0.8 mg/dL).    Accu-Checks  Recent Labs     22  0236 22  0737 22  1215 22  1611 22  2221 22  0243 22  0754 22  1127 22  1515 22  0828   POCTGLUCOSE 217* 195* 200* 213* 127* 134* 134* 241* 155* 127*       Current Medications and/or Treatments Impacting Glycemic Control  Immunotherapy:    Immunosuppressants       None          Steroids:   Hormones (From admission, onward)                Start      Stop Route Frequency Ordered    07/19/22 1833  melatonin tablet 6 mg         -- Oral Nightly PRN 07/19/22 1733          Pressors:    Autonomic Drugs (From admission, onward)                None          Hyperglycemia/Diabetes Medications:   Antihyperglycemics (From admission, onward)                Start     Stop Route Frequency Ordered    07/21/22 1130  insulin aspart U-100 pen 8 Units         -- SubQ 3 times daily with meals 07/21/22 1014    07/21/22 1015  insulin regular in 0.9 % NaCl 100 unit/100 mL (1 unit/mL) infusion        Question:  Enter initial dose (Units/hr):  Answer:  1    -- IV Continuous 07/21/22 1014    07/19/22 1524  insulin aspart U-100 pen 0-10 Units         -- SubQ As needed (PRN) 07/19/22 1272

## 2022-07-23 ENCOUNTER — NURSE TRIAGE (OUTPATIENT)
Dept: ADMINISTRATIVE | Facility: CLINIC | Age: 62
End: 2022-07-23
Payer: COMMERCIAL

## 2022-07-23 VITALS
SYSTOLIC BLOOD PRESSURE: 108 MMHG | RESPIRATION RATE: 16 BRPM | DIASTOLIC BLOOD PRESSURE: 67 MMHG | WEIGHT: 283.94 LBS | HEART RATE: 76 BPM | OXYGEN SATURATION: 95 % | TEMPERATURE: 99 F | HEIGHT: 72 IN | BODY MASS INDEX: 38.46 KG/M2

## 2022-07-23 LAB
ALBUMIN SERPL BCP-MCNC: 2.7 G/DL (ref 3.5–5.2)
ALP SERPL-CCNC: 65 U/L (ref 55–135)
ALT SERPL W/O P-5'-P-CCNC: 45 U/L (ref 10–44)
ANION GAP SERPL CALC-SCNC: 11 MMOL/L (ref 8–16)
AST SERPL-CCNC: 28 U/L (ref 10–40)
BASOPHILS # BLD AUTO: 0.11 K/UL (ref 0–0.2)
BASOPHILS NFR BLD: 0.9 % (ref 0–1.9)
BILIRUB SERPL-MCNC: 0.5 MG/DL (ref 0.1–1)
BUN SERPL-MCNC: 21 MG/DL (ref 8–23)
CALCIUM SERPL-MCNC: 8.7 MG/DL (ref 8.7–10.5)
CHLORIDE SERPL-SCNC: 98 MMOL/L (ref 95–110)
CO2 SERPL-SCNC: 26 MMOL/L (ref 23–29)
CREAT SERPL-MCNC: 0.8 MG/DL (ref 0.5–1.4)
DIFFERENTIAL METHOD: ABNORMAL
EOSINOPHIL # BLD AUTO: 0.3 K/UL (ref 0–0.5)
EOSINOPHIL NFR BLD: 2.3 % (ref 0–8)
ERYTHROCYTE [DISTWIDTH] IN BLOOD BY AUTOMATED COUNT: 14.6 % (ref 11.5–14.5)
EST. GFR  (AFRICAN AMERICAN): >60 ML/MIN/1.73 M^2
EST. GFR  (NON AFRICAN AMERICAN): >60 ML/MIN/1.73 M^2
GLUCOSE SERPL-MCNC: 124 MG/DL (ref 70–110)
HCT VFR BLD AUTO: 34.9 % (ref 40–54)
HGB BLD-MCNC: 11.7 G/DL (ref 14–18)
IMM GRANULOCYTES # BLD AUTO: 0.24 K/UL (ref 0–0.04)
IMM GRANULOCYTES NFR BLD AUTO: 1.9 % (ref 0–0.5)
LYMPHOCYTES # BLD AUTO: 2.8 K/UL (ref 1–4.8)
LYMPHOCYTES NFR BLD: 22 % (ref 18–48)
MAGNESIUM SERPL-MCNC: 1.9 MG/DL (ref 1.6–2.6)
MCH RBC QN AUTO: 30.4 PG (ref 27–31)
MCHC RBC AUTO-ENTMCNC: 33.5 G/DL (ref 32–36)
MCV RBC AUTO: 91 FL (ref 82–98)
MONOCYTES # BLD AUTO: 1.1 K/UL (ref 0.3–1)
MONOCYTES NFR BLD: 8.9 % (ref 4–15)
NEUTROPHILS # BLD AUTO: 8.2 K/UL (ref 1.8–7.7)
NEUTROPHILS NFR BLD: 64 % (ref 38–73)
NRBC BLD-RTO: 1 /100 WBC
PHOSPHATE SERPL-MCNC: 3.4 MG/DL (ref 2.7–4.5)
PLATELET # BLD AUTO: 565 K/UL (ref 150–450)
PMV BLD AUTO: 9.1 FL (ref 9.2–12.9)
POCT GLUCOSE: 153 MG/DL (ref 70–110)
POTASSIUM SERPL-SCNC: 4 MMOL/L (ref 3.5–5.1)
PROT SERPL-MCNC: 6.5 G/DL (ref 6–8.4)
RBC # BLD AUTO: 3.85 M/UL (ref 4.6–6.2)
SODIUM SERPL-SCNC: 135 MMOL/L (ref 136–145)
WBC # BLD AUTO: 12.78 K/UL (ref 3.9–12.7)

## 2022-07-23 PROCEDURE — 25000003 PHARM REV CODE 250: Performed by: STUDENT IN AN ORGANIZED HEALTH CARE EDUCATION/TRAINING PROGRAM

## 2022-07-23 PROCEDURE — 25000003 PHARM REV CODE 250

## 2022-07-23 PROCEDURE — 85025 COMPLETE CBC W/AUTO DIFF WBC: CPT

## 2022-07-23 PROCEDURE — 63600175 PHARM REV CODE 636 W HCPCS: Performed by: THORACIC SURGERY (CARDIOTHORACIC VASCULAR SURGERY)

## 2022-07-23 PROCEDURE — 80053 COMPREHEN METABOLIC PANEL: CPT

## 2022-07-23 PROCEDURE — 25000003 PHARM REV CODE 250: Performed by: NURSE PRACTITIONER

## 2022-07-23 PROCEDURE — 83735 ASSAY OF MAGNESIUM: CPT

## 2022-07-23 PROCEDURE — 36415 COLL VENOUS BLD VENIPUNCTURE: CPT

## 2022-07-23 PROCEDURE — 84100 ASSAY OF PHOSPHORUS: CPT

## 2022-07-23 PROCEDURE — 25000003 PHARM REV CODE 250: Performed by: THORACIC SURGERY (CARDIOTHORACIC VASCULAR SURGERY)

## 2022-07-23 RX ORDER — AMIODARONE HYDROCHLORIDE 200 MG/1
TABLET ORAL
Qty: 90 TABLET | Refills: 11 | Status: SHIPPED | OUTPATIENT
Start: 2022-07-23 | End: 2022-08-31

## 2022-07-23 RX ADMIN — DOCUSATE SODIUM 100 MG: 100 CAPSULE ORAL at 08:07

## 2022-07-23 RX ADMIN — SITAGLIPTIN 100 MG: 50 TABLET, FILM COATED ORAL at 08:07

## 2022-07-23 RX ADMIN — OXYCODONE 5 MG: 5 TABLET ORAL at 05:07

## 2022-07-23 RX ADMIN — ACETAMINOPHEN 1000 MG: 500 TABLET ORAL at 05:07

## 2022-07-23 RX ADMIN — ASPIRIN 325 MG ORAL TABLET 325 MG: 325 PILL ORAL at 08:07

## 2022-07-23 RX ADMIN — METOPROLOL SUCCINATE 100 MG: 100 TABLET, EXTENDED RELEASE ORAL at 08:07

## 2022-07-23 RX ADMIN — AMIODARONE HYDROCHLORIDE 400 MG: 200 TABLET ORAL at 08:07

## 2022-07-23 RX ADMIN — FUROSEMIDE 20 MG: 10 INJECTION, SOLUTION INTRAMUSCULAR; INTRAVENOUS at 08:07

## 2022-07-23 RX ADMIN — POTASSIUM CHLORIDE 20 MEQ: 1500 TABLET, EXTENDED RELEASE ORAL at 08:07

## 2022-07-23 RX ADMIN — INSULIN ASPART 2 UNITS: 100 INJECTION, SOLUTION INTRAVENOUS; SUBCUTANEOUS at 08:07

## 2022-07-23 NOTE — DISCHARGE SUMMARY
[facility] - Cardiology Stepdown  Cardiothoracic Surgery  Discharge Summary      Patient Name: Isaiah Dorsey Jr.  MRN: [id number]  Admission Date: 7/11/2022  Hospital Length of Stay: 12 days  Discharge Date and Time:  07/23/2022 10:16 AM  Attending Physician: Blu Rhodes MD   Discharging Provider: Arlyn Koenig MD  Primary Care Provider: Primary Doctor No    HPI:   Mr. Dorsey is a pleasant 62 year old male with hypertension, diabetes (HbA1C 7.6), and BMI of 39, who presented with unstable angina (troponin 0.037) vs NSTEMI, given Brilinta, and underwent coronary angiogram showing multivessel coronary artery disease.  Coronary angiogram details: 90% early mid LAD lesion with a moderate sized mid and distal LAD (wrap around LAD), 99% ostial LCx which has diffuse disease, and 90% proximal lesion in a small to moderate sized right posterolateral ventricular artery.  Transthoracic echocardiogram showed 40-45% ejection fraction.     Given the severity of disease and the symptoms, I recommend coronary artery bypass surgery x 1 or 2 (LIMA-LAD, possible SVG-RPL), on pump vs off pump.  The risks and benefits were explained and informed consent was obtained.       Procedure(s) (LRB):  CORONARY ARTERY BYPASS GRAFT (CABG) (Left)  HARVEST-VEIN-ENDOVASCULAR (Left)      Indwelling Lines/Drains at time of discharge:   Lines/Drains/Airways     Line  Duration                Pacer Wires 07/18/22 0946 5 days              Hospital Course: On 7/18/22, the patient was taken to the Operating Room for the above stated procedure. Please see the previously dictated operative report for complete details. Postoperatively, the patient was taken from the  Operating Room to the ICU where the vital signs were monitored and pain was kept under control. The patient was weaned from the drips and extubated in the ICU per protocol. Once hemodynamically stable, the patient was transferred to the Cardiac Step-Down floor for continued strengthening  and ambulation. Post operative course complicated by atrial fibrillation for which patient was started on amiodarone. Is enrolled in the PACES trial for which patient will be sent home with on Aspirin and Xarelto. On postoperative day 5, the patient was ready for discharge to home. At the time of discharge, the patient was ambulating unassisted. Pain was well controlled with oral analgesics and the patient was tolerating the diet.     MOBILITY AND ACTIVITY: As tolerated. Patient may shower. No heavy lifting of greater than 5 pounds and no driving.     DIET: An 1800-calorie ADA with a 1500 mL fluid restriction.     WOUND CARE INSTRUCTIONS: Check for redness, swelling and drainage around the  incision or wound. Patient is to call for any obvious bleeding, drainage, pus from the wound, unusual problems or difficulties or temperature of greater than 101   degrees.     FOLLOWUP: Follow up with Dr. Rhodes in approximately 5 weeks. Prior to this  appointment, the patient will have an EKG.     Patient not placed on Ace-Inhibitor at the time of discharge due to potential for hypotension       DISCHARGE CONDITION: At the time of discharge, the patient was in sinus rhythm and afebrile with stable vital signs.        Goals of Care Treatment Preferences:  Code Status: Full Code      Consults (From admission, onward)        Status Ordering Provider     Inpatient consult to Endocrinology  Once        Provider:  (Not yet assigned)    Completed ROBIN SZYMANSKI     Inpatient consult to Registered Dietitian/Nutritionist  Once        Provider:  (Not yet assigned)    Completed ROBIN SZYMANSKI     Inpatient consult to Midline team  Once        Provider:  (Not yet assigned)    Completed ARISTIDES POMPA     Inpatient consult to Cardiothoracic Surgery  Once        Provider:  (Not yet assigned)    Completed TERRY ZEPEDA          Significant Diagnostic Studies:     Pending Diagnostic Studies:     Procedure Component Value Units Date/Time     APTT [[id number]] Collected: 07/16/22 0318    Order Status: Sent Lab Status: In process Updated: 07/16/22 0318    Specimen: Blood           Paroxysmal atrial fibrillation  Post operative   Amiodarone   BB  NSR today     Acute blood loss anemia  Expected post operatively   CBC daily     S/P CABG (coronary artery bypass graft)  ASA   Statin   BB   Lasix and k   Pain regimen   Bowel regimen   SubQ Lovenox   Chest tubes and pacer wires removed   On room air   PT/OT    Type 2 diabetes mellitus with hyperglycemia, without long-term current use of insulin  Endocrine following     Elevated platelet count  CBC daily    HLD (hyperlipidemia)  Statin     HTN (hypertension), benign  Stable  BB    Morbid obesity with BMI of 45.0-49.9, adult  PT/OT       Final Active Diagnoses:    Diagnosis Date Noted POA    PRINCIPAL PROBLEM:  Atherosclerotic heart disease of native coronary artery with unstable angina pectoris [I25.110] 07/11/2022 Yes    Acute blood loss anemia [D62] 07/21/2022 Unknown    Paroxysmal atrial fibrillation [I48.0] 07/21/2022 Unknown    Encounter for weaning from ventilator [Z99.11]  Not Applicable    S/P CABG (coronary artery bypass graft) [Z95.1] 07/18/2022 Not Applicable    Non-STEMI (non-ST elevated myocardial infarction) [I21.4]  Yes    SIRS (systemic inflammatory response syndrome) [R65.10] 07/13/2022 Unknown    Heart failure with mid-range ejection fraction [I50.9] 07/13/2022 Unknown    Type 2 diabetes mellitus with hyperglycemia, without long-term current use of insulin [E11.65] 07/11/2022 Unknown    Dilatation of aorta [I77.819] 07/11/2022 Unknown    Elevated platelet count [R79.89] 07/23/2015 Yes    HTN (hypertension), benign [I10] 01/22/2015 Yes    HLD (hyperlipidemia) [E78.5] 01/22/2015 Yes    Morbid obesity with BMI of 45.0-49.9, adult [E66.01, Z68.42] 06/11/2014 Not Applicable      Problems Resolved During this Admission:      Discharged Condition: good    Disposition: Home or Self  Care    Follow Up:   Follow-up Information     [facility] Follow up.    Specialties: Home Health Services, Home Therapy Services, Home Living Aide Services  Why: They will contact you to schedule date and time.  Contact information:  [address] [address]  [phone]           Blu Rhodes MD Follow up in 5 week(s).    Specialties: Cardiothoracic Surgery, Transplant, Vascular Surgery  Contact information:  [address]  [facility] [address]  [phone]                       Patient Instructions:      CTA Chest Non Coronary   Standing Status: Future Standing Exp. Date: 07/11/23     Order Specific Question Answer Comments   Is the patient allergic to iodine or contrast? No    Is the patient on ANY Metformin drug such as Glucophage/Glucovance?           Should be off drug 48 hours after contrast. Check renal function before restart. No    History of Kidney Disease - including: decreased kidney function, dialysis, kidney transplay, single kidney, kidney cancer, kidney surgery? None    May the Radiologist modify the order per protocol to meet the clinical needs of the patient? Yes      Ambulatory referral/consult to Home Health   Standing Status: Future   Referral Priority: Routine Referral Type: Home Health   Referral Reason: Specialty Services Required   Requested Specialty: Home Health Services   Number of Visits Requested: 1     Diet Cardiac     Lifting restrictions   Order Comments: No lifting more than 5lbs for 6 weeks     No driving until:   Order Comments: 4 weeks     Notify your health care provider if you experience any of the following:  temperature >100.4     Notify your health care provider if you experience any of the following:  persistent nausea and vomiting or diarrhea     Notify your health care provider if you experience any of the following:  severe uncontrolled pain     Notify your health care provider if you experience any  of the following:  redness, tenderness, or signs of infection (pain, swelling, redness, odor or green/yellow discharge around incision site)     Notify your health care provider if you experience any of the following:  difficulty breathing or increased cough     No dressing needed     Medications:  Reconciled Home Medications:      Medication List      START taking these medications    acetaminophen 500 MG tablet  Commonly known as: TYLENOL  Take 2 tablets (1,000 mg total) by mouth every 6 (six) hours as needed for Pain.     amiodarone 400 MG tablet  Commonly known as: PACERONE  Take 400mg 3x daily until 7/25 THEN take 400mg 2x daily until 8/8 THEN take 200mg daily ending 9/5/22     aspirin 325 MG tablet  Take 1 tablet (325 mg total) by mouth once daily.     atorvastatin 40 MG tablet  Commonly known as: LIPITOR  Take 1 tablet (40 mg total) by mouth every evening.     docusate sodium 100 MG capsule  Commonly known as: COLACE  Take 1 capsule (100 mg total) by mouth 2 (two) times daily as needed for Constipation.     furosemide 40 MG tablet  Commonly known as: LASIX  Take 1 tablet (40 mg total) by mouth once daily. for 5 days     metoprolol succinate 100 MG 24 hr tablet  Commonly known as: TOPROL-XL  Take 1 tablet (100 mg total) by mouth once daily.     oxyCODONE 5 MG immediate release tablet  Commonly known as: ROXICODONE  Take 1 tablet (5 mg total) by mouth every 4 (four) hours as needed for Pain.     potassium chloride SA 20 MEQ tablet  Commonly known as: K-DUR,KLOR-CON  Take 1 tablet (20 mEq total) by mouth once daily. for 5 days     rivaroxaban 10 mg Tab  Commonly known as: XARELTO  Take 2 tablets (20 mg total) by mouth daily with dinner or evening meal.     SITagliptin 100 MG Tab  Commonly known as: JANUVIA  Take 1 tablet (100 mg total) by mouth once daily.        CONTINUE taking these medications    metFORMIN 1000 MG tablet  Commonly known as: GLUCOPHAGE  Take 1,000 mg by mouth 2 (two) times daily with meals.         STOP taking these medications    amLODIPine 10 MG tablet  Commonly known as: NORVASC     LISINOPRIL ORAL     losartan 100 MG tablet  Commonly known as: COZAAR     meloxicam 15 MG tablet  Commonly known as: MOBIC     naproxen sodium 220 MG tablet  Commonly known as: ANAPROX          Time spent on the discharge of patient: 30 minutes    Arlyn Koenig MD  Cardiothoracic Surgery  [facility] - Cardiology Stepdown

## 2022-07-23 NOTE — TELEPHONE ENCOUNTER
Patient was discharged from hospital today after coronary artery bypass graft on 7/18.  Spoke with Rica (spouse) states patient was prescribed xarelto.  Per Rica insurance will not cover medication unless it's 20 mg.   Prescription order was ordered as rivaroxaban (XARELTO) 10 mg Tab,Take 2 tablets (20 mg total) by mouth daily with dinner or evening meal.   Called  pharmacy for clarity spoke with Ria who states prescription should be sent for xarelto 20 mg tablet take 20 mg once a day.  Medication was ordered by Gabi Dash PA-C.   Attempted to reach provider no response via secure chat.  Spoke with on call provider Dr. Kole Harding whom states he will put in prescription.   Called Rica (wife) to notify her that medication was sent.  Rica then states patient was prescribed januvia 100 mg while in the hospital.  His metformin was increased to 1000 mg BID.  Wife states she has questions about januvia and metformin.  She also stated amiodarone will not be in stock until Monday. However "[employer] did tell Rica which store has medication in stock but she stated she cannot drive far to get it.  Connected patient with on call provider Dr. Kole Harding to discuss her concerns and questions.   Rica did not want to disclose to myself or the on call provider which [facility]'s location she was told that had the medication in stock.  Dr. Kole Harding offered to re-route medication (amiodarome) to [facility] per wife's request.  He instructed her to call office of Dr. Rhodes during office hours on Monday to discuss Januvia, metformin, and amoidarone.  Dr. Sharif told Rica for now patient should take metformin and Januvia.  Called [facility] spoke with Rory states they have amiodarone 200 mg in stock.  Notified Dr. Sharif who then stated he would send in prescription.  He states patient can start the amiodarone tomorrow.  Information was reviewed with wife.  Rica verbalized understanding.  Advised her to call back  with questions or concerns.     mReason for Disposition   [1] Caller has URGENT medicine question about med that PCP or specialist prescribed AND [2] triager unable to answer question    Protocols used: MEDICATION QUESTION CALL-A-AH

## 2022-07-23 NOTE — CARE UPDATE
BG goal 110-140 (CTS protocol)   Diet diabetic [facility]; 1500 Calorie; Isolation Tray - Regular [place]; Fluid - 1500mL     - Continue Januvia 100 mg daily. Patient denies PMH of medullary thyroid CA and/or pancreatitis.   - BG Monitoring AC/HS      ** Please call Endocrine for any BG related issues **  ** Please notify Endocrine for any change and/or advance in diet**           Lab Results   Component Value Date     HGBA1C 7.6 (H) 07/12/2022      Discharge Planning:   - Insurance preferred diabetes testing supplies  - Continue Metformin 1000 mg BID.   - Start Januvia 100 mg daily (Insurance preferred DPP-4 inhibitor).   - BG Monitoring AC/HS   - Patient is to keep blood sugar logs, and follow-up with PCP for continued DM management and care.

## 2022-07-23 NOTE — DISCHARGE INSTRUCTIONS
Discharge Instructions    The hospital and staff members extend their best wishes to you as you are discharged. Because we are most concerned with your health, we suggest you carefully read the following instructions.    Activity Instructions  Ambulate.  No strenuous Exercise   May shower with soap and water.  Do not let shower hit incision directly.  Dry well.       Restrictions:   Sternal precautions.  No heavy lifting more than 5lbs for 6 weeks.  No driving for 4 weeks.     Diet:   Low Salt/Chol/Fat diet    Wound Care Instruction  Keep incision/wound dry-no tub baths.  Clean with soap and water daily. Do not apply ointments or powder to incision sites.   Remove old chest tube dressing tomorrow and leave open to air unless drainage noted then change daily.      When to call the doctors:  For any obvious bleeding, Fever over 101 degrees, Redness or swelling around the incision, Drainage(pus) from the wound, Unusual problems or difficulties, Persistent pain not relieved by the pain medication.    Call [phone] to have the operators page the doctor on call for Cardiothoracic surgery after hours with questions or concerns      Current pain management regimen    IF PAIN INTENSIFIES OR PAIN IS UNRELIEVED WITH MEDICATIONS AS ORDERED, CALL YOUR DOCTOR

## 2022-07-23 NOTE — PLAN OF CARE
Problem: Adult Inpatient Plan of Care  Goal: Plan of Care Review  7/23/2022 1129 by Hina Rivera RN  Outcome: Met  7/23/2022 1129 by Hina Rivera RN  Outcome: Ongoing, Progressing  Goal: Patient-Specific Goal (Individualized)  7/23/2022 1129 by Hina Rivera RN  Outcome: Met  7/23/2022 1129 by Hina Rivera RN  Outcome: Ongoing, Progressing  Goal: Absence of Hospital-Acquired Illness or Injury  7/23/2022 1129 by Hina Rivera RN  Outcome: Met  7/23/2022 1129 by Hina Rivera RN  Outcome: Ongoing, Progressing  Goal: Optimal Comfort and Wellbeing  7/23/2022 1129 by Hina Rivera RN  Outcome: Met  7/23/2022 1129 by Hina Rivera RN  Outcome: Ongoing, Progressing  Goal: Readiness for Transition of Care  7/23/2022 1129 by Hina Rivera RN  Outcome: Met  7/23/2022 1129 by Hina Rivera RN  Outcome: Ongoing, Progressing     Problem: Diabetes Comorbidity  Goal: Blood Glucose Level Within Targeted Range  7/23/2022 1129 by Hina Rivera RN  Outcome: Met  7/23/2022 1129 by Hina Rivera RN  Outcome: Ongoing, Progressing     Problem: Fall Injury Risk  Goal: Absence of Fall and Fall-Related Injury  7/23/2022 1129 by Hina Rivera RN  Outcome: Met  7/23/2022 1129 by Hina Rivera RN  Outcome: Ongoing, Progressing     Problem: Chest Pain  Goal: Resolution of Chest Pain Symptoms  7/23/2022 1129 by Hina Rivera RN  Outcome: Met  7/23/2022 1129 by Hina Rivera RN  Outcome: Ongoing, Progressing     Problem: Infection  Goal: Absence of Infection Signs and Symptoms  7/23/2022 1129 by Hina Rivera RN  Outcome: Met  7/23/2022 1129 by Hina Rivera RN  Outcome: Ongoing, Progressing     Problem: Communication Impairment (Mechanical Ventilation, Invasive)  Goal: Effective Communication  7/23/2022 1129 by Hina Rivera RN  Outcome: Met  7/23/2022 1129 by Hina Zauha, RN  Outcome: Ongoing, Progressing     Problem: Device-Related Complication Risk (Mechanical Ventilation, Invasive)  Goal: Optimal  Device Function  7/23/2022 1129 by Hina Rivera RN  Outcome: Met  7/23/2022 1129 by Hina Rivera RN  Outcome: Ongoing, Progressing     Problem: Inability to Wean (Mechanical Ventilation, Invasive)  Goal: Mechanical Ventilation Liberation  7/23/2022 1129 by Hina Rivera RN  Outcome: Met  7/23/2022 1129 by Hina Rivera RN  Outcome: Ongoing, Progressing     Problem: Nutrition Impairment (Mechanical Ventilation, Invasive)  Goal: Optimal Nutrition Delivery  7/23/2022 1129 by Hina Rivera RN  Outcome: Met  7/23/2022 1129 by Hina Rivera RN  Outcome: Ongoing, Progressing     Problem: Skin and Tissue Injury (Mechanical Ventilation, Invasive)  Goal: Absence of Device-Related Skin and Tissue Injury  7/23/2022 1129 by Hina Rivera RN  Outcome: Met  7/23/2022 1129 by Hina Rivera RN  Outcome: Ongoing, Progressing     Problem: Ventilator-Induced Lung Injury (Mechanical Ventilation, Invasive)  Goal: Absence of Ventilator-Induced Lung Injury  7/23/2022 1129 by Hina Rivera RN  Outcome: Met  7/23/2022 1129 by Hina Rivera RN  Outcome: Ongoing, Progressing     Problem: Communication Impairment (Artificial Airway)  Goal: Effective Communication  7/23/2022 1129 by Hina Rivera RN  Outcome: Met  7/23/2022 1129 by Hina Rivera RN  Outcome: Ongoing, Progressing     Problem: Device-Related Complication Risk (Artificial Airway)  Goal: Optimal Device Function  7/23/2022 1129 by Hina Rivera RN  Outcome: Met  7/23/2022 1129 by Hina Rivera RN  Outcome: Ongoing, Progressing     Problem: Skin and Tissue Injury (Artificial Airway)  Goal: Absence of Device-Related Skin or Tissue Injury  7/23/2022 1129 by Hina Rivera RN  Outcome: Met  7/23/2022 1129 by Hina Rivera RN  Outcome: Ongoing, Progressing     Problem: Restraint, Nonbehavioral (Nonviolent)  Goal: Absence of Harm or Injury  7/23/2022 1129 by Hina Rivera RN  Outcome: Met  7/23/2022 1129 by Hina Rivera RN  Outcome: Ongoing,  Progressing     Problem: Skin Injury Risk Increased  Goal: Skin Health and Integrity  7/23/2022 1129 by Hina Rivera RN  Outcome: Met  7/23/2022 1129 by Hina Rivera RN  Outcome: Ongoing, Progressing     Problem: Pain Acute  Goal: Acceptable Pain Control and Functional Ability  7/23/2022 1129 by Hina Rivera RN  Outcome: Met  7/23/2022 1129 by Hina Rivera RN  Outcome: Ongoing, Progressing

## 2022-07-23 NOTE — NURSING
- Assumed care of pt at ~2200  - Pt oriented x4, pleasant   - Midsternal incision intact with dermabond. SWEETIE  - Pt up in chair during the night  - Telemetry monitoring continued   - PRN oxycodone administered x2  - [facility] accucheck schedule followed. , no SSI administered due to pt eating prior.   - Bed in lowest locked position, call light and personal items in reach, nonskid socks on, verbalized to call for assistance

## 2022-07-25 ENCOUNTER — RESEARCH ENCOUNTER (OUTPATIENT)
Dept: RESEARCH | Facility: HOSPITAL | Age: 62
End: 2022-07-25
Payer: COMMERCIAL

## 2022-07-25 PROCEDURE — G0180 PR HOME HEALTH MD CERTIFICATION: ICD-10-PCS | Mod: ,,, | Performed by: THORACIC SURGERY (CARDIOTHORACIC VASCULAR SURGERY)

## 2022-07-25 PROCEDURE — G0180 MD CERTIFICATION HHA PATIENT: HCPCS | Mod: ,,, | Performed by: THORACIC SURGERY (CARDIOTHORACIC VASCULAR SURGERY)

## 2022-07-25 NOTE — PROGRESS NOTES
Study name: Anticoagulation for New-Onset Post-Operative Atrial Fibrillation after CABG- PACES    IRB:  2019.273    PI: Dr. JENARO Holguin    Study Visit: Hospital Discharge Follow-Up phone call        [id number]: Left message with the patient RE: Hospital discharge follow up for the PACES Study. Asked patient to call Ethel at [phone].     Liz Bansal RN, CCM, CRC

## 2022-07-25 NOTE — PLAN OF CARE
[facility] - Cardiology Stepdown  Discharge Final Note    Primary Care Provider: Primary Doctor No    Expected Discharge Date: 7/23/2022    Final Discharge Note (most recent)     Final Note - 07/25/22 0826        Final Note    Assessment Type Final Discharge Note     Anticipated Discharge Disposition Home-Health Care [facility]     Hospital Resources/Appts/Education Provided Provided patient/caregiver with written discharge plan information;Appointments scheduled by Navigator/Coordinator;Post-Acute resouces added to AVS;Appointments scheduled and added to AVS                 Important Message from Medicare             Contact Info     [facility]   Specialty: Home Health Services, Home Therapy Services, Home Living Aide Services    [address] [id number]   Phone: [phone]       Next Steps: Follow up    Instructions: They will contact you to schedule date and time.    Blu Rhodes MD   Specialty: Cardiothoracic Surgery, Transplant, Vascular Surgery    [address] [facility] [address]   Phone: [phone]       Next Steps: Follow up in 5 week(s)      KIM Vallejo, RN    [phone]

## 2022-07-26 ENCOUNTER — OFFICE VISIT (OUTPATIENT)
Dept: CARDIOLOGY | Facility: CLINIC | Age: 62
End: 2022-07-26
Payer: COMMERCIAL

## 2022-07-26 ENCOUNTER — TELEPHONE (OUTPATIENT)
Dept: CARDIAC REHAB | Facility: CLINIC | Age: 62
End: 2022-07-26
Payer: COMMERCIAL

## 2022-07-26 VITALS
HEIGHT: 72 IN | HEART RATE: 68 BPM | SYSTOLIC BLOOD PRESSURE: 114 MMHG | BODY MASS INDEX: 37.27 KG/M2 | DIASTOLIC BLOOD PRESSURE: 72 MMHG | OXYGEN SATURATION: 97 % | WEIGHT: 275.13 LBS

## 2022-07-26 DIAGNOSIS — I10 HTN (HYPERTENSION), BENIGN: ICD-10-CM

## 2022-07-26 DIAGNOSIS — I48.0 PAROXYSMAL ATRIAL FIBRILLATION: ICD-10-CM

## 2022-07-26 DIAGNOSIS — I21.4 NON-STEMI (NON-ST ELEVATED MYOCARDIAL INFARCTION): Primary | ICD-10-CM

## 2022-07-26 DIAGNOSIS — Z95.1 S/P CABG (CORONARY ARTERY BYPASS GRAFT): ICD-10-CM

## 2022-07-26 DIAGNOSIS — G47.33 OSA (OBSTRUCTIVE SLEEP APNEA): ICD-10-CM

## 2022-07-26 DIAGNOSIS — E78.2 MIXED HYPERLIPIDEMIA: ICD-10-CM

## 2022-07-26 DIAGNOSIS — Z79.01 LONG TERM (CURRENT) USE OF ANTICOAGULANTS: ICD-10-CM

## 2022-07-26 DIAGNOSIS — E11.65 TYPE 2 DIABETES MELLITUS WITH HYPERGLYCEMIA, WITHOUT LONG-TERM CURRENT USE OF INSULIN: ICD-10-CM

## 2022-07-26 DIAGNOSIS — E66.01 MORBID OBESITY WITH BMI OF 45.0-49.9, ADULT: ICD-10-CM

## 2022-07-26 DIAGNOSIS — I77.819 DILATATION OF AORTA: ICD-10-CM

## 2022-07-26 PROCEDURE — 3008F PR BODY MASS INDEX (BMI) DOCUMENTED: ICD-10-PCS | Mod: CPTII,S$GLB,, | Performed by: INTERNAL MEDICINE

## 2022-07-26 PROCEDURE — 99999 PR PBB SHADOW E&M-EST. PATIENT-LVL V: ICD-10-PCS | Mod: PBBFAC,,, | Performed by: INTERNAL MEDICINE

## 2022-07-26 PROCEDURE — 3008F BODY MASS INDEX DOCD: CPT | Mod: CPTII,S$GLB,, | Performed by: INTERNAL MEDICINE

## 2022-07-26 PROCEDURE — 99214 PR OFFICE/OUTPT VISIT, EST, LEVL IV, 30-39 MIN: ICD-10-PCS | Mod: S$GLB,,, | Performed by: INTERNAL MEDICINE

## 2022-07-26 PROCEDURE — 3074F PR MOST RECENT SYSTOLIC BLOOD PRESSURE < 130 MM HG: ICD-10-PCS | Mod: CPTII,S$GLB,, | Performed by: INTERNAL MEDICINE

## 2022-07-26 PROCEDURE — 1159F PR MEDICATION LIST DOCUMENTED IN MEDICAL RECORD: ICD-10-PCS | Mod: CPTII,S$GLB,, | Performed by: INTERNAL MEDICINE

## 2022-07-26 PROCEDURE — 1111F PR DISCHARGE MEDS RECONCILED W/ CURRENT OUTPATIENT MED LIST: ICD-10-PCS | Mod: CPTII,S$GLB,, | Performed by: INTERNAL MEDICINE

## 2022-07-26 PROCEDURE — 3074F SYST BP LT 130 MM HG: CPT | Mod: CPTII,S$GLB,, | Performed by: INTERNAL MEDICINE

## 2022-07-26 PROCEDURE — 1111F DSCHRG MED/CURRENT MED MERGE: CPT | Mod: CPTII,S$GLB,, | Performed by: INTERNAL MEDICINE

## 2022-07-26 PROCEDURE — 1159F MED LIST DOCD IN RCRD: CPT | Mod: CPTII,S$GLB,, | Performed by: INTERNAL MEDICINE

## 2022-07-26 PROCEDURE — 3078F PR MOST RECENT DIASTOLIC BLOOD PRESSURE < 80 MM HG: ICD-10-PCS | Mod: CPTII,S$GLB,, | Performed by: INTERNAL MEDICINE

## 2022-07-26 PROCEDURE — 3051F PR MOST RECENT HEMOGLOBIN A1C LEVEL 7.0 - < 8.0%: ICD-10-PCS | Mod: CPTII,S$GLB,, | Performed by: INTERNAL MEDICINE

## 2022-07-26 PROCEDURE — 3078F DIAST BP <80 MM HG: CPT | Mod: CPTII,S$GLB,, | Performed by: INTERNAL MEDICINE

## 2022-07-26 PROCEDURE — 3051F HG A1C>EQUAL 7.0%<8.0%: CPT | Mod: CPTII,S$GLB,, | Performed by: INTERNAL MEDICINE

## 2022-07-26 PROCEDURE — 99214 OFFICE O/P EST MOD 30 MIN: CPT | Mod: S$GLB,,, | Performed by: INTERNAL MEDICINE

## 2022-07-26 PROCEDURE — 99999 PR PBB SHADOW E&M-EST. PATIENT-LVL V: CPT | Mod: PBBFAC,,, | Performed by: INTERNAL MEDICINE

## 2022-07-26 NOTE — LETTER
July 26, 2022    Isaiah Dorsey Jr.  [address] [address]             [facility]  [address]  [place] [address]  Phone: [phone]                                  [facility]   [address]   [place] [address]  [phone]         [facility]   [address] [place] [address] [phone]         [facility]    [address]  [place], [place] [address] [phone]   Re: Isaiah Dorsey Jr.  Clinic number: [id number]    Dear {MR/MRS/MS/DR:[id number]} Sunita:    You were recently admitted to an [facility] facility for cardiac (heart) problem.  Your physician has referred you to [facility].  Cardiac Rehab Phase 2 is an educational and exercise program, conducted in a outpatient setting, proven to help reduce your risk for recurrent heart events.    Cardiac rehab has two major parts:    1. Exercise training to help you achieve cardiovascular fitness while learning how to exercise safely and improve muscle strength and endurance.  Your exercise prescription will be based on the results of the cardiopulmonary stress test (CPX) which will be done before entering the program and at completion.  2. Education, counseling and training to help you understand your heart condition and find ways to reduce your risk of future heart problems.  The cardiac rehab team will help you learn how to cope with the stress of adjusting to a new lifestyle and to deal with your fears about the future.    Phase 2 is a 36-session program, meeting 3 times a week for 12 weeks.  Each session consists of an hour of exercise and half-hour dedicated to the educational topic of the day.  Class days vary per location.  Please contact your nearest facility for details.    Through cardiac rehab you will learn:  · About your heart condition, medical therapies, and medication  · Risk factors in y our lifestyle  contributing to heart disease  · New strategies to modify your risk factors  · About a healthy diet that can lower your blood cholesterol, control weight, help prevent or control high blood pressure, and diabetes  · How to stop smoking  · How to manage stress    If you are interested in getting started, call the [facility] of your choosing.     Sincerely,     [facility]

## 2022-07-26 NOTE — LETTER
July 26, 2022    Isaiah Dorsey Jr.  [address] [facility] [address]             [facility]  [address] [facility].  [place] [address]  Phone: [phone]                                  [facility]   [address] [facility]   [place] [address]  [phone]         [facility]   [address] [place] [address] [phone]         [facility]    [address]  [place], [place] [address] [phone]   Re: Isaiah Dorsey Jr.  Clinic number: [id number]    Dear {MR/MRS/MS/DR:[id number]} Sunita:    You were recently admitted to an [facility] facility for cardiac (heart) problem.  Your physician has referred you to [facility].  Cardiac Rehab Phase 2 is an educational and exercise program, conducted in a outpatient setting, proven to help reduce your risk for recurrent heart events.    Cardiac rehab has two major parts:    1. Exercise training to help you achieve cardiovascular fitness while learning how to exercise safely and improve muscle strength and endurance.  Your exercise prescription will be based on the results of the cardiopulmonary stress test (CPX) which will be done before entering the program and at completion.  2. Education, counseling and training to help you understand your heart condition and find ways to reduce your risk of future heart problems.  The cardiac rehab team will help you learn how to cope with the stress of adjusting to a new lifestyle and to deal with your fears about the future.    Phase 2 is a 36-session program, meeting 3 times a week for 12 weeks.  Each session consists of an hour of exercise and half-hour dedicated to the educational topic of the day.  Class days vary per location.  Please contact your nearest facility for details.    Through cardiac rehab you will learn:  · About your heart condition, medical therapies, and medication  · Risk factors in y our lifestyle  contributing to heart disease  · New strategies to modify your risk factors  · About a healthy diet that can lower your blood cholesterol, control weight, help prevent or control high blood pressure, and diabetes  · How to stop smoking  · How to manage stress    If you are interested in getting started, call the [facility] of your choosing.     Sincerely,     [facility]

## 2022-07-26 NOTE — LETTER
July 26, 2022    Isaiah Dorsey Jr.  [address] [facility] [address]             [facility]  [address] [facility].  [place] [address]  Phone: [phone]                                  [facility]   [address] [facility]   [place] [address]  [phone]         [facility]   [address] [place] [address] [phone]         [facility]    [address]  [place], [place] [address] [phone]   Re: Isaiha Dorsey JrAnel  Clinic number: [id number]    Dear Mr. Dorsey:    You were recently admitted to an [facility] facility for cardiac (heart) problem.  Your physician has referred you to [facility].  Cardiac Rehab Phase 2 is an educational and exercise program, conducted in a outpatient setting, proven to help reduce your risk for recurrent heart events.    Cardiac rehab has two major parts:    1. Exercise training to help you achieve cardiovascular fitness while learning how to exercise safely and improve muscle strength and endurance.  Your exercise prescription will be based on the results of the cardiopulmonary stress test (CPX) which will be done before entering the program and at completion.  2. Education, counseling and training to help you understand your heart condition and find ways to reduce your risk of future heart problems.  The cardiac rehab team will help you learn how to cope with the stress of adjusting to a new lifestyle and to deal with your fears about the future.    Phase 2 is a 36-session program, meeting 3 times a week for 12 weeks.  Each session consists of an hour of exercise and half-hour dedicated to the educational topic of the day.  Class days vary per location.  Please contact your nearest facility for details.    Through cardiac rehab you will learn:  · About your heart condition, medical therapies, and medication  · Risk factors in y our lifestyle contributing to heart  disease  · New strategies to modify your risk factors  · About a healthy diet that can lower your blood cholesterol, control weight, help prevent or control high blood pressure, and diabetes  · How to stop smoking  · How to manage stress    If you are interested in getting started, call the [facility] of your choosing.     Sincerely,     [facility]

## 2022-07-26 NOTE — PROGRESS NOTES
"Subjective:    Patient ID:  Isaiah Dorsey Jr. is a 62 y.o. male who presents for evaluation of CAD    HPI   Mr. Dorsey is a  62 year old male with hypertension, diabetes (HbA1C 7.6), and BMI of 39, who presented with  NSTEMI 7/11/22, given Brilinta, and underwent coronary angiogram showing multivessel coronary artery disease.  Coronary angiogram details: 90% early mid LAD lesion with a moderate sized mid and distal LAD (wrap around LAD), 99% ostial LCx which has diffuse disease, and 90% proximal lesion in a small to moderate sized right posterolateral ventricular artery.  Transthoracic echocardiogram showed 40-45% ejection fraction. He was brought to surgery 7/18/22 and had a LIMA to LAD. He developed atrial fibriliation post op requiring cardioversion X 3 and amiodarone. He was discharged 7/23/22. He has a very strong family of CAD. 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 He is a distant former smoker and and was on aspirin and a statin. He is an . His peak weight in the past was 354    Summary 7/11/22       · The pre-procedure left ventricular end diastolic pressure was 25.  · The Ost LAD to Prox LAD lesion was 90% stenosed.  · The Mid LAD lesion was 70% stenosed.  · The 1st Diag lesion was 90% stenosed.  · The 2nd Diag lesion was 60% stenosed.  · The RPAV lesion was 90% stenosed.  · The Ost Cx to Prox Cx lesion was 99% stenosed.  · The estimated blood loss was none.  · Aortogram demonstrated no obvious dissection and a dilated ascending aorta.     The procedure log was documented by Documenter: Bay Alvarenga and verified by Bjorn Cheatham MD.     Date: 7/11/2022  Time: 3:17 PM     .  Lab Results   Component Value Date     (L) 07/23/2022    K 4.0 07/23/2022    CL 98 07/23/2022    CO2 26 07/23/2022    BUN 21 07/23/2022    CREATININE 0.8 07/23/2022     (H) " 07/23/2022    HGBA1C 7.6 (H) 07/12/2022    MG 1.9 07/23/2022    AST 28 07/23/2022    ALT 45 (H) 07/23/2022    ALBUMIN 2.7 (L) 07/23/2022    PROT 6.5 07/23/2022    BILITOT 0.5 07/23/2022    WBC 12.78 (H) 07/23/2022    HGB 11.7 (L) 07/23/2022    HCT 34.9 (L) 07/23/2022    HCT 35 (L) 07/19/2022    MCV 91 07/23/2022     (H) 07/23/2022    INR 1.1 07/21/2022    PSA 0.74 06/05/2014    TSH 2.840 07/13/2022         Lab Results   Component Value Date    CHOL 180 07/12/2022    HDL 40 07/12/2022    TRIG 156 (H) 07/12/2022       Lab Results   Component Value Date    LDLCALC 108.8 07/12/2022       Past Medical History:   Diagnosis Date    Diabetes mellitus type 2, uncontrolled, without complications     6.7% Metf 1 g qd, eye 2015, [facility],     Elevated platelet count 7/23/2015    H/O splenectomy 4/24/2015    doesn't like pneumovax bc caused side effects    HLD (hyperlipidemia) 1/22/2015    goal LDL < 100, reluctant to take statin    HTN (hypertension), benign 1/22/2015    losartan 100    Morbid obesity with BMI of 45.0-49.9, adult 6/11/2014    Polyp of transverse colon 9/5/2018 2018, repeat 2023    Severe uncontrolled hypertension 6/11/2014       Current Outpatient Medications:     acetaminophen (TYLENOL) 500 MG tablet, Take 2 tablets (1,000 mg total) by mouth every 6 (six) hours as needed for Pain., Disp: 30 tablet, Rfl: 0    amiodarone (PACERONE) 200 MG Tab, Take 2 tablets (400 mg total) by mouth 3 (three) times daily until 7/25/22 then take 2 tablets (400 mg total) by mouth 2 (two) times daily until 8/8/22 THEN take 1 tablet (200 mg total) by mouth 1 (one) time daily ending 9/5/22, Disp: 90 tablet, Rfl: 11    amiodarone (PACERONE) 400 MG tablet, Take 400mg 3x daily until 7/25 THEN take 400mg 2x daily until 8/8 THEN take 200mg daily ending 9/5/22, Disp: 30 tablet, Rfl: 3    aspirin 325 MG tablet, Take 1 tablet (325 mg total) by mouth once daily., Disp: 30 tablet, Rfl: 11    atorvastatin (LIPITOR) 40 MG  tablet, Take 1 tablet (40 mg total) by mouth every evening., Disp: 30 tablet, Rfl: 11    docusate sodium (COLACE) 100 MG capsule, Take 1 capsule (100 mg total) by mouth 2 (two) times daily as needed for Constipation., Disp: 30 capsule, Rfl: 0    furosemide (LASIX) 40 MG tablet, Take 1 tablet (40 mg total) by mouth once daily. for 5 days, Disp: 5 tablet, Rfl: 0    metFORMIN (GLUCOPHAGE) 1000 MG tablet, Take 1,000 mg by mouth 2 (two) times daily with meals., Disp: , Rfl:     metoprolol succinate (TOPROL-XL) 100 MG 24 hr tablet, Take 1 tablet (100 mg total) by mouth once daily., Disp: 30 tablet, Rfl: 11    oxyCODONE (ROXICODONE) 5 MG immediate release tablet, Take 1 tablet (5 mg total) by mouth every 4 (four) hours as needed for Pain., Disp: 42 tablet, Rfl: 0    potassium chloride SA (K-DUR,KLOR-CON) 20 MEQ tablet, Take 1 tablet (20 mEq total) by mouth once daily. for 5 days, Disp: 5 tablet, Rfl: 0    rivaroxaban (XARELTO) 20 mg Tab, Take 1 tablet (20 mg total) by mouth daily with dinner or evening meal., Disp: 30 tablet, Rfl: 1    SITagliptin (JANUVIA) 100 MG Tab, Take 1 tablet (100 mg total) by mouth once daily., Disp: 30 tablet, Rfl: 11          Review of Systems   Constitutional: Positive for malaise/fatigue. Negative for decreased appetite, diaphoresis, fever, weight gain and weight loss.   HENT: Negative for congestion, ear discharge, ear pain and nosebleeds.    Eyes: Negative for blurred vision, double vision and visual disturbance.   Cardiovascular: Positive for chest pain (incisional). Negative for claudication, cyanosis, dyspnea on exertion, irregular heartbeat, leg swelling, near-syncope, orthopnea, palpitations, paroxysmal nocturnal dyspnea and syncope.   Respiratory: Positive for snoring. Negative for cough, hemoptysis, shortness of breath, sleep disturbances due to breathing, sputum production and wheezing.    Endocrine: Negative for polydipsia, polyphagia and polyuria.   Hematologic/Lymphatic:  Negative for adenopathy and bleeding problem. Does not bruise/bleed easily.   Skin: Negative for color change, nail changes, poor wound healing and rash.   Musculoskeletal: Negative for muscle cramps and muscle weakness.   Gastrointestinal: Negative for abdominal pain, anorexia, change in bowel habit, hematochezia, nausea and vomiting.   Genitourinary: Negative for dysuria, frequency and hematuria.   Neurological: Positive for excessive daytime sleepiness. Negative for brief paralysis, difficulty with concentration, dizziness, focal weakness, headaches, light-headedness, seizures, vertigo and weakness.   Psychiatric/Behavioral: Negative for altered mental status and depression.   Allergic/Immunologic: Negative for persistent infections.        Objective:/72 (BP Location: Left arm, Patient Position: Sitting, BP Method: Large (Automatic))   Pulse 68   Ht 6' (1.829 m)   Wt 124.8 kg (275 lb 2.2 oz)   SpO2 97%   BMI 37.31 kg/m²             Physical Exam  Constitutional:       Appearance: He is well-developed. He is obese.   HENT:      Head: Normocephalic.      Right Ear: External ear normal.      Left Ear: External ear normal.      Nose: Nose normal.   Eyes:      General: No scleral icterus.     Pupils: Pupils are equal, round, and reactive to light.   Neck:      Thyroid: No thyromegaly.      Vascular: No JVD.      Trachea: No tracheal deviation.   Cardiovascular:      Rate and Rhythm: Normal rate and regular rhythm.      Pulses: Intact distal pulses.           Carotid pulses are 2+ on the right side and 2+ on the left side.       Dorsalis pedis pulses are 2+ on the right side and 2+ on the left side.      Heart sounds: No murmur heard.    No friction rub. No gallop.      Comments: JVP normal  Pulmonary:      Effort: Pulmonary effort is normal.      Breath sounds: Normal breath sounds.   Chest:       Abdominal:      General: Bowel sounds are normal. There is no distension.      Tenderness: There is no  abdominal tenderness. There is no guarding.   Musculoskeletal:         General: No tenderness. Normal range of motion.      Cervical back: Normal range of motion and neck supple.   Lymphadenopathy:      Comments: Palpation of neck and groin lymph nodes normal   Skin:     General: Skin is dry.      Comments: Palpation of skin normal   Neurological:      Mental Status: He is alert and oriented to person, place, and time.      Cranial Nerves: No cranial nerve deficit.      Motor: No abnormal muscle tone.      Coordination: Coordination normal.   Psychiatric:         Behavior: Behavior normal.         Thought Content: Thought content normal.         Judgment: Judgment normal.           Assessment:       1. Non-STEMI (non-ST elevated myocardial infarction)    2. HTN (hypertension), benign    3. Mixed hyperlipidemia    4. Dilatation of aorta    5. Paroxysmal atrial fibrillation    6. S/P CABG (coronary artery bypass graft)    7. Morbid obesity with BMI of 45.0-49.9, adult    8. Type 2 diabetes mellitus with hyperglycemia, without long-term current use of insulin    9. RODRIGUEZ (obstructive sleep apnea)         Plan:       Isaiah was seen today for follow-up, establish care and nausea.    Diagnoses and all orders for this visit:    Non-STEMI (non-ST elevated myocardial infarction)    HTN (hypertension), benign    Mixed hyperlipidemia    Dilatation of aorta    Paroxysmal atrial fibrillation    S/P CABG (coronary artery bypass graft)    Morbid obesity with BMI of 45.0-49.9, adult    Type 2 diabetes mellitus with hyperglycemia, without long-term current use of insulin    RODRIGUEZ (obstructive sleep apnea)

## 2022-07-27 ENCOUNTER — TELEPHONE (OUTPATIENT)
Dept: CARDIAC REHAB | Facility: CLINIC | Age: 62
End: 2022-07-27
Payer: COMMERCIAL

## 2022-07-28 ENCOUNTER — RESEARCH ENCOUNTER (OUTPATIENT)
Dept: RESEARCH | Facility: HOSPITAL | Age: 62
End: 2022-07-28
Payer: COMMERCIAL

## 2022-07-28 ENCOUNTER — LAB VISIT (OUTPATIENT)
Dept: LAB | Facility: HOSPITAL | Age: 62
End: 2022-07-28
Attending: INTERNAL MEDICINE
Payer: COMMERCIAL

## 2022-07-28 DIAGNOSIS — E11.65 TYPE 2 DIABETES MELLITUS WITH HYPERGLYCEMIA, WITHOUT LONG-TERM CURRENT USE OF INSULIN: ICD-10-CM

## 2022-07-28 DIAGNOSIS — E78.2 MIXED HYPERLIPIDEMIA: ICD-10-CM

## 2022-07-28 LAB
CHOLEST SERPL-MCNC: 89 MG/DL (ref 120–199)
CHOLEST/HDLC SERPL: 2.7 {RATIO} (ref 2–5)
ESTIMATED AVG GLUCOSE: 160 MG/DL (ref 68–131)
HBA1C MFR BLD: 7.2 % (ref 4–5.6)
HDLC SERPL-MCNC: 33 MG/DL (ref 40–75)
HDLC SERPL: 37.1 % (ref 20–50)
LDLC SERPL CALC-MCNC: 35 MG/DL (ref 63–159)
NONHDLC SERPL-MCNC: 56 MG/DL
TRIGL SERPL-MCNC: 105 MG/DL (ref 30–150)

## 2022-07-28 PROCEDURE — 36415 COLL VENOUS BLD VENIPUNCTURE: CPT | Mod: PN | Performed by: INTERNAL MEDICINE

## 2022-07-28 PROCEDURE — 80061 LIPID PANEL: CPT | Performed by: INTERNAL MEDICINE

## 2022-07-28 PROCEDURE — 83036 HEMOGLOBIN GLYCOSYLATED A1C: CPT | Performed by: INTERNAL MEDICINE

## 2022-07-28 NOTE — PROGRESS NOTES
Study name: Anticoagulation for New-Onset Post-Operative Atrial Fibrillation after CABG- PACES    IRB:  2019.273    PI: Dr. JENARO Holguin    Study Visit: Discharge follow-up    Who was present: Patient and Liz Bansal RN    28JUL2022: I spoke with the patient for a discharge follow-up call for the PACES Study.      Reaffirmed that the patient still wishes to participate in the study and continues to consent to the study.      Patient discharged from [facility] on 23JUL2022. Patient reports that he started Xarelto 20mg on 7-23-22 at 1700 with dinner.     Discussed with the patient the importance of follow up in the study.  He/she verbalized understanding.      Liz Bansal RN, CCM, CRC

## 2022-07-29 ENCOUNTER — TELEPHONE (OUTPATIENT)
Dept: CARDIOLOGY | Facility: CLINIC | Age: 62
End: 2022-07-29
Payer: COMMERCIAL

## 2022-07-29 NOTE — TELEPHONE ENCOUNTER
----- Message from Ruddy Redman MD sent at 7/29/2022  2:27 PM CDT -----  Regarding: RE: nausea wt loss  I do not know what is going on.  This is a primary care problem and will need to wait until Dr Tobias is back on Monday.  If his nausea is very severe, he should go to an urgent care to be seen.  ----- Message -----  From: Caridad Haas RN  Sent: 7/29/2022  12:42 PM CDT  To: Caridad Haas RN, Ruddy Redman MD  Subject: nausea wt loss                                   Pt s/p CABG 7/18. Wt 281# upon discharge, 269# yesterday, 265# today. Pt does not always urinate before checking morning wt. Pt w. ongoing nausea and no appetite.    Also pale after walking at [facility] today (apparently the longest he walked since sx).  Denies lightheadness/ dizziness. States felt good while walking. No other issue, no swelling, no SOB.   after eating.  Has glucerna ordered.BP post walk, 115/60.  Talked to home health nurse about checking BS before meals, logging HR w. BP, urinating before wt check. go to ER if unable to eat/ vomiting.    Please advise on nausea over the week-end, and above issues.    ----- Message -----  From: Jewels Sutton MA  Sent: 7/29/2022  12:18 PM CDT  To: EVITA Clement the nurse  from [facility] need to talk  to you about the patient weight on July 27 269 pounds July 29 265. The patient is schedule to see  on 8-25-22. Thank you.

## 2022-07-29 NOTE — TELEPHONE ENCOUNTER
Pt updated on following up wAnel Tobias and going to urgent care/ ED over the week-end if nausea significant. He verbalized understanding.

## 2022-08-03 ENCOUNTER — TELEPHONE (OUTPATIENT)
Dept: CARDIAC REHAB | Facility: CLINIC | Age: 62
End: 2022-08-03
Payer: COMMERCIAL

## 2022-08-03 NOTE — TELEPHONE ENCOUNTER
Spoke with pt re: cardiac rehab. Pt is interested. Explained to pt will send order for authorization and then contact him for scheduling when approved.

## 2022-08-12 ENCOUNTER — PATIENT MESSAGE (OUTPATIENT)
Dept: CARDIOLOGY | Facility: CLINIC | Age: 62
End: 2022-08-12
Payer: COMMERCIAL

## 2022-08-15 ENCOUNTER — TELEPHONE (OUTPATIENT)
Dept: CARDIAC REHAB | Facility: CLINIC | Age: 62
End: 2022-08-15
Payer: COMMERCIAL

## 2022-08-15 NOTE — TELEPHONE ENCOUNTER
Received message from Arianne Callahan that patient has called her & stated that no one contacted him about Phase II cardiac rehab.  We reached out to him on numerous occasions via letter, portal & spoke with him about rehab.  Currently, we are waiting on a response for insurance approval.  Discussed with patient that this can take up to 2 weeks.  He was sent to insurance on 8/3/2022.  Insurance info should return before 8/17/2022 & if not, we will reach out to authorization.  Verbalized to patient that we will contact him as soon as info is back.  Lilibeth Gomez RN  Cardiac Rehab Nurse

## 2022-08-17 ENCOUNTER — EXTERNAL HOME HEALTH (OUTPATIENT)
Dept: HOME HEALTH SERVICES | Facility: HOSPITAL | Age: 62
End: 2022-08-17
Payer: COMMERCIAL

## 2022-08-19 DIAGNOSIS — I25.10 ATHEROSCLEROSIS OF NATIVE CORONARY ARTERY OF NATIVE HEART, UNSPECIFIED WHETHER ANGINA PRESENT: ICD-10-CM

## 2022-08-19 DIAGNOSIS — Z95.1 POSTSURGICAL AORTOCORONARY BYPASS STATUS: Primary | ICD-10-CM

## 2022-08-22 ENCOUNTER — RESEARCH ENCOUNTER (OUTPATIENT)
Dept: RESEARCH | Facility: HOSPITAL | Age: 62
End: 2022-08-22
Payer: COMMERCIAL

## 2022-08-22 ENCOUNTER — LAB VISIT (OUTPATIENT)
Dept: SURGERY | Facility: CLINIC | Age: 62
End: 2022-08-22
Payer: COMMERCIAL

## 2022-08-22 DIAGNOSIS — I25.10 ATHEROSCLEROSIS OF NATIVE CORONARY ARTERY OF NATIVE HEART, UNSPECIFIED WHETHER ANGINA PRESENT: ICD-10-CM

## 2022-08-22 DIAGNOSIS — Z95.1 POSTSURGICAL AORTOCORONARY BYPASS STATUS: ICD-10-CM

## 2022-08-22 LAB — SARS-COV-2 RNA RESP QL NAA+PROBE: NOT DETECTED

## 2022-08-22 PROCEDURE — U0005 INFEC AGEN DETEC AMPLI PROBE: HCPCS | Performed by: INTERNAL MEDICINE

## 2022-08-22 PROCEDURE — U0003 INFECTIOUS AGENT DETECTION BY NUCLEIC ACID (DNA OR RNA); SEVERE ACUTE RESPIRATORY SYNDROME CORONAVIRUS 2 (SARS-COV-2) (CORONAVIRUS DISEASE [COVID-19]), AMPLIFIED PROBE TECHNIQUE, MAKING USE OF HIGH THROUGHPUT TECHNOLOGIES AS DESCRIBED BY CMS-2020-01-R: HCPCS | Performed by: INTERNAL MEDICINE

## 2022-08-22 NOTE — PROGRESS NOTES
Study name: Anticoagulation for New-Onset Post-Operative Atrial Fibrillation after CABG- PACES    IRB:  2019.273    PI: Dr. JENARO Holguin    Study Visit: 30 Day Follow-up Call    Who was present: Patient and Liz Bansal    [id number]: I spoke with the patient for the 30 day follow up for the PACES Study.      Reaffirmed that the patient still wishes to participate in the study and continues to consent to the study.      Has the patient had any changes in his health since the last study visit? No    Medications and allergy lists updated and current. YES  Has the patient had any outside hospitalizations/ER visits: NO    Medication adherence questionnaire completed: YES  Questionnaire for Verification of Stroke-Free Status completed: YES  Bleeding Questionnaire completed: YES    Discussed with the patient the importance of follow up in the study.  He/she verbalized understanding.        Liz Bansal RN, CCM, CRC

## 2022-08-24 ENCOUNTER — TELEPHONE (OUTPATIENT)
Dept: ENDOSCOPY | Facility: HOSPITAL | Age: 62
End: 2022-08-24
Payer: COMMERCIAL

## 2022-08-24 NOTE — TELEPHONE ENCOUNTER
Received [employer] message. Called and spoke with patient regarding scheduling for a colonoscopy.  Patient requested to get scheduled in November.  There are no orders or referrals for a colonoscopy.  Explained to patient that our November endoscopy schedule is not open to schedule yet.  Patient states he will speak with Dr. Tobias to see if he is cleared to have a colonoscopy due to recent coronary bypass and to place colonoscopy referral.       [facility] End[facility] Schedulers  Caller: Diamond (Today, 10:52 AM)  Patient wife calling to get  scheduled for colonoscopy. Please contact patient wife to schedule appointment. Patient prefer a Tues or Thurs appointment.           Confirmed Contact below:   Contact Name: Isaiah Dorsey   Phone Number: [phone]

## 2022-08-25 ENCOUNTER — OFFICE VISIT (OUTPATIENT)
Dept: CARDIOLOGY | Facility: CLINIC | Age: 62
End: 2022-08-25
Payer: COMMERCIAL

## 2022-08-25 ENCOUNTER — HOSPITAL ENCOUNTER (OUTPATIENT)
Dept: CARDIOLOGY | Facility: HOSPITAL | Age: 62
Discharge: HOME | End: 2022-08-25
Attending: INTERNAL MEDICINE
Payer: COMMERCIAL

## 2022-08-25 ENCOUNTER — TELEPHONE (OUTPATIENT)
Dept: CARDIAC REHAB | Facility: CLINIC | Age: 62
End: 2022-08-25
Payer: COMMERCIAL

## 2022-08-25 VITALS
BODY MASS INDEX: 37.3 KG/M2 | DIASTOLIC BLOOD PRESSURE: 80 MMHG | WEIGHT: 275.38 LBS | HEART RATE: 68 BPM | HEIGHT: 72 IN | OXYGEN SATURATION: 96 % | SYSTOLIC BLOOD PRESSURE: 120 MMHG

## 2022-08-25 VITALS
WEIGHT: 274.69 LBS | DIASTOLIC BLOOD PRESSURE: 84 MMHG | BODY MASS INDEX: 37.21 KG/M2 | HEIGHT: 72 IN | SYSTOLIC BLOOD PRESSURE: 118 MMHG | HEART RATE: 57 BPM

## 2022-08-25 DIAGNOSIS — I25.810 CORONARY ARTERY DISEASE INVOLVING CORONARY BYPASS GRAFT OF NATIVE HEART WITHOUT ANGINA PECTORIS: ICD-10-CM

## 2022-08-25 DIAGNOSIS — I10 HTN (HYPERTENSION), BENIGN: ICD-10-CM

## 2022-08-25 DIAGNOSIS — G47.33 OSA (OBSTRUCTIVE SLEEP APNEA): ICD-10-CM

## 2022-08-25 DIAGNOSIS — I21.4 NON-STEMI (NON-ST ELEVATED MYOCARDIAL INFARCTION): ICD-10-CM

## 2022-08-25 DIAGNOSIS — I77.819 DILATATION OF AORTA: ICD-10-CM

## 2022-08-25 DIAGNOSIS — I50.22 HEART FAILURE WITH MID-RANGE EJECTION FRACTION: Primary | ICD-10-CM

## 2022-08-25 DIAGNOSIS — Z95.1 POSTSURGICAL AORTOCORONARY BYPASS STATUS: ICD-10-CM

## 2022-08-25 DIAGNOSIS — I48.0 PAROXYSMAL ATRIAL FIBRILLATION: ICD-10-CM

## 2022-08-25 DIAGNOSIS — E78.2 MIXED HYPERLIPIDEMIA: ICD-10-CM

## 2022-08-25 DIAGNOSIS — I25.10 ATHEROSCLEROSIS OF NATIVE CORONARY ARTERY OF NATIVE HEART, UNSPECIFIED WHETHER ANGINA PRESENT: ICD-10-CM

## 2022-08-25 DIAGNOSIS — E66.01 MORBID OBESITY WITH BMI OF 45.0-49.9, ADULT: ICD-10-CM

## 2022-08-25 LAB
CV STRESS BASE HR: 57 BPM
DIASTOLIC BLOOD PRESSURE: 84 MMHG
OHS CV CPX 1 MINUTE RECOVERY HEART RATE: 76 BPM
OHS CV CPX 85 PERCENT MAX PREDICTED HEART RATE MALE: 133
OHS CV CPX ABDOMINAL GIRTH: 53.5 CM
OHS CV CPX ANAEROBIC THRESHOLD DIASTOLIC BLOOD PRESSURE: 80 MMHG
OHS CV CPX ANAEROBIC THRESHOLD HEART RATE: 68
OHS CV CPX ANAEROBIC THRESHOLD RATE PRESSURE PRODUCT: 8160
OHS CV CPX ANAEROBIC THRESHOLD SYSTOLIC BLOOD PRESSURE: 120
OHS CV CPX DATA GRADE - AT: 4.4
OHS CV CPX DATA GRADE - PEAK: 7.4
OHS CV CPX DATA O2 SAT - PEAK: 97
OHS CV CPX DATA O2 SAT - REST: 94
OHS CV CPX DATA SPEED - AT: 1.8
OHS CV CPX DATA SPEED - PEAK: 2.4
OHS CV CPX DATA TIME - AT: 2.08
OHS CV CPX DATA TIME - PEAK: 3.5
OHS CV CPX DATA VE/VCO2 - AT: 43
OHS CV CPX DATA VE/VCO2 - PEAK: 43
OHS CV CPX DATA VE/VO2 - AT: 34
OHS CV CPX DATA VE/VO2 - PEAK: 45
OHS CV CPX DATA VO2 - AT: 8.8
OHS CV CPX DATA VO2 - PEAK: 11.3
OHS CV CPX DATA VO2 - REST: 3.1
OHS CV CPX ESTIMATED METS: 6
OHS CV CPX FEV1/FVC: 0.85
OHS CV CPX FORCED EXPIRATORY VOLUME: 2.36
OHS CV CPX FORCED VITAL CAPACITY (FVC): 2.79
OHS CV CPX HIGHEST VO: 26.6
OHS CV CPX MAX PREDICTED HEART RATE: 157
OHS CV CPX MAXIMAL VOLUNTARY VENTILATION (MVV) PREDICTED: 94.4
OHS CV CPX MAXIMAL VOLUNTARY VENTILATION (MVV): 77
OHS CV CPX MAXIUMUM EXERCISE VENTILATION (VE MAX): 62.4
OHS CV CPX PATIENT AGE: 63
OHS CV CPX PATIENT HEIGHT IN: 72
OHS CV CPX PATIENT IS FEMALE AGE 11-19: 0
OHS CV CPX PATIENT IS FEMALE AGE GREATER THAN 19: 0
OHS CV CPX PATIENT IS FEMALE AGE LESS THAN 11: 0
OHS CV CPX PATIENT IS FEMALE: 0
OHS CV CPX PATIENT IS MALE AGE 11-25: 0
OHS CV CPX PATIENT IS MALE AGE GREATER THAN 25: 1
OHS CV CPX PATIENT IS MALE AGE LESS THAN 11: 0
OHS CV CPX PATIENT IS MALE GREATER THAN 18: 1
OHS CV CPX PATIENT IS MALE LESS THAN OR EQUAL TO 18: 0
OHS CV CPX PATIENT IS MALE: 1
OHS CV CPX PATIENT WEIGHT RETURNED IN OZ: 4395.09
OHS CV CPX PEAK DIASTOLIC BLOOD PRESSURE: 89 MMHG
OHS CV CPX PEAK HEAR RATE: 77 BPM
OHS CV CPX PEAK RATE PRESSURE PRODUCT: NORMAL
OHS CV CPX PEAK SYSTOLIC BLOOD PRESSURE: 139 MMHG
OHS CV CPX PERCENT BODY FAT: 24.8
OHS CV CPX PERCENT MAX PREDICTED HEART RATE ACHIEVED: 49
OHS CV CPX PREDICTED VO2: 26.6 ML/KG/MIN
OHS CV CPX RATE PRESSURE PRODUCT PRESENTING: 6726
OHS CV CPX REST PET CO2: 27
OHS CV CPX VE/VCO2 SLOPE: 34.4
STRESS ECHO POST EXERCISE DUR MIN: 3 MINUTES
STRESS ECHO POST EXERCISE DUR SEC: 30 SECONDS
SYSTOLIC BLOOD PRESSURE: 118 MMHG

## 2022-08-25 PROCEDURE — 3074F SYST BP LT 130 MM HG: CPT | Mod: CPTII,S$GLB,, | Performed by: INTERNAL MEDICINE

## 2022-08-25 PROCEDURE — 3008F BODY MASS INDEX DOCD: CPT | Mod: CPTII,S$GLB,, | Performed by: INTERNAL MEDICINE

## 2022-08-25 PROCEDURE — 99999 PR PBB SHADOW E&M-EST. PATIENT-LVL IV: ICD-10-PCS | Mod: PBBFAC,,, | Performed by: INTERNAL MEDICINE

## 2022-08-25 PROCEDURE — 3079F PR MOST RECENT DIASTOLIC BLOOD PRESSURE 80-89 MM HG: ICD-10-PCS | Mod: CPTII,S$GLB,, | Performed by: INTERNAL MEDICINE

## 2022-08-25 PROCEDURE — 1159F MED LIST DOCD IN RCRD: CPT | Mod: CPTII,S$GLB,, | Performed by: INTERNAL MEDICINE

## 2022-08-25 PROCEDURE — 94621 CARDIOPULM EXERCISE TESTING: CPT

## 2022-08-25 PROCEDURE — 94621 CARDIOPULMONARY EXERCISE TESTING (CUPID ONLY): ICD-10-PCS | Mod: 26,,, | Performed by: INTERNAL MEDICINE

## 2022-08-25 PROCEDURE — 3008F PR BODY MASS INDEX (BMI) DOCUMENTED: ICD-10-PCS | Mod: CPTII,S$GLB,, | Performed by: INTERNAL MEDICINE

## 2022-08-25 PROCEDURE — 99999 PR PBB SHADOW E&M-EST. PATIENT-LVL IV: CPT | Mod: PBBFAC,,, | Performed by: INTERNAL MEDICINE

## 2022-08-25 PROCEDURE — 3044F HG A1C LEVEL LT 7.0%: CPT | Mod: CPTII,S$GLB,, | Performed by: INTERNAL MEDICINE

## 2022-08-25 PROCEDURE — 3079F DIAST BP 80-89 MM HG: CPT | Mod: CPTII,S$GLB,, | Performed by: INTERNAL MEDICINE

## 2022-08-25 PROCEDURE — 1159F PR MEDICATION LIST DOCUMENTED IN MEDICAL RECORD: ICD-10-PCS | Mod: CPTII,S$GLB,, | Performed by: INTERNAL MEDICINE

## 2022-08-25 PROCEDURE — 99213 OFFICE O/P EST LOW 20 MIN: CPT | Mod: S$GLB,,, | Performed by: INTERNAL MEDICINE

## 2022-08-25 PROCEDURE — 94621 CARDIOPULM EXERCISE TESTING: CPT | Mod: 26,,, | Performed by: INTERNAL MEDICINE

## 2022-08-25 PROCEDURE — 3044F PR MOST RECENT HEMOGLOBIN A1C LEVEL <7.0%: ICD-10-PCS | Mod: CPTII,S$GLB,, | Performed by: INTERNAL MEDICINE

## 2022-08-25 PROCEDURE — 3074F PR MOST RECENT SYSTOLIC BLOOD PRESSURE < 130 MM HG: ICD-10-PCS | Mod: CPTII,S$GLB,, | Performed by: INTERNAL MEDICINE

## 2022-08-25 PROCEDURE — 99213 PR OFFICE/OUTPT VISIT, EST, LEVL III, 20-29 MIN: ICD-10-PCS | Mod: S$GLB,,, | Performed by: INTERNAL MEDICINE

## 2022-08-25 NOTE — PROGRESS NOTES
Subjective:    Patient ID:  Isaiah Dorsey Jr. is a 62 y.o. male who presents for follow-up of CAD post CABG    HPI   Mr. Dorsey is a  62 year old male with hypertension, diabetes (HbA1C 7.6), and BMI of 39, who presented with  NSTEMI 7/11/22, given Brilinta, and underwent coronary angiogram showing multivessel coronary artery disease.He was brought to surgery 7/18/22 and had a LIMA to LAD. He developed atrial fibriliation post op requiring cardioversion X 3 and amiodarone. He was discharged 7/23/22. He has a very strong family of CAD.  He is a distant former smoker and and was on aspirin and a statin on admit. He is an . He continues to improve and back to work 1/2 day. He reports no chest pain or PRASAD  Lab Results   Component Value Date     (L) 07/23/2022    K 4.0 07/23/2022    CL 98 07/23/2022    CO2 26 07/23/2022    BUN 21 07/23/2022    CREATININE 0.8 07/23/2022     (H) 07/23/2022    HGBA1C 6.2 (H) 08/25/2022    MG 1.9 07/23/2022    AST 28 07/23/2022    ALT 45 (H) 07/23/2022    ALBUMIN 2.7 (L) 07/23/2022    PROT 6.5 07/23/2022    BILITOT 0.5 07/23/2022    WBC 10.65 08/25/2022    HGB 13.8 (L) 08/25/2022    HCT 45.4 08/25/2022    HCT 35 (L) 07/19/2022    MCV 94 08/25/2022     08/25/2022    INR 1.1 07/21/2022    PSA 0.33 09/30/2022    TSH 2.840 07/13/2022         Lab Results   Component Value Date    CHOL 89 (L) 07/28/2022    HDL 33 (L) 07/28/2022    TRIG 105 07/28/2022       Lab Results   Component Value Date    LDLCALC 35.0 (L) 07/28/2022       Past Medical History:   Diagnosis Date    CHF (congestive heart failure)     Coronary artery disease     Diabetes mellitus type 2, uncontrolled, without complications     6.7% Metf 1 g qd, eye 2015, [facility]-2015,     Elevated platelet count 07/23/2015    H/O splenectomy 04/24/2015    doesn't like pneumovax bc caused side effects    HLD (hyperlipidemia) 01/22/2015    goal LDL < 100, reluctant to take statin    HTN (hypertension), benign 01/22/2015     losartan 100    Morbid obesity with BMI of 45.0-49.9, adult 06/11/2014    RODRIGUEZ (obstructive sleep apnea)     PAF (paroxysmal atrial fibrillation)     Polyp of transverse colon 09/05/2018 2018, repeat 2023    Severe uncontrolled hypertension 06/11/2014       Current Outpatient Medications:     aspirin 325 MG tablet, Take 1 tablet (325 mg total) by mouth once daily., Disp: 30 tablet, Rfl: 11    atorvastatin (LIPITOR) 80 MG tablet, Take 1 tablet (80 mg total) by mouth once daily., Disp: 90 tablet, Rfl: 3    metFORMIN (GLUCOPHAGE) 1000 MG tablet, Take 1 tablet (1,000 mg total) by mouth 2 (two) times daily with meals., Disp: 180 tablet, Rfl: 3    metoprolol succinate (TOPROL-XL) 100 MG 24 hr tablet, Take 1 tablet (100 mg total) by mouth once daily., Disp: 90 tablet, Rfl: 3    rivaroxaban (XARELTO) 20 mg Tab, Take 1 tablet (20 mg total) by mouth daily with dinner or evening meal., Disp: 90 tablet, Rfl: 3    silver sulfADIAZINE 1% (SILVADENE) 1 % cream, silver sulfadiazine 1 % topical cream, Disp: , Rfl:     SITagliptin (JANUVIA) 100 MG Tab, Take 1 tablet (100 mg total) by mouth once daily., Disp: 90 tablet, Rfl: 3          Review of Systems   Constitutional: Negative for decreased appetite, diaphoresis, fever, malaise/fatigue, weight gain and weight loss.   HENT:  Negative for congestion, ear discharge, ear pain and nosebleeds.    Eyes:  Negative for blurred vision, double vision and visual disturbance.   Cardiovascular:  Negative for chest pain, claudication, cyanosis, dyspnea on exertion, irregular heartbeat, leg swelling, near-syncope, orthopnea, palpitations, paroxysmal nocturnal dyspnea and syncope.   Respiratory:  Negative for cough, hemoptysis, shortness of breath, sleep disturbances due to breathing, snoring, sputum production and wheezing.    Endocrine: Negative for polydipsia, polyphagia and polyuria.   Hematologic/Lymphatic: Negative for adenopathy and bleeding problem. Does not bruise/bleed easily.    Skin:  Negative for color change, nail changes, poor wound healing and rash.   Musculoskeletal:  Negative for muscle cramps and muscle weakness.   Gastrointestinal:  Negative for abdominal pain, anorexia, change in bowel habit, hematochezia, nausea and vomiting.   Genitourinary:  Negative for dysuria, frequency and hematuria.   Neurological:  Negative for brief paralysis, difficulty with concentration, excessive daytime sleepiness, dizziness, focal weakness, headaches, light-headedness, seizures, vertigo and weakness.   Psychiatric/Behavioral:  Negative for altered mental status and depression.    Allergic/Immunologic: Negative for persistent infections.      Objective:/80   Pulse 68   Ht 6' (1.829 m)   Wt 124.9 kg (275 lb 5.7 oz)   SpO2 96%   BMI 37.34 kg/m²             Physical Exam  Constitutional:       Appearance: He is well-developed. He is obese.   HENT:      Head: Normocephalic.      Right Ear: External ear normal.      Left Ear: External ear normal.      Nose: Nose normal.   Eyes:      General: No scleral icterus.     Pupils: Pupils are equal, round, and reactive to light.   Neck:      Thyroid: No thyromegaly.      Vascular: No JVD.      Trachea: No tracheal deviation.   Cardiovascular:      Rate and Rhythm: Normal rate and regular rhythm.      Pulses: Intact distal pulses.           Carotid pulses are 2+ on the right side and 2+ on the left side.       Dorsalis pedis pulses are 2+ on the right side and 2+ on the left side.        Posterior tibial pulses are 2+ on the right side and 2+ on the left side.      Heart sounds: No murmur heard.    No friction rub. No gallop.   Pulmonary:      Effort: Pulmonary effort is normal.      Breath sounds: Normal breath sounds.   Chest:       Abdominal:      General: Bowel sounds are normal. There is no distension.      Tenderness: There is no abdominal tenderness. There is no guarding.   Musculoskeletal:         General: No tenderness. Normal range of  motion.      Cervical back: Normal range of motion and neck supple.   Lymphadenopathy:      Comments: Palpation of neck and groin lymph nodes normal   Skin:     General: Skin is dry.      Comments: Palpation of skin normal   Neurological:      Mental Status: He is alert and oriented to person, place, and time.      Cranial Nerves: No cranial nerve deficit.      Motor: No abnormal muscle tone.      Coordination: Coordination normal.   Psychiatric:         Behavior: Behavior normal.         Thought Content: Thought content normal.         Judgment: Judgment normal.         Assessment:       1. Heart failure with mid-range ejection fraction    2. Coronary artery disease involving coronary bypass graft of native heart without angina pectoris    3. Dilatation of aorta    4. Mixed hyperlipidemia    5. HTN (hypertension), benign    6. Non-STEMI (non-ST elevated myocardial infarction)    7. Paroxysmal atrial fibrillation    8. Morbid obesity with BMI of 45.0-49.9, adult    9. RODRIGUEZ (obstructive sleep apnea)         Plan:       Isaiah was seen today for atrial fibrillation.    Diagnoses and all orders for this visit:    Heart failure with mid-range ejection fraction    Coronary artery disease involving coronary bypass graft of native heart without angina pectoris    Dilatation of aorta    Mixed hyperlipidemia    HTN (hypertension), benign    Non-STEMI (non-ST elevated myocardial infarction)    Paroxysmal atrial fibrillation    Morbid obesity with BMI of 45.0-49.9, adult    RODRIGUEZ (obstructive sleep apnea)

## 2022-08-26 ENCOUNTER — TELEPHONE (OUTPATIENT)
Dept: CARDIAC REHAB | Facility: CLINIC | Age: 62
End: 2022-08-26
Payer: COMMERCIAL

## 2022-08-26 NOTE — TELEPHONE ENCOUNTER
----- Message from Chacha Greene sent at 8/25/2022 12:14 PM CDT -----  Regarding: returning a call  The pt is returning a call. Please call him back @ [phone]. Thanks, Chacha

## 2022-08-31 ENCOUNTER — OFFICE VISIT (OUTPATIENT)
Dept: CARDIOTHORACIC SURGERY | Facility: CLINIC | Age: 62
End: 2022-08-31
Payer: COMMERCIAL

## 2022-08-31 ENCOUNTER — HOSPITAL ENCOUNTER (OUTPATIENT)
Dept: CARDIOLOGY | Facility: CLINIC | Age: 62
Discharge: HOME | End: 2022-08-31
Payer: COMMERCIAL

## 2022-08-31 ENCOUNTER — DOCUMENTATION ONLY (OUTPATIENT)
Dept: CARDIOTHORACIC SURGERY | Facility: CLINIC | Age: 62
End: 2022-08-31

## 2022-08-31 ENCOUNTER — RESEARCH ENCOUNTER (OUTPATIENT)
Dept: RESEARCH | Facility: HOSPITAL | Age: 62
End: 2022-08-31
Payer: COMMERCIAL

## 2022-08-31 VITALS
DIASTOLIC BLOOD PRESSURE: 99 MMHG | HEIGHT: 72 IN | OXYGEN SATURATION: 96 % | RESPIRATION RATE: 18 BRPM | HEART RATE: 62 BPM | BODY MASS INDEX: 37.62 KG/M2 | SYSTOLIC BLOOD PRESSURE: 154 MMHG | WEIGHT: 277.75 LBS

## 2022-08-31 DIAGNOSIS — Z95.1 S/P CABG X 3: ICD-10-CM

## 2022-08-31 DIAGNOSIS — Z95.1 S/P CABG (CORONARY ARTERY BYPASS GRAFT): Primary | ICD-10-CM

## 2022-08-31 PROCEDURE — 93010 EKG 12-LEAD: ICD-10-PCS | Mod: S$GLB,,, | Performed by: INTERNAL MEDICINE

## 2022-08-31 PROCEDURE — 4010F PR ACE/ARB THEARPY RXD/TAKEN: ICD-10-PCS | Mod: CPTII,S$GLB,, | Performed by: THORACIC SURGERY (CARDIOTHORACIC VASCULAR SURGERY)

## 2022-08-31 PROCEDURE — 3077F PR MOST RECENT SYSTOLIC BLOOD PRESSURE >= 140 MM HG: ICD-10-PCS | Mod: CPTII,S$GLB,, | Performed by: THORACIC SURGERY (CARDIOTHORACIC VASCULAR SURGERY)

## 2022-08-31 PROCEDURE — 93010 ELECTROCARDIOGRAM REPORT: CPT | Mod: S$GLB,,, | Performed by: INTERNAL MEDICINE

## 2022-08-31 PROCEDURE — 3077F SYST BP >= 140 MM HG: CPT | Mod: CPTII,S$GLB,, | Performed by: THORACIC SURGERY (CARDIOTHORACIC VASCULAR SURGERY)

## 2022-08-31 PROCEDURE — 3080F DIAST BP >= 90 MM HG: CPT | Mod: CPTII,S$GLB,, | Performed by: THORACIC SURGERY (CARDIOTHORACIC VASCULAR SURGERY)

## 2022-08-31 PROCEDURE — 1159F MED LIST DOCD IN RCRD: CPT | Mod: CPTII,S$GLB,, | Performed by: THORACIC SURGERY (CARDIOTHORACIC VASCULAR SURGERY)

## 2022-08-31 PROCEDURE — 99999 PR PBB SHADOW E&M-EST. PATIENT-LVL IV: CPT | Mod: PBBFAC,,, | Performed by: THORACIC SURGERY (CARDIOTHORACIC VASCULAR SURGERY)

## 2022-08-31 PROCEDURE — 93005 EKG 12-LEAD: ICD-10-PCS | Mod: S$GLB,,, | Performed by: THORACIC SURGERY (CARDIOTHORACIC VASCULAR SURGERY)

## 2022-08-31 PROCEDURE — 99999 PR PBB SHADOW E&M-EST. PATIENT-LVL IV: ICD-10-PCS | Mod: PBBFAC,,, | Performed by: THORACIC SURGERY (CARDIOTHORACIC VASCULAR SURGERY)

## 2022-08-31 PROCEDURE — 3008F PR BODY MASS INDEX (BMI) DOCUMENTED: ICD-10-PCS | Mod: CPTII,S$GLB,, | Performed by: THORACIC SURGERY (CARDIOTHORACIC VASCULAR SURGERY)

## 2022-08-31 PROCEDURE — 3080F PR MOST RECENT DIASTOLIC BLOOD PRESSURE >= 90 MM HG: ICD-10-PCS | Mod: CPTII,S$GLB,, | Performed by: THORACIC SURGERY (CARDIOTHORACIC VASCULAR SURGERY)

## 2022-08-31 PROCEDURE — 3008F BODY MASS INDEX DOCD: CPT | Mod: CPTII,S$GLB,, | Performed by: THORACIC SURGERY (CARDIOTHORACIC VASCULAR SURGERY)

## 2022-08-31 PROCEDURE — 3044F HG A1C LEVEL LT 7.0%: CPT | Mod: CPTII,S$GLB,, | Performed by: THORACIC SURGERY (CARDIOTHORACIC VASCULAR SURGERY)

## 2022-08-31 PROCEDURE — 3044F PR MOST RECENT HEMOGLOBIN A1C LEVEL <7.0%: ICD-10-PCS | Mod: CPTII,S$GLB,, | Performed by: THORACIC SURGERY (CARDIOTHORACIC VASCULAR SURGERY)

## 2022-08-31 PROCEDURE — 93005 ELECTROCARDIOGRAM TRACING: CPT | Mod: S$GLB,,, | Performed by: THORACIC SURGERY (CARDIOTHORACIC VASCULAR SURGERY)

## 2022-08-31 PROCEDURE — 1159F PR MEDICATION LIST DOCUMENTED IN MEDICAL RECORD: ICD-10-PCS | Mod: CPTII,S$GLB,, | Performed by: THORACIC SURGERY (CARDIOTHORACIC VASCULAR SURGERY)

## 2022-08-31 PROCEDURE — 99024 PR POST-OP FOLLOW-UP VISIT: ICD-10-PCS | Mod: S$GLB,,, | Performed by: THORACIC SURGERY (CARDIOTHORACIC VASCULAR SURGERY)

## 2022-08-31 PROCEDURE — 4010F ACE/ARB THERAPY RXD/TAKEN: CPT | Mod: CPTII,S$GLB,, | Performed by: THORACIC SURGERY (CARDIOTHORACIC VASCULAR SURGERY)

## 2022-08-31 PROCEDURE — 99024 POSTOP FOLLOW-UP VISIT: CPT | Mod: S$GLB,,, | Performed by: THORACIC SURGERY (CARDIOTHORACIC VASCULAR SURGERY)

## 2022-08-31 RX ORDER — ORAL SEMAGLUTIDE 7 MG/1
TABLET ORAL
COMMUNITY
End: 2022-09-30

## 2022-08-31 RX ORDER — ORAL SEMAGLUTIDE 14 MG/1
TABLET ORAL
COMMUNITY
End: 2022-09-30

## 2022-08-31 RX ORDER — TEDIZOLID PHOSPHATE 200 MG/1
TABLET, FILM COATED ORAL
COMMUNITY
End: 2022-09-30

## 2022-08-31 RX ORDER — METOPROLOL SUCCINATE 100 MG/1
TABLET, EXTENDED RELEASE ORAL
COMMUNITY
Start: 2022-07-22 | End: 2022-09-30 | Stop reason: SDUPTHER

## 2022-08-31 RX ORDER — AMLODIPINE BESYLATE AND ATORVASTATIN CALCIUM 5; 40 MG/1; MG/1
TABLET, FILM COATED ORAL
COMMUNITY
End: 2022-09-30

## 2022-08-31 RX ORDER — AMLODIPINE BESYLATE 5 MG/1
TABLET ORAL
COMMUNITY
End: 2022-09-30

## 2022-08-31 RX ORDER — ATORVASTATIN CALCIUM 40 MG/1
TABLET, FILM COATED ORAL
COMMUNITY
Start: 2022-07-22 | End: 2022-08-31

## 2022-08-31 RX ORDER — ERTUGLIFLOZIN 15 MG/1
TABLET, FILM COATED ORAL
COMMUNITY
End: 2022-09-30

## 2022-08-31 RX ORDER — HYDROCHLOROTHIAZIDE 12.5 MG/1
TABLET ORAL
COMMUNITY
End: 2022-09-30

## 2022-08-31 RX ORDER — OXYCODONE HYDROCHLORIDE 5 MG/1
TABLET ORAL
COMMUNITY
Start: 2022-07-22 | End: 2022-08-31

## 2022-08-31 RX ORDER — AMLODIPINE AND BENAZEPRIL HYDROCHLORIDE 10; 40 MG/1; MG/1
CAPSULE ORAL
COMMUNITY
End: 2022-09-30

## 2022-08-31 RX ORDER — PANTOPRAZOLE SODIUM 40 MG/1
TABLET, DELAYED RELEASE ORAL
COMMUNITY
End: 2022-08-31

## 2022-08-31 RX ORDER — IRBESARTAN 300 MG/1
TABLET ORAL
COMMUNITY
End: 2022-09-30

## 2022-08-31 RX ORDER — METOPROLOL SUCCINATE 50 MG/1
TABLET, EXTENDED RELEASE ORAL
COMMUNITY
Start: 2021-09-16 | End: 2022-09-30

## 2022-08-31 RX ORDER — METFORMIN HYDROCHLORIDE 1000 MG/1
TABLET ORAL
COMMUNITY
Start: 2022-07-22 | End: 2022-09-30

## 2022-08-31 RX ORDER — DOCUSATE SODIUM 100 MG/1
CAPSULE, LIQUID FILLED ORAL
COMMUNITY
Start: 2022-07-22 | End: 2022-08-31

## 2022-08-31 RX ORDER — ATORVASTATIN CALCIUM 80 MG/1
TABLET, FILM COATED ORAL
COMMUNITY
Start: 2021-09-17 | End: 2022-09-30

## 2022-08-31 RX ORDER — SILVER SULFADIAZINE 10 G/1000G
CREAM TOPICAL
COMMUNITY
End: 2022-10-11

## 2022-08-31 RX ORDER — POTASSIUM CHLORIDE 20 MEQ/1
TABLET, EXTENDED RELEASE ORAL
COMMUNITY
Start: 2022-07-22 | End: 2022-08-31

## 2022-08-31 NOTE — PROGRESS NOTES
Pt established with [facility] cardiac rehab.  Pt instructed to follow-up with his cardiologist, Dr. Tobias  .    Pt reminded to continue with 20lb lifting restriction until 9/12. As of 9/13 he will no longer be under restrictions.  Pt reminded to continue washing incisions everyday with soap and water.      Pt active portal user and AVS to be accessed via [employer].     Pt verbalized understanding of all instructions.     Julie Haase RN  CTS Nurse Navigator [phone]

## 2022-08-31 NOTE — LETTER
[facility] Cardi[facility]  [address]  [facility] [address]  Phone: [phone]             August 31, 2022      Patient: Isaiah Dorsey Jr.   MR Number: [id number]   YOB: 1960   Date of Visit: 8/31/2022     Gage Tobias MD  [address]  [facility] [address]    Dear Dr. Tobias,     It was a pleasure seeing your patient, Mr. Dorsey, in our follow-up clinic today after his single vessel coronary artery bypass surgery (LIMA to LAD) on July 18, 2022.  His postoperative recovery was normal and he is doing excellent.  His most recent echocardiogram showed moderate left ventricular dysfunction.  He is an active individual who walks daily and has no respiratory issues.     On examination today, his vital signs are normal and EKG is sinus rhythm.  The incision has healed very well and the sternum is stable.  Lungs are clear bilaterally and there is no significant heart murmur.  There are no signs of heart failure or peripheral edema.  He is now discharged from our clinic but please do not hesitate to contact me if there is any problem.       Thank you for your referral and for your trust and confidence in our Cardiac Surgery Reference [facility].  For convenience, attached below is a copy of the operative report.     Kindest regards,    Blu Rhodes MD  Cardiothoracic Surgery  [facility]  Cardiothoracic Surgery Operative Report     Patient Name:  Isaiah Dorsey Jr.; [id number]     Preoperative Diagnosis: Coronary artery disease, unstable angina, ischemic cardiomyopathy, systolic heart failure     Postoperative Diagnosis:  Same     Date of Operation:  07/18/2022      Operation:  Single vessel coronary artery bypass grafting  Left internal mammary artery to the distal left anterior descending artery  Endoscopic saphenous vein harvest from the left lower extremity     Surgeon:  Blu Rhodes MD     Assistant Surgeon:  none     Fellow:   none     Anesthesiologist:  Keila Sanchez MD     ----------------------------------------------------------------------------------------------------------        Indications for surgery: Mr. Dorsey is a pleasant 62 year old male with hypertension, diabetes (HbA1C 7.6), and BMI of 39, who presented with unstable angina (troponin 0.037) vs NSTEMI, given Brilinta, and underwent coronary angiogram showing multivessel coronary artery disease.  Coronary angiogram details: 90% early mid LAD lesion with a moderate sized mid and distal LAD (wrap around LAD), 99% ostial LCx which has diffuse disease, and 90% proximal lesion in a small to moderate sized right posterolateral ventricular artery.  Transthoracic echocardiogram showed 40-45% ejection fraction.     Given the severity of disease and the symptoms, I recommend coronary artery bypass surgery x 1 or 2 (LIMA-LAD, possible SVG-RPL), on pump vs off pump.  The risks and benefits were explained and informed consent was obtained.      Gross findings: No pericardial adhesions.  Small to moderate LAD target, small and nonbypassable RPL.  Moderate left ventricular dysfunction pre and post bypass.        Procedure:  The patient was brought to the holding area and the indications for surgery were reviewed.  Afterwards, the patient was placed supine on the operating room table and prepped and draped in the usual sterile fashion. A surgical time out was performed.      A midline incision was followed with division of the sternum. The left internal mammary artery was harvested. Simultaneously, one piece of saphenous vein was harvested endoscopically from the leg. ACT guided heparinization was administered.  The ascending aorta and right atrium were cannulated.  Cardiopulmonary bypass was commenced.  The ascending aorta was cannulated for administration of cardioplegia.  A cross-clamp was applied and 1500cc of antegrade cold blood cardioplegia was administered.      Attention was  "turned to the distal left anterior descending artery. The heart was repositioned and the LAD was identified. The vessel was opened and the arteriotomy elongated with scissors.  The left internal mammary artery was brought to the field and prepared for use.  The left internal mammary artery was sewn end to side to the LAD with continuous 7-0 prolene. The vessel was briefly unclamped to assess for hemostasis.      A dose of warm "hot shot" cardioplegia was given antegrade.  Air was evacuated and the cross-clamp was removed. All anastomoses were checked for hemostasis and the patient was weaned from bypass on a low to moderate amount of inotropic support.  The total cardiopulmonary bypass time was 24 minutes and cross clamp time was 15 minutes.  The cannulae were removed and heparin was reversed.  Temporary ventricular pacing wires were placed on the heart.  Drainage tubes were placed behind the sternum and in the pleural space. The chest was closed with steel wires and the soft tissue was closed with absorbable suture.  A sterile dressing was applied and the patient was brought to the ICU in stable condition.       I was present in the operating room for the entire operation and immediately available thereafter.         "

## 2022-08-31 NOTE — PATIENT INSTRUCTIONS
Please make appointments to check in with your PCP and Cardiologist in the next 2 to 6 weeks.    Continue walking during cooler parts of the day or early evening to build up your endurance.     You may drive, if you have power steering.    Your lifting restriction is still in place until you are 8 weeks out from your surgery:  You can life up to 20 lbs until the 9/12  As of 9/13 you are no longer any lifting restrictions      COVID Precautions:    Continue to take precautions against COVID. Mask up when around unknown people, wash / sanitize hands frequently. Avoid large groups of people until at least 12 weeks post op.    Cardiac Rehab:    We will send over orders for your Cardiac Rehab referral. It may take 7-10 business days to get authorization from your insurance company. The facility will call you to set up your first appointment once they have insurance authorization.

## 2022-08-31 NOTE — PROGRESS NOTES
Patient seen and examined. Patient is progressively increasing activity. No significant complaints.     Sternum: stable, incision CDI  EKG: NSR     Assessment:  Single vessel coronary artery bypass grafting  Left internal mammary artery to the distal left anterior descending artery  Endoscopic saphenous vein harvest from the left lower extremity       Plan:  Can begin driving   Can begin cardiac rehab 6 weeks after operation   We will refer to cardiology to assume care         No scheduled appointment, RTC prn    I have seen the patient and reviewed the nurse practitioner's note above. I have personally interviewed and examined the patient at bedside and agree with the findings.     Mr. Dorsey is doing well after his single vessel coronary artery bypass surgery (LIMA to LAD) on July 18, 2022.  He is walking only a little daily and needs to increase ambulation to one mile daily.  He can see me in clinic as needed.      Blu Rhodes MD  Cardiothoracic Surgery  [facility]

## 2022-08-31 NOTE — PROGRESS NOTES
Study name: Anticoagulation for New-Onset Post-Operative Atrial Fibrillation after CABG- PACES    IRB:  2019.273    PI: Dr. JENARO Holguin    Who was Present:  Patient, and Corrina Curtis, [facility]   08/31/2022: Patient was in clinic for post-op appointment.  He is aware he has three more follow-up calls and pending EKG in about 6 weeks.  Planning to start Cardiac Rehab at the [facility].

## 2022-09-07 NOTE — PROGRESS NOTES
HISTORY: S/P CABG (7-18-22), CAD, CHF, HLP, THN, NSTEMI, PAROXYSMAL A-FIB, DM, EF=40-45%(7-12-22)    ANTHROPOMETRICS:     PRE   Height (in) 72   Weight (lb) 274   BMI 37.3   Abdominal Girth (in) 53.5   % Body Fat 24.8%       LAB RESULTS:    Lab Results   Component Value Date    HGB 13.8 (L) 08/25/2022     Lab Results   Component Value Date    HCT 45.4 08/25/2022     Lab Results   Component Value Date    MPV 10.8 08/25/2022       Lab Results   Component Value Date    CHOL 89 (L) 07/28/2022     Lab Results   Component Value Date    HDL 33 (L) 07/28/2022     Lab Results   Component Value Date    LDLCALC 35.0 (L) 07/28/2022     Lab Results   Component Value Date    TRIG 105 07/28/2022     Lab Results   Component Value Date    CHOLHDL 37.1 07/28/2022       Lab Results   Component Value Date    GLUF 136 (H) 08/25/2022     Lab Results   Component Value Date    HGBA1C 6.2 (H) 08/25/2022        Lab Results   Component Value Date    HSCRP 0.80 08/25/2022         MEGAN SCORES:     PRE   Anxiety 0   Depression 0   Somatic 1   Hostility 0     SF-36 SCORES:     PRE   Physical Function 20   Social Function 11   Mental Health 26   Pain 11   Change in Health 5   Physical Role Limitation 0   Mental Role Limitation 1   Energy/Fatigue 7   Health Perceptions 20   Total Score 101     PHQ-9:  PHQ-9 Depression Patient Health Questionnaire 9/15/2022   Over the last two weeks how often have you been bothered by little interest or pleasure in doing things 0   Over the last two weeks how often have you been bothered by feeling down, depressed or hopeless 0   Over the last two weeks how often have you been bothered by trouble falling or staying asleep, or sleeping too much 0   Over the last two weeks how often have you been bothered by feeling tired or having little energy 1   Over the last two weeks how often have you been bothered by a poor appetite or overeating 0   Over the last two weeks how often have you been bothered by feeling bad  about yourself - or that you are a failure or have let yourself or your family down 0   Over the last two weeks how often have you been bothered by trouble concentrating on things, such as reading the newspaper or watching television 0   Over the last two weeks how often have you been bothered by moving or speaking so slowly that other people could have noticed. 0   Over the last two weeks how often have you been bothered by thoughts that you would be better off dead, or of hurting yourself 0   If you checked off any problems, how difficult have these problems made it for you to do your work, take care of things at home or get along with other people? Not difficult at all   Total Score 1             EDUCATION SCORES:     PRE   Education Score 80

## 2022-09-07 NOTE — PROGRESS NOTES
Session: Orientation   Cardiac Rehab Individual Treatment Plan - Initial Assessment      Patient Name: Isaiah Dorsey Jr. MRN: [id number]   : 1960   Age: 62 y.o.   Primary Diagnosis: CABG  Date of Event: 22  EF: 40-45%  Risk Stratification: high  Referring Physician: ADRIANO   Exercise Assessment:     CPX/TM Date: 22 Results   RHR 57   Max HR 77   Peak VO2 (CPX only) 11.3   Actual METS (CPX only) 3.23   Estimated METS 6.0     Anthropometrics    Height 72 inches   Weight 274 lbs   BMI 37.3   Abdominal Girth 53.5   Body Composition 24.8%     ST Depression noted on Stress Test?:No  Angina with exercise?: No   Fall Risk: No   Assistive Devices:  independent   Currently exercising? Yes: Walking; Frequency: 3 days per week; Duration approximately 20 minutes maybe  There were no limitations noted by the patient.      Exercise Plan:   Goals:  CR Exercise Goals: Attend Cardiac Rehab 3 times/week: In Progress  Home Aerobic Exercise: 2 additional days/week for 30-60 minutes: In Progress  Intensity of 12-15 on the Rate of Perceived Exertion (RPE) scale: In Progress  30% increase in entry estimated METS: 7.8 : In Progress  5 days/week for 30-60 minutes: In Progress    Intervention:   Discussed importance of regular attendance to cardiac rehab class    Exercise Prescription:  THR Range 63-74   Mode: Treadmill  Recumbent Bike  Upright Bike  Nustep  Elliptical   Frequency:  3 days/week   Duration:  30 - 60 minutes   Intensity:  12 - 15 RPE   Resistance Training:  Yes: 5 to 7 lb weights with 10-15 reps based on strength and range of motion assessment     Home Prescription:  Mode Aerobic   Frequency: 2- 3 days/week   Duration: 30-60 minutes   Resistance Training: None        Education:  Orientation to Equipment; verbalizes understanding; Date: 9-15-22  Exercise Recommendations; verbalizes understanding; Date: 9-15-22  Exercise Safety; verbalizes understanding; Date: 9-15-22  Class Preparation: verbalizes understanding;  Date: 9-15-22  Signs and symptoms to report: verbalizes understanding; Date: 9-15-22  Caffeine/Hydration: verbalizes understanding; Date: 9-15-22  Exercise Terminology: verbalizes understanding; Date: 9-15-22  Resistance Training: verbalizes understanding; Date: 9-15-22    Comments:  Mr. Mason was encouraged to continue walking at least 2 non-rehab days per week for at least 30 minutes in addition to attending Phase II cardiac rehab classes 3 days per week.  He stated understanding.     All consent forms were signed, proper attire and shoes were discussed.       Mr. Mason will begin Cardiac Rehab on Friday, September 23 at 7:00am.    The exercise prescription will be adjusted based on tolerance of exercise intensity by patient.    Sharda Ceballos., CEP

## 2022-09-13 ENCOUNTER — TELEPHONE (OUTPATIENT)
Dept: CARDIAC REHAB | Facility: CLINIC | Age: 62
End: 2022-09-13
Payer: COMMERCIAL

## 2022-09-15 ENCOUNTER — CLINICAL SUPPORT (OUTPATIENT)
Dept: CARDIAC REHAB | Facility: CLINIC | Age: 62
End: 2022-09-15
Payer: COMMERCIAL

## 2022-09-15 ENCOUNTER — RESEARCH ENCOUNTER (OUTPATIENT)
Dept: RESEARCH | Facility: HOSPITAL | Age: 62
End: 2022-09-15
Payer: COMMERCIAL

## 2022-09-15 ENCOUNTER — PATIENT MESSAGE (OUTPATIENT)
Dept: RESEARCH | Facility: HOSPITAL | Age: 62
End: 2022-09-15
Payer: COMMERCIAL

## 2022-09-15 DIAGNOSIS — Z00.6 RESEARCH STUDY PATIENT: Primary | ICD-10-CM

## 2022-09-15 DIAGNOSIS — I48.0 PAROXYSMAL ATRIAL FIBRILLATION: ICD-10-CM

## 2022-09-15 DIAGNOSIS — Z95.1 POSTSURGICAL AORTOCORONARY BYPASS STATUS: Primary | ICD-10-CM

## 2022-09-15 PROCEDURE — 99999 PR PBB SHADOW E&M-EST. PATIENT-LVL III: CPT | Mod: PBBFAC,,,

## 2022-09-15 PROCEDURE — 93798 PHYS/QHP OP CAR RHAB W/ECG: CPT | Mod: S$GLB,,, | Performed by: INTERNAL MEDICINE

## 2022-09-15 PROCEDURE — 93798 PR CARDIAC REHAB/MONITOR: ICD-10-PCS | Mod: S$GLB,,, | Performed by: INTERNAL MEDICINE

## 2022-09-15 PROCEDURE — 99999 PR PBB SHADOW E&M-EST. PATIENT-LVL III: ICD-10-PCS | Mod: PBBFAC,,,

## 2022-09-15 NOTE — PROGRESS NOTES
Session: Orientation   Cardiac Rehab Individual Treatment Plan - Initial Assessment      Patient Name: Isaiah Dorsey Jr. MRN: [id number]   : 1960   Age: 62 y.o.   Date of Event: 22   Primary Diagnosis: S/P CABG    EF: 40-45% (22)    Physical Assessment:   There were no vitals taken for this visit.    ASSESSMENT:  Heart Sounds: regular rate and rhythm, S1, S2 normal, no murmur, click, rub or gallop  Prosthetic Valve: No  Lung Sounds: normal air entry, lungs clear to auscultation  Capillary Refill: normal  Left Radial Pulse: Normal (+2)  Right Radial Pulse: Normal (+2)  Left Pedal Pulse: Normal (+2)  Right Pedal Pulse: Normal (+2)  Right Edema: Trace  Left Edema Trace  Strength: good  Range of Motion: full range of motion  Existing Limitations:      Site   [] Arthritis, bursitis    [] Amputation, atrophy    [] Other:    []       Diabetic patient's foot examination comments: Normal -  Bilateral  Incisional site: healing well  Special needs: NA    Psychosocial Assessment:   Outcome Survey Tools:    MEGAN SCORES:   PRE   Anxiety 0   Depression 0   Somatic 1   Hostility 0     SF-36 SCORES:   PRE   Physical Function 20   Social Function 11   Mental Health 26   Pain 11   Change in Health 5   Physical Role Limitation 0   Mental Role Limitation 1   Energy/Fatigue 7   Health Perceptions 20   Total Score 101     PHQ-9:  PHQ-9 Depression Patient Health Questionnaire 9/15/2022   Over the last two weeks how often have you been bothered by little interest or pleasure in doing things 0   Over the last two weeks how often have you been bothered by feeling down, depressed or hopeless 0   Over the last two weeks how often have you been bothered by trouble falling or staying asleep, or sleeping too much 0   Over the last two weeks how often have you been bothered by feeling tired or having little energy 1   Over the last two weeks how often have you been bothered by a poor appetite or overeating 0   Over the last two  weeks how often have you been bothered by feeling bad about yourself - or that you are a failure or have let yourself or your family down 0   Over the last two weeks how often have you been bothered by trouble concentrating on things, such as reading the newspaper or watching television 0   Over the last two weeks how often have you been bothered by moving or speaking so slowly that other people could have noticed. 0   Over the last two weeks how often have you been bothered by thoughts that you would be better off dead, or of hurting yourself 0   If you checked off any problems, how difficult have these problems made it for you to do your work, take care of things at home or get along with other people? Not difficult at all   Total Score 1              Living Arrangements: Lives with spouse  Family Support: children, grandchildren, and spouse  Self Reported: Effective Coping Skills  Displays: happiness and calmness  Medication: not applicable    Psychosocial Plan:   Goals:  Improved psychosocial coping strategies  Reduce manifestation of stress  Maintain positive support system  Maintain positive outlook  Improve overall quality of life    Interventions/Recommendations:  Discussed Results of Surveys  Patient to Self Report Emotional Changes at Session Check In  Recommend Physical Activity  Recommend Attending Education Lectures  Notify MD: No  Program Referral: No  Pharmaceutical Intervention/Therapy: No  Other Needs: not applicable  Stage of Readiness to Change: Contemplation    Education:  Stress, verbalizes understanding; Date:9/15/22  Risk Factors, verbalizes understanding; Date:9/15/22    Comments:  Patient acknowledges stress at work with deadlines. Discussed screening results and importance of seeking treatment when indicated. Patient verbalized understanding. Patient has been instructed to notify staff in the event that circumstances worsen.  Patient verbalizes understanding.    Other Core Components/Risk  Factors Assessment:   RISK FACTORS:  diabetes, hyperlipidemia, hypertension, obesity, positive family history, sedentary lifestyle, stress    Learning Barriers: None    Education Level:  Associate/Bachelor Degree    Pre-test Score: 80    Medication Compliance: has been compliant with taking medications    Other Core Components/Risk Factors Plan:   Goals:  Decrease cholesterol level: In Progress  Increase exercise tolerance: In Progress  Increase knowledge of CAD: In Progress  Decrease blood pressure: In Progress  Weight loss: In Progress  Demonstrate accurate pulse taking: In Progress  Identify and manage personal areas of stress: In Progress  Control diabetes by adjusting diet and exercise: In Progress  Learn more about healthy eating: In Progress    Interventions/Recommendations:  Recommend regular attendance for Cardiac Rehab: Exercise and Education Lectures  Encourage medication compliance  Individual Education/ Counseling: Yes  Physician Referral: No    Education:    risk factors, verbalizes understanding; Date: 9/15/22  stress, verbalizes understanding; Date: 9/15/22         Education method adapted to patients education level and preferred method of learning.  Method: explanation    Comments:  Patient acknowledges his stress, plans for healthier lifestyle to improve his cardiac health.    Other Core Components/Hypertension Assessment:   Resting BP:  Left 130/84 right 126/80   BP Readings from Last 1 Encounters:   08/31/22 (!) 154/99         BP Diagnosis: Hypertensive  Patient reported symptoms: none    Other Core Components/Hypertension Plan:   Goals:  Blood Pressure <130/80    Interventions/Recommendations:  Med Card Reconciled: Yes  Encourage medication compliance  Encourage sodium reduction  Encourage weight loss  Recommend physical activity  Educate on contributory factors  Reduce stress, anxiety, anger, depression, and/or chronic pain  Encourage home blood pressure monitoring    Education:    Risk  "Factors; verbalizes understanding; Date: 9/15/22  Stress; verbalizes understanding; Date: 9/15/22         Comments:  Patient not knowledgeable of his medication regimen "his wife handles that". Encouraged patient to keep list of medications with him at all times. Patient verbalized understanding.     Does the patient have Heart Failure? YES, EF 40-45%, trace edema bilateral legs, denies shortness of breath    Other Core Components/Tobacco Cessation Assessment:   Smoking Status: past smoker who quit many years ago  Smoking Cessation Barriers:  NA  Stage of Readiness to Change: Maintenance    Other Core Components/Tobacco Cessation Plan:   Goals:  Maintain non-smoking status    Interventions:  Maintains non-smoking status    Education:    Risk Factors; verbalizes understanding; Date: 9/15/22         Comments:  Patient motivated to start cardiac rehab. Screening scores do not indicate a referral for behavioral health, pt denies any issues with anxiety/depression but admits to stress with work deadlines. Discussed Cardiac Rehab program in depth with patient.  Medication list updated per patient & marked as reviewed.  Patient has been instructed to notify staff of any problems while attending rehab (ie: chest pain, shortness of breath, lightheadedness, dizziness).  Patient has been instructed to monitor blood pressure readings outside of rehab & to keep a daily log of the readings.  Patient verbalizes understanding.     Ranjit Osuna RN  Cardiac Rehab Nurse  "

## 2022-09-15 NOTE — PROGRESS NOTES
Orientation   Cardiac Rehab Individual Treatment Plan - Initial Assessment      Patient Name: Isaiah Dorsey Jr. MRN: [id number]   : 1960   Age: 62 y.o.   Primary Diagnosis: s/p CABG, CAD, CHF, h/o NSTEMI, HLD, paroxysmal AFIB, DM    Nutrition Assessment:     Anthropometrics    Height 72 inches   Weight 274 lbs   BMI 37.3   Abdominal Girth 53.5   Body Composition 24.8       Drug Allergies and Intolerances:  Review of patient's allergies indicates:   Allergen Reactions    Penicillins      Other reaction(s): Itching  Other reaction(s): Hives    Wasp venom Edema       Food Allergies and Intolerances:  NA    Past Medical History:  Past Medical History:   Diagnosis Date    Diabetes mellitus type 2, uncontrolled, without complications     6.7% Metf 1 g qd, eye , [facility]     Elevated platelet count 2015    H/O splenectomy 2015    doesn't like pneumovax bc caused side effects    HLD (hyperlipidemia) 2015    goal LDL < 100, reluctant to take statin    HTN (hypertension), benign 2015    losartan 100    Morbid obesity with BMI of 45.0-49.9, adult 2014    Polyp of transverse colon 2018, repeat     Severe uncontrolled hypertension 2014       Past Surgical History:  Past Surgical History:   Procedure Laterality Date    AORTOGRAPHY N/A 2022    Procedure: AORTOGRAM;  Surgeon: Bjorn Cheatham MD;  Location: [facility] CATH LAB;  Service: Cardiology;  Laterality: N/A;    CORONARY ARTERY BYPASS GRAFT (CABG) Left 2022    Procedure: CORONARY ARTERY BYPASS GRAFT (CABG);  Surgeon: Blu Rhodes MD;  Location: [address];  Service: Cardiovascular;  Laterality: Left;  CABG x 1 (LIMA to LAD)    ENDOSCOPIC HARVEST OF VEIN Left 2022    Procedure: HARVEST-VEIN-ENDOVASCULAR;  Surgeon: Blu Rhodes MD;  Location: [address];  Service: Cardiovascular;  Laterality: Left;  Vein [place] Start time: 0823am  Vein [place] Stop time: 0836am    Vein [place] Start  time: 0848am  Vein [place] Stop time: 0904am    LEFT HEART CATHETERIZATION N/A 7/11/2022    Procedure: Left heart cath;  Surgeon: Bjorn Cheatham MD;  Location: [facility] CATH LAB;  Service: Cardiology;  Laterality: N/A;    splenecectomy         Medications:  Current Outpatient Medications   Medication Sig    amLODIPine (NORVASC) 5 MG tablet amlodipine 5 mg tablet    amlodipine-atorvastatin (CADUET) 5-40 mg per tablet amlodipine 5 mg-atorvastatin 40 mg tablet    amLODIPine-benazepriL (LOTREL) 10-40 mg per capsule amlodipine 10 mg-benazepril 40 mg capsule   TAKE 1 CAPSULE BY MOUTH DAILY (STANDARD)    aspirin 325 MG tablet Take 1 tablet (325 mg total) by mouth once daily.    atorvastatin (LIPITOR) 40 MG tablet Take 1 tablet (40 mg total) by mouth every evening.    atorvastatin (LIPITOR) 80 MG tablet atorvastatin 80 mg tablet    ertugliflozin (STEGLATRO) 15 mg Tab Steglatro 15 mg tablet    furosemide (LASIX) 40 MG tablet Take 1 tablet (40 mg total) by mouth once daily. for 5 days    hydroCHLOROthiazide (HYDRODIURIL) 12.5 MG Tab hydrochlorothiazide 12.5 mg tablet    irbesartan (AVAPRO) 300 MG tablet irbesartan 300 mg tablet    metFORMIN (GLUCOPHAGE) 1000 MG tablet Take 1,000 mg by mouth 2 (two) times daily with meals.    metFORMIN (GLUCOPHAGE) 1000 MG tablet 1 tablet 2 TIMES DAILY (route: oral)    metoprolol succinate (TOPROL-XL) 100 MG 24 hr tablet Take 1 tablet (100 mg total) by mouth once daily. (Patient not taking: Reported on 8/31/2022)    metoprolol succinate (TOPROL-XL) 100 MG 24 hr tablet 1 tablet DAILY (route: oral)    metoprolol succinate (TOPROL-XL) 50 MG 24 hr tablet metoprolol succinate ER 50 mg tablet,extended release 24 hr    rivaroxaban (XARELTO) 20 mg Tab Take 1 tablet (20 mg total) by mouth daily with dinner or evening meal.    semaglutide (RYBELSUS) 14 mg tablet Rybelsus 14 mg tablet   TAKE 1 TABLET BY MOUTH EVERY MORNING    semaglutide (RYBELSUS) 7 mg tablet Rybelsus 7 mg tablet    silver sulfADIAZINE  1% (SILVADENE) 1 % cream silver sulfadiazine 1 % topical cream    SITagliptin (JANUVIA) 100 MG Tab Take 1 tablet (100 mg total) by mouth once daily.    tedizolid (SIVEXTRO) 200 mg Tab Sivextro 200 mg tablet     No current facility-administered medications for this visit.       Vitamins and Supplements:  NA    Labs:  Patient confirms he is taking lipitor 40mg for cholesterol control.    Lab Results   Component Value Date    CHOL 89 (L) 07/28/2022     Lab Results   Component Value Date    HDL 33 (L) 07/28/2022     Lab Results   Component Value Date    LDLCALC 35.0 (L) 07/28/2022     Lab Results   Component Value Date    TRIG 105 07/28/2022     Lab Results   Component Value Date    CHOLHDL 37.1 07/28/2022         Lab Results   Component Value Date    GLUF 136 (H) 08/25/2022     Lab Results   Component Value Date    HGBA1C 6.2 (H) 08/25/2022       Nutrition/Diet History:  Patient eats 2 meals daily.    Seasons food with salt/pepper.  Patient admits to use of a salt shaker at the table on prepared foods.   Dines out rarely  Chooses fried foods rarely  Chooses fish rarely  Beverages:  water  Alcohol: none    24 Hour Recall:  Breakfast: skipped  Lunch:Air fried chicken  Dinner:grilled pork chop, potatoes, broccoli  Other: ice cream bar    Difficulty Chewing or Swallowing: no  Current Exercise: See Exercise Physiologist Note  Food Safety/Food Preparation: self  Living Arrangements/Family Support: Lives with spouse  Cultural/Spiritual/Personal Preferences: not applicable   Barriers to Education: none identified  Stage of Change Related to Diet Habits: Action    Nutrition Diagnosis:  Food and nutrition related knowledge deficit related to the lack of prior nutrition education as evidenced by diet history and 24 hour recall    Nutrition Plan:   Goals:  LDL-C < 70 (for high risk patients)  Hgb A1c < 7%  BMI < 25 and abdominal girth < 40M/<35 F  2 gram sodium, Mediterranean diet  Rehab weight goal: -10-15lbs  Fish intake  (non-fried varieties) to a goal of 2-3 servings per week.   Increase fruit and vegetable intake    Interventions/Recommendations:  Lab results reviewed and discussed  Nutrition Prescription:  Total Energy Estimated Needs: 5287-7280 Kcal/d for weight loss  Method for Estimating Needs: 20-25kcal/kg ABW  Total Protein Estimated Needs:  g/d  Method for Estimating Needs: 0.8-1.2 g/Kg ABW  Total Fluid Estimated Needs: 1 mL/Kcal  Dietitian Consult: No  Patient to participate in Cardiac Rehab sessions three times a week  Weekly Dietitian Weight Check  Encouraged patient to complete 3 day food diary  Follow Up Plan for Ongoing Self-Management Support    Education:  Mediterranean Diet; verbalizes understanding; Date: 9/15/22  Person taught: patient  Preferred Learning Method: Verbal and written  Education Needed/Provided: Nutrition counseling and education related to cardiac rehabilitation  Education Method: Weekly nutrition lectures on the Mediterranean diet, cooking, shopping, and dining out  Written Materials Provided: 3 Day Food Record, Introduction to Mediterranean Diet  Strategies Implemented: Motivational interviewing, Goal setting, Self-Monitoring, and Problem Solving    Comments:   Discussed ways to incorporate healthy snacks, eating on a schedule, and monitoring sodium intake for heart health.  Encouraged fish intake and regular meals, also discussed fish oil supplement     Diabetes  Is the patient diabetic? Yes   Other Core Components/Diabetes Assessment:   Labs:  Lab Results   Component Value Date    GLUF 136 (H) 08/25/2022     Lab Results   Component Value Date    HGBA1C 6.2 (H) 08/25/2022    HGBA1C 7.2 (H) 07/28/2022    HGBA1C 7.6 (H) 07/12/2022      Lab Results   Component Value Date    ESTIMATEDAVG 131 08/25/2022    ESTIMATEDAVG 160 (H) 07/28/2022    ESTIMATEDAVG 171 (H) 07/12/2022       History of diabetes since 2018  Diabetes medications: metformin 1000mg BID, Januvia 100mg, Ozempic injection  weekly  Blood Glucose Checks at Home: Yes: random blood sugars  Typical morning range: 100-130 mg/dl  Endocrinologist or PCP following DM: Dr. Burrows    Other Core Components/Diabetes Plan:   Goals:  Hgb A1c < 7%  Exercise Blood Glucose: 100-300 mg/dl    Interventions:  Reviewed and Discussed Labs  Medication Compliance  Med Card Reconciled  Low Sodium, Mediterranean, ADA Diet  Weight Management  Physical Activity  Increase Knowledge of Contributory Factors  Home Monitoring    Education:  Mediterranean Diet; verbalizes understanding; Date: 9/15/22    Comments:   Patient verbalizes understanding to bring home glucometer and check glucose pre and post each exercise session.  Per cardiac rehab protocols, patient's glucose must be between 90 and 270 mg/dL to exercise.  Patient denies any recent glucose levels less than 60 mg/dL or greater than 300 mg/dL. Patient verbalizes importance of notifying rehab staff if symptoms of hypoglycemia occur while at cardiac rehab.  Abnormal labs will be reported to patient's PCP/Endocrinologist by rehab staff.    Noted updated glucose parameters of 100-300    RD contact information provided.      Sarita Tirado MS, RDN/LDN

## 2022-09-15 NOTE — PROGRESS NOTES
Study name: Anticoagulation for New-Onset Post-Operative Atrial Fibrillation after CABG- PACES    IRB:  2019.273    PI: Dr. JENARO Holguin    Study Visit: 60 day follow-up telephone visit    Who was present: Patient and Liz Bansal RN [facility]    09/15/2022: I spoke with the patient for the 60 day follow up for the PACES Study.      Reaffirmed that the patient still wishes to participate in the study and continues to consent to the study.      Has the patient had any changes in his health since the last study visit? No  Medications and allergy lists updated and current. Yes  Has the patient had any outside hospitalizations/ER visits: No    Medication adherence questionnaire completed: Yes  Questionnaire for Verification of Stroke-Free Status completed: Yes  Bleeding Questionnaire completed: Yes    Discussed with the patient the importance of follow up in the study.  He verbalized understanding.      Liz Bansal, RN, CCM, CRC

## 2022-09-19 ENCOUNTER — PATIENT MESSAGE (OUTPATIENT)
Dept: CARDIOLOGY | Facility: CLINIC | Age: 62
End: 2022-09-19
Payer: COMMERCIAL

## 2022-09-20 ENCOUNTER — PATIENT MESSAGE (OUTPATIENT)
Dept: PODIATRY | Facility: CLINIC | Age: 62
End: 2022-09-20

## 2022-09-20 ENCOUNTER — HOSPITAL ENCOUNTER (OUTPATIENT)
Dept: RADIOLOGY | Facility: HOSPITAL | Age: 62
Discharge: HOME | End: 2022-09-20
Attending: PODIATRIST
Payer: COMMERCIAL

## 2022-09-20 ENCOUNTER — OFFICE VISIT (OUTPATIENT)
Dept: PODIATRY | Facility: CLINIC | Age: 62
End: 2022-09-20
Payer: COMMERCIAL

## 2022-09-20 VITALS
WEIGHT: 282.63 LBS | SYSTOLIC BLOOD PRESSURE: 123 MMHG | HEIGHT: 72 IN | DIASTOLIC BLOOD PRESSURE: 79 MMHG | BODY MASS INDEX: 38.28 KG/M2 | HEART RATE: 56 BPM

## 2022-09-20 DIAGNOSIS — E11.65 TYPE 2 DIABETES MELLITUS WITH HYPERGLYCEMIA, WITHOUT LONG-TERM CURRENT USE OF INSULIN: ICD-10-CM

## 2022-09-20 DIAGNOSIS — M76.60 ACHILLES TENDINITIS, UNSPECIFIED LATERALITY: ICD-10-CM

## 2022-09-20 DIAGNOSIS — E11.9 ENCOUNTER FOR COMPREHENSIVE DIABETIC FOOT EXAMINATION, TYPE 2 DIABETES MELLITUS: ICD-10-CM

## 2022-09-20 DIAGNOSIS — Z79.01 ANTICOAGULATED: Primary | ICD-10-CM

## 2022-09-20 PROCEDURE — 3044F HG A1C LEVEL LT 7.0%: CPT | Mod: CPTII,S$GLB,, | Performed by: PODIATRIST

## 2022-09-20 PROCEDURE — 1159F PR MEDICATION LIST DOCUMENTED IN MEDICAL RECORD: ICD-10-PCS | Mod: CPTII,S$GLB,, | Performed by: PODIATRIST

## 2022-09-20 PROCEDURE — 3008F BODY MASS INDEX DOCD: CPT | Mod: CPTII,S$GLB,, | Performed by: PODIATRIST

## 2022-09-20 PROCEDURE — 99203 OFFICE O/P NEW LOW 30 MIN: CPT | Mod: S$GLB,,, | Performed by: PODIATRIST

## 2022-09-20 PROCEDURE — 3078F DIAST BP <80 MM HG: CPT | Mod: CPTII,S$GLB,, | Performed by: PODIATRIST

## 2022-09-20 PROCEDURE — 3074F SYST BP LT 130 MM HG: CPT | Mod: CPTII,S$GLB,, | Performed by: PODIATRIST

## 2022-09-20 PROCEDURE — 99203 PR OFFICE/OUTPT VISIT, NEW, LEVL III, 30-44 MIN: ICD-10-PCS | Mod: S$GLB,,, | Performed by: PODIATRIST

## 2022-09-20 PROCEDURE — 73630 X-RAY EXAM OF FOOT: CPT | Mod: 26,LT,, | Performed by: RADIOLOGY

## 2022-09-20 PROCEDURE — 3044F PR MOST RECENT HEMOGLOBIN A1C LEVEL <7.0%: ICD-10-PCS | Mod: CPTII,S$GLB,, | Performed by: PODIATRIST

## 2022-09-20 PROCEDURE — 1159F MED LIST DOCD IN RCRD: CPT | Mod: CPTII,S$GLB,, | Performed by: PODIATRIST

## 2022-09-20 PROCEDURE — 73630 XR FOOT COMPLETE 3 VIEW LEFT: ICD-10-PCS | Mod: 26,LT,, | Performed by: RADIOLOGY

## 2022-09-20 PROCEDURE — 4010F ACE/ARB THERAPY RXD/TAKEN: CPT | Mod: CPTII,S$GLB,, | Performed by: PODIATRIST

## 2022-09-20 PROCEDURE — 99999 PR PBB SHADOW E&M-EST. PATIENT-LVL IV: ICD-10-PCS | Mod: PBBFAC,,, | Performed by: PODIATRIST

## 2022-09-20 PROCEDURE — 3074F PR MOST RECENT SYSTOLIC BLOOD PRESSURE < 130 MM HG: ICD-10-PCS | Mod: CPTII,S$GLB,, | Performed by: PODIATRIST

## 2022-09-20 PROCEDURE — 4010F PR ACE/ARB THEARPY RXD/TAKEN: ICD-10-PCS | Mod: CPTII,S$GLB,, | Performed by: PODIATRIST

## 2022-09-20 PROCEDURE — 99999 PR PBB SHADOW E&M-EST. PATIENT-LVL IV: CPT | Mod: PBBFAC,,, | Performed by: PODIATRIST

## 2022-09-20 PROCEDURE — 3078F PR MOST RECENT DIASTOLIC BLOOD PRESSURE < 80 MM HG: ICD-10-PCS | Mod: CPTII,S$GLB,, | Performed by: PODIATRIST

## 2022-09-20 PROCEDURE — 73630 X-RAY EXAM OF FOOT: CPT | Mod: TC,PN,LT

## 2022-09-20 PROCEDURE — 3008F PR BODY MASS INDEX (BMI) DOCUMENTED: ICD-10-PCS | Mod: CPTII,S$GLB,, | Performed by: PODIATRIST

## 2022-09-20 NOTE — PROGRESS NOTES
Subjective:      Patient ID: Isaiah Dorsey Jr. is a 62 y.o. male.    Chief Complaint:   Diabetes Mellitus and Foot Pain (Left foot pain /Pcp-NONE)    Isaiah is a 62 y.o. male who presents to the clinic upon referral from Dr. Napier  for evaluation and treatment of diabetic feet. 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 Patient relates no major problem with feet. Only complaints today consist of left ankle/heel pain.  Patient relates he noticed it about 4 months ago there has been some pain in the back near the Achilles.  Mostly when he walks.  There is no pain with sitting or lying down.  He relates he has focused on his shoe choices and shoes with soft backs to help    Sees cardiology Dr. Tobias.   Dr. Burrows treated him for his Diabetes     History ingrown nails    He recently recovered from bypass hurt operation  PCP: Nitin Cedeño, DO    Date Last Seen by PCP: 9/30/22    Current shoe gear: Casual shoes    Hemoglobin A1C   Date Value Ref Range Status   08/25/2022 6.2 (H) 4.0 - 5.6 % Final     Comment:     ADA Screening Guidelines:  5.7-6.4%  Consistent with prediabetes  >or=6.5%  Consistent with diabetes    High levels of fetal hemoglobin interfere with the HbA1C  assay. Heterozygous hemoglobin variants (HbS, HgC, etc)do  not significantly interfere with this assay.   However, presence of multiple variants may affect accuracy.     07/28/2022 7.2 (H) 4.0 - 5.6 % Final     Comment:     ADA Screening Guidelines:  5.7-6.4%  Consistent with prediabetes  >or=6.5%  Consistent with diabetes    High levels of fetal hemoglobin interfere with the HbA1C  assay. Heterozygous  hemoglobin variants (HbS, HgC, etc)do  not significantly interfere with this assay.   However, presence of multiple variants may affect accuracy.     07/12/2022 7.6 (H) 4.0 - 5.6 % Final     Comment:     ADA Screening Guidelines:  5.7-6.4%  Consistent with prediabetes  >or=6.5%  Consistent with diabetes    High levels of fetal hemoglobin interfere with the HbA1C  assay. Heterozygous hemoglobin variants (HbS, HgC, etc)do  not significantly interfere with this assay.   However, presence of multiple variants may affect accuracy.            Past Medical History:   Diagnosis Date    Diabetes mellitus type 2, uncontrolled, without complications     6.7% Metf 1 g qd, eye 2015, [facility]-2015,     Elevated platelet count 7/23/2015    H/O splenectomy 4/24/2015    doesn't like pneumovax bc caused side effects    HLD (hyperlipidemia) 1/22/2015    goal LDL < 100, reluctant to take statin    HTN (hypertension), benign 1/22/2015    losartan 100    Morbid obesity with BMI of 45.0-49.9, adult 6/11/2014    Polyp of transverse colon 9/5/2018 2018, repeat 2023    Severe uncontrolled hypertension 6/11/2014     Past Surgical History:   Procedure Laterality Date    AORTOGRAPHY N/A 7/11/2022    Procedure: AORTOGRAM;  Surgeon: Bjorn Cheatham MD;  Location: [facility] CATH LAB;  Service: Cardiology;  Laterality: N/A;    CORONARY ARTERY BYPASS GRAFT (CABG) Left 7/18/2022    Procedure: CORONARY ARTERY BYPASS GRAFT (CABG);  Surgeon: Blu Rhodes MD;  Location: [facility] OR [address] [place] [address];  Service: Cardiovascular;  Laterality: Left;  CABG x 1 (LIMA to LAD)    ENDOSCOPIC HARVEST OF VEIN Left 7/18/2022    Procedure: HARVEST-VEIN-ENDOVASCULAR;  Surgeon: Blu Rhodes MD;  Location: [facility] OR [address] [place] [address];  Service: Cardiovascular;  Laterality: Left;  Vein [place] Start time: 0823am  Vein [place] Stop time: 0836am    Vein [place] Start time: 0848am  Vein [place] Stop time: 0904am    LEFT HEART CATHETERIZATION N/A 7/11/2022    Procedure: Left heart cath;   Surgeon: Bjorn Cheatham MD;  Location: [facility] CATH LAB;  Service: Cardiology;  Laterality: N/A;    splenecectomy       Current Outpatient Medications on File Prior to Visit   Medication Sig Dispense Refill    amLODIPine (NORVASC) 5 MG tablet amlodipine 5 mg tablet      amlodipine-atorvastatin (CADUET) 5-40 mg per tablet amlodipine 5 mg-atorvastatin 40 mg tablet      amLODIPine-benazepriL (LOTREL) 10-40 mg per capsule amlodipine 10 mg-benazepril 40 mg capsule   TAKE 1 CAPSULE BY MOUTH DAILY (STANDARD)      aspirin 325 MG tablet Take 1 tablet (325 mg total) by mouth once daily. 30 tablet 11    atorvastatin (LIPITOR) 40 MG tablet Take 1 tablet (40 mg total) by mouth every evening. 30 tablet 11    atorvastatin (LIPITOR) 80 MG tablet atorvastatin 80 mg tablet      ertugliflozin (STEGLATRO) 15 mg Tab Steglatro 15 mg tablet      hydroCHLOROthiazide (HYDRODIURIL) 12.5 MG Tab hydrochlorothiazide 12.5 mg tablet      irbesartan (AVAPRO) 300 MG tablet irbesartan 300 mg tablet      metFORMIN (GLUCOPHAGE) 1000 MG tablet Take 1,000 mg by mouth 2 (two) times daily with meals.      metFORMIN (GLUCOPHAGE) 1000 MG tablet 1 tablet 2 TIMES DAILY (route: oral)      metoprolol succinate (TOPROL-XL) 100 MG 24 hr tablet Take 1 tablet (100 mg total) by mouth once daily. 30 tablet 11    metoprolol succinate (TOPROL-XL) 100 MG 24 hr tablet 1 tablet DAILY (route: oral)      metoprolol succinate (TOPROL-XL) 50 MG 24 hr tablet metoprolol succinate ER 50 mg tablet,extended release 24 hr      semaglutide (RYBELSUS) 14 mg tablet Rybelsus 14 mg tablet   TAKE 1 TABLET BY MOUTH EVERY MORNING      semaglutide (RYBELSUS) 7 mg tablet Rybelsus 7 mg tablet      silver sulfADIAZINE 1% (SILVADENE) 1 % cream silver sulfadiazine 1 % topical cream      SITagliptin (JANUVIA) 100 MG Tab Take 1 tablet (100 mg total) by mouth once daily. 30 tablet 11    tedizolid (SIVEXTRO) 200 mg Tab Sivextro 200 mg tablet      [DISCONTINUED] rivaroxaban (XARELTO) 20 mg Tab Take 1  tablet (20 mg total) by mouth daily with dinner or evening meal. 30 tablet 1    furosemide (LASIX) 40 MG tablet Take 1 tablet (40 mg total) by mouth once daily. for 5 days 5 tablet 0     No current facility-administered medications on file prior to visit.     Review of patient's allergies indicates:   Allergen Reactions    Penicillins      Other reaction(s): Itching  Other reaction(s): Hives    Wasp venom Edema       Review of Systems   Constitutional: Negative for chills, decreased appetite, fever, malaise/fatigue, night sweats, weight gain and weight loss.   Cardiovascular:  Negative for chest pain, claudication, dyspnea on exertion, leg swelling, palpitations and syncope.   Respiratory:  Negative for cough and shortness of breath.    Endocrine: Negative for cold intolerance and heat intolerance.   Hematologic/Lymphatic: Negative for bleeding problem. Bruises/bleeds easily.   Skin:  Positive for nail changes. Negative for color change, dry skin, flushing, itching, poor wound healing, rash, skin cancer, suspicious lesions and unusual hair distribution.   Musculoskeletal:  Negative for arthritis, back pain, falls, gout, joint pain, joint swelling, muscle cramps, muscle weakness, myalgias, neck pain and stiffness.        Achilles tendon pain   Gastrointestinal:  Negative for diarrhea, nausea and vomiting.   Neurological:  Negative for dizziness, focal weakness, light-headedness, numbness, paresthesias, tremors, vertigo and weakness.   Psychiatric/Behavioral:  Negative for altered mental status and depression. The patient does not have insomnia.    Allergic/Immunologic: Negative.          Objective:       Vitals:    09/20/22 0733   BP: 123/79   Pulse: (!) 56   Weight: 128.2 kg (282 lb 10.1 oz)   Height: 6' (1.829 m)   PainSc:   6   PainLoc: Foot   128.2 kg (282 lb 10.1 oz)     Physical Exam  Vitals reviewed.   Constitutional:       General: He is not in acute distress.     Appearance: He is well-developed. He is not  ill-appearing, toxic-appearing or diaphoretic.      Comments: Proper supportive shoegear      Cardiovascular:      Pulses:           Dorsalis pedis pulses are 2+ on the right side and 2+ on the left side.        Posterior tibial pulses are 2+ on the right side and 2+ on the left side.   Musculoskeletal:         General: No swelling, tenderness or deformity.      Right lower leg: No edema.      Left lower leg: No edema.      Right ankle:      Right Achilles Tendon: No tenderness or defects.      Left ankle:      Left Achilles Tendon: Tenderness (Plantar distal aspect) present. No defects.      Right foot: Decreased range of motion. No deformity, tenderness or bony tenderness.      Left foot: Decreased range of motion. No deformity, tenderness or bony tenderness.      Comments: No pain to calcaneus or distal foot no pain to proximal calf    Flexible pes planus foot type w/ medial arch collapse and mild gastroc equinus      Strength 5/5 bilateral    No pain with range of motion against resistance   Feet:      Right foot:      Protective Sensation: 10 sites tested.  10 sites sensed.      Skin integrity: No ulcer, blister, skin breakdown, erythema, warmth, callus or dry skin.      Toenail Condition: Right toenails are abnormally thick.      Left foot:      Protective Sensation: 10 sites tested.  10 sites sensed.      Skin integrity: No ulcer, blister, skin breakdown, erythema, warmth, callus or dry skin.      Toenail Condition: Left toenails are abnormally thick.      Comments: Sheyenne Tito intact  Skin:     General: Skin is warm.      Capillary Refill: Capillary refill takes 2 to 3 seconds.      Coloration: Skin is not pale.      Findings: No erythema or rash.      Nails: There is no clubbing.   Neurological:      Mental Status: He is alert and oriented to person, place, and time.      Sensory: No sensory deficit.      Gait: Gait is intact.   Psychiatric:         Attention and Perception: Attention normal.          Mood and Affect: Mood normal.         Speech: Speech normal.         Behavior: Behavior normal.         Thought Content: Thought content normal.         Cognition and Memory: Cognition normal.         Judgment: Judgment normal.             Assessment:       Encounter Diagnoses   Name Primary?    Anticoagulated Yes    Type 2 diabetes mellitus with hyperglycemia, without long-term current use of insulin     Achilles tendinitis, unspecified laterality     Encounter for comprehensive diabetic foot examination, type 2 diabetes mellitus          Plan:       Isaiah was seen today for diabetes mellitus and foot pain.    Diagnoses and all orders for this visit:    Anticoagulated    Type 2 diabetes mellitus with hyperglycemia, without long-term current use of insulin    Achilles tendinitis, unspecified laterality  -     X-Ray Foot Complete Left; Future    Encounter for comprehensive diabetic foot examination, type 2 diabetes mellitus    I counseled the patient on his conditions, their implications and medical management.        - Shoe inspection. Diabetic Foot Education. Patient reminded of the importance of good nutrition and blood sugar control to help prevent podiatric complications of diabetes. Patient instructed on proper foot hygeine. We discussed wearing proper shoe gear, daily foot inspections, never walking without protective shoe gear, never putting sharp instruments to feet, routine podiatric nail visits every 2-3 months.      - diabetic foot exam performed    - discuss likely Achilles tendinitis    - recommend x-ray    - dispensed and discussed heel lifts; open back shoes when possible    - also dispensed Achilles tendon silicone brace patient can purchase online    - stretching exercises dispensed    - consider formal physical therapy      Follow up in about 4 weeks (around 10/18/2022).

## 2022-09-23 ENCOUNTER — CLINICAL SUPPORT (OUTPATIENT)
Dept: CARDIAC REHAB | Facility: CLINIC | Age: 62
End: 2022-09-23
Payer: COMMERCIAL

## 2022-09-23 DIAGNOSIS — Z95.1 POSTSURGICAL AORTOCORONARY BYPASS STATUS: Primary | ICD-10-CM

## 2022-09-23 DIAGNOSIS — I25.10 CORONARY ATHEROSCLEROSIS OF NATIVE CORONARY ARTERY: ICD-10-CM

## 2022-09-23 PROCEDURE — 93798 PHYS/QHP OP CAR RHAB W/ECG: CPT | Mod: S$GLB,,, | Performed by: INTERNAL MEDICINE

## 2022-09-23 PROCEDURE — 93798 PR CARDIAC REHAB/MONITOR: ICD-10-PCS | Mod: S$GLB,,, | Performed by: INTERNAL MEDICINE

## 2022-09-26 ENCOUNTER — CLINICAL SUPPORT (OUTPATIENT)
Dept: CARDIAC REHAB | Facility: CLINIC | Age: 62
End: 2022-09-26
Payer: COMMERCIAL

## 2022-09-26 DIAGNOSIS — I25.10 CORONARY ATHEROSCLEROSIS OF NATIVE CORONARY ARTERY: ICD-10-CM

## 2022-09-26 DIAGNOSIS — Z95.1 POSTSURGICAL AORTOCORONARY BYPASS STATUS: Primary | ICD-10-CM

## 2022-09-26 PROCEDURE — 93798 PR CARDIAC REHAB/MONITOR: ICD-10-PCS | Mod: S$GLB,,, | Performed by: INTERNAL MEDICINE

## 2022-09-26 PROCEDURE — 93798 PHYS/QHP OP CAR RHAB W/ECG: CPT | Mod: S$GLB,,, | Performed by: INTERNAL MEDICINE

## 2022-09-28 ENCOUNTER — CLINICAL SUPPORT (OUTPATIENT)
Dept: CARDIAC REHAB | Facility: CLINIC | Age: 62
End: 2022-09-28
Payer: COMMERCIAL

## 2022-09-28 DIAGNOSIS — I25.10 ATHEROSCLEROSIS OF NATIVE CORONARY ARTERY OF NATIVE HEART WITHOUT ANGINA PECTORIS: ICD-10-CM

## 2022-09-28 DIAGNOSIS — Z95.1 POSTSURGICAL AORTOCORONARY BYPASS STATUS: Primary | ICD-10-CM

## 2022-09-28 PROCEDURE — 93798 PR CARDIAC REHAB/MONITOR: ICD-10-PCS | Mod: S$GLB,,, | Performed by: INTERNAL MEDICINE

## 2022-09-28 PROCEDURE — 93798 PHYS/QHP OP CAR RHAB W/ECG: CPT | Mod: S$GLB,,, | Performed by: INTERNAL MEDICINE

## 2022-09-29 DIAGNOSIS — E11.9 TYPE 2 DIABETES MELLITUS WITHOUT COMPLICATION: ICD-10-CM

## 2022-09-30 ENCOUNTER — TELEPHONE (OUTPATIENT)
Dept: INTERNAL MEDICINE | Facility: CLINIC | Age: 62
End: 2022-09-30
Payer: COMMERCIAL

## 2022-09-30 ENCOUNTER — LAB VISIT (OUTPATIENT)
Dept: LAB | Facility: HOSPITAL | Age: 62
End: 2022-09-30
Attending: INTERNAL MEDICINE
Payer: COMMERCIAL

## 2022-09-30 ENCOUNTER — OFFICE VISIT (OUTPATIENT)
Dept: INTERNAL MEDICINE | Facility: CLINIC | Age: 62
End: 2022-09-30
Payer: COMMERCIAL

## 2022-09-30 VITALS
HEART RATE: 60 BPM | SYSTOLIC BLOOD PRESSURE: 138 MMHG | RESPIRATION RATE: 12 BRPM | WEIGHT: 282 LBS | DIASTOLIC BLOOD PRESSURE: 78 MMHG | BODY MASS INDEX: 38.19 KG/M2 | TEMPERATURE: 98 F | HEIGHT: 72 IN

## 2022-09-30 DIAGNOSIS — E78.5 HYPERLIPIDEMIA ASSOCIATED WITH TYPE 2 DIABETES MELLITUS: ICD-10-CM

## 2022-09-30 DIAGNOSIS — Z95.1 S/P CABG (CORONARY ARTERY BYPASS GRAFT): ICD-10-CM

## 2022-09-30 DIAGNOSIS — E11.65 TYPE 2 DIABETES MELLITUS WITH HYPERGLYCEMIA, WITHOUT LONG-TERM CURRENT USE OF INSULIN: ICD-10-CM

## 2022-09-30 DIAGNOSIS — E66.2 OBESITY HYPOVENTILATION SYNDROME: ICD-10-CM

## 2022-09-30 DIAGNOSIS — Z00.00 ANNUAL PHYSICAL EXAM: ICD-10-CM

## 2022-09-30 DIAGNOSIS — I15.2 HYPERTENSION ASSOCIATED WITH DIABETES: Primary | ICD-10-CM

## 2022-09-30 DIAGNOSIS — Z12.11 COLON CANCER SCREENING: ICD-10-CM

## 2022-09-30 DIAGNOSIS — E11.59 HYPERTENSION ASSOCIATED WITH DIABETES: Primary | ICD-10-CM

## 2022-09-30 DIAGNOSIS — Z90.81 H/O SPLENECTOMY: Primary | ICD-10-CM

## 2022-09-30 DIAGNOSIS — E11.69 HYPERLIPIDEMIA ASSOCIATED WITH TYPE 2 DIABETES MELLITUS: ICD-10-CM

## 2022-09-30 DIAGNOSIS — E11.59 HYPERTENSION ASSOCIATED WITH DIABETES: ICD-10-CM

## 2022-09-30 DIAGNOSIS — I25.810 CORONARY ARTERY DISEASE INVOLVING CORONARY BYPASS GRAFT OF NATIVE HEART WITHOUT ANGINA PECTORIS: ICD-10-CM

## 2022-09-30 DIAGNOSIS — I48.0 PAROXYSMAL ATRIAL FIBRILLATION: ICD-10-CM

## 2022-09-30 DIAGNOSIS — I15.2 HYPERTENSION ASSOCIATED WITH DIABETES: ICD-10-CM

## 2022-09-30 LAB — COMPLEXED PSA SERPL-MCNC: 0.33 NG/ML (ref 0–4)

## 2022-09-30 PROCEDURE — 3078F PR MOST RECENT DIASTOLIC BLOOD PRESSURE < 80 MM HG: ICD-10-PCS | Mod: CPTII,S$GLB,, | Performed by: INTERNAL MEDICINE

## 2022-09-30 PROCEDURE — 99999 PR PBB SHADOW E&M-EST. PATIENT-LVL IV: ICD-10-PCS | Mod: PBBFAC,,, | Performed by: INTERNAL MEDICINE

## 2022-09-30 PROCEDURE — 3044F PR MOST RECENT HEMOGLOBIN A1C LEVEL <7.0%: ICD-10-PCS | Mod: CPTII,S$GLB,, | Performed by: INTERNAL MEDICINE

## 2022-09-30 PROCEDURE — 84153 ASSAY OF PSA TOTAL: CPT | Performed by: INTERNAL MEDICINE

## 2022-09-30 PROCEDURE — 99386 PREV VISIT NEW AGE 40-64: CPT | Mod: S$GLB,,, | Performed by: INTERNAL MEDICINE

## 2022-09-30 PROCEDURE — 3078F DIAST BP <80 MM HG: CPT | Mod: CPTII,S$GLB,, | Performed by: INTERNAL MEDICINE

## 2022-09-30 PROCEDURE — 36415 COLL VENOUS BLD VENIPUNCTURE: CPT | Mod: PO | Performed by: INTERNAL MEDICINE

## 2022-09-30 PROCEDURE — 3075F PR MOST RECENT SYSTOLIC BLOOD PRESS GE 130-139MM HG: ICD-10-PCS | Mod: CPTII,S$GLB,, | Performed by: INTERNAL MEDICINE

## 2022-09-30 PROCEDURE — 99999 PR PBB SHADOW E&M-EST. PATIENT-LVL IV: CPT | Mod: PBBFAC,,, | Performed by: INTERNAL MEDICINE

## 2022-09-30 PROCEDURE — 3075F SYST BP GE 130 - 139MM HG: CPT | Mod: CPTII,S$GLB,, | Performed by: INTERNAL MEDICINE

## 2022-09-30 PROCEDURE — 99386 PR PREVENTIVE VISIT,NEW,40-64: ICD-10-PCS | Mod: S$GLB,,, | Performed by: INTERNAL MEDICINE

## 2022-09-30 PROCEDURE — 3044F HG A1C LEVEL LT 7.0%: CPT | Mod: CPTII,S$GLB,, | Performed by: INTERNAL MEDICINE

## 2022-09-30 RX ORDER — METOPROLOL SUCCINATE 100 MG/1
100 TABLET, EXTENDED RELEASE ORAL DAILY
Qty: 90 TABLET | Refills: 3 | Status: SHIPPED | OUTPATIENT
Start: 2022-09-30 | End: 2023-12-27 | Stop reason: SDUPTHER

## 2022-09-30 RX ORDER — ATORVASTATIN CALCIUM 80 MG/1
80 TABLET, FILM COATED ORAL DAILY
Qty: 90 TABLET | Refills: 3 | Status: SHIPPED | OUTPATIENT
Start: 2022-09-30 | End: 2023-10-24 | Stop reason: SDUPTHER

## 2022-09-30 RX ORDER — METFORMIN HYDROCHLORIDE 1000 MG/1
1000 TABLET ORAL 2 TIMES DAILY WITH MEALS
Qty: 180 TABLET | Refills: 3 | Status: SHIPPED | OUTPATIENT
Start: 2022-09-30 | End: 2023-12-14 | Stop reason: SDUPTHER

## 2022-09-30 NOTE — TELEPHONE ENCOUNTER
----- Message from Lauren Agosto sent at 9/30/2022  8:31 AM CDT -----  Good Morning,  Patient: LAURE BELTRE JR. [[id number]] has an appointment scheduled for: 3/30/23, his labs are scheduled for: 3/23/23. Please link lab orders. Thank you in advance.
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
## 2022-09-30 NOTE — TELEPHONE ENCOUNTER
----- Message from Lauren Agosto sent at 9/30/2022  8:31 AM CDT -----  Good Morning,  Patient: LAURE BELTRE JR. [[id number]] has an appointment scheduled for: 3/30/23, his labs are scheduled for: 3/23/23. Please link lab orders. Thank you in advance.

## 2022-10-03 ENCOUNTER — CLINICAL SUPPORT (OUTPATIENT)
Dept: CARDIAC REHAB | Facility: CLINIC | Age: 62
End: 2022-10-03
Payer: COMMERCIAL

## 2022-10-03 DIAGNOSIS — Z95.1 S/P CABG (CORONARY ARTERY BYPASS GRAFT): Primary | ICD-10-CM

## 2022-10-03 DIAGNOSIS — I25.10 CORONARY ATHEROSCLEROSIS OF NATIVE CORONARY ARTERY: ICD-10-CM

## 2022-10-03 PROCEDURE — 93798 PHYS/QHP OP CAR RHAB W/ECG: CPT | Mod: S$GLB,,, | Performed by: INTERNAL MEDICINE

## 2022-10-03 PROCEDURE — 93798 PR CARDIAC REHAB/MONITOR: ICD-10-PCS | Mod: S$GLB,,, | Performed by: INTERNAL MEDICINE

## 2022-10-05 ENCOUNTER — CLINICAL SUPPORT (OUTPATIENT)
Dept: CARDIAC REHAB | Facility: CLINIC | Age: 62
End: 2022-10-05
Payer: COMMERCIAL

## 2022-10-05 DIAGNOSIS — Z95.1 S/P CABG (CORONARY ARTERY BYPASS GRAFT): Primary | ICD-10-CM

## 2022-10-05 DIAGNOSIS — I25.10 CORONARY ATHEROSCLEROSIS OF NATIVE CORONARY ARTERY: ICD-10-CM

## 2022-10-05 PROCEDURE — 93798 PR CARDIAC REHAB/MONITOR: ICD-10-PCS | Mod: S$GLB,,, | Performed by: INTERNAL MEDICINE

## 2022-10-05 PROCEDURE — 93798 PHYS/QHP OP CAR RHAB W/ECG: CPT | Mod: S$GLB,,, | Performed by: INTERNAL MEDICINE

## 2022-10-06 ENCOUNTER — DOCUMENTATION ONLY (OUTPATIENT)
Dept: CARDIAC REHAB | Facility: CLINIC | Age: 62
End: 2022-10-06
Payer: COMMERCIAL

## 2022-10-06 NOTE — PROGRESS NOTES
Mr. Mason has completed 5 out of 36 exercise session of Phase II cardiac rehab.  A follow up reassessment will be completed at 12 sessions.     Session: Orientation   Cardiac Rehab Individual Treatment Plan - Initial Assessment      Patient Name: Isaiah Dorsey Jr. MRN: [id number]   : 1960   Age: 62 y.o.   Primary Diagnosis: CABG  Date of Event: 22  EF: 40-45%  Risk Stratification: high  Referring Physician: ADRIANO   Exercise Assessment:     CPX/TM Date: 22 Results   RHR 57   Max HR 77   Peak VO2 (CPX only) 11.3   Actual METS (CPX only) 3.23   Estimated METS 6.0     Anthropometrics    Height 72 inches   Weight 274 lbs   BMI 37.3   Abdominal Girth 53.5   Body Composition 24.8%     ST Depression noted on Stress Test?:No  Angina with exercise?: No   Fall Risk: No   Assistive Devices:  independent   Currently exercising? Yes: Walking; Frequency: 3 days per week; Duration approximately 20 minutes maybe  There were no limitations noted by the patient.      Exercise Plan:   Goals:  CR Exercise Goals: Attend Cardiac Rehab 3 times/week: In Progress  Home Aerobic Exercise: 2 additional days/week for 30-60 minutes: In Progress  Intensity of 12-15 on the Rate of Perceived Exertion (RPE) scale: In Progress  30% increase in entry estimated METS: 7.8 : In Progress  5 days/week for 30-60 minutes: In Progress    Intervention:   Discussed importance of regular attendance to cardiac rehab class    Exercise Prescription:  THR Range 63-74   Mode: Treadmill  Recumbent Bike  Upright Bike  Nustep  Elliptical   Frequency:  3 days/week   Duration:  30 - 60 minutes   Intensity:  12 - 15 RPE   Resistance Training:  Yes: 5 to 7 lb weights with 10-15 reps based on strength and range of motion assessment     Home Prescription:  Mode Aerobic   Frequency: 2- 3 days/week   Duration: 30-60 minutes   Resistance Training: None        Education:  Orientation to Equipment; verbalizes understanding; Date: 9-15-22  Exercise Recommendations;  verbalizes understanding; Date: 9-15-22  Exercise Safety; verbalizes understanding; Date: 9-15-22  Class Preparation: verbalizes understanding; Date: 9-15-22  Signs and symptoms to report: verbalizes understanding; Date: 9-15-22  Caffeine/Hydration: verbalizes understanding; Date: 9-15-22  Exercise Terminology: verbalizes understanding; Date: 9-15-22  Resistance Training: verbalizes understanding; Date: 9-15-22    Comments:  Mr. Mason was encouraged to continue walking at least 2 non-rehab days per week for at least 30 minutes in addition to attending Phase II cardiac rehab classes 3 days per week.  He stated understanding.     All consent forms were signed, proper attire and shoes were discussed.       Mr. Mason will begin Cardiac Rehab on  at 7:00am.    The exercise prescription will be adjusted based on tolerance of exercise intensity by patient.    Jorje Ceballos, CEP     Orientation   Cardiac Rehab Individual Treatment Plan - Initial Assessment      Patient Name: Isaiah Dorsey Jr. MRN: [id number]   : 1960   Age: 62 y.o.   Primary Diagnosis: s/p CABG, CAD, CHF, h/o NSTEMI, HLD, paroxysmal AFIB, DM    Nutrition Assessment:     Anthropometrics    Height 72 inches   Weight 274 lbs   BMI 37.3   Abdominal Girth 53.5   Body Composition 24.8       Drug Allergies and Intolerances:  Review of patient's allergies indicates:   Allergen Reactions    Penicillins      Other reaction(s): Itching  Other reaction(s): Hives    Wasp venom Edema       Food Allergies and Intolerances:  NA    Past Medical History:  Past Medical History:   Diagnosis Date    CHF (congestive heart failure)     Coronary artery disease     Diabetes mellitus type 2, uncontrolled, without complications     6.7% Metf 1 g qd, eye , [facility]     Elevated platelet count 2015    H/O splenectomy 2015    doesn't like pneumovax bc caused side effects    HLD (hyperlipidemia) 2015    goal LDL < 100, reluctant to take  statin    HTN (hypertension), benign 01/22/2015    losartan 100    Morbid obesity with BMI of 45.0-49.9, adult 06/11/2014    RODRIGUEZ (obstructive sleep apnea)     PAF (paroxysmal atrial fibrillation)     Polyp of transverse colon 09/05/2018 2018, repeat 2023    Severe uncontrolled hypertension 06/11/2014       Past Surgical History:  Past Surgical History:   Procedure Laterality Date    AORTOGRAPHY N/A 7/11/2022    Procedure: AORTOGRAM;  Surgeon: Bjorn Cheatham MD;  Location: [facility] CATH LAB;  Service: Cardiology;  Laterality: N/A;    CORONARY ARTERY BYPASS GRAFT (CABG) Left 7/18/2022    Procedure: CORONARY ARTERY BYPASS GRAFT (CABG);  Surgeon: Blu Rhodes MD;  Location: [facility] OR [address] [place] [address];  Service: Cardiovascular;  Laterality: Left;  CABG x 1 (LIMA to LAD)    ENDOSCOPIC HARVEST OF VEIN Left 7/18/2022    Procedure: HARVEST-VEIN-ENDOVASCULAR;  Surgeon: Blu Rhodes MD;  [facility];  Service: Cardiovascular;  Laterality: Left;  Vein [place] Start time: 0823am  Vein [place] Stop time: 0836am    Vein [place] Start time: 0848am  Vein [place] Stop time: 0904am    LEFT HEART CATHETERIZATION N/A 7/11/2022    Procedure: Left heart cath;  Surgeon: Bjorn Cheatham MD;  Location: [facility] CATH LAB;  Service: Cardiology;  Laterality: N/A;    splenecectomy         Medications:  Current Outpatient Medications   Medication Sig    aspirin 325 MG tablet Take 1 tablet (325 mg total) by mouth once daily.    atorvastatin (LIPITOR) 80 MG tablet Take 1 tablet (80 mg total) by mouth once daily.    metFORMIN (GLUCOPHAGE) 1000 MG tablet Take 1 tablet (1,000 mg total) by mouth 2 (two) times daily with meals.    metoprolol succinate (TOPROL-XL) 100 MG 24 hr tablet Take 1 tablet (100 mg total) by mouth once daily.    rivaroxaban (XARELTO) 20 mg Tab Take 1 tablet (20 mg total) by mouth daily with dinner or evening meal.    silver sulfADIAZINE 1% (SILVADENE) 1 % cream silver sulfadiazine 1 % topical cream    SITagliptin  (JANUVIA) 100 MG Tab Take 1 tablet (100 mg total) by mouth once daily.     No current facility-administered medications for this visit.       Vitamins and Supplements:  NA    Labs:  Patient confirms he is taking lipitor 40mg for cholesterol control.    Lab Results   Component Value Date    CHOL 89 (L) 07/28/2022     Lab Results   Component Value Date    HDL 33 (L) 07/28/2022     Lab Results   Component Value Date    LDLCALC 35.0 (L) 07/28/2022     Lab Results   Component Value Date    TRIG 105 07/28/2022     Lab Results   Component Value Date    CHOLHDL 37.1 07/28/2022         Lab Results   Component Value Date    GLUF 136 (H) 08/25/2022     Lab Results   Component Value Date    HGBA1C 6.2 (H) 08/25/2022       Nutrition/Diet History:  Patient eats 2 meals daily.    Seasons food with salt/pepper.  Patient admits to use of a salt shaker at the table on prepared foods.   Dines out rarely  Chooses fried foods rarely  Chooses fish rarely  Beverages:  water  Alcohol: none    24 Hour Recall:  Breakfast: skipped  Lunch:Air fried chicken  Dinner:grilled pork chop, potatoes, broccoli  Other: ice cream bar    Difficulty Chewing or Swallowing: no  Current Exercise: See Exercise Physiologist Note  Food Safety/Food Preparation: self  Living Arrangements/Family Support: Lives with spouse  Cultural/Spiritual/Personal Preferences: not applicable   Barriers to Education: none identified  Stage of Change Related to Diet Habits: Action    Nutrition Diagnosis:  Food and nutrition related knowledge deficit related to the lack of prior nutrition education as evidenced by diet history and 24 hour recall    Nutrition Plan:   Goals:  LDL-C < 70 (for high risk patients)  Hgb A1c < 7%  BMI < 25 and abdominal girth < 40M/<35 F  2 gram sodium, Mediterranean diet  Rehab weight goal: -10-15lbs  Fish intake (non-fried varieties) to a goal of 2-3 servings per week.   Increase fruit and vegetable intake    Interventions/Recommendations:  Lab results  reviewed and discussed  Nutrition Prescription:  Total Energy Estimated Needs: 8748-4388 Kcal/d for weight loss  Method for Estimating Needs: 20-25kcal/kg ABW  Total Protein Estimated Needs:  g/d  Method for Estimating Needs: 0.8-1.2 g/Kg ABW  Total Fluid Estimated Needs: 1 mL/Kcal  Dietitian Consult: No  Patient to participate in Cardiac Rehab sessions three times a week  Weekly Dietitian Weight Check  Encouraged patient to complete 3 day food diary  Follow Up Plan for Ongoing Self-Management Support    Education:  Mediterranean Diet; verbalizes understanding; Date: 9/15/22  Person taught: patient  Preferred Learning Method: Verbal and written  Education Needed/Provided: Nutrition counseling and education related to cardiac rehabilitation  Education Method: Weekly nutrition lectures on the Mediterranean diet, cooking, shopping, and dining out  Written Materials Provided: 3 Day Food Record, Introduction to Mediterranean Diet  Strategies Implemented: Motivational interviewing, Goal setting, Self-Monitoring, and Problem Solving    Comments:   Discussed ways to incorporate healthy snacks, eating on a schedule, and monitoring sodium intake for heart health.  Encouraged fish intake and regular meals, also discussed fish oil supplement     Diabetes  Is the patient diabetic? Yes   Other Core Components/Diabetes Assessment:   Labs:  Lab Results   Component Value Date    GLUF 136 (H) 08/25/2022     Lab Results   Component Value Date    HGBA1C 6.2 (H) 08/25/2022    HGBA1C 7.2 (H) 07/28/2022    HGBA1C 7.6 (H) 07/12/2022      Lab Results   Component Value Date    ESTIMATEDAVG 131 08/25/2022    ESTIMATEDAVG 160 (H) 07/28/2022    ESTIMATEDAVG 171 (H) 07/12/2022       History of diabetes since 2018  Diabetes medications: metformin 1000mg BID, Januvia 100mg, Ozempic injection weekly  Blood Glucose Checks at Home: Yes: random blood sugars  Typical morning range: 100-130 mg/dl  Endocrinologist or PCP following DM:   Markos    Other Core Components/Diabetes Plan:   Goals:  Hgb A1c < 7%  Exercise Blood Glucose: 100-300 mg/dl    Interventions:  Reviewed and Discussed Labs  Medication Compliance  Med Card Reconciled  Low Sodium, Mediterranean, ADA Diet  Weight Management  Physical Activity  Increase Knowledge of Contributory Factors  Home Monitoring    Education:  Mediterranean Diet; verbalizes understanding; Date: 9/15/22    Comments:   Patient verbalizes understanding to bring home glucometer and check glucose pre and post each exercise session.  Per cardiac rehab protocols, patient's glucose must be between 90 and 270 mg/dL to exercise.  Patient denies any recent glucose levels less than 60 mg/dL or greater than 300 mg/dL. Patient verbalizes importance of notifying rehab staff if symptoms of hypoglycemia occur while at cardiac rehab.  Abnormal labs will be reported to patient's PCP/Endocrinologist by rehab staff.    Noted updated glucose parameters of 100-300    RD contact information provided.      Sarita Tirado MS, RDN/LDN     Session: Orientation   Cardiac Rehab Individual Treatment Plan - Initial Assessment      Patient Name: Isaiah Dorsey Jr. MRN: [id number]   : 1960   Age: 62 y.o.   Date of Event: 22   Primary Diagnosis: S/P CABG    EF: 40-45% (22)    Physical Assessment:   There were no vitals taken for this visit.    ASSESSMENT:  Heart Sounds: regular rate and rhythm, S1, S2 normal, no murmur, click, rub or gallop  Prosthetic Valve: No  Lung Sounds: normal air entry, lungs clear to auscultation  Capillary Refill: normal  Left Radial Pulse: Normal (+2)  Right Radial Pulse: Normal (+2)  Left Pedal Pulse: Normal (+2)  Right Pedal Pulse: Normal (+2)  Right Edema: Trace  Left Edema Trace  Strength: good  Range of Motion: full range of motion  Existing Limitations:      Site   [] Arthritis, bursitis    [] Amputation, atrophy    [] Other:    []       Diabetic patient's foot examination comments: Normal -   Bilateral  Incisional site: healing well  Special needs: NA    Psychosocial Assessment:   Outcome Survey Tools:    MEGAN SCORES:   PRE   Anxiety 0   Depression 0   Somatic 1   Hostility 0     SF-36 SCORES:   PRE   Physical Function 20   Social Function 11   Mental Health 26   Pain 11   Change in Health 5   Physical Role Limitation 0   Mental Role Limitation 1   Energy/Fatigue 7   Health Perceptions 20   Total Score 101     PHQ-9:  PHQ-9 Depression Patient Health Questionnaire 9/15/2022   Over the last two weeks how often have you been bothered by little interest or pleasure in doing things 0   Over the last two weeks how often have you been bothered by feeling down, depressed or hopeless 0   Over the last two weeks how often have you been bothered by trouble falling or staying asleep, or sleeping too much 0   Over the last two weeks how often have you been bothered by feeling tired or having little energy 1   Over the last two weeks how often have you been bothered by a poor appetite or overeating 0   Over the last two weeks how often have you been bothered by feeling bad about yourself - or that you are a failure or have let yourself or your family down 0   Over the last two weeks how often have you been bothered by trouble concentrating on things, such as reading the newspaper or watching television 0   Over the last two weeks how often have you been bothered by moving or speaking so slowly that other people could have noticed. 0   Over the last two weeks how often have you been bothered by thoughts that you would be better off dead, or of hurting yourself 0   If you checked off any problems, how difficult have these problems made it for you to do your work, take care of things at home or get along with other people? Not difficult at all   Total Score 1              Living Arrangements: Lives with spouse  Family Support: children, grandchildren, and spouse  Self Reported: Effective Coping Skills  Displays:  happiness and calmness  Medication: not applicable    Psychosocial Plan:   Goals:  Improved psychosocial coping strategies  Reduce manifestation of stress  Maintain positive support system  Maintain positive outlook  Improve overall quality of life    Interventions/Recommendations:  Discussed Results of Surveys  Patient to Self Report Emotional Changes at Session Check In  Recommend Physical Activity  Recommend Attending Education Lectures  Notify MD: No  Program Referral: No  Pharmaceutical Intervention/Therapy: No  Other Needs: not applicable  Stage of Readiness to Change: Contemplation    Education:  Stress, verbalizes understanding; Date:9/15/22  Risk Factors, verbalizes understanding; Date:9/15/22    Comments:  Patient acknowledges stress at work with deadlines. Discussed screening results and importance of seeking treatment when indicated. Patient verbalized understanding. Patient has been instructed to notify staff in the event that circumstances worsen.  Patient verbalizes understanding.    Other Core Components/Risk Factors Assessment:   RISK FACTORS:  diabetes, hyperlipidemia, hypertension, obesity, positive family history, sedentary lifestyle, stress    Learning Barriers: None    Education Level:  Associate/Bachelor Degree    Pre-test Score: 80    Medication Compliance: has been compliant with taking medications    Other Core Components/Risk Factors Plan:   Goals:  Decrease cholesterol level: In Progress  Increase exercise tolerance: In Progress  Increase knowledge of CAD: In Progress  Decrease blood pressure: In Progress  Weight loss: In Progress  Demonstrate accurate pulse taking: In Progress  Identify and manage personal areas of stress: In Progress  Control diabetes by adjusting diet and exercise: In Progress  Learn more about healthy eating: In Progress    Interventions/Recommendations:  Recommend regular attendance for Cardiac Rehab: Exercise and Education Lectures  Encourage medication  "compliance  Individual Education/ Counseling: Yes  Physician Referral: No    Education:    risk factors, verbalizes understanding; Date: 9/15/22  stress, verbalizes understanding; Date: 9/15/22         Education method adapted to patients education level and preferred method of learning.  Method: explanation    Comments:  Patient acknowledges his stress, plans for healthier lifestyle to improve his cardiac health.    Other Core Components/Hypertension Assessment:   Resting BP:  Left 130/84 right 126/80   BP Readings from Last 1 Encounters:   09/30/22 138/78         BP Diagnosis: Hypertensive  Patient reported symptoms: none    Other Core Components/Hypertension Plan:   Goals:  Blood Pressure <130/80    Interventions/Recommendations:  Med Card Reconciled: Yes  Encourage medication compliance  Encourage sodium reduction  Encourage weight loss  Recommend physical activity  Educate on contributory factors  Reduce stress, anxiety, anger, depression, and/or chronic pain  Encourage home blood pressure monitoring    Education:    Risk Factors; verbalizes understanding; Date: 9/15/22  Stress; verbalizes understanding; Date: 9/15/22         Comments:  Patient not knowledgeable of his medication regimen "his wife handles that". Encouraged patient to keep list of medications with him at all times. Patient verbalized understanding.     Does the patient have Heart Failure? YES, EF 40-45%, trace edema bilateral legs, denies shortness of breath    Other Core Components/Tobacco Cessation Assessment:   Smoking Status: past smoker who quit many years ago  Smoking Cessation Barriers:  NA  Stage of Readiness to Change: Maintenance    Other Core Components/Tobacco Cessation Plan:   Goals:  Maintain non-smoking status    Interventions:  Maintains non-smoking status    Education:    Risk Factors; verbalizes understanding; Date: 9/15/22         Comments:  Patient motivated to start cardiac rehab. Screening scores do not indicate a referral " for behavioral health, pt denies any issues with anxiety/depression but admits to stress with work deadlines. Discussed Cardiac Rehab program in depth with patient.  Medication list updated per patient & marked as reviewed.  Patient has been instructed to notify staff of any problems while attending rehab (ie: chest pain, shortness of breath, lightheadedness, dizziness).  Patient has been instructed to monitor blood pressure readings outside of rehab & to keep a daily log of the readings.  Patient verbalizes understanding.     Ranjit Osuna RN  Cardiac Rehab Nurse

## 2022-10-10 ENCOUNTER — TELEPHONE (OUTPATIENT)
Dept: CARDIAC REHAB | Facility: CLINIC | Age: 62
End: 2022-10-10

## 2022-10-10 ENCOUNTER — PATIENT MESSAGE (OUTPATIENT)
Dept: CARDIOLOGY | Facility: CLINIC | Age: 62
End: 2022-10-10
Payer: COMMERCIAL

## 2022-10-10 ENCOUNTER — CLINICAL SUPPORT (OUTPATIENT)
Dept: CARDIAC REHAB | Facility: CLINIC | Age: 62
End: 2022-10-10
Payer: COMMERCIAL

## 2022-10-10 DIAGNOSIS — Z95.1 POSTSURGICAL AORTOCORONARY BYPASS STATUS: Primary | ICD-10-CM

## 2022-10-10 DIAGNOSIS — I25.10 CORONARY ARTERY DISEASE INVOLVING NATIVE CORONARY ARTERY OF NATIVE HEART WITHOUT ANGINA PECTORIS: ICD-10-CM

## 2022-10-10 PROCEDURE — 93798 PHYS/QHP OP CAR RHAB W/ECG: CPT | Mod: S$GLB,,, | Performed by: INTERNAL MEDICINE

## 2022-10-10 PROCEDURE — 93798 PR CARDIAC REHAB/MONITOR: ICD-10-PCS | Mod: S$GLB,,, | Performed by: INTERNAL MEDICINE

## 2022-10-10 NOTE — TELEPHONE ENCOUNTER
Dr. Tobias,  Patient here for Phase II cardiac rehab session.  Upon arrival, noting normal sinus rhythm with heart rate at 62 BPM//68.  During cooldown, patient became lightheaded & dizzy.  Sat patient down. BP 94/54. Elevated patient's legs, blood glucose at 132.  Recheck lying /80.  Sat him up & gave him 2-8 ounce cups of water. /80.  Patient reports to be feeling better.  Discharge /70 & patient with no complaints.  Dr. Sam was notified of the above information. Instructed patient to contact Dr. Tobias's office & to seek attention via ER/911 with any additional problems.  He verbalizes understanding.     Lilibeth Gomez, RN  Cardiac Rehab Nurse

## 2022-10-10 NOTE — PROGRESS NOTES
Patient here for Phase II cardiac rehab session.  Upon arrival, noting normal sinus rhythm with heart rate at 62 BPM//68.  During cooldown, patient became lightheaded & dizzy.  Sat patient down. BP 94/54. Elevated patient's legs, blood glucose at 132.  Recheck lying /80.  Sat him up & gave him 2-8 ounce cups of water. /80.  Patient reports to be feeling better.  Discharge /70 & patient with no complaints.  Dr. Sam was notified of the above information.  Will send message to Dr. Tobias for review.  Instructed patient to contact Dr. Tobias's office & to seek attention via ER/911 with any additional problems.  He verbalizes understanding.    Lilibeth Gomez RN  Cardiac Rehab Nurse

## 2022-10-11 ENCOUNTER — OFFICE VISIT (OUTPATIENT)
Dept: CARDIOLOGY | Facility: CLINIC | Age: 62
End: 2022-10-11
Payer: COMMERCIAL

## 2022-10-11 DIAGNOSIS — I77.819 DILATATION OF AORTA: ICD-10-CM

## 2022-10-11 DIAGNOSIS — I48.0 PAROXYSMAL ATRIAL FIBRILLATION: ICD-10-CM

## 2022-10-11 DIAGNOSIS — E11.69 HYPERLIPIDEMIA ASSOCIATED WITH TYPE 2 DIABETES MELLITUS: ICD-10-CM

## 2022-10-11 DIAGNOSIS — I21.4 NON-STEMI (NON-ST ELEVATED MYOCARDIAL INFARCTION): ICD-10-CM

## 2022-10-11 DIAGNOSIS — I25.810 CORONARY ARTERY DISEASE INVOLVING CORONARY BYPASS GRAFT OF NATIVE HEART WITHOUT ANGINA PECTORIS: ICD-10-CM

## 2022-10-11 DIAGNOSIS — E11.65 TYPE 2 DIABETES MELLITUS WITH HYPERGLYCEMIA, WITHOUT LONG-TERM CURRENT USE OF INSULIN: ICD-10-CM

## 2022-10-11 DIAGNOSIS — R42 ORTHOSTATIC LIGHTHEADEDNESS: Primary | ICD-10-CM

## 2022-10-11 DIAGNOSIS — G47.33 OSA (OBSTRUCTIVE SLEEP APNEA): ICD-10-CM

## 2022-10-11 DIAGNOSIS — E78.5 HYPERLIPIDEMIA ASSOCIATED WITH TYPE 2 DIABETES MELLITUS: ICD-10-CM

## 2022-10-11 DIAGNOSIS — Z95.1 S/P CABG (CORONARY ARTERY BYPASS GRAFT): ICD-10-CM

## 2022-10-11 PROCEDURE — 99213 OFFICE O/P EST LOW 20 MIN: CPT | Mod: 95,,, | Performed by: INTERNAL MEDICINE

## 2022-10-11 PROCEDURE — 99213 PR OFFICE/OUTPT VISIT, EST, LEVL III, 20-29 MIN: ICD-10-PCS | Mod: 95,,, | Performed by: INTERNAL MEDICINE

## 2022-10-11 PROCEDURE — 3044F HG A1C LEVEL LT 7.0%: CPT | Mod: CPTII,95,, | Performed by: INTERNAL MEDICINE

## 2022-10-11 PROCEDURE — 3044F PR MOST RECENT HEMOGLOBIN A1C LEVEL <7.0%: ICD-10-PCS | Mod: CPTII,95,, | Performed by: INTERNAL MEDICINE

## 2022-10-11 NOTE — PROGRESS NOTES
Subjective:    Patient ID:  Isaiah oDrsey Jr. is a 62 y.o. male who presents for evaluation of light headedness    HPI  The patient is a 62 year old male post CABG 7/18/22 with post op AF. He is going to rehab and has light headedness following  vigorous exercise . His BP was documented at 94/54. The light headedness recovered upon sitting.  He did not prehydrate. He also reports bilateral lateral rib pain since surgery. He was reassured that the dcrop in BP is normal after exercise and recommended fall precautions and pre hydrate.  Lab Results   Component Value Date     (L) 07/23/2022    K 4.0 07/23/2022    CL 98 07/23/2022    CO2 26 07/23/2022    BUN 21 07/23/2022    CREATININE 0.8 07/23/2022     (H) 07/23/2022    HGBA1C 6.2 (H) 08/25/2022    MG 1.9 07/23/2022    AST 28 07/23/2022    ALT 45 (H) 07/23/2022    ALBUMIN 2.7 (L) 07/23/2022    PROT 6.5 07/23/2022    BILITOT 0.5 07/23/2022    WBC 10.65 08/25/2022    HGB 13.8 (L) 08/25/2022    HCT 45.4 08/25/2022    HCT 35 (L) 07/19/2022    MCV 94 08/25/2022     08/25/2022    INR 1.1 07/21/2022    PSA 0.33 09/30/2022    TSH 2.840 07/13/2022         Lab Results   Component Value Date    CHOL 89 (L) 07/28/2022    HDL 33 (L) 07/28/2022    TRIG 105 07/28/2022       Lab Results   Component Value Date    LDLCALC 35.0 (L) 07/28/2022       Past Medical History:   Diagnosis Date    CHF (congestive heart failure)     Coronary artery disease     Diabetes mellitus type 2, uncontrolled, without complications     6.7% Metf 1 g qd, eye 2015, [facility]-2015,     Elevated platelet count 07/23/2015    H/O splenectomy 04/24/2015    doesn't like pneumovax bc caused side effects    HLD (hyperlipidemia) 01/22/2015    goal LDL < 100, reluctant to take statin    HTN (hypertension), benign 01/22/2015    losartan 100    Morbid obesity with BMI of 45.0-49.9, adult 06/11/2014    RODRIGUEZ (obstructive sleep apnea)     PAF (paroxysmal atrial fibrillation)     Polyp of transverse colon  09/05/2018 2018, repeat 2023    Severe uncontrolled hypertension 06/11/2014       Current Outpatient Medications:     aspirin 325 MG tablet, Take 1 tablet (325 mg total) by mouth once daily., Disp: 30 tablet, Rfl: 11    atorvastatin (LIPITOR) 80 MG tablet, Take 1 tablet (80 mg total) by mouth once daily., Disp: 90 tablet, Rfl: 3    metFORMIN (GLUCOPHAGE) 1000 MG tablet, Take 1 tablet (1,000 mg total) by mouth 2 (two) times daily with meals., Disp: 180 tablet, Rfl: 3    metoprolol succinate (TOPROL-XL) 100 MG 24 hr tablet, Take 1 tablet (100 mg total) by mouth once daily., Disp: 90 tablet, Rfl: 3    rivaroxaban (XARELTO) 20 mg Tab, Take 1 tablet (20 mg total) by mouth daily with dinner or evening meal., Disp: 90 tablet, Rfl: 3    silver sulfADIAZINE 1% (SILVADENE) 1 % cream, silver sulfadiazine 1 % topical cream, Disp: , Rfl:     SITagliptin (JANUVIA) 100 MG Tab, Take 1 tablet (100 mg total) by mouth once daily., Disp: 90 tablet, Rfl: 3          Review of Systems   Constitutional: Negative for decreased appetite, diaphoresis, fever, malaise/fatigue, weight gain and weight loss.   HENT:  Negative for congestion, ear discharge, ear pain and nosebleeds.    Eyes:  Negative for blurred vision, double vision and visual disturbance.   Cardiovascular:  Positive for chest pain. Negative for claudication, cyanosis, dyspnea on exertion, irregular heartbeat, leg swelling, near-syncope, orthopnea, palpitations, paroxysmal nocturnal dyspnea and syncope.   Respiratory:  Negative for cough, hemoptysis, shortness of breath, sleep disturbances due to breathing, snoring, sputum production and wheezing.    Endocrine: Negative for polydipsia, polyphagia and polyuria.   Hematologic/Lymphatic: Negative for adenopathy and bleeding problem. Does not bruise/bleed easily.   Skin:  Negative for color change, nail changes, poor wound healing and rash.   Musculoskeletal:  Negative for muscle cramps and muscle weakness.   Gastrointestinal:   Negative for abdominal pain, anorexia, change in bowel habit, hematochezia, nausea and vomiting.   Genitourinary:  Negative for dysuria, frequency and hematuria.   Neurological:  Positive for light-headedness. Negative for brief paralysis, difficulty with concentration, excessive daytime sleepiness, dizziness, focal weakness, headaches, seizures, vertigo and weakness.   Psychiatric/Behavioral:  Negative for altered mental status and depression.    Allergic/Immunologic: Negative for persistent infections.      Objective:There were no vitals taken for this visit.            Physical Exam      Assessment:       No diagnosis found.     Plan:       There are no diagnoses linked to this encounter.

## 2022-10-12 ENCOUNTER — CLINICAL SUPPORT (OUTPATIENT)
Dept: CARDIAC REHAB | Facility: CLINIC | Age: 62
End: 2022-10-12
Payer: COMMERCIAL

## 2022-10-12 DIAGNOSIS — Z95.1 POSTSURGICAL AORTOCORONARY BYPASS STATUS: Primary | ICD-10-CM

## 2022-10-12 DIAGNOSIS — I25.10 ATHEROSCLEROSIS OF NATIVE CORONARY ARTERY OF NATIVE HEART, UNSPECIFIED WHETHER ANGINA PRESENT: ICD-10-CM

## 2022-10-12 PROCEDURE — 93798 PR CARDIAC REHAB/MONITOR: ICD-10-PCS | Mod: S$GLB,,,

## 2022-10-12 PROCEDURE — 93798 PHYS/QHP OP CAR RHAB W/ECG: CPT | Mod: S$GLB,,,

## 2022-10-13 ENCOUNTER — OFFICE VISIT (OUTPATIENT)
Dept: DERMATOLOGY | Facility: CLINIC | Age: 62
End: 2022-10-13
Payer: COMMERCIAL

## 2022-10-13 DIAGNOSIS — D22.9 MULTIPLE BENIGN NEVI: ICD-10-CM

## 2022-10-13 DIAGNOSIS — L21.9 SEBORRHEA: Primary | ICD-10-CM

## 2022-10-13 DIAGNOSIS — Z12.83 SKIN CANCER SCREENING: ICD-10-CM

## 2022-10-13 PROCEDURE — 99204 OFFICE O/P NEW MOD 45 MIN: CPT | Mod: S$GLB,,, | Performed by: DERMATOLOGY

## 2022-10-13 PROCEDURE — 3044F HG A1C LEVEL LT 7.0%: CPT | Mod: CPTII,S$GLB,, | Performed by: DERMATOLOGY

## 2022-10-13 PROCEDURE — 99204 PR OFFICE/OUTPT VISIT, NEW, LEVL IV, 45-59 MIN: ICD-10-PCS | Mod: S$GLB,,, | Performed by: DERMATOLOGY

## 2022-10-13 PROCEDURE — 1159F PR MEDICATION LIST DOCUMENTED IN MEDICAL RECORD: ICD-10-PCS | Mod: CPTII,S$GLB,, | Performed by: DERMATOLOGY

## 2022-10-13 PROCEDURE — 1159F MED LIST DOCD IN RCRD: CPT | Mod: CPTII,S$GLB,, | Performed by: DERMATOLOGY

## 2022-10-13 PROCEDURE — 1160F PR REVIEW ALL MEDS BY PRESCRIBER/CLIN PHARMACIST DOCUMENTED: ICD-10-PCS | Mod: CPTII,S$GLB,, | Performed by: DERMATOLOGY

## 2022-10-13 PROCEDURE — 3044F PR MOST RECENT HEMOGLOBIN A1C LEVEL <7.0%: ICD-10-PCS | Mod: CPTII,S$GLB,, | Performed by: DERMATOLOGY

## 2022-10-13 PROCEDURE — 99999 PR PBB SHADOW E&M-EST. PATIENT-LVL III: ICD-10-PCS | Mod: PBBFAC,,, | Performed by: DERMATOLOGY

## 2022-10-13 PROCEDURE — 99999 PR PBB SHADOW E&M-EST. PATIENT-LVL III: CPT | Mod: PBBFAC,,, | Performed by: DERMATOLOGY

## 2022-10-13 PROCEDURE — 1160F RVW MEDS BY RX/DR IN RCRD: CPT | Mod: CPTII,S$GLB,, | Performed by: DERMATOLOGY

## 2022-10-13 RX ORDER — KETOCONAZOLE 20 MG/G
CREAM TOPICAL 2 TIMES DAILY
Qty: 60 G | Refills: 3 | Status: SHIPPED | OUTPATIENT
Start: 2022-10-13 | End: 2023-12-14 | Stop reason: SDUPTHER

## 2022-10-13 RX ORDER — KETOCONAZOLE 20 MG/ML
SHAMPOO, SUSPENSION TOPICAL DAILY
Qty: 120 ML | Refills: 4 | Status: SHIPPED | OUTPATIENT
Start: 2022-10-13 | End: 2023-03-09

## 2022-10-13 NOTE — PROGRESS NOTES
Subjective:       Patient ID:  Isaiah Dorsey Jr. is a 62 y.o. male who presents for   Chief Complaint   Patient presents with    Skin Check     UBSE   Moles on back     Dry Skin     Face, Xyears, dry, otc tx      Dry Skin - Initial  Affected locations: face  Signs / symptoms: dryness and itching  Severity: mild to moderate  Timing: constant    Review of Systems   Constitutional: Negative.    HENT: Negative.     Respiratory: Negative.     Musculoskeletal: Negative.    Skin:  Positive for dry skin.      Objective:    Physical Exam   Constitutional: He appears well-developed and well-nourished.   Eyes: No conjunctival no injection.   Neurological: He is alert and oriented to person, place, and time.   Psychiatric: He has a normal mood and affect.   Skin:   Areas Examined (abnormalities noted in diagram):   Head / Face Inspection Performed  Neck Inspection Performed  Chest / Axilla Inspection Performed  Abdomen Inspection Performed  Back Inspection Performed  RUE Inspected  LUE Inspection Performed                 Diagram Legend     Erythematous scaling macule/papule c/w actinic keratosis       Vascular papule c/w angioma      Pigmented verrucoid papule/plaque c/w seborrheic keratosis      Yellow umbilicated papule c/w sebaceous hyperplasia      Irregularly shaped tan macule c/w lentigo     1-2 mm smooth white papules consistent with Milia      Movable subcutaneous cyst with punctum c/w epidermal inclusion cyst      Subcutaneous movable cyst c/w pilar cyst      Firm pink to brown papule c/w dermatofibroma      Pedunculated fleshy papule(s) c/w skin tag(s)      Evenly pigmented macule c/w junctional nevus     Mildly variegated pigmented, slightly irregular-bordered macule c/w mildly atypical nevus      Flesh colored to evenly pigmented papule c/w intradermal nevus       Pink pearly papule/plaque c/w basal cell carcinoma      Erythematous hyperkeratotic cursted plaque c/w SCC      Surgical scar with no sign of skin  cancer recurrence      Open and closed comedones      Inflammatory papules and pustules      Verrucoid papule consistent consistent with wart     Erythematous eczematous patches and plaques     Dystrophic onycholytic nail with subungual debris c/w onychomycosis     Umbilicated papule    Erythematous-base heme-crusted tan verrucoid plaque consistent with inflamed seborrheic keratosis     Erythematous Silvery Scaling Plaque c/w Psoriasis     See annotation      Assessment / Plan:        Seborrhea  -     ketoconazole (NIZORAL) 2 % shampoo; Apply topically once daily. Face and scalp soaks  Dispense: 120 mL; Refill: 4  -     ketoconazole (NIZORAL) 2 % cream; Apply topically 2 (two) times daily. Prn rash face  Dispense: 60 g; Refill: 3  Discussed with patient the etiology and pathogenesis of the disease or skin lesion(s) and possible treatments and aggravators.    Chronic nature of this condition discussed with patient.  Reviewed with patient different treatment options and associated risks.  Proper application of medications and or care for affected area(s) and condition(s) reviewed.    Multiple benign nevi  Discussed all of the following with the patient.    Patient to check their breasts (if applicable), buttocks, and groin with a mirror.  Patient deferred our examination of these areas today.    Each month, check your body for any spots such as freckles, age spots, and moles.  Watch for color changes and shape changes and growth in size.    Have your davis or  pay attention to any dark color changes to moles of the scalp.    Moles, also called nevi, are small, colored (pigmented) marks on the skin. They have no known purpose. Many moles appear before age 30, but they also increase frequently as people age. Moles most often are not cancer (benign) and are harmless. But some become cancerous (malignant). Thats why you need to watch the moles on your body and tell your healthcare provider about any that  concern you.  Brochure given for patient education.    Skin cancer screening  Waist up examination performed today.  No suspicious lesions were noted unless otherwise documented in this note.    Patient did not want a waist down exam today.  Previous [facility] labs and or records and notes reviewed and considered for their impact on our clinical decision making today.           Follow up in about 1 year (around 10/13/2023).

## 2022-10-13 NOTE — PATIENT INSTRUCTIONS
Avoid hot water   Wash face with Ketoconazole shampoo nightly    Apply the cream twice daily as needed       Discussed all of the following with the patient.    Patient to check their breasts (if applicable), buttocks, and groin with a mirror.  Patient deferred our examination of these areas today.    Each month, check your body for any spots such as freckles, age spots, and moles.  Watch for color changes and shape changes and growth in size.    Have your davis or  pay attention to any dark color changes to moles of the scalp.    Moles, also called nevi, are small, colored (pigmented) marks on the skin. They have no known purpose. Many moles appear before age 30, but they also increase frequently as people age. Moles most often are not cancer (benign) and are harmless. But some become cancerous (malignant). Thats why you need to watch the moles on your body and tell your healthcare provider about any that concern you.

## 2022-10-14 ENCOUNTER — CLINICAL SUPPORT (OUTPATIENT)
Dept: CARDIAC REHAB | Facility: CLINIC | Age: 62
End: 2022-10-14
Payer: COMMERCIAL

## 2022-10-14 DIAGNOSIS — Z95.1 POSTSURGICAL AORTOCORONARY BYPASS STATUS: Primary | ICD-10-CM

## 2022-10-14 DIAGNOSIS — I25.10 CORONARY ARTERY DISEASE INVOLVING NATIVE CORONARY ARTERY OF NATIVE HEART WITHOUT ANGINA PECTORIS: ICD-10-CM

## 2022-10-14 PROCEDURE — 93798 PHYS/QHP OP CAR RHAB W/ECG: CPT | Mod: S$GLB,,, | Performed by: INTERNAL MEDICINE

## 2022-10-14 PROCEDURE — 93798 PR CARDIAC REHAB/MONITOR: ICD-10-PCS | Mod: S$GLB,,, | Performed by: INTERNAL MEDICINE

## 2022-10-17 ENCOUNTER — CLINICAL SUPPORT (OUTPATIENT)
Dept: CARDIAC REHAB | Facility: CLINIC | Age: 62
End: 2022-10-17
Payer: COMMERCIAL

## 2022-10-17 DIAGNOSIS — Z95.1 POSTSURGICAL AORTOCORONARY BYPASS STATUS: Primary | ICD-10-CM

## 2022-10-17 DIAGNOSIS — I25.10 CORONARY ARTERY DISEASE, UNSPECIFIED VESSEL OR LESION TYPE, UNSPECIFIED WHETHER ANGINA PRESENT, UNSPECIFIED WHETHER NATIVE OR TRANSPLANTED HEART: ICD-10-CM

## 2022-10-17 PROCEDURE — 93798 PHYS/QHP OP CAR RHAB W/ECG: CPT | Mod: S$GLB,,, | Performed by: INTERNAL MEDICINE

## 2022-10-17 PROCEDURE — 93798 PR CARDIAC REHAB/MONITOR: ICD-10-PCS | Mod: S$GLB,,, | Performed by: INTERNAL MEDICINE

## 2022-10-19 ENCOUNTER — CLINICAL SUPPORT (OUTPATIENT)
Dept: CARDIAC REHAB | Facility: CLINIC | Age: 62
End: 2022-10-19
Payer: COMMERCIAL

## 2022-10-19 DIAGNOSIS — I25.10 CORONARY ARTERY DISEASE, UNSPECIFIED VESSEL OR LESION TYPE, UNSPECIFIED WHETHER ANGINA PRESENT, UNSPECIFIED WHETHER NATIVE OR TRANSPLANTED HEART: ICD-10-CM

## 2022-10-19 DIAGNOSIS — Z95.1 POSTSURGICAL AORTOCORONARY BYPASS STATUS: Primary | ICD-10-CM

## 2022-10-19 PROCEDURE — 93798 PHYS/QHP OP CAR RHAB W/ECG: CPT | Mod: S$GLB,,, | Performed by: INTERNAL MEDICINE

## 2022-10-19 PROCEDURE — 93798 PR CARDIAC REHAB/MONITOR: ICD-10-PCS | Mod: S$GLB,,, | Performed by: INTERNAL MEDICINE

## 2022-10-20 ENCOUNTER — RESEARCH ENCOUNTER (OUTPATIENT)
Dept: RESEARCH | Facility: HOSPITAL | Age: 62
End: 2022-10-20
Payer: COMMERCIAL

## 2022-10-20 NOTE — PROGRESS NOTES
Study name: Anticoagulation for New-Onset Post-Operative Atrial Fibrillation after CABG- PACES    IRB:  2019.273    PI: Dr. JENARO Holguin    Study Visit: 90 Day follow up    Who was present: Patient and Liz Bansal RN     10/20/2022: I conducted a telphone visit with the patient for the 90 day follow up for the PACES Study.      Reaffirmed that the patient still wishes to participate in the study and continues to consent to the study.      Has the patient had any changes in his health since the last study visit? No    All questions for Interval Assessment were answered by the patient as No    Has the patient had any outside hospitalizations/ER visits: No    Medication adherence questionnaire completed: Yes    Patient taking Oral Anticoagulant as prescribed, as part of the clinical trial? Yes     Patient taking Antiplatelet as prescribed, as part of the clinical trial? Yes    Bleeding Questionnaire completed.       All questions were answered by the patient as No    Questionnaire for Verification of Stroke-Free Status completed.      All questions were answered by the patient as No    PACT-Q2 questionnaire:  Yes      Patient answered all questions and document to be attached to note.        Medications and allergy lists updated and current. Yes    INR Assessment -  N/A (not on VKA)    EKG scheduled for 10-27-22, will upload once results are available.     Discussed with the patient the importance of follow up in the study. Next visit scheduled range from 1-18-23 to 2-1-23 . Patient verbalized understanding.

## 2022-10-21 ENCOUNTER — DOCUMENTATION ONLY (OUTPATIENT)
Dept: CARDIAC REHAB | Facility: CLINIC | Age: 62
End: 2022-10-21

## 2022-10-21 ENCOUNTER — CLINICAL SUPPORT (OUTPATIENT)
Dept: CARDIAC REHAB | Facility: CLINIC | Age: 62
End: 2022-10-21
Payer: COMMERCIAL

## 2022-10-21 DIAGNOSIS — I25.10 CORONARY ATHEROSCLEROSIS OF NATIVE CORONARY ARTERY: ICD-10-CM

## 2022-10-21 DIAGNOSIS — Z95.1 POSTSURGICAL AORTOCORONARY BYPASS STATUS: Primary | ICD-10-CM

## 2022-10-21 PROCEDURE — 93798 PHYS/QHP OP CAR RHAB W/ECG: CPT | Mod: S$GLB,,, | Performed by: INTERNAL MEDICINE

## 2022-10-21 PROCEDURE — 93798 PR CARDIAC REHAB/MONITOR: ICD-10-PCS | Mod: S$GLB,,, | Performed by: INTERNAL MEDICINE

## 2022-10-21 NOTE — PROGRESS NOTES
12 Session Follow Up   Cardiac Rehab Individual Treatment Plan - Reassessment    Patient Name: Isaiah Dorsey Jr. MRN: [id number]   : 1960   Age: 62 y.o.   Primary Diagnosis: s/p CABG, CAD, CHF, HLD, NSTEMI, AFIB, DM    Nutrition Assessment:     Anthropometrics    Height 72 inches   Weight 282 lbs   BMI 38.2     Patient confirms he is taking lipitor 40mg for cholesterol control.  Difficulty Chewing or Swallowing: no  Current Exercise: See Exercise Physiologist Note  Food Safety/Food Preparation: spouse  Living Arrangements/Family Support: Lives with spouse  Cultural/Spiritual/Personal Preferences: not applicable   Barriers to Education: none identified  Stage of Change Related to Diet Habits: Action  Recent Changes to Diet: No  Food Diary: Given      Nutrition Plan:   Goals:  LDL-C < 70 (for high risk patients)  Hgb A1c < 7%  BMI < 25 and abdominal girth < 40M/<35 F  2 gram sodium, Mediterranean diet: In Progress  Rehab weight loss goal of 10 lbs, 1 lb per week: In Progress  Fish intake (non-fried varieties) to a goal of 2-3 servings per week: In Progress  Increase fruit and vegetable intake: In Progress    Comments on Goal Progression:  Emphasized importance of improving hydration and produce intake with meals    Interventions:  Dietitian Consult: No  Patient to participate in Cardiac Rehab sessions three times a week  Weekly Dietitian Weight Check  Nutrition Recommendations Provided: Verbal, Reviewed  Follow Up Plan for Ongoing Self-Management Support    Education:  Seafood; verbalizes understanding; Date: 10/12/22  Sodium; verbalizes understanding; Date: 10/19/22  Pre/Probiotics; verbalizes understanding; Date: 22  Fiber; verbalizes understanding; Date: 10/5/22    Comments:   Discussed ways to incorporate healthy snacks, eating on a schedule, and monitoring sodium intake for heart health.    Diabetes  Is the patient diabetic? Yes   Other Core Components/Diabetes Assessment:   Labs:  Lab Results    Component Value Date    GLUF 136 (H) 08/25/2022     Lab Results   Component Value Date    HGBA1C 6.2 (H) 08/25/2022    HGBA1C 7.2 (H) 07/28/2022    HGBA1C 7.6 (H) 07/12/2022      Lab Results   Component Value Date    ESTIMATEDAVG 131 08/25/2022    ESTIMATEDAVG 160 (H) 07/28/2022    ESTIMATEDAVG 171 (H) 07/12/2022       History of diabetes since 2018  Diabetes medications: Ozempic, Janvia 100mg, metformin 1000mg BID  Blood Glucose Checks at Home: Yes: Other: daily  Typical range: 130-150 mg/dl  Endocrinologist or PCP following DM: Dr. Burrows    Other Core Components/Diabetes Plan:   Goals:  Hgb A1c < 7%  Exercise Blood Glucose: 100-300 mg/dl    Interventions:  Medication Compliance  Med Card Reconciled  Low Sodium, Mediterranean, ADA Diet  Weight Management  Physical Activity  Increase Knowledge of Contributory Factors  Home Monitoring    Comments:   Patient verbalizes understanding to bring home glucometer and check glucose pre and post each exercise session.  Per cardiac rehab protocols, patient's glucose must be between 90 and 270 mg/dL to exercise.  Patient denies any recent glucose levels less than 60 mg/dL or greater than 300 mg/dL. Patient verbalizes importance of notifying rehab staff if symptoms of hypoglycemia occur while at cardiac rehab.  Abnormal labs will be reported to patient's PCP/Endocrinologist by rehab staff.    Noted updated glucose parameters 100-300    Sarita Tirado MS, RDN/LDN

## 2022-10-21 NOTE — PROGRESS NOTES
Mr. Mason has completed 12 out of 36 exercise session of Phase II cardiac rehab.  A follow up reassessment will be completed at 24 sessions.     12 Session Follow Up   Cardiac Rehab Individual Treatment Plan - Reassessment      Patient Name: Isaiah Dorsey Jr. MRN: [id number]   : 1960   Age: 62 y.o.   Primary Diagnosis: CABG Date of Event: 22   EF: 40-45%  Risk Stratification: high  Referring Physician: DOMINIQUE   Exercise Assessment:     Angina with exercise?: No   ST Depression with Exercise?: No  Fall Risk: No   Assistive Devices:  independent  There were no limitations noted by the patient.  Comments on Progression: Mr. Mason is progressing fair and his workloads will continue to be increased as he tolerates exercise intensity.     Exercise Plan:   Goals:  CR Exercise Goals: Attend Cardiac Rehab 3 times/week: In Progress  Home Aerobic Exercise: 2 additional days/week for 30-60 minutes: In Progress  Intensity of 12-15 on the Rate of Perceived Exertion (RPE) scale: In Progress  30% increase in entry estimated METS: 7.8 : In Progress  5 days/week for 30-60 minutes: In Progress    Comments on Goal Progression:  Patient Consistency: consistent with attendance  Home exercise? No   Patient reports intensity rate 11-14 on RPE scale    Intervention/Recommendations:   Discussed importance of regular attendance to cardiac rehab class    Exercise Prescription:  THR Range 63-74   Mode: Treadmill  Nustep   Frequency:  3 days/week   Duration:  30-60 minutes   Intensity:  12-15 RPE   Resistance Training:  Yes: 5 to 7 lb weights with 10-15 reps based on strength and range of motion and adjusted accordingly     Home Prescription:  Mode Aerobic exercise   Frequency: 2- 3 days/week   Duration: 30-60 minutes   Resistance Training: None     Education:  Muscular Anatomy; verbalizes understanding; Date: 22  Resistance Training; verbalizes understanding; Date: 10-10-22  Resistance Training II; verbalizes understanding; Date:  10-17-22  The Exercise Program; verbalizes understanding; Date: 10-3-22    Comments:  I reviewed exercise recommendations with Mr. Mason.  I encouraged him to begin exercising.  He stated he has a stationary bike at home and I advised him to start using it.  We discussed keeping to an exercise schedule as well.   He stated understanding.      The exercise prescription will continue to be adjusted based on tolerance of exercise intensity by patient.    Sharda Ceballos., CEP

## 2022-10-24 ENCOUNTER — CLINICAL SUPPORT (OUTPATIENT)
Dept: CARDIAC REHAB | Facility: CLINIC | Age: 62
End: 2022-10-24
Payer: COMMERCIAL

## 2022-10-24 DIAGNOSIS — I25.10 ATHEROSCLEROSIS OF NATIVE CORONARY ARTERY OF NATIVE HEART, UNSPECIFIED WHETHER ANGINA PRESENT: ICD-10-CM

## 2022-10-24 DIAGNOSIS — Z95.1 POSTSURGICAL AORTOCORONARY BYPASS STATUS: Primary | ICD-10-CM

## 2022-10-24 PROCEDURE — 93798 PHYS/QHP OP CAR RHAB W/ECG: CPT | Mod: S$GLB,,,

## 2022-10-24 PROCEDURE — 93798 PR CARDIAC REHAB/MONITOR: ICD-10-PCS | Mod: S$GLB,,,

## 2022-10-26 ENCOUNTER — CLINICAL SUPPORT (OUTPATIENT)
Dept: CARDIAC REHAB | Facility: CLINIC | Age: 62
End: 2022-10-26
Payer: COMMERCIAL

## 2022-10-26 DIAGNOSIS — Z95.1 POSTSURGICAL AORTOCORONARY BYPASS STATUS: Primary | ICD-10-CM

## 2022-10-26 DIAGNOSIS — I25.10 ATHEROSCLEROSIS OF NATIVE CORONARY ARTERY OF NATIVE HEART, UNSPECIFIED WHETHER ANGINA PRESENT: ICD-10-CM

## 2022-10-26 PROCEDURE — 93798 PHYS/QHP OP CAR RHAB W/ECG: CPT | Mod: S$GLB,,,

## 2022-10-26 PROCEDURE — 93798 PR CARDIAC REHAB/MONITOR: ICD-10-PCS | Mod: S$GLB,,,

## 2022-10-27 ENCOUNTER — HOSPITAL ENCOUNTER (OUTPATIENT)
Dept: CARDIOLOGY | Facility: CLINIC | Age: 62
Discharge: HOME | End: 2022-10-27
Payer: COMMERCIAL

## 2022-10-27 ENCOUNTER — DOCUMENTATION ONLY (OUTPATIENT)
Dept: CARDIAC REHAB | Facility: CLINIC | Age: 62
End: 2022-10-27
Payer: COMMERCIAL

## 2022-10-27 DIAGNOSIS — Z00.6 RESEARCH STUDY PATIENT: ICD-10-CM

## 2022-10-27 DIAGNOSIS — I48.0 PAROXYSMAL ATRIAL FIBRILLATION: ICD-10-CM

## 2022-10-27 PROCEDURE — 93010 ELECTROCARDIOGRAM REPORT: CPT | Mod: S$GLB,,, | Performed by: INTERNAL MEDICINE

## 2022-10-27 PROCEDURE — 93005 EKG 12-LEAD: ICD-10-PCS | Mod: S$GLB,,, | Performed by: THORACIC SURGERY (CARDIOTHORACIC VASCULAR SURGERY)

## 2022-10-27 PROCEDURE — 93010 EKG 12-LEAD: ICD-10-PCS | Mod: S$GLB,,, | Performed by: INTERNAL MEDICINE

## 2022-10-27 PROCEDURE — 93005 ELECTROCARDIOGRAM TRACING: CPT | Mod: S$GLB,,, | Performed by: THORACIC SURGERY (CARDIOTHORACIC VASCULAR SURGERY)

## 2022-10-27 NOTE — PROGRESS NOTES
Mr. Mason has completed 14 out of 36 exercise session of Phase II cardiac rehab.  A follow up reassessment will be completed at 24 sessions.     12 Session Follow Up   Cardiac Rehab Individual Treatment Plan - Reassessment      Patient Name: Isaiah Dorsey Jr. MRN: [id number]   : 1960   Age: 62 y.o.   Primary Diagnosis: CABG Date of Event: 22   EF: 40-45%  Risk Stratification: high  Referring Physician: DOMINIQUE   Exercise Assessment:     Angina with exercise?: No   ST Depression with Exercise?: No  Fall Risk: No   Assistive Devices:  independent  There were no limitations noted by the patient.  Comments on Progression: Mr. Mason is progressing fair and his workloads will continue to be increased as he tolerates exercise intensity.     Exercise Plan:   Goals:  CR Exercise Goals: Attend Cardiac Rehab 3 times/week: In Progress  Home Aerobic Exercise: 2 additional days/week for 30-60 minutes: In Progress  Intensity of 12-15 on the Rate of Perceived Exertion (RPE) scale: In Progress  30% increase in entry estimated METS: 7.8 : In Progress  5 days/week for 30-60 minutes: In Progress    Comments on Goal Progression:  Patient Consistency: consistent with attendance  Home exercise? No   Patient reports intensity rate 11-14 on RPE scale    Intervention/Recommendations:   Discussed importance of regular attendance to cardiac rehab class    Exercise Prescription:  THR Range 63-74   Mode: Treadmill  Nustep   Frequency:  3 days/week   Duration:  30-60 minutes   Intensity:  12-15 RPE   Resistance Training:  Yes: 5 to 7 lb weights with 10-15 reps based on strength and range of motion and adjusted accordingly     Home Prescription:  Mode Aerobic exercise   Frequency: 2- 3 days/week   Duration: 30-60 minutes   Resistance Training: None     Education:  Muscular Anatomy; verbalizes understanding; Date: 22  Resistance Training; verbalizes understanding; Date: 10-10-22  Resistance Training II; verbalizes understanding; Date:  10-17-22  The Exercise Program; verbalizes understanding; Date: 10-3-22    Comments:  I reviewed exercise recommendations with Mr. Mason.  I encouraged him to begin exercising.  He stated he has a stationary bike at home and I advised him to start using it.  We discussed keeping to an exercise schedule as well.   He stated understanding.      The exercise prescription will continue to be adjusted based on tolerance of exercise intensity by patient.    Jorje Ceballos, [facility] [place]     12 Session Follow Up   Cardiac Rehab Individual Treatment Plan - Reassessment    Patient Name: Isaiah Dorsey Jr. MRN: [id number]   : 1960   Age: 62 y.o.   Primary Diagnosis: s/p CABG, CAD, CHF, HLD, NSTEMI, AFIB, DM    Nutrition Assessment:     Anthropometrics    Height 72 inches   Weight 282 lbs   BMI 38.2     Patient confirms he is taking lipitor 40mg for cholesterol control.  Difficulty Chewing or Swallowing: no  Current Exercise: See Exercise Physiologist Note  Food Safety/Food Preparation: spouse  Living Arrangements/Family Support: Lives with spouse  Cultural/Spiritual/Personal Preferences: not applicable   Barriers to Education: none identified  Stage of Change Related to Diet Habits: Action  Recent Changes to Diet: No  Food Diary: Given      Nutrition Plan:   Goals:  LDL-C < 70 (for high risk patients)  Hgb A1c < 7%  BMI < 25 and abdominal girth < 40M/<35 F  2 gram sodium, Mediterranean diet: In Progress  Rehab weight loss goal of 10 lbs, 1 lb per week: In Progress  Fish intake (non-fried varieties) to a goal of 2-3 servings per week: In Progress  Increase fruit and vegetable intake: In Progress    Comments on Goal Progression:  Emphasized importance of improving hydration and produce intake with meals    Interventions:  Dietitian Consult: No  Patient to participate in Cardiac Rehab sessions three times a week  Weekly Dietitian Weight Check  Nutrition Recommendations Provided: Verbal, Reviewed  Follow Up Plan for Ongoing  Self-Management Support    Education:  Seafood; verbalizes understanding; Date: 10/12/22  Sodium; verbalizes understanding; Date: 10/19/22  Pre/Probiotics; verbalizes understanding; Date: 9/28/22  Fiber; verbalizes understanding; Date: 10/5/22    Comments:   Discussed ways to incorporate healthy snacks, eating on a schedule, and monitoring sodium intake for heart health.    Diabetes  Is the patient diabetic? Yes   Other Core Components/Diabetes Assessment:   Labs:  Lab Results   Component Value Date    GLUF 136 (H) 08/25/2022     Lab Results   Component Value Date    HGBA1C 6.2 (H) 08/25/2022    HGBA1C 7.2 (H) 07/28/2022    HGBA1C 7.6 (H) 07/12/2022      Lab Results   Component Value Date    ESTIMATEDAVG 131 08/25/2022    ESTIMATEDAVG 160 (H) 07/28/2022    ESTIMATEDAVG 171 (H) 07/12/2022       History of diabetes since 2018  Diabetes medications: Ozempic, Janvia 100mg, metformin 1000mg BID  Blood Glucose Checks at Home: Yes: Other: daily  Typical range: 130-150 mg/dl  Endocrinologist or PCP following DM: Dr. Burrows    Other Core Components/Diabetes Plan:   Goals:  Hgb A1c < 7%  Exercise Blood Glucose: 100-300 mg/dl    Interventions:  Medication Compliance  Med Card Reconciled  Low Sodium, Mediterranean, ADA Diet  Weight Management  Physical Activity  Increase Knowledge of Contributory Factors  Home Monitoring    Comments:   Patient verbalizes understanding to bring home glucometer and check glucose pre and post each exercise session.  Per cardiac rehab protocols, patient's glucose must be between 90 and 270 mg/dL to exercise.  Patient denies any recent glucose levels less than 60 mg/dL or greater than 300 mg/dL. Patient verbalizes importance of notifying rehab staff if symptoms of hypoglycemia occur while at cardiac rehab.  Abnormal labs will be reported to patient's PCP/Endocrinologist by rehab staff.    Noted updated glucose parameters 100-300    Sarita Tirado MS, RDN/LDN     Session: Orientation   Cardiac Rehab  Individual Treatment Plan - Initial Assessment      Patient Name: Isaiah Dorsey Jr. MRN: [id number]   : 1960   Age: 62 y.o.   Date of Event: 22   Primary Diagnosis: S/P CABG    EF: 40-45% (22)    Physical Assessment:   There were no vitals taken for this visit.    ASSESSMENT:  Heart Sounds: regular rate and rhythm, S1, S2 normal, no murmur, click, rub or gallop  Prosthetic Valve: No  Lung Sounds: normal air entry, lungs clear to auscultation  Capillary Refill: normal  Left Radial Pulse: Normal (+2)  Right Radial Pulse: Normal (+2)  Left Pedal Pulse: Normal (+2)  Right Pedal Pulse: Normal (+2)  Right Edema: Trace  Left Edema Trace  Strength: good  Range of Motion: full range of motion  Existing Limitations:      Site   [] Arthritis, bursitis    [] Amputation, atrophy    [] Other:    []       Diabetic patient's foot examination comments: Normal -  Bilateral  Incisional site: healing well  Special needs: NA    Psychosocial Assessment:   Outcome Survey Tools:    MEGAN SCORES:   PRE   Anxiety 0   Depression 0   Somatic 1   Hostility 0     SF-36 SCORES:   PRE   Physical Function 20   Social Function 11   Mental Health 26   Pain 11   Change in Health 5   Physical Role Limitation 0   Mental Role Limitation 1   Energy/Fatigue 7   Health Perceptions 20   Total Score 101     PHQ-9:  PHQ-9 Depression Patient Health Questionnaire 9/15/2022   Over the last two weeks how often have you been bothered by little interest or pleasure in doing things 0   Over the last two weeks how often have you been bothered by feeling down, depressed or hopeless 0   Over the last two weeks how often have you been bothered by trouble falling or staying asleep, or sleeping too much 0   Over the last two weeks how often have you been bothered by feeling tired or having little energy 1   Over the last two weeks how often have you been bothered by a poor appetite or overeating 0   Over the last two weeks how often have you been bothered  by feeling bad about yourself - or that you are a failure or have let yourself or your family down 0   Over the last two weeks how often have you been bothered by trouble concentrating on things, such as reading the newspaper or watching television 0   Over the last two weeks how often have you been bothered by moving or speaking so slowly that other people could have noticed. 0   Over the last two weeks how often have you been bothered by thoughts that you would be better off dead, or of hurting yourself 0   If you checked off any problems, how difficult have these problems made it for you to do your work, take care of things at home or get along with other people? Not difficult at all   Total Score 1              Living Arrangements: Lives with spouse  Family Support: children, grandchildren, and spouse  Self Reported: Effective Coping Skills  Displays: happiness and calmness  Medication: not applicable    Psychosocial Plan:   Goals:  Improved psychosocial coping strategies  Reduce manifestation of stress  Maintain positive support system  Maintain positive outlook  Improve overall quality of life    Interventions/Recommendations:  Discussed Results of Surveys  Patient to Self Report Emotional Changes at Session Check In  Recommend Physical Activity  Recommend Attending Education Lectures  Notify MD: No  Program Referral: No  Pharmaceutical Intervention/Therapy: No  Other Needs: not applicable  Stage of Readiness to Change: Contemplation    Education:  Stress, verbalizes understanding; Date:9/15/22  Risk Factors, verbalizes understanding; Date:9/15/22    Comments:  Patient acknowledges stress at work with deadlines. Discussed screening results and importance of seeking treatment when indicated. Patient verbalized understanding. Patient has been instructed to notify staff in the event that circumstances worsen.  Patient verbalizes understanding.    Other Core Components/Risk Factors Assessment:   RISK  FACTORS:  diabetes, hyperlipidemia, hypertension, obesity, positive family history, sedentary lifestyle, stress    Learning Barriers: None    Education Level:  Associate/Bachelor Degree    Pre-test Score: 80    Medication Compliance: has been compliant with taking medications    Other Core Components/Risk Factors Plan:   Goals:  Decrease cholesterol level: In Progress  Increase exercise tolerance: In Progress  Increase knowledge of CAD: In Progress  Decrease blood pressure: In Progress  Weight loss: In Progress  Demonstrate accurate pulse taking: In Progress  Identify and manage personal areas of stress: In Progress  Control diabetes by adjusting diet and exercise: In Progress  Learn more about healthy eating: In Progress    Interventions/Recommendations:  Recommend regular attendance for Cardiac Rehab: Exercise and Education Lectures  Encourage medication compliance  Individual Education/ Counseling: Yes  Physician Referral: No    Education:    risk factors, verbalizes understanding; Date: 9/15/22  stress, verbalizes understanding; Date: 9/15/22         Education method adapted to patients education level and preferred method of learning.  Method: explanation    Comments:  Patient acknowledges his stress, plans for healthier lifestyle to improve his cardiac health.    Other Core Components/Hypertension Assessment:   Resting BP:  Left 130/84 right 126/80   BP Readings from Last 1 Encounters:   09/30/22 138/78         BP Diagnosis: Hypertensive  Patient reported symptoms: none    Other Core Components/Hypertension Plan:   Goals:  Blood Pressure <130/80    Interventions/Recommendations:  Med Card Reconciled: Yes  Encourage medication compliance  Encourage sodium reduction  Encourage weight loss  Recommend physical activity  Educate on contributory factors  Reduce stress, anxiety, anger, depression, and/or chronic pain  Encourage home blood pressure monitoring    Education:    Risk Factors; verbalizes understanding;  "Date: 9/15/22  Stress; verbalizes understanding; Date: 9/15/22         Comments:  Patient not knowledgeable of his medication regimen "his wife handles that". Encouraged patient to keep list of medications with him at all times. Patient verbalized understanding.     Does the patient have Heart Failure? YES, EF 40-45%, trace edema bilateral legs, denies shortness of breath    Other Core Components/Tobacco Cessation Assessment:   Smoking Status: past smoker who quit many years ago  Smoking Cessation Barriers:  NA  Stage of Readiness to Change: Maintenance    Other Core Components/Tobacco Cessation Plan:   Goals:  Maintain non-smoking status    Interventions:  Maintains non-smoking status    Education:    Risk Factors; verbalizes understanding; Date: 9/15/22         Comments:  Patient motivated to start cardiac rehab. Screening scores do not indicate a referral for behavioral health, pt denies any issues with anxiety/depression but admits to stress with work deadlines. Discussed Cardiac Rehab program in depth with patient.  Medication list updated per patient & marked as reviewed.  Patient has been instructed to notify staff of any problems while attending rehab (ie: chest pain, shortness of breath, lightheadedness, dizziness).  Patient has been instructed to monitor blood pressure readings outside of rehab & to keep a daily log of the readings.  Patient verbalizes understanding.     Ranjit Osuna RN  Cardiac Rehab Nurse    "

## 2022-10-28 ENCOUNTER — CLINICAL SUPPORT (OUTPATIENT)
Dept: CARDIAC REHAB | Facility: CLINIC | Age: 62
End: 2022-10-28
Payer: COMMERCIAL

## 2022-10-28 DIAGNOSIS — Z95.1 POSTSURGICAL AORTOCORONARY BYPASS STATUS: Primary | ICD-10-CM

## 2022-10-28 DIAGNOSIS — I25.10 ATHEROSCLEROSIS OF NATIVE CORONARY ARTERY OF NATIVE HEART, UNSPECIFIED WHETHER ANGINA PRESENT: ICD-10-CM

## 2022-10-28 PROCEDURE — 93798 PHYS/QHP OP CAR RHAB W/ECG: CPT | Mod: S$GLB,,,

## 2022-10-28 PROCEDURE — 93798 PR CARDIAC REHAB/MONITOR: ICD-10-PCS | Mod: S$GLB,,,

## 2022-11-01 ENCOUNTER — CLINICAL SUPPORT (OUTPATIENT)
Dept: ENDOSCOPY | Facility: HOSPITAL | Age: 62
End: 2022-11-01
Attending: INTERNAL MEDICINE
Payer: COMMERCIAL

## 2022-11-01 ENCOUNTER — TELEPHONE (OUTPATIENT)
Dept: ENDOSCOPY | Facility: HOSPITAL | Age: 62
End: 2022-11-01

## 2022-11-01 ENCOUNTER — OFFICE VISIT (OUTPATIENT)
Dept: PODIATRY | Facility: CLINIC | Age: 62
End: 2022-11-01
Payer: COMMERCIAL

## 2022-11-01 VITALS
SYSTOLIC BLOOD PRESSURE: 138 MMHG | DIASTOLIC BLOOD PRESSURE: 85 MMHG | HEIGHT: 72 IN | HEART RATE: 57 BPM | BODY MASS INDEX: 38.76 KG/M2 | WEIGHT: 286.19 LBS

## 2022-11-01 DIAGNOSIS — E11.65 TYPE 2 DIABETES MELLITUS WITH HYPERGLYCEMIA, WITHOUT LONG-TERM CURRENT USE OF INSULIN: Primary | ICD-10-CM

## 2022-11-01 DIAGNOSIS — M76.62 ACHILLES TENDINITIS OF LEFT LOWER EXTREMITY: ICD-10-CM

## 2022-11-01 DIAGNOSIS — Z12.11 COLON CANCER SCREENING: ICD-10-CM

## 2022-11-01 PROCEDURE — 1159F MED LIST DOCD IN RCRD: CPT | Mod: CPTII,S$GLB,, | Performed by: PODIATRIST

## 2022-11-01 PROCEDURE — 3044F PR MOST RECENT HEMOGLOBIN A1C LEVEL <7.0%: ICD-10-PCS | Mod: CPTII,S$GLB,, | Performed by: PODIATRIST

## 2022-11-01 PROCEDURE — 99999 PR PBB SHADOW E&M-EST. PATIENT-LVL III: ICD-10-PCS | Mod: PBBFAC,,, | Performed by: PODIATRIST

## 2022-11-01 PROCEDURE — 1159F PR MEDICATION LIST DOCUMENTED IN MEDICAL RECORD: ICD-10-PCS | Mod: CPTII,S$GLB,, | Performed by: PODIATRIST

## 2022-11-01 PROCEDURE — 99213 OFFICE O/P EST LOW 20 MIN: CPT | Mod: S$GLB,,, | Performed by: PODIATRIST

## 2022-11-01 PROCEDURE — 3075F SYST BP GE 130 - 139MM HG: CPT | Mod: CPTII,S$GLB,, | Performed by: PODIATRIST

## 2022-11-01 PROCEDURE — 3044F HG A1C LEVEL LT 7.0%: CPT | Mod: CPTII,S$GLB,, | Performed by: PODIATRIST

## 2022-11-01 PROCEDURE — 99999 PR PBB SHADOW E&M-EST. PATIENT-LVL III: CPT | Mod: PBBFAC,,, | Performed by: PODIATRIST

## 2022-11-01 PROCEDURE — 3079F DIAST BP 80-89 MM HG: CPT | Mod: CPTII,S$GLB,, | Performed by: PODIATRIST

## 2022-11-01 PROCEDURE — 3075F PR MOST RECENT SYSTOLIC BLOOD PRESS GE 130-139MM HG: ICD-10-PCS | Mod: CPTII,S$GLB,, | Performed by: PODIATRIST

## 2022-11-01 PROCEDURE — 3079F PR MOST RECENT DIASTOLIC BLOOD PRESSURE 80-89 MM HG: ICD-10-PCS | Mod: CPTII,S$GLB,, | Performed by: PODIATRIST

## 2022-11-01 PROCEDURE — 99213 PR OFFICE/OUTPT VISIT, EST, LEVL III, 20-29 MIN: ICD-10-PCS | Mod: S$GLB,,, | Performed by: PODIATRIST

## 2022-11-01 NOTE — PROGRESS NOTES
Subjective:      Patient ID: Isaiah Dorsey Jr. is a 62 y.o. male.    Chief Complaint:   Follow-up (Left foot /back of heel) and Diabetes Mellitus (Pcp-Antwan 9/30/22)    Isaiah is a 62 y.o. male who presents to the clinic upon referral from Dr. Jhoana arreola. provider found  for evaluation and treatment of diabetic feet. Isaiah has a past medical history of CHF (congestive heart failure), Coronary artery disease, Diabetes mellitus type 2, uncontrolled, without complications, Elevated platelet count (07/23/2015), H/O splenectomy (04/24/2015), HLD (hyperlipidemia) (01/22/2015), HTN (hypertension), benign (01/22/2015), Morbid obesity with BMI of 45.0-49.9, adult (06/11/2014), RODRIGUEZ (obstructive sleep apnea), PAF (paroxysmal atrial fibrillation), Polyp of transverse colon (09/05/2018), and Severe uncontrolled hypertension (06/11/2014).   Patient relates overall he is improving.    He got rid of all his other shoes and purchase some [employer] tennis shoes.  He is noticed an improvement.  He is able to walk without a lot of pain he is doing his own stretching  He is using heel lifts in some of his shoes however not the Darby    He has been doing cardiac rehab.    Last visit:  - discuss likely Achilles tendinitis  - recommend x-ray  - dispensed and discussed heel lifts; open back shoes when possible  - also dispensed Achilles tendon silicone brace patient can purchase online  - stretching exercises dispensed  - consider formal physical therapy      PCP: Nitin Cedeño DO    Date Last Seen by PCP: 9/30/22    Current shoe gear: Casual shoes    Hemoglobin A1C   Date Value Ref Range Status   08/25/2022 6.2 (H) 4.0 - 5.6 % Final     Comment:     ADA Screening Guidelines:  5.7-6.4%  Consistent with prediabetes  >or=6.5%  Consistent with diabetes    High levels of fetal hemoglobin interfere with the HbA1C  assay. Heterozygous hemoglobin variants (HbS, HgC, etc)do  not significantly interfere with this assay.   However, presence  of multiple variants may affect accuracy.     07/28/2022 7.2 (H) 4.0 - 5.6 % Final     Comment:     ADA Screening Guidelines:  5.7-6.4%  Consistent with prediabetes  >or=6.5%  Consistent with diabetes    High levels of fetal hemoglobin interfere with the HbA1C  assay. Heterozygous hemoglobin variants (HbS, HgC, etc)do  not significantly interfere with this assay.   However, presence of multiple variants may affect accuracy.     07/12/2022 7.6 (H) 4.0 - 5.6 % Final     Comment:     ADA Screening Guidelines:  5.7-6.4%  Consistent with prediabetes  >or=6.5%  Consistent with diabetes    High levels of fetal hemoglobin interfere with the HbA1C  assay. Heterozygous hemoglobin variants (HbS, HgC, etc)do  not significantly interfere with this assay.   However, presence of multiple variants may affect accuracy.            Past Medical History:   Diagnosis Date    CHF (congestive heart failure)     Coronary artery disease     Diabetes mellitus type 2, uncontrolled, without complications     6.7% Metf 1 g qd, eye 2015, [facility]-2015,     Elevated platelet count 07/23/2015    H/O splenectomy 04/24/2015    doesn't like pneumovax bc caused side effects    HLD (hyperlipidemia) 01/22/2015    goal LDL < 100, reluctant to take statin    HTN (hypertension), benign 01/22/2015    losartan 100    Morbid obesity with BMI of 45.0-49.9, adult 06/11/2014    RODRIGUEZ (obstructive sleep apnea)     PAF (paroxysmal atrial fibrillation)     Polyp of transverse colon 09/05/2018 2018, repeat 2023    Severe uncontrolled hypertension 06/11/2014     Past Surgical History:   Procedure Laterality Date    AORTOGRAPHY N/A 7/11/2022    Procedure: AORTOGRAM;  Surgeon: Bjorn Cheatham MD;  Location: [facility] CATH LAB;  Service: Cardiology;  Laterality: N/A;    CORONARY ARTERY BYPASS GRAFT (CABG) Left 7/18/2022    Procedure: CORONARY ARTERY BYPASS GRAFT (CABG);  Surgeon: Blu Rhodes MD;  Location: [facility] OR [address] [place] [address];  Service: Cardiovascular;  Laterality: Left;   CABG x 1 (LIMA to LAD)    ENDOSCOPIC HARVEST OF VEIN Left 7/18/2022    Procedure: HARVEST-VEIN-ENDOVASCULAR;  Surgeon: Blu Rhodes MD;  Location: [facility] OR [address] [place] [address];  Service: Cardiovascular;  Laterality: Left;  Vein [place] Start time: 0823am  Vein [place] Stop time: 0836am    Vein [place] Start time: 0848am  Vein [place] Stop time: 0904am    LEFT HEART CATHETERIZATION N/A 7/11/2022    Procedure: Left heart cath;  Surgeon: Bjorn Cheatham MD;  Location: [facility] CATH LAB;  Service: Cardiology;  Laterality: N/A;    splenecectomy       Current Outpatient Medications on File Prior to Visit   Medication Sig Dispense Refill    aspirin 325 MG tablet Take 1 tablet (325 mg total) by mouth once daily. 30 tablet 11    atorvastatin (LIPITOR) 80 MG tablet Take 1 tablet (80 mg total) by mouth once daily. 90 tablet 3    ketoconazole (NIZORAL) 2 % cream Apply topically 2 (two) times daily. Prn rash face 60 g 3    ketoconazole (NIZORAL) 2 % shampoo Apply topically once daily. Face and scalp soaks 120 mL 4    metFORMIN (GLUCOPHAGE) 1000 MG tablet Take 1 tablet (1,000 mg total) by mouth 2 (two) times daily with meals. 180 tablet 3    metoprolol succinate (TOPROL-XL) 100 MG 24 hr tablet Take 1 tablet (100 mg total) by mouth once daily. 90 tablet 3    rivaroxaban (XARELTO) 20 mg Tab Take 1 tablet (20 mg total) by mouth daily with dinner or evening meal. 90 tablet 3    SITagliptin (JANUVIA) 100 MG Tab Take 1 tablet (100 mg total) by mouth once daily. 90 tablet 3     No current facility-administered medications on file prior to visit.     Review of patient's allergies indicates:   Allergen Reactions    Penicillins      Other reaction(s): Itching  Other reaction(s): Hives    Wasp venom Edema       Review of Systems   Constitutional: Negative for chills, decreased appetite, fever, malaise/fatigue, night sweats, weight gain and weight loss.   HENT:  Positive for hearing loss (Hard of hearing).    Cardiovascular:  Negative for chest  pain, claudication, dyspnea on exertion, leg swelling, palpitations and syncope.   Respiratory:  Negative for cough and shortness of breath.    Endocrine: Negative for cold intolerance and heat intolerance.   Hematologic/Lymphatic: Negative for bleeding problem. Bruises/bleeds easily.   Skin:  Positive for nail changes. Negative for color change, dry skin, flushing, itching, poor wound healing, rash, skin cancer, suspicious lesions and unusual hair distribution.   Musculoskeletal:  Negative for arthritis, back pain, falls, gout, joint pain, joint swelling, muscle cramps, muscle weakness, myalgias, neck pain and stiffness.        Achilles tendon pain = improving   Gastrointestinal:  Negative for diarrhea, nausea and vomiting.   Neurological:  Negative for dizziness, focal weakness, light-headedness, numbness, paresthesias, tremors, vertigo and weakness.   Psychiatric/Behavioral:  Negative for altered mental status and depression. The patient does not have insomnia.    Allergic/Immunologic: Negative.          Objective:       Vitals:    11/01/22 0737   BP: 138/85   Pulse: (!) 57   Weight: 129.8 kg (286 lb 2.5 oz)   Height: 6' (1.829 m)   PainSc: 0-No pain   129.8 kg (286 lb 2.5 oz)     Physical Exam  Vitals reviewed.   Constitutional:       General: He is not in acute distress.     Appearance: He is well-developed. He is not ill-appearing, toxic-appearing or diaphoretic.      Comments: Proper supportive shoegear      Cardiovascular:      Pulses:           Dorsalis pedis pulses are 2+ on the right side and 2+ on the left side.        Posterior tibial pulses are 2+ on the right side and 2+ on the left side.   Musculoskeletal:         General: No swelling, tenderness or deformity.      Right lower leg: No edema.      Left lower leg: No edema.      Right ankle:      Right Achilles Tendon: No tenderness or defects.      Left ankle:      Left Achilles Tendon: Tenderness (Plantar distal aspect) present. No defects.       Right foot: Decreased range of motion. No deformity, tenderness or bony tenderness.      Left foot: Decreased range of motion. No deformity, tenderness or bony tenderness.      Comments: No pain to calcaneus or distal foot no pain to proximal calf    Flexible pes planus foot type w/ medial arch collapse and mild gastroc equinus      Strength 5/5 bilateral    No pain with range of motion against resistance   Feet:      Right foot:      Protective Sensation: 10 sites tested.  10 sites sensed.      Skin integrity: No ulcer, blister, skin breakdown, erythema, warmth, callus or dry skin.      Toenail Condition: Right toenails are abnormally thick.      Left foot:      Protective Sensation: 10 sites tested.  10 sites sensed.      Skin integrity: No ulcer, blister, skin breakdown, erythema, warmth, callus or dry skin.      Toenail Condition: Left toenails are abnormally thick.      Comments: [place] intact  Skin:     General: Skin is warm.      Capillary Refill: Capillary refill takes 2 to 3 seconds.      Coloration: Skin is not pale.      Findings: No erythema or rash.      Nails: There is no clubbing.   Neurological:      Mental Status: He is alert and oriented to person, place, and time.      Sensory: No sensory deficit.      Gait: Gait is intact.   Psychiatric:         Attention and Perception: Attention normal.         Mood and Affect: Mood normal.         Speech: Speech normal.         Behavior: Behavior normal.         Thought Content: Thought content normal.         Cognition and Memory: Cognition normal.         Judgment: Judgment normal.     X-Ray Foot Complete Left  Narrative: EXAMINATION:  XR FOOT COMPLETE 3 VIEW LEFT    CLINICAL HISTORY:  achilles heel pain,; Achilles tendinitis, unspecified leg    TECHNIQUE:  XR FOOT COMPLETE 3 VIEW LEFT    COMPARISON:  None    FINDINGS:  Joint spaces are preserved.  Flexion deformity of the D IP joints of the 2nd and 3rd digit and early crossed toe deformity of the  3rd and 4th digits noted.  Prominent plantar calcaneal spur and enthesopathic calcification of the insertion of the Achilles tendon.  No fracture.  Impression: As above    Electronically signed by: Natalio Bautista Jr  Date:    09/20/2022  Time:    09:23         Assessment:       Encounter Diagnoses   Name Primary?    Type 2 diabetes mellitus with hyperglycemia, without long-term current use of insulin Yes    Achilles tendinitis of left lower extremity            Plan:       Isaiah was seen today for follow-up and diabetes mellitus.    Diagnoses and all orders for this visit:    Type 2 diabetes mellitus with hyperglycemia, without long-term current use of insulin  -     Ambulatory referral/consult to Physical/Occupational Therapy; Future    Achilles tendinitis of left lower extremity  -     Ambulatory referral/consult to Physical/Occupational Therapy; Future      I counseled the patient on his conditions, their implications and medical management.        - Shoe inspection. Diabetic Foot Education. Patient reminded of the importance of good nutrition and blood sugar control to help prevent podiatric complications of diabetes. Patient instructed on proper foot hygeine. We discussed wearing proper shoe gear, daily foot inspections, never walking without protective shoe gear, never putting sharp instruments to feet, routine podiatric nail visits every 2-3 months.      - diabetic foot care discussed    - patient has improved    - applied heel lifts to the new pair of Darby today    - recommend formal physical therapy    -if pain does not resolve consider MRI or surgical intervention overall patient has significantly improved    Patient can message if symptoms do not resolve if not follow-up in 1 year for diabetic foot exam      Follow up if symptoms worsen or fail to improve.

## 2022-11-01 NOTE — TELEPHONE ENCOUNTER
Patient has an order for a colonoscopy.  Is it ok to hold Xarelto 2 days prior to procedure?  Please advise.  Thank you.    Frieda

## 2022-11-04 ENCOUNTER — TELEPHONE (OUTPATIENT)
Dept: ENDOSCOPY | Facility: HOSPITAL | Age: 62
End: 2022-11-04
Payer: COMMERCIAL

## 2022-11-04 NOTE — TELEPHONE ENCOUNTER
Patient called to get scheduled for his procedure   Pt states Frieda told him to call to get scheduled sooner   Pt has a pat appointment on 12/9  Pt states he would like a call back to schedule procedure and was informed that he can hold xarelto  Pt would like a call back

## 2022-11-07 ENCOUNTER — CLINICAL SUPPORT (OUTPATIENT)
Dept: CARDIAC REHAB | Facility: CLINIC | Age: 62
End: 2022-11-07
Payer: COMMERCIAL

## 2022-11-07 DIAGNOSIS — I25.10 CORONARY ARTERY DISEASE, UNSPECIFIED VESSEL OR LESION TYPE, UNSPECIFIED WHETHER ANGINA PRESENT, UNSPECIFIED WHETHER NATIVE OR TRANSPLANTED HEART: ICD-10-CM

## 2022-11-07 DIAGNOSIS — Z95.1 POSTSURGICAL AORTOCORONARY BYPASS STATUS: Primary | ICD-10-CM

## 2022-11-07 DIAGNOSIS — Z12.11 SPECIAL SCREENING FOR MALIGNANT NEOPLASMS, COLON: Primary | ICD-10-CM

## 2022-11-07 PROCEDURE — 93798 PR CARDIAC REHAB/MONITOR: ICD-10-PCS | Mod: S$GLB,,, | Performed by: INTERNAL MEDICINE

## 2022-11-07 PROCEDURE — 93798 PHYS/QHP OP CAR RHAB W/ECG: CPT | Mod: S$GLB,,, | Performed by: INTERNAL MEDICINE

## 2022-11-07 RX ORDER — SOD SULF/POT CHLORIDE/MAG SULF 1.479 G
12 TABLET ORAL DAILY
Qty: 24 TABLET | Refills: 0 | Status: ON HOLD | OUTPATIENT
Start: 2022-11-07 | End: 2022-11-22 | Stop reason: HOSPADM

## 2022-11-07 NOTE — TELEPHONE ENCOUNTER
November 1, 2022       GT    1:18 PM  Frieda Hudson RN routed this conversation to [facility] Scheduling Anticoag    Gage Tobias MD  to Frieda Hudson RN      12:59 PM   He may hold xarelto for 48 hrs to be resumed ASAP. Pat        GT    12:14 PM  Frieda Hudson RN routed this conversation to MD Frieda Gandhi RN     GT    12:14 PM  Note  Patient has an order for a colonoscopy.  Is it ok to hold Xarelto 2 days prior to procedure?  Please advise.  Thank you.     Frieda

## 2022-11-07 NOTE — PROGRESS NOTES
Session: 12 Session Follow Up   Cardiac Rehab Individual Treatment Plan - Reassessment      Patient Name: Isaiah Dorsey Jr. MRN: [id number]   : 1960   Age: 62 y.o.   Primary Diagnosis: S/P CABG      Psychosocial Assessment:   Living Arrangements: Lives with spouse  Family Support: children, grandchildren, and spouse  Self Reported:  self reports  Effective Coping Skills  Displays: happiness and calmness  Medication: not applicable    Psychosocial Plan:   Goals:  Improved psychosocial coping strategies: In Progress  Reduce manifestation of stress: In Progress  Maintain positive support system: In Progress  Maintain positive outlook: In Progress  Improve overall quality of life: In Progress    Comments on Goal Progression:  Patient feels he is making progress toward his goals.    Interventions:  Patient to Self Report Emotional Changes at Session Check In  Recommend Physical Activity  Recommend Attending Education Lectures  Notify MD: No  Program Referral: No  Pharmaceutical Intervention/Therapy: No  Other Needs: not applicable  Stage of Readiness to Change: Action    Education:  Stress; verbalizes understanding; Date: 9/15/22  Risk Factors; verbalizes understanding; Date: 9/15/22    Comments:  Patient reports his stress level is always high but feels he manages it well. Discussed effects of stress on cardiac health pt verbalized understanding. Patient has been instructed to notify staff in the event that circumstances worsen.  Patient verbalizes understanding.    Other Core Components/Risk Factors Assessment:   RISK FACTORS:  diabetes, hyperlipidemia, hypertension, obesity, positive family history, sedentary lifestyle, stress    Learning Barriers: None    Medication Compliance: has been compliant with the medicaiton regimen    Other Core Components/Risk Factors Plan:   Goals:  Decrease cholesterol level: In Progress  Increase exercise tolerance: In Progress  Increase knowledge of CAD: In Progress  Decrease  blood pressure: In Progress  Weight loss: In Progress  Demonstrate accurate pulse taking: In Progress  Identify and manage personal areas of stress: In Progress  Control diabetes by adjusting diet and exercise: In Progress  Learn more about healthy eating: In Progress    Comments on Goal Progression:  Patient feels he is making progress.    Interventions:  Individual Education/ Counseling: Yes  Physician Referral: No    Education:    physical activity, verbalizes understanding; Date: 10/3/22  risk factors, verbalizes understanding; Date: 9/15/22  sodium, verbalizes understanding; Date: 10/19/22  stress, verbalizes understanding; Date: 9/15/22         Education method adapted to patients education level and preferred method of learning.  Method: explanation  handouts    Comments:  Patient is motivated about making lifestyle changes to improve cardiac health.    Other Core Components/Hypertension Assessment:   BP Range: 100-142/66-94  BP at Goal: No some isolated high readings   Patient reported symptoms: none    Other Core Components/Hypertension Plan:   Goals:  Blood Pressure <130/80    Comments on Goal Progression:  Patient feels he is making progress toward goals.    Interventions:  Med Card Reconciled: Yes  Encourage medication compliance  Encourage sodium reduction  Encourage weight loss  Recommend physical activity  Educate on contributory factors  Reduce stress, anxiety, anger, depression, and/or chronic pain  Encourage home blood pressure monitoring  Recommend daily weights    Education:    Coronary Artery Disease; verbalizes understanding; Date: 10/14/22  Risk Factors; verbalizes understanding; Date: 9/15/22  Stress; verbalizes understanding; Date: 9/15/22         Comments:  Patient not currently taking his blood pressure at home. Discussed importance of taking his blood pressure at home and keeping a log for his appts patient verbalized understanding.    Does the patient have Heart Failure? Yes   Other  Core Components/Congestive Heart Failure Assessment:   Ejection Fraction: 40-45 %  Patient reported symptoms: None  Lung Sounds: normal air entry, lungs clear to auscultation  Right Leg Edema: none  Left Leg Edema none    Other Core Components/Congestive Heart Failure Plan:   Goals:  Patient free from CHF Exacerbation    Comments on Goal Progression:  Patient has been free from exacerbation of CHF.    Interventions/Recommendations:  Encourage medication compliance  Encourage daily weight checks  Promote sodium reduction  Encourage tracking fluid intake  Promote physical activity  Educate on contributory factors  Recommend home blood pressure monitoring    Education:    Congestive Heart Failure; verbalizes understanding; Date: 10/21/22  Risk Factors; verbalizes understanding; Date: 9/15/22  Sodium; verbalizes understanding; Date: 10/19/22         Comments:  Patient focusing on portion control, increased fruits and vegetables, exercise to improve his cardiac health.      Other Core Components/Tobacco Cessation Assessment:   Smoking Status: past smoker who quit many years ago  Smoking Cessation Barriers:  NA  Stage of Readiness to Change: Maintenance    Other Core Components/Tobacco Cessation Plan:   Goals:  Maintain non-smoking status    Comments on Goal Progression:  Maintain nonsmoker status    Interventions:  Maintains non-smoking status    Education:    Coronary Artery Disease; verbalizes understanding; Date: 10/14/22  Risk Factors; verbalizes understanding; Date: 9/15/22         Comments:  Patient is motivated to make lifestyle changes to improve cardiac health. He attends regularly and participates in lectures.    Ranjit Osuna RN  Cardiac Rehab Nurse

## 2022-11-08 ENCOUNTER — CLINICAL SUPPORT (OUTPATIENT)
Dept: REHABILITATION | Facility: HOSPITAL | Age: 62
End: 2022-11-08
Attending: PODIATRIST
Payer: COMMERCIAL

## 2022-11-08 DIAGNOSIS — M76.62 ACHILLES TENDINITIS OF LEFT LOWER EXTREMITY: ICD-10-CM

## 2022-11-08 DIAGNOSIS — M76.62 TENDONITIS, ACHILLES, LEFT: ICD-10-CM

## 2022-11-08 DIAGNOSIS — E11.65 TYPE 2 DIABETES MELLITUS WITH HYPERGLYCEMIA, WITHOUT LONG-TERM CURRENT USE OF INSULIN: ICD-10-CM

## 2022-11-08 PROCEDURE — 97161 PT EVAL LOW COMPLEX 20 MIN: CPT | Mod: PO

## 2022-11-08 PROCEDURE — 97110 THERAPEUTIC EXERCISES: CPT | Mod: PO

## 2022-11-08 NOTE — PROGRESS NOTES
[facility]   Physical Therapy Initial Evaluation     Date: 11/8/2022   Name: Isaiah Dorsey Jr.  Clinic Number: [id number]    Therapy Diagnosis:   Encounter Diagnoses   Name Primary?    Type 2 diabetes mellitus with hyperglycemia, without long-term current use of insulin     Achilles tendinitis of left lower extremity     Tendonitis, Achilles, left      Physician: Cornelia Odom DPM    Physician Orders: PT Eval and Treat   Medical Diagnosis from Referral:   E11.65 (ICD-10-CM) - Type 2 diabetes mellitus with hyperglycemia, without long-term current use of insulin   M76.62 (ICD-10-CM) - Achilles tendinitis of left lower extremity     Evaluation Date: 11/8/2022  Authorization Period Expiration: 11/1/2023  Plan of Care Expiration: 1/8/2023  Progress Note Due: 12/8/2022  Visit # / Visits authorized: 1/ 1   FOTO: 1/3    Precautions: Standard, Diabetes, and CHF     Time In: 9:37  Time Out: 10:15  Total Appointment Time (timed & untimed codes): 40 minutes      SUBJECTIVE     Date of onset: Pt reported that he has had L calf discomfort for 2 years.        History of current condition - Isaiah reports: He recently began Cardiac rehabilitation where he walks on a TM for 20 - 30 min.  This has caused increased his L achilles pain. He recently discontinued wearing dress shoes and is wearing tennis shoes helping to decrease his achilles pain.  He has been performing standing calf stretching on a step with some pain in the L achilles    Falls: falls    Imaging, X-Rays:     Prior Therapy: no  Social History: Pt lives in two story house with 15 steps to his office. lives with their spouse  Occupation: desk work  Prior Level of Function: pt was not feeling pain with walking   Current Level of Function: pt has changed shoes to tennis shoes with a lift in it    Pain:  Current 0/10, worst 5/10, best 0/10   Location: left lateral heel    Description: Sharp  Aggravating Factors: Walking  (worse with stress  rest)  Easing Factors:  avoid painful activities    Patients goals: get rid of the pain with walking     Medical History:   Past Medical History:   Diagnosis Date    CHF (congestive heart failure)     Coronary artery disease     Diabetes mellitus type 2, uncontrolled, without complications     6.7% Metf 1 g qd, eye 2015, [facility],     Elevated platelet count 07/23/2015    H/O splenectomy 04/24/2015    doesn't like pneumovax bc caused side effects    HLD (hyperlipidemia) 01/22/2015    goal LDL < 100, reluctant to take statin    HTN (hypertension), benign 01/22/2015    losartan 100    Morbid obesity with BMI of 45.0-49.9, adult 06/11/2014    RODRIGUEZ (obstructive sleep apnea)     PAF (paroxysmal atrial fibrillation)     Polyp of transverse colon 09/05/2018 2018, repeat 2023    Severe uncontrolled hypertension 06/11/2014       Surgical History:   Isaiah Dorsey   has a past surgical history that includes splenecectomy; Left heart catheterization (N/A, 7/11/2022); Aortography (N/A, 7/11/2022); Coronary Artery Bypass Graft (CABG) (Left, 7/18/2022); and Endoscopic harvest of vein (Left, 7/18/2022).    Medications:   Isaiah has a current medication list which includes the following prescription(s): aspirin, atorvastatin, ketoconazole, ketoconazole, metformin, metoprolol succinate, rivaroxaban, sitagliptin phosphate, and sutab.    Allergies:   Review of patient's allergies indicates:   Allergen Reactions    Penicillins      Other reaction(s): Itching  Other reaction(s): Hives    Wasp venom Edema          OBJECTIVE     Observation: Pt was able to enter the clinic ambulating independently without an assistive device.     Posture: L side of pelvis is elevated.      Range of Motion:   Ankle Left active Left Passive Right Active R passive   Dorsiflexion 8 nt 8 nt   Plantar flexion 40 nt 52 nt   Inversion  Eversion        Lower Extremity Strength  Right LE  Left LE    Knee extension: 5/5 Knee extension: 4+/5   Knee flexion:  5/5 Knee flexion: 4+/5   Hip flexion: nt Hip flexion: nt   Hip extension:  nt Hip extension: nt   Hip abduction: 2+/5 Hip abduction: 3-/5   Hip adduction: 4/5 Hip adduction 4/5   Ankle dorsiflexion: 5/5 Ankle dorsiflexion: 4+/5   Ankle plantarflexion: 4/5 Ankle plantarflexion: 4/5*   Ankle inversion   Ankle eversion      Step down test: nt      Function:    - SLS R: nt  - SLS L: nt  - Squat: nt   - Sit <--> Stand:independent   - Bed Mobility: independent        Joint Mobility: WFL    Palpation: tender to palpation over the L achilles tendon.    Sensation: intact    Flexibility:    Ely's test: R = nt degrees ; L = nt degrees   Popliteal Angle: R = nt degrees ; L = nt degrees   Devonte's test: R = nt ; L = nt   Dennis test: R = nt ; L = nt   Gastrocnemius: R = +5, L = -2           Limitation/Restriction for FOTO  Survey    Therapist reviewed FOTO scores for Isaiah ROMERO Sunita Wells on 11/8/2022.   FOTO documents entered into EPIC - see Media section.    Limitation Score: %         TREATMENT     Total Treatment time (time-based codes) separate from Evaluation: 15 minutes      Isaiah received the treatments listed below:  therapeutic exercises to develop strength and flexibility for 10 minutes including:  Gastrocnemius towel stretch 3 x 30 seconds  Soleus towel stretch 3 x 30 seconds  Active L ankle plantar flexion 20 x 3 sets with a blue TB.    manual therapy techniques: Soft tissue Mobilization were applied to the: L calf for 5 minutes, including:  Soft tissue mobilization L calf.      PATIENT EDUCATION AND HOME EXERCISES     Education provided:   - yes    Written Home Exercises Provided: yes. Exercises were reviewed and Isaiah was able to demonstrate them prior to the end of the session.  Isaiah demonstrated good  understanding of the education provided. See EMR under Patient Instructions for exercises provided during therapy sessions.    ASSESSMENT     Isaiah is a 62 y.o. male referred to outpatient Physical Therapy with a  medical diagnosis of   E11.65 (ICD-10-CM) - Type 2 diabetes mellitus with hyperglycemia, without long-term current use of insulin   M76.62 (ICD-10-CM) - Achilles tendinitis of left lower extremity   . Patient presents with a painful L achilles to palpation and tight gastrocnemius as compared to the R.    Patient prognosis is Good.   Patient will benefit from skilled outpatient Physical Therapy to address the deficits stated above and in the chart below, provide patient /family education, and to maximize patientt's level of independence.     Plan of care discussed with patient: Yes  Patient's spiritual, cultural and educational needs considered and patient is agreeable to the plan of care and goals as stated below:     Anticipated Barriers for therapy: scheduling    Medical Necessity is demonstrated by the following  History  Co-morbidities and personal factors that may impact the plan of care Co-morbidities:   See past medical history    Personal Factors:   coping style     low   Examination  Body Structures and Functions, activity limitations and participation restrictions that may impact the plan of care Body Regions:   lower extremities    Body Systems:    ROM  strength  flexibility    Participation Restrictions:   none    Activity limitations:   Learning and applying knowledge  no deficits    General Tasks and Commands  no deficits    Communication  no deficits    Mobility  lifting and carrying objects    Self care  no deficits    Domestic Life  no deficits    Interactions/Relationships  no deficits    Life Areas  no deficits    Community and Social Life  recreation and leisure         low   Clinical Presentation stable and uncomplicated low   Decision Making/ Complexity Score: low     Goals:  Short Term Goals: 3 weeks   Pt will be instructed in an exercise program to address functional deficits related to his L LE  Improve L gastrocnemius flexibility without increasing pain in Pt's L achilles tendon  Pt to  alter his L LE exercise routine to avoid increasing pian in his L achilles tendon    Long Term Goals: 6 weeks   Pt will be independent in a HEP to assist in managing their L achilles Sx.   Improve L gastrocnemius flexibility to +5 degrees pain free  Improve L calf strength to 4+/5 pain free.  Pt will report that he is able to perform his ADL's without L achilles pian.    PLAN   Plan of care Certification: 11/8/2022 to 1/8/2023.    Outpatient Physical Therapy 1 times weekly for 8 weeks to include the following interventions: Electrical Stimulation as indicated, Gait Training, Manual Therapy, Moist Heat/ Ice, Neuromuscular Re-ed, Patient Education, Self Care, Therapeutic Activities, Therapeutic Exercise, and dry needling .     Korey Warren, PT      I CERTIFY THE NEED FOR THESE SERVICES FURNISHED UNDER THIS PLAN OF TREATMENT AND WHILE UNDER MY CARE   Physician's comments:     Physician's Signature: ___________________________________________________

## 2022-11-09 ENCOUNTER — CLINICAL SUPPORT (OUTPATIENT)
Dept: CARDIAC REHAB | Facility: CLINIC | Age: 62
End: 2022-11-09
Payer: COMMERCIAL

## 2022-11-09 DIAGNOSIS — I25.10 ATHEROSCLEROSIS OF NATIVE CORONARY ARTERY OF NATIVE HEART WITHOUT ANGINA PECTORIS: ICD-10-CM

## 2022-11-09 DIAGNOSIS — Z95.1 POSTSURGICAL AORTOCORONARY BYPASS STATUS: Primary | ICD-10-CM

## 2022-11-09 PROCEDURE — 93798 PR CARDIAC REHAB/MONITOR: ICD-10-PCS | Mod: S$GLB,,, | Performed by: INTERNAL MEDICINE

## 2022-11-09 PROCEDURE — 93798 PHYS/QHP OP CAR RHAB W/ECG: CPT | Mod: S$GLB,,, | Performed by: INTERNAL MEDICINE

## 2022-11-11 ENCOUNTER — CLINICAL SUPPORT (OUTPATIENT)
Dept: CARDIAC REHAB | Facility: CLINIC | Age: 62
End: 2022-11-11
Payer: COMMERCIAL

## 2022-11-11 DIAGNOSIS — Z95.1 POSTSURGICAL AORTOCORONARY BYPASS STATUS: Primary | ICD-10-CM

## 2022-11-11 DIAGNOSIS — I25.10 ATHEROSCLEROSIS OF NATIVE CORONARY ARTERY OF NATIVE HEART, UNSPECIFIED WHETHER ANGINA PRESENT: ICD-10-CM

## 2022-11-11 PROCEDURE — 93798 PR CARDIAC REHAB/MONITOR: ICD-10-PCS | Mod: S$GLB,,,

## 2022-11-11 PROCEDURE — 93798 PHYS/QHP OP CAR RHAB W/ECG: CPT | Mod: S$GLB,,,

## 2022-11-14 ENCOUNTER — TELEPHONE (OUTPATIENT)
Dept: CARDIAC REHAB | Facility: CLINIC | Age: 62
End: 2022-11-14

## 2022-11-14 ENCOUNTER — CLINICAL SUPPORT (OUTPATIENT)
Dept: CARDIAC REHAB | Facility: CLINIC | Age: 62
End: 2022-11-14
Payer: COMMERCIAL

## 2022-11-14 DIAGNOSIS — I25.10 CORONARY ARTERY DISEASE, UNSPECIFIED VESSEL OR LESION TYPE, UNSPECIFIED WHETHER ANGINA PRESENT, UNSPECIFIED WHETHER NATIVE OR TRANSPLANTED HEART: ICD-10-CM

## 2022-11-14 DIAGNOSIS — Z95.1 POSTSURGICAL AORTOCORONARY BYPASS STATUS: Primary | ICD-10-CM

## 2022-11-14 PROCEDURE — 93798 PHYS/QHP OP CAR RHAB W/ECG: CPT | Mod: S$GLB,,, | Performed by: INTERNAL MEDICINE

## 2022-11-14 PROCEDURE — 93798 PR CARDIAC REHAB/MONITOR: ICD-10-PCS | Mod: S$GLB,,, | Performed by: INTERNAL MEDICINE

## 2022-11-14 NOTE — TELEPHONE ENCOUNTER
"Dr. Tobias,   Patient arrived for cardiac rehab session. Pt reports to have eaten prior to exercise session.  The patient was on continuous EKG monitoring throughout the session. The patient tolerated exercise session well with no complaints. He had heart rate of 90s on arrival, and elevations to 130s with exericse reports he felt the higher heart rates and his exercise workloads were decreased as a result of the increase HR. Post exercise he complained of "feeling funny", dizziness, /72 HR 72 Sat 95% with pallor. Patient put in supine with legs elevated with improvement of BP to 110/80 and improved symptoms. Pt reports dietary inconsistencies with Popeyes, chips, and hot tamales yesterday. Patient was hydrated with water which helped improve his BP. Patient reports he has taken all his medications when prescribed, no dose changes.   "

## 2022-11-14 NOTE — PROGRESS NOTES
"Patient arrived for cardiac rehab session. Pt reports to have eaten prior to exercise session.  The patient was on continuous EKG monitoring throughout the session. The patient tolerated exercise session well with no complaints. He had heart rate of 90s on arrival, and elevations to 130s with exericse reports he felt the higher heart rates and his exercise workloads were decreased as a result of the increase HR. Post exercise he complained of "feeling funny", dizziness, /72 HR 72 Sat 95% with pallor. Patient put in supine with legs elevated with improvement of BP to 110/80 and improved symptoms. Pt reports dietary inconsistencies with Popeyes, chips, and hot tamales yesterday. Patient was hydrated with water which helped improve his BP. Patient reports he has taken all his medications when prescribed, no dose changes. Dr. Tobias notified.     "

## 2022-11-16 ENCOUNTER — CLINICAL SUPPORT (OUTPATIENT)
Dept: CARDIAC REHAB | Facility: CLINIC | Age: 62
End: 2022-11-16
Payer: COMMERCIAL

## 2022-11-16 DIAGNOSIS — Z95.1 POSTSURGICAL AORTOCORONARY BYPASS STATUS: Primary | ICD-10-CM

## 2022-11-16 DIAGNOSIS — I25.10 ATHEROSCLEROSIS OF NATIVE CORONARY ARTERY OF NATIVE HEART, UNSPECIFIED WHETHER ANGINA PRESENT: ICD-10-CM

## 2022-11-16 PROCEDURE — 93798 PR CARDIAC REHAB/MONITOR: ICD-10-PCS | Mod: S$GLB,,,

## 2022-11-16 PROCEDURE — 93798 PHYS/QHP OP CAR RHAB W/ECG: CPT | Mod: S$GLB,,,

## 2022-11-17 ENCOUNTER — DOCUMENTATION ONLY (OUTPATIENT)
Dept: CARDIAC REHAB | Facility: CLINIC | Age: 62
End: 2022-11-17
Payer: COMMERCIAL

## 2022-11-17 NOTE — PROGRESS NOTES
Mr. Mason has completed 20 out of 36 exercise session of Phase II cardiac rehab.  A follow up reassessment will be completed at 24 sessions.     12 Session Follow Up   Cardiac Rehab Individual Treatment Plan - Reassessment      Patient Name: Isaiah Dorsey Jr. MRN: [id number]   : 1960   Age: 62 y.o.   Primary Diagnosis: CABG Date of Event: 22   EF: 40-45%  Risk Stratification: high  Referring Physician: DOMINIQUE   Exercise Assessment:     Angina with exercise?: No   ST Depression with Exercise?: No  Fall Risk: No   Assistive Devices:  independent  There were no limitations noted by the patient.  Comments on Progression: Mr. Mason is progressing fair and his workloads will continue to be increased as he tolerates exercise intensity.     Exercise Plan:   Goals:  CR Exercise Goals: Attend Cardiac Rehab 3 times/week: In Progress  Home Aerobic Exercise: 2 additional days/week for 30-60 minutes: In Progress  Intensity of 12-15 on the Rate of Perceived Exertion (RPE) scale: In Progress  30% increase in entry estimated METS: 7.8 : In Progress  5 days/week for 30-60 minutes: In Progress    Comments on Goal Progression:  Patient Consistency: consistent with attendance  Home exercise? No   Patient reports intensity rate 11-14 on RPE scale    Intervention/Recommendations:   Discussed importance of regular attendance to cardiac rehab class    Exercise Prescription:  THR Range 63-74   Mode: Treadmill  Nustep   Frequency:  3 days/week   Duration:  30-60 minutes   Intensity:  12-15 RPE   Resistance Training:  Yes: 5 to 7 lb weights with 10-15 reps based on strength and range of motion and adjusted accordingly     Home Prescription:  Mode Aerobic exercise   Frequency: 2- 3 days/week   Duration: 30-60 minutes   Resistance Training: None     Education:  Muscular Anatomy; verbalizes understanding; Date: 22  Resistance Training; verbalizes understanding; Date: 10-10-22  Resistance Training II; verbalizes understanding; Date:  10-17-22  The Exercise Program; verbalizes understanding; Date: 10-3-22    Comments:  I reviewed exercise recommendations with Mr. Mason.  I encouraged him to begin exercising.  He stated he has a stationary bike at home and I advised him to start using it.  We discussed keeping to an exercise schedule as well.   He stated understanding.      The exercise prescription will continue to be adjusted based on tolerance of exercise intensity by patient.    Jorje Ceballos, [facility] [place]     12 Session Follow Up   Cardiac Rehab Individual Treatment Plan - Reassessment    Patient Name: Isaiah Dorsey Jr. MRN: [id number]   : 1960   Age: 62 y.o.   Primary Diagnosis: s/p CABG, CAD, CHF, HLD, NSTEMI, AFIB, DM    Nutrition Assessment:     Anthropometrics    Height 72 inches   Weight 282 lbs   BMI 38.2     Patient confirms he is taking lipitor 40mg for cholesterol control.  Difficulty Chewing or Swallowing: no  Current Exercise: See Exercise Physiologist Note  Food Safety/Food Preparation: spouse  Living Arrangements/Family Support: Lives with spouse  Cultural/Spiritual/Personal Preferences: not applicable   Barriers to Education: none identified  Stage of Change Related to Diet Habits: Action  Recent Changes to Diet: No  Food Diary: Given      Nutrition Plan:   Goals:  LDL-C < 70 (for high risk patients)  Hgb A1c < 7%  BMI < 25 and abdominal girth < 40M/<35 F  2 gram sodium, Mediterranean diet: In Progress  Rehab weight loss goal of 10 lbs, 1 lb per week: In Progress  Fish intake (non-fried varieties) to a goal of 2-3 servings per week: In Progress  Increase fruit and vegetable intake: In Progress    Comments on Goal Progression:  Emphasized importance of improving hydration and produce intake with meals    Interventions:  Dietitian Consult: No  Patient to participate in Cardiac Rehab sessions three times a week  Weekly Dietitian Weight Check  Nutrition Recommendations Provided: Verbal, Reviewed  Follow Up Plan for Ongoing  Self-Management Support    Education:  Seafood; verbalizes understanding; Date: 10/12/22  Sodium; verbalizes understanding; Date: 10/19/22  Pre/Probiotics; verbalizes understanding; Date: 9/28/22  Fiber; verbalizes understanding; Date: 10/5/22    Comments:   Discussed ways to incorporate healthy snacks, eating on a schedule, and monitoring sodium intake for heart health.    Diabetes  Is the patient diabetic? Yes   Other Core Components/Diabetes Assessment:   Labs:  Lab Results   Component Value Date    GLUF 136 (H) 08/25/2022     Lab Results   Component Value Date    HGBA1C 6.2 (H) 08/25/2022    HGBA1C 7.2 (H) 07/28/2022    HGBA1C 7.6 (H) 07/12/2022      Lab Results   Component Value Date    ESTIMATEDAVG 131 08/25/2022    ESTIMATEDAVG 160 (H) 07/28/2022    ESTIMATEDAVG 171 (H) 07/12/2022       History of diabetes since 2018  Diabetes medications: Ozempic, Janvia 100mg, metformin 1000mg BID  Blood Glucose Checks at Home: Yes: Other: daily  Typical range: 130-150 mg/dl  Endocrinologist or PCP following DM: Dr. Burrows    Other Core Components/Diabetes Plan:   Goals:  Hgb A1c < 7%  Exercise Blood Glucose: 100-300 mg/dl    Interventions:  Medication Compliance  Med Card Reconciled  Low Sodium, Mediterranean, ADA Diet  Weight Management  Physical Activity  Increase Knowledge of Contributory Factors  Home Monitoring    Comments:   Patient verbalizes understanding to bring home glucometer and check glucose pre and post each exercise session.  Per cardiac rehab protocols, patient's glucose must be between 90 and 270 mg/dL to exercise.  Patient denies any recent glucose levels less than 60 mg/dL or greater than 300 mg/dL. Patient verbalizes importance of notifying rehab staff if symptoms of hypoglycemia occur while at cardiac rehab.  Abnormal labs will be reported to patient's PCP/Endocrinologist by rehab staff.    Noted updated glucose parameters 100-300    Sarita Tirado MS, RDN/LDN     Session: 12 Session Follow Up    Cardiac Rehab Individual Treatment Plan - Reassessment      Patient Name: Isaiah Dorsey Jr. MRN: [id number]   : 1960   Age: 62 y.o.   Primary Diagnosis: S/P CABG      Psychosocial Assessment:   Living Arrangements: Lives with spouse  Family Support: children, grandchildren, and spouse  Self Reported:  self reports  Effective Coping Skills  Displays: happiness and calmness  Medication: not applicable    Psychosocial Plan:   Goals:  Improved psychosocial coping strategies: In Progress  Reduce manifestation of stress: In Progress  Maintain positive support system: In Progress  Maintain positive outlook: In Progress  Improve overall quality of life: In Progress    Comments on Goal Progression:  Patient feels he is making progress toward his goals.    Interventions:  Patient to Self Report Emotional Changes at Session Check In  Recommend Physical Activity  Recommend Attending Education Lectures  Notify MD: No  Program Referral: No  Pharmaceutical Intervention/Therapy: No  Other Needs: not applicable  Stage of Readiness to Change: Action    Education:  Stress; verbalizes understanding; Date: 9/15/22  Risk Factors; verbalizes understanding; Date: 9/15/22    Comments:  Patient reports his stress level is always high but feels he manages it well. Discussed effects of stress on cardiac health pt verbalized understanding. Patient has been instructed to notify staff in the event that circumstances worsen.  Patient verbalizes understanding.    Other Core Components/Risk Factors Assessment:   RISK FACTORS:  diabetes, hyperlipidemia, hypertension, obesity, positive family history, sedentary lifestyle, stress    Learning Barriers: None    Medication Compliance: has been compliant with the medicaiton regimen    Other Core Components/Risk Factors Plan:   Goals:  Decrease cholesterol level: In Progress  Increase exercise tolerance: In Progress  Increase knowledge of CAD: In Progress  Decrease blood pressure: In  Progress  Weight loss: In Progress  Demonstrate accurate pulse taking: In Progress  Identify and manage personal areas of stress: In Progress  Control diabetes by adjusting diet and exercise: In Progress  Learn more about healthy eating: In Progress    Comments on Goal Progression:  Patient feels he is making progress.    Interventions:  Individual Education/ Counseling: Yes  Physician Referral: No    Education:    physical activity, verbalizes understanding; Date: 10/3/22  risk factors, verbalizes understanding; Date: 9/15/22  sodium, verbalizes understanding; Date: 10/19/22  stress, verbalizes understanding; Date: 9/15/22         Education method adapted to patients education level and preferred method of learning.  Method: explanation  handouts    Comments:  Patient is motivated about making lifestyle changes to improve cardiac health.    Other Core Components/Hypertension Assessment:   BP Range: 100-142/66-94  BP at Goal: No some isolated high readings   Patient reported symptoms: none    Other Core Components/Hypertension Plan:   Goals:  Blood Pressure <130/80    Comments on Goal Progression:  Patient feels he is making progress toward goals.    Interventions:  Med Card Reconciled: Yes  Encourage medication compliance  Encourage sodium reduction  Encourage weight loss  Recommend physical activity  Educate on contributory factors  Reduce stress, anxiety, anger, depression, and/or chronic pain  Encourage home blood pressure monitoring  Recommend daily weights    Education:    Coronary Artery Disease; verbalizes understanding; Date: 10/14/22  Risk Factors; verbalizes understanding; Date: 9/15/22  Stress; verbalizes understanding; Date: 9/15/22         Comments:  Patient not currently taking his blood pressure at home. Discussed importance of taking his blood pressure at home and keeping a log for his appts patient verbalized understanding.    Does the patient have Heart Failure? Yes   Other Core  Components/Congestive Heart Failure Assessment:   Ejection Fraction: 40-45 %  Patient reported symptoms: None  Lung Sounds: normal air entry, lungs clear to auscultation  Right Leg Edema: none  Left Leg Edema none    Other Core Components/Congestive Heart Failure Plan:   Goals:  Patient free from CHF Exacerbation    Comments on Goal Progression:  Patient has been free from exacerbation of CHF.    Interventions/Recommendations:  Encourage medication compliance  Encourage daily weight checks  Promote sodium reduction  Encourage tracking fluid intake  Promote physical activity  Educate on contributory factors  Recommend home blood pressure monitoring    Education:    Congestive Heart Failure; verbalizes understanding; Date: 10/21/22  Risk Factors; verbalizes understanding; Date: 9/15/22  Sodium; verbalizes understanding; Date: 10/19/22         Comments:  Patient focusing on portion control, increased fruits and vegetables, exercise to improve his cardiac health.      Other Core Components/Tobacco Cessation Assessment:   Smoking Status: past smoker who quit many years ago  Smoking Cessation Barriers:  NA  Stage of Readiness to Change: Maintenance    Other Core Components/Tobacco Cessation Plan:   Goals:  Maintain non-smoking status    Comments on Goal Progression:  Maintain nonsmoker status    Interventions:  Maintains non-smoking status    Education:    Coronary Artery Disease; verbalizes understanding; Date: 10/14/22  Risk Factors; verbalizes understanding; Date: 9/15/22         Comments:  Patient is motivated to make lifestyle changes to improve cardiac health. He attends regularly and participates in lectures.    Ranjit Osuna RN  Cardiac Rehab Nurse

## 2022-11-18 PROBLEM — M76.62 TENDONITIS, ACHILLES, LEFT: Status: ACTIVE | Noted: 2022-11-18

## 2022-11-18 NOTE — PLAN OF CARE
[facility]   Physical Therapy Initial Evaluation     Date: 11/8/2022   Name: Isaiah Dorsey Jr.  Clinic Number: [id number]    Therapy Diagnosis:   Encounter Diagnoses   Name Primary?    Type 2 diabetes mellitus with hyperglycemia, without long-term current use of insulin     Achilles tendinitis of left lower extremity     Tendonitis, Achilles, left      Physician: Cornelia Odom DPM    Physician Orders: PT Eval and Treat   Medical Diagnosis from Referral:   E11.65 (ICD-10-CM) - Type 2 diabetes mellitus with hyperglycemia, without long-term current use of insulin   M76.62 (ICD-10-CM) - Achilles tendinitis of left lower extremity     Evaluation Date: 11/8/2022  Authorization Period Expiration: 11/1/2023  Plan of Care Expiration: 1/8/2023  Progress Note Due: 12/8/2022  Visit # / Visits authorized: 1/ 1   FOTO: 1/3    Precautions: Standard, Diabetes, and CHF     Time In: 9:37  Time Out: 10:15  Total Appointment Time (timed & untimed codes): 40 minutes      SUBJECTIVE     Date of onset: Pt reported that he has had L calf discomfort for 2 years.        History of current condition - Isaiah reports: He recently began Cardiac rehabilitation where he walks on a TM for 20 - 30 min.  This has caused increased his L achilles pain. He recently discontinued wearing dress shoes and is wearing tennis shoes helping to decrease his achilles pain.  He has been performing standing calf stretching on a step with some pain in the L achilles    Falls: falls    Imaging, X-Rays:     Prior Therapy: no  Social History: Pt lives in two story house with 15 steps to his office. lives with their spouse  Occupation: desk work  Prior Level of Function: pt was not feeling pain with walking   Current Level of Function: pt has changed shoes to tennis shoes with a lift in it    Pain:  Current 0/10, worst 5/10, best 0/10   Location: left lateral heel    Description: Sharp  Aggravating Factors: Walking  (worse with stress  rest)  Easing Factors: avoid painful activities    Patients goals: get rid of the pain with walking     Medical History:   Past Medical History:   Diagnosis Date    CHF (congestive heart failure)     Coronary artery disease     Diabetes mellitus type 2, uncontrolled, without complications     6.7% Metf 1 g qd, eye 2015, [facility]- [facility]-2015,     Elevated platelet count 07/23/2015    H/O splenectomy 04/24/2015    doesn't like pneumovax bc caused side effects    HLD (hyperlipidemia) 01/22/2015    goal LDL < 100, reluctant to take statin    HTN (hypertension), benign 01/22/2015    losartan 100    Morbid obesity with BMI of 45.0-49.9, adult 06/11/2014    RODRIGUEZ (obstructive sleep apnea)     PAF (paroxysmal atrial fibrillation)     Polyp of transverse colon 09/05/2018 2018, repeat 2023    Severe uncontrolled hypertension 06/11/2014       Surgical History:   Isaiah Dorsey   has a past surgical history that includes splenecectomy; Left heart catheterization (N/A, 7/11/2022); Aortography (N/A, 7/11/2022); Coronary Artery Bypass Graft (CABG) (Left, 7/18/2022); and Endoscopic harvest of vein (Left, 7/18/2022).    Medications:   Isaiah has a current medication list which includes the following prescription(s): aspirin, atorvastatin, ketoconazole, ketoconazole, metformin, metoprolol succinate, rivaroxaban, sitagliptin phosphate, and sutab.    Allergies:   Review of patient's allergies indicates:   Allergen Reactions    Penicillins      Other reaction(s): Itching  Other reaction(s): Hives    Wasp venom Edema          OBJECTIVE     Observation: Pt was able to enter the clinic ambulating independently without an assistive device.     Posture: L side of pelvis is elevated.      Range of Motion:   Ankle Left active Left Passive Right Active R passive   Dorsiflexion 8 nt 8 nt   Plantar flexion 40 nt 52 nt   Inversion  Eversion        Lower Extremity Strength  Right LE  Left LE    Knee extension: 5/5 Knee extension: 4+/5   Knee flexion: 5/5  Knee flexion: 4+/5   Hip flexion: nt Hip flexion: nt   Hip extension:  nt Hip extension: nt   Hip abduction: 2+/5 Hip abduction: 3-/5   Hip adduction: 4/5 Hip adduction 4/5   Ankle dorsiflexion: 5/5 Ankle dorsiflexion: 4+/5   Ankle plantarflexion: 4/5 Ankle plantarflexion: 4/5*   Ankle inversion   Ankle eversion      Step down test: nt      Function:    - SLS R: nt  - SLS L: nt  - Squat: nt   - Sit <--> Stand:independent   - Bed Mobility: independent        Joint Mobility: WFL    Palpation: tender to palpation over the L achilles tendon.    Sensation: intact    Flexibility:    Ely's test: R = nt degrees ; L = nt degrees   Popliteal Angle: R = nt degrees ; L = nt degrees   Devonte's test: R = nt ; L = nt   Dennis test: R = nt ; L = nt   Gastrocnemius: R = +5, L = -2           Limitation/Restriction for FOTO  Survey    Therapist reviewed FOTO scores for Isaiah HEATHER Dorsey Jr. on 11/8/2022.   FOTO documents entered into Float: [place] - see Media section.    Limitation Score: %         TREATMENT     Total Treatment time (time-based codes) separate from Evaluation: 15 minutes      Isaiah received the treatments listed below:  therapeutic exercises to develop strength and flexibility for 10 minutes including:  Gastrocnemius towel stretch 3 x 30 seconds  Soleus towel stretch 3 x 30 seconds  Active L ankle plantar flexion 20 x 3 sets with a blue TB.    manual therapy techniques: Soft tissue Mobilization were applied to the: L calf for 5 minutes, including:  Soft tissue mobilization L calf.      PATIENT EDUCATION AND HOME EXERCISES     Education provided:   - yes    Written Home Exercises Provided: yes. Exercises were reviewed and Isaiah was able to demonstrate them prior to the end of the session.  Isaiah demonstrated good  understanding of the education provided. See EMR under Patient Instructions for exercises provided during therapy sessions.    ASSESSMENT     Isaiah is a 62 y.o. male referred to outpatient Physical Therapy with a  medical diagnosis of   E11.65 (ICD-10-CM) - Type 2 diabetes mellitus with hyperglycemia, without long-term current use of insulin   M76.62 (ICD-10-CM) - Achilles tendinitis of left lower extremity   . Patient presents with a painful L achilles to palpation and tight gastrocnemius as compared to the R.    Patient prognosis is Good.   Patient will benefit from skilled outpatient Physical Therapy to address the deficits stated above and in the chart below, provide patient /family education, and to maximize patientt's level of independence.     Plan of care discussed with patient: Yes  Patient's spiritual, cultural and educational needs considered and patient is agreeable to the plan of care and goals as stated below:     Anticipated Barriers for therapy: scheduling    Medical Necessity is demonstrated by the following  History  Co-morbidities and personal factors that may impact the plan of care Co-morbidities:   See past medical history    Personal Factors:   coping style     low   Examination  Body Structures and Functions, activity limitations and participation restrictions that may impact the plan of care Body Regions:   lower extremities    Body Systems:    ROM  strength  flexibility    Participation Restrictions:   none    Activity limitations:   Learning and applying knowledge  no deficits    General Tasks and Commands  no deficits    Communication  no deficits    Mobility  lifting and carrying objects    Self care  no deficits    Domestic Life  no deficits    Interactions/Relationships  no deficits    Life Areas  no deficits    Community and Social Life  recreation and leisure         low   Clinical Presentation stable and uncomplicated low   Decision Making/ Complexity Score: low     Goals:  Short Term Goals: 3 weeks   Pt will be instructed in an exercise program to address functional deficits related to his L LE  Improve L gastrocnemius flexibility without increasing pain in Pt's L achilles tendon  Pt to  alter his L LE exercise routine to avoid increasing pian in his L achilles tendon    Long Term Goals: 6 weeks   Pt will be independent in a HEP to assist in managing their L achilles Sx.   Improve L gastrocnemius flexibility to +5 degrees pain free  Improve L calf strength to 4+/5 pain free.  Pt will report that he is able to perform his ADL's without L achilles pian.    PLAN   Plan of care Certification: 11/8/2022 to 1/8/2023.    Outpatient Physical Therapy 1 times weekly for 8 weeks to include the following interventions: Electrical Stimulation as indicated, Gait Training, Manual Therapy, Moist Heat/ Ice, Neuromuscular Re-ed, Patient Education, Self Care, Therapeutic Activities, Therapeutic Exercise, and dry needling.     Korey Warren, PT      I CERTIFY THE NEED FOR THESE SERVICES FURNISHED UNDER THIS PLAN OF TREATMENT AND WHILE UNDER MY CARE   Physician's comments:     Physician's Signature: ___________________________________________________

## 2022-11-21 ENCOUNTER — CLINICAL SUPPORT (OUTPATIENT)
Dept: CARDIAC REHAB | Facility: CLINIC | Age: 62
End: 2022-11-21
Payer: COMMERCIAL

## 2022-11-21 DIAGNOSIS — Z95.1 POSTSURGICAL AORTOCORONARY BYPASS STATUS: Primary | ICD-10-CM

## 2022-11-21 DIAGNOSIS — I25.10 CORONARY ATHEROSCLEROSIS OF NATIVE CORONARY ARTERY: ICD-10-CM

## 2022-11-21 PROCEDURE — 93798 PHYS/QHP OP CAR RHAB W/ECG: CPT | Mod: S$GLB,,, | Performed by: INTERNAL MEDICINE

## 2022-11-21 PROCEDURE — 93798 PR CARDIAC REHAB/MONITOR: ICD-10-PCS | Mod: S$GLB,,, | Performed by: INTERNAL MEDICINE

## 2022-11-22 ENCOUNTER — RESEARCH ENCOUNTER (OUTPATIENT)
Dept: RESEARCH | Facility: HOSPITAL | Age: 62
End: 2022-11-22
Payer: COMMERCIAL

## 2022-11-22 ENCOUNTER — HOSPITAL ENCOUNTER (OUTPATIENT)
Facility: HOSPITAL | Age: 62
Discharge: HOME | End: 2022-11-22
Attending: INTERNAL MEDICINE | Admitting: INTERNAL MEDICINE
Payer: COMMERCIAL

## 2022-11-22 ENCOUNTER — ANESTHESIA EVENT (OUTPATIENT)
Dept: ENDOSCOPY | Facility: HOSPITAL | Age: 62
End: 2022-11-22
Payer: COMMERCIAL

## 2022-11-22 ENCOUNTER — ANESTHESIA (OUTPATIENT)
Dept: ENDOSCOPY | Facility: HOSPITAL | Age: 62
End: 2022-11-22
Payer: COMMERCIAL

## 2022-11-22 VITALS
TEMPERATURE: 98 F | OXYGEN SATURATION: 98 % | HEART RATE: 51 BPM | SYSTOLIC BLOOD PRESSURE: 152 MMHG | HEIGHT: 72 IN | WEIGHT: 284 LBS | BODY MASS INDEX: 38.47 KG/M2 | RESPIRATION RATE: 17 BRPM | DIASTOLIC BLOOD PRESSURE: 89 MMHG

## 2022-11-22 DIAGNOSIS — Z12.11 COLON CANCER SCREENING: Primary | ICD-10-CM

## 2022-11-22 LAB — POCT GLUCOSE: 131 MG/DL (ref 70–110)

## 2022-11-22 PROCEDURE — G0105 COLORECTAL SCRN; HI RISK IND: HCPCS | Performed by: INTERNAL MEDICINE

## 2022-11-22 PROCEDURE — 37000009 HC ANESTHESIA EA ADD 15 MINS: Performed by: INTERNAL MEDICINE

## 2022-11-22 PROCEDURE — 63600175 PHARM REV CODE 636 W HCPCS: Performed by: NURSE ANESTHETIST, CERTIFIED REGISTERED

## 2022-11-22 PROCEDURE — G0105 COLORECTAL SCRN; HI RISK IND: ICD-10-PCS | Mod: ,,, | Performed by: INTERNAL MEDICINE

## 2022-11-22 PROCEDURE — G0105 COLORECTAL SCRN; HI RISK IND: HCPCS | Mod: ,,, | Performed by: INTERNAL MEDICINE

## 2022-11-22 PROCEDURE — 25000003 PHARM REV CODE 250: Performed by: NURSE ANESTHETIST, CERTIFIED REGISTERED

## 2022-11-22 PROCEDURE — 37000008 HC ANESTHESIA 1ST 15 MINUTES: Performed by: INTERNAL MEDICINE

## 2022-11-22 PROCEDURE — 82962 GLUCOSE BLOOD TEST: CPT | Performed by: INTERNAL MEDICINE

## 2022-11-22 RX ORDER — SODIUM CHLORIDE 0.9 % (FLUSH) 0.9 %
10 SYRINGE (ML) INJECTION
Status: DISCONTINUED | OUTPATIENT
Start: 2022-11-22 | End: 2022-11-22 | Stop reason: HOSPADM

## 2022-11-22 RX ORDER — PROPOFOL 10 MG/ML
VIAL (ML) INTRAVENOUS CONTINUOUS PRN
Status: DISCONTINUED | OUTPATIENT
Start: 2022-11-22 | End: 2022-11-22

## 2022-11-22 RX ORDER — SODIUM CHLORIDE 9 MG/ML
INJECTION, SOLUTION INTRAVENOUS CONTINUOUS
Status: DISCONTINUED | OUTPATIENT
Start: 2022-11-22 | End: 2022-11-22 | Stop reason: HOSPADM

## 2022-11-22 RX ORDER — LIDOCAINE HYDROCHLORIDE 20 MG/ML
INJECTION INTRAVENOUS
Status: DISCONTINUED | OUTPATIENT
Start: 2022-11-22 | End: 2022-11-22

## 2022-11-22 RX ORDER — PROPOFOL 10 MG/ML
VIAL (ML) INTRAVENOUS
Status: DISCONTINUED | OUTPATIENT
Start: 2022-11-22 | End: 2022-11-22

## 2022-11-22 RX ADMIN — PROPOFOL 100 MG: 10 INJECTION, EMULSION INTRAVENOUS at 09:11

## 2022-11-22 RX ADMIN — Medication 150 MCG/KG/MIN: at 09:11

## 2022-11-22 RX ADMIN — SODIUM CHLORIDE: 0.9 INJECTION, SOLUTION INTRAVENOUS at 08:11

## 2022-11-22 RX ADMIN — LIDOCAINE HYDROCHLORIDE 50 MG: 20 INJECTION INTRAVENOUS at 09:11

## 2022-11-22 NOTE — BRIEF OP NOTE
Discharge Summary/Instructions after an Endoscopic Procedure    Patient Name: Isaiah Dorsey  Patient MRN: [id number]  Patient YOB: 1960    Tuesday, November 22, 2022  Castro Jasso MD    Dear patient,  As a result of recent federal legislation (The Federal Cures Act), you may receive lab or pathology results from your procedure in your [facility] account before your physician is able to contact you. Your physician or their representative will relay the results to you with their recommendations at their soonest availability.  Thank you,    RESTRICTIONS:  During your procedure today, you received medications for sedation.  These medications may affect your judgment, balance and coordination.  Therefore, for 24 hours, you have the following restrictions:     - DO NOT drive a car, operate machinery, make legal/financial decisions, sign important papers or drink alcohol.      ACTIVITY:  Today: no heavy lifting, straining or running due to procedural sedation/anesthesia.  The following day: return to full activity including work.    DIET:  Eat and drink normally unless instructed otherwise.     TREATMENT FOR COMMON SIDE EFFECTS:  - Mild abdominal pain, nausea, belching, bloating or excessive gas:  rest, eat lightly and use a heating pad.  - Sore Throat: treat with throat lozenges and/or gargle with warm salt water.  - Because air was used during the procedure, expelling large amounts of air from your rectum or belching is normal.  - If a bowel prep was taken, you may not have a bowel movement for 1-3 days.  This is normal.      SYMPTOMS TO WATCH FOR AND REPORT TO YOUR PHYSICIAN:  1. Abdominal pain or bloating, other than gas cramps.  2. Chest pain.  3. Back pain.  4. Signs of infection such as: chills or fever occurring within 24 hours after the procedure.  5. Rectal bleeding, which would show as bright red, maroon, or black stools. (A tablespoon of blood from the rectum is not serious, especially if hemorrhoids  are present.)  6. Vomiting.  7. Weakness or dizziness.      GO DIRECTLY TO THE NEAREST EMERGENCY ROOM IF YOU HAVE ANY OF THE FOLLOWING:     Difficulty breathing              Chills and/or fever over 101 F   Persistent vomiting and/or vomiting blood   Severe abdominal pain   Severe chest pain   Black, tarry stools   Bleeding- more than one tablespoon   Any other symptom or condition that you feel may need urgent attention    Your doctor recommends these additional instructions:  If any biopsies were taken, your doctors clinic will contact you in 1 to 2 weeks with any results.    - Discharge patient to home (ambulatory).   - Repeat colonoscopy in 5 years for surveillance.   - Resume previous diet.   - Patient has a contact number available for emergencies.  The signs and symptoms of potential delayed complications were discussed with the patient.  Return to normal activities tomorrow.  Written discharge instructions were provided to the patient.    For questions, problems or results please call your physician - Castro Jasso MD at Work:  [phone].    [facility], EMERGENCY ROOM PHONE NUMBER: [phone]    IF A COMPLICATION OR EMERGENCY SITUATION ARISES AND YOU ARE UNABLE TO REACH YOUR PHYSICIAN - GO DIRECTLY TO THE EMERGENCY ROOM.

## 2022-11-22 NOTE — TRANSFER OF CARE
Anesthesia Transfer of Care Note    Patient: Isaiah Dorsey Jr.    Procedure(s) Performed: Procedure(s) (LRB):  COLONOSCOPY (N/A)    Patient location: GI    Anesthesia Type: MAC and general    Transport from OR: Transported from OR on 2-3 L/min O2 by NC with adequate spontaneous ventilation    Post pain: adequate analgesia    Post assessment: no apparent anesthetic complications and tolerated procedure well    Post vital signs: stable    Level of consciousness: responds to stimulation    Nausea/Vomiting: no nausea/vomiting    Complications: none    Transfer of care protocol was followed      Last vitals:   Visit Vitals  BP (!) 157/78 (BP Location: Left arm, Patient Position: Lying)   Pulse (!) 57   Temp 36.5 °C (97.7 °F) (Temporal)   Resp 16   Ht 6' (1.829 m)   Wt 128.8 kg (284 lb)   SpO2 (!) 94%   BMI 38.52 kg/m²

## 2022-11-22 NOTE — PROVATION PATIENT INSTRUCTIONS
Discharge Summary/Instructions after an Endoscopic Procedure  Patient Name: Isaiah Doresy  Patient MRN: [id number]  Patient YOB: 1960  Tuesday, November 22, 2022  Castro Jasso MD  Dear patient,  As a result of recent federal legislation (The Federal Cures Act), you may   receive lab or pathology results from your procedure in your [facility]   account before your physician is able to contact you. Your physician or   their representative will relay the results to you with their   recommendations at their soonest availability.  Thank you,  RESTRICTIONS:  During your procedure today, you received medications for sedation.  These   medications may affect your judgment, balance and coordination.  Therefore,   for 24 hours, you have the following restrictions:   - DO NOT drive a car, operate machinery, make legal/financial decisions,   sign important papers or drink alcohol.    ACTIVITY:  Today: no heavy lifting, straining or running due to procedural   sedation/anesthesia.  The following day: return to full activity including work.  DIET:  Eat and drink normally unless instructed otherwise.     TREATMENT FOR COMMON SIDE EFFECTS:  - Mild abdominal pain, nausea, belching, bloating or excessive gas:  rest,   eat lightly and use a heating pad.  - Sore Throat: treat with throat lozenges and/or gargle with warm salt   water.  - Because air was used during the procedure, expelling large amounts of air   from your rectum or belching is normal.  - If a bowel prep was taken, you may not have a bowel movement for 1-3 days.    This is normal.  SYMPTOMS TO WATCH FOR AND REPORT TO YOUR PHYSICIAN:  1. Abdominal pain or bloating, other than gas cramps.  2. Chest pain.  3. Back pain.  4. Signs of infection such as: chills or fever occurring within 24 hours   after the procedure.  5. Rectal bleeding, which would show as bright red, maroon, or black stools.   (A tablespoon of blood from the rectum is not serious, especially if    hemorrhoids are present.)  6. Vomiting.  7. Weakness or dizziness.  GO DIRECTLY TO THE NEAREST EMERGENCY ROOM IF YOU HAVE ANY OF THE FOLLOWING:      Difficulty breathing              Chills and/or fever over 101 F   Persistent vomiting and/or vomiting blood   Severe abdominal pain   Severe chest pain   Black, tarry stools   Bleeding- more than one tablespoon   Any other symptom or condition that you feel may need urgent attention  Your doctor recommends these additional instructions:  If any biopsies were taken, your doctors clinic will contact you in 1 to 2   weeks with any results.  - Discharge patient to home (ambulatory).   - Repeat colonoscopy in 5 years for surveillance.   - Resume previous diet.   - Patient has a contact number available for emergencies.  The signs and   symptoms of potential delayed complications were discussed with the   patient.  Return to normal activities tomorrow.  Written discharge   instructions were provided to the patient.  For questions, problems or results please call your physician - Castro Jasso MD at Work:  [phone].  [facility], EMERGENCY ROOM PHONE NUMBER: [phone]  IF A COMPLICATION OR EMERGENCY SITUATION ARISES AND YOU ARE UNABLE TO REACH   YOUR PHYSICIAN - GO DIRECTLY TO THE EMERGENCY ROOM.  Castro Jasso MD  11/22/2022 9:19:34 AM  This report has been verified and signed electronically.  Dear patient,  As a result of recent federal legislation (The Federal Cures Act), you may   receive lab or pathology results from your procedure in your [facility]   account before your physician is able to contact you. Your physician or   their representative will relay the results to you with their   recommendations at their soonest availability.  Thank you,  PROVATION

## 2022-11-22 NOTE — H&P
History & Physical - Short Stay  Gastroenterology      SUBJECTIVE:     Procedure: Colonoscopy    Chief Complaint/Indication for Procedure: Family History of Colon Cancer and Previous Polyps    History of Present Illness:  Patient is a 62 y.o. male presents with previous polyps and family history of colon cancer here for surveillance.     PTA Medications   Medication Sig    aspirin 325 MG tablet Take 1 tablet (325 mg total) by mouth once daily.    atorvastatin (LIPITOR) 80 MG tablet Take 1 tablet (80 mg total) by mouth once daily.    metFORMIN (GLUCOPHAGE) 1000 MG tablet Take 1 tablet (1,000 mg total) by mouth 2 (two) times daily with meals.    metoprolol succinate (TOPROL-XL) 100 MG 24 hr tablet Take 1 tablet (100 mg total) by mouth once daily.    SITagliptin (JANUVIA) 100 MG Tab Take 1 tablet (100 mg total) by mouth once daily.    ketoconazole (NIZORAL) 2 % cream Apply topically 2 (two) times daily. Prn rash face    ketoconazole (NIZORAL) 2 % shampoo Apply topically once daily. Face and scalp soaks    rivaroxaban (XARELTO) 20 mg Tab Take 1 tablet (20 mg total) by mouth daily with dinner or evening meal.    sod sulf-pot chloride-mag sulf (SUTAB) 1.479-0.188- 0.225 gram tablet Take 12 tablets by mouth once daily. Take according to package instructions with indicated amount of water.       Review of patient's allergies indicates:   Allergen Reactions    Penicillins      Other reaction(s): Itching  Other reaction(s): Hives    Wasp venom Edema        Past Medical History:   Diagnosis Date    CHF (congestive heart failure)     Coronary artery disease     Diabetes mellitus type 2, uncontrolled, without complications     6.7% Metf 1 g qd, eye 2015, [facility]- [facility]-2015,     Elevated platelet count 07/23/2015    H/O splenectomy 04/24/2015    doesn't like pneumovax bc caused side effects    HLD (hyperlipidemia) 01/22/2015    goal LDL < 100, reluctant to take statin    HTN (hypertension), benign 01/22/2015    losartan 100    Morbid  obesity with BMI of 45.0-49.9, adult 06/11/2014    RODRIGUEZ (obstructive sleep apnea)     PAF (paroxysmal atrial fibrillation)     Polyp of transverse colon 09/05/2018 2018, repeat 2023    Severe uncontrolled hypertension 06/11/2014     Past Surgical History:   Procedure Laterality Date    AORTOGRAPHY N/A 7/11/2022    Procedure: AORTOGRAM;  Surgeon: Bjorn Cheatham MD;  Location: [facility] CATH LAB;  Service: Cardiology;  Laterality: N/A;    CORONARY ARTERY BYPASS GRAFT (CABG) Left 7/18/2022    Procedure: CORONARY ARTERY BYPASS GRAFT (CABG);  Surgeon: Blu Rhodes MD;  Location: [facility] OR [address] [place] [address];  Service: Cardiovascular;  Laterality: Left;  CABG x 1 (LIMA to LAD)    ENDOSCOPIC HARVEST OF VEIN Left 7/18/2022    Procedure: HARVEST-VEIN-ENDOVASCULAR;  Surgeon: Blu Rhodes MD;  [facility];  Service: Cardiovascular;  Laterality: Left;  Vein Leslie Start time: 0823am  Vein Leslie Stop time: 0836am    Vein Leslie Start time: 0848am  Vein Leslie Stop time: 0904am    LEFT HEART CATHETERIZATION N/A 7/11/2022    Procedure: Left heart cath;  Surgeon: Bjorn Cheatham MD;  Location: [facility] CATH LAB;  Service: Cardiology;  Laterality: N/A;    splenecectomy       Family History   Problem Relation Age of Onset    Lung cancer Mother     Hypertension Mother     Heart attack Mother 58    Heart disease Mother     Stroke Mother     Heart failure Mother     Hyperlipidemia Mother     Asbestos Mother     Diabetes Father     Colon cancer Father 65    Coronary artery disease Brother     Coronary artery disease Maternal Grandmother      Social History     Tobacco Use    Smoking status: Former     Types: Cigarettes    Smokeless tobacco: Never   Substance Use Topics    Alcohol use: Yes     Alcohol/week: 0.0 standard drinks     Comment: 1 a week    Drug use: Never       Review of Systems:  Constitutional: no fever or chills  Respiratory: no cough or shortness of breath  Cardiovascular: no chest pain or  palpitations    OBJECTIVE:     Vital Signs (Most Recent)  Temp: 97.7 °F (36.5 °C) (11/22/22 0808)  Pulse: (!) 57 (11/22/22 0808)  Resp: 16 (11/22/22 0808)  BP: (!) 157/78 (11/22/22 0808)  SpO2: (!) 94 % (11/22/22 0808)    Physical Exam:  General: well developed, well nourished  Lungs:  normal respiratory effort  Heart: regular rate, S1, S2 normal    Laboratory  CBC: No results for input(s): WBC, RBC, HGB, HCT, PLT, MCV, MCH, MCHC in the last 168 hours.  CMP: No results for input(s): GLU, CALCIUM, ALBUMIN, PROT, NA, K, CO2, CL, BUN, CREATININE, ALKPHOS, ALT, AST, BILITOT in the last 168 hours.  Coagulation: No results for input(s): LABPROT, INR, APTT in the last 168 hours.    Diagnostic Results:      ASSESSMENT/PLAN:     Previous colon polyps  Family history of colon cancer    Plan: Colonoscopy    Anesthesia Plan: MAC    ASA Grade: ASA 3 - Patient with moderate systemic disease with functional limitations    The impression and plan was discussed in detail with the patient. All questions have been answered and the patient voices understanding of our plan at this point. The risk of the procedure was discussed in detail which includes but not limited to bleeding, infection, perforation in some cases requiring surgery with its spectrum of complications.

## 2022-11-22 NOTE — ANESTHESIA POSTPROCEDURE EVALUATION
Anesthesia Post Evaluation    Patient: Isaiah Dorsey JrAnel    Procedure(s) Performed: Procedure(s) (LRB):  COLONOSCOPY (N/A)    Final Anesthesia Type: general      Patient location during evaluation: [facility]  Patient participation: Yes- Able to Participate  Level of consciousness: awake and alert and oriented  Post-procedure vital signs: reviewed and stable  Pain management: adequate  Airway patency: patent    PONV status at discharge: No PONV  Anesthetic complications: no      Cardiovascular status: stable  Respiratory status: unassisted and spontaneous ventilation  Hydration status: euvolemic  Follow-up not needed.          Vitals Value Taken Time   /87 11/22/22 0939   Temp 36.5 °C (97.7 °F) 11/22/22 0924   Pulse 50 11/22/22 0939   Resp 17 11/22/22 0939   SpO2 96 % 11/22/22 0939         No case tracking events are documented in the log.      Pain/Hamilton Score: Hamilton Score: 10 (11/22/2022  9:39 AM)

## 2022-11-22 NOTE — ANESTHESIA PREPROCEDURE EVALUATION
11/22/2022  Isaiah Dorsey Jr. is a 62 y.o., male.        Pre-operative evaluation for Procedure(s) (LRB):  COLONOSCOPY (N/A)    @[id number]@@    No diagnosis found.    Review of patient's allergies indicates:   Allergen Reactions    Penicillins      Other reaction(s): Itching  Other reaction(s): Hives    Wasp venom Edema       No medications prior to admission.            No current facility-administered medications on file prior to encounter.     Current Outpatient Medications on File Prior to Encounter   Medication Sig Dispense Refill    aspirin 325 MG tablet Take 1 tablet (325 mg total) by mouth once daily. 30 tablet 11    atorvastatin (LIPITOR) 80 MG tablet Take 1 tablet (80 mg total) by mouth once daily. 90 tablet 3    ketoconazole (NIZORAL) 2 % cream Apply topically 2 (two) times daily. Prn rash face 60 g 3    ketoconazole (NIZORAL) 2 % shampoo Apply topically once daily. Face and scalp soaks 120 mL 4    metFORMIN (GLUCOPHAGE) 1000 MG tablet Take 1 tablet (1,000 mg total) by mouth 2 (two) times daily with meals. 180 tablet 3    metoprolol succinate (TOPROL-XL) 100 MG 24 hr tablet Take 1 tablet (100 mg total) by mouth once daily. 90 tablet 3    rivaroxaban (XARELTO) 20 mg Tab Take 1 tablet (20 mg total) by mouth daily with dinner or evening meal. 90 tablet 3    SITagliptin (JANUVIA) 100 MG Tab Take 1 tablet (100 mg total) by mouth once daily. 90 tablet 3    sod sulf-pot chloride-mag sulf (SUTAB) 1.479-0.188- 0.225 gram tablet Take 12 tablets by mouth once daily. Take according to package instructions with indicated amount of water. 24 tablet 0       Past Medical History:  No date: CHF (congestive heart failure)  No date: Coronary artery disease  No date: Diabetes mellitus type 2, uncontrolled, without complications      Comment:  6.7% Metf 1 g qd, eye 2015, [facility]-2015,   07/23/2015: Elevated  platelet count  04/24/2015: H/O splenectomy      Comment:  doesn't like pneumovax bc caused side effects  01/22/2015: HLD (hyperlipidemia)      Comment:  goal LDL < 100, reluctant to take statin  01/22/2015: HTN (hypertension), benign      Comment:  losartan 100  06/11/2014: Morbid obesity with BMI of 45.0-49.9, adult  No date: RODRIGUEZ (obstructive sleep apnea)  No date: PAF (paroxysmal atrial fibrillation)  09/05/2018: Polyp of transverse colon      Comment:  2018, repeat 2023 06/11/2014: Severe uncontrolled hypertension    Past Surgical History:   Procedure Laterality Date    AORTOGRAPHY N/A 7/11/2022    Procedure: AORTOGRAM;  Surgeon: Bjorn Cheatham MD;  Location: [facility] CATH LAB;  Service: Cardiology;  Laterality: N/A;    CORONARY ARTERY BYPASS GRAFT (CABG) Left 7/18/2022    Procedure: CORONARY ARTERY BYPASS GRAFT (CABG);  Surgeon: Blu Rhodes MD;  Location: [facility] OR [address] [place] [address];  Service: Cardiovascular;  Laterality: Left;  CABG x 1 (LIMA to LAD)    ENDOSCOPIC HARVEST OF VEIN Left 7/18/2022    Procedure: HARVEST-VEIN-ENDOVASCULAR;  Surgeon: Blu Rhodes MD;  Location: [facility] OR [address] [place] [address];  Service: Cardiovascular;  Laterality: Left;  Vein Conyers Start time: 0823am  Vein Conyers Stop time: 0836am    Vein Conyers Start time: 0848am  Vein Conyers Stop time: 0904am    LEFT HEART CATHETERIZATION N/A 7/11/2022    Procedure: Left heart cath;  Surgeon: Bjorn Cheatham MD;  Location: [facility] CATH LAB;  Service: Cardiology;  Laterality: N/A;    splenecectomy         Social History     Tobacco Use   Smoking Status Former    Types: Cigarettes   Smokeless Tobacco Never       Social History     Substance and Sexual Activity   Alcohol Use Yes    Alcohol/week: 0.0 standard drinks    Comment: 1 a week       Physical Activity: Not on file         No results for input(s): HCT in the last 72 hours.  No results for input(s): PLT in the last 72 hours.  No results for input(s): K in the last 72 hours.  No results for  input(s): CREATININE in the last 72 hours.  No results for input(s): GLU in the last 72 hours.  No results for input(s): PT in the last 72 hours.                    Pre-op Assessment          Review of Systems  Anesthesia Hx:  No problems with previous Anesthesia    Hematology/Oncology:     Oncology Normal   Hematology Comments: On xarelto for cad, last took thursday   Cardiovascular:   Hypertension, well controlled Past MI (7/2022) CAD asymptomatic CABG/stent (cabg one week after bypass)   Denies Angina. CHF (only just before bypass)    Pulmonary:   Denies COPD.  Denies Asthma.  Denies Shortness of breath.  Denies Sleep Apnea. Walks treadmilll 30 minutes and eliptical 20 minutes   Renal/:   Denies Chronic Renal Disease.     Hepatic/GI:   Denies Liver Disease.    Neurological:   Denies TIA. Denies CVA. Denies Seizures.    Endocrine:   Diabetes, type 2        Physical Exam  General: Well nourished, Cooperative, Alert and Oriented    Airway:  Mallampati: II   Mouth Opening: Normal  TM Distance: Normal  Tongue: Normal  Neck ROM: Normal ROM        Anesthesia Plan  Type of Anesthesia, risks & benefits discussed:    Anesthesia Type: Gen Natural Airway  Intra-op Monitoring Plan: Standard ASA Monitors  Post Op Pain Control Plan: IV/PO Opioids PRN  Induction:  IV  Informed Consent: Informed consent signed with the Patient and all parties understand the risks and agree with anesthesia plan.  All questions answered.   ASA Score: 3  Day of Surgery Review of History & Physical: H&P Update referred to the surgeon/provider.    Ready For Surgery From Anesthesia Perspective.     .

## 2022-11-22 NOTE — PROGRESS NOTES
Called pt- Left message to confirm colonoscopy completed today and if pt to resume taking Xarelto- as prescribed by PCP on [id number].    Liz Bansal RN, CCM, CRC

## 2022-11-23 ENCOUNTER — CLINICAL SUPPORT (OUTPATIENT)
Dept: CARDIAC REHAB | Facility: CLINIC | Age: 62
End: 2022-11-23
Payer: COMMERCIAL

## 2022-11-23 DIAGNOSIS — Z95.1 POSTSURGICAL AORTOCORONARY BYPASS STATUS: Primary | ICD-10-CM

## 2022-11-23 DIAGNOSIS — I25.10 CORONARY ATHEROSCLEROSIS OF NATIVE CORONARY ARTERY: ICD-10-CM

## 2022-11-23 PROCEDURE — 93798 PR CARDIAC REHAB/MONITOR: ICD-10-PCS | Mod: S$GLB,,, | Performed by: INTERNAL MEDICINE

## 2022-11-23 PROCEDURE — 93798 PHYS/QHP OP CAR RHAB W/ECG: CPT | Mod: S$GLB,,, | Performed by: INTERNAL MEDICINE

## 2022-12-01 ENCOUNTER — OFFICE VISIT (OUTPATIENT)
Dept: OPTOMETRY | Facility: CLINIC | Age: 62
End: 2022-12-01
Payer: COMMERCIAL

## 2022-12-01 DIAGNOSIS — H52.203 HYPEROPIA WITH ASTIGMATISM AND PRESBYOPIA, BILATERAL: ICD-10-CM

## 2022-12-01 DIAGNOSIS — H52.4 HYPEROPIA WITH ASTIGMATISM AND PRESBYOPIA, BILATERAL: ICD-10-CM

## 2022-12-01 DIAGNOSIS — E11.9 TYPE 2 DIABETES MELLITUS WITHOUT RETINOPATHY: Primary | ICD-10-CM

## 2022-12-01 DIAGNOSIS — Z96.1 PSEUDOPHAKIA: ICD-10-CM

## 2022-12-01 DIAGNOSIS — H52.03 HYPEROPIA WITH ASTIGMATISM AND PRESBYOPIA, BILATERAL: ICD-10-CM

## 2022-12-01 PROCEDURE — 99999 PR PBB SHADOW E&M-EST. PATIENT-LVL III: ICD-10-PCS | Mod: PBBFAC,,, | Performed by: OPTOMETRIST

## 2022-12-01 PROCEDURE — 1159F MED LIST DOCD IN RCRD: CPT | Mod: CPTII,S$GLB,, | Performed by: OPTOMETRIST

## 2022-12-01 PROCEDURE — 92015 PR REFRACTION: ICD-10-PCS | Mod: S$GLB,,, | Performed by: OPTOMETRIST

## 2022-12-01 PROCEDURE — 1160F RVW MEDS BY RX/DR IN RCRD: CPT | Mod: CPTII,S$GLB,, | Performed by: OPTOMETRIST

## 2022-12-01 PROCEDURE — 2023F DILAT RTA XM W/O RTNOPTHY: CPT | Mod: CPTII,S$GLB,, | Performed by: OPTOMETRIST

## 2022-12-01 PROCEDURE — 99999 PR PBB SHADOW E&M-EST. PATIENT-LVL III: CPT | Mod: PBBFAC,,, | Performed by: OPTOMETRIST

## 2022-12-01 PROCEDURE — 92004 PR EYE EXAM, NEW PATIENT,COMPREHESV: ICD-10-PCS | Mod: S$GLB,,, | Performed by: OPTOMETRIST

## 2022-12-01 PROCEDURE — 1159F PR MEDICATION LIST DOCUMENTED IN MEDICAL RECORD: ICD-10-PCS | Mod: CPTII,S$GLB,, | Performed by: OPTOMETRIST

## 2022-12-01 PROCEDURE — 1160F PR REVIEW ALL MEDS BY PRESCRIBER/CLIN PHARMACIST DOCUMENTED: ICD-10-PCS | Mod: CPTII,S$GLB,, | Performed by: OPTOMETRIST

## 2022-12-01 PROCEDURE — 92004 COMPRE OPH EXAM NEW PT 1/>: CPT | Mod: S$GLB,,, | Performed by: OPTOMETRIST

## 2022-12-01 PROCEDURE — 3044F HG A1C LEVEL LT 7.0%: CPT | Mod: CPTII,S$GLB,, | Performed by: OPTOMETRIST

## 2022-12-01 PROCEDURE — 92015 DETERMINE REFRACTIVE STATE: CPT | Mod: S$GLB,,, | Performed by: OPTOMETRIST

## 2022-12-01 PROCEDURE — 3044F PR MOST RECENT HEMOGLOBIN A1C LEVEL <7.0%: ICD-10-PCS | Mod: CPTII,S$GLB,, | Performed by: OPTOMETRIST

## 2022-12-01 PROCEDURE — 2023F PR DILATED RETINAL EXAM W/O EVID OF RETINOPATHY: ICD-10-PCS | Mod: CPTII,S$GLB,, | Performed by: OPTOMETRIST

## 2022-12-01 RX ORDER — AMIODARONE HYDROCHLORIDE 400 MG/1
TABLET ORAL
COMMUNITY
Start: 2022-11-11 | End: 2023-02-07

## 2022-12-01 NOTE — PROGRESS NOTES
HPI    [place]: about 2 yrs. Ago elsewhere  Chief complaint (CC): Patient is here for annual eye exam.  Wears OTC   +1.75 for near but patient has more trouble with desk work.  Distance   vision seems fine.  Glasses? +1.75 OTC  Contacts? -  H/o eye surgery, injections or laser: PC IOL OU  H/o eye injury: -  Known eye conditions? -  Family h/o eye conditions? -  Eye gtts? -      (-) Flashes (-)  Floaters (-) Mucous   (-)  Tearing (-) Itching (-) Burning   (-) Headaches (-) Eye Pain/discomfort (-) Irritation   (-)  Redness (-) Double vision (-) Blurry vision    Diabetic? +  today  A1c? Hemoglobin A1C       Date                     Value               Ref Range             Status                08/25/2022               6.2 (H)             4.0 - 5.6 %           Final                 07/28/2022               7.2 (H)             4.0 - 5.6 %           Final                 07/12/2022               7.6 (H)             4.0 - 5.6 %           Final                  Last edited by Lis Rodrigues on 12/1/2022  9:44 AM.            Assessment /Plan     For exam results, see Encounter Report.      Type 2 diabetes mellitus without retinopathy  BS control. No signs of diabetic retinopathy. Monitor with annual exam.    Hyperopia with astigmatism and presbyopia, bilateral  SRx released to patient. Patient educated on lens options. Normal ocular health. RTC 1 year for routine exam.     Pseudophakia  Good result.

## 2022-12-05 ENCOUNTER — CLINICAL SUPPORT (OUTPATIENT)
Dept: CARDIAC REHAB | Facility: CLINIC | Age: 62
End: 2022-12-05
Payer: COMMERCIAL

## 2022-12-05 DIAGNOSIS — I25.10 CORONARY ATHEROSCLEROSIS OF NATIVE CORONARY ARTERY: ICD-10-CM

## 2022-12-05 DIAGNOSIS — Z95.1 POSTSURGICAL AORTOCORONARY BYPASS STATUS: Primary | ICD-10-CM

## 2022-12-05 PROCEDURE — 93798 PHYS/QHP OP CAR RHAB W/ECG: CPT | Mod: S$GLB,,, | Performed by: INTERNAL MEDICINE

## 2022-12-05 PROCEDURE — 93798 PR CARDIAC REHAB/MONITOR: ICD-10-PCS | Mod: S$GLB,,, | Performed by: INTERNAL MEDICINE

## 2022-12-06 ENCOUNTER — CLINICAL SUPPORT (OUTPATIENT)
Dept: OTOLARYNGOLOGY | Facility: CLINIC | Age: 62
End: 2022-12-06
Payer: COMMERCIAL

## 2022-12-06 ENCOUNTER — OFFICE VISIT (OUTPATIENT)
Dept: OTOLARYNGOLOGY | Facility: CLINIC | Age: 62
End: 2022-12-06
Payer: COMMERCIAL

## 2022-12-06 VITALS
HEIGHT: 72 IN | SYSTOLIC BLOOD PRESSURE: 149 MMHG | BODY MASS INDEX: 39.28 KG/M2 | WEIGHT: 290 LBS | HEART RATE: 60 BPM | DIASTOLIC BLOOD PRESSURE: 93 MMHG

## 2022-12-06 DIAGNOSIS — H60.63 CHRONIC OTITIS EXTERNA OF BOTH EARS, UNSPECIFIED TYPE: Chronic | ICD-10-CM

## 2022-12-06 DIAGNOSIS — H90.3 SENSORINEURAL HEARING LOSS (SNHL) OF BOTH EARS: Primary | Chronic | ICD-10-CM

## 2022-12-06 DIAGNOSIS — H90.3 SENSORINEURAL HEARING LOSS (SNHL) OF BOTH EARS: Primary | ICD-10-CM

## 2022-12-06 DIAGNOSIS — H72.92 TYMPANIC MEMBRANE PERFORATION, LEFT: ICD-10-CM

## 2022-12-06 PROCEDURE — 99999 PR PBB SHADOW E&M-EST. PATIENT-LVL III: CPT | Mod: PBBFAC,,, | Performed by: OTOLARYNGOLOGY

## 2022-12-06 PROCEDURE — 1160F RVW MEDS BY RX/DR IN RCRD: CPT | Mod: CPTII,S$GLB,, | Performed by: OTOLARYNGOLOGY

## 2022-12-06 PROCEDURE — 3044F PR MOST RECENT HEMOGLOBIN A1C LEVEL <7.0%: ICD-10-PCS | Mod: CPTII,S$GLB,, | Performed by: OTOLARYNGOLOGY

## 2022-12-06 PROCEDURE — 92557 COMPREHENSIVE HEARING TEST: CPT | Mod: S$GLB,,, | Performed by: PHYSICIAN ASSISTANT

## 2022-12-06 PROCEDURE — 92567 TYMPANOMETRY: CPT | Mod: S$GLB,,, | Performed by: PHYSICIAN ASSISTANT

## 2022-12-06 PROCEDURE — 99204 PR OFFICE/OUTPT VISIT, NEW, LEVL IV, 45-59 MIN: ICD-10-PCS | Mod: S$GLB,,, | Performed by: OTOLARYNGOLOGY

## 2022-12-06 PROCEDURE — 99999 PR PBB SHADOW E&M-EST. PATIENT-LVL I: ICD-10-PCS | Mod: PBBFAC,,, | Performed by: PHYSICIAN ASSISTANT

## 2022-12-06 PROCEDURE — 3080F PR MOST RECENT DIASTOLIC BLOOD PRESSURE >= 90 MM HG: ICD-10-PCS | Mod: CPTII,S$GLB,, | Performed by: OTOLARYNGOLOGY

## 2022-12-06 PROCEDURE — 99999 PR PBB SHADOW E&M-EST. PATIENT-LVL III: ICD-10-PCS | Mod: PBBFAC,,, | Performed by: OTOLARYNGOLOGY

## 2022-12-06 PROCEDURE — 99999 PR PBB SHADOW E&M-EST. PATIENT-LVL I: CPT | Mod: PBBFAC,,, | Performed by: PHYSICIAN ASSISTANT

## 2022-12-06 PROCEDURE — 3080F DIAST BP >= 90 MM HG: CPT | Mod: CPTII,S$GLB,, | Performed by: OTOLARYNGOLOGY

## 2022-12-06 PROCEDURE — 3008F PR BODY MASS INDEX (BMI) DOCUMENTED: ICD-10-PCS | Mod: CPTII,S$GLB,, | Performed by: OTOLARYNGOLOGY

## 2022-12-06 PROCEDURE — 3077F PR MOST RECENT SYSTOLIC BLOOD PRESSURE >= 140 MM HG: ICD-10-PCS | Mod: CPTII,S$GLB,, | Performed by: OTOLARYNGOLOGY

## 2022-12-06 PROCEDURE — 92567 PR TYMPA2METRY: ICD-10-PCS | Mod: S$GLB,,, | Performed by: PHYSICIAN ASSISTANT

## 2022-12-06 PROCEDURE — 3008F BODY MASS INDEX DOCD: CPT | Mod: CPTII,S$GLB,, | Performed by: OTOLARYNGOLOGY

## 2022-12-06 PROCEDURE — 99204 OFFICE O/P NEW MOD 45 MIN: CPT | Mod: S$GLB,,, | Performed by: OTOLARYNGOLOGY

## 2022-12-06 PROCEDURE — 3077F SYST BP >= 140 MM HG: CPT | Mod: CPTII,S$GLB,, | Performed by: OTOLARYNGOLOGY

## 2022-12-06 PROCEDURE — 92557 PR COMPREHENSIVE HEARING TEST: ICD-10-PCS | Mod: S$GLB,,, | Performed by: PHYSICIAN ASSISTANT

## 2022-12-06 PROCEDURE — 3044F HG A1C LEVEL LT 7.0%: CPT | Mod: CPTII,S$GLB,, | Performed by: OTOLARYNGOLOGY

## 2022-12-06 PROCEDURE — 1160F PR REVIEW ALL MEDS BY PRESCRIBER/CLIN PHARMACIST DOCUMENTED: ICD-10-PCS | Mod: CPTII,S$GLB,, | Performed by: OTOLARYNGOLOGY

## 2022-12-06 PROCEDURE — 1159F PR MEDICATION LIST DOCUMENTED IN MEDICAL RECORD: ICD-10-PCS | Mod: CPTII,S$GLB,, | Performed by: OTOLARYNGOLOGY

## 2022-12-06 PROCEDURE — 1159F MED LIST DOCD IN RCRD: CPT | Mod: CPTII,S$GLB,, | Performed by: OTOLARYNGOLOGY

## 2022-12-06 RX ORDER — FLUOCINOLONE ACETONIDE 0.11 MG/ML
3 OIL AURICULAR (OTIC) 2 TIMES DAILY
Qty: 20 ML | Refills: 3 | Status: SHIPPED | OUTPATIENT
Start: 2022-12-06 | End: 2024-02-20 | Stop reason: SDUPTHER

## 2022-12-06 RX ORDER — PREDNISOLONE ACETATE 10 MG/ML
SUSPENSION/ DROPS OPHTHALMIC
Qty: 10 ML | Refills: 0 | Status: SHIPPED | OUTPATIENT
Start: 2022-12-06 | End: 2024-02-20 | Stop reason: SDUPTHER

## 2022-12-06 NOTE — PROGRESS NOTES
Isaiah Dorsey Jr., a 62 y.o. male, was seen today in the clinic for an audiologic evaluation.  Patients main complaints were hearing loss and itching ears.  Mr. Dorsey does a have history of perforation of the left TM.  He currently wears bilateral [employer] hearing aids on a daily basis and appreciates the benefit he receives from them.      Audiogram results revealed a moderate sensorineural hearing loss in the right ear from 1k-8k Hz and a mild to severe sensorineural hearing loss from 1k-8k Hz in the left ear.  Speech reception thresholds were noted at 25 dB in the right ear and 20 dB in the left ear.  Speech discrimination scores were 84% in the right ear and 84% in the left ear.  Tympanometry revealed Type A in the right ear and a hermetic seal could not be obtained in the left ear.     Recommendations:  Otologic evaluation  Annual audiogram  Hearing protection when in noise  Continued and consistent use of bilateral amplification   Appointment with his hearing aid provider for programming adjustments as needed

## 2022-12-06 NOTE — PROGRESS NOTES
Chief Complaint   Patient presents with    Hearing Loss     Hearing screening, bilateral itching        HPI: Patient is a very pleasant 62 y.o. male here to see me today for the first time for evaluation of hearing loss.  He reports hearing loss that has been gradually progressing over the last many year(s).  He has not noted any difference in hearing between the ears, with both ears being the better hearing ear.  He has noted any tinnitus in both ears.  He has not had any recent issues with ear pain or ear drainage, but he does have history of perforation of the left TM and if he gets water in the ear it will occasionally become infected.  He denies a family history of hearing loss, and has not had any previous otologic surgery.  He has any history of significant loud noise exposure.He denies issues with dizziness.                Past Medical History:   Diagnosis Date    CHF (congestive heart failure)     Coronary artery disease     Diabetes mellitus type 2, uncontrolled, without complications     6.7% Metf 1 g qd, eye 2015, [facility]-2015,     Elevated platelet count 07/23/2015    H/O splenectomy 04/24/2015    doesn't like pneumovax bc caused side effects    HLD (hyperlipidemia) 01/22/2015    goal LDL < 100, reluctant to take statin    HTN (hypertension), benign 01/22/2015    losartan 100    Morbid obesity with BMI of 45.0-49.9, adult 06/11/2014    RODRIGUEZ (obstructive sleep apnea)     PAF (paroxysmal atrial fibrillation)     Polyp of transverse colon 09/05/2018 2018, repeat 2023    Severe uncontrolled hypertension 06/11/2014     Social History     Socioeconomic History    Marital status:     Number of children: 2   Tobacco Use    Smoking status: Former     Types: Cigarettes    Smokeless tobacco: Never   Substance and Sexual Activity    Alcohol use: Yes     Alcohol/week: 0.0 standard drinks     Comment: 1 a week    Drug use: Never    Sexual activity: Yes     Partners: Female   Social History Narrative      , Quit smoking 20 yrs, ETOH 2 beers on weekend, colonoscopy 2011     Past Surgical History:   Procedure Laterality Date    AORTOGRAPHY N/A 7/11/2022    Procedure: AORTOGRAM;  Surgeon: Bjorn Cheatham MD;  Location: [facility] CATH LAB;  Service: Cardiology;  Laterality: N/A;    COLONOSCOPY N/A 11/22/2022    Procedure: COLONOSCOPY;  Surgeon: Castro Jasso MD;  Location: [facility] ENDO (4TH FLR);  Service: Endoscopy;  Laterality: N/A;  ok to hold Xarelto-see telephone encounter dated 11/1  [facility] via portal  pre call done    CORONARY ARTERY BYPASS GRAFT (CABG) Left 7/18/2022    Procedure: CORONARY ARTERY BYPASS GRAFT (CABG);  Surgeon: Blu Rhodes MD;  [facility];  Service: Cardiovascular;  Laterality: Left;  CABG x 1 (LIMA to LAD)    ENDOSCOPIC HARVEST OF VEIN Left 7/18/2022    Procedure: HARVEST-VEIN-ENDOVASCULAR;  Surgeon: Blu Rhodes MD;  [facility];  Service: Cardiovascular;  Laterality: Left;  Vein Robinson Start time: 0823am  Vein Robinson Stop time: 0836am    Vein Robinson Start time: 0848am  Vein Robinson Stop time: 0904am    LEFT HEART CATHETERIZATION N/A 7/11/2022    Procedure: Left heart cath;  Surgeon: Bjorn Cheatham MD;  Location: [facility] CATH LAB;  Service: Cardiology;  Laterality: N/A;    splenecectomy       Family History   Problem Relation Age of Onset    Lung cancer Mother     Hypertension Mother     Heart attack Mother 58    Heart disease Mother     Stroke Mother     Heart failure Mother     Hyperlipidemia Mother     Asbestos Mother     Diabetes Father     Colon cancer Father 65    Coronary artery disease Brother     Coronary artery disease Maternal Grandmother            Review of Systems  General: negative for chills, fever or weight loss  Psychological: negative for mood changes or depression  Ophthalmic: negative for blurry vision, photophobia or eye pain  ENT: see HPI  Respiratory: no cough, shortness of breath, or wheezing  Cardiovascular: no chest  pain or dyspnea on exertion  Gastrointestinal: no abdominal pain, change in bowel habits, or black/ bloody stools  Musculoskeletal: negative for gait disturbance or muscular weakness  Neurological: no syncope or seizures; no ataxia  Dermatological: negative for pruritis,  rash and jaundice  Hematologic/lymphatic: no easy bruising, no new adenopathy      Physical Exam:    Vitals:    12/06/22 0812   BP: (!) 149/93   Pulse: 60         Constitutional:   He is oriented to person, place, and time. Vital signs are normal. He appears well-developed and well-nourished. He appears alert. He is cooperative.  Non-toxic appearance. Normal speech.      Head:  Normocephalic and atraumatic. Salivary glands normal.  Facial strength is normal.      Ears:  Hearing normal to normal and whispered voice; external ear normal without scars, lesions, or masses; ear canal, tympanic membrane, and middle ear normal., right ear hearing normal to normal and whispered voice; external ear normal without scars, lesions, or masses; ear canal, tympanic membrane, and middle ear normal. and left ear hearing normal to normal and whispered voice; external ear normal without scars, lesions, or masses; ear canal, tympanic membrane, and middle ear normal..   Right Ear: Tympanic membrane is not perforated, not erythematous and not retracted. No middle ear effusion.   Left Ear: Tympanic membrane is perforated. Tympanic membrane is not erythematous and not retracted.  No middle ear effusion.   Ears:    Perforation- well healed, no middle ear inflammation or otorrhea    Nose:  Mucosal edema (mild) present. No rhinorrhea, septal deviation, nasal septal hematoma or polyps. No epistaxis. No turbinate masses and no turbinate hypertrophy (2+ size).  Right sinus exhibits no maxillary sinus tenderness and no frontal sinus tenderness. Left sinus exhibits no maxillary sinus tenderness and no frontal sinus tenderness.     Mouth/Throat  Oropharynx clear and moist without  lesions or asymmetry, normal uvula midline, lips, teeth, and gums normal and oropharynx normal. Normal dentition. No uvula swelling or oral lesions. No oropharyngeal exudate, posterior oropharyngeal edema or posterior oropharyngeal erythema. +2.  Tonsils not present, tonsillar erythema, tonsillar hyperemia, tonsillar exudate.  Mirror exam not performed due to patient tolerance.  Mirror exam not performed due to patient tolerance.      Neck:  Neck normal without thyromegaly masses, asymmetry, normal tracheal structure, crepitus, and tenderness, thyroid normal, trachea normal, full range of motion with neck supple and no adenopathy. No JVD present. Carotid bruit is not present. Thyroid tenderness is present. No edema and no erythema present. No thyroid mass and no thyromegaly present.     He has no cervical adenopathy.     Cardiovascular:    Normal rate, regular rhythm, normal heart sounds and rate and rhythm, heart sounds, and pulses normal.              Pulmonary/Chest:   Effort and breath sounds normal.     Psychiatric:   He has a normal mood and affect. His speech is normal and behavior is normal.     Neurological:   He is alert and oriented to person, place, and time. He has neurological normal, alert and oriented. No cranial nerve deficit.     Skin:   No abrasions, lacerations, lesions, or rashes.       Audiogram: Interpreted by me and reviewed with the patient today.  Mild worsening of hearing from previous audiogram in 2015.  Overall slope unchanged.  Minimal conductive component noted on left.  Type a tympanogram right, could not seal on the left due to perforation.            Assessment:    ICD-10-CM ICD-9-CM    1. Sensorineural hearing loss (SNHL) of both ears  H90.3 389.18       2. Chronic otitis externa of both ears, unspecified type  H60.63 380.23 prednisoLONE acetate (PRED FORTE) 1 % DrpS      fluocinolone acetonide oiL (DERMOTIC OIL) 0.01 % Drop      3. Tympanic membrane perforation, left  H72.92  384.20         The primary encounter diagnosis was Sensorineural hearing loss (SNHL) of both ears. Diagnoses of Chronic otitis externa of both ears, unspecified type and Tympanic membrane perforation, left were also pertinent to this visit.      Plan:    Gave patient prescription for prednisolone drops to use in the left ear for ear itching.  May use Dermotic drops just on the external portions of the ear.  Derm otic drops given to be used on the right side, and gave patient instructions on prevention of chronic otitis externa.    Hearing minimally worsened since 2015, but no significant changes.  Patient will contact his hearing aid provider for evaluation and adjustment as needed.    Discuss the possibility of repair of the TM, but patient not interested at this time.    Follow-up in 1 year for annual audiogram, or sooner if needed.    Luz Maria Campos MD

## 2022-12-06 NOTE — PATIENT INSTRUCTIONS
Water precautions to left ear for perforation.    Prednisolone drops for left ear itching.    I would recommend the use of an over the counter moisturizing drop such as baby oil, mineral oil, Vitamin E oil, etc on a weekly basis.    You should avoid Qtip use in the ears.    If the ear feels wet, use a hairdryer on a cool setting to dry the ears.    Fluocinolone Oil (DermOtic) drops may have been prescribed and can be used daily or weekly as needed for itching.       Audiogram was reviewed in detail with the patient, and they understand their hearing loss.    Recommend getting hearing aids checked and adjusted by provider.    I recommend annual audiograms as well as hearing protection in noise.

## 2022-12-07 ENCOUNTER — DOCUMENTATION ONLY (OUTPATIENT)
Dept: CARDIAC REHAB | Facility: CLINIC | Age: 62
End: 2022-12-07

## 2022-12-07 ENCOUNTER — CLINICAL SUPPORT (OUTPATIENT)
Dept: CARDIAC REHAB | Facility: CLINIC | Age: 62
End: 2022-12-07
Payer: COMMERCIAL

## 2022-12-07 ENCOUNTER — TELEPHONE (OUTPATIENT)
Dept: ORTHOPEDICS | Facility: CLINIC | Age: 62
End: 2022-12-07
Payer: COMMERCIAL

## 2022-12-07 DIAGNOSIS — I25.10 ATHEROSCLEROSIS OF NATIVE CORONARY ARTERY OF NATIVE HEART, UNSPECIFIED WHETHER ANGINA PRESENT: ICD-10-CM

## 2022-12-07 DIAGNOSIS — Z95.1 POSTSURGICAL AORTOCORONARY BYPASS STATUS: Primary | ICD-10-CM

## 2022-12-07 DIAGNOSIS — M25.511 RIGHT SHOULDER PAIN, UNSPECIFIED CHRONICITY: Primary | ICD-10-CM

## 2022-12-07 PROCEDURE — 93798 PHYS/QHP OP CAR RHAB W/ECG: CPT | Mod: S$GLB,,,

## 2022-12-07 PROCEDURE — 93798 PR CARDIAC REHAB/MONITOR: ICD-10-PCS | Mod: S$GLB,,,

## 2022-12-07 NOTE — PROGRESS NOTES
Mr. Mason has completed 24 out of 36 exercise session of Phase II cardiac rehab.  A follow up reassessment will be completed at exit interview.     24 Session Follow Up   Cardiac Rehab Individual Treatment Plan - Reassessment      Patient Name: Isaiah Dorsey Jr. MRN: [id number]   : 1960   Age: 62 y.o.   Primary Diagnosis: CABG Date of Event: 22   EF: 40-45%  Risk Stratification: high  Referring Physician: ADRIANO   Exercise Assessment:     Angina with exercise?: No   ST Depression with Exercise?: No  Fall Risk: No   Assistive Devices:  independent  There were no limitations noted by the patient.  Comments on Progression: Mr. Mason is progressing fair and his workloads will continue to be increased as he tolerates exercise intensity.     Exercise Plan:   Goals:  CR Exercise Goals: Attend Cardiac Rehab 3 times/week: In Progress  Home Aerobic Exercise: 2 additional days/week for 30-60 minutes: In Progress  Intensity of 12-15 on the Rate of Perceived Exertion (RPE) scale: In Progress  30% increase in entry estimated METS: 7.8 : In Progress  5 days/week for 30-60 minutes: In Progress    Comments on Goal Progression:  Patient Consistency: inconsistent with attendance  Home exercise? No   Patient reports intensity rate 11-14 on RPE scale    Intervention/Recommendations:   Discussed importance of regular attendance to cardiac rehab class    Exercise Prescription:  THR Range 63-74   Mode: Treadmill  Nustep   Frequency:  3 days/week   Duration:  30-60 minutes   Intensity:  12-15 RPE   Resistance Training:  Yes: 3 to 5 lb weights with 10-15 reps based on strength and range of motion and adjusted accordingly     Home Prescription:  Mode Aerobic exercise   Frequency: 2- 3 days/week   Duration: 30-60 minutes   Resistance Training: None     Education:  Arthritis/Osteporosis; verbalizes understanding; Date: 22  Body Composition; verbalizes understanding; Date: 22  Exercise and Rehydration; verbalizes understanding;  Date: 11-21-22  Flexibility/Stretching; verbalizes understanding; Date: 10-24-22  Relaxation Techniques; verbalizes understanding; Date: 11-14-22    Comments:  I reviewed exercise recommendations with Mr. Mason.  I strongly encouraged him to begin exercising.  He stated understanding but he also stated he doesn't feel he has time.        The exercise prescription will continue to be adjusted based on tolerance of exercise intensity by patient.    Sharda Ceballos., CEP

## 2022-12-07 NOTE — PROGRESS NOTES
24 Session Follow Up   Cardiac Rehab Individual Treatment Plan - Reassessment    Patient Name: Isaiah Dorsey Jr. MRN: [id number]   : 1960   Age: 62 y.o.   Primary Diagnosis: s/p CABG, CAD, CHF, HLD, NSTEMI, DM    Nutrition Assessment:     Anthropometrics    Height 72 inches   Weight 288 lbs   BMI 39.1     Patient confirms he is taking lipitor 40mg for cholesterol control.  Difficulty Chewing or Swallowing: no  Current Exercise: See Exercise Physiologist Note  Food Safety/Food Preparation: self  Living Arrangements/Family Support: Lives with spouse  Cultural/Spiritual/Personal Preferences: not applicable   Barriers to Education: none identified  Stage of Change Related to Diet Habits: Action  Recent Changes to Diet: Yes - more produce intake  Food Diary: Completed      Nutrition Plan:   Goals:  LDL-C < 70 (for high risk patients)  Hgb A1c < 7%  BMI < 25 and abdominal girth < 40M/<35 F  2 gram sodium, Mediterranean diet: In Progress  Rehab weight loss goal of 10 lbs, 1 lb per week: Not Met: pt has gained 14lbs since starting rehab  Fish intake (non-fried varieties) to a goal of 2-3 servings per week: In Progress  Increase fruit and vegetable intake: In Progress    Comments on Goal Progression:  Discussed significant weight gain, pt is aware he has been making poor food choices and not exercising outside of rehab. Emphasized importance of portion control and lifestyle modification for improved health outcomes-pt verbalized understanding.    Interventions:  Dietitian Consult: No  Patient to participate in Cardiac Rehab sessions three times a week  Weekly Dietitian Weight Check  Nutrition Recommendations Provided: Verbal, Reviewed  Follow Up Plan for Ongoing Self-Management Support    Education:  Dining Out; verbalizes understanding; Date: 22  Mediterranean Diet; verbalizes understanding; Date: 22  Recipe Modication; verbalizes understanding; Date: 22  Vegetarianism; verbalizes understanding; Date:  12/5/22  Vitamins; verbalizes understanding; Date: 10/26/22    Comments:   Discussed ways to incorporate healthy snacks, eating on a schedule, and monitoring sodium intake for heart health.    Diabetes  Is the patient diabetic? Yes   Other Core Components/Diabetes Assessment:   Labs:  Lab Results   Component Value Date    GLUF 136 (H) 08/25/2022     Lab Results   Component Value Date    HGBA1C 6.2 (H) 08/25/2022    HGBA1C 7.2 (H) 07/28/2022    HGBA1C 7.6 (H) 07/12/2022      Lab Results   Component Value Date    ESTIMATEDAVG 131 08/25/2022    ESTIMATEDAVG 160 (H) 07/28/2022    ESTIMATEDAVG 171 (H) 07/12/2022       History of diabetes since 2018  Diabetes medications: Ozempic, Januvia 100mg daily, metformin 1000mg daily  Blood Glucose Checks at Home: Yes: blood sugars ac & HS  Typical morning range: 130-160 mg/dl  Endocrinologist or PCP following DM: Dr. Burrows    Other Core Components/Diabetes Plan:   Goals:  Hgb A1c < 7%  Exercise Blood Glucose: 100-300 mg/dl    Interventions:  Reviewed and Discussed Labs  Medication Compliance  Med Card Reconciled  Low Sodium, Mediterranean, ADA Diet  Weight Management  Physical Activity  Increase Knowledge of Contributory Factors  Home Monitoring    Education:  Risk Factors; verbalizes understanding; Date: 10/28/22  Stress; verbalizes understanding; Date: 11/11/22  Mediterranean Diet; verbalizes understanding; Date: 11/23/22  Dining Out; verbalizes understanding; Date: 11/9/22    Comments:   Patient verbalizes understanding to bring home glucometer and check glucose pre and post each exercise session.  Per cardiac rehab protocols, patient's glucose must be between 90 and 270 mg/dL to exercise.  Patient denies any recent glucose levels less than 60 mg/dL or greater than 300 mg/dL. Patient verbalizes importance of notifying rehab staff if symptoms of hypoglycemia occur while at cardiac rehab.  Abnormal labs will be reported to patient's PCP/Endocrinologist by rehab staff.    Noted  updated glucose parameters of 100-300    Saritamikayla Tirado, MS, RDN/LDN

## 2022-12-07 NOTE — TELEPHONE ENCOUNTER
Spoke to Pt on 12/7/22 @ 1:29 pm. I wanted to be clear that it wasn't the pt's shoulder . Indeed it is and Pt asked me to cancelled appointment.

## 2022-12-08 ENCOUNTER — HOSPITAL ENCOUNTER (OUTPATIENT)
Dept: RADIOLOGY | Facility: HOSPITAL | Age: 62
Discharge: HOME | End: 2022-12-08
Attending: PHYSICIAN ASSISTANT
Payer: COMMERCIAL

## 2022-12-08 ENCOUNTER — DOCUMENTATION ONLY (OUTPATIENT)
Dept: CARDIAC REHAB | Facility: CLINIC | Age: 62
End: 2022-12-08
Payer: COMMERCIAL

## 2022-12-08 ENCOUNTER — OFFICE VISIT (OUTPATIENT)
Dept: SPORTS MEDICINE | Facility: CLINIC | Age: 62
End: 2022-12-08
Payer: COMMERCIAL

## 2022-12-08 VITALS
BODY MASS INDEX: 39.57 KG/M2 | WEIGHT: 292.13 LBS | SYSTOLIC BLOOD PRESSURE: 142 MMHG | DIASTOLIC BLOOD PRESSURE: 87 MMHG | HEART RATE: 68 BPM | HEIGHT: 72 IN

## 2022-12-08 DIAGNOSIS — Z95.1 POSTSURGICAL AORTOCORONARY BYPASS STATUS: Primary | ICD-10-CM

## 2022-12-08 DIAGNOSIS — G89.29 CHRONIC RIGHT SHOULDER PAIN: Primary | ICD-10-CM

## 2022-12-08 DIAGNOSIS — S46.001A ROTATOR CUFF INJURY, RIGHT, INITIAL ENCOUNTER: ICD-10-CM

## 2022-12-08 DIAGNOSIS — I25.10 ATHEROSCLEROSIS OF NATIVE CORONARY ARTERY OF NATIVE HEART, UNSPECIFIED WHETHER ANGINA PRESENT: ICD-10-CM

## 2022-12-08 DIAGNOSIS — M25.511 CHRONIC RIGHT SHOULDER PAIN: Primary | ICD-10-CM

## 2022-12-08 DIAGNOSIS — M25.511 RIGHT SHOULDER PAIN, UNSPECIFIED CHRONICITY: ICD-10-CM

## 2022-12-08 PROCEDURE — 3077F PR MOST RECENT SYSTOLIC BLOOD PRESSURE >= 140 MM HG: ICD-10-PCS | Mod: CPTII,S$GLB,, | Performed by: PHYSICIAN ASSISTANT

## 2022-12-08 PROCEDURE — 3044F HG A1C LEVEL LT 7.0%: CPT | Mod: CPTII,S$GLB,, | Performed by: PHYSICIAN ASSISTANT

## 2022-12-08 PROCEDURE — 3008F PR BODY MASS INDEX (BMI) DOCUMENTED: ICD-10-PCS | Mod: CPTII,S$GLB,, | Performed by: PHYSICIAN ASSISTANT

## 2022-12-08 PROCEDURE — 1159F PR MEDICATION LIST DOCUMENTED IN MEDICAL RECORD: ICD-10-PCS | Mod: CPTII,S$GLB,, | Performed by: PHYSICIAN ASSISTANT

## 2022-12-08 PROCEDURE — 73030 XR SHOULDER COMPLETE 2 OR MORE VIEWS RIGHT: ICD-10-PCS | Mod: 26,RT,, | Performed by: RADIOLOGY

## 2022-12-08 PROCEDURE — 99999 PR PBB SHADOW E&M-EST. PATIENT-LVL III: ICD-10-PCS | Mod: PBBFAC,,, | Performed by: PHYSICIAN ASSISTANT

## 2022-12-08 PROCEDURE — 99204 OFFICE O/P NEW MOD 45 MIN: CPT | Mod: S$GLB,,, | Performed by: PHYSICIAN ASSISTANT

## 2022-12-08 PROCEDURE — 99999 PR PBB SHADOW E&M-EST. PATIENT-LVL III: CPT | Mod: PBBFAC,,, | Performed by: PHYSICIAN ASSISTANT

## 2022-12-08 PROCEDURE — 99204 PR OFFICE/OUTPT VISIT, NEW, LEVL IV, 45-59 MIN: ICD-10-PCS | Mod: S$GLB,,, | Performed by: PHYSICIAN ASSISTANT

## 2022-12-08 PROCEDURE — 3077F SYST BP >= 140 MM HG: CPT | Mod: CPTII,S$GLB,, | Performed by: PHYSICIAN ASSISTANT

## 2022-12-08 PROCEDURE — 1160F PR REVIEW ALL MEDS BY PRESCRIBER/CLIN PHARMACIST DOCUMENTED: ICD-10-PCS | Mod: CPTII,S$GLB,, | Performed by: PHYSICIAN ASSISTANT

## 2022-12-08 PROCEDURE — 1160F RVW MEDS BY RX/DR IN RCRD: CPT | Mod: CPTII,S$GLB,, | Performed by: PHYSICIAN ASSISTANT

## 2022-12-08 PROCEDURE — 73030 X-RAY EXAM OF SHOULDER: CPT | Mod: TC,PN,RT

## 2022-12-08 PROCEDURE — 3079F PR MOST RECENT DIASTOLIC BLOOD PRESSURE 80-89 MM HG: ICD-10-PCS | Mod: CPTII,S$GLB,, | Performed by: PHYSICIAN ASSISTANT

## 2022-12-08 PROCEDURE — 1159F MED LIST DOCD IN RCRD: CPT | Mod: CPTII,S$GLB,, | Performed by: PHYSICIAN ASSISTANT

## 2022-12-08 PROCEDURE — 3044F PR MOST RECENT HEMOGLOBIN A1C LEVEL <7.0%: ICD-10-PCS | Mod: CPTII,S$GLB,, | Performed by: PHYSICIAN ASSISTANT

## 2022-12-08 PROCEDURE — 3079F DIAST BP 80-89 MM HG: CPT | Mod: CPTII,S$GLB,, | Performed by: PHYSICIAN ASSISTANT

## 2022-12-08 PROCEDURE — 73030 X-RAY EXAM OF SHOULDER: CPT | Mod: 26,RT,, | Performed by: RADIOLOGY

## 2022-12-08 PROCEDURE — 3008F BODY MASS INDEX DOCD: CPT | Mod: CPTII,S$GLB,, | Performed by: PHYSICIAN ASSISTANT

## 2022-12-08 NOTE — PROGRESS NOTES
Mr. Mason has completed 24 out of 36 exercise session of Phase II cardiac rehab.  A follow up reassessment will be completed at exit interview.     24 Session Follow Up   Cardiac Rehab Individual Treatment Plan - Reassessment      Patient Name: Isaiah Dorsey Jr. MRN: [id number]   : 1960   Age: 62 y.o.   Primary Diagnosis: CABG Date of Event: 22   EF: 40-45%  Risk Stratification: high  Referring Physician: ADRIANO   Exercise Assessment:     Angina with exercise?: No   ST Depression with Exercise?: No  Fall Risk: No   Assistive Devices:  independent  There were no limitations noted by the patient.  Comments on Progression: Mr. Mason is progressing fair and his workloads will continue to be increased as he tolerates exercise intensity.     Exercise Plan:   Goals:  CR Exercise Goals: Attend Cardiac Rehab 3 times/week: In Progress  Home Aerobic Exercise: 2 additional days/week for 30-60 minutes: In Progress  Intensity of 12-15 on the Rate of Perceived Exertion (RPE) scale: In Progress  30% increase in entry estimated METS: 7.8 : In Progress  5 days/week for 30-60 minutes: In Progress    Comments on Goal Progression:  Patient Consistency: inconsistent with attendance  Home exercise? No   Patient reports intensity rate 11-14 on RPE scale    Intervention/Recommendations:   Discussed importance of regular attendance to cardiac rehab class    Exercise Prescription:  THR Range 63-74   Mode: Treadmill  Nustep   Frequency:  3 days/week   Duration:  30-60 minutes   Intensity:  12-15 RPE   Resistance Training:  Yes: 3 to 5 lb weights with 10-15 reps based on strength and range of motion and adjusted accordingly     Home Prescription:  Mode Aerobic exercise   Frequency: 2- 3 days/week   Duration: 30-60 minutes   Resistance Training: None     Education:  Arthritis/Osteporosis; verbalizes understanding; Date: 22  Body Composition; verbalizes understanding; Date: 22  Exercise and Rehydration; verbalizes understanding;  Date: 22  Flexibility/Stretching; verbalizes understanding; Date: 10-24-22  Relaxation Techniques; verbalizes understanding; Date: 22    Comments:  I reviewed exercise recommendations with Mr. Mason.  I strongly encouraged him to begin exercising.  He stated understanding but he also stated he doesn't feel he has time.        The exercise prescription will continue to be adjusted based on tolerance of exercise intensity by patient.    Jorje Ceballos, CEP     24 Session Follow Up   Cardiac Rehab Individual Treatment Plan - Reassessment    Patient Name: Isaiah Dorsey Jr. MRN: [id number]   : 1960   Age: 62 y.o.   Primary Diagnosis: s/p CABG, CAD, CHF, HLD, NSTEMI, DM    Nutrition Assessment:     Anthropometrics    Height 72 inches   Weight 288 lbs   BMI 39.1     Patient confirms he is taking lipitor 40mg for cholesterol control.  Difficulty Chewing or Swallowing: no  Current Exercise: See Exercise Physiologist Note  Food Safety/Food Preparation: self  Living Arrangements/Family Support: Lives with spouse  Cultural/Spiritual/Personal Preferences: not applicable   Barriers to Education: none identified  Stage of Change Related to Diet Habits: Action  Recent Changes to Diet: Yes - more produce intake  Food Diary: Completed      Nutrition Plan:   Goals:  LDL-C < 70 (for high risk patients)  Hgb A1c < 7%  BMI < 25 and abdominal girth < 40M/<35 F  2 gram sodium, Mediterranean diet: In Progress  Rehab weight loss goal of 10 lbs, 1 lb per week: Not Met: pt has gained 14lbs since starting rehab  Fish intake (non-fried varieties) to a goal of 2-3 servings per week: In Progress  Increase fruit and vegetable intake: In Progress    Comments on Goal Progression:  Discussed significant weight gain, pt is aware he has been making poor food choices and not exercising outside of rehab. Emphasized importance of portion control and lifestyle modification for improved health outcomes-pt verbalized  understanding.    Interventions:  Dietitian Consult: No  Patient to participate in Cardiac Rehab sessions three times a week  Weekly Dietitian Weight Check  Nutrition Recommendations Provided: Verbal, Reviewed  Follow Up Plan for Ongoing Self-Management Support    Education:  Dining Out; verbalizes understanding; Date: 11/9/22  Mediterranean Diet; verbalizes understanding; Date: 11/23/22  Recipe Modication; verbalizes understanding; Date: 11/16/22  Vegetarianism; verbalizes understanding; Date: 12/5/22  Vitamins; verbalizes understanding; Date: 10/26/22    Comments:   Discussed ways to incorporate healthy snacks, eating on a schedule, and monitoring sodium intake for heart health.    Diabetes  Is the patient diabetic? Yes   Other Core Components/Diabetes Assessment:   Labs:  Lab Results   Component Value Date    GLUF 136 (H) 08/25/2022     Lab Results   Component Value Date    HGBA1C 6.2 (H) 08/25/2022    HGBA1C 7.2 (H) 07/28/2022    HGBA1C 7.6 (H) 07/12/2022      Lab Results   Component Value Date    ESTIMATEDAVG 131 08/25/2022    ESTIMATEDAVG 160 (H) 07/28/2022    ESTIMATEDAVG 171 (H) 07/12/2022       History of diabetes since 2018  Diabetes medications: Ozempic, Januvia 100mg daily, metformin 1000mg daily  Blood Glucose Checks at Home: Yes: blood sugars ac & HS  Typical morning range: 130-160 mg/dl  Endocrinologist or PCP following DM: Dr. Burrows    Other Core Components/Diabetes Plan:   Goals:  Hgb A1c < 7%  Exercise Blood Glucose: 100-300 mg/dl    Interventions:  Reviewed and Discussed Labs  Medication Compliance  Med Card Reconciled  Low Sodium, Mediterranean, ADA Diet  Weight Management  Physical Activity  Increase Knowledge of Contributory Factors  Home Monitoring    Education:  Risk Factors; verbalizes understanding; Date: 10/28/22  Stress; verbalizes understanding; Date: 11/11/22  Mediterranean Diet; verbalizes understanding; Date: 11/23/22  Dining Out; verbalizes understanding; Date: 11/9/22    Comments:    Patient verbalizes understanding to bring home glucometer and check glucose pre and post each exercise session.  Per cardiac rehab protocols, patient's glucose must be between 90 and 270 mg/dL to exercise.  Patient denies any recent glucose levels less than 60 mg/dL or greater than 300 mg/dL. Patient verbalizes importance of notifying rehab staff if symptoms of hypoglycemia occur while at cardiac rehab.  Abnormal labs will be reported to patient's PCP/Endocrinologist by rehab staff.    Noted updated glucose parameters of 100-300    Sarita Tirado MS, RDN/LDN     Session: 12 Session Follow Up   Cardiac Rehab Individual Treatment Plan - Reassessment      Patient Name: Isaiah Dorsey Jr. MRN: [id number]   : 1960   Age: 62 y.o.   Primary Diagnosis: S/P CABG      Psychosocial Assessment:   Living Arrangements: Lives with spouse  Family Support: children, grandchildren, and spouse  Self Reported:  self reports  Effective Coping Skills  Displays: happiness and calmness  Medication: not applicable    Psychosocial Plan:   Goals:  Improved psychosocial coping strategies: In Progress  Reduce manifestation of stress: In Progress  Maintain positive support system: In Progress  Maintain positive outlook: In Progress  Improve overall quality of life: In Progress    Comments on Goal Progression:  Patient feels he is making progress toward his goals.    Interventions:  Patient to Self Report Emotional Changes at Session Check In  Recommend Physical Activity  Recommend Attending Education Lectures  Notify MD: No  Program Referral: No  Pharmaceutical Intervention/Therapy: No  Other Needs: not applicable  Stage of Readiness to Change: Action    Education:  Stress; verbalizes understanding; Date: 9/15/22  Risk Factors; verbalizes understanding; Date: 9/15/22    Comments:  Patient reports his stress level is always high but feels he manages it well. Discussed effects of stress on cardiac health pt verbalized understanding.  Patient has been instructed to notify staff in the event that circumstances worsen.  Patient verbalizes understanding.    Other Core Components/Risk Factors Assessment:   RISK FACTORS:  diabetes, hyperlipidemia, hypertension, obesity, positive family history, sedentary lifestyle, stress    Learning Barriers: None    Medication Compliance: has been compliant with the medicaiton regimen    Other Core Components/Risk Factors Plan:   Goals:  Decrease cholesterol level: In Progress  Increase exercise tolerance: In Progress  Increase knowledge of CAD: In Progress  Decrease blood pressure: In Progress  Weight loss: In Progress  Demonstrate accurate pulse taking: In Progress  Identify and manage personal areas of stress: In Progress  Control diabetes by adjusting diet and exercise: In Progress  Learn more about healthy eating: In Progress    Comments on Goal Progression:  Patient feels he is making progress.    Interventions:  Individual Education/ Counseling: Yes  Physician Referral: No    Education:    physical activity, verbalizes understanding; Date: 10/3/22  risk factors, verbalizes understanding; Date: 9/15/22  sodium, verbalizes understanding; Date: 10/19/22  stress, verbalizes understanding; Date: 9/15/22         Education method adapted to patients education level and preferred method of learning.  Method: explanation  handouts    Comments:  Patient is motivated about making lifestyle changes to improve cardiac health.    Other Core Components/Hypertension Assessment:   BP Range: 100-142/66-94  BP at Goal: No some isolated high readings   Patient reported symptoms: none    Other Core Components/Hypertension Plan:   Goals:  Blood Pressure <130/80    Comments on Goal Progression:  Patient feels he is making progress toward goals.    Interventions:  Med Card Reconciled: Yes  Encourage medication compliance  Encourage sodium reduction  Encourage weight loss  Recommend physical activity  Educate on contributory  factors  Reduce stress, anxiety, anger, depression, and/or chronic pain  Encourage home blood pressure monitoring  Recommend daily weights    Education:    Coronary Artery Disease; verbalizes understanding; Date: 10/14/22  Risk Factors; verbalizes understanding; Date: 9/15/22  Stress; verbalizes understanding; Date: 9/15/22         Comments:  Patient not currently taking his blood pressure at home. Discussed importance of taking his blood pressure at home and keeping a log for his appts patient verbalized understanding.    Does the patient have Heart Failure? Yes   Other Core Components/Congestive Heart Failure Assessment:   Ejection Fraction: 40-45 %  Patient reported symptoms: None  Lung Sounds: normal air entry, lungs clear to auscultation  Right Leg Edema: none  Left Leg Edema none    Other Core Components/Congestive Heart Failure Plan:   Goals:  Patient free from CHF Exacerbation    Comments on Goal Progression:  Patient has been free from exacerbation of CHF.    Interventions/Recommendations:  Encourage medication compliance  Encourage daily weight checks  Promote sodium reduction  Encourage tracking fluid intake  Promote physical activity  Educate on contributory factors  Recommend home blood pressure monitoring    Education:    Congestive Heart Failure; verbalizes understanding; Date: 10/21/22  Risk Factors; verbalizes understanding; Date: 9/15/22  Sodium; verbalizes understanding; Date: 10/19/22         Comments:  Patient focusing on portion control, increased fruits and vegetables, exercise to improve his cardiac health.      Other Core Components/Tobacco Cessation Assessment:   Smoking Status: past smoker who quit many years ago  Smoking Cessation Barriers:  NA  Stage of Readiness to Change: Maintenance    Other Core Components/Tobacco Cessation Plan:   Goals:  Maintain non-smoking status    Comments on Goal Progression:  Maintain nonsmoker status    Interventions:  Maintains non-smoking  status    Education:    Coronary Artery Disease; verbalizes understanding; Date: 10/14/22  Risk Factors; verbalizes understanding; Date: 9/15/22         Comments:  Patient is motivated to make lifestyle changes to improve cardiac health. He attends regularly and participates in lectures.    Ranjit Osuna RN  Cardiac Rehab Nurse

## 2022-12-08 NOTE — PROGRESS NOTES
NEW PATIENT    HISTORY OF PRESENT ILLNESS   62 y.o. Male with a history of right shoulder pain who works as a .  He complains today of having progressive right shoulder pain over the last 2-3 years.  He denies having any precipitating injury or trauma.  He states his pain is exacerbated by activity.  He recently had open heart surgery in July and has been undergoing cardiac rehab.  He states that he has had to decrease the weight when exercising due to his pain and weakness.        + mechanical symptoms, - instability    Is affecting ADLs.  Pain is 8/10 at it's worst.        PAST MEDICAL HISTORY    Past Medical History:   Diagnosis Date    CHF (congestive heart failure)     Coronary artery disease     Diabetes mellitus type 2, uncontrolled, without complications     6.7% Metf 1 g qd, eye 2015, U- F-2015,     Elevated platelet count 07/23/2015    H/O splenectomy 04/24/2015    doesn't like pneumovax bc caused side effects    HLD (hyperlipidemia) 01/22/2015    goal LDL < 100, reluctant to take statin    HTN (hypertension), benign 01/22/2015    losartan 100    Morbid obesity with BMI of 45.0-49.9, adult 06/11/2014    RODRIGUEZ (obstructive sleep apnea)     PAF (paroxysmal atrial fibrillation)     Polyp of transverse colon 09/05/2018 2018, repeat 2023    Severe uncontrolled hypertension 06/11/2014       PAST SURGICAL HISTORY     Past Surgical History:   Procedure Laterality Date    AORTOGRAPHY N/A 7/11/2022    Procedure: AORTOGRAM;  Surgeon: Bjorn Cheatham MD;  Location: [facility] CATH LAB;  Service: Cardiology;  Laterality: N/A;    COLONOSCOPY N/A 11/22/2022    Procedure: COLONOSCOPY;  Surgeon: Castro Jasso MD;  Location: [facility] ENDO ([address] [place] [address]);  Service: Endoscopy;  Laterality: N/A;  ok to hold Xarelto-see telephone encounter dated 11/1  inst via portal  pre call done    CORONARY ARTERY BYPASS GRAFT (CABG) Left 7/18/2022    Procedure: CORONARY ARTERY BYPASS GRAFT (CABG);  Surgeon: Blu Rhodes MD;   [facility];  Service: Cardiovascular;  Laterality: Left;  CABG x 1 (LIMA to LAD)    ENDOSCOPIC HARVEST OF VEIN Left 7/18/2022    Procedure: HARVEST-VEIN-ENDOVASCULAR;  Surgeon: Blu Rhodes MD;  Location: [facility] OR [facility] FLR;  Service: Cardiovascular;  Laterality: Left;  Vein [place] Start time: 0823am  Vein [place] Stop time: 0836am    Vein [place] Start time: 0848am  Vein [place] Stop time: 0904am    LEFT HEART CATHETERIZATION N/A 7/11/2022    Procedure: Left heart cath;  Surgeon: Bjorn Cheatham MD;  Location: [facility] CATH LAB;  Service: Cardiology;  Laterality: N/A;    splenecectomy         FAMILY HISTORY    Family History   Problem Relation Age of Onset    Lung cancer Mother     Hypertension Mother     Heart attack Mother 58    Heart disease Mother     Stroke Mother     Heart failure Mother     Hyperlipidemia Mother     Asbestos Mother     Diabetes Father     Colon cancer Father 65    Coronary artery disease Brother     Coronary artery disease Maternal Grandmother        SOCIAL HISTORY    Social History     Socioeconomic History    Marital status:     Number of children: 2   Tobacco Use    Smoking status: Former     Types: Cigarettes    Smokeless tobacco: Never   Substance and Sexual Activity    Alcohol use: Yes     Alcohol/week: 0.0 standard drinks     Comment: 1 a week    Drug use: Never    Sexual activity: Yes     Partners: Female   Social History Narrative     , Quit smoking 20 yrs, ETOH 2 beers on weekend, colonoscopy 2011       MEDICATIONS      Current Outpatient Medications:     amiodarone (PACERONE) 400 MG tablet, Take by mouth., Disp: , Rfl:     aspirin 325 MG tablet, Take 1 tablet (325 mg total) by mouth once daily., Disp: 30 tablet, Rfl: 11    atorvastatin (LIPITOR) 80 MG tablet, Take 1 tablet (80 mg total) by mouth once daily., Disp: 90 tablet, Rfl: 3    fluocinolone acetonide oiL (DERMOTIC OIL) 0.01 % Drop, Place 3 drops into the right ear 2 (two) times daily., Disp:  20 mL, Rfl: 3    ketoconazole (NIZORAL) 2 % cream, Apply topically 2 (two) times daily. Prn rash face, Disp: 60 g, Rfl: 3    ketoconazole (NIZORAL) 2 % shampoo, Apply topically once daily. Face and scalp soaks, Disp: 120 mL, Rfl: 4    metFORMIN (GLUCOPHAGE) 1000 MG tablet, Take 1 tablet (1,000 mg total) by mouth 2 (two) times daily with meals., Disp: 180 tablet, Rfl: 3    metoprolol succinate (TOPROL-XL) 100 MG 24 hr tablet, Take 1 tablet (100 mg total) by mouth once daily., Disp: 90 tablet, Rfl: 3    prednisoLONE acetate (PRED FORTE) 1 % DrpS, Apply 2 drops  to left ear BID, Disp: 10 mL, Rfl: 0    rivaroxaban (XARELTO) 20 mg Tab, Take 1 tablet (20 mg total) by mouth daily with dinner or evening meal., Disp: 90 tablet, Rfl: 3    SITagliptin (JANUVIA) 100 MG Tab, Take 1 tablet (100 mg total) by mouth once daily., Disp: 90 tablet, Rfl: 3    ALLERGIES     Review of patient's allergies indicates:   Allergen Reactions    Penicillins      Other reaction(s): Itching  Other reaction(s): Hives    Wasp venom Edema         REVIEW OF SYSTEMS   Constitution: Negative. Negative for chills, fever and night sweats.   HENT: Negative for congestion and headaches.    Eyes: Negative for blurred vision, left vision loss and right vision loss.   Cardiovascular: Negative for chest pain and syncope.   Respiratory: Negative for cough and shortness of breath.    Endocrine: Negative for polydipsia, polyphagia and polyuria.   Hematologic/Lymphatic: Negative for bleeding problem. Does not bruise/bleed easily.   Skin: Negative for dry skin, itching and rash.   Musculoskeletal: Negative for falls. Positive for right shoulder pain and weakness.  Gastrointestinal: Negative for abdominal pain and bowel incontinence.   Genitourinary: Negative for bladder incontinence and nocturia.   Neurological: Negative for disturbances in coordination, loss of balance and seizures.   Psychiatric/Behavioral: Negative for depression. The patient does not have  insomnia.    Allergic/Immunologic: Negative for hives and persistent infections.     PHYSICAL EXAMINATION    Vitals: BP (!) 142/87   Pulse 68   Ht 6' (1.829 m)   Wt 132.5 kg (292 lb 1.8 oz)   BMI 39.62 kg/m²     General: The patient appears active and healthy with no apparent physical problems.  No disturbance of mood or affect is demonstrated. Alert and Oriented.    HEENT: Eyes normal, pupils equally round, nose normal.    Resp: Equal and symmetrical chest rises. No wheezing    CV: Regular rate    Neck: Supple; nonpainful range of motion.    Extremities: no cyanosis, clubbing, edema, or diffuse swelling.  Palpable pulses, good capillary refill of the digits.  No coolness, discoloration, edema or obvious varicosities.    Skin: no lesions noted.    Lymphatic: no detected adenopathy in the upper or lower extremities.    Neurologic: normal mental status, normal reflexes, normal gait and balance.  Patient is alert and oriented to person, place and time.  No flaccidity or spasticity is noted.  No motor or sensory deficits are noted.  Light touch is intact    Orthopaedic: Shoulder Musculoskeletal Exam    Inspection    Right      Right shoulder inspection is normal.      Ecchymosis: none      Atrophy: none      Symmetry: symmetric      Masses: none      Skin tenting: none      Prior incision: none    Palpation    Right      Crepitus: no crepitus      Increased warmth: none      Tenderness: present        Anterior shoulder: none        Posterior shoulder: none        Clavicle: none        AC joint: none        Sternoclavicular joint: none        Rotator cuff: moderate        Greater tuberosity: mild        Trapezius: none        Medial scapula: none        Superior pole of scapula: moderate        Inferior pole of scapula: none        Bicipital groove: none        Proximal biceps: none    Range of Motion    Right      Active ROM: normal and pain.       Passive ROM: normal and pain.       Active forward elevation: 150.        Passive forward elevation: 160.       Shoulder active abduction: 60.       Passive abduction: 80.       Active external rotation at side: 30.       Passive external rotation at side: 40.       Active external rotation in abduction: 30.       Passive external rotation in abduction: 40.       Internal rotation: L3.     Left      Internal rotation: T7.     Strength    Right      External rotation: 4/5. External rotation is affected by pain.       Internal rotation: 5/5.       Abduction: 4/5. Abduction is affected by pain.       Biceps: 5/5.       Triceps: 5/5.     Neurovascular    Right      Right shoulder nerve sensation is normal.    Scapula    Right      Right shoulder scapula is normal.    Special Tests    Right    Rotator Cuff Signs      Neer's test: positive       Flowers test: positive       Supraspinatus: positive       Painful arc test: positive     Biceps/nic Signs      [place]'s test: negative       Clicking/popping: negative     AC Joint Signs      Active horizontal adduction pain: negative     Instability Signs      Joint laxity: negative       IMAGING    Xrays include 3 View right shoulder are ordered / images reviewed by me:  No fractures or dislocations noted.  No arthritis of the glenohumeral joint.  No arthritis of the AC joint.  No subluxation of the humeral head.  No evidence of a Hill-Sachs lesion.  Type 2 acromion.      IMPRESSION     62-year-old gentleman with progressive right shoulder pain and weakness over the past 2-3 years.  X-rays are negative for any acute findings or advanced osteoarthritis.  His history and physical examination are concerning for a degenerative tear of his rotator cuff.  He has failed conservative measures with prescription strength anti-inflammatories and physical therapy.  Before considering a diagnostic subacromial injection done under ultrasound guidance, I recommend getting an MRI for further evaluation of the rotator cuff.      ICD-10-CM ICD-9-CM   1.  Chronic right shoulder pain  M25.511 719.41    G89.29 338.29   2. Rotator cuff injury, right, initial encounter  S46.001A 959.2       MEDICATIONS PRESCRIBED      None    RECOMMENDATIONS     MRI right shoulder for suspected rotator cuff tear  RTC in 1 week to discuss results      All questions were answered, pt will contact us for questions or concerns in the interim.

## 2022-12-09 ENCOUNTER — CLINICAL SUPPORT (OUTPATIENT)
Dept: CARDIAC REHAB | Facility: CLINIC | Age: 62
End: 2022-12-09
Payer: COMMERCIAL

## 2022-12-09 DIAGNOSIS — I25.10 CORONARY ATHEROSCLEROSIS OF NATIVE CORONARY ARTERY: ICD-10-CM

## 2022-12-09 DIAGNOSIS — Z95.1 POSTSURGICAL AORTOCORONARY BYPASS STATUS: Primary | ICD-10-CM

## 2022-12-09 PROCEDURE — 93798 PR CARDIAC REHAB/MONITOR: ICD-10-PCS | Mod: S$GLB,,, | Performed by: INTERNAL MEDICINE

## 2022-12-09 PROCEDURE — 93798 PHYS/QHP OP CAR RHAB W/ECG: CPT | Mod: S$GLB,,, | Performed by: INTERNAL MEDICINE

## 2022-12-12 ENCOUNTER — CLINICAL SUPPORT (OUTPATIENT)
Dept: CARDIAC REHAB | Facility: CLINIC | Age: 62
End: 2022-12-12
Payer: COMMERCIAL

## 2022-12-12 DIAGNOSIS — Z95.1 POSTSURGICAL AORTOCORONARY BYPASS STATUS: Primary | ICD-10-CM

## 2022-12-12 DIAGNOSIS — I25.10 ATHEROSCLEROSIS OF NATIVE CORONARY ARTERY OF NATIVE HEART WITHOUT ANGINA PECTORIS: ICD-10-CM

## 2022-12-12 PROCEDURE — 93798 PHYS/QHP OP CAR RHAB W/ECG: CPT | Mod: S$GLB,,, | Performed by: INTERNAL MEDICINE

## 2022-12-12 PROCEDURE — 93798 PR CARDIAC REHAB/MONITOR: ICD-10-PCS | Mod: S$GLB,,, | Performed by: INTERNAL MEDICINE

## 2022-12-14 ENCOUNTER — CLINICAL SUPPORT (OUTPATIENT)
Dept: CARDIAC REHAB | Facility: CLINIC | Age: 62
End: 2022-12-14
Payer: COMMERCIAL

## 2022-12-14 ENCOUNTER — HOSPITAL ENCOUNTER (OUTPATIENT)
Dept: RADIOLOGY | Facility: OTHER | Age: 62
Discharge: HOME | End: 2022-12-14
Attending: PHYSICIAN ASSISTANT
Payer: COMMERCIAL

## 2022-12-14 DIAGNOSIS — G89.29 CHRONIC RIGHT SHOULDER PAIN: ICD-10-CM

## 2022-12-14 DIAGNOSIS — I25.10 ATHEROSCLEROSIS OF NATIVE CORONARY ARTERY OF NATIVE HEART, UNSPECIFIED WHETHER ANGINA PRESENT: ICD-10-CM

## 2022-12-14 DIAGNOSIS — S46.001A ROTATOR CUFF INJURY, RIGHT, INITIAL ENCOUNTER: ICD-10-CM

## 2022-12-14 DIAGNOSIS — M25.511 CHRONIC RIGHT SHOULDER PAIN: ICD-10-CM

## 2022-12-14 DIAGNOSIS — Z95.1 POSTSURGICAL AORTOCORONARY BYPASS STATUS: Primary | ICD-10-CM

## 2022-12-14 PROCEDURE — 73221 MRI JOINT UPR EXTREM W/O DYE: CPT | Mod: 26,RT,, | Performed by: RADIOLOGY

## 2022-12-14 PROCEDURE — 73221 MRI JOINT UPR EXTREM W/O DYE: CPT | Mod: TC,RT

## 2022-12-14 PROCEDURE — 73221 MRI SHOULDER WITHOUT CONTRAST RIGHT: ICD-10-PCS | Mod: 26,RT,, | Performed by: RADIOLOGY

## 2022-12-14 PROCEDURE — 93798 PHYS/QHP OP CAR RHAB W/ECG: CPT | Mod: S$GLB,,, | Performed by: INTERNAL MEDICINE

## 2022-12-14 PROCEDURE — 93798 PR CARDIAC REHAB/MONITOR: ICD-10-PCS | Mod: S$GLB,,, | Performed by: INTERNAL MEDICINE

## 2022-12-15 ENCOUNTER — PATIENT MESSAGE (OUTPATIENT)
Dept: CARDIOLOGY | Facility: CLINIC | Age: 62
End: 2022-12-15
Payer: COMMERCIAL

## 2022-12-15 ENCOUNTER — OFFICE VISIT (OUTPATIENT)
Dept: UROLOGY | Facility: CLINIC | Age: 62
End: 2022-12-15
Payer: COMMERCIAL

## 2022-12-15 ENCOUNTER — TELEPHONE (OUTPATIENT)
Dept: SPORTS MEDICINE | Facility: CLINIC | Age: 62
End: 2022-12-15
Payer: COMMERCIAL

## 2022-12-15 ENCOUNTER — PATIENT MESSAGE (OUTPATIENT)
Dept: SPORTS MEDICINE | Facility: CLINIC | Age: 62
End: 2022-12-15
Payer: COMMERCIAL

## 2022-12-15 ENCOUNTER — OFFICE VISIT (OUTPATIENT)
Dept: SPORTS MEDICINE | Facility: CLINIC | Age: 62
End: 2022-12-15
Payer: COMMERCIAL

## 2022-12-15 VITALS
SYSTOLIC BLOOD PRESSURE: 162 MMHG | HEIGHT: 72 IN | BODY MASS INDEX: 39.28 KG/M2 | WEIGHT: 290 LBS | DIASTOLIC BLOOD PRESSURE: 90 MMHG | HEART RATE: 68 BPM

## 2022-12-15 VITALS
WEIGHT: 290.38 LBS | SYSTOLIC BLOOD PRESSURE: 177 MMHG | BODY MASS INDEX: 39.33 KG/M2 | HEIGHT: 72 IN | DIASTOLIC BLOOD PRESSURE: 96 MMHG | HEART RATE: 58 BPM

## 2022-12-15 DIAGNOSIS — M75.21 BICEPS TENDINITIS OF RIGHT SHOULDER: ICD-10-CM

## 2022-12-15 DIAGNOSIS — S46.011A TRAUMATIC COMPLETE TEAR OF RIGHT ROTATOR CUFF, INITIAL ENCOUNTER: Primary | ICD-10-CM

## 2022-12-15 DIAGNOSIS — R39.12 BENIGN PROSTATIC HYPERPLASIA WITH WEAK URINARY STREAM: Primary | ICD-10-CM

## 2022-12-15 DIAGNOSIS — N52.9 ERECTILE DYSFUNCTION, UNSPECIFIED ERECTILE DYSFUNCTION TYPE: ICD-10-CM

## 2022-12-15 DIAGNOSIS — S43.431A GLENOID LABRAL TEAR, RIGHT, INITIAL ENCOUNTER: ICD-10-CM

## 2022-12-15 DIAGNOSIS — N40.1 BENIGN PROSTATIC HYPERPLASIA WITH WEAK URINARY STREAM: Primary | ICD-10-CM

## 2022-12-15 LAB
BILIRUB SERPL-MCNC: NEGATIVE MG/DL
BLOOD URINE, POC: NEGATIVE
CLARITY, POC UA: CLEAR
COLOR, POC UA: YELLOW
GLUCOSE UR QL STRIP: NORMAL
KETONES UR QL STRIP: NEGATIVE
LEUKOCYTE ESTERASE URINE, POC: NEGATIVE
NITRITE, POC UA: NEGATIVE
PH, POC UA: 5
POC RESIDUAL URINE VOLUME: 246 ML (ref 0–100)
PROTEIN, POC: ABNORMAL
SPECIFIC GRAVITY, POC UA: 1.01
UROBILINOGEN, POC UA: NORMAL

## 2022-12-15 PROCEDURE — 1159F MED LIST DOCD IN RCRD: CPT | Mod: CPTII,S$GLB,, | Performed by: PHYSICIAN ASSISTANT

## 2022-12-15 PROCEDURE — 3008F PR BODY MASS INDEX (BMI) DOCUMENTED: ICD-10-PCS | Mod: CPTII,S$GLB,, | Performed by: UROLOGY

## 2022-12-15 PROCEDURE — 3008F BODY MASS INDEX DOCD: CPT | Mod: CPTII,S$GLB,, | Performed by: PHYSICIAN ASSISTANT

## 2022-12-15 PROCEDURE — 99999 PR PBB SHADOW E&M-EST. PATIENT-LVL IV: CPT | Mod: PBBFAC,,, | Performed by: PHYSICIAN ASSISTANT

## 2022-12-15 PROCEDURE — 3077F SYST BP >= 140 MM HG: CPT | Mod: CPTII,S$GLB,, | Performed by: UROLOGY

## 2022-12-15 PROCEDURE — 99204 OFFICE O/P NEW MOD 45 MIN: CPT | Mod: S$GLB,,, | Performed by: UROLOGY

## 2022-12-15 PROCEDURE — 1159F MED LIST DOCD IN RCRD: CPT | Mod: CPTII,S$GLB,, | Performed by: UROLOGY

## 2022-12-15 PROCEDURE — 3077F PR MOST RECENT SYSTOLIC BLOOD PRESSURE >= 140 MM HG: ICD-10-PCS | Mod: CPTII,S$GLB,, | Performed by: PHYSICIAN ASSISTANT

## 2022-12-15 PROCEDURE — 3044F PR MOST RECENT HEMOGLOBIN A1C LEVEL <7.0%: ICD-10-PCS | Mod: CPTII,S$GLB,, | Performed by: PHYSICIAN ASSISTANT

## 2022-12-15 PROCEDURE — 81002 POCT URINE DIPSTICK WITHOUT MICROSCOPE: ICD-10-PCS | Mod: S$GLB,,, | Performed by: UROLOGY

## 2022-12-15 PROCEDURE — 3044F HG A1C LEVEL LT 7.0%: CPT | Mod: CPTII,S$GLB,, | Performed by: PHYSICIAN ASSISTANT

## 2022-12-15 PROCEDURE — 1160F PR REVIEW ALL MEDS BY PRESCRIBER/CLIN PHARMACIST DOCUMENTED: ICD-10-PCS | Mod: CPTII,S$GLB,, | Performed by: UROLOGY

## 2022-12-15 PROCEDURE — 99999 PR PBB SHADOW E&M-EST. PATIENT-LVL IV: ICD-10-PCS | Mod: PBBFAC,,, | Performed by: PHYSICIAN ASSISTANT

## 2022-12-15 PROCEDURE — 51798 POCT BLADDER SCAN: ICD-10-PCS | Mod: S$GLB,,, | Performed by: UROLOGY

## 2022-12-15 PROCEDURE — 99204 PR OFFICE/OUTPT VISIT, NEW, LEVL IV, 45-59 MIN: ICD-10-PCS | Mod: S$GLB,,, | Performed by: UROLOGY

## 2022-12-15 PROCEDURE — 51798 US URINE CAPACITY MEASURE: CPT | Mod: S$GLB,,, | Performed by: UROLOGY

## 2022-12-15 PROCEDURE — 99999 PR PBB SHADOW E&M-EST. PATIENT-LVL III: CPT | Mod: PBBFAC,,, | Performed by: UROLOGY

## 2022-12-15 PROCEDURE — 1159F PR MEDICATION LIST DOCUMENTED IN MEDICAL RECORD: ICD-10-PCS | Mod: CPTII,S$GLB,, | Performed by: UROLOGY

## 2022-12-15 PROCEDURE — 3044F PR MOST RECENT HEMOGLOBIN A1C LEVEL <7.0%: ICD-10-PCS | Mod: CPTII,S$GLB,, | Performed by: UROLOGY

## 2022-12-15 PROCEDURE — 3080F PR MOST RECENT DIASTOLIC BLOOD PRESSURE >= 90 MM HG: ICD-10-PCS | Mod: CPTII,S$GLB,, | Performed by: PHYSICIAN ASSISTANT

## 2022-12-15 PROCEDURE — 3080F DIAST BP >= 90 MM HG: CPT | Mod: CPTII,S$GLB,, | Performed by: PHYSICIAN ASSISTANT

## 2022-12-15 PROCEDURE — 99999 PR PBB SHADOW E&M-EST. PATIENT-LVL III: ICD-10-PCS | Mod: PBBFAC,,, | Performed by: UROLOGY

## 2022-12-15 PROCEDURE — 3008F PR BODY MASS INDEX (BMI) DOCUMENTED: ICD-10-PCS | Mod: CPTII,S$GLB,, | Performed by: PHYSICIAN ASSISTANT

## 2022-12-15 PROCEDURE — 1160F RVW MEDS BY RX/DR IN RCRD: CPT | Mod: CPTII,S$GLB,, | Performed by: UROLOGY

## 2022-12-15 PROCEDURE — 3077F SYST BP >= 140 MM HG: CPT | Mod: CPTII,S$GLB,, | Performed by: PHYSICIAN ASSISTANT

## 2022-12-15 PROCEDURE — 3044F HG A1C LEVEL LT 7.0%: CPT | Mod: CPTII,S$GLB,, | Performed by: UROLOGY

## 2022-12-15 PROCEDURE — 81002 URINALYSIS NONAUTO W/O SCOPE: CPT | Mod: S$GLB,,, | Performed by: UROLOGY

## 2022-12-15 PROCEDURE — 99214 OFFICE O/P EST MOD 30 MIN: CPT | Mod: S$GLB,,, | Performed by: PHYSICIAN ASSISTANT

## 2022-12-15 PROCEDURE — 3077F PR MOST RECENT SYSTOLIC BLOOD PRESSURE >= 140 MM HG: ICD-10-PCS | Mod: CPTII,S$GLB,, | Performed by: UROLOGY

## 2022-12-15 PROCEDURE — 3080F PR MOST RECENT DIASTOLIC BLOOD PRESSURE >= 90 MM HG: ICD-10-PCS | Mod: CPTII,S$GLB,, | Performed by: UROLOGY

## 2022-12-15 PROCEDURE — 3008F BODY MASS INDEX DOCD: CPT | Mod: CPTII,S$GLB,, | Performed by: UROLOGY

## 2022-12-15 PROCEDURE — 1159F PR MEDICATION LIST DOCUMENTED IN MEDICAL RECORD: ICD-10-PCS | Mod: CPTII,S$GLB,, | Performed by: PHYSICIAN ASSISTANT

## 2022-12-15 PROCEDURE — 99214 PR OFFICE/OUTPT VISIT, EST, LEVL IV, 30-39 MIN: ICD-10-PCS | Mod: S$GLB,,, | Performed by: PHYSICIAN ASSISTANT

## 2022-12-15 PROCEDURE — 3080F DIAST BP >= 90 MM HG: CPT | Mod: CPTII,S$GLB,, | Performed by: UROLOGY

## 2022-12-15 RX ORDER — SILDENAFIL 50 MG/1
50 TABLET, FILM COATED ORAL DAILY PRN
Qty: 10 TABLET | Refills: 5 | Status: SHIPPED | OUTPATIENT
Start: 2022-12-15 | End: 2022-12-15 | Stop reason: SDUPTHER

## 2022-12-15 RX ORDER — TAMSULOSIN HYDROCHLORIDE 0.4 MG/1
0.4 CAPSULE ORAL DAILY
Qty: 90 CAPSULE | Refills: 3 | Status: SHIPPED | OUTPATIENT
Start: 2022-12-15 | End: 2023-12-15

## 2022-12-15 NOTE — PROGRESS NOTES
ESTABLISHED PATIENT    History 12/15/2022:  Isaiah returns here today for follow up evaluation of his right shoulder.  An MRI was ordered at his last visit and he is here today to discuss his results.    History 12/08/2022:   62 y.o. Male with a history of right shoulder pain who works as a .  He complains today of having progressive right shoulder pain over the last 2-3 years.  He denies having any precipitating injury or trauma.  He states his pain is exacerbated by activity.  He recently had open heart surgery in July and has been undergoing cardiac rehab.  He states that he has had to decrease the weight when exercising due to his pain and weakness.        + mechanical symptoms, - instability    Is affecting ADLs.  Pain is 8/10 at it's worst.        PAST MEDICAL HISTORY    Past Medical History:   Diagnosis Date    CHF (congestive heart failure)     Coronary artery disease     Diabetes mellitus type 2, uncontrolled, without complications     6.7% Metf 1 g qd, eye 2015, U- F-2015,     Elevated platelet count 07/23/2015    H/O splenectomy 04/24/2015    doesn't like pneumovax bc caused side effects    HLD (hyperlipidemia) 01/22/2015    goal LDL < 100, reluctant to take statin    HTN (hypertension), benign 01/22/2015    losartan 100    Morbid obesity with BMI of 45.0-49.9, adult 06/11/2014    RODRIGUEZ (obstructive sleep apnea)     PAF (paroxysmal atrial fibrillation)     Polyp of transverse colon 09/05/2018 2018, repeat 2023    Severe uncontrolled hypertension 06/11/2014       PAST SURGICAL HISTORY     Past Surgical History:   Procedure Laterality Date    AORTOGRAPHY N/A 7/11/2022    Procedure: AORTOGRAM;  Surgeon: Bjorn Cheatham MD;  Location: [facility] CATH LAB;  Service: Cardiology;  Laterality: N/A;    COLONOSCOPY N/A 11/22/2022    Procedure: COLONOSCOPY;  Surgeon: Castro Jasso MD;  Location: [facility] ENDO (4TH FLR);  Service: Endoscopy;  Laterality: N/A;  ok to hold Xarelto-see telephone encounter  dated 11/1  [facility] via portal  pre call done    CORONARY ARTERY BYPASS GRAFT (CABG) Left 7/18/2022    Procedure: CORONARY ARTERY BYPASS GRAFT (CABG);  Surgeon: Blu Rhodes MD;  Location: [address];  Service: Cardiovascular;  Laterality: Left;  CABG x 1 (LIMA to LAD)    ENDOSCOPIC HARVEST OF VEIN Left 7/18/2022    Procedure: HARVEST-VEIN-ENDOVASCULAR;  Surgeon: Blu Rhodes MD;  Location: [facility] OR [address] [place] [address];  Service: Cardiovascular;  Laterality: Left;  Vein [place] Start time: 0823am  Vein [place] Stop time: 0836am    Vein [place] Start time: 0848am  Vein [place] Stop time: 0904am    LEFT HEART CATHETERIZATION N/A 7/11/2022    Procedure: Left heart cath;  Surgeon: Bjorn Cheatham MD;  Location: [facility] CATH LAB;  Service: Cardiology;  Laterality: N/A;    splenecectomy         FAMILY HISTORY    Family History   Problem Relation Age of Onset    Lung cancer Mother     Hypertension Mother     Heart attack Mother 58    Heart disease Mother     Stroke Mother     Heart failure Mother     Hyperlipidemia Mother     Asbestos Mother     Diabetes Father     Colon cancer Father 65    Coronary artery disease Brother     Coronary artery disease Maternal Grandmother        SOCIAL HISTORY    Social History     Socioeconomic History    Marital status:     Number of children: 2   Tobacco Use    Smoking status: Former     Types: Cigarettes    Smokeless tobacco: Never   Substance and Sexual Activity    Alcohol use: Yes     Alcohol/week: 0.0 standard drinks     Comment: 1 a week    Drug use: Never    Sexual activity: Yes     Partners: Female   Social History Narrative     , Quit smoking 20 yrs, ETOH 2 beers on weekend, colonoscopy 2011       MEDICATIONS      Current Outpatient Medications:     amiodarone (PACERONE) 400 MG tablet, Take by mouth., Disp: , Rfl:     aspirin 325 MG tablet, Take 1 tablet (325 mg total) by mouth once daily., Disp: 30 tablet, Rfl: 11    atorvastatin (LIPITOR) 80 MG tablet, Take 1  tablet (80 mg total) by mouth once daily., Disp: 90 tablet, Rfl: 3    fluocinolone acetonide oiL (DERMOTIC OIL) 0.01 % Drop, Place 3 drops into the right ear 2 (two) times daily., Disp: 20 mL, Rfl: 3    ketoconazole (NIZORAL) 2 % cream, Apply topically 2 (two) times daily. Prn rash face, Disp: 60 g, Rfl: 3    ketoconazole (NIZORAL) 2 % shampoo, Apply topically once daily. Face and scalp soaks, Disp: 120 mL, Rfl: 4    metFORMIN (GLUCOPHAGE) 1000 MG tablet, Take 1 tablet (1,000 mg total) by mouth 2 (two) times daily with meals., Disp: 180 tablet, Rfl: 3    metoprolol succinate (TOPROL-XL) 100 MG 24 hr tablet, Take 1 tablet (100 mg total) by mouth once daily., Disp: 90 tablet, Rfl: 3    prednisoLONE acetate (PRED FORTE) 1 % DrpS, Apply 2 drops  to left ear BID, Disp: 10 mL, Rfl: 0    rivaroxaban (XARELTO) 20 mg Tab, Take 1 tablet (20 mg total) by mouth daily with dinner or evening meal., Disp: 90 tablet, Rfl: 3    sildenafiL (VIAGRA) 50 MG tablet, Take 1 tablet (50 mg total) by mouth daily as needed for Erectile Dysfunction. Take 1 hour before sexual activity on an empty stomach., Disp: 10 tablet, Rfl: 5    SITagliptin (JANUVIA) 100 MG Tab, Take 1 tablet (100 mg total) by mouth once daily., Disp: 90 tablet, Rfl: 3    tamsulosin (FLOMAX) 0.4 mg Cap, Take 1 capsule (0.4 mg total) by mouth once daily., Disp: 90 capsule, Rfl: 3    ALLERGIES     Review of patient's allergies indicates:   Allergen Reactions    Penicillins      Other reaction(s): Itching  Other reaction(s): Hives    Wasp venom Edema         REVIEW OF SYSTEMS   Constitution: Negative. Negative for chills, fever and night sweats.   HENT: Negative for congestion and headaches.    Eyes: Negative for blurred vision, left vision loss and right vision loss.   Cardiovascular: Negative for chest pain and syncope.   Respiratory: Negative for cough and shortness of breath.    Endocrine: Negative for polydipsia, polyphagia and polyuria.   Hematologic/Lymphatic: Negative  for bleeding problem. Does not bruise/bleed easily.   Skin: Negative for dry skin, itching and rash.   Musculoskeletal: Negative for falls. Positive for right shoulder pain and weakness.  Gastrointestinal: Negative for abdominal pain and bowel incontinence.   Genitourinary: Negative for bladder incontinence and nocturia.   Neurological: Negative for disturbances in coordination, loss of balance and seizures.   Psychiatric/Behavioral: Negative for depression. The patient does not have insomnia.    Allergic/Immunologic: Negative for hives and persistent infections.     PHYSICAL EXAMINATION    Vitals: BP (!) 162/90   Pulse 68   Ht 6' (1.829 m)   Wt 131.5 kg (290 lb)   BMI 39.33 kg/m²     General: The patient appears active and healthy with no apparent physical problems.  No disturbance of mood or affect is demonstrated. Alert and Oriented.    HEENT: Eyes normal, pupils equally round, nose normal.    Resp: Equal and symmetrical chest rises. No wheezing    CV: Regular rate    Neck: Supple; nonpainful range of motion.    Extremities: no cyanosis, clubbing, edema, or diffuse swelling.  Palpable pulses, good capillary refill of the digits.  No coolness, discoloration, edema or obvious varicosities.    Skin: no lesions noted.    Lymphatic: no detected adenopathy in the upper or lower extremities.    Neurologic: normal mental status, normal reflexes, normal gait and balance.  Patient is alert and oriented to person, place and time.  No flaccidity or spasticity is noted.  No motor or sensory deficits are noted.  Light touch is intact    Orthopaedic: Shoulder Musculoskeletal Exam    Inspection    Right      Right shoulder inspection is normal.      Ecchymosis: none      Atrophy: none      Symmetry: symmetric      Masses: none      Skin tenting: none      Prior incision: none    Palpation    Right      Crepitus: no crepitus      Increased warmth: none      Tenderness: present        Anterior shoulder: none        Posterior  shoulder: none        Clavicle: none        AC joint: none        Sternoclavicular joint: none        Rotator cuff: moderate        Greater tuberosity: mild        Trapezius: none        Medial scapula: none        Superior pole of scapula: moderate        Inferior pole of scapula: none        Bicipital groove: none        Proximal biceps: none    Range of Motion    Right      Active ROM: normal and pain.       Passive ROM: normal and pain.       Active forward elevation: 150.       Passive forward elevation: 160.       Shoulder active abduction: 60.       Passive abduction: 80.       Active external rotation at side: 30.       Passive external rotation at side: 40.       Active external rotation in abduction: 30.       Passive external rotation in abduction: 40.       Internal rotation: L3.     Left      Internal rotation: T7.     Strength    Right      External rotation: 4/5. External rotation is affected by pain.       Internal rotation: 5/5.       Abduction: 4/5. Abduction is affected by pain.       Biceps: 5/5.       Triceps: 5/5.     Neurovascular    Right      Right shoulder nerve sensation is normal.    Scapula    Right      Right shoulder scapula is normal.    Special Tests    Right    Rotator Cuff Signs      Neer's test: positive       Flowers test: positive       Supraspinatus: positive       Painful arc test: positive     Biceps/nic Signs      [place]'s test: negative       Clicking/popping: negative     AC Joint Signs      Active horizontal adduction pain: negative     Instability Signs      Joint laxity: negative       IMAGING    MRI Shoulder Without Contrast Right  Narrative: EXAMINATION:  MRI SHOULDER WITHOUT CONTRAST RIGHT    CLINICAL HISTORY:  Shoulder pain, bursitis suspected, xray done;Shoulder pain, rotator cuff disorder suspected, xray done;  Pain in right shoulder    FINDINGS:  There is complete tear with retraction of the anterior fibers of the supraspinatus tendon.  No fracture dislocation  bone destruction seen.  There is mild DJD.  The biceps tendon is unremarkable.  There is degenerative change of the labrum.  No soft tissue masses are seen.  There is thinning of the biceps tendon at the rotator cuff interval.  Impression: Full-thickness tear with retraction of the anterior fibers of the supraspinatus tendon.    Degenerative change.    Degenerative change of the labrum.    There is thinning of the biceps tendon at the rotator cuff interval.    Electronically signed by: Lane Kyle MD  Date:    12/15/2022  Time:    08:26      Xrays include 3 View right shoulder are ordered / images reviewed by me:  No fractures or dislocations noted.  No arthritis of the glenohumeral joint.  No arthritis of the AC joint.  No subluxation of the humeral head.  No evidence of a Hill-Sachs lesion.  Type 2 acromion.      IMPRESSION       ICD-10-CM ICD-9-CM   1. Traumatic complete tear of right rotator cuff, initial encounter  S46.011A 840.4   2. Biceps tendinitis of right shoulder  M75.21 726.12   3. Glenoid labral tear, right, initial encounter  S43.431A 840.8     62-year-old gentleman with progressive right shoulder pain and weakness over the past 2-3 years.  X-rays are negative for any acute findings or advanced osteoarthritis.  His history and physical examination are concerning for a degenerative tear of his rotator cuff.  He has failed conservative measures with prescription strength anti-inflammatories and physical therapy.  The MRI confirms the presence of a full-thickness tear with retraction of the anterior fibers of the supraspinatus tendon.  There is also thinning of the biceps tendon and degenerative changes of the glenoid labrum.    I have had a lengthy discussion with him today regarding these findings, his prognosis, and treatment options.  His best long-term option is shoulder arthroscopy with rotator cuff repair, debridement, and mini open biceps tenodesis.  The risks, benefits, and postoperative  recovery of the procedure have been discussed in depth today.  All questions have been answered and he would like to proceed before the end of the year if possible.    He will need to have cardiac clearance and be off of his Xarelto for at least 3-5 days.  I will have him follow up with Alexandria Morse MD in the clinic to discuss surgical planning.    MEDICATIONS PRESCRIBED      None    RECOMMENDATIONS     Follow up with Alexandria Morse MD to discuss surgical planning; right shoulder arthroscopy with rotator cuff repair, debridement of glenoid labral tear, and mini open versus arthroscopic biceps tenodesis.  Obtain cardiac clearance prior to surgery  Return to clinic for a preoperative visit with me prior to surgery      All questions were answered, pt will contact us for questions or concerns in the interim.

## 2022-12-15 NOTE — TELEPHONE ENCOUNTER
Phone number is not working, patient portal message sent.    Kylah Rose MS, [facility]    ----- Message from An Bullock MA sent at 12/15/2022  2:44 PM CST -----  Regarding: Daphne Loya saw this patient today and he would like to see if the patient can come in tomorrow to see Dr. Morse to set him up for surgery next week.    Rosi

## 2022-12-15 NOTE — PROGRESS NOTES
Subjective:       Patient ID: Isaiah Dorsey Jr. is a 62 y.o. male.    Chief Complaint: prostate     This is a 62 y.o.  male patient that is new to me.  The patient was self referred weak urinary stream.  Patient has a family history of prostate cancer.  He reports slowing of urination with weak stream and straining for months now.  He has not been on any medication.  He also reports erectile dysfunction with normal libido.  Not able to obtain or maintain erection for sexual activity.  This has been ongoing for some time.  He does have a history of MI and is not on nitroglycerin.  AUA SS 13/3, bladder scan 297 but did not void.    IPSS Questionnaire (AUA-SS):  Over the past month    1)  Incomplete Emptying - How often have you had a sensation of not emptying your bladder completely after you finish urinating?  0 - Not at all   2)  Frequency - How often have you had to urinate again less than two hours after you finished urinating? 1 - Less than 1 time in 5   3)  Intermittency - How often have you found you stopped and started again several times when you urinated?  1 - Less than 1 time in 5   4) Urgency - How difficult have you found it to postpone urination?  0 - Not at all   5) Weak Stream - How often have you had a weak urinary stream?  5 - Almost always   6) Straining  - How often have you had to push or strain to begin urination?  5 - Almost always   7) Nocturia - How many times did you most typically get up to urinate from the time you went to bed until the time you got up in the morning?  1 - 1 time   Total score:  0-7 mild, 8-19 moderate, 20-35 severe 13   Quality of Life:  3 - Mixed          LAST PSA  Lab Results   Component Value Date    PSA 0.33 09/30/2022    PSA 0.74 06/05/2014    PSA 0.48 03/24/2011    PSA 0.50 07/02/2009       Lab Results   Component Value Date    CREATININE 0.8 07/23/2022       ---  PMH/PSH/Medications/Allergies/Social history reviewed and as in chart.    Review of Systems    Constitutional:  Negative for activity change, chills and fever.   HENT:  Negative for congestion.    Respiratory:  Negative for cough, chest tightness and shortness of breath.    Cardiovascular:  Negative for chest pain and palpitations.   Gastrointestinal:  Negative for abdominal distention, abdominal pain, nausea and vomiting.   Genitourinary:  Positive for difficulty urinating and frequency. Negative for flank pain, hematuria, penile pain, scrotal swelling and testicular pain.   Musculoskeletal:  Negative for gait problem.     Objective:      Physical Exam  HENT:      Head: Atraumatic.   Pulmonary:      Effort: Pulmonary effort is normal.   Neurological:      General: No focal deficit present.      Mental Status: He is alert and oriented to person, place, and time.       Assessment:     Problem Noted   Benign Prostatic Hyperplasia With Weak Urinary Stream 12/15/2022    AUA SS 13/3, mainly weak stream/straining  Tamsulosin started 12/22     Erectile Dysfunction 12/15/2022    Sildenafil 50 (12/22)         Plan:     Start tamsulosin and sildenafil.  Side effects, risks, benefits and alternatives of the medication discussed.    Cysto/TRUS to r/o stricture, evaluate prostate  Follow up to review    Davian Cobb MD    The above referenced imaging and interpretations were personally reviewed.  Disclaimer: This note has been generated using voice-recognition software. There may be typographical errors that have been missed during proof-reading.

## 2022-12-16 ENCOUNTER — OFFICE VISIT (OUTPATIENT)
Dept: SPORTS MEDICINE | Facility: CLINIC | Age: 62
End: 2022-12-16
Payer: COMMERCIAL

## 2022-12-16 ENCOUNTER — OFFICE VISIT (OUTPATIENT)
Dept: CARDIOLOGY | Facility: CLINIC | Age: 62
End: 2022-12-16
Payer: COMMERCIAL

## 2022-12-16 VITALS
WEIGHT: 286.63 LBS | HEIGHT: 72 IN | BODY MASS INDEX: 38.82 KG/M2 | DIASTOLIC BLOOD PRESSURE: 74 MMHG | SYSTOLIC BLOOD PRESSURE: 152 MMHG | HEART RATE: 60 BPM

## 2022-12-16 VITALS
HEART RATE: 58 BPM | HEIGHT: 72 IN | BODY MASS INDEX: 38.74 KG/M2 | DIASTOLIC BLOOD PRESSURE: 85 MMHG | SYSTOLIC BLOOD PRESSURE: 132 MMHG | WEIGHT: 286 LBS

## 2022-12-16 DIAGNOSIS — I25.810 CORONARY ARTERY DISEASE INVOLVING CORONARY BYPASS GRAFT OF NATIVE HEART WITHOUT ANGINA PECTORIS: ICD-10-CM

## 2022-12-16 DIAGNOSIS — Z01.818 PREOPERATIVE CLEARANCE: Primary | ICD-10-CM

## 2022-12-16 DIAGNOSIS — E11.65 TYPE 2 DIABETES MELLITUS WITH HYPERGLYCEMIA, WITHOUT LONG-TERM CURRENT USE OF INSULIN: ICD-10-CM

## 2022-12-16 DIAGNOSIS — G47.33 OSA (OBSTRUCTIVE SLEEP APNEA): ICD-10-CM

## 2022-12-16 DIAGNOSIS — I21.4 NON-STEMI (NON-ST ELEVATED MYOCARDIAL INFARCTION): ICD-10-CM

## 2022-12-16 DIAGNOSIS — E78.5 HYPERLIPIDEMIA ASSOCIATED WITH TYPE 2 DIABETES MELLITUS: ICD-10-CM

## 2022-12-16 DIAGNOSIS — Z95.1 S/P CABG (CORONARY ARTERY BYPASS GRAFT): ICD-10-CM

## 2022-12-16 DIAGNOSIS — E11.69 HYPERLIPIDEMIA ASSOCIATED WITH TYPE 2 DIABETES MELLITUS: ICD-10-CM

## 2022-12-16 DIAGNOSIS — I15.2 HYPERTENSION ASSOCIATED WITH DIABETES: ICD-10-CM

## 2022-12-16 DIAGNOSIS — S46.011A TRAUMATIC COMPLETE TEAR OF RIGHT ROTATOR CUFF, INITIAL ENCOUNTER: Primary | ICD-10-CM

## 2022-12-16 DIAGNOSIS — I48.0 PAROXYSMAL ATRIAL FIBRILLATION: ICD-10-CM

## 2022-12-16 DIAGNOSIS — M75.21 BICEPS TENDINITIS OF RIGHT SHOULDER: ICD-10-CM

## 2022-12-16 DIAGNOSIS — E11.59 HYPERTENSION ASSOCIATED WITH DIABETES: ICD-10-CM

## 2022-12-16 PROCEDURE — 3079F DIAST BP 80-89 MM HG: CPT | Mod: CPTII,S$GLB,, | Performed by: ORTHOPAEDIC SURGERY

## 2022-12-16 PROCEDURE — 3075F PR MOST RECENT SYSTOLIC BLOOD PRESS GE 130-139MM HG: ICD-10-PCS | Mod: CPTII,S$GLB,, | Performed by: ORTHOPAEDIC SURGERY

## 2022-12-16 PROCEDURE — 3078F DIAST BP <80 MM HG: CPT | Mod: CPTII,S$GLB,, | Performed by: INTERNAL MEDICINE

## 2022-12-16 PROCEDURE — 3077F PR MOST RECENT SYSTOLIC BLOOD PRESSURE >= 140 MM HG: ICD-10-PCS | Mod: CPTII,S$GLB,, | Performed by: INTERNAL MEDICINE

## 2022-12-16 PROCEDURE — 3008F PR BODY MASS INDEX (BMI) DOCUMENTED: ICD-10-PCS | Mod: CPTII,S$GLB,, | Performed by: INTERNAL MEDICINE

## 2022-12-16 PROCEDURE — 99999 PR PBB SHADOW E&M-EST. PATIENT-LVL III: CPT | Mod: PBBFAC,,, | Performed by: ORTHOPAEDIC SURGERY

## 2022-12-16 PROCEDURE — 99215 OFFICE O/P EST HI 40 MIN: CPT | Mod: S$GLB,,, | Performed by: ORTHOPAEDIC SURGERY

## 2022-12-16 PROCEDURE — 1159F MED LIST DOCD IN RCRD: CPT | Mod: CPTII,S$GLB,, | Performed by: ORTHOPAEDIC SURGERY

## 2022-12-16 PROCEDURE — 3044F PR MOST RECENT HEMOGLOBIN A1C LEVEL <7.0%: ICD-10-PCS | Mod: CPTII,S$GLB,, | Performed by: ORTHOPAEDIC SURGERY

## 2022-12-16 PROCEDURE — 3008F BODY MASS INDEX DOCD: CPT | Mod: CPTII,S$GLB,, | Performed by: ORTHOPAEDIC SURGERY

## 2022-12-16 PROCEDURE — 3075F SYST BP GE 130 - 139MM HG: CPT | Mod: CPTII,S$GLB,, | Performed by: ORTHOPAEDIC SURGERY

## 2022-12-16 PROCEDURE — 4010F PR ACE/ARB THEARPY RXD/TAKEN: ICD-10-PCS | Mod: CPTII,S$GLB,, | Performed by: INTERNAL MEDICINE

## 2022-12-16 PROCEDURE — 3079F PR MOST RECENT DIASTOLIC BLOOD PRESSURE 80-89 MM HG: ICD-10-PCS | Mod: CPTII,S$GLB,, | Performed by: ORTHOPAEDIC SURGERY

## 2022-12-16 PROCEDURE — 99999 PR PBB SHADOW E&M-EST. PATIENT-LVL IV: ICD-10-PCS | Mod: PBBFAC,,, | Performed by: INTERNAL MEDICINE

## 2022-12-16 PROCEDURE — 3008F PR BODY MASS INDEX (BMI) DOCUMENTED: ICD-10-PCS | Mod: CPTII,S$GLB,, | Performed by: ORTHOPAEDIC SURGERY

## 2022-12-16 PROCEDURE — 3044F HG A1C LEVEL LT 7.0%: CPT | Mod: CPTII,S$GLB,, | Performed by: ORTHOPAEDIC SURGERY

## 2022-12-16 PROCEDURE — 4010F PR ACE/ARB THEARPY RXD/TAKEN: ICD-10-PCS | Mod: CPTII,S$GLB,, | Performed by: ORTHOPAEDIC SURGERY

## 2022-12-16 PROCEDURE — 3078F PR MOST RECENT DIASTOLIC BLOOD PRESSURE < 80 MM HG: ICD-10-PCS | Mod: CPTII,S$GLB,, | Performed by: INTERNAL MEDICINE

## 2022-12-16 PROCEDURE — 99214 OFFICE O/P EST MOD 30 MIN: CPT | Mod: S$GLB,,, | Performed by: INTERNAL MEDICINE

## 2022-12-16 PROCEDURE — 99999 PR PBB SHADOW E&M-EST. PATIENT-LVL IV: CPT | Mod: PBBFAC,,, | Performed by: INTERNAL MEDICINE

## 2022-12-16 PROCEDURE — 3008F BODY MASS INDEX DOCD: CPT | Mod: CPTII,S$GLB,, | Performed by: INTERNAL MEDICINE

## 2022-12-16 PROCEDURE — 1159F PR MEDICATION LIST DOCUMENTED IN MEDICAL RECORD: ICD-10-PCS | Mod: CPTII,S$GLB,, | Performed by: ORTHOPAEDIC SURGERY

## 2022-12-16 PROCEDURE — 3044F PR MOST RECENT HEMOGLOBIN A1C LEVEL <7.0%: ICD-10-PCS | Mod: CPTII,S$GLB,, | Performed by: INTERNAL MEDICINE

## 2022-12-16 PROCEDURE — 3077F SYST BP >= 140 MM HG: CPT | Mod: CPTII,S$GLB,, | Performed by: INTERNAL MEDICINE

## 2022-12-16 PROCEDURE — 99215 PR OFFICE/OUTPT VISIT, EST, LEVL V, 40-54 MIN: ICD-10-PCS | Mod: S$GLB,,, | Performed by: ORTHOPAEDIC SURGERY

## 2022-12-16 PROCEDURE — 4010F ACE/ARB THERAPY RXD/TAKEN: CPT | Mod: CPTII,S$GLB,, | Performed by: ORTHOPAEDIC SURGERY

## 2022-12-16 PROCEDURE — 4010F ACE/ARB THERAPY RXD/TAKEN: CPT | Mod: CPTII,S$GLB,, | Performed by: INTERNAL MEDICINE

## 2022-12-16 PROCEDURE — 99214 PR OFFICE/OUTPT VISIT, EST, LEVL IV, 30-39 MIN: ICD-10-PCS | Mod: S$GLB,,, | Performed by: INTERNAL MEDICINE

## 2022-12-16 PROCEDURE — 99999 PR PBB SHADOW E&M-EST. PATIENT-LVL III: ICD-10-PCS | Mod: PBBFAC,,, | Performed by: ORTHOPAEDIC SURGERY

## 2022-12-16 PROCEDURE — 3044F HG A1C LEVEL LT 7.0%: CPT | Mod: CPTII,S$GLB,, | Performed by: INTERNAL MEDICINE

## 2022-12-16 RX ORDER — LOSARTAN POTASSIUM 25 MG/1
25 TABLET ORAL DAILY
Qty: 90 TABLET | Refills: 3 | Status: SHIPPED | OUTPATIENT
Start: 2022-12-16 | End: 2023-12-04

## 2022-12-16 RX ORDER — NAPROXEN SODIUM 220 MG/1
81 TABLET, FILM COATED ORAL DAILY
Qty: 90 TABLET | Refills: 3 | Status: SHIPPED | OUTPATIENT
Start: 2022-12-16 | End: 2023-03-12 | Stop reason: SDUPTHER

## 2022-12-16 NOTE — PROGRESS NOTES
Subjective:   Patient ID:  Isaiah Dorsey Jr. is a 62 y.o. male who presents for follow-up of Pre-op Exam  Date of Operation:  07/18/2022      Operation:  Single vessel coronary artery bypass grafting  Left internal mammary artery to the distal left anterior descending artery  Endoscopic saphenous vein harvest from the left lower extremity   cardiac cath 7/22  The pre-procedure left ventricular end diastolic pressure was 25.  The Ost LAD to Prox LAD lesion was 90% stenosed.  The Mid LAD lesion was 70% stenosed.  The 1st Diag lesion was 90% stenosed.  The 2nd Diag lesion was 60% stenosed.  The RPAV lesion was 90% stenosed.  The Ost Cx to Prox Cx lesion was 99% stenosed.  The estimated blood loss was none.  Aortogram demonstrated no obvious dissection and a dilated ascending aorta.     The procedure log was documented by Documenter: Bay Alvarenga and verified by Bjorn Cheatham MD.     HPI:   Preop clearance for shoulder surgery  Currently on cardiac rehab  Patient was not exercising before  He is an   No chest pain, Orthopnea, PND of heart failure symptoms.   Denies palpitations or fluttering in the chest  Patient experienced Post op AF that spontaneously converted and was placed on amiodarone and xeralto. Patient stopped taking xeralto when he was asked to stop for colonoscopy and did not resume.   Can easily do 4 mets      Patient Active Problem List   Diagnosis    Diabetes mellitus type 2, uncontrolled, without complications    H/O splenectomy    Elevated platelet count    Polyp of transverse colon    Atherosclerotic heart disease of native coronary artery with unstable angina pectoris    Type 2 diabetes mellitus with hyperglycemia, without long-term current use of insulin    Dilatation of aorta    SIRS (systemic inflammatory response syndrome)    Heart failure with mid-range ejection fraction    S/P CABG (coronary artery bypass graft)    Non-STEMI (non-ST elevated myocardial infarction)     Encounter for weaning from ventilator    Acute blood loss anemia    Paroxysmal atrial fibrillation    RODRIGUEZ (obstructive sleep apnea)    Coronary artery disease involving coronary bypass graft of native heart without angina pectoris    Hypertension associated with diabetes    Hyperlipidemia associated with type 2 diabetes mellitus    Obesity hypoventilation syndrome    Tendonitis, Achilles, left    Benign prostatic hyperplasia with weak urinary stream    Erectile dysfunction     BP (!) 152/74 (BP Location: Left arm, Patient Position: Sitting, BP Method: Large (Automatic))   Pulse 60   Ht 6' (1.829 m)   Wt 130 kg (286 lb 9.6 oz)   BMI 38.87 kg/m²   Body mass index is 38.87 kg/m².  CrCl cannot be calculated (Patient's most recent lab result is older than the maximum 7 days allowed.).    Lab Results   Component Value Date     (L) 07/23/2022    K 4.0 07/23/2022    CL 98 07/23/2022    CO2 26 07/23/2022    BUN 21 07/23/2022    CREATININE 0.8 07/23/2022     (H) 07/23/2022    HGBA1C 6.2 (H) 08/25/2022    MG 1.9 07/23/2022    AST 28 07/23/2022    ALT 45 (H) 07/23/2022    ALBUMIN 2.7 (L) 07/23/2022    PROT 6.5 07/23/2022    BILITOT 0.5 07/23/2022    WBC 10.65 08/25/2022    HGB 13.8 (L) 08/25/2022    HCT 45.4 08/25/2022    HCT 35 (L) 07/19/2022    MCV 94 08/25/2022     08/25/2022    INR 1.1 07/21/2022    PSA 0.33 09/30/2022    TSH 2.840 07/13/2022    CHOL 89 (L) 07/28/2022    HDL 33 (L) 07/28/2022    LDLCALC 35.0 (L) 07/28/2022    TRIG 105 07/28/2022       Current Outpatient Medications   Medication Sig    amiodarone (PACERONE) 400 MG tablet Take by mouth.    aspirin 325 MG tablet Take 1 tablet (325 mg total) by mouth once daily.    atorvastatin (LIPITOR) 80 MG tablet Take 1 tablet (80 mg total) by mouth once daily.    fluocinolone acetonide oiL (DERMOTIC OIL) 0.01 % Drop Place 3 drops into the right ear 2 (two) times daily.    ketoconazole (NIZORAL) 2 % cream Apply topically 2 (two) times daily. Prn rash  face    ketoconazole (NIZORAL) 2 % shampoo Apply topically once daily. Face and scalp soaks    metFORMIN (GLUCOPHAGE) 1000 MG tablet Take 1 tablet (1,000 mg total) by mouth 2 (two) times daily with meals.    metoprolol succinate (TOPROL-XL) 100 MG 24 hr tablet Take 1 tablet (100 mg total) by mouth once daily.    prednisoLONE acetate (PRED FORTE) 1 % DrpS Apply 2 drops  to left ear BID    sildenafiL (VIAGRA) 50 MG tablet Take 1 tablet (50 mg total) by mouth daily as needed for Erectile Dysfunction. Take 1 hour before sexual activity on an empty stomach.    SITagliptin (JANUVIA) 100 MG Tab Take 1 tablet (100 mg total) by mouth once daily.    tamsulosin (FLOMAX) 0.4 mg Cap Take 1 capsule (0.4 mg total) by mouth once daily.    rivaroxaban (XARELTO) 20 mg Tab Take 1 tablet (20 mg total) by mouth daily with dinner or evening meal. (Patient not taking: Reported on 12/16/2022)     No current facility-administered medications for this visit.       Review of Systems   Constitutional: Negative for chills, decreased appetite, malaise/fatigue, night sweats, weight gain and weight loss.   Eyes:  Negative for blurred vision, double vision, visual disturbance and visual halos.   Cardiovascular:  Negative for chest pain, claudication, cyanosis, dyspnea on exertion, irregular heartbeat, leg swelling, near-syncope, orthopnea, palpitations, paroxysmal nocturnal dyspnea and syncope.   Respiratory:  Negative for cough, hemoptysis, snoring, sputum production and wheezing.    Endocrine: Negative for cold intolerance, heat intolerance, polydipsia and polyphagia.   Hematologic/Lymphatic: Negative for adenopathy and bleeding problem. Does not bruise/bleed easily.   Skin:  Negative for flushing, itching, poor wound healing and rash.   Musculoskeletal:  Negative for arthritis, back pain, falls, gout, joint pain, joint swelling, muscle cramps, muscle weakness, myalgias, neck pain and stiffness.   Gastrointestinal:  Negative for bloating,  abdominal pain, anorexia, diarrhea, dysphagia, excessive appetite, flatus, hematemesis, jaundice, melena and nausea.   Genitourinary:  Negative for hesitancy and incomplete emptying.   Neurological:  Negative for aphonia, brief paralysis, difficulty with concentration, disturbances in coordination, excessive daytime sleepiness, dizziness, focal weakness, light-headedness, loss of balance and weakness.   Psychiatric/Behavioral:  Negative for altered mental status, depression, hallucinations, hypervigilance, memory loss, substance abuse and suicidal ideas. The patient does not have insomnia and is not nervous/anxious.      Objective:   Physical Exam  Constitutional:       General: He is not in acute distress.     Appearance: He is well-developed. He is not diaphoretic.   HENT:      Head: Normocephalic and atraumatic.      Nose: Nose normal.      Mouth/Throat:      Pharynx: No oropharyngeal exudate.   Eyes:      General: No scleral icterus.        Right eye: No discharge.         Left eye: No discharge.      Conjunctiva/sclera: Conjunctivae normal.      Pupils: Pupils are equal, round, and reactive to light.   Neck:      Thyroid: No thyromegaly.      Vascular: No JVD.      Trachea: No tracheal deviation.   Cardiovascular:      Rate and Rhythm: Normal rate and regular rhythm.      Pulses: Intact distal pulses.      Heart sounds: Normal heart sounds. No murmur heard.    No friction rub. No gallop.   Pulmonary:      Effort: Pulmonary effort is normal. No respiratory distress.      Breath sounds: Normal breath sounds. No stridor. No wheezing or rales.   Chest:      Chest wall: No tenderness.   Abdominal:      General: Bowel sounds are normal. There is no distension.      Palpations: Abdomen is soft. There is no mass.      Tenderness: There is no abdominal tenderness.   Musculoskeletal:         General: No tenderness.      Cervical back: Normal range of motion and neck supple.   Lymphadenopathy:      Cervical: No cervical  adenopathy.   Skin:     General: Skin is warm.      Coloration: Skin is not pale.      Findings: No erythema or rash.   Neurological:      Mental Status: He is alert and oriented to person, place, and time.      Cranial Nerves: No cranial nerve deficit.      Motor: No abnormal muscle tone.      Coordination: Coordination normal.      Deep Tendon Reflexes: Reflexes are normal and symmetric. Reflexes normal.   Psychiatric:         Behavior: Behavior normal.         Thought Content: Thought content normal.         Judgment: Judgment normal.       Assessment:     1. S/P CABG (coronary artery bypass graft)    2. Non-STEMI (non-ST elevated myocardial infarction)    3. Paroxysmal atrial fibrillation    4. Coronary artery disease involving coronary bypass graft of native heart without angina pectoris    5. Hypertension associated with diabetes    6. Hyperlipidemia associated with type 2 diabetes mellitus    7. Type 2 diabetes mellitus with hyperglycemia, without long-term current use of insulin    8. RODRIGUEZ (obstructive sleep apnea)      Plan:   I have recommended him to resume xeralto and do an event monitor if he is NSR we can discuss this further with Dr. Sierra  Counseled on importance of heart healthy diet low in saturated and trans fat and salt as well gradually starting a regular aerobic exercise regimen with goal of 30min 5x/week. Recommend BP diary. Call if systolic BP > 130 mmHg on checking repeatedly    Isaiah was seen today for pre-op exam.    Diagnoses and all orders for this visit:    Preoperative clearance    S/P CABG (coronary artery bypass graft)  -     Cardiac event monitor; Future  -     Echo Saline Bubble? No; Future  -     IN OFFICE EKG 12-LEAD (to Muse)    Non-STEMI (non-ST elevated myocardial infarction)    Paroxysmal atrial fibrillation    Coronary artery disease involving coronary bypass graft of native heart without angina pectoris    Hypertension associated with diabetes    Hyperlipidemia associated  with type 2 diabetes mellitus    Type 2 diabetes mellitus with hyperglycemia, without long-term current use of insulin    RODRIGUEZ (obstructive sleep apnea)    Other orders  -     aspirin 81 MG Chew; Take 1 tablet (81 mg total) by mouth once daily.  -     losartan (COZAAR) 25 MG tablet; Take 1 tablet (25 mg total) by mouth once daily.      RTC 4 months Hypertensive med titration.   Counseled on importance of heart healthy diet low in saturated and trans fat and salt as well gradually starting a regular aerobic exercise regimen with goal of 30min 5x/week. Recommend BP diary. Call if systolic BP > 130 mmHg on checking repeatedly

## 2022-12-16 NOTE — PROGRESS NOTES
NEW PATIENT    HISTORY OF PRESENT ILLNESS   62 y.o. Male with a history of right shoulder pain who Vincenzo Ortiz PA-C. He is an  who will be retiring in a few years. He has had shoulder pain for 2 years. He does not recall a specific ANABELLE. He states he had a heart attack in July and he has been cleared by cardiology. He had bypass, he was not stented. He has been taking Alieve for his shoulder pain. He has also been doing physical therapy.  He states he is gotten to the point that he just wants to get it fixed.  The pain has been radiating in the lateral aspect of his shoulder.    Is affecting ADLs.  Pain is 8/10 at it's worst.        PAST MEDICAL HISTORY    Past Medical History:   Diagnosis Date    CHF (congestive heart failure)     Coronary artery disease     Diabetes mellitus type 2, uncontrolled, without complications     6.7% Metf 1 g qd, eye 2015, [facility]- [facility]-2015,     Elevated platelet count 07/23/2015    H/O splenectomy 04/24/2015    doesn't like pneumovax bc caused side effects    HLD (hyperlipidemia) 01/22/2015    goal LDL < 100, reluctant to take statin    HTN (hypertension), benign 01/22/2015    losartan 100    Morbid obesity with BMI of 45.0-49.9, adult 06/11/2014    RODRIGUEZ (obstructive sleep apnea)     PAF (paroxysmal atrial fibrillation)     Polyp of transverse colon 09/05/2018 2018, repeat 2023    Severe uncontrolled hypertension 06/11/2014       PAST SURGICAL HISTORY     Past Surgical History:   Procedure Laterality Date    AORTOGRAPHY N/A 7/11/2022    Procedure: AORTOGRAM;  Surgeon: Bjorn Cheatham MD;  Location: [facility] CATH LAB;  Service: Cardiology;  Laterality: N/A;    COLONOSCOPY N/A 11/22/2022    Procedure: COLONOSCOPY;  Surgeon: Castro Jasso MD;  Location: [facility] ENDO ([address] [place] [address]);  Service: Endoscopy;  Laterality: N/A;  ok to hold Xarelto-see telephone encounter dated 11/1  [facility] via portal  pre call done    CORONARY ARTERY BYPASS GRAFT (CABG) Left 7/18/2022    Procedure: CORONARY ARTERY  BYPASS GRAFT (CABG);  Surgeon: Blu Rhodes MD;  [facility];  Service: Cardiovascular;  Laterality: Left;  CABG x 1 (LIMA to LAD)    ENDOSCOPIC HARVEST OF VEIN Left 7/18/2022    Procedure: HARVEST-VEIN-ENDOVASCULAR;  Surgeon: Blu Rhodes MD;  [facility];  Service: Cardiovascular;  Laterality: Left;  Vein Weidman Start time: 0823am  Vein Weidman Stop time: 0836am    Vein Weidman Start time: 0848am  Vein Weidman Stop time: 0904am    LEFT HEART CATHETERIZATION N/A 7/11/2022    Procedure: Left heart cath;  Surgeon: Bjorn Cheatham MD;  Location: [facility] CATH LAB;  Service: Cardiology;  Laterality: N/A;    splenecectomy         FAMILY HISTORY    Family History   Problem Relation Age of Onset    Lung cancer Mother     Hypertension Mother     Heart attack Mother 58    Heart disease Mother     Stroke Mother     Heart failure Mother     Hyperlipidemia Mother     Asbestos Mother     Diabetes Father     Colon cancer Father 65    Coronary artery disease Brother     Coronary artery disease Maternal Grandmother        SOCIAL HISTORY    Social History     Socioeconomic History    Marital status:     Number of children: 2   Tobacco Use    Smoking status: Former     Types: Cigarettes    Smokeless tobacco: Never   Substance and Sexual Activity    Alcohol use: Yes     Alcohol/week: 0.0 standard drinks     Comment: 1 a week    Drug use: Never    Sexual activity: Yes     Partners: Female   Social History Narrative     , Quit smoking 20 yrs, ETOH 2 beers on weekend, colonoscopy 2011       MEDICATIONS      Current Outpatient Medications:     amiodarone (PACERONE) 400 MG tablet, Take by mouth., Disp: , Rfl:     aspirin 81 MG Chew, Take 1 tablet (81 mg total) by mouth once daily., Disp: 90 tablet, Rfl: 3    atorvastatin (LIPITOR) 80 MG tablet, Take 1 tablet (80 mg total) by mouth once daily., Disp: 90 tablet, Rfl: 3    fluocinolone acetonide oiL (DERMOTIC OIL) 0.01 % Drop, Place 3  drops into the right ear 2 (two) times daily., Disp: 20 mL, Rfl: 3    ketoconazole (NIZORAL) 2 % cream, Apply topically 2 (two) times daily. Prn rash face, Disp: 60 g, Rfl: 3    ketoconazole (NIZORAL) 2 % shampoo, Apply topically once daily. Face and scalp soaks, Disp: 120 mL, Rfl: 4    losartan (COZAAR) 25 MG tablet, Take 1 tablet (25 mg total) by mouth once daily., Disp: 90 tablet, Rfl: 3    metFORMIN (GLUCOPHAGE) 1000 MG tablet, Take 1 tablet (1,000 mg total) by mouth 2 (two) times daily with meals., Disp: 180 tablet, Rfl: 3    metoprolol succinate (TOPROL-XL) 100 MG 24 hr tablet, Take 1 tablet (100 mg total) by mouth once daily., Disp: 90 tablet, Rfl: 3    prednisoLONE acetate (PRED FORTE) 1 % DrpS, Apply 2 drops  to left ear BID, Disp: 10 mL, Rfl: 0    rivaroxaban (XARELTO) 20 mg Tab, Take 1 tablet (20 mg total) by mouth daily with dinner or evening meal., Disp: 90 tablet, Rfl: 3    sildenafiL (VIAGRA) 50 MG tablet, Take 1 tablet (50 mg total) by mouth daily as needed for Erectile Dysfunction. Take 1 hour before sexual activity on an empty stomach., Disp: 10 tablet, Rfl: 5    SITagliptin (JANUVIA) 100 MG Tab, Take 1 tablet (100 mg total) by mouth once daily., Disp: 90 tablet, Rfl: 3    tamsulosin (FLOMAX) 0.4 mg Cap, Take 1 capsule (0.4 mg total) by mouth once daily., Disp: 90 capsule, Rfl: 3    ALLERGIES     Review of patient's allergies indicates:   Allergen Reactions    Penicillins      Other reaction(s): Itching  Other reaction(s): Hives    Wasp venom Edema         REVIEW OF SYSTEMS   Constitution: Negative. Negative for chills, fever and night sweats.   HENT: Negative for congestion and headaches.    Eyes: Negative for blurred vision, left vision loss and right vision loss.   Cardiovascular: Negative for chest pain and syncope.   Respiratory: Negative for cough and shortness of breath.    Endocrine: Negative for polydipsia, polyphagia and polyuria.   Hematologic/Lymphatic: Negative for bleeding problem.  Does not bruise/bleed easily.   Skin: Negative for dry skin, itching and rash.   Musculoskeletal: Negative for falls. Positive for right shoulder pain   Gastrointestinal: Negative for abdominal pain and bowel incontinence.   Genitourinary: Negative for bladder incontinence and nocturia.   Neurological: Negative for disturbances in coordination, loss of balance and seizures.   Psychiatric/Behavioral: Negative for depression. The patient does not have insomnia.    Allergic/Immunologic: Negative for hives and persistent infections.     PHYSICAL EXAMINATION    Vitals: /85   Pulse (!) 58   Ht 6' (1.829 m)   Wt 129.7 kg (286 lb)   BMI 38.79 kg/m²     General: The patient appears active and healthy with no apparent physical problems.  No disturbance of mood or affect is demonstrated. Alert and Oriented.    HEENT: Eyes normal, pupils equally round, nose normal.    Resp: Equal and symmetrical chest rises. No wheezing    CV: Regular rate    Neck: Supple; nonpainful range of motion.    Extremities: no cyanosis, clubbing, edema, or diffuse swelling.  Palpable pulses, good capillary refill of the digits.  No coolness, discoloration, edema or obvious varicosities.    Skin: no lesions noted.    Lymphatic: no detected adenopathy in the upper or lower extremities.    Neurologic: normal mental status, normal reflexes, normal gait and balance.  Patient is alert and oriented to person, place and time.  No flaccidity or spasticity is noted.  No motor or sensory deficits are noted.  Light touch is intact    Orthopaedic:     SHOULDER EXAM - RIGHT    Inspection:   Normal skin color and appearance with no scars.  No muscle atrophy noted.  No scapular winging.      Palpation: No tenderness of the acromioclavicular joint, lateral edge of the acromion, biceps tendon, trapezius muscle or scapulothoracic bursa.      ROM:      PROM:     FE - 180°    Abd/ER -  90°  Abd/IR -  45°   Add/ER -  60°     AROM:    FE - 180°    Abd/ER -  90°   Abd/IR -  45°   Add/ER -  60°         Tests:     - Flowers, - Neer's, - Cross Arm Adduction, - [place], - [place], - Speed. - Belly Press,  + Jobes, - Lift Off    Stability: - sulcus, - apprehension, - relocation, - load and shift, - DLS      Motor:  Rotator cuff strength is 5/5 supraspinatus, 4/5 infraspinatus, 5/5 subscapularis. Biceps, triceps and deltoid strength is 5/5.      Neuro     Distally there are no paresthesias, and sensation is intact to light touch in the median, ulnar, and radial distributions.  Reflexes are 2/2 biceps, triceps and brachioradialis.        IMAGING    MRI Shoulder Without Contrast Right  Narrative: EXAMINATION:  MRI SHOULDER WITHOUT CONTRAST RIGHT    CLINICAL HISTORY:  Shoulder pain, bursitis suspected, xray done;Shoulder pain, rotator cuff disorder suspected, xray done;  Pain in right shoulder    FINDINGS:  There is complete tear with retraction of the anterior fibers of the supraspinatus tendon.  No fracture dislocation bone destruction seen.  There is mild DJD.  The biceps tendon is unremarkable.  There is degenerative change of the labrum.  No soft tissue masses are seen.  There is thinning of the biceps tendon at the rotator cuff interval.  Impression: Full-thickness tear with retraction of the anterior fibers of the supraspinatus tendon.    Degenerative change.    Degenerative change of the labrum.    There is thinning of the biceps tendon at the rotator cuff interval.    Electronically signed by: Lane Kyle MD  Date:    12/15/2022  Time:    08:26        IMPRESSION       ICD-10-CM ICD-9-CM   1. Traumatic complete tear of right rotator cuff, initial encounter  S46.011A 840.4   2. Biceps tendinitis of right shoulder  M75.21 726.12       MEDICATIONS PRESCRIBED      None    RECOMMENDATIONS     Surgical versus non-surgical options discussed today; right shoulder arthroscopy with rotator cuff repair, extensive debridement, sub-acromial decompression, and possible open sub-pectoral  biceps tenodesis  The patient will require pre-operative clearance with his PCP and cardiologist secondary to his previous history of a myocardial infarction.  We did discuss the risks with this and whether or not he will be a surgical candidate at this time.  RTC for pre-op      All questions were answered, pt will contact us for questions or concerns in the interim.

## 2022-12-19 ENCOUNTER — PATIENT MESSAGE (OUTPATIENT)
Dept: PREADMISSION TESTING | Facility: HOSPITAL | Age: 62
End: 2022-12-19
Payer: COMMERCIAL

## 2022-12-19 NOTE — ANESTHESIA PAT ROS NOTE
12/19/2022  Isaiah Dorsey Jr. is a 62 y.o., male.      Pre-op Assessment    I have reviewed the Patient Summary Reports.       I have reviewed the Medications.     Review of Systems  Anesthesia Hx:  No problems with previous Anesthesia   History of prior surgery of interest to airway management or planning: heart surgery. Previous anesthesia: General 7/18/2022  CABG with general anesthesia.  Procedure performed at an [facility].      Airway issues documented on chart review include mask, easy, GETA, easy direct laryngoscopy , view on direct laryngoscopy Grade I      Denies Personal Hx of Anesthesia complications.                    Social:  Former Smoker, No Alcohol Use       Cardiovascular:  Exercise tolerance: good   Hypertension  Past MI CAD  asymptomatic CABG/stent Dysrhythmias atrial fibrillation  Denies Angina. CHF    hyperlipidemia   ECG has been reviewed. Paroxysmal A-Fib, CAD, NSTEMI, S/P CABG 7/2022                         Pulmonary:        Sleep Apnea                Hepatic/GI:        Hx Splenectomy, Polyps          Musculoskeletal:     Traumatic complete tear of right rotator cuff,   Biceps tendinitis of right shoulder              Endocrine:  Diabetes, well controlled, type 2   HA1C 6.2 on 8/25/2022      Obesity / BMI > 30       Anesthesia Assessment: Preoperative EQUATION    Planned Procedure: Procedure(s) (LRB):  REPAIR, ROTATOR CUFF, ARTHROSCOPIC (Right)  DEBRIDEMENT, SHOULDER, ARTHROSCOPIC (Right)  ARTHROSCOPY, SHOULDER, WITH SUBACROMIAL SPACE DECOMPRESSION (Right)  FIXATION, TENDON-biceps tenodesis (Right)  Requested Anesthesia Type:General/Regional  Surgeon: Alexandria Morse MD  Service: Orthopedics  Known or anticipated Date of Surgery:12/22/2022      Surgeon notes: reviewed        Triage considerations:     The patient has no apparent active cardiac condition (No unstable coronary  Syndrome such as severe unstable angina or recent [<1 month] myocardial infarction, decompensated CHF, severe valvular   disease or significant arrhythmia)    Previous anesthesia records:GETA, Easy airway, Easy intubation, and No problems  7/18/2022 Coronary artery Bypass Graft, Vein [place] Left Leg:  Placement Date: 07/18/22; Placement Time: 0717 (created via procedure documentation); Method of Intubation: Direct laryngoscopy; Mask Ventilation: Easy - oral; Intubated: Postinduction; Blade: Wander #3; Airway Device Size: 7.5; Placement Verified By: Capnometry; Complicating Factors: None; Intubation Findings: Bilateral breath sounds; Securment: Lips; Complications: None; Removal Date: 07/18/22;  Removal Time: 2250;       Last PCP note: within 3 months , [facility]   Subspecialty notes: Cardiology: Vascular Medicine    Patient to be cleared by Cardiology    Other important co-morbidities: A FIB, CHF, DM2, HLD, HTN, Obesity, and RODRIGUEZ       EKG 12/16/2022:        Instructions given. (See in Nurse's note)      Ht: 6'  Wt: 286 lb  BMI: 38.79  Vaccinated

## 2022-12-21 ENCOUNTER — TELEPHONE (OUTPATIENT)
Dept: SPORTS MEDICINE | Facility: CLINIC | Age: 62
End: 2022-12-21
Payer: COMMERCIAL

## 2022-12-21 ENCOUNTER — OFFICE VISIT (OUTPATIENT)
Dept: SPORTS MEDICINE | Facility: CLINIC | Age: 62
End: 2022-12-21
Payer: COMMERCIAL

## 2022-12-21 ENCOUNTER — ANESTHESIA EVENT (OUTPATIENT)
Dept: SURGERY | Facility: HOSPITAL | Age: 62
End: 2022-12-21
Payer: COMMERCIAL

## 2022-12-21 VITALS
SYSTOLIC BLOOD PRESSURE: 152 MMHG | DIASTOLIC BLOOD PRESSURE: 96 MMHG | BODY MASS INDEX: 39.8 KG/M2 | WEIGHT: 293.88 LBS | HEART RATE: 58 BPM | HEIGHT: 72 IN

## 2022-12-21 DIAGNOSIS — S46.011A TRAUMATIC COMPLETE TEAR OF RIGHT ROTATOR CUFF, INITIAL ENCOUNTER: Primary | ICD-10-CM

## 2022-12-21 DIAGNOSIS — M75.21 BICEPS TENDINITIS OF RIGHT SHOULDER: ICD-10-CM

## 2022-12-21 PROCEDURE — 1159F MED LIST DOCD IN RCRD: CPT | Mod: CPTII,S$GLB,, | Performed by: PHYSICIAN ASSISTANT

## 2022-12-21 PROCEDURE — 4010F PR ACE/ARB THEARPY RXD/TAKEN: ICD-10-PCS | Mod: CPTII,S$GLB,, | Performed by: PHYSICIAN ASSISTANT

## 2022-12-21 PROCEDURE — 3080F PR MOST RECENT DIASTOLIC BLOOD PRESSURE >= 90 MM HG: ICD-10-PCS | Mod: CPTII,S$GLB,, | Performed by: PHYSICIAN ASSISTANT

## 2022-12-21 PROCEDURE — 3077F PR MOST RECENT SYSTOLIC BLOOD PRESSURE >= 140 MM HG: ICD-10-PCS | Mod: CPTII,S$GLB,, | Performed by: PHYSICIAN ASSISTANT

## 2022-12-21 PROCEDURE — 3044F HG A1C LEVEL LT 7.0%: CPT | Mod: CPTII,S$GLB,, | Performed by: PHYSICIAN ASSISTANT

## 2022-12-21 PROCEDURE — 3080F DIAST BP >= 90 MM HG: CPT | Mod: CPTII,S$GLB,, | Performed by: PHYSICIAN ASSISTANT

## 2022-12-21 PROCEDURE — 1160F RVW MEDS BY RX/DR IN RCRD: CPT | Mod: CPTII,S$GLB,, | Performed by: PHYSICIAN ASSISTANT

## 2022-12-21 PROCEDURE — 3008F BODY MASS INDEX DOCD: CPT | Mod: CPTII,S$GLB,, | Performed by: PHYSICIAN ASSISTANT

## 2022-12-21 PROCEDURE — 99499 UNLISTED E&M SERVICE: CPT | Mod: S$GLB,,, | Performed by: PHYSICIAN ASSISTANT

## 2022-12-21 PROCEDURE — 1159F PR MEDICATION LIST DOCUMENTED IN MEDICAL RECORD: ICD-10-PCS | Mod: CPTII,S$GLB,, | Performed by: PHYSICIAN ASSISTANT

## 2022-12-21 PROCEDURE — 1160F PR REVIEW ALL MEDS BY PRESCRIBER/CLIN PHARMACIST DOCUMENTED: ICD-10-PCS | Mod: CPTII,S$GLB,, | Performed by: PHYSICIAN ASSISTANT

## 2022-12-21 PROCEDURE — 99499 NO LOS: ICD-10-PCS | Mod: S$GLB,,, | Performed by: PHYSICIAN ASSISTANT

## 2022-12-21 PROCEDURE — 99999 PR PBB SHADOW E&M-EST. PATIENT-LVL IV: CPT | Mod: PBBFAC,,, | Performed by: PHYSICIAN ASSISTANT

## 2022-12-21 PROCEDURE — 99999 PR PBB SHADOW E&M-EST. PATIENT-LVL IV: ICD-10-PCS | Mod: PBBFAC,,, | Performed by: PHYSICIAN ASSISTANT

## 2022-12-21 PROCEDURE — 4010F ACE/ARB THERAPY RXD/TAKEN: CPT | Mod: CPTII,S$GLB,, | Performed by: PHYSICIAN ASSISTANT

## 2022-12-21 PROCEDURE — 3077F SYST BP >= 140 MM HG: CPT | Mod: CPTII,S$GLB,, | Performed by: PHYSICIAN ASSISTANT

## 2022-12-21 PROCEDURE — 3044F PR MOST RECENT HEMOGLOBIN A1C LEVEL <7.0%: ICD-10-PCS | Mod: CPTII,S$GLB,, | Performed by: PHYSICIAN ASSISTANT

## 2022-12-21 PROCEDURE — 3008F PR BODY MASS INDEX (BMI) DOCUMENTED: ICD-10-PCS | Mod: CPTII,S$GLB,, | Performed by: PHYSICIAN ASSISTANT

## 2022-12-21 RX ORDER — MUPIROCIN 20 MG/G
OINTMENT TOPICAL
Status: CANCELLED | OUTPATIENT
Start: 2022-12-21

## 2022-12-21 RX ORDER — HYDROCODONE BITARTRATE AND ACETAMINOPHEN 7.5; 325 MG/1; MG/1
1 TABLET ORAL EVERY 6 HOURS PRN
Qty: 20 TABLET | Refills: 0 | Status: SHIPPED | OUTPATIENT
Start: 2022-12-21 | End: 2023-04-20

## 2022-12-21 NOTE — H&P (VIEW-ONLY)
H&P     History 12/21/2022:  Isaiah returns here today for preoperative evaluation of his right shoulder.  He is scheduled to undergo a shoulder arthroscopy with rotator cuff repair, extensive debridement, sub-acromial decompression, and possible open sub-pectoral biceps tenodesis on December 22, 2022.  He was cleared for surgery by Irene Mosquera MD on 12/16/2022.     History 12/16/2022:  62 y.o. Male with a history of right shoulder pain who Vincenzo Ortiz PA-C. He is an  who will be retiring in a few years. He has had shoulder pain for 2 years. He does not recall a specific ANABELLE. He states he had a heart attack in July and he has been cleared by cardiology. He had bypass, he was not stented. He has been taking Alieve for his shoulder pain. He has also been doing physical therapy.  He states he is gotten to the point that he just wants to get it fixed.  The pain has been radiating in the lateral aspect of his shoulder.     Is affecting ADLs.  Pain is 8/10 at it's worst.         PAST MEDICAL HISTORY          Past Medical History:   Diagnosis Date    CHF (congestive heart failure)      Coronary artery disease      Diabetes mellitus type 2, uncontrolled, without complications       6.7% Metf 1 g qd, eye 2015, [facility]- [facility]-2015,     Elevated platelet count 07/23/2015    H/O splenectomy 04/24/2015     doesn't like pneumovax bc caused side effects    HLD (hyperlipidemia) 01/22/2015     goal LDL < 100, reluctant to take statin    HTN (hypertension), benign 01/22/2015     losartan 100    Morbid obesity with BMI of 45.0-49.9, adult 06/11/2014    RODRIGUEZ (obstructive sleep apnea)      PAF (paroxysmal atrial fibrillation)      Polyp of transverse colon 09/05/2018 2018, repeat 2023    Severe uncontrolled hypertension 06/11/2014         PAST SURGICAL HISTORY            Past Surgical History:   Procedure Laterality Date    AORTOGRAPHY N/A 7/11/2022     Procedure: AORTOGRAM;  Surgeon: Bjorn Cheatham MD;  Location: [facility]  LAB;  Service: Cardiology;  Laterality: N/A;    COLONOSCOPY N/A 11/22/2022     Procedure: COLONOSCOPY;  Surgeon: Castro Jasso MD;  Location: [facility] ENDO (4TH FLR);  Service: Endoscopy;  Laterality: N/A;  ok to hold Xarelto-see telephone encounter dated 11/1  inst via portal  pre call done    CORONARY ARTERY BYPASS GRAFT (CABG) Left 7/18/2022     Procedure: CORONARY ARTERY BYPASS GRAFT (CABG);  Surgeon: Blu Rhodes MD;  [facility];  Service: Cardiovascular;  Laterality: Left;  CABG x 1 (LIMA to LAD)    ENDOSCOPIC HARVEST OF VEIN Left 7/18/2022     Procedure: HARVEST-VEIN-ENDOVASCULAR;  Surgeon: Blu Rhodes MD;  [facility];  Service: Cardiovascular;  Laterality: Left;  Vein [place] Start time: 0823am  Vein Candia Stop time: 0836am     Vein Candia Start time: 0848am  Vein Candia Stop time: 0904am    LEFT HEART CATHETERIZATION N/A 7/11/2022     Procedure: Left heart cath;  Surgeon: Bjorn Cheatham MD;  Location: [facility] CATH LAB;  Service: Cardiology;  Laterality: N/A;    splenecectomy             FAMILY HISTORY           Family History   Problem Relation Age of Onset    Lung cancer Mother      Hypertension Mother      Heart attack Mother 58    Heart disease Mother      Stroke Mother      Heart failure Mother      Hyperlipidemia Mother      Asbestos Mother      Diabetes Father      Colon cancer Father 65    Coronary artery disease Brother      Coronary artery disease Maternal Grandmother           SOCIAL HISTORY     Social History               Socioeconomic History    Marital status:     Number of children: 2   Tobacco Use    Smoking status: Former       Types: Cigarettes    Smokeless tobacco: Never   Substance and Sexual Activity    Alcohol use: Yes       Alcohol/week: 0.0 standard drinks       Comment: 1 a week    Drug use: Never    Sexual activity: Yes       Partners: Female   Social History Narrative      , Quit smoking 20 yrs, ETOH 2 beers on  weekend, colonoscopy 2011            MEDICATIONS        Current Outpatient Medications:     amiodarone (PACERONE) 400 MG tablet, Take by mouth., Disp: , Rfl:     aspirin 81 MG Chew, Take 1 tablet (81 mg total) by mouth once daily., Disp: 90 tablet, Rfl: 3    atorvastatin (LIPITOR) 80 MG tablet, Take 1 tablet (80 mg total) by mouth once daily., Disp: 90 tablet, Rfl: 3    fluocinolone acetonide oiL (DERMOTIC OIL) 0.01 % Drop, Place 3 drops into the right ear 2 (two) times daily., Disp: 20 mL, Rfl: 3    ketoconazole (NIZORAL) 2 % cream, Apply topically 2 (two) times daily. Prn rash face, Disp: 60 g, Rfl: 3    ketoconazole (NIZORAL) 2 % shampoo, Apply topically once daily. Face and scalp soaks, Disp: 120 mL, Rfl: 4    losartan (COZAAR) 25 MG tablet, Take 1 tablet (25 mg total) by mouth once daily., Disp: 90 tablet, Rfl: 3    metFORMIN (GLUCOPHAGE) 1000 MG tablet, Take 1 tablet (1,000 mg total) by mouth 2 (two) times daily with meals., Disp: 180 tablet, Rfl: 3    metoprolol succinate (TOPROL-XL) 100 MG 24 hr tablet, Take 1 tablet (100 mg total) by mouth once daily., Disp: 90 tablet, Rfl: 3    prednisoLONE acetate (PRED FORTE) 1 % DrpS, Apply 2 drops  to left ear BID, Disp: 10 mL, Rfl: 0    rivaroxaban (XARELTO) 20 mg Tab, Take 1 tablet (20 mg total) by mouth daily with dinner or evening meal., Disp: 90 tablet, Rfl: 3    sildenafiL (VIAGRA) 50 MG tablet, Take 1 tablet (50 mg total) by mouth daily as needed for Erectile Dysfunction. Take 1 hour before sexual activity on an empty stomach., Disp: 10 tablet, Rfl: 5    SITagliptin (JANUVIA) 100 MG Tab, Take 1 tablet (100 mg total) by mouth once daily., Disp: 90 tablet, Rfl: 3    tamsulosin (FLOMAX) 0.4 mg Cap, Take 1 capsule (0.4 mg total) by mouth once daily., Disp: 90 capsule, Rfl: 3     ALLERGIES            Review of patient's allergies indicates:   Allergen Reactions    Penicillins         Other reaction(s): Itching  Other reaction(s): Hives    Wasp venom Edema             REVIEW OF SYSTEMS   Constitution: Negative. Negative for chills, fever and night sweats.   HENT: Negative for congestion and headaches.    Eyes: Negative for blurred vision, left vision loss and right vision loss.   Cardiovascular: Negative for chest pain and syncope.   Respiratory: Negative for cough and shortness of breath.    Endocrine: Negative for polydipsia, polyphagia and polyuria.   Hematologic/Lymphatic: Negative for bleeding problem. Does not bruise/bleed easily.   Skin: Negative for dry skin, itching and rash.   Musculoskeletal: Negative for falls. Positive for right shoulder pain   Gastrointestinal: Negative for abdominal pain and bowel incontinence.   Genitourinary: Negative for bladder incontinence and nocturia.   Neurological: Negative for disturbances in coordination, loss of balance and seizures.   Psychiatric/Behavioral: Negative for depression. The patient does not have insomnia.    Allergic/Immunologic: Negative for hives and persistent infections.      PHYSICAL EXAMINATION     Vitals: BP (!) 152/96   Pulse (!) 58   Ht 6' (1.829 m)   Wt 133.3 kg (293 lb 14 oz)   BMI 39.86 kg/m²      General: The patient appears active and healthy with no apparent physical problems.  No disturbance of mood or affect is demonstrated. Alert and Oriented.     HEENT: Eyes normal, pupils equally round, nose normal.     Resp: Equal and symmetrical chest rises. No wheezing     CV: Regular rate     Neck: Supple; nonpainful range of motion.     Extremities: no cyanosis, clubbing, edema, or diffuse swelling.  Palpable pulses, good capillary refill of the digits.  No coolness, discoloration, edema or obvious varicosities.     Skin: no lesions noted.     Lymphatic: no detected adenopathy in the upper or lower extremities.     Neurologic: normal mental status, normal reflexes, normal gait and balance.  Patient is alert and oriented to person, place and time.  No flaccidity or spasticity is noted.  No motor or sensory  deficits are noted.  Light touch is intact     Orthopaedic:      SHOULDER EXAM - RIGHT     Inspection:   Normal skin color and appearance with no scars.  No muscle atrophy noted.  No scapular winging.       Palpation: No tenderness of the acromioclavicular joint, lateral edge of the acromion, biceps tendon, trapezius muscle or scapulothoracic bursa.       ROM:      PROM:     FE - 180°    Abd/ER -  90°  Abd/IR -  45°   Add/ER -  60°      AROM:    FE - 180°    Abd/ER -  90°  Abd/IR -  45°   Add/ER -  60°          Tests:     - Flowers, - Neer's, - Cross Arm Adduction, - Phillips, - Yerguson, - Speed. - Belly Press,  + Jobes, - Lift Off     Stability: - sulcus, - apprehension, - relocation, - load and shift, - DLS       Motor:  Rotator cuff strength is 5/5 supraspinatus, 4/5 infraspinatus, 5/5 subscapularis. Biceps, triceps and deltoid strength is 5/5.       Neuro     Distally there are no paresthesias, and sensation is intact to light touch in the median, ulnar, and radial distributions.  Reflexes are 2/2 biceps, triceps and brachioradialis.          IMAGING     MRI Shoulder Without Contrast Right  Narrative: EXAMINATION:  MRI SHOULDER WITHOUT CONTRAST RIGHT     CLINICAL HISTORY:  Shoulder pain, bursitis suspected, xray done;Shoulder pain, rotator cuff disorder suspected, xray done;  Pain in right shoulder     FINDINGS:  There is complete tear with retraction of the anterior fibers of the supraspinatus tendon.  No fracture dislocation bone destruction seen.  There is mild DJD.  The biceps tendon is unremarkable.  There is degenerative change of the labrum.  No soft tissue masses are seen.  There is thinning of the biceps tendon at the rotator cuff interval.  Impression: Full-thickness tear with retraction of the anterior fibers of the supraspinatus tendon.     Degenerative change.     Degenerative change of the labrum.     There is thinning of the biceps tendon at the rotator cuff interval.     Electronically signed by:          Lane Kyle MD  Date:                                        12/15/2022  Time:                                       08:26           IMPRESSION          ICD-10-CM ICD-9-CM   1. Traumatic complete tear of right rotator cuff, initial encounter  S46.011A 840.4   2. Biceps tendinitis of right shoulder  M75.21 726.12            MEDICATIONS PRESCRIBED       Hydrocodone 7.5/325mg     RECOMMENDATIONS      The patient elected to proceed with operative intervention after all risks, benefits, and alternatives were discussed with the patient.  The risks of surgery include bleeding, scarring, injuries to nerves, arteries and veins, need for additional surgeries, blood clots, infections, and the risk of anesthesia.  I personally obtained informed consent from the patient and documented full history and physical, which has been placed on the chart; right shoulder arthroscopy with rotator cuff repair, extensive debridement, sub-acromial decompression, and possible open sub-pectoral biceps tenodesis  Physical therapy referral placed today        All questions were answered, pt will contact us for questions or concerns in the interim.

## 2022-12-21 NOTE — TELEPHONE ENCOUNTER
Spoke with the patient. Notified of 9 AM arrival time to the [facility]. Informed of current visitor policy.  Reminded of NPO. Patient verbalized understanding to all.    Kylah Rose MS, OTC  Clinical/OR Assistant to Alexandria Morse MD  [facility]

## 2022-12-21 NOTE — H&P
H&P     History 12/21/2022:  Isaiah returns here today for preoperative evaluation of his right shoulder.  He is scheduled to undergo a shoulder arthroscopy with rotator cuff repair, extensive debridement, sub-acromial decompression, and possible open sub-pectoral biceps tenodesis on December 22, 2022.  He was cleared for surgery by Irene Mosquera MD on 12/16/2022.     History 12/16/2022:  62 y.o. Male with a history of right shoulder pain who Vincenzo Ortiz PA-C. He is an  who will be retiring in a few years. He has had shoulder pain for 2 years. He does not recall a specific ANABELLE. He states he had a heart attack in July and he has been cleared by cardiology. He had bypass, he was not stented. He has been taking Alieve for his shoulder pain. He has also been doing physical therapy.  He states he is gotten to the point that he just wants to get it fixed.  The pain has been radiating in the lateral aspect of his shoulder.     Is affecting ADLs.  Pain is 8/10 at it's worst.         PAST MEDICAL HISTORY          Past Medical History:   Diagnosis Date    CHF (congestive heart failure)      Coronary artery disease      Diabetes mellitus type 2, uncontrolled, without complications       6.7% Metf 1 g qd, eye 2015, U- F-2015,     Elevated platelet count 07/23/2015    H/O splenectomy 04/24/2015     doesn't like pneumovax bc caused side effects    HLD (hyperlipidemia) 01/22/2015     goal LDL < 100, reluctant to take statin    HTN (hypertension), benign 01/22/2015     losartan 100    Morbid obesity with BMI of 45.0-49.9, adult 06/11/2014    RODRIGUEZ (obstructive sleep apnea)      PAF (paroxysmal atrial fibrillation)      Polyp of transverse colon 09/05/2018 2018, repeat 2023    Severe uncontrolled hypertension 06/11/2014         PAST SURGICAL HISTORY            Past Surgical History:   Procedure Laterality Date    AORTOGRAPHY N/A 7/11/2022     Procedure: AORTOGRAM;  Surgeon: Bjorn Cheatham MD;  Location: [facility]  LAB;  Service: Cardiology;  Laterality: N/A;    COLONOSCOPY N/A 11/22/2022     Procedure: COLONOSCOPY;  Surgeon: Castro Jasso MD;  Location: [facility] ENDO (4TH FLR);  Service: Endoscopy;  Laterality: N/A;  ok to hold Xarelto-see telephone encounter dated 11/1  inst via portal  pre call done    CORONARY ARTERY BYPASS GRAFT (CABG) Left 7/18/2022     Procedure: CORONARY ARTERY BYPASS GRAFT (CABG);  Surgeon: Blu Rhodes MD;  [facility];  Service: Cardiovascular;  Laterality: Left;  CABG x 1 (LIMA to LAD)    ENDOSCOPIC HARVEST OF VEIN Left 7/18/2022     Procedure: HARVEST-VEIN-ENDOVASCULAR;  Surgeon: Blu Rhodes MD;  [facility];  Service: Cardiovascular;  Laterality: Left;  Vein [place] Start time: 0823am  Vein [place] Stop time: 0836am     Vein [place] Start time: 0848am  Vein [place] Stop time: 0904am    LEFT HEART CATHETERIZATION N/A 7/11/2022     Procedure: Left heart cath;  Surgeon: Bjorn Cheatham MD;  Location: [facility] CATH LAB;  Service: Cardiology;  Laterality: N/A;    splenecectomy             FAMILY HISTORY           Family History   Problem Relation Age of Onset    Lung cancer Mother      Hypertension Mother      Heart attack Mother 58    Heart disease Mother      Stroke Mother      Heart failure Mother      Hyperlipidemia Mother      Asbestos Mother      Diabetes Father      Colon cancer Father 65    Coronary artery disease Brother      Coronary artery disease Maternal Grandmother           SOCIAL HISTORY     Social History               Socioeconomic History    Marital status:     Number of children: 2   Tobacco Use    Smoking status: Former       Types: Cigarettes    Smokeless tobacco: Never   Substance and Sexual Activity    Alcohol use: Yes       Alcohol/week: 0.0 standard drinks       Comment: 1 a week    Drug use: Never    Sexual activity: Yes       Partners: Female   Social History Narrative      , Quit smoking 20 yrs, ETOH 2 beers on  weekend, colonoscopy 2011            MEDICATIONS        Current Outpatient Medications:     amiodarone (PACERONE) 400 MG tablet, Take by mouth., Disp: , Rfl:     aspirin 81 MG Chew, Take 1 tablet (81 mg total) by mouth once daily., Disp: 90 tablet, Rfl: 3    atorvastatin (LIPITOR) 80 MG tablet, Take 1 tablet (80 mg total) by mouth once daily., Disp: 90 tablet, Rfl: 3    fluocinolone acetonide oiL (DERMOTIC OIL) 0.01 % Drop, Place 3 drops into the right ear 2 (two) times daily., Disp: 20 mL, Rfl: 3    ketoconazole (NIZORAL) 2 % cream, Apply topically 2 (two) times daily. Prn rash face, Disp: 60 g, Rfl: 3    ketoconazole (NIZORAL) 2 % shampoo, Apply topically once daily. Face and scalp soaks, Disp: 120 mL, Rfl: 4    losartan (COZAAR) 25 MG tablet, Take 1 tablet (25 mg total) by mouth once daily., Disp: 90 tablet, Rfl: 3    metFORMIN (GLUCOPHAGE) 1000 MG tablet, Take 1 tablet (1,000 mg total) by mouth 2 (two) times daily with meals., Disp: 180 tablet, Rfl: 3    metoprolol succinate (TOPROL-XL) 100 MG 24 hr tablet, Take 1 tablet (100 mg total) by mouth once daily., Disp: 90 tablet, Rfl: 3    prednisoLONE acetate (PRED FORTE) 1 % DrpS, Apply 2 drops  to left ear BID, Disp: 10 mL, Rfl: 0    rivaroxaban (XARELTO) 20 mg Tab, Take 1 tablet (20 mg total) by mouth daily with dinner or evening meal., Disp: 90 tablet, Rfl: 3    sildenafiL (VIAGRA) 50 MG tablet, Take 1 tablet (50 mg total) by mouth daily as needed for Erectile Dysfunction. Take 1 hour before sexual activity on an empty stomach., Disp: 10 tablet, Rfl: 5    SITagliptin (JANUVIA) 100 MG Tab, Take 1 tablet (100 mg total) by mouth once daily., Disp: 90 tablet, Rfl: 3    tamsulosin (FLOMAX) 0.4 mg Cap, Take 1 capsule (0.4 mg total) by mouth once daily., Disp: 90 capsule, Rfl: 3     ALLERGIES            Review of patient's allergies indicates:   Allergen Reactions    Penicillins         Other reaction(s): Itching  Other reaction(s): Hives    Wasp venom Edema             REVIEW OF SYSTEMS   Constitution: Negative. Negative for chills, fever and night sweats.   HENT: Negative for congestion and headaches.    Eyes: Negative for blurred vision, left vision loss and right vision loss.   Cardiovascular: Negative for chest pain and syncope.   Respiratory: Negative for cough and shortness of breath.    Endocrine: Negative for polydipsia, polyphagia and polyuria.   Hematologic/Lymphatic: Negative for bleeding problem. Does not bruise/bleed easily.   Skin: Negative for dry skin, itching and rash.   Musculoskeletal: Negative for falls. Positive for right shoulder pain   Gastrointestinal: Negative for abdominal pain and bowel incontinence.   Genitourinary: Negative for bladder incontinence and nocturia.   Neurological: Negative for disturbances in coordination, loss of balance and seizures.   Psychiatric/Behavioral: Negative for depression. The patient does not have insomnia.    Allergic/Immunologic: Negative for hives and persistent infections.      PHYSICAL EXAMINATION     Vitals: BP (!) 152/96   Pulse (!) 58   Ht 6' (1.829 m)   Wt 133.3 kg (293 lb 14 oz)   BMI 39.86 kg/m²      General: The patient appears active and healthy with no apparent physical problems.  No disturbance of mood or affect is demonstrated. Alert and Oriented.     HEENT: Eyes normal, pupils equally round, nose normal.     Resp: Equal and symmetrical chest rises. No wheezing     CV: Regular rate     Neck: Supple; nonpainful range of motion.     Extremities: no cyanosis, clubbing, edema, or diffuse swelling.  Palpable pulses, good capillary refill of the digits.  No coolness, discoloration, edema or obvious varicosities.     Skin: no lesions noted.     Lymphatic: no detected adenopathy in the upper or lower extremities.     Neurologic: normal mental status, normal reflexes, normal gait and balance.  Patient is alert and oriented to person, place and time.  No flaccidity or spasticity is noted.  No motor or sensory  deficits are noted.  Light touch is intact     Orthopaedic:      SHOULDER EXAM - RIGHT     Inspection:   Normal skin color and appearance with no scars.  No muscle atrophy noted.  No scapular winging.       Palpation: No tenderness of the acromioclavicular joint, lateral edge of the acromion, biceps tendon, trapezius muscle or scapulothoracic bursa.       ROM:      PROM:     FE - 180°    Abd/ER -  90°  Abd/IR -  45°   Add/ER -  60°      AROM:    FE - 180°    Abd/ER -  90°  Abd/IR -  45°   Add/ER -  60°          Tests:     - Flowers, - Neer's, - Cross Arm Adduction, - [place], - Yerguson, - Speed. - Belly Press,  + Jobes, - Lift Off     Stability: - sulcus, - apprehension, - relocation, - load and shift, - DLS       Motor:  Rotator cuff strength is 5/5 supraspinatus, 4/5 infraspinatus, 5/5 subscapularis. Biceps, triceps and deltoid strength is 5/5.       Neuro     Distally there are no paresthesias, and sensation is intact to light touch in the median, ulnar, and radial distributions.  Reflexes are 2/2 biceps, triceps and brachioradialis.          IMAGING     MRI Shoulder Without Contrast Right  Narrative: EXAMINATION:  MRI SHOULDER WITHOUT CONTRAST RIGHT     CLINICAL HISTORY:  Shoulder pain, bursitis suspected, xray done;Shoulder pain, rotator cuff disorder suspected, xray done;  Pain in right shoulder     FINDINGS:  There is complete tear with retraction of the anterior fibers of the supraspinatus tendon.  No fracture dislocation bone destruction seen.  There is mild DJD.  The biceps tendon is unremarkable.  There is degenerative change of the labrum.  No soft tissue masses are seen.  There is thinning of the biceps tendon at the rotator cuff interval.  Impression: Full-thickness tear with retraction of the anterior fibers of the supraspinatus tendon.     Degenerative change.     Degenerative change of the labrum.     There is thinning of the biceps tendon at the rotator cuff interval.     Electronically signed by:          Lane Kyle MD  Date:                                        12/15/2022  Time:                                       08:26           IMPRESSION          ICD-10-CM ICD-9-CM   1. Traumatic complete tear of right rotator cuff, initial encounter  S46.011A 840.4   2. Biceps tendinitis of right shoulder  M75.21 726.12            MEDICATIONS PRESCRIBED       Hydrocodone 7.5/325mg     RECOMMENDATIONS      The patient elected to proceed with operative intervention after all risks, benefits, and alternatives were discussed with the patient.  The risks of surgery include bleeding, scarring, injuries to nerves, arteries and veins, need for additional surgeries, blood clots, infections, and the risk of anesthesia.  I personally obtained informed consent from the patient and documented full history and physical, which has been placed on the chart; right shoulder arthroscopy with rotator cuff repair, extensive debridement, sub-acromial decompression, and possible open sub-pectoral biceps tenodesis  Physical therapy referral placed today        All questions were answered, pt will contact us for questions or concerns in the interim.

## 2022-12-21 NOTE — PROGRESS NOTES
PREOPERATIVE APPOINTMENT    History 12/21/2022:  Isaiah returns here today for preoperative evaluation of his right shoulder.  He is scheduled to undergo a shoulder arthroscopy with rotator cuff repair, extensive debridement, sub-acromial decompression, and possible open sub-pectoral biceps tenodesis on December 22, 2022.  He was cleared for surgery by Irene Mosquera MD on 12/16/2022.    History 12/16/2022:  62 y.o. Male with a history of right shoulder pain who Vincenzo Ortiz PA-C. He is an  who will be retiring in a few years. He has had shoulder pain for 2 years. He does not recall a specific ANABELLE. He states he had a heart attack in July and he has been cleared by cardiology. He had bypass, he was not stented. He has been taking Alieve for his shoulder pain. He has also been doing physical therapy.  He states he is gotten to the point that he just wants to get it fixed.  The pain has been radiating in the lateral aspect of his shoulder.    Is affecting ADLs.  Pain is 8/10 at it's worst.        PAST MEDICAL HISTORY    Past Medical History:   Diagnosis Date    CHF (congestive heart failure)     Coronary artery disease     Diabetes mellitus type 2, uncontrolled, without complications     6.7% Metf 1 g qd, eye 2015, [facility]- [facility]-2015,     Elevated platelet count 07/23/2015    H/O splenectomy 04/24/2015    doesn't like pneumovax bc caused side effects    HLD (hyperlipidemia) 01/22/2015    goal LDL < 100, reluctant to take statin    HTN (hypertension), benign 01/22/2015    losartan 100    Morbid obesity with BMI of 45.0-49.9, adult 06/11/2014    RODRIGUEZ (obstructive sleep apnea)     PAF (paroxysmal atrial fibrillation)     Polyp of transverse colon 09/05/2018 2018, repeat 2023    Severe uncontrolled hypertension 06/11/2014       PAST SURGICAL HISTORY     Past Surgical History:   Procedure Laterality Date    AORTOGRAPHY N/A 7/11/2022    Procedure: AORTOGRAM;  Surgeon: Bjorn Cheatham MD;  Location: [facility] CATH LAB;   Service: Cardiology;  Laterality: N/A;    COLONOSCOPY N/A 11/22/2022    Procedure: COLONOSCOPY;  Surgeon: Castro Jasso MD;  Location: [facility] ENDO (4TH FLR);  Service: Endoscopy;  Laterality: N/A;  ok to hold Xarelto-see telephone encounter dated 11/1  inst via portal  pre call done    CORONARY ARTERY BYPASS GRAFT (CABG) Left 7/18/2022    Procedure: CORONARY ARTERY BYPASS GRAFT (CABG);  Surgeon: Blu Rhodes MD;  [facility];  Service: Cardiovascular;  Laterality: Left;  CABG x 1 (LIMA to LAD)    ENDOSCOPIC HARVEST OF VEIN Left 7/18/2022    Procedure: HARVEST-VEIN-ENDOVASCULAR;  Surgeon: Blu Rhodes MD;  [facility];  Service: Cardiovascular;  Laterality: Left;  Vein [place] Start time: 0823am  Vein [place] Stop time: 0836am    Vein [place] Start time: 0848am  Vein [place] Stop time: 0904am    LEFT HEART CATHETERIZATION N/A 7/11/2022    Procedure: Left heart cath;  Surgeon: Bjorn Cheatham MD;  Location: [facility] CATH LAB;  Service: Cardiology;  Laterality: N/A;    splenecectomy         FAMILY HISTORY    Family History   Problem Relation Age of Onset    Lung cancer Mother     Hypertension Mother     Heart attack Mother 58    Heart disease Mother     Stroke Mother     Heart failure Mother     Hyperlipidemia Mother     Asbestos Mother     Diabetes Father     Colon cancer Father 65    Coronary artery disease Brother     Coronary artery disease Maternal Grandmother        SOCIAL HISTORY    Social History     Socioeconomic History    Marital status:     Number of children: 2   Tobacco Use    Smoking status: Former     Types: Cigarettes    Smokeless tobacco: Never   Substance and Sexual Activity    Alcohol use: Yes     Alcohol/week: 0.0 standard drinks     Comment: 1 a week    Drug use: Never    Sexual activity: Yes     Partners: Female   Social History Narrative     , Quit smoking 20 yrs, ETOH 2 beers on weekend, colonoscopy 2011       MEDICATIONS      Current  Outpatient Medications:     amiodarone (PACERONE) 400 MG tablet, Take by mouth., Disp: , Rfl:     aspirin 81 MG Chew, Take 1 tablet (81 mg total) by mouth once daily., Disp: 90 tablet, Rfl: 3    atorvastatin (LIPITOR) 80 MG tablet, Take 1 tablet (80 mg total) by mouth once daily., Disp: 90 tablet, Rfl: 3    fluocinolone acetonide oiL (DERMOTIC OIL) 0.01 % Drop, Place 3 drops into the right ear 2 (two) times daily., Disp: 20 mL, Rfl: 3    ketoconazole (NIZORAL) 2 % cream, Apply topically 2 (two) times daily. Prn rash face, Disp: 60 g, Rfl: 3    ketoconazole (NIZORAL) 2 % shampoo, Apply topically once daily. Face and scalp soaks, Disp: 120 mL, Rfl: 4    losartan (COZAAR) 25 MG tablet, Take 1 tablet (25 mg total) by mouth once daily., Disp: 90 tablet, Rfl: 3    metFORMIN (GLUCOPHAGE) 1000 MG tablet, Take 1 tablet (1,000 mg total) by mouth 2 (two) times daily with meals., Disp: 180 tablet, Rfl: 3    metoprolol succinate (TOPROL-XL) 100 MG 24 hr tablet, Take 1 tablet (100 mg total) by mouth once daily., Disp: 90 tablet, Rfl: 3    prednisoLONE acetate (PRED FORTE) 1 % DrpS, Apply 2 drops  to left ear BID, Disp: 10 mL, Rfl: 0    rivaroxaban (XARELTO) 20 mg Tab, Take 1 tablet (20 mg total) by mouth daily with dinner or evening meal., Disp: 90 tablet, Rfl: 3    sildenafiL (VIAGRA) 50 MG tablet, Take 1 tablet (50 mg total) by mouth daily as needed for Erectile Dysfunction. Take 1 hour before sexual activity on an empty stomach., Disp: 10 tablet, Rfl: 5    SITagliptin (JANUVIA) 100 MG Tab, Take 1 tablet (100 mg total) by mouth once daily., Disp: 90 tablet, Rfl: 3    tamsulosin (FLOMAX) 0.4 mg Cap, Take 1 capsule (0.4 mg total) by mouth once daily., Disp: 90 capsule, Rfl: 3    ALLERGIES     Review of patient's allergies indicates:   Allergen Reactions    Penicillins      Other reaction(s): Itching  Other reaction(s): Hives    Wasp venom Edema         REVIEW OF SYSTEMS   Constitution: Negative. Negative for chills, fever and  night sweats.   HENT: Negative for congestion and headaches.    Eyes: Negative for blurred vision, left vision loss and right vision loss.   Cardiovascular: Negative for chest pain and syncope.   Respiratory: Negative for cough and shortness of breath.    Endocrine: Negative for polydipsia, polyphagia and polyuria.   Hematologic/Lymphatic: Negative for bleeding problem. Does not bruise/bleed easily.   Skin: Negative for dry skin, itching and rash.   Musculoskeletal: Negative for falls. Positive for right shoulder pain   Gastrointestinal: Negative for abdominal pain and bowel incontinence.   Genitourinary: Negative for bladder incontinence and nocturia.   Neurological: Negative for disturbances in coordination, loss of balance and seizures.   Psychiatric/Behavioral: Negative for depression. The patient does not have insomnia.    Allergic/Immunologic: Negative for hives and persistent infections.     PHYSICAL EXAMINATION    Vitals: BP (!) 152/96   Pulse (!) 58   Ht 6' (1.829 m)   Wt 133.3 kg (293 lb 14 oz)   BMI 39.86 kg/m²     General: The patient appears active and healthy with no apparent physical problems.  No disturbance of mood or affect is demonstrated. Alert and Oriented.    HEENT: Eyes normal, pupils equally round, nose normal.    Resp: Equal and symmetrical chest rises. No wheezing    CV: Regular rate    Neck: Supple; nonpainful range of motion.    Extremities: no cyanosis, clubbing, edema, or diffuse swelling.  Palpable pulses, good capillary refill of the digits.  No coolness, discoloration, edema or obvious varicosities.    Skin: no lesions noted.    Lymphatic: no detected adenopathy in the upper or lower extremities.    Neurologic: normal mental status, normal reflexes, normal gait and balance.  Patient is alert and oriented to person, place and time.  No flaccidity or spasticity is noted.  No motor or sensory deficits are noted.  Light touch is intact    Orthopaedic:     SHOULDER EXAM -  RIGHT    Inspection:   Normal skin color and appearance with no scars.  No muscle atrophy noted.  No scapular winging.      Palpation: No tenderness of the acromioclavicular joint, lateral edge of the acromion, biceps tendon, trapezius muscle or scapulothoracic bursa.      ROM:      PROM:     FE - 180°    Abd/ER -  90°  Abd/IR -  45°   Add/ER -  60°     AROM:    FE - 180°    Abd/ER -  90°  Abd/IR -  45°   Add/ER -  60°         Tests:     - Flowers, - Neer's, - Cross Arm Adduction, - [place], - [place], - Speed. - Belly Press,  + Jobes, - Lift Off    Stability: - sulcus, - apprehension, - relocation, - load and shift, - DLS      Motor:  Rotator cuff strength is 5/5 supraspinatus, 4/5 infraspinatus, 5/5 subscapularis. Biceps, triceps and deltoid strength is 5/5.      Neuro     Distally there are no paresthesias, and sensation is intact to light touch in the median, ulnar, and radial distributions.  Reflexes are 2/2 biceps, triceps and brachioradialis.        IMAGING    MRI Shoulder Without Contrast Right  Narrative: EXAMINATION:  MRI SHOULDER WITHOUT CONTRAST RIGHT    CLINICAL HISTORY:  Shoulder pain, bursitis suspected, xray done;Shoulder pain, rotator cuff disorder suspected, xray done;  Pain in right shoulder    FINDINGS:  There is complete tear with retraction of the anterior fibers of the supraspinatus tendon.  No fracture dislocation bone destruction seen.  There is mild DJD.  The biceps tendon is unremarkable.  There is degenerative change of the labrum.  No soft tissue masses are seen.  There is thinning of the biceps tendon at the rotator cuff interval.  Impression: Full-thickness tear with retraction of the anterior fibers of the supraspinatus tendon.    Degenerative change.    Degenerative change of the labrum.    There is thinning of the biceps tendon at the rotator cuff interval.    Electronically signed by: Lane Kyle MD  Date:    12/15/2022  Time:    08:26        IMPRESSION       ICD-10-CM ICD-9-CM    1. Traumatic complete tear of right rotator cuff, initial encounter  S46.011A 840.4   2. Biceps tendinitis of right shoulder  M75.21 726.12         MEDICATIONS PRESCRIBED      Hydrocodone 7.5/325mg    RECOMMENDATIONS     The patient elected to proceed with operative intervention after all risks, benefits, and alternatives were discussed with the patient.  The risks of surgery include bleeding, scarring, injuries to nerves, arteries and veins, need for additional surgeries, blood clots, infections, and the risk of anesthesia.  I personally obtained informed consent from the patient and documented full history and physical, which has been placed on the chart; right shoulder arthroscopy with rotator cuff repair, extensive debridement, sub-acromial decompression, and possible open sub-pectoral biceps tenodesis  Physical therapy referral placed today      All questions were answered, pt will contact us for questions or concerns in the interim.

## 2022-12-22 ENCOUNTER — ANESTHESIA (OUTPATIENT)
Dept: SURGERY | Facility: HOSPITAL | Age: 62
End: 2022-12-22
Payer: COMMERCIAL

## 2022-12-22 ENCOUNTER — HOSPITAL ENCOUNTER (OUTPATIENT)
Facility: HOSPITAL | Age: 62
Discharge: HOME | End: 2022-12-22
Attending: ORTHOPAEDIC SURGERY | Admitting: ORTHOPAEDIC SURGERY
Payer: COMMERCIAL

## 2022-12-22 VITALS
HEIGHT: 72 IN | RESPIRATION RATE: 28 BRPM | BODY MASS INDEX: 39.68 KG/M2 | TEMPERATURE: 98 F | WEIGHT: 293 LBS | HEART RATE: 47 BPM | DIASTOLIC BLOOD PRESSURE: 69 MMHG | OXYGEN SATURATION: 95 % | SYSTOLIC BLOOD PRESSURE: 136 MMHG

## 2022-12-22 DIAGNOSIS — S46.011A TRAUMATIC COMPLETE TEAR OF RIGHT ROTATOR CUFF, INITIAL ENCOUNTER: ICD-10-CM

## 2022-12-22 LAB
POCT GLUCOSE: 108 MG/DL (ref 70–110)
POCT GLUCOSE: 120 MG/DL (ref 70–110)

## 2022-12-22 PROCEDURE — 64450 NJX AA&/STRD OTHER PN/BRANCH: CPT | Mod: 59,RT,, | Performed by: ANESTHESIOLOGY

## 2022-12-22 PROCEDURE — 25000003 PHARM REV CODE 250: Performed by: STUDENT IN AN ORGANIZED HEALTH CARE EDUCATION/TRAINING PROGRAM

## 2022-12-22 PROCEDURE — 76942 ECHO GUIDE FOR BIOPSY: CPT | Performed by: ANESTHESIOLOGY

## 2022-12-22 PROCEDURE — C1713 ANCHOR/SCREW BN/BN,TIS/BN: HCPCS | Performed by: ORTHOPAEDIC SURGERY

## 2022-12-22 PROCEDURE — 63600175 PHARM REV CODE 636 W HCPCS: Performed by: NURSE ANESTHETIST, CERTIFIED REGISTERED

## 2022-12-22 PROCEDURE — 37000009 HC ANESTHESIA EA ADD 15 MINS: Performed by: ORTHOPAEDIC SURGERY

## 2022-12-22 PROCEDURE — 25000003 PHARM REV CODE 250: Performed by: NURSE ANESTHETIST, CERTIFIED REGISTERED

## 2022-12-22 PROCEDURE — 76942: ICD-10-PCS | Mod: 26,,, | Performed by: ANESTHESIOLOGY

## 2022-12-22 PROCEDURE — 63600175 PHARM REV CODE 636 W HCPCS: Performed by: STUDENT IN AN ORGANIZED HEALTH CARE EDUCATION/TRAINING PROGRAM

## 2022-12-22 PROCEDURE — 82962 GLUCOSE BLOOD TEST: CPT | Performed by: ORTHOPAEDIC SURGERY

## 2022-12-22 PROCEDURE — D9220A PRA ANESTHESIA: Mod: ANES,,, | Performed by: ANESTHESIOLOGY

## 2022-12-22 PROCEDURE — 25000003 PHARM REV CODE 250: Performed by: ANESTHESIOLOGY

## 2022-12-22 PROCEDURE — 25000003 PHARM REV CODE 250: Performed by: ORTHOPAEDIC SURGERY

## 2022-12-22 PROCEDURE — D9220A PRA ANESTHESIA: ICD-10-PCS | Mod: ANES,,, | Performed by: ANESTHESIOLOGY

## 2022-12-22 PROCEDURE — 29828 PR ARTHROSCOPY SHOULDER SURGICAL BICEPS TENODESIS: ICD-10-PCS | Mod: 51,RT,, | Performed by: ORTHOPAEDIC SURGERY

## 2022-12-22 PROCEDURE — 94761 N-INVAS EAR/PLS OXIMETRY MLT: CPT

## 2022-12-22 PROCEDURE — D9220A PRA ANESTHESIA: ICD-10-PCS | Mod: CRNA,,, | Performed by: NURSE ANESTHETIST, CERTIFIED REGISTERED

## 2022-12-22 PROCEDURE — 29827 SHO ARTHRS SRG RT8TR CUF RPR: CPT | Mod: RT,,, | Performed by: ORTHOPAEDIC SURGERY

## 2022-12-22 PROCEDURE — 99900035 HC TECH TIME PER 15 MIN (STAT)

## 2022-12-22 PROCEDURE — 27201423 OPTIME MED/SURG SUP & DEVICES STERILE SUPPLY: Performed by: ORTHOPAEDIC SURGERY

## 2022-12-22 PROCEDURE — 36000711: Performed by: ORTHOPAEDIC SURGERY

## 2022-12-22 PROCEDURE — 64416 NJX AA&/STRD BRCH PL NFS IMG: CPT | Mod: 59,RT,, | Performed by: ANESTHESIOLOGY

## 2022-12-22 PROCEDURE — 37000008 HC ANESTHESIA 1ST 15 MINUTES: Performed by: ORTHOPAEDIC SURGERY

## 2022-12-22 PROCEDURE — 63600175 PHARM REV CODE 636 W HCPCS: Performed by: ORTHOPAEDIC SURGERY

## 2022-12-22 PROCEDURE — D9220A PRA ANESTHESIA: Mod: CRNA,,, | Performed by: NURSE ANESTHETIST, CERTIFIED REGISTERED

## 2022-12-22 PROCEDURE — 71000015 HC POSTOP RECOV 1ST HR: Performed by: ORTHOPAEDIC SURGERY

## 2022-12-22 PROCEDURE — 29827 PR SHLDR ARTHROSCOP,SURG,W/ROTAT CUFF REPR: ICD-10-PCS | Mod: RT,,, | Performed by: ORTHOPAEDIC SURGERY

## 2022-12-22 PROCEDURE — 71000033 HC RECOVERY, INTIAL HOUR: Performed by: ORTHOPAEDIC SURGERY

## 2022-12-22 PROCEDURE — 29822 PR SHLDR ARTHROSCOP,PART DEBRIDE: ICD-10-PCS | Mod: 59,RT,, | Performed by: ORTHOPAEDIC SURGERY

## 2022-12-22 PROCEDURE — 25000003 PHARM REV CODE 250: Performed by: PHYSICIAN ASSISTANT

## 2022-12-22 PROCEDURE — 36000710: Performed by: ORTHOPAEDIC SURGERY

## 2022-12-22 PROCEDURE — 64416 INTERSCALENE NERVE CATHETER: ICD-10-PCS | Mod: 59,RT,, | Performed by: ANESTHESIOLOGY

## 2022-12-22 PROCEDURE — 29828 SHO ARTHRS SRG BICP TENODSIS: CPT | Mod: 51,RT,, | Performed by: ORTHOPAEDIC SURGERY

## 2022-12-22 PROCEDURE — 29822 SHO ARTHRS SRG LMTD DBRDMT: CPT | Mod: 59,RT,, | Performed by: ORTHOPAEDIC SURGERY

## 2022-12-22 PROCEDURE — 64450: ICD-10-PCS | Mod: 59,RT,, | Performed by: ANESTHESIOLOGY

## 2022-12-22 DEVICE — ANCHOR SUTURE SWIVILOK 8MM: Type: IMPLANTABLE DEVICE | Site: SHOULDER | Status: FUNCTIONAL

## 2022-12-22 DEVICE — IMPLANTABLE DEVICE: Type: IMPLANTABLE DEVICE | Site: SHOULDER | Status: FUNCTIONAL

## 2022-12-22 RX ORDER — ACETAMINOPHEN 500 MG
1000 TABLET ORAL
COMMUNITY
End: 2024-02-20 | Stop reason: ALTCHOICE

## 2022-12-22 RX ORDER — CARBOXYMETHYLCELLULOSE SODIUM 10 MG/ML
GEL OPHTHALMIC
Status: DISCONTINUED | OUTPATIENT
Start: 2022-12-22 | End: 2022-12-22

## 2022-12-22 RX ORDER — DEXMEDETOMIDINE HYDROCHLORIDE 100 UG/ML
INJECTION, SOLUTION INTRAVENOUS
Status: DISCONTINUED | OUTPATIENT
Start: 2022-12-22 | End: 2022-12-22

## 2022-12-22 RX ORDER — FENTANYL CITRATE 50 UG/ML
25-200 INJECTION, SOLUTION INTRAMUSCULAR; INTRAVENOUS
Status: DISCONTINUED | OUTPATIENT
Start: 2022-12-22 | End: 2022-12-22 | Stop reason: HOSPADM

## 2022-12-22 RX ORDER — METHOCARBAMOL 500 MG/1
1000 TABLET, FILM COATED ORAL ONCE AS NEEDED
Status: COMPLETED | OUTPATIENT
Start: 2022-12-22 | End: 2022-12-22

## 2022-12-22 RX ORDER — OXYCODONE HYDROCHLORIDE 5 MG/1
5 TABLET ORAL EVERY 4 HOURS PRN
Status: DISCONTINUED | OUTPATIENT
Start: 2022-12-22 | End: 2022-12-22 | Stop reason: HOSPADM

## 2022-12-22 RX ORDER — BUPIVACAINE HYDROCHLORIDE AND EPINEPHRINE 2.5; 5 MG/ML; UG/ML
INJECTION, SOLUTION EPIDURAL; INFILTRATION; INTRACAUDAL; PERINEURAL
Status: COMPLETED | OUTPATIENT
Start: 2022-12-22 | End: 2022-12-22

## 2022-12-22 RX ORDER — FAMOTIDINE 10 MG/ML
INJECTION INTRAVENOUS
Status: DISCONTINUED | OUTPATIENT
Start: 2022-12-22 | End: 2022-12-22

## 2022-12-22 RX ORDER — ROPIVACAINE HYDROCHLORIDE 2 MG/ML
INJECTION, SOLUTION EPIDURAL; INFILTRATION; PERINEURAL CONTINUOUS
Status: DISCONTINUED | OUTPATIENT
Start: 2022-12-22 | End: 2022-12-22 | Stop reason: HOSPADM

## 2022-12-22 RX ORDER — NEOSTIGMINE METHYLSULFATE 0.5 MG/ML
INJECTION, SOLUTION INTRAVENOUS
Status: DISCONTINUED | OUTPATIENT
Start: 2022-12-22 | End: 2022-12-22

## 2022-12-22 RX ORDER — PROPOFOL 10 MG/ML
VIAL (ML) INTRAVENOUS
Status: DISCONTINUED | OUTPATIENT
Start: 2022-12-22 | End: 2022-12-22

## 2022-12-22 RX ORDER — ONDANSETRON 2 MG/ML
INJECTION INTRAMUSCULAR; INTRAVENOUS
Status: DISCONTINUED | OUTPATIENT
Start: 2022-12-22 | End: 2022-12-22

## 2022-12-22 RX ORDER — MUPIROCIN 20 MG/G
OINTMENT TOPICAL
Status: CANCELLED | OUTPATIENT
Start: 2022-12-22

## 2022-12-22 RX ORDER — KETAMINE HCL IN 0.9 % NACL 50 MG/5 ML
SYRINGE (ML) INTRAVENOUS
Status: DISCONTINUED | OUTPATIENT
Start: 2022-12-22 | End: 2022-12-22

## 2022-12-22 RX ORDER — ACETAMINOPHEN 500 MG
1000 TABLET ORAL
Status: COMPLETED | OUTPATIENT
Start: 2022-12-22 | End: 2022-12-22

## 2022-12-22 RX ORDER — CELECOXIB 200 MG/1
400 CAPSULE ORAL ONCE
Status: COMPLETED | OUTPATIENT
Start: 2022-12-22 | End: 2022-12-22

## 2022-12-22 RX ORDER — EPINEPHRINE 1 MG/ML
INJECTION, SOLUTION, CONCENTRATE INTRAVENOUS
Status: DISCONTINUED | OUTPATIENT
Start: 2022-12-22 | End: 2022-12-22 | Stop reason: HOSPADM

## 2022-12-22 RX ORDER — LIDOCAINE HYDROCHLORIDE 20 MG/ML
INJECTION INTRAVENOUS
Status: DISCONTINUED | OUTPATIENT
Start: 2022-12-22 | End: 2022-12-22

## 2022-12-22 RX ORDER — MIDAZOLAM HYDROCHLORIDE 1 MG/ML
.5-4 INJECTION INTRAMUSCULAR; INTRAVENOUS
Status: DISCONTINUED | OUTPATIENT
Start: 2022-12-22 | End: 2022-12-22 | Stop reason: HOSPADM

## 2022-12-22 RX ORDER — NAPROXEN SODIUM 220 MG
220 TABLET ORAL
COMMUNITY
End: 2024-02-20

## 2022-12-22 RX ORDER — MUPIROCIN 20 MG/G
OINTMENT TOPICAL
Status: DISCONTINUED | OUTPATIENT
Start: 2022-12-22 | End: 2022-12-22 | Stop reason: HOSPADM

## 2022-12-22 RX ORDER — SODIUM CHLORIDE 0.9 % (FLUSH) 0.9 %
3 SYRINGE (ML) INJECTION
Status: DISCONTINUED | OUTPATIENT
Start: 2022-12-22 | End: 2022-12-22 | Stop reason: HOSPADM

## 2022-12-22 RX ORDER — ROCURONIUM BROMIDE 10 MG/ML
INJECTION, SOLUTION INTRAVENOUS
Status: DISCONTINUED | OUTPATIENT
Start: 2022-12-22 | End: 2022-12-22

## 2022-12-22 RX ORDER — SODIUM CHLORIDE 0.9 % (FLUSH) 0.9 %
2 SYRINGE (ML) INJECTION EVERY 6 HOURS
Status: DISCONTINUED | OUTPATIENT
Start: 2022-12-22 | End: 2022-12-22 | Stop reason: HOSPADM

## 2022-12-22 RX ORDER — ONDANSETRON 2 MG/ML
4 INJECTION INTRAMUSCULAR; INTRAVENOUS ONCE AS NEEDED
Status: DISCONTINUED | OUTPATIENT
Start: 2022-12-22 | End: 2022-12-22 | Stop reason: HOSPADM

## 2022-12-22 RX ORDER — CEFAZOLIN SODIUM 1 G/3ML
INJECTION, POWDER, FOR SOLUTION INTRAMUSCULAR; INTRAVENOUS
Status: DISCONTINUED | OUTPATIENT
Start: 2022-12-22 | End: 2022-12-22

## 2022-12-22 RX ORDER — PHENYLEPHRINE HYDROCHLORIDE 10 MG/ML
INJECTION INTRAVENOUS
Status: DISCONTINUED | OUTPATIENT
Start: 2022-12-22 | End: 2022-12-22

## 2022-12-22 RX ORDER — EPHEDRINE SULFATE 50 MG/ML
INJECTION, SOLUTION INTRAVENOUS
Status: DISCONTINUED | OUTPATIENT
Start: 2022-12-22 | End: 2022-12-22

## 2022-12-22 RX ORDER — BACITRACIN ZINC 500 UNIT/G
OINTMENT (GRAM) TOPICAL
Status: DISCONTINUED | OUTPATIENT
Start: 2022-12-22 | End: 2022-12-22 | Stop reason: HOSPADM

## 2022-12-22 RX ADMIN — EPHEDRINE SULFATE 10 MG: 50 INJECTION INTRAVENOUS at 12:12

## 2022-12-22 RX ADMIN — CARBOXYMETHYLCELLULOSE SODIUM 4 DROP: 10 GEL OPHTHALMIC at 12:12

## 2022-12-22 RX ADMIN — MUPIROCIN: 20 OINTMENT TOPICAL at 10:12

## 2022-12-22 RX ADMIN — PHENYLEPHRINE HYDROCHLORIDE 100 MCG: 10 INJECTION INTRAVENOUS at 12:12

## 2022-12-22 RX ADMIN — METHOCARBAMOL 1000 MG: 500 TABLET ORAL at 03:12

## 2022-12-22 RX ADMIN — PHENYLEPHRINE HYDROCHLORIDE 50 MCG: 10 INJECTION INTRAVENOUS at 12:12

## 2022-12-22 RX ADMIN — FENTANYL CITRATE 25 MCG: 50 INJECTION, SOLUTION INTRAMUSCULAR; INTRAVENOUS at 02:12

## 2022-12-22 RX ADMIN — ROCURONIUM BROMIDE 50 MG: 10 INJECTION INTRAVENOUS at 12:12

## 2022-12-22 RX ADMIN — FENTANYL CITRATE 50 MCG: 50 INJECTION, SOLUTION INTRAMUSCULAR; INTRAVENOUS at 02:12

## 2022-12-22 RX ADMIN — CEFAZOLIN 3 G: 330 INJECTION, POWDER, FOR SOLUTION INTRAMUSCULAR; INTRAVENOUS at 12:12

## 2022-12-22 RX ADMIN — SODIUM CHLORIDE: 0.9 INJECTION, SOLUTION INTRAVENOUS at 10:12

## 2022-12-22 RX ADMIN — FENTANYL CITRATE 100 MCG: 50 INJECTION, SOLUTION INTRAMUSCULAR; INTRAVENOUS at 12:12

## 2022-12-22 RX ADMIN — BUPIVACAINE HYDROCHLORIDE AND EPINEPHRINE BITARTRATE 20 ML: 2.5; .0091 INJECTION, SOLUTION EPIDURAL; INFILTRATION; INTRACAUDAL; PERINEURAL at 11:12

## 2022-12-22 RX ADMIN — CELECOXIB 400 MG: 200 CAPSULE ORAL at 10:12

## 2022-12-22 RX ADMIN — LIDOCAINE HYDROCHLORIDE 100 MG: 20 INJECTION INTRAVENOUS at 12:12

## 2022-12-22 RX ADMIN — FAMOTIDINE 20 MG: 10 INJECTION, SOLUTION INTRAVENOUS at 12:12

## 2022-12-22 RX ADMIN — EPHEDRINE SULFATE 10 MG: 50 INJECTION INTRAVENOUS at 01:12

## 2022-12-22 RX ADMIN — OXYCODONE 5 MG: 5 TABLET ORAL at 03:12

## 2022-12-22 RX ADMIN — MIDAZOLAM 1 MG: 1 INJECTION INTRAMUSCULAR; INTRAVENOUS at 11:12

## 2022-12-22 RX ADMIN — BUPIVACAINE HYDROCHLORIDE AND EPINEPHRINE 20 ML: 2.5; 5 INJECTION, SOLUTION EPIDURAL; INFILTRATION; INTRACAUDAL; PERINEURAL at 11:12

## 2022-12-22 RX ADMIN — FENTANYL CITRATE 50 MCG: 50 INJECTION, SOLUTION INTRAMUSCULAR; INTRAVENOUS at 11:12

## 2022-12-22 RX ADMIN — Medication: at 02:12

## 2022-12-22 RX ADMIN — NEOSTIGMINE METHYLSULFATE 4 MG: 0.5 INJECTION, SOLUTION INTRAVENOUS at 02:12

## 2022-12-22 RX ADMIN — SODIUM CHLORIDE, SODIUM GLUCONATE, SODIUM ACETATE, POTASSIUM CHLORIDE, MAGNESIUM CHLORIDE, SODIUM PHOSPHATE, DIBASIC, AND POTASSIUM PHOSPHATE: .53; .5; .37; .037; .03; .012; .00082 INJECTION, SOLUTION INTRAVENOUS at 01:12

## 2022-12-22 RX ADMIN — Medication 20 MG: at 12:12

## 2022-12-22 RX ADMIN — DEXMEDETOMIDINE HYDROCHLORIDE 8 MCG: 100 INJECTION, SOLUTION INTRAVENOUS at 12:12

## 2022-12-22 RX ADMIN — ONDANSETRON 4 MG: 2 INJECTION INTRAMUSCULAR; INTRAVENOUS at 02:12

## 2022-12-22 RX ADMIN — GLYCOPYRROLATE 0.6 MG: 0.2 INJECTION, SOLUTION INTRAMUSCULAR; INTRAVENOUS at 02:12

## 2022-12-22 RX ADMIN — PROPOFOL 220 MG: 10 INJECTION, EMULSION INTRAVENOUS at 12:12

## 2022-12-22 RX ADMIN — ACETAMINOPHEN 1000 MG: 500 TABLET ORAL at 10:12

## 2022-12-22 RX ADMIN — EPHEDRINE SULFATE 10 MG: 50 INJECTION INTRAVENOUS at 02:12

## 2022-12-22 NOTE — BRIEF OP NOTE
[facility]  Brief Operative Note    Surgery Date: 12/22/2022     Surgeon(s) and Role:     * Alexandria Morse MD - Primary    Assisting Surgeon: None    Pre-Operative Diagnosis: Traumatic complete tear of right rotator cuff, initial encounter [S46.011A]  Biceps tendinitis of right shoulder [M75.21]    Post-Operative Diagnosis: Post-Op Diagnosis Codes:     * Traumatic complete tear of right rotator cuff, initial encounter [S46.011A]     * Biceps tendinitis of right shoulder [M75.21]    Procedure(s) (LRB):  REPAIR, ROTATOR CUFF, ARTHROSCOPIC (Right)  DEBRIDEMENT, SHOULDER, ARTHROSCOPIC (Right)  ARTHROSCOPY, SHOULDER, WITH SUBACROMIAL SPACE DECOMPRESSION (Right)  FIXATION, TENDON-biceps tenodesis (Right)    Anesthesia: General    Operative Findings: See Op note.    Estimated Blood Loss: * No values recorded between 12/22/2022 12:46 PM and 12/22/2022  2:15 PM *         Specimens:   Specimen (24h ago, onward)      None              Discharge Note    OUTCOME: Patient tolerated treatment/procedure well without complication and is now ready for discharge.    DISPOSITION: Home or Self Care    FINAL DIAGNOSIS:    * Traumatic complete tear of right rotator cuff, initial encounter [S46.011A]     * Biceps tendinitis of right shoulder [M75.21]    FOLLOWUP: In clinic    DISCHARGE INSTRUCTIONS: See attached.

## 2022-12-22 NOTE — PLAN OF CARE
VSS. Pt able to tolerate oral liquids. Pt reports tolerable pain level for D/C. Ice pack and dressing intact. Thigh TEDs intact. Spouse received home meds per bedside delivery. No distress noted. Pt states he is ready for D/C. D/C and Nimbus instructions reviewed with pt and spouse, verbalized understanding. All questions addressed and answered.

## 2022-12-22 NOTE — OP NOTE
Date of Procedure: 12/22/2022    Surgeon(s) and Role:     * Alexandria Morse MD - Primary    Assistant:   None    Preoperative Diagnosis:  Right shoulder Tear, Rotator Cuff, Tramatic S46.012A, S46.011A  Right shoulder Tear, Biceps Tendon, Non-Tramatic M66.829  Right shoulder Impingement Syndrome M75.40  Right shoulder  synovitis    Postoperative Diagnosis:   Right shoulder Tear, Rotator Cuff, Tramatic S46.012A, S46.011A  Right shoulder Tear, Biceps Tendon, Non-Tramatic M66.829  Right shoulder Impingement Syndrome M75.40  Right shoulder synovitis    Procedures Performed:  1. Right shoulder Arthroscopic rotator cuff repair CPT - 77240    2. Right shoulder Arthroscopic Biceps tenodesis CPT - 83643    3. Right shoulder Arthroscopic extensive debridement CPT - 17805    4. Right shoulder Arthroscopic subacromial decompression and bursectomy CPT - 47233    Anesthesia: General/Regional    Complications: None    Implants:   Implant Name Type Inv. Item Serial No.  Lot No. LRB No. Used Action   PEEK SWIVELOCK SUTURE ANCHOR     [employer] [id number] Right 5 Implanted   ANCHOR SUTURE SWIVILOK 8MM - [id number]  ANCHOR SUTURE SWIVILOK 8MM  [employer] [id number] Right 1 Implanted   8MM REAMER LOW PROFILE    [employer] [id number] Right 1 Implanted and Explanted       Arthroscopic Findings:   Glenohumeral joint  Evaluation of the glenohumeral joint demonstrated no significant articular lesions the glenoid.  Mild chondromalacia noted at the anterior aspect of the humerus at the area of his unstable long head of the biceps tendon otherwise no significant chondral defects to the humerus.  Evaluation labrum demonstrated a type 2 slap tear in addition to some fraying that was noted on the long head of the biceps as it entered intertubercular groove.  Small area of anterior degenerative labral tearing anterior inferior posterior inferior posterior labrum were intact.  Full-thickness rotator cuff tear was evaluated from the subacromial space  involving the supraspinatus extending back to the infraspinatus.  Upper border subscapularis demonstrate some upper border fraying however no full-thickness tear of the subscapularis.    Subacromial space  Confirmation of a full-thickness supraspinatus retracted rotator cuff tear appeared to be chronic in nature.  Significant impingement noted on the CA ligament and on the humeral head with witness marks on the CA ligament anterior lateral aspect of the undersurface of the acromion.    Indication for Procedure: 62 y.o. Male with a history of right shoulder pain seen by Vincenzo Ortiz PA-C. He is an  who will be retiring in a few years. He has had shoulder pain for 2 years. He does not recall a specific ANABELLE. He states he had a heart attack in July and he has been cleared by cardiology. He had bypass, he was not stented. He has been taking Alieve for his shoulder pain. He has also been doing physical therapy.  He states he is gotten to the point that he just wants to get it fixed.  The pain has been radiating in the lateral aspect of his shoulder.  History, physical and imaging were consistent with a full-thickness retracted rotator cuff tear in addition to a long head of the biceps tendon tear.  Risks, benefits and alternatives were discussed with the patient.  Elected to proceed with surgical intervention.    Surgery in Detail:  The patient was marked identified in the holding room.  He was brought back to the operating room and placed in the lateral decubitus position.  After general endotracheal anesthesia was administered the right upper extremity was prepped and draped in usual sterile fashion for a shoulder arthroscopy. The body was secured and well padded in a bean bag. Preoperative antibiotics were given within 1 hour the procedure.  A time-out was taken prior starting.  A posterior portal was created after insufflation of the joint with sterile saline.  A diagnostic arthroscopy was performed with the  above-stated findings.    Arthroscopic Extensive Debridement  Martell were brought in though a rotator interval portal and synovectomy and debridement was done of both the rotator interval synovitis and superior capsular recess synovitis.  We also debrided area of the superior labrum as well as the anterior labrum.  After evaluation long head of biceps tendon and identifying the significant amount of tearing and fraying of the long head of the biceps tendon,  we elected to perform a biceps tenotomy for later biceps tenodesis.  Martell were also used to debride the undersurface fraying of the rotator cuff and to prepare the area of the footprint on the greater tuberosity.      Arthroscopic Subacromial Decompression  We then moved to the subacromial space and a lateral portal was created. A complete bursectomy was done for both visualization and removal of the pathologic bursa.  We then demarcated the borders of the acromion.  This was done using our vapor.  We then brought in a 5.5 mm bur and converted the type 2 acromion into a type 1 acromion.      Arthroscopic Rotator Cuff Repair  We then prepared the greater tuberosity footprint with a 5.5 bur to remove any tissue and to provide a biological bed for healing.  We then placed 2 medial row anchors with double loaded 4.75 mm peek SwiveLock anchors.  These were passed in horizontal mattress Fashions from anterior to posterior.  They were then tied with arthroscopic knot techniques from posterior to anterior.  They were then brought over to 2 lateral row knotless 4.75 mm peek SwiveLock anchors for a transosseous equivalent double row rotator cuff repair.    Arthroscopic Biceps Tenodesis  The biceps tendon sheath was then elevated and the biceps tendon was identified.  It was brought out our accessory anterior lateral portal and a #2 Fiber Loop was used to secure the biceps tendon.  We sized this to approximately an 8 mm diameter tendon.  We reamed an 8 mm socket at the  intertubercular groove.  The biceps tendon was then docked into the tunnel and secured with an 8 mm x 15 mm interference screw.  Arthroscope were then removed from the joint and the incisions were closed with 3-0 nylon sutures in simple interrupted fashion.  Adaptic, bacitracin, 4x4s and ABDs were then used.  The patient was then placed in an arc brace and extubated and taken recovery.      Post-Op Plan:  Type 2 rotator cuff repair protocol with a biceps tenodesis    Attestation: I was present and scrubbed for the entire procedure.

## 2022-12-22 NOTE — PLAN OF CARE
Nursing pre-op complete. Pt calm, will continue to monitor. Pt's belongings placed in locker #8, 1 bag. Spouse visiting at bedside.

## 2022-12-22 NOTE — ANESTHESIA PROCEDURE NOTES
PECS II Single Shot Block    Patient location during procedure: pre-op   Block not for primary anesthetic.  Reason for block: at surgeon's request and post-op pain management   Post-op Pain Location: R arm pain   Start time: 12/22/2022 11:55 AM  Timeout: 12/22/2022 11:55 AM   End time: 12/22/2022 11:57 AM    Staffing  Authorizing Provider: Gage Aguilera MD  Performing Provider: Gage Aguilera MD    Preanesthetic Checklist  Completed: patient identified, IV checked, site marked, risks and benefits discussed, surgical consent, monitors and equipment checked, pre-op evaluation and timeout performed  Peripheral Block  Patient position: supine  Prep: ChloraPrep  Patient monitoring: heart rate, cardiac monitor, continuous pulse ox, continuous capnometry and frequent blood pressure checks  Block type: pectoral  Laterality: right  Injection technique: single shot  Needle  Needle type: Stimuplex   Needle gauge: 21 G  Needle length: 4 in  Needle localization: anatomical landmarks and ultrasound guidance   -ultrasound image captured on disc.  Assessment  Injection assessment: negative aspiration, negative parasthesia and local visualized surrounding nerve  Paresthesia pain: none  Heart rate change: no  Slow fractionated injection: yes  Pain Tolerance: comfortable throughout block and no complaints  Medications:    Medications: bupivacaine 0.25%-EPINEPHrine (PF) 1:200,000 injection - Intrathecal   20 mL - 12/22/2022 11:57:00 AM    Additional Notes  Performed PECS II  VSS.  [facility] RN monitoring vitals throughout procedure.  Patient tolerated procedure well.

## 2022-12-22 NOTE — ANESTHESIA PROCEDURE NOTES
Intubation    Date/Time: 12/22/2022 12:26 PM  Performed by: Adelaida Baldwin CRNA  Authorized by: Harrison Urena MD     Intubation:     Induction:  Intravenous    Intubated:  Postinduction    Mask Ventilation:  Easy with oral airway    Attempts:  1    Attempted By:  CRNA    Method of Intubation:  Direct    Blade:  Burrows 2    Laryngeal View Grade: Grade I - full view of cords      Difficult Airway Encountered?: No      Complications:  None    Airway Device:  Oral endotracheal tube    Airway Device Size:  7.5    Style/Cuff Inflation:  Cuffed (inflated to minimal occlusive pressure)    Inflation Amount (mL):  8    Tube secured:  23    Secured at:  The lips    Placement Verified By:  Capnometry    Complicating Factors:  Obesity    Findings Post-Intubation:  BS equal bilateral and atraumatic/condition of teeth unchanged

## 2022-12-22 NOTE — TRANSFER OF CARE
Anesthesia Transfer of Care Note    Patient: Isaiah Dorsey JrAnel    Procedure(s) Performed: Procedure(s) (LRB):  REPAIR, ROTATOR CUFF, ARTHROSCOPIC (Right)  DEBRIDEMENT, SHOULDER, ARTHROSCOPIC (Right)  ARTHROSCOPY, SHOULDER, WITH SUBACROMIAL SPACE DECOMPRESSION (Right)  FIXATION, TENDON-biceps tenodesis (Right)    Patient location: PACU    Anesthesia Type: general    Transport from OR: Transported from OR on 6-10 L/min O2 by face mask with adequate spontaneous ventilation    Post pain: adequate analgesia    Post assessment: no apparent anesthetic complications and tolerated procedure well    Post vital signs: stable    Level of consciousness: awake, alert and oriented    Nausea/Vomiting: no nausea/vomiting    Complications: none    Transfer of care protocol was followed      Last vitals:   Visit Vitals  /69 (BP Location: Left arm, Patient Position: Lying)   Pulse (!) 51   Temp 36.4 °C (97.6 °F) (Tympanic)   Resp 15   Ht 6' (1.829 m)   Wt 132.9 kg (293 lb)   SpO2 96%   BMI 39.74 kg/m²

## 2022-12-22 NOTE — PATIENT INSTRUCTIONS
POST-OPERATIVE DISCHARGE INSTRUCTIONS    Diet: Ice chips, clear liquids, and then diet as tolerated.  Drink plenty of liquids.  Ice the area at least three times a day (20 minutes per session).    Elevate the extremity above the level of the heart to help reduce swelling.  Pain medication can be taken every four to six hours as needed.  It is helpful to take pain medication prior to physical therapy.  Any activity that requires precise thinking or accuracy should be avoided for a minimum of 72 hours after surgery and while on narcotic pain medication.  This includes operating machinery and/or driving a vehicle.  All sutures/staples will be removed approximately 14 days from the time of surgery. Leave steri-strips (skin tapes) in place until sutures are removed.  If skin glue is used instead of stitches, do not apply any ointments or solutions to the incision.  Keep the incision dry.  The skin glue will peel off in 3-4 weeks.  Dressing change directions, unless otherwise instructed:       ? Shoulder scope:  Remove dressings 48 hours after surgery and start using waterproof Band-Aids over the incision sites.      All casts, splints, braces, slings, crutches, abduction pillows, etc. are to be worn as instructed.    Pravin Hose are often used following knee surgery to help with swelling.  They can be removed for 2-3 hours daily as needed.  If the hose become uncomfortable after a few days, switch to an Ace Wrap if desired.  Keep the incision dry for 10-14 days.  A waterproof dressing ([facility] or [facility]) can be used to shower.  No bath, pool, or hot tub until instructed.  You should notify our office if you notice any of the following:  A temperature greater than 101 F  Severe increased pain  Excessive drainage or redness of the incision  Calf swelling and pain  Chest pain  Your post-op follow up appointment will be approximately 14 days from the time of surgery.  If you do not have an appointment scheduled, please call our  office and schedule within 1-2 days.   14.         If you have any problems or questions, please call our office at [phone].

## 2022-12-22 NOTE — ANESTHESIA PREPROCEDURE EVALUATION
12/22/2022  Pre-operative evaluation for Procedure(s) (LRB):  REPAIR, ROTATOR CUFF, ARTHROSCOPIC (Right)  DEBRIDEMENT, SHOULDER, ARTHROSCOPIC (Right)  ARTHROSCOPY, SHOULDER, WITH SUBACROMIAL SPACE DECOMPRESSION (Right)  FIXATION, TENDON-biceps tenodesis (Right)    Isaiah Dorsey Jr. is a 62 y.o. male     Patient Active Problem List   Diagnosis    Diabetes mellitus type 2, uncontrolled, without complications    H/O splenectomy    Elevated platelet count    Polyp of transverse colon    Atherosclerotic heart disease of native coronary artery with unstable angina pectoris    Type 2 diabetes mellitus with hyperglycemia, without long-term current use of insulin    Dilatation of aorta    SIRS (systemic inflammatory response syndrome)    Heart failure with mid-range ejection fraction    S/P CABG (coronary artery bypass graft)    Non-STEMI (non-ST elevated myocardial infarction)    Encounter for weaning from ventilator    Acute blood loss anemia    Paroxysmal atrial fibrillation    RODRIGUEZ (obstructive sleep apnea)    Coronary artery disease involving coronary bypass graft of native heart without angina pectoris    Hypertension associated with diabetes    Hyperlipidemia associated with type 2 diabetes mellitus    Obesity hypoventilation syndrome    Tendonitis, Achilles, left    Benign prostatic hyperplasia with weak urinary stream    Erectile dysfunction       Review of patient's allergies indicates:   Allergen Reactions    Penicillins      Other reaction(s): Itching  Other reaction(s): Hives    Wasp venom Edema       No current facility-administered medications on file prior to encounter.     Current Outpatient Medications on File Prior to Encounter   Medication Sig Dispense Refill    acetaminophen (TYLENOL) 500 MG tablet Take 1,000 mg by mouth as needed for Pain.      amiodarone (PACERONE) 400  MG tablet Take by mouth.      aspirin 81 MG Chew Take 1 tablet (81 mg total) by mouth once daily. 90 tablet 3    atorvastatin (LIPITOR) 80 MG tablet Take 1 tablet (80 mg total) by mouth once daily. 90 tablet 3    fluocinolone acetonide oiL (DERMOTIC OIL) 0.01 % Drop Place 3 drops into the right ear 2 (two) times daily. 20 mL 3    ketoconazole (NIZORAL) 2 % cream Apply topically 2 (two) times daily. Prn rash face 60 g 3    losartan (COZAAR) 25 MG tablet Take 1 tablet (25 mg total) by mouth once daily. 90 tablet 3    metFORMIN (GLUCOPHAGE) 1000 MG tablet Take 1 tablet (1,000 mg total) by mouth 2 (two) times daily with meals. 180 tablet 3    metoprolol succinate (TOPROL-XL) 100 MG 24 hr tablet Take 1 tablet (100 mg total) by mouth once daily. 90 tablet 3    prednisoLONE acetate (PRED FORTE) 1 % DrpS Apply 2 drops  to left ear BID 10 mL 0    SITagliptin (JANUVIA) 100 MG Tab Take 1 tablet (100 mg total) by mouth once daily. 90 tablet 3    ketoconazole (NIZORAL) 2 % shampoo Apply topically once daily. Face and scalp soaks 120 mL 4    naproxen sodium (ANAPROX) 220 MG tablet Take 220 mg by mouth as needed.      rivaroxaban (XARELTO) 20 mg Tab Take 1 tablet (20 mg total) by mouth daily with dinner or evening meal. 90 tablet 3    sildenafiL (VIAGRA) 50 MG tablet Take 1 tablet (50 mg total) by mouth daily as needed for Erectile Dysfunction. Take 1 hour before sexual activity on an empty stomach. 10 tablet 5    tamsulosin (FLOMAX) 0.4 mg Cap Take 1 capsule (0.4 mg total) by mouth once daily. 90 capsule 3       Past Surgical History:   Procedure Laterality Date    AORTOGRAPHY N/A 7/11/2022    Procedure: AORTOGRAM;  Surgeon: Bjorn Cheatham MD;  Location: [facility] CATH LAB;  Service: Cardiology;  Laterality: N/A;    COLONOSCOPY N/A 11/22/2022    Procedure: COLONOSCOPY;  Surgeon: Castro Jasso MD;  Location: [facility] ENDO ([address] [place] [address]);  Service: Endoscopy;  Laterality: N/A;  ok to hold Xarelto-see telephone encounter  dated   [facility] via portal  pre call done    CORONARY ARTERY BYPASS GRAFT (CABG) Left 2022    Procedure: CORONARY ARTERY BYPASS GRAFT (CABG);  Surgeon: Blu Rhodes MD;  Location: [address];  Service: Cardiovascular;  Laterality: Left;  CABG x 1 (LIMA to LAD)    ENDOSCOPIC HARVEST OF VEIN Left 2022    Procedure: HARVEST-VEIN-ENDOVASCULAR;  Surgeon: Blu Rhodes MD;  Location: [address];  Service: Cardiovascular;  Laterality: Left;  Vein [place] Start time: 0823am  Vein [place] Stop time: 0836am    Vein [place] Start time: 0848am  Vein [place] Stop time: 0904am    LEFT HEART CATHETERIZATION N/A 2022    Procedure: Left heart cath;  Surgeon: Bjorn Cheatham MD;  Location: [facility] CATH LAB;  Service: Cardiology;  Laterality: N/A;    splenecectomy         Social History     Socioeconomic History    Marital status:     Number of children: 2   Tobacco Use    Smoking status: Former     Types: Cigarettes    Smokeless tobacco: Never   Substance and Sexual Activity    Alcohol use: Yes     Alcohol/week: 0.0 standard drinks     Comment: 1 a week    Drug use: Never    Sexual activity: Yes     Partners: Female   Social History Narrative     , Quit smoking 20 yrs, ETOH 2 beers on weekend, colonoscopy      [facility] Echo:  No results found for this or any previous visit.        Pre-op Assessment    I have reviewed the Patient Summary Reports.     I have reviewed the Nursing Notes. I have reviewed the NPO Status.   I have reviewed the Medications.     Review of Systems  Anesthesia Hx:  No problems with previous Anesthesia  History of prior surgery of interest to airway management or planning: heart surgery. Previous anesthesia: General 2022  CABG with general anesthesia.  Procedure performed at an [facility]. Airway issues documented on chart review include mask, easy, GETA, easy direct laryngoscopy , view on direct laryngoscopy Grade I  Denies Family  Hx of Anesthesia complications.   Denies Personal Hx of Anesthesia complications.   Social:  Former Smoker, No Alcohol Use    Cardiovascular:   Exercise tolerance: good Hypertension Past MI CAD asymptomatic CABG/stent Dysrhythmias atrial fibrillation  Denies Angina. CHF hyperlipidemia ECG has been reviewed. Paroxysmal A-Fib, CAD, NSTEMI, S/P CABG 7/2022   Pulmonary:   Sleep Apnea    Hepatic/GI:   Hx Splenectomy, Polyps   Musculoskeletal:   Traumatic complete tear of right rotator cuff,   Biceps tendinitis of right shoulder   Endocrine:   Diabetes, well controlled, type 2 HA1C 6.2 on 8/25/2022 Obesity / BMI > 30      Physical Exam  General: Well nourished, Cooperative, Alert and Oriented    Airway:  Mallampati: III / II  Mouth Opening: Normal  TM Distance: Normal  Tongue: Normal  Neck ROM: Normal ROM    Dental:  Intact    Chest/Lungs:  Clear to auscultation, Normal Respiratory Rate    Heart:  Rate: Normal  Rhythm: Regular Rhythm        Anesthesia Plan  Type of Anesthesia, risks & benefits discussed:    Anesthesia Type: Gen ETT  Intra-op Monitoring Plan: Standard ASA Monitors  Post Op Pain Control Plan: multimodal analgesia and IV/PO Opioids PRN  Airway Plan: Direct, Post-Induction  Informed Consent: Informed consent signed with the Patient and all parties understand the risks and agree with anesthesia plan.  All questions answered.   ASA Score: 2  Day of Surgery Review of History & Physical: H&P Update referred to the surgeon/provider.    Ready For Surgery From Anesthesia Perspective.     .

## 2022-12-22 NOTE — ANESTHESIA PROCEDURE NOTES
Interscalene Nerve Catheter    Patient location during procedure: pre-op   Block not for primary anesthetic.  Reason for block: at surgeon's request and post-op pain management   Post-op Pain Location: R shoulder pain   Start time: 12/22/2022 11:45 AM  Timeout: 12/22/2022 11:45 AM   End time: 12/22/2022 11:55 AM    Staffing  Authorizing Provider: Gage Aguilera MD  Performing Provider: Gage Aguilera MD    Preanesthetic Checklist  Completed: patient identified, IV checked, site marked, risks and benefits discussed, surgical consent, monitors and equipment checked, pre-op evaluation and timeout performed  Peripheral Block  Patient position: sitting  Prep: ChloraPrep and site prepped and draped  Patient monitoring: heart rate, cardiac monitor, continuous pulse ox, continuous capnometry and frequent blood pressure checks  Block type: interscalene  Laterality: right  Injection technique: continuous  Needle  Needle type: Tuohy   Needle gauge: 18 G  Needle length: 2 in  Needle localization: anatomical landmarks and ultrasound guidance  Catheter type: non-stimulating  Catheter size: 20 G  Test dose: lidocaine 1.5% with Epi 1-to-200,000 and negative   -ultrasound image captured on disc.  Assessment  Injection assessment: negative aspiration, negative parasthesia and local visualized surrounding nerve  Paresthesia pain: none  Heart rate change: no  Slow fractionated injection: yes  Pain Tolerance: comfortable throughout block and no complaints  Medications:    Medications: bupivacaine 0.25%-EPINEPHrine (PF) 1:200,000 injection - Intrathecal   20 mL - 12/22/2022 11:55:00 AM    Additional Notes  VSS.  [facility] RN monitoring vitals throughout procedure.  Patient tolerated procedure well.

## 2022-12-23 ENCOUNTER — RESEARCH ENCOUNTER (OUTPATIENT)
Dept: RESEARCH | Facility: HOSPITAL | Age: 62
End: 2022-12-23
Payer: COMMERCIAL

## 2022-12-23 ENCOUNTER — CLINICAL SUPPORT (OUTPATIENT)
Dept: CARDIOLOGY | Facility: HOSPITAL | Age: 62
End: 2022-12-23
Attending: INTERNAL MEDICINE
Payer: COMMERCIAL

## 2022-12-23 DIAGNOSIS — Z95.1 S/P CABG (CORONARY ARTERY BYPASS GRAFT): ICD-10-CM

## 2022-12-23 PROCEDURE — 93272 CARDIAC EVENT MONITOR (CUPID ONLY): ICD-10-PCS | Mod: ,,, | Performed by: STUDENT IN AN ORGANIZED HEALTH CARE EDUCATION/TRAINING PROGRAM

## 2022-12-23 PROCEDURE — 93272 ECG/REVIEW INTERPRET ONLY: CPT | Mod: ,,, | Performed by: STUDENT IN AN ORGANIZED HEALTH CARE EDUCATION/TRAINING PROGRAM

## 2022-12-23 PROCEDURE — 93270 REMOTE 30 DAY ECG REV/REPORT: CPT

## 2022-12-23 NOTE — ANESTHESIA POSTPROCEDURE EVALUATION
Anesthesia Post Evaluation    Patient: Isaiah Dorsey JrAnel    Procedure(s) Performed: Procedure(s) (LRB):  REPAIR, ROTATOR CUFF, ARTHROSCOPIC (Right)  DEBRIDEMENT, SHOULDER, ARTHROSCOPIC (Right)  ARTHROSCOPY, SHOULDER, WITH SUBACROMIAL SPACE DECOMPRESSION (Right)  FIXATION, TENDON-biceps tenodesis (Right)    Final Anesthesia Type: general      Patient location during evaluation: PACU  Patient participation: Yes- Able to Participate  Level of consciousness: awake and alert  Post-procedure vital signs: reviewed and stable  Pain management: adequate  Airway patency: patent    PONV status at discharge: No PONV  Anesthetic complications: no      Cardiovascular status: blood pressure returned to baseline  Respiratory status: unassisted  Hydration status: euvolemic  Follow-up not needed.          Vitals Value Taken Time   /69 12/22/22 1546   Temp 36.6 °C (97.9 °F) 12/22/22 1545   Pulse 47 12/22/22 1557   Resp 24 12/22/22 1557   SpO2 92 % 12/22/22 1557   Vitals shown include unvalidated device data.      Event Time   Out of Recovery 15:30:00         Pain/Hamilton Score: Pain Rating Prior to Med Admin: 6 (12/22/2022  3:23 PM)  Pain Rating Post Med Admin: 4 (12/22/2022  3:45 PM)  Hamilton Score: 10 (12/22/2022  3:04 PM)

## 2022-12-23 NOTE — ANESTHESIA POST-OP PAIN MANAGEMENT
Acute Pain Service Progress Note    12/23/2022 1235    Unable to reach patient for Nimbus follow up. Voicemail left on his son's cell number, Angie Dorsey- [phone], encouraged to have patient contact the anesthesia team at [phone] for any questions/concerns related to the Nimbus pump. Team will continue to follow up.

## 2022-12-23 NOTE — PROGRESS NOTES
PACES Trial:  Chart reviewed. Noted pt had rotator cuff surgery on 22DEC2022.    Liz Bansal RN, CCM, CRC

## 2022-12-24 ENCOUNTER — PATIENT MESSAGE (OUTPATIENT)
Dept: SPORTS MEDICINE | Facility: CLINIC | Age: 62
End: 2022-12-24
Payer: COMMERCIAL

## 2022-12-26 ENCOUNTER — PATIENT MESSAGE (OUTPATIENT)
Dept: SPORTS MEDICINE | Facility: CLINIC | Age: 62
End: 2022-12-26
Payer: COMMERCIAL

## 2022-12-27 NOTE — ADDENDUM NOTE
Addendum  created 12/27/22 1208 by Rao Valle RN    Clinical Note Signed, Intraprocedure Event edited

## 2022-12-27 NOTE — ANESTHESIA POST-OP PAIN MANAGEMENT
Acute Pain Service Progress Note    12/27/2022 1205    Unable to reach patient or patient's wife for Nimbus follow up. Voicemail previously left on patient's sons cell number on file with anesthesia contact information should any assistance be needed. Nimbus due to be discontinued today at noon.

## 2022-12-28 ENCOUNTER — PATIENT MESSAGE (OUTPATIENT)
Dept: SPORTS MEDICINE | Facility: CLINIC | Age: 62
End: 2022-12-28
Payer: COMMERCIAL

## 2022-12-28 DIAGNOSIS — Z98.890 S/P RIGHT ROTATOR CUFF REPAIR: Primary | ICD-10-CM

## 2022-12-28 RX ORDER — TRAMADOL HYDROCHLORIDE 50 MG/1
50 TABLET ORAL EVERY 6 HOURS
Qty: 10 TABLET | Refills: 0 | Status: SHIPPED | OUTPATIENT
Start: 2022-12-28 | End: 2023-04-20

## 2022-12-29 ENCOUNTER — DOCUMENTATION ONLY (OUTPATIENT)
Dept: CARDIAC REHAB | Facility: CLINIC | Age: 62
End: 2022-12-29
Payer: COMMERCIAL

## 2022-12-29 ENCOUNTER — HOSPITAL ENCOUNTER (OUTPATIENT)
Dept: CARDIOLOGY | Facility: CLINIC | Age: 62
Discharge: HOME | End: 2022-12-29
Payer: COMMERCIAL

## 2022-12-29 ENCOUNTER — HOSPITAL ENCOUNTER (OUTPATIENT)
Dept: CARDIOLOGY | Facility: HOSPITAL | Age: 62
Discharge: HOME | End: 2022-12-29
Attending: INTERNAL MEDICINE
Payer: COMMERCIAL

## 2022-12-29 VITALS
SYSTOLIC BLOOD PRESSURE: 130 MMHG | HEIGHT: 72 IN | WEIGHT: 286 LBS | DIASTOLIC BLOOD PRESSURE: 80 MMHG | HEART RATE: 59 BPM | BODY MASS INDEX: 38.74 KG/M2

## 2022-12-29 DIAGNOSIS — Z95.1 S/P CABG (CORONARY ARTERY BYPASS GRAFT): ICD-10-CM

## 2022-12-29 LAB
ASCENDING AORTA: 4.21 CM
AV INDEX (PROSTH): 0.55
AV MEAN GRADIENT: 6 MMHG
AV PEAK GRADIENT: 13 MMHG
AV VALVE AREA: 2.16 CM2
AV VELOCITY RATIO: 0.5
BSA FOR ECHO PROCEDURE: 2.57 M2
CV ECHO LV RWT: 0.35 CM
DOP CALC AO PEAK VEL: 1.77 M/S
DOP CALC AO VTI: 37.95 CM
DOP CALC LVOT AREA: 3.9 CM2
DOP CALC LVOT DIAMETER: 2.24 CM
DOP CALC LVOT PEAK VEL: 0.88 M/S
DOP CALC LVOT STROKE VOLUME: 81.85 CM3
DOP CALCLVOT PEAK VEL VTI: 20.78 CM
E WAVE DECELERATION TIME: 210.43 MSEC
E/A RATIO: 1.31
E/E' RATIO: 18.31 M/S
ECHO LV POSTERIOR WALL: 1.01 CM (ref 0.6–1.1)
EJECTION FRACTION: 45 %
FRACTIONAL SHORTENING: 25 % (ref 28–44)
INTERVENTRICULAR SEPTUM: 1.28 CM (ref 0.6–1.1)
LA MAJOR: 6.47 CM
LA MINOR: 5.91 CM
LA WIDTH: 4.53 CM
LEFT ATRIUM SIZE: 4.26 CM
LEFT ATRIUM VOLUME INDEX MOD: 46.2 ML/M2
LEFT ATRIUM VOLUME INDEX: 40.9 ML/M2
LEFT ATRIUM VOLUME MOD: 114.66 CM3
LEFT ATRIUM VOLUME: 101.33 CM3
LEFT INTERNAL DIMENSION IN SYSTOLE: 4.27 CM (ref 2.1–4)
LEFT VENTRICLE DIASTOLIC VOLUME INDEX: 67.16 ML/M2
LEFT VENTRICLE DIASTOLIC VOLUME: 166.56 ML
LEFT VENTRICLE MASS INDEX: 109 G/M2
LEFT VENTRICLE SYSTOLIC VOLUME INDEX: 32.9 ML/M2
LEFT VENTRICLE SYSTOLIC VOLUME: 81.61 ML
LEFT VENTRICULAR INTERNAL DIMENSION IN DIASTOLE: 5.7 CM (ref 3.5–6)
LEFT VENTRICULAR MASS: 270.91 G
LV LATERAL E/E' RATIO: 17 M/S
LV SEPTAL E/E' RATIO: 19.83 M/S
MV A" WAVE DURATION": 12.56 MSEC
MV PEAK A VEL: 0.91 M/S
MV PEAK E VEL: 1.19 M/S
MV STENOSIS PRESSURE HALF TIME: 61.03 MS
MV VALVE AREA P 1/2 METHOD: 3.6 CM2
PISA TR MAX VEL: 2.58 M/S
PULM VEIN S/D RATIO: 0.86
PV PEAK D VEL: 0.73 M/S
PV PEAK S VEL: 0.63 M/S
RA MAJOR: 6.34 CM
RA PRESSURE: 3 MMHG
RA WIDTH: 4.24 CM
RIGHT VENTRICULAR END-DIASTOLIC DIMENSION: 4.9 CM
RV TISSUE DOPPLER FREE WALL SYSTOLIC VELOCITY 1 (APICAL 4 CHAMBER VIEW): 6.57 CM/S
SINUS: 3.69 CM
STJ: 3.3 CM
TDI LATERAL: 0.07 M/S
TDI SEPTAL: 0.06 M/S
TDI: 0.07 M/S
TR MAX PG: 27 MMHG
TRICUSPID ANNULAR PLANE SYSTOLIC EXCURSION: 1.57 CM
TV REST PULMONARY ARTERY PRESSURE: 30 MMHG

## 2022-12-29 PROCEDURE — 93000 EKG 12-LEAD: ICD-10-PCS | Mod: S$GLB,,, | Performed by: INTERNAL MEDICINE

## 2022-12-29 PROCEDURE — 93306 TTE W/DOPPLER COMPLETE: CPT

## 2022-12-29 PROCEDURE — 93306 TTE W/DOPPLER COMPLETE: CPT | Mod: 26,,, | Performed by: INTERNAL MEDICINE

## 2022-12-29 PROCEDURE — 93000 ELECTROCARDIOGRAM COMPLETE: CPT | Mod: S$GLB,,, | Performed by: INTERNAL MEDICINE

## 2022-12-29 PROCEDURE — 93306 ECHO (CUPID ONLY): ICD-10-PCS | Mod: 26,,, | Performed by: INTERNAL MEDICINE

## 2022-12-29 NOTE — PROGRESS NOTES
Mr. Mason has completed 27 out of 36 exercise session of Phase II cardiac rehab.  A follow up reassessment will be completed at exit interview.   Mr. Mason is currently on hold due to having shoulder surgery.  We do not know if he will be able to return to rehab.    24 Session Follow Up   Cardiac Rehab Individual Treatment Plan - Reassessment      Patient Name: Isaiah Dorsey Jr. MRN: [id number]   : 1960   Age: 62 y.o.   Primary Diagnosis: CABG Date of Event: 22   EF: 40-45%  Risk Stratification: high  Referring Physician: ADRIANO   Exercise Assessment:     Angina with exercise?: No   ST Depression with Exercise?: No  Fall Risk: No   Assistive Devices:  independent  There were no limitations noted by the patient.  Comments on Progression: Mr. Mason is progressing fair and his workloads will continue to be increased as he tolerates exercise intensity.     Exercise Plan:   Goals:  CR Exercise Goals: Attend Cardiac Rehab 3 times/week: In Progress  Home Aerobic Exercise: 2 additional days/week for 30-60 minutes: In Progress  Intensity of 12-15 on the Rate of Perceived Exertion (RPE) scale: In Progress  30% increase in entry estimated METS: 7.8 : In Progress  5 days/week for 30-60 minutes: In Progress    Comments on Goal Progression:  Patient Consistency: inconsistent with attendance  Home exercise? No   Patient reports intensity rate 11-14 on RPE scale    Intervention/Recommendations:   Discussed importance of regular attendance to cardiac rehab class    Exercise Prescription:  THR Range 63-74   Mode: Treadmill  Nustep   Frequency:  3 days/week   Duration:  30-60 minutes   Intensity:  12-15 RPE   Resistance Training:  Yes: 3 to 5 lb weights with 10-15 reps based on strength and range of motion and adjusted accordingly     Home Prescription:  Mode Aerobic exercise   Frequency: 2- 3 days/week   Duration: 30-60 minutes   Resistance Training: None     Education:  Arthritis/Osteporosis; verbalizes understanding; Date:  22  Body Composition; verbalizes understanding; Date: 22  Exercise and Rehydration; verbalizes understanding; Date: 22  Flexibility/Stretching; verbalizes understanding; Date: 10-24-22  Relaxation Techniques; verbalizes understanding; Date: 22    Comments:  I reviewed exercise recommendations with Mr. Mason.  I strongly encouraged him to begin exercising.  He stated understanding but he also stated he doesn't feel he has time.        The exercise prescription will continue to be adjusted based on tolerance of exercise intensity by patient.    Jorje Ceballos, CEP     24 Session Follow Up   Cardiac Rehab Individual Treatment Plan - Reassessment    Patient Name: Isaiah Dorsey Jr. MRN: [id number]   : 1960   Age: 62 y.o.   Primary Diagnosis: s/p CABG, CAD, CHF, HLD, NSTEMI, DM    Nutrition Assessment:     Anthropometrics    Height 72 inches   Weight 288 lbs   BMI 39.1     Patient confirms he is taking lipitor 40mg for cholesterol control.  Difficulty Chewing or Swallowing: no  Current Exercise: See Exercise Physiologist Note  Food Safety/Food Preparation: self  Living Arrangements/Family Support: Lives with spouse  Cultural/Spiritual/Personal Preferences: not applicable   Barriers to Education: none identified  Stage of Change Related to Diet Habits: Action  Recent Changes to Diet: Yes - more produce intake  Food Diary: Completed      Nutrition Plan:   Goals:  LDL-C < 70 (for high risk patients)  Hgb A1c < 7%  BMI < 25 and abdominal girth < 40M/<35 F  2 gram sodium, Mediterranean diet: In Progress  Rehab weight loss goal of 10 lbs, 1 lb per week: Not Met: pt has gained 14lbs since starting rehab  Fish intake (non-fried varieties) to a goal of 2-3 servings per week: In Progress  Increase fruit and vegetable intake: In Progress    Comments on Goal Progression:  Discussed significant weight gain, pt is aware he has been making poor food choices and not exercising outside of rehab. Emphasized  importance of portion control and lifestyle modification for improved health outcomes-pt verbalized understanding.    Interventions:  Dietitian Consult: No  Patient to participate in Cardiac Rehab sessions three times a week  Weekly Dietitian Weight Check  Nutrition Recommendations Provided: Verbal, Reviewed  Follow Up Plan for Ongoing Self-Management Support    Education:  Dining Out; verbalizes understanding; Date: 11/9/22  Mediterranean Diet; verbalizes understanding; Date: 11/23/22  Recipe Modication; verbalizes understanding; Date: 11/16/22  Vegetarianism; verbalizes understanding; Date: 12/5/22  Vitamins; verbalizes understanding; Date: 10/26/22    Comments:   Discussed ways to incorporate healthy snacks, eating on a schedule, and monitoring sodium intake for heart health.    Diabetes  Is the patient diabetic? Yes   Other Core Components/Diabetes Assessment:   Labs:  Lab Results   Component Value Date    GLUF 136 (H) 08/25/2022     Lab Results   Component Value Date    HGBA1C 6.2 (H) 08/25/2022    HGBA1C 7.2 (H) 07/28/2022    HGBA1C 7.6 (H) 07/12/2022      Lab Results   Component Value Date    ESTIMATEDAVG 131 08/25/2022    ESTIMATEDAVG 160 (H) 07/28/2022    ESTIMATEDAVG 171 (H) 07/12/2022       History of diabetes since 2018  Diabetes medications: Ozempic, Januvia 100mg daily, metformin 1000mg daily  Blood Glucose Checks at Home: Yes: blood sugars ac & HS  Typical morning range: 130-160 mg/dl  Endocrinologist or PCP following DM: Dr. Burrows    Other Core Components/Diabetes Plan:   Goals:  Hgb A1c < 7%  Exercise Blood Glucose: 100-300 mg/dl    Interventions:  Reviewed and Discussed Labs  Medication Compliance  Med Card Reconciled  Low Sodium, Mediterranean, ADA Diet  Weight Management  Physical Activity  Increase Knowledge of Contributory Factors  Home Monitoring    Education:  Risk Factors; verbalizes understanding; Date: 10/28/22  Stress; verbalizes understanding; Date: 11/11/22  Mediterranean Diet;  verbalizes understanding; Date: 22  Dining Out; verbalizes understanding; Date: 22    Comments:   Patient verbalizes understanding to bring home glucometer and check glucose pre and post each exercise session.  Per cardiac rehab protocols, patient's glucose must be between 90 and 270 mg/dL to exercise.  Patient denies any recent glucose levels less than 60 mg/dL or greater than 300 mg/dL. Patient verbalizes importance of notifying rehab staff if symptoms of hypoglycemia occur while at cardiac rehab.  Abnormal labs will be reported to patient's PCP/Endocrinologist by rehab staff.    Noted updated glucose parameters of 100-300    Sarita Tirado MS, RDN/LDN     Session: 24 Session Follow Up   Cardiac Rehab Individual Treatment Plan - Reassessment      Patient Name: Isaiah Dorsey Jr. MRN: [id number]   : 1960   Age: 62 y.o.   Primary Diagnosis: CABG 2022      Psychosocial Assessment:   Living Arrangements: Lives with spouse  Family Support: family  Self Reported: no change Stress  Displays: happiness  Medication: not applicable    Psychosocial Plan:   Goals:  Improved psychosocial coping strategies: Not Met: pt states he has a lot of stress  Verbalizes coping mechanisms: In Progress  Reduce manifestation of stress: In Progress  Maintain positive outlook: In Progress  Improve overall quality of life: In Progress    Comments on Goal Progression:  Pt is working towards decreasing stress in his life and improving his management of stressful events.    Interventions:  Patient to Self Report Emotional Changes at Session Check In  Recommend Physical Activity  Recommend Attending Education Lectures  Notify MD: No  Program Referral: No  Pharmaceutical Intervention/Therapy: No  Other Needs: not applicable  Stage of Readiness to Change: Action    Education:  Stress Management; verbalizes understanding; Date: 2022  Stress; verbalizes understanding; Date: 2022  Risk Factors; verbalizes  understanding; Date: 10/28/2022    Comments:  Pt attendance to cardiac rehab class has improved. Pt denies overwhelming stress or anxiety.   Patient has been instructed to notify staff in the event that circumstances worsen.  Patient verbalizes understanding.    Other Core Components/Risk Factors Assessment:   RISK FACTORS:  diabetes, hyperlipidemia, hypertension, obesity, sedentary lifestyle, stress    Learning Barriers: None    Medication Compliance: has been compliant with the medicaiton regimen    Other Core Components/Risk Factors Plan:   Goals:  Decrease cholesterol level: In Progress  Increase exercise tolerance: Met  Increase knowledge of CAD: Met  Decrease blood pressure: Met  Weight loss: Not Met: Pt has gained 14 lbs since starting rehab  Demonstrate accurate pulse taking: Met  Identify and manage personal areas of stress: In Progress  Control diabetes by adjusting diet and exercise: In Progress  Learn more about healthy eating: In Progress    Comments on Goal Progression:  Pt acknowledges his weight gain and states he is trying to eat healthier.     Interventions:  Individual Education/ Counseling: Yes  Physician Referral: No    Education:    fluid overload/CHF, verbalizes understanding; Date: 10/21/2022  risk factors, verbalizes understanding; Date: 10/28/2022  stress, verbalizes understanding; Date: 11/11/2022         Education method adapted to patients education level and preferred method of learning.  Method: explanation  handouts    Comments:  Pt states he is improved and continues to try to modify his risk factors to improve his cardiovascular health.    Other Core Components/Hypertension Assessment:   BP Range: 100-160 systolic; 70-90 diastolic  BP at Goal: No  Patient reported symptoms: none    Other Core Components/Hypertension Plan:   Goals:  Blood Pressure <130/80    Comments on Goal Progression:  Pt states he is interested in possibly joining the digital hypertension  program    Interventions:  Med Card Reconciled: No  Encourage medication compliance  Encourage sodium reduction  Encourage weight loss  Recommend physical activity  Educate on contributory factors  Reduce stress, anxiety, anger, depression, and/or chronic pain  Encourage home blood pressure monitoring  Recommend daily weights  MD notified/Physician Referral: Yes    Education:    Congestive Heart Failure; verbalizes understanding; Date: 10/21/2022  Risk Factors; verbalizes understanding; Date: 10/28/2022  Stress; verbalizes understanding; Date: 11/11/2022         Comments:  Pt has better blood pressure readings than on entry but still has occasional isolated elevated reading  >130/80  Does the patient have Heart Failure? Yes   Other Core Components/Congestive Heart Failure Assessment:   Ejection Fraction: 40-45% %  Patient reported symptoms: peripheral edema and weight gain  Lung Sounds: normal air entry, lungs clear to auscultation  Right Leg Edema: Trace  Left Leg Edema Trace    Other Core Components/Congestive Heart Failure Plan:   Goals:  Patient free from CHF Exacerbation    Comments on Goal Progression:  Pt is more aware of his symptoms and is making effort to decrease exacerbation of CHF.    Interventions/Recommendations:  Encourage medication compliance  Encourage daily weight checks  Promote sodium reduction  Encourage tracking fluid intake  Promote physical activity  Educate on contributory factors  Recommend home blood pressure monitoring  MD notified/MD visit scheduled: No not until next year      Education:    Congestive Heart Failure; verbalizes understanding; Date: 10/21/2022  Risk Factors; verbalizes understanding; Date: 10/28/2022  Stress; verbalizes understanding; Date: 11/11/2022         Comments:  Pt is not having CHF symptoms at this time.      Other Core Components/Tobacco Cessation Assessment:   Smoking Status: past smoker who quit many years ago  Smoking Cessation Barriers:  none  Stage of  Readiness to Change: Maintenance    Other Core Components/Tobacco Cessation Plan:   Goals:  Maintain non-smoking status    Comments on Goal Progression:  Pt does not smoke    Interventions:  Maintains non-smoking status    Education:    Risk Factors; verbalizes understanding; Date: 10/28/2022         Comments:  Pt has no desire to use tobacco products at this time.    Jaden Penaloza RN

## 2023-01-03 ENCOUNTER — TELEPHONE (OUTPATIENT)
Dept: SPORTS MEDICINE | Facility: CLINIC | Age: 63
End: 2023-01-03
Payer: COMMERCIAL

## 2023-01-03 ENCOUNTER — PATIENT MESSAGE (OUTPATIENT)
Dept: SPORTS MEDICINE | Facility: CLINIC | Age: 63
End: 2023-01-03
Payer: COMMERCIAL

## 2023-01-03 NOTE — PROGRESS NOTES
As Dr. Mosquera requested me to do is to inform Mr. Sunita FloydAnel about his test results. I called and informed Mr. Sunita Floyd. That his echo (ultrasound of the heart) shows heart function to be 45% and is unchanged from prior study. He verbalized and understood.

## 2023-01-03 NOTE — TELEPHONE ENCOUNTER
Spoke with patient, he did not fall directly on the shoulder and it was in the sling. He only has pain on his thigh. Will wait until post op appointment on Friday.    Kylah Rose MS, OTC [facility]    ----- Message from Charlotte Pearce sent at 1/3/2023  2:34 PM CST -----  Contact: pt  Pt spouse calling in regards to pt falling , says he not in any pain and doesn't believe her hit his shoulder , please call     Refill request.    traMADoL (ULTRAM) 50 mg tablet        [id number] - [place], [place] - [address] [place] [address] AT [address] [address]  Phone: [phone] Fax: [phone]          Confirmed patient's contact info below:  Contact Name: Isaiah Dorsey  Phone Number: [phone]

## 2023-01-03 NOTE — PROGRESS NOTES
plz let pt. Know that echo (ultrasound of the heart) shows heart function to be 45% and is unchanged from prior study

## 2023-01-06 ENCOUNTER — OFFICE VISIT (OUTPATIENT)
Dept: SPORTS MEDICINE | Facility: CLINIC | Age: 63
End: 2023-01-06
Payer: COMMERCIAL

## 2023-01-06 VITALS
DIASTOLIC BLOOD PRESSURE: 91 MMHG | SYSTOLIC BLOOD PRESSURE: 163 MMHG | BODY MASS INDEX: 38.6 KG/M2 | WEIGHT: 285 LBS | HEART RATE: 61 BPM | HEIGHT: 72 IN

## 2023-01-06 DIAGNOSIS — Z98.890 S/P RIGHT ROTATOR CUFF REPAIR: Primary | ICD-10-CM

## 2023-01-06 PROCEDURE — 3008F BODY MASS INDEX DOCD: CPT | Mod: CPTII,S$GLB,, | Performed by: ORTHOPAEDIC SURGERY

## 2023-01-06 PROCEDURE — 3077F PR MOST RECENT SYSTOLIC BLOOD PRESSURE >= 140 MM HG: ICD-10-PCS | Mod: CPTII,S$GLB,, | Performed by: ORTHOPAEDIC SURGERY

## 2023-01-06 PROCEDURE — 3008F PR BODY MASS INDEX (BMI) DOCUMENTED: ICD-10-PCS | Mod: CPTII,S$GLB,, | Performed by: ORTHOPAEDIC SURGERY

## 2023-01-06 PROCEDURE — 3072F PR LOW RISK FOR RETINOPATHY: ICD-10-PCS | Mod: CPTII,S$GLB,, | Performed by: ORTHOPAEDIC SURGERY

## 2023-01-06 PROCEDURE — 3080F PR MOST RECENT DIASTOLIC BLOOD PRESSURE >= 90 MM HG: ICD-10-PCS | Mod: CPTII,S$GLB,, | Performed by: ORTHOPAEDIC SURGERY

## 2023-01-06 PROCEDURE — 99024 PR POST-OP FOLLOW-UP VISIT: ICD-10-PCS | Mod: S$GLB,,, | Performed by: ORTHOPAEDIC SURGERY

## 2023-01-06 PROCEDURE — 3077F SYST BP >= 140 MM HG: CPT | Mod: CPTII,S$GLB,, | Performed by: ORTHOPAEDIC SURGERY

## 2023-01-06 PROCEDURE — 99024 POSTOP FOLLOW-UP VISIT: CPT | Mod: S$GLB,,, | Performed by: ORTHOPAEDIC SURGERY

## 2023-01-06 PROCEDURE — 99999 PR PBB SHADOW E&M-EST. PATIENT-LVL III: CPT | Mod: PBBFAC,,, | Performed by: ORTHOPAEDIC SURGERY

## 2023-01-06 PROCEDURE — 99999 PR PBB SHADOW E&M-EST. PATIENT-LVL III: ICD-10-PCS | Mod: PBBFAC,,, | Performed by: ORTHOPAEDIC SURGERY

## 2023-01-06 PROCEDURE — 3072F LOW RISK FOR RETINOPATHY: CPT | Mod: CPTII,S$GLB,, | Performed by: ORTHOPAEDIC SURGERY

## 2023-01-06 PROCEDURE — 3080F DIAST BP >= 90 MM HG: CPT | Mod: CPTII,S$GLB,, | Performed by: ORTHOPAEDIC SURGERY

## 2023-01-06 PROCEDURE — 1159F MED LIST DOCD IN RCRD: CPT | Mod: CPTII,S$GLB,, | Performed by: ORTHOPAEDIC SURGERY

## 2023-01-06 PROCEDURE — 1159F PR MEDICATION LIST DOCUMENTED IN MEDICAL RECORD: ICD-10-PCS | Mod: CPTII,S$GLB,, | Performed by: ORTHOPAEDIC SURGERY

## 2023-01-06 NOTE — PROGRESS NOTES
POST-OPERATIVE EXAMINATION    62 y.o. Male who returns for follow after surgery. He is 2 weeks s/p    Procedures Performed:  1. Right shoulder Arthroscopic rotator cuff repair CPT - 97170     2. Right shoulder Arthroscopic Biceps tenodesis CPT - 76228     3. Right shoulder Arthroscopic extensive debridement CPT - 31965     4. Right shoulder Arthroscopic subacromial decompression and bursectomy CPT - 71460     He is doing well without any issues.       PHYSICAL EXAMINATION:  BP (!) 163/91   Pulse 61   Ht 6' (1.829 m)   Wt 129.3 kg (285 lb)   BMI 38.65 kg/m²   General: Well-developed well-nourished 62 y.o. malein no acute distress   Cardiovascular: Regular rhythm   Lungs: No labored breathing or wheezing appreciated   Neuro: Alert and oriented ×3   Psychiatric: well oriented to person, place and time, demonstrates normal mood and affect   Skin: No rashes, lesions or ulcers, normal temperature, turgor, and texture on involved extremity    ORTHOPEDIC EXAM:  Normal post-operative swelling  Normal post-operative scarring  Strength: WNL  ROM: Not tested  Tests: None today    ASSESSMENT:      ICD-10-CM ICD-9-CM   1. S/P right rotator cuff repair  Z98.890 V45.89       PLAN:       Sutures removed today  Continue physical therapy  RTC in 1 month

## 2023-01-10 ENCOUNTER — CLINICAL SUPPORT (OUTPATIENT)
Dept: REHABILITATION | Facility: HOSPITAL | Age: 63
End: 2023-01-10
Attending: PODIATRIST
Payer: COMMERCIAL

## 2023-01-10 DIAGNOSIS — M25.611 IMPAIRED RANGE OF MOTION OF RIGHT SHOULDER: ICD-10-CM

## 2023-01-10 DIAGNOSIS — M75.21 BICEPS TENDINITIS OF RIGHT SHOULDER: ICD-10-CM

## 2023-01-10 DIAGNOSIS — S46.011A TRAUMATIC COMPLETE TEAR OF RIGHT ROTATOR CUFF, INITIAL ENCOUNTER: ICD-10-CM

## 2023-01-10 DIAGNOSIS — R29.898 DECREASED STRENGTH OF UPPER EXTREMITY: ICD-10-CM

## 2023-01-10 PROCEDURE — 97110 THERAPEUTIC EXERCISES: CPT | Mod: PO

## 2023-01-10 PROCEDURE — 97162 PT EVAL MOD COMPLEX 30 MIN: CPT | Mod: PO

## 2023-01-10 NOTE — PLAN OF CARE
[facility]   Physical Therapy Initial Evaluation     Date: 1/10/2023   Name: Isaiah Dorsey Jr.  Clinic Number: [id number]    Therapy Diagnosis:   Encounter Diagnoses   Name Primary?    Traumatic complete tear of right rotator cuff, initial encounter     Biceps tendinitis of right shoulder     Impaired range of motion of right shoulder     Decreased strength of upper extremity      Physician: Vincenzo Jaeger, WENDY    Physician Orders: PT Eval and Treat   Medical Diagnosis from Referral:   S46.011A (ICD-10-CM) - Traumatic complete tear of right rotator cuff, initial encounter   M75.21 (ICD-10-CM) - Biceps tendinitis of right shoulder     Evaluation Date: 1/10/2023  Authorization Period Expiration: 12/21/23  Plan of Care Expiration: 4/4/23  Progress Note Due: 2/10/23  Visit # / Visits authorized: 1/ 1   FOTO: NEEDS TO BE PERFORMED AT FIRST VISIT    Precautions: post op shoulder precautions     Time In: 12:45 PM  Time Out: 1:23 PM  Total Appointment Time (timed & untimed codes): 38 minutes      Procedures Performed:  1. Right shoulder Arthroscopic rotator cuff repair CPT - 27314     2. Right shoulder Arthroscopic Biceps tenodesis CPT - 06531     3. Right shoulder Arthroscopic extensive debridement CPT - 02463     4. Right shoulder Arthroscopic subacromial decompression and bursectomy CPT - 02607           Dr. Morse protocol      SUBJECTIVE     Date of onset: date of surgery : 12/22/22    History of current condition - Isaiah reports: had shoulder pain for about 2 years and then got surgery on 12/22/22. Currently in a sling. He says he fell going down the stairs too fast about a week ago. He told Dr. Morse about this. He is right hand dominant     Falls: denies other falls     Imaging, see chart    Prior Therapy: yes-for his heel   Social History: lives with wife  Occupation:  (has been back at work)   Prior Level of Function: independent  Current Level of Function: limited post  "operatively as he is in the sling     Pain:  Current 1/10, worst 2/10, best 0/10   Location: right shoulder   Description: Aching  Aggravating Factors: being out of sling   Easing Factors: being in sling, sometimes tylenol or aleve     Patients goals: "full use of my arm again"      Medical History:   Past Medical History:   Diagnosis Date    CHF (congestive heart failure)     Coronary artery disease     Diabetes mellitus type 2, uncontrolled, without complications     6.7% Metf 1 g qd, eye 2015, [facility]-2015,     Elevated platelet count 07/23/2015    H/O splenectomy 04/24/2015    doesn't like pneumovax bc caused side effects    HLD (hyperlipidemia) 01/22/2015    goal LDL < 100, reluctant to take statin    HTN (hypertension), benign 01/22/2015    losartan 100    Morbid obesity with BMI of 45.0-49.9, adult 06/11/2014    RODRIGUEZ (obstructive sleep apnea)     PAF (paroxysmal atrial fibrillation)     Polyp of transverse colon 09/05/2018 2018, repeat 2023    Severe uncontrolled hypertension 06/11/2014       Surgical History:   Isaiah ROMERO Sunita Wells  has a past surgical history that includes splenecectomy; Left heart catheterization (N/A, 7/11/2022); Aortography (N/A, 7/11/2022); Coronary Artery Bypass Graft (CABG) (Left, 7/18/2022); Endoscopic harvest of vein (Left, 7/18/2022); Colonoscopy (N/A, 11/22/2022); Arthroscopic repair of rotator cuff of shoulder (Right, 12/22/2022); Arthroscopic debridement of shoulder (Right, 12/22/2022); Arthroscopy of shoulder with decompression of subacromial space (Right, 12/22/2022); and Fixation of tendon (Right, 12/22/2022).    Medications:   Isaiah has a current medication list which includes the following prescription(s): acetaminophen, amiodarone, aspirin, atorvastatin, fluocinolone acetonide oil, hydrocodone-acetaminophen, ketoconazole, ketoconazole, losartan, metformin, metoprolol succinate, naproxen sodium, prednisolone acetate, rivaroxaban, sildenafil, sitagliptin phosphate, " "tamsulosin, and tramadol.    Allergies:   Review of patient's allergies indicates:   Allergen Reactions    Penicillins      Other reaction(s): Itching  Other reaction(s): Hives    Wasp venom Edema          OBJECTIVE       Observation: incision intact   Patient enters clinic with sling donned    AROM Right Left   Shoulder Flexion:  degrees   Shoulder Abduction:  degrees   Shoulder ER: NT WFL   Shoulder IR: NT WFL     PROM: unable to be assessed due to muscle guarding     MMT   Right Left   Shoulder flexion: NT 4+/5   Shoulder Abduction: NT 4+/5   Shoulder ER: NT >/=4/5   Shoulder IR: NT 5/5     Special Tests: NT       Limitation/Restriction for FOTO TBD Survey    Therapist reviewed FOTO scores for Isaiah Dorsey  on 1/10/2023.   FOTO documents entered into [employer] - see Media section.    Limitation Score: TBD%         TREATMENT     Total Treatment time (time-based codes) separate from Evaluation: 23 minutes      Isaiah received the treatments listed below:      therapeutic exercises to develop strength, endurance, and ROM for 23 minutes including:  Pendulums seated CW,CCW, Side to side, front to back  Scap squeeze x5" holds   Wrist flexion extension  Ball squeeze in sling    UT stretch   Education on sling use, post op precautions      PATIENT EDUCATION AND HOME EXERCISES     Education provided:   - PT role and POC  - HEP   - postop precautions     Written Home Exercises Provided: yes. Exercises were reviewed and Isaiah was able to demonstrate them prior to the end of the session.  Isaiah demonstrated good  understanding of the education provided. See EMR under Patient Instructions for exercises provided during therapy sessions.    ASSESSMENT     Isaiah is a 63 y.o. male referred to outpatient Physical Therapy with a medical diagnosis of   S46.011A (ICD-10-CM) - Traumatic complete tear of right rotator cuff, initial encounter   M75.21 (ICD-10-CM) - Biceps tendinitis of right shoulder   . Patient presents " with s/p below listed procedures on 12/22/22  1. Right shoulder Arthroscopic rotator cuff repair      2. Right shoulder Arthroscopic Biceps tenodesis     3. Right shoulder Arthroscopic extensive debridement      4. Right shoulder Arthroscopic subacromial decompression and bursectomy     Assessment is limited today due to acute post op precautions. Educated on home exercise program. Patient is educated on importance of maintaining passive motion during pendulums.Additionally, extensive education provided on post- op precautions as patient is unaware of them. He is educated that he can NOT perform any active motions of the shoulder. He is also educated on sling use. He is educated that he cannot lift/carry with R hand/UE. He has not been adhering to these precautions. He also reports that he has been driving. I heavily educated the patient that he should not be driving as this time, but he insists that he must be driving due to his job and that he just drives with non-operative UE. Patient does not seem to understand importance of post-op precautions despite extensive education. I will reach out to Dr. Morse concerning patient noncompliance with post op restrictions and precautions.       Patient prognosis is Fair.   Patient will benefit from skilled outpatient Physical Therapy to address the deficits stated above and in the chart below, provide patient /family education, and to maximize patientt's level of independence.     Plan of care discussed with patient: Yes  Patient's spiritual, cultural and educational needs considered and patient is agreeable to the plan of care and goals as stated below:     Anticipated Barriers for therapy: non-compliance with post op precautions     Medical Necessity is demonstrated by the following  History  Co-morbidities and personal factors that may impact the plan of care Co-morbidities:     Past Medical History:   Diagnosis Date    CHF (congestive heart failure)     Coronary artery  disease     Diabetes mellitus type 2, uncontrolled, without complications     6.7% Metf 1 g qd, eye 2015, [facility]-2015,     Elevated platelet count 07/23/2015    H/O splenectomy 04/24/2015    doesn't like pneumovax bc caused side effects    HLD (hyperlipidemia) 01/22/2015    goal LDL < 100, reluctant to take statin    HTN (hypertension), benign 01/22/2015    losartan 100    Morbid obesity with BMI of 45.0-49.9, adult 06/11/2014    RODRIGUEZ (obstructive sleep apnea)     PAF (paroxysmal atrial fibrillation)     Polyp of transverse colon 09/05/2018 2018, repeat 2023    Severe uncontrolled hypertension 06/11/2014       Personal Factors:   attitudes     high   Examination  Body Structures and Functions, activity limitations and participation restrictions that may impact the plan of care Body Regions:   upper extremities    Body Systems:    gross symmetry  ROM  strength  gross coordinated movement  transfers  transitions  motor control  motor learning  scar formation    Participation Restrictions:   Post op limitations    Activity limitations:   Learning and applying knowledge  no deficits    General Tasks and Commands  no deficits    Communication  no deficits    Mobility  lifting and carrying objects  fine hand use (grasping/picking up)  driving (bike, car, motorcycle)    Self care  washing oneself (bathing, drying, washing hands)  caring for body parts (brushing teeth, shaving, grooming)  dressing  looking after one's health    Domestic Life  cooking  doing house work (cleaning house, washing dishes, laundry)    Interactions/Relationships  no deficits    Life Areas  employment    Community and Social Life  community life  recreation and leisure         moderate   Clinical Presentation evolving clinical presentation with changing clinical characteristics moderate   Decision Making/ Complexity Score: moderate     Goals:    Short Term Goals 6 weeks   1. Pt will be independent with HEP to supplement PT in improving functional  use of R UE.  2. Pt will increase pain free right shoulder PROM in all planes to WNL to improve functional mobility of UE-  3. Formally assess strength when allowed and set goals accordingly  4. Pt will adhere to post op precautions to allow for proper tissue healing    Long Term Goals 12 weeks   1.  Pt will have full PROM of right shoulder to show improvements in overall movement patterns.  2. Pt to show proper postural awareness with no VC from PT  3. Pt to have good tolerance to AAROM and AROM activities per protocol   4. Patient will increase right shoulder strength to >/=4/5.    PLAN   Plan of care Certification: 1/10/2023 to 4/4/23.    Outpatient Physical Therapy 1-2 times weekly for 12 weeks to include the following interventions: Manual Therapy, Moist Heat/ Ice, Neuromuscular Re-ed, Patient Education, Self Care, Therapeutic Activities, Therapeutic Exercise, and modalities as appropriate.     Luz Elena Kaufman, PT      I CERTIFY THE NEED FOR THESE SERVICES FURNISHED UNDER THIS PLAN OF TREATMENT AND WHILE UNDER MY CARE   Physician's comments:     Physician's Signature: ___________________________________________________

## 2023-01-11 ENCOUNTER — PROCEDURE VISIT (OUTPATIENT)
Dept: UROLOGY | Facility: CLINIC | Age: 63
End: 2023-01-11
Payer: COMMERCIAL

## 2023-01-11 VITALS
SYSTOLIC BLOOD PRESSURE: 164 MMHG | WEIGHT: 290.38 LBS | HEART RATE: 67 BPM | DIASTOLIC BLOOD PRESSURE: 91 MMHG | BODY MASS INDEX: 39.33 KG/M2 | HEIGHT: 72 IN

## 2023-01-11 DIAGNOSIS — N40.1 BENIGN PROSTATIC HYPERPLASIA WITH WEAK URINARY STREAM: ICD-10-CM

## 2023-01-11 DIAGNOSIS — R39.12 BENIGN PROSTATIC HYPERPLASIA WITH WEAK URINARY STREAM: ICD-10-CM

## 2023-01-11 PROCEDURE — 76872 US TRANSRECTAL: CPT | Mod: S$GLB,,, | Performed by: UROLOGY

## 2023-01-11 PROCEDURE — 99214 PR OFFICE/OUTPT VISIT, EST, LEVL IV, 30-39 MIN: ICD-10-PCS | Mod: 25,S$GLB,, | Performed by: UROLOGY

## 2023-01-11 PROCEDURE — 52000 CYSTOSCOPY: ICD-10-PCS | Mod: S$GLB,,, | Performed by: UROLOGY

## 2023-01-11 PROCEDURE — 76872 TRANSRECTAL ULTRASOUND: ICD-10-PCS | Mod: S$GLB,,, | Performed by: UROLOGY

## 2023-01-11 PROCEDURE — 52000 CYSTOURETHROSCOPY: CPT | Mod: S$GLB,,, | Performed by: UROLOGY

## 2023-01-11 PROCEDURE — 99214 OFFICE O/P EST MOD 30 MIN: CPT | Mod: 25,S$GLB,, | Performed by: UROLOGY

## 2023-01-11 NOTE — PROGRESS NOTES
Subjective:       Patient ID: Isaiah Dorsey Jr. is a 63 y.o. male.    Chief Complaint: Cystoscopy/Trus     This is a 63 y.o.  male patient with BPH, weak urinary stream.  Patient has a family history of prostate cancer.  He reports slowing of urination with weak stream and straining for months now.  He has not been on any medication.  He also reports erectile dysfunction with normal libido.  Not able to obtain or maintain erection for sexual activity.  This has been ongoing for some time.  He does have a history of MI and is not on nitroglycerin.  AUA SS 13/3, bladder scan 297 but did not void.  Cysto/TRUS 1/23.     IPSS Questionnaire (AUA-SS):  Over the past month    1)  Incomplete Emptying - How often have you had a sensation of not emptying your bladder completely after you finish urinating?  0 - Not at all   2)  Frequency - How often have you had to urinate again less than two hours after you finished urinating? 1 - Less than 1 time in 5   3)  Intermittency - How often have you found you stopped and started again several times when you urinated?  1 - Less than 1 time in 5   4) Urgency - How difficult have you found it to postpone urination?  0 - Not at all   5) Weak Stream - How often have you had a weak urinary stream?  5 - Almost always   6) Straining  - How often have you had to push or strain to begin urination?  5 - Almost always   7) Nocturia - How many times did you most typically get up to urinate from the time you went to bed until the time you got up in the morning?  1 - 1 time   Total score:  0-7 mild, 8-19 moderate, 20-35 severe 13   Quality of Life:  3 - Mixed          LAST PSA  Lab Results   Component Value Date    PSA 0.33 09/30/2022    PSA 0.74 06/05/2014    PSA 0.48 03/24/2011    PSA 0.50 07/02/2009       Lab Results   Component Value Date    CREATININE 0.8 07/23/2022       ---  PMH/PSH/Medications/Allergies/Social history reviewed and as in chart.    Review of Systems   Constitutional:   Negative for activity change, chills and fever.   HENT:  Negative for congestion.    Respiratory:  Negative for cough, chest tightness and shortness of breath.    Cardiovascular:  Negative for chest pain and palpitations.   Gastrointestinal:  Negative for abdominal distention, abdominal pain, nausea and vomiting.   Genitourinary:  Positive for difficulty urinating and frequency. Negative for flank pain, hematuria, penile pain, scrotal swelling and testicular pain.   Musculoskeletal:  Negative for gait problem.     Objective:      Physical Exam  HENT:      Head: Atraumatic.   Pulmonary:      Effort: Pulmonary effort is normal.   Neurological:      General: No focal deficit present.      Mental Status: He is alert and oriented to person, place, and time.       Assessment:     Problem Noted   Benign Prostatic Hyperplasia With Weak Urinary Stream 12/15/2022    AUA SS 13/3, mainly weak stream/straining  Tamsulosin started 12/22  Cysto/TRUS 1/23: volume 55, lateral lobe hyperplasia and trabeculation.       Erectile Dysfunction 12/15/2022    Sildenafil 50 (12/22)     PSA 9/22--0.33    Plan:     Cysto/TRUS reviewed.   Separate from procedure discussed options including:  HoLEP, Rezum, Urolift, GLL PVP, TURP.  Risks, benefits and alternatives of each reviewed.  Wishes to start medications and follow up in 3 months.     Davian Cobb MD    The above referenced imaging and interpretations were personally reviewed.  Disclaimer: This note has been generated using voice-recognition software. There may be typographical errors that have been missed during proof-reading.

## 2023-01-11 NOTE — PROCEDURES
"Cystoscopy    Date/Time: 1/11/2023 9:00 AM  Performed by: Davian Cobb MD  Authorized by: Davian Cobb MD     Consent Done?:  Yes (Written)  Timeout: prior to procedure the correct patient, procedure, and site was verified    Prep: patient was prepped and draped in usual sterile fashion    Local anesthesia used?: Yes    Anesthesia:  Lidocaine jelly  Indications comment:  LUTs  Position:  Supine  Anesthesia:  Lidocaine jelly  Preparation: Patient was prepped and draped in usual sterile fashion    Scope type:  Flexible cystoscope  External exam normal: Yes    Digital exam performed: No    Urethra normal: No    Urethral Internal Findings:  Stricture (wide caliber strictures along anterior urethra, multiple, approximalte 22 F)  Prostate normal: Yes    Bladder neck normal: Yes    Bladder normal: No (trabeculation)     patient tolerated the procedure well with no immediate complications  Transrectal Ultrasound    Date/Time: 1/11/2023 9:00 AM  Performed by: Davian Cobb MD  Authorized by: Davian Cobb MD     Consent Done?:  Yes (Written)  Time out: Immediately prior to procedure a "time out" was called to verify the correct patient, procedure, equipment, support staff and site/side marked as required.    Indications comment:  LUTs  Preparation: Patient was prepped and draped in usual sterile fashion    Anesthesia:  Lidocaine jelly  Patient sedated: No    Prostate Size:  55  Lesions:: No      Patient tolerance:  Patient tolerated the procedure well with no immediate complications  "

## 2023-01-12 DIAGNOSIS — I48.0 PAROXYSMAL ATRIAL FIBRILLATION: Primary | ICD-10-CM

## 2023-01-17 ENCOUNTER — CLINICAL SUPPORT (OUTPATIENT)
Dept: REHABILITATION | Facility: HOSPITAL | Age: 63
End: 2023-01-17
Attending: PODIATRIST
Payer: COMMERCIAL

## 2023-01-17 DIAGNOSIS — M25.611 IMPAIRED RANGE OF MOTION OF RIGHT SHOULDER: Primary | ICD-10-CM

## 2023-01-17 DIAGNOSIS — R29.898 DECREASED STRENGTH OF UPPER EXTREMITY: ICD-10-CM

## 2023-01-17 PROCEDURE — 97110 THERAPEUTIC EXERCISES: CPT | Mod: PO

## 2023-01-17 NOTE — PROGRESS NOTES
[facility]   Physical Therapy Treatment Note     Name: Isaiah Dorsey Jr.  Clinic Number: [id number]    Therapy Diagnosis:   Encounter Diagnoses   Name Primary?    Impaired range of motion of right shoulder Yes    Decreased strength of upper extremity      Physician: Vincenzo Jaeger PA-C    Visit Date: 1/17/2023       Evaluation Date: 1/10/2023  Authorization Period Expiration: 12/21/23  Plan of Care Expiration: 4/4/23  Progress Note Due: 2/10/23  Visit # / Visits authorized: 1/ 20  FOTO: NEEDS TO BE PERFORMED AT FIRST VISIT     Precautions: post op shoulder precautions       PTA Visit #: 0/5     Time In: 9:45 am  Time Out: 10:28 am  Total Billable Time: 43 minutes    SUBJECTIVE     Pt reports: shoulder is feeling good with minimal complaints.  He was compliant with home exercise program.  Response to previous treatment: cesar eval well  Functional change: ongoing     Pain: 1/10  Location: right shoulder      OBJECTIVE     R shoulder PROM:   Flexion: 140  Abduction: 110   ER: 50  IR: to belly      Treatment     Isaiah received the treatments listed below:      Pt received therapeutic exercises to develop strength and ROM for 45 minutes including:  R shoulder and elbow PROM all directions  Shoulder iso at wall - flex, abd, ext 10x10s   Scap squeeze - x30   Shrugs - x30   Table slides - 2 mins       Patient Education and Home Exercises     Home Exercises Provided and Patient Education Provided     Education provided:   - review of precautions    Written Home Exercises Provided: yes. Exercises were reviewed and Isaiah was able to demonstrate them prior to the end of the session.  Isaiah demonstrated good  understanding of the education provided. See EMR under Patient Instructions for exercises provided during therapy sessions    ASSESSMENT     Isaiah was educated today at the beginning of the session to avoid AROM at this time as I observed him actively elevate his R shoulder to doff his  sling. ROM is doing very well for 3 weeks out. We also initiated AAROM table slides and isometrics as allowed per protocol today. Did not give isometrics for HEP as a precaution but all other exercises performed today were initiated for HEP.     Isaiah Is progressing well towards his goals.   Pt prognosis is Good.     Pt will continue to benefit from skilled outpatient physical therapy to address the deficits listed in the problem list box on initial evaluation, provide pt/family education and to maximize pt's level of independence in the home and community environment.     Pt's spiritual, cultural and educational needs considered and pt agreeable to plan of care and goals.     Anticipated barriers to physical therapy: failure to follow precautions    Goals:     Short Term Goals 6 weeks   1. Pt will be independent with HEP to supplement PT in improving functional use of R UE.  2. Pt will increase pain free right shoulder PROM in all planes to WNL to improve functional mobility of UE-  3. Formally assess strength when allowed and set goals accordingly  4. Pt will adhere to post op precautions to allow for proper tissue healing     Long Term Goals 12 weeks   1.  Pt will have full PROM of right shoulder to show improvements in overall movement patterns.  2. Pt to show proper postural awareness with no VC from PT  3. Pt to have good tolerance to AAROM and AROM activities per protocol   4. Patient will increase right shoulder strength to >/=4/5.    PLAN     Progress as allowed per week 3-4 next visit    Nikhil Salvador PT

## 2023-01-19 ENCOUNTER — DOCUMENTATION ONLY (OUTPATIENT)
Dept: CARDIAC REHAB | Facility: CLINIC | Age: 63
End: 2023-01-19
Payer: COMMERCIAL

## 2023-01-19 NOTE — PROGRESS NOTES
Mr. Msaon has completed 27 out of 36 exercise session of Phase II cardiac rehab.  A follow up reassessment will be completed at exit interview.   Mr. Mason is currently on hold due to having shoulder surgery.  We do not know if he will be able to return to rehab.    24 Session Follow Up   Cardiac Rehab Individual Treatment Plan - Reassessment      Patient Name: Isaiah Dorsey Jr. MRN: [id number]   : 1960   Age: 63 y.o.   Primary Diagnosis: CABG Date of Event: 22   EF: 40-45%  Risk Stratification: high  Referring Physician: ADRIANO   Exercise Assessment:     Angina with exercise?: No   ST Depression with Exercise?: No  Fall Risk: No   Assistive Devices:  independent  There were no limitations noted by the patient.  Comments on Progression: Mr. Mason is progressing fair and his workloads will continue to be increased as he tolerates exercise intensity.     Exercise Plan:   Goals:  CR Exercise Goals: Attend Cardiac Rehab 3 times/week: In Progress  Home Aerobic Exercise: 2 additional days/week for 30-60 minutes: In Progress  Intensity of 12-15 on the Rate of Perceived Exertion (RPE) scale: In Progress  30% increase in entry estimated METS: 7.8 : In Progress  5 days/week for 30-60 minutes: In Progress    Comments on Goal Progression:  Patient Consistency: inconsistent with attendance  Home exercise? No   Patient reports intensity rate 11-14 on RPE scale    Intervention/Recommendations:   Discussed importance of regular attendance to cardiac rehab class    Exercise Prescription:  THR Range 63-74   Mode: Treadmill  Nustep   Frequency:  3 days/week   Duration:  30-60 minutes   Intensity:  12-15 RPE   Resistance Training:  Yes: 3 to 5 lb weights with 10-15 reps based on strength and range of motion and adjusted accordingly     Home Prescription:  Mode Aerobic exercise   Frequency: 2- 3 days/week   Duration: 30-60 minutes   Resistance Training: None     Education:  Arthritis/Osteporosis; verbalizes understanding; Date:  22  Body Composition; verbalizes understanding; Date: 22  Exercise and Rehydration; verbalizes understanding; Date: 22  Flexibility/Stretching; verbalizes understanding; Date: 10-24-22  Relaxation Techniques; verbalizes understanding; Date: 22    Comments:  I reviewed exercise recommendations with Mr. Mason.  I strongly encouraged him to begin exercising.  He stated understanding but he also stated he doesn't feel he has time.        The exercise prescription will continue to be adjusted based on tolerance of exercise intensity by patient.    Jorje Ceballos, CEP     24 Session Follow Up   Cardiac Rehab Individual Treatment Plan - Reassessment    Patient Name: Isaiah Dorsey Jr. MRN: [id number]   : 1960   Age: 63 y.o.   Primary Diagnosis: s/p CABG, CAD, CHF, HLD, NSTEMI, DM    Nutrition Assessment:     Anthropometrics    Height 72 inches   Weight 288 lbs   BMI 39.1     Patient confirms he is taking lipitor 40mg for cholesterol control.  Difficulty Chewing or Swallowing: no  Current Exercise: See Exercise Physiologist Note  Food Safety/Food Preparation: self  Living Arrangements/Family Support: Lives with spouse  Cultural/Spiritual/Personal Preferences: not applicable   Barriers to Education: none identified  Stage of Change Related to Diet Habits: Action  Recent Changes to Diet: Yes - more produce intake  Food Diary: Completed      Nutrition Plan:   Goals:  LDL-C < 70 (for high risk patients)  Hgb A1c < 7%  BMI < 25 and abdominal girth < 40M/<35 F  2 gram sodium, Mediterranean diet: In Progress  Rehab weight loss goal of 10 lbs, 1 lb per week: Not Met: pt has gained 14lbs since starting rehab  Fish intake (non-fried varieties) to a goal of 2-3 servings per week: In Progress  Increase fruit and vegetable intake: In Progress    Comments on Goal Progression:  Discussed significant weight gain, pt is aware he has been making poor food choices and not exercising outside of rehab. Emphasized  importance of portion control and lifestyle modification for improved health outcomes-pt verbalized understanding.    Interventions:  Dietitian Consult: No  Patient to participate in Cardiac Rehab sessions three times a week  Weekly Dietitian Weight Check  Nutrition Recommendations Provided: Verbal, Reviewed  Follow Up Plan for Ongoing Self-Management Support    Education:  Dining Out; verbalizes understanding; Date: 11/9/22  Mediterranean Diet; verbalizes understanding; Date: 11/23/22  Recipe Modication; verbalizes understanding; Date: 11/16/22  Vegetarianism; verbalizes understanding; Date: 12/5/22  Vitamins; verbalizes understanding; Date: 10/26/22    Comments:   Discussed ways to incorporate healthy snacks, eating on a schedule, and monitoring sodium intake for heart health.    Diabetes  Is the patient diabetic? Yes   Other Core Components/Diabetes Assessment:   Labs:  Lab Results   Component Value Date    GLUF 136 (H) 08/25/2022     Lab Results   Component Value Date    HGBA1C 6.2 (H) 08/25/2022    HGBA1C 7.2 (H) 07/28/2022    HGBA1C 7.6 (H) 07/12/2022      Lab Results   Component Value Date    ESTIMATEDAVG 131 08/25/2022    ESTIMATEDAVG 160 (H) 07/28/2022    ESTIMATEDAVG 171 (H) 07/12/2022       History of diabetes since 2018  Diabetes medications: Ozempic, Januvia 100mg daily, metformin 1000mg daily  Blood Glucose Checks at Home: Yes: blood sugars ac & HS  Typical morning range: 130-160 mg/dl  Endocrinologist or PCP following DM: Dr. Burrwos    Other Core Components/Diabetes Plan:   Goals:  Hgb A1c < 7%  Exercise Blood Glucose: 100-300 mg/dl    Interventions:  Reviewed and Discussed Labs  Medication Compliance  Med Card Reconciled  Low Sodium, Mediterranean, ADA Diet  Weight Management  Physical Activity  Increase Knowledge of Contributory Factors  Home Monitoring    Education:  Risk Factors; verbalizes understanding; Date: 10/28/22  Stress; verbalizes understanding; Date: 11/11/22  Mediterranean Diet;  verbalizes understanding; Date: 22  Dining Out; verbalizes understanding; Date: 22    Comments:   Patient verbalizes understanding to bring home glucometer and check glucose pre and post each exercise session.  Per cardiac rehab protocols, patient's glucose must be between 90 and 270 mg/dL to exercise.  Patient denies any recent glucose levels less than 60 mg/dL or greater than 300 mg/dL. Patient verbalizes importance of notifying rehab staff if symptoms of hypoglycemia occur while at cardiac rehab.  Abnormal labs will be reported to patient's PCP/Endocrinologist by rehab staff.    Noted updated glucose parameters of 100-300    Sarita Tirado MS, RDN/LDN     Session: 24 Session Follow Up   Cardiac Rehab Individual Treatment Plan - Reassessment      Patient Name: Isaiah Dorsey Jr. MRN: [id number]   : 1960   Age: 63 y.o.   Primary Diagnosis: CABG 2022      Psychosocial Assessment:   Living Arrangements: Lives with spouse  Family Support: family  Self Reported: no change Stress  Displays: happiness  Medication: not applicable    Psychosocial Plan:   Goals:  Improved psychosocial coping strategies: Not Met: pt states he has a lot of stress  Verbalizes coping mechanisms: In Progress  Reduce manifestation of stress: In Progress  Maintain positive outlook: In Progress  Improve overall quality of life: In Progress    Comments on Goal Progression:  Pt is working towards decreasing stress in his life and improving his management of stressful events.    Interventions:  Patient to Self Report Emotional Changes at Session Check In  Recommend Physical Activity  Recommend Attending Education Lectures  Notify MD: No  Program Referral: No  Pharmaceutical Intervention/Therapy: No  Other Needs: not applicable  Stage of Readiness to Change: Action    Education:  Stress Management; verbalizes understanding; Date: 2022  Stress; verbalizes understanding; Date: 2022  Risk Factors; verbalizes  understanding; Date: 10/28/2022    Comments:  Pt attendance to cardiac rehab class has improved. Pt denies overwhelming stress or anxiety.   Patient has been instructed to notify staff in the event that circumstances worsen.  Patient verbalizes understanding.    Other Core Components/Risk Factors Assessment:   RISK FACTORS:  diabetes, hyperlipidemia, hypertension, obesity, sedentary lifestyle, stress    Learning Barriers: None    Medication Compliance: has been compliant with the medicaiton regimen    Other Core Components/Risk Factors Plan:   Goals:  Decrease cholesterol level: In Progress  Increase exercise tolerance: Met  Increase knowledge of CAD: Met  Decrease blood pressure: Met  Weight loss: Not Met: Pt has gained 14 lbs since starting rehab  Demonstrate accurate pulse taking: Met  Identify and manage personal areas of stress: In Progress  Control diabetes by adjusting diet and exercise: In Progress  Learn more about healthy eating: In Progress    Comments on Goal Progression:  Pt acknowledges his weight gain and states he is trying to eat healthier.     Interventions:  Individual Education/ Counseling: Yes  Physician Referral: No    Education:    fluid overload/CHF, verbalizes understanding; Date: 10/21/2022  risk factors, verbalizes understanding; Date: 10/28/2022  stress, verbalizes understanding; Date: 11/11/2022         Education method adapted to patients education level and preferred method of learning.  Method: explanation  handouts    Comments:  Pt states he is improved and continues to try to modify his risk factors to improve his cardiovascular health.    Other Core Components/Hypertension Assessment:   BP Range: 100-160 systolic; 70-90 diastolic  BP at Goal: No  Patient reported symptoms: none    Other Core Components/Hypertension Plan:   Goals:  Blood Pressure <130/80    Comments on Goal Progression:  Pt states he is interested in possibly joining the digital hypertension  program    Interventions:  Med Card Reconciled: No  Encourage medication compliance  Encourage sodium reduction  Encourage weight loss  Recommend physical activity  Educate on contributory factors  Reduce stress, anxiety, anger, depression, and/or chronic pain  Encourage home blood pressure monitoring  Recommend daily weights  MD notified/Physician Referral: Yes    Education:    Congestive Heart Failure; verbalizes understanding; Date: 10/21/2022  Risk Factors; verbalizes understanding; Date: 10/28/2022  Stress; verbalizes understanding; Date: 11/11/2022         Comments:  Pt has better blood pressure readings than on entry but still has occasional isolated elevated reading  >130/80  Does the patient have Heart Failure? Yes   Other Core Components/Congestive Heart Failure Assessment:   Ejection Fraction: 40-45% %  Patient reported symptoms: peripheral edema and weight gain  Lung Sounds: normal air entry, lungs clear to auscultation  Right Leg Edema: Trace  Left Leg Edema Trace    Other Core Components/Congestive Heart Failure Plan:   Goals:  Patient free from CHF Exacerbation    Comments on Goal Progression:  Pt is more aware of his symptoms and is making effort to decrease exacerbation of CHF.    Interventions/Recommendations:  Encourage medication compliance  Encourage daily weight checks  Promote sodium reduction  Encourage tracking fluid intake  Promote physical activity  Educate on contributory factors  Recommend home blood pressure monitoring  MD notified/MD visit scheduled: No not until next year      Education:    Congestive Heart Failure; verbalizes understanding; Date: 10/21/2022  Risk Factors; verbalizes understanding; Date: 10/28/2022  Stress; verbalizes understanding; Date: 11/11/2022         Comments:  Pt is not having CHF symptoms at this time.      Other Core Components/Tobacco Cessation Assessment:   Smoking Status: past smoker who quit many years ago  Smoking Cessation Barriers:  none  Stage of  Readiness to Change: Maintenance    Other Core Components/Tobacco Cessation Plan:   Goals:  Maintain non-smoking status    Comments on Goal Progression:  Pt does not smoke    Interventions:  Maintains non-smoking status    Education:    Risk Factors; verbalizes understanding; Date: 10/28/2022         Comments:  Pt has no desire to use tobacco products at this time.    aJden Penaloza RN

## 2023-01-20 ENCOUNTER — RESEARCH ENCOUNTER (OUTPATIENT)
Dept: RESEARCH | Facility: HOSPITAL | Age: 63
End: 2023-01-20
Payer: COMMERCIAL

## 2023-01-20 NOTE — PROGRESS NOTES
Study name: Anticoagulation for New-Onset Post-Operative Atrial Fibrillation after CABG- PACES    IRB:  2019.273    PI: Dr. JENARO Holguin    Study Visit: 6 month phone call     Who was present: Patient and Liz Bansal RN,CRC    01/20/2023: I phoned the patient for the 6 month follow up for the PACES Study.      Reaffirmed that the patient still wishes to participate in the study and continues to consent to the study.      Has the patient had any changes in his health since the last study visit? No      All questions for Interval Assessment were answered by the patient as No, except for medical encounter- Rotator cuff surgery on 22DEC2022    Has the patient had any outside hospitalizations/ER visits: Patient had planned Rotator Cuff surgery on 22DEC2022    Medication adherence questionnaire completed: Yes     Patient taking Oral Anticoagulant as prescribed, as part of the clinical trial?  Yes      Patient taking Antiplatelet as prescribed, as part of the clinical trial? Yes    Bleeding Questionnaire completed.       All questions were answered by the patient as No    Questionnaire for Verification of Stroke-Free Status completed.      All questions were answered by the patient as No    Medications and allergy lists updated and current. Yes    Informed patient that study participation has completed, and patient thanked for his time and commitment to the study.     Liz Bansal RN, CCM, CRC

## 2023-01-23 ENCOUNTER — PATIENT MESSAGE (OUTPATIENT)
Dept: INTERNAL MEDICINE | Facility: CLINIC | Age: 63
End: 2023-01-23
Payer: COMMERCIAL

## 2023-01-23 DIAGNOSIS — E11.9 TYPE 2 DIABETES MELLITUS WITHOUT COMPLICATION, WITHOUT LONG-TERM CURRENT USE OF INSULIN: Primary | ICD-10-CM

## 2023-01-23 RX ORDER — DAPAGLIFLOZIN 10 MG/1
10 TABLET, FILM COATED ORAL
Qty: 90 TABLET | Refills: 3 | Status: SHIPPED | OUTPATIENT
Start: 2023-01-23

## 2023-01-24 ENCOUNTER — CLINICAL SUPPORT (OUTPATIENT)
Dept: REHABILITATION | Facility: HOSPITAL | Age: 63
End: 2023-01-24
Attending: PODIATRIST
Payer: COMMERCIAL

## 2023-01-24 DIAGNOSIS — R29.898 DECREASED STRENGTH OF UPPER EXTREMITY: ICD-10-CM

## 2023-01-24 DIAGNOSIS — M25.611 IMPAIRED RANGE OF MOTION OF RIGHT SHOULDER: Primary | ICD-10-CM

## 2023-01-24 PROCEDURE — 97110 THERAPEUTIC EXERCISES: CPT | Mod: PO,CQ

## 2023-01-24 NOTE — PROGRESS NOTES
"[facility]   Physical Therapy Treatment Note     Name: Isaiah Dorsey Jr.  Clinic Number: [id number]    Therapy Diagnosis:   No diagnosis found.    Physician: Vincenzo Jaeger PA-C    Visit Date: 1/24/2023  Evaluation Date: 1/10/2023  Authorization Period Expiration: 12/21/23  Plan of Care Expiration: 4/4/23  Progress Note Due: 2/10/23  Visit # / Visits authorized: 2/ 20  FOTO: NEEDS TO BE PERFORMED AT FIRST VISIT     Precautions: post op shoulder precautions       PTA Visit #: 1/5     Time In: 3:45 pm  Time Out: 4:23 pm  Total Billable Time: 38 minutes    SUBJECTIVE     Pt reports: shoulder is feeling good with minimal complaints.  He was compliant with home exercise program.  Response to previous treatment: good  Functional change: ongoing     Pain: 0/10  Location: right shoulder      OBJECTIVE   Pt arrived with sling donned on right upper extremity.    Taken: 01/17/2023  R shoulder PROM:   Flexion: 140  Abduction: 110   ER: 50  IR: to belly      Treatment     Isaiah received the treatments listed below:      Pt received therapeutic exercises to develop strength and ROM for 38 minutes including:    Donning/[place] sling  R shoulder PROM all directions  Supine shoulder flexion with PVC pipe 3x10   Supine serratus punch 3x10  Scap squeeze - 3x10   Pulley's 3'  Shoulder iso at wall - IR, ER, flex, ext 20x5s   Rows YTB 3x10 3" hold   UBE 3' forward/3' backward    Patient Education and Home Exercises     Home Exercises Provided and Patient Education Provided     Education provided:   - review of precautions    Written Home Exercises Provided: yes. Exercises were reviewed and Isaiah was able to demonstrate them prior to the end of the session.  Isaiah demonstrated good  understanding of the education provided. See EMR under Patient Instructions for exercises provided during therapy sessions    ASSESSMENT     Isaiah was progressed as per type II rotator cuff repair 3-4 weeks to JOAQUIN and " periscapular strengthening which he tolerated well without any complaints of pain. Pt required proper cueing while performing rows to avoid compensation with UT. Isaiah is progressing well within protocol, and will continue to be progressed pending protocol and tolerance.     Isaiah Is progressing well towards his goals.   Pt prognosis is Good.     Pt will continue to benefit from skilled outpatient physical therapy to address the deficits listed in the problem list box on initial evaluation, provide pt/family education and to maximize pt's level of independence in the home and community environment.     Pt's spiritual, cultural and educational needs considered and pt agreeable to plan of care and goals.     Anticipated barriers to physical therapy: failure to follow precautions    Goals:     Short Term Goals 6 weeks   1. Pt will be independent with HEP to supplement PT in improving functional use of R UE.  2. Pt will increase pain free right shoulder PROM in all planes to WNL to improve functional mobility of UE-  3. Formally assess strength when allowed and set goals accordingly  4. Pt will adhere to post op precautions to allow for proper tissue healing     Long Term Goals 12 weeks   1.  Pt will have full PROM of right shoulder to show improvements in overall movement patterns.  2. Pt to show proper postural awareness with no VC from PT  3. Pt to have good tolerance to AAROM and AROM activities per protocol   4. Patient will increase right shoulder strength to >/=4/5.    PLAN     Progress as allowed per week 3-4 next visit    Cher Brewer PTA

## 2023-02-02 ENCOUNTER — OFFICE VISIT (OUTPATIENT)
Dept: SPORTS MEDICINE | Facility: CLINIC | Age: 63
End: 2023-02-02
Payer: COMMERCIAL

## 2023-02-02 VITALS
SYSTOLIC BLOOD PRESSURE: 151 MMHG | HEART RATE: 66 BPM | HEIGHT: 72 IN | WEIGHT: 293 LBS | DIASTOLIC BLOOD PRESSURE: 90 MMHG | BODY MASS INDEX: 39.68 KG/M2

## 2023-02-02 DIAGNOSIS — Z98.890 S/P RIGHT ROTATOR CUFF REPAIR: Primary | ICD-10-CM

## 2023-02-02 PROCEDURE — 99999 PR PBB SHADOW E&M-EST. PATIENT-LVL IV: ICD-10-PCS | Mod: PBBFAC,,, | Performed by: PHYSICIAN ASSISTANT

## 2023-02-02 PROCEDURE — 3072F PR LOW RISK FOR RETINOPATHY: ICD-10-PCS | Mod: CPTII,S$GLB,, | Performed by: PHYSICIAN ASSISTANT

## 2023-02-02 PROCEDURE — 3008F BODY MASS INDEX DOCD: CPT | Mod: CPTII,S$GLB,, | Performed by: PHYSICIAN ASSISTANT

## 2023-02-02 PROCEDURE — 3072F LOW RISK FOR RETINOPATHY: CPT | Mod: CPTII,S$GLB,, | Performed by: PHYSICIAN ASSISTANT

## 2023-02-02 PROCEDURE — 99024 POSTOP FOLLOW-UP VISIT: CPT | Mod: S$GLB,,, | Performed by: PHYSICIAN ASSISTANT

## 2023-02-02 PROCEDURE — 3080F DIAST BP >= 90 MM HG: CPT | Mod: CPTII,S$GLB,, | Performed by: PHYSICIAN ASSISTANT

## 2023-02-02 PROCEDURE — 1159F PR MEDICATION LIST DOCUMENTED IN MEDICAL RECORD: ICD-10-PCS | Mod: CPTII,S$GLB,, | Performed by: PHYSICIAN ASSISTANT

## 2023-02-02 PROCEDURE — 3077F PR MOST RECENT SYSTOLIC BLOOD PRESSURE >= 140 MM HG: ICD-10-PCS | Mod: CPTII,S$GLB,, | Performed by: PHYSICIAN ASSISTANT

## 2023-02-02 PROCEDURE — 99024 PR POST-OP FOLLOW-UP VISIT: ICD-10-PCS | Mod: S$GLB,,, | Performed by: PHYSICIAN ASSISTANT

## 2023-02-02 PROCEDURE — 3077F SYST BP >= 140 MM HG: CPT | Mod: CPTII,S$GLB,, | Performed by: PHYSICIAN ASSISTANT

## 2023-02-02 PROCEDURE — 3008F PR BODY MASS INDEX (BMI) DOCUMENTED: ICD-10-PCS | Mod: CPTII,S$GLB,, | Performed by: PHYSICIAN ASSISTANT

## 2023-02-02 PROCEDURE — 3080F PR MOST RECENT DIASTOLIC BLOOD PRESSURE >= 90 MM HG: ICD-10-PCS | Mod: CPTII,S$GLB,, | Performed by: PHYSICIAN ASSISTANT

## 2023-02-02 PROCEDURE — 1159F MED LIST DOCD IN RCRD: CPT | Mod: CPTII,S$GLB,, | Performed by: PHYSICIAN ASSISTANT

## 2023-02-02 PROCEDURE — 99999 PR PBB SHADOW E&M-EST. PATIENT-LVL IV: CPT | Mod: PBBFAC,,, | Performed by: PHYSICIAN ASSISTANT

## 2023-02-02 NOTE — PROGRESS NOTES
POST-OPERATIVE EXAMINATION    63 y.o. Male who returns for follow after surgery. He is 6 weeks s/p    Procedures Performed:  1. Right shoulder Arthroscopic rotator cuff repair CPT - 29750     2. Right shoulder Arthroscopic Biceps tenodesis CPT - 80589     3. Right shoulder Arthroscopic extensive debridement CPT - 77096     4. Right shoulder Arthroscopic subacromial decompression and bursectomy CPT - 42911     He is doing well without any issues.       PHYSICAL EXAMINATION:  BP (!) 151/90   Pulse 66   Ht 6' (1.829 m)   Wt 132.9 kg (292 lb 15.9 oz)   BMI 39.74 kg/m²   General: Well-developed well-nourished 63 y.o. male in no acute distress   Cardiovascular: Regular rhythm   Lungs: No labored breathing or wheezing appreciated   Neuro: Alert and oriented ×3   Psychiatric: well oriented to person, place and time, demonstrates normal mood and affect   Skin: No rashes, lesions or ulcers, normal temperature, turgor, and texture on involved extremity    ORTHOPEDIC EXAM:  Normal post-operative swelling  Normal post-operative scarring  Strength: WNL  ROM: FF: 150 degrees; ABD: 60 degrees; ER: 30 degrees; IR: Hip pocket  Tests: None today    ASSESSMENT:      ICD-10-CM ICD-9-CM   1. S/P right rotator cuff repair  Z98.890 V45.89         PLAN:       Begin to wean from sling and progress AAROM  Continue physical therapy  RTC in 2 months with Alexandria Morse MD

## 2023-02-06 NOTE — PROGRESS NOTES
Subjective:    Patient ID:  Isaiah Dorsey Jr. is a 63 y.o. male who presents for evaluation of Atrial Fibrillation      63 yoM referred for AF management. He underwent CAGB 7/18/22 for multivessel disease. He had post operative AF leading to initiation of amiodarone and apixaban. He had no prior arrhythmia history and has had no subsequent arrhythmia symptoms. 30d event monitor showed NSR with no sustained arrhythmias.     Echo 12/22:  · The estimated ejection fraction is 45%. overall unchanged from prior.  · The following segments are hypokinetic: basal anterior, basal anteroseptal, basal anterolateral, mid anterior, mid anteroseptal and mid anterolateral.  · The left ventricle is normal in size with mildly decreased systolic function.  · Grade II left ventricular diastolic dysfunction.  · Normal right ventricular size with normal right ventricular systolic function.  · Moderate left atrial enlargement.  · Aortic valve sclerosis with restritction of left coronary leaflet without aortic stenosis.  · Mild mitral regurgitation.  · The estimated PA systolic pressure is 30 mmHg.  · Normal central venous pressure (3 mmHg).  · The ascending aorta is mildly dilated and measures 4.2 cm    Past Medical History:  No date: CHF (congestive heart failure)  No date: Coronary artery disease  No date: Diabetes mellitus type 2, uncontrolled, without complications      Comment:  6.7% Metf 1 g qd, eye 2015, [facility],   07/23/2015: Elevated platelet count  04/24/2015: H/O splenectomy      Comment:  doesn't like pneumovax bc caused side effects  01/22/2015: HLD (hyperlipidemia)      Comment:  goal LDL < 100, reluctant to take statin  01/22/2015: HTN (hypertension), benign      Comment:  losartan 100  06/11/2014: Morbid obesity with BMI of 45.0-49.9, adult  No date: RODRIGUEZ (obstructive sleep apnea)  No date: PAF (paroxysmal atrial fibrillation)  09/05/2018: Polyp of transverse colon      Comment:  2018, repeat 2023 06/11/2014: Severe  uncontrolled hypertension    Past Surgical History:  7/11/2022: AORTOGRAPHY; N/A      Comment:  Procedure: AORTOGRAM;  Surgeon: Bjorn Cheatham MD;                 Location: [facility] CATH LAB;  Service: Cardiology;                 Laterality: N/A;  12/22/2022: ARTHROSCOPIC DEBRIDEMENT OF SHOULDER; Right      Comment:  Procedure: DEBRIDEMENT, SHOULDER, ARTHROSCOPIC;                 Surgeon: Alexandria Morse MD;  Location: [facility] OR;                 Service: Orthopedics;  Laterality: Right;  12/22/2022: ARTHROSCOPIC REPAIR OF ROTATOR CUFF OF SHOULDER; Right      Comment:  Procedure: REPAIR, ROTATOR CUFF, ARTHROSCOPIC;  Surgeon:               Alexandria Morse MD;  Location: [facility] OR;  Service:                Orthopedics;  Laterality: Right;  12/22/2022: ARTHROSCOPY OF SHOULDER WITH DECOMPRESSION OF SUBACROMIAL   SPACE; Right      Comment:  Procedure: ARTHROSCOPY, SHOULDER, WITH SUBACROMIAL SPACE               DECOMPRESSION;  Surgeon: Alexandria Morse MD;  Location:                [facility] OR;  Service: Orthopedics;  Laterality: Right;  11/22/2022: COLONOSCOPY; N/A      Comment:  Procedure: COLONOSCOPY;  Surgeon: Castro Jasso MD;               Location: [facility] ENDO (4TH FLR);  Service: Endoscopy;                 Laterality: N/A;  ok to hold Xarelto-see telephone                encounter dated 11/1inst via portalpre call done  7/18/2022: CORONARY ARTERY BYPASS GRAFT (CABG); Left      Comment:  Procedure: CORONARY ARTERY BYPASS GRAFT (CABG);                 Surgeon: Blu Rhodes MD;  Location: [facility] OR 2ND                FLR;  Service: Cardiovascular;  Laterality: Left;  CABG x               1 (LIMA to LAD)  7/18/2022: ENDOSCOPIC HARVEST OF VEIN; Left      Comment:  Procedure: HARVEST-VEIN-ENDOVASCULAR;  Surgeon: Blu Rhodes MD;  Location: [facility] OR 2ND FLR;  Service:                Cardiovascular;  Laterality: Left;  Vein Copen Start                time: 0823amVein [place]open Stop time: 0836amVein                 [place] Start time: 0848amVein [place] Stop time:                0904am  12/22/2022: FIXATION OF TENDON; Right      Comment:  Procedure: FIXATION, TENDON-biceps tenodesis;  Surgeon:                Alexandria Morse MD;  Location: [facility] OR;  Service:                Orthopedics;  Laterality: Right;  7/11/2022: LEFT HEART CATHETERIZATION; N/A      Comment:  Procedure: Left heart cath;  Surgeon: Bjorn Cheatham MD;  Location: [facility] CATH LAB;  Service: Cardiology;                 Laterality: N/A;  No date: splenecectomy    Social History    Socioeconomic History      Marital status:       Number of children: 2    Tobacco Use      Smoking status: Former        Types: Cigarettes      Smokeless tobacco: Never    Substance and Sexual Activity      Alcohol use: Yes        Alcohol/week: 0.0 standard drinks        Comment: 1 a week      Drug use: Never      Sexual activity: Yes        Partners: Female    Social History Narrative       , Quit smoking 20 yrs, ETOH 2 beers on weekend, colonoscopy 2011       Review of patient's family history indicates:      Current Outpatient Medications:  acetaminophen (TYLENOL) 500 MG tablet, Take 1,000 mg by mouth as needed for Pain., Disp: , Rfl:   aspirin 81 MG Chew, Take 1 tablet (81 mg total) by mouth once daily., Disp: 90 tablet, Rfl: 3  atorvastatin (LIPITOR) 80 MG tablet, Take 1 tablet (80 mg total) by mouth once daily., Disp: 90 tablet, Rfl: 3  fluocinolone acetonide oiL (DERMOTIC OIL) 0.01 % Drop, Place 3 drops into the right ear 2 (two) times daily., Disp: 20 mL, Rfl: 3  HYDROcodone-acetaminophen (NORCO) 7.5-325 mg per tablet, Take 1 tablet by mouth every 6 (six) hours as needed for Pain., Disp: 20 tablet, Rfl: 0  ketoconazole (NIZORAL) 2 % cream, Apply topically 2 (two) times daily. Prn rash face, Disp: 60 g, Rfl: 3  ketoconazole (NIZORAL) 2 % shampoo, Apply topically once daily. Face and scalp soaks, Disp: 120 mL, Rfl: 4  losartan (COZAAR) 25  MG tablet, Take 1 tablet (25 mg total) by mouth once daily., Disp: 90 tablet, Rfl: 3  metFORMIN (GLUCOPHAGE) 1000 MG tablet, Take 1 tablet (1,000 mg total) by mouth 2 (two) times daily with meals., Disp: 180 tablet, Rfl: 3  metoprolol succinate (TOPROL-XL) 100 MG 24 hr tablet, Take 1 tablet (100 mg total) by mouth once daily., Disp: 90 tablet, Rfl: 3  naproxen sodium (ANAPROX) 220 MG tablet, Take 220 mg by mouth as needed., Disp: , Rfl:   prednisoLONE acetate (PRED FORTE) 1 % DrpS, Apply 2 drops  to left ear BID, Disp: 10 mL, Rfl: 0  tamsulosin (FLOMAX) 0.4 mg Cap, Take 1 capsule (0.4 mg total) by mouth once daily., Disp: 90 capsule, Rfl: 3  traMADoL (ULTRAM) 50 mg tablet, Take 1 tablet (50 mg total) by mouth every 6 (six) hours., Disp: 10 tablet, Rfl: 0  dapagliflozin (FARXIGA) 10 mg tablet, Take 1 tablet (10 mg total) by mouth before breakfast. (Patient not taking: Reported on 2/7/2023), Disp: 90 tablet, Rfl: 3  JANUVIA 100 mg Tab, Take 100 mg by mouth., Disp: , Rfl:   sildenafiL (VIAGRA) 50 MG tablet, Take 1 tablet (50 mg total) by mouth daily as needed for Erectile Dysfunction. Take 1 hour before sexual activity on an empty stomach. (Patient not taking: Reported on 2/7/2023), Disp: 10 tablet, Rfl: 5    No current facility-administered medications for this visit.            Review of Systems   Constitutional: Negative for chills, decreased appetite, malaise/fatigue, night sweats, weight gain and weight loss.   Eyes:  Negative for blurred vision, double vision, visual disturbance and visual halos.   Cardiovascular:  Negative for chest pain, claudication, cyanosis, dyspnea on exertion, irregular heartbeat, leg swelling, near-syncope, orthopnea, palpitations, paroxysmal nocturnal dyspnea and syncope.   Respiratory:  Negative for cough, hemoptysis, snoring, sputum production and wheezing.    Endocrine: Negative for cold intolerance, heat intolerance, polydipsia and polyphagia.   Hematologic/Lymphatic: Negative for  adenopathy and bleeding problem. Does not bruise/bleed easily.   Skin:  Negative for flushing, itching, poor wound healing and rash.   Musculoskeletal:  Negative for arthritis, back pain, falls, gout, joint pain, joint swelling, muscle cramps, muscle weakness, myalgias, neck pain and stiffness.   Gastrointestinal:  Negative for bloating, abdominal pain, anorexia, diarrhea, dysphagia, excessive appetite, flatus, hematemesis, jaundice, melena and nausea.   Genitourinary:  Negative for hesitancy and incomplete emptying.   Neurological:  Negative for aphonia, brief paralysis, difficulty with concentration, disturbances in coordination, excessive daytime sleepiness, dizziness, focal weakness, light-headedness, loss of balance and weakness.   Psychiatric/Behavioral:  Negative for altered mental status, depression, hallucinations, hypervigilance, memory loss, substance abuse and suicidal ideas. The patient does not have insomnia and is not nervous/anxious.       Objective:    Physical Exam  Vitals reviewed.   Constitutional:       General: He is not in acute distress.     Appearance: He is well-developed. He is not diaphoretic.   HENT:      Head: Normocephalic and atraumatic.      Nose: Nose normal.      Mouth/Throat:      Pharynx: No oropharyngeal exudate.   Eyes:      General: No scleral icterus.        Right eye: No discharge.         Left eye: No discharge.      Conjunctiva/sclera: Conjunctivae normal.      Pupils: Pupils are equal, round, and reactive to light.   Neck:      Thyroid: No thyromegaly.      Vascular: No JVD.      Trachea: No tracheal deviation.   Cardiovascular:      Rate and Rhythm: Normal rate and regular rhythm.      Pulses: Intact distal pulses.      Heart sounds: Normal heart sounds. No murmur heard.    No friction rub. No gallop.   Pulmonary:      Effort: Pulmonary effort is normal. No respiratory distress.      Breath sounds: Normal breath sounds. No stridor. No wheezing or rales.   Chest:       Chest wall: No tenderness.   Abdominal:      General: Bowel sounds are normal. There is no distension.      Palpations: Abdomen is soft. There is no mass.      Tenderness: There is no abdominal tenderness.   Musculoskeletal:         General: No tenderness.      Cervical back: Normal range of motion and neck supple.   Lymphadenopathy:      Cervical: No cervical adenopathy.   Skin:     General: Skin is warm.      Coloration: Skin is not pale.      Findings: No erythema or rash.   Neurological:      Mental Status: He is alert and oriented to person, place, and time.      Cranial Nerves: No cranial nerve deficit.      Motor: No abnormal muscle tone.      Coordination: Coordination normal.      Deep Tendon Reflexes: Reflexes are normal and symmetric. Reflexes normal.   Psychiatric:         Behavior: Behavior normal.         Thought Content: Thought content normal.         Judgment: Judgment normal.     ECG: NSR nl NV, QRS, QTc        Assessment:       1. S/P CABG (coronary artery bypass graft)    2. Obesity hypoventilation syndrome    3. Heart failure with mid-range ejection fraction    4. Paroxysmal atrial fibrillation    5. Dilatation of aorta    6. Type 2 diabetes mellitus with hyperglycemia, without long-term current use of insulin    7. Atherosclerosis of native coronary artery of native heart with unstable angina pectoris         Plan:       63 yoM with post-operative AF. No subsequent AF since the index event. He wore a 30d event monitor with no arrhythmias noted. He can stop amiodarone and OAC. RTC as needed

## 2023-02-07 ENCOUNTER — CLINICAL SUPPORT (OUTPATIENT)
Dept: REHABILITATION | Facility: HOSPITAL | Age: 63
End: 2023-02-07
Attending: PODIATRIST
Payer: COMMERCIAL

## 2023-02-07 ENCOUNTER — HOSPITAL ENCOUNTER (OUTPATIENT)
Dept: CARDIOLOGY | Facility: CLINIC | Age: 63
Discharge: HOME | End: 2023-02-07
Payer: COMMERCIAL

## 2023-02-07 ENCOUNTER — OFFICE VISIT (OUTPATIENT)
Dept: ELECTROPHYSIOLOGY | Facility: CLINIC | Age: 63
End: 2023-02-07
Payer: COMMERCIAL

## 2023-02-07 VITALS
BODY MASS INDEX: 39.39 KG/M2 | WEIGHT: 290.81 LBS | HEIGHT: 72 IN | DIASTOLIC BLOOD PRESSURE: 82 MMHG | SYSTOLIC BLOOD PRESSURE: 134 MMHG | HEART RATE: 80 BPM

## 2023-02-07 DIAGNOSIS — I25.110 ATHEROSCLEROSIS OF NATIVE CORONARY ARTERY OF NATIVE HEART WITH UNSTABLE ANGINA PECTORIS: ICD-10-CM

## 2023-02-07 DIAGNOSIS — E66.2 OBESITY HYPOVENTILATION SYNDROME: ICD-10-CM

## 2023-02-07 DIAGNOSIS — I48.0 PAROXYSMAL ATRIAL FIBRILLATION: ICD-10-CM

## 2023-02-07 DIAGNOSIS — E11.65 TYPE 2 DIABETES MELLITUS WITH HYPERGLYCEMIA, WITHOUT LONG-TERM CURRENT USE OF INSULIN: ICD-10-CM

## 2023-02-07 DIAGNOSIS — I77.819 DILATATION OF AORTA: ICD-10-CM

## 2023-02-07 DIAGNOSIS — M25.611 IMPAIRED RANGE OF MOTION OF RIGHT SHOULDER: Primary | ICD-10-CM

## 2023-02-07 DIAGNOSIS — Z95.1 S/P CABG (CORONARY ARTERY BYPASS GRAFT): Primary | ICD-10-CM

## 2023-02-07 DIAGNOSIS — I50.22 HEART FAILURE WITH MID-RANGE EJECTION FRACTION: ICD-10-CM

## 2023-02-07 DIAGNOSIS — R29.898 DECREASED STRENGTH OF UPPER EXTREMITY: ICD-10-CM

## 2023-02-07 PROCEDURE — 99205 OFFICE O/P NEW HI 60 MIN: CPT | Mod: S$GLB,,, | Performed by: INTERNAL MEDICINE

## 2023-02-07 PROCEDURE — 3075F SYST BP GE 130 - 139MM HG: CPT | Mod: CPTII,S$GLB,, | Performed by: INTERNAL MEDICINE

## 2023-02-07 PROCEDURE — 93005 RHYTHM STRIP: ICD-10-PCS | Mod: S$GLB,,, | Performed by: INTERNAL MEDICINE

## 2023-02-07 PROCEDURE — 3079F DIAST BP 80-89 MM HG: CPT | Mod: CPTII,S$GLB,, | Performed by: INTERNAL MEDICINE

## 2023-02-07 PROCEDURE — 99999 PR PBB SHADOW E&M-EST. PATIENT-LVL IV: CPT | Mod: PBBFAC,,, | Performed by: INTERNAL MEDICINE

## 2023-02-07 PROCEDURE — 1159F PR MEDICATION LIST DOCUMENTED IN MEDICAL RECORD: ICD-10-PCS | Mod: CPTII,S$GLB,, | Performed by: INTERNAL MEDICINE

## 2023-02-07 PROCEDURE — 1159F MED LIST DOCD IN RCRD: CPT | Mod: CPTII,S$GLB,, | Performed by: INTERNAL MEDICINE

## 2023-02-07 PROCEDURE — 93010 RHYTHM STRIP: ICD-10-PCS | Mod: S$GLB,,, | Performed by: INTERNAL MEDICINE

## 2023-02-07 PROCEDURE — 93010 ELECTROCARDIOGRAM REPORT: CPT | Mod: S$GLB,,, | Performed by: INTERNAL MEDICINE

## 2023-02-07 PROCEDURE — 1160F RVW MEDS BY RX/DR IN RCRD: CPT | Mod: CPTII,S$GLB,, | Performed by: INTERNAL MEDICINE

## 2023-02-07 PROCEDURE — 3072F PR LOW RISK FOR RETINOPATHY: ICD-10-PCS | Mod: CPTII,S$GLB,, | Performed by: INTERNAL MEDICINE

## 2023-02-07 PROCEDURE — 3008F PR BODY MASS INDEX (BMI) DOCUMENTED: ICD-10-PCS | Mod: CPTII,S$GLB,, | Performed by: INTERNAL MEDICINE

## 2023-02-07 PROCEDURE — 1160F PR REVIEW ALL MEDS BY PRESCRIBER/CLIN PHARMACIST DOCUMENTED: ICD-10-PCS | Mod: CPTII,S$GLB,, | Performed by: INTERNAL MEDICINE

## 2023-02-07 PROCEDURE — 99205 PR OFFICE/OUTPT VISIT, NEW, LEVL V, 60-74 MIN: ICD-10-PCS | Mod: S$GLB,,, | Performed by: INTERNAL MEDICINE

## 2023-02-07 PROCEDURE — 3075F PR MOST RECENT SYSTOLIC BLOOD PRESS GE 130-139MM HG: ICD-10-PCS | Mod: CPTII,S$GLB,, | Performed by: INTERNAL MEDICINE

## 2023-02-07 PROCEDURE — 99999 PR PBB SHADOW E&M-EST. PATIENT-LVL IV: ICD-10-PCS | Mod: PBBFAC,,, | Performed by: INTERNAL MEDICINE

## 2023-02-07 PROCEDURE — 3008F BODY MASS INDEX DOCD: CPT | Mod: CPTII,S$GLB,, | Performed by: INTERNAL MEDICINE

## 2023-02-07 PROCEDURE — 93005 ELECTROCARDIOGRAM TRACING: CPT | Mod: S$GLB,,, | Performed by: INTERNAL MEDICINE

## 2023-02-07 PROCEDURE — 97110 THERAPEUTIC EXERCISES: CPT | Mod: PO

## 2023-02-07 PROCEDURE — 3079F PR MOST RECENT DIASTOLIC BLOOD PRESSURE 80-89 MM HG: ICD-10-PCS | Mod: CPTII,S$GLB,, | Performed by: INTERNAL MEDICINE

## 2023-02-07 PROCEDURE — 3072F LOW RISK FOR RETINOPATHY: CPT | Mod: CPTII,S$GLB,, | Performed by: INTERNAL MEDICINE

## 2023-02-07 RX ORDER — SITAGLIPTIN 100 MG/1
100 TABLET, FILM COATED ORAL
COMMUNITY
Start: 2023-02-05 | End: 2023-10-24

## 2023-02-07 NOTE — PROGRESS NOTES
"  [facility]   Physical Therapy Treatment Note     Name: Isaiah Dorsey Jr.  Clinic Number: [id number]    Therapy Diagnosis:   Encounter Diagnoses   Name Primary?    Impaired range of motion of right shoulder Yes    Decreased strength of upper extremity        Physician: Vincenzo Jaeger PA-C    Visit Date: 2/7/2023  Evaluation Date: 1/10/2023  Authorization Period Expiration: 12/21/23  Plan of Care Expiration: 4/4/23  Progress Note Due: 2/10/23  Visit # / Visits authorized: 3/ 20  FOTO: NEEDS TO BE PERFORMED AT FIRST VISIT     Precautions: post op shoulder precautions       PTA Visit #: 1/5     Time In: 4:25 PM  Time Out: 5:03 PM  Total Billable Time: 38 minutes (TE-3)    SUBJECTIVE     Pt reports: still some pain but for the most part he feels good. Doctor said he only has to wear the sling if he wants to. Also said he did some weight lifting with 3 pounds.  He was compliant with home exercise program.  Response to previous treatment: felt better  Functional change: improved motion    Pain: 0/10 (when not moving) 1-2/10 (with movement)  Location: right shoulder      OBJECTIVE       Taken: 01/17/2023  R shoulder PROM:   Flexion: 140  Abduction: 110   ER: 50  IR: to belly      Treatment     Isaiah received the treatments listed below:      Pt received therapeutic exercises to develop strength and ROM for 38 minutes including:    Bold=exercises performed today    R shoulder PROM all directions  Supine shoulder flexion with PVC pipe 3x10   Supine serratus punch 3x10  Scap squeeze - 3x10   Pulley's 3'  Shoulder iso at wall - IR, ER, flex, ext 20x5s   Rows YTB 3x10 3" hold   UBE 3' forward/3' backward      +Prone Rows 3x10  +Prone scap retraction 3x10 5" holds   +Prone extension 3x10   +Banister slides flexion 3 mins   +Banister slides abduction 3 mins  +Supine shoulder flexion AROM to tolerance 2x10       Patient Education and Home Exercises     Home Exercises Provided and Patient Education " Provided     Education provided:   - review of precautions  - pt educated on all interventions performed today     Written Home Exercises Provided: Patient instructed to cont prior HEP. Exercises were reviewed and Isaiah was able to demonstrate them prior to the end of the session.  Isaiah demonstrated good  understanding of the education provided. See EMR under Patient Instructions for exercises provided during therapy sessions    ASSESSMENT     Patient presents to session without sling on. Today Isaiah is a little over 6 weeks post op. Initiated gravity reduced shoulder flexion  (in supine) per protocol. Attempted to add gravity reduced shoulder abduction but patient endorses increased pain so this is discontinued and performed on banister (AAROM). Also initiated prone therex today (prone rows, extensions, and scap retractions) and pt is able to perform with cuing for proper scapular setting and to avoid UT compensation. Overall, Isaiah is progressing well within protocol, and will continue to be progressed pending protocol and tolerance.     Isaiah Is progressing well towards his goals.   Pt prognosis is Good.     Pt will continue to benefit from skilled outpatient physical therapy to address the deficits listed in the problem list box on initial evaluation, provide pt/family education and to maximize pt's level of independence in the home and community environment.     Pt's spiritual, cultural and educational needs considered and pt agreeable to plan of care and goals.     Anticipated barriers to physical therapy: failure to follow precautions    Goals:     Short Term Goals 6 weeks   1. Pt will be independent with HEP to supplement PT in improving functional use of R UE.  2. Pt will increase pain free right shoulder PROM in all planes to WNL to improve functional mobility of UE-  3. Formally assess strength when allowed and set goals accordingly  4. Pt will adhere to post op precautions to allow for proper  tissue healing     Long Term Goals 12 weeks   1.  Pt will have full PROM of right shoulder to show improvements in overall movement patterns.  2. Pt to show proper postural awareness with no VC from PT  3. Pt to have good tolerance to AAROM and AROM activities per protocol   4. Patient will increase right shoulder strength to >/=4/5.    PLAN     Progress as allowed per week 3-4 next visit    Luz Elena Kaufman, PT

## 2023-02-09 ENCOUNTER — CLINICAL SUPPORT (OUTPATIENT)
Dept: REHABILITATION | Facility: HOSPITAL | Age: 63
End: 2023-02-09
Attending: PODIATRIST
Payer: COMMERCIAL

## 2023-02-09 ENCOUNTER — DOCUMENTATION ONLY (OUTPATIENT)
Dept: CARDIAC REHAB | Facility: CLINIC | Age: 63
End: 2023-02-09
Payer: COMMERCIAL

## 2023-02-09 DIAGNOSIS — M25.611 IMPAIRED RANGE OF MOTION OF RIGHT SHOULDER: Primary | ICD-10-CM

## 2023-02-09 DIAGNOSIS — R29.898 DECREASED STRENGTH OF UPPER EXTREMITY: ICD-10-CM

## 2023-02-09 PROCEDURE — 97112 NEUROMUSCULAR REEDUCATION: CPT | Mod: PO

## 2023-02-09 PROCEDURE — 97110 THERAPEUTIC EXERCISES: CPT | Mod: PO

## 2023-02-09 PROCEDURE — 97140 MANUAL THERAPY 1/> REGIONS: CPT | Mod: PO

## 2023-02-09 NOTE — PROGRESS NOTES
"  [facility]   Physical Therapy Treatment Note     Name: Isaiah Dorsey Jr.  Clinic Number: [id number]    Therapy Diagnosis:   Encounter Diagnoses   Name Primary?    Impaired range of motion of right shoulder Yes    Decreased strength of upper extremity        Physician: Vincenzo Jaeger PA-C    Visit Date: 2/9/2023  Evaluation Date: 1/10/2023  Authorization Period Expiration: 12/21/23  Plan of Care Expiration: 4/4/23  Progress Note Due: 2/10/23  Visit # / Visits authorized: 3/ 20  FOTO: NEEDS TO BE PERFORMED AT FIRST VISIT     Precautions: post op shoulder precautions       PTA Visit #: 1/5     Time In: 4:25 PM  Time Out: 5:03 PM  Total Billable Time: 38 minutes (TE-3)    SUBJECTIVE     Pt reports: today marks Week 7. He has had no resting pain at this time. He reported that he had trouble sleeping after PT last visit due to the pain. But he stated to me today that he is using ~3# dumbbells at home doing his "own exercises".    He was compliant with home exercise program.  Response to previous treatment: felt better  Functional change: improved motion    Pain: 0/10 (when not moving) 1-2/10 (with movement)  Location: right shoulder      OBJECTIVE     Taken: 02/9/2023  R shoulder PROM:   Flexion: 155  Abduction: 120  ER: 75  IR: to belly      Treatment     Isaiah received the treatments listed below:      Pt received therapeutic exercises to develop strength and ROM for 23 minutes including:    Bold=exercises performed today    UBE 3' forward/3' backward  ROM assessment     ROM:  Supine shoulder flexion 2.5# dowel 2x10   Seated abduction table slides with foam - x20       Strengthening:  Sidelying ER, Flex, abd 0 # - 2x10   Landmine press 3x6  Supine serratus punch 3x10 3#      Pt received the following manual therapy techniques: Joint mobilizations and Soft tissue Mobilization were applied to the: R shoulder for 08 minutes, including:  GHJ posterior and inf mobs Grades 3-4   PROM all " directions     Pt received neuromuscular re-education in order to improve scapulohumeral rhythm for 14 mins including:  Prone Rows 3x10  Prone extension 3x10   Prone scap squeeze x20       Patient Education and Home Exercises     Home Exercises Provided and Patient Education Provided     Education provided:   - review of precautions  - pt educated on all interventions performed today     Written Home Exercises Provided: Patient instructed to cont prior HEP. Exercises were reviewed and Isaiah was able to demonstrate them prior to the end of the session.  Isaiah demonstrated good  understanding of the education provided. See EMR under Patient Instructions for exercises provided during therapy sessions    ASSESSMENT     Isaiah continues to fail to follow guidelines from PT. I strongly encouraged him not perform his own exercises with weighted dumbbells at this stage in his recovery. When I offered him a print out HEP of the exercises that we recommend, he refused. Despite not following guidelines, his ROM continues to progress well at this time.     Isaiah Is progressing well towards his goals.   Pt prognosis is Good.     Pt will continue to benefit from skilled outpatient physical therapy to address the deficits listed in the problem list box on initial evaluation, provide pt/family education and to maximize pt's level of independence in the home and community environment.     Pt's spiritual, cultural and educational needs considered and pt agreeable to plan of care and goals.     Anticipated barriers to physical therapy: failure to follow precautions    Goals:     Short Term Goals 6 weeks   1. Pt will be independent with HEP to supplement PT in improving functional use of R UE.  2. Pt will increase pain free right shoulder PROM in all planes to WNL to improve functional mobility of UE-  3. Formally assess strength when allowed and set goals accordingly  4. Pt will adhere to post op precautions to allow for proper  tissue healing     Long Term Goals 12 weeks   1.  Pt will have full PROM of right shoulder to show improvements in overall movement patterns.  2. Pt to show proper postural awareness with no VC from PT  3. Pt to have good tolerance to AAROM and AROM activities per protocol   4. Patient will increase right shoulder strength to >/=4/5.    PLAN     Cont to follow protocol listed by MD Nikhil Salvador, PT

## 2023-02-09 NOTE — PROGRESS NOTES
Mr. Mason has completed 27 out of 36 exercise session of Phase II cardiac rehab.  A follow up reassessment will be completed at exit interview.   Mr. Mason is currently on hold due to having shoulder surgery.  We do not know if he will be able to return to rehab.    24 Session Follow Up   Cardiac Rehab Individual Treatment Plan - Reassessment      Patient Name: Isaiah Dorsey Jr. MRN: [id number]   : 1960   Age: 63 y.o.   Primary Diagnosis: CABG Date of Event: 22   EF: 40-45%  Risk Stratification: high  Referring Physician: ADRIANO   Exercise Assessment:     Angina with exercise?: No   ST Depression with Exercise?: No  Fall Risk: No   Assistive Devices:  independent  There were no limitations noted by the patient.  Comments on Progression: Mr. Mason is progressing fair and his workloads will continue to be increased as he tolerates exercise intensity.     Exercise Plan:   Goals:  CR Exercise Goals: Attend Cardiac Rehab 3 times/week: In Progress  Home Aerobic Exercise: 2 additional days/week for 30-60 minutes: In Progress  Intensity of 12-15 on the Rate of Perceived Exertion (RPE) scale: In Progress  30% increase in entry estimated METS: 7.8 : In Progress  5 days/week for 30-60 minutes: In Progress    Comments on Goal Progression:  Patient Consistency: inconsistent with attendance  Home exercise? No   Patient reports intensity rate 11-14 on RPE scale    Intervention/Recommendations:   Discussed importance of regular attendance to cardiac rehab class    Exercise Prescription:  THR Range 63-74   Mode: Treadmill  Nustep   Frequency:  3 days/week   Duration:  30-60 minutes   Intensity:  12-15 RPE   Resistance Training:  Yes: 3 to 5 lb weights with 10-15 reps based on strength and range of motion and adjusted accordingly     Home Prescription:  Mode Aerobic exercise   Frequency: 2- 3 days/week   Duration: 30-60 minutes   Resistance Training: None     Education:  Arthritis/Osteporosis; verbalizes understanding; Date:  22  Body Composition; verbalizes understanding; Date: 22  Exercise and Rehydration; verbalizes understanding; Date: 22  Flexibility/Stretching; verbalizes understanding; Date: 10-24-22  Relaxation Techniques; verbalizes understanding; Date: 22    Comments:  I reviewed exercise recommendations with Mr. Mason.  I strongly encouraged him to begin exercising.  He stated understanding but he also stated he doesn't feel he has time.        The exercise prescription will continue to be adjusted based on tolerance of exercise intensity by patient.    Jorje Ceballos, CEP     24 Session Follow Up   Cardiac Rehab Individual Treatment Plan - Reassessment    Patient Name: Isaiah Dorsey Jr. MRN: [id number]   : 1960   Age: 63 y.o.   Primary Diagnosis: s/p CABG, CAD, CHF, HLD, NSTEMI, DM    Nutrition Assessment:     Anthropometrics    Height 72 inches   Weight 288 lbs   BMI 39.1     Patient confirms he is taking lipitor 40mg for cholesterol control.  Difficulty Chewing or Swallowing: no  Current Exercise: See Exercise Physiologist Note  Food Safety/Food Preparation: self  Living Arrangements/Family Support: Lives with spouse  Cultural/Spiritual/Personal Preferences: not applicable   Barriers to Education: none identified  Stage of Change Related to Diet Habits: Action  Recent Changes to Diet: Yes - more produce intake  Food Diary: Completed      Nutrition Plan:   Goals:  LDL-C < 70 (for high risk patients)  Hgb A1c < 7%  BMI < 25 and abdominal girth < 40M/<35 F  2 gram sodium, Mediterranean diet: In Progress  Rehab weight loss goal of 10 lbs, 1 lb per week: Not Met: pt has gained 14lbs since starting rehab  Fish intake (non-fried varieties) to a goal of 2-3 servings per week: In Progress  Increase fruit and vegetable intake: In Progress    Comments on Goal Progression:  Discussed significant weight gain, pt is aware he has been making poor food choices and not exercising outside of rehab. Emphasized  importance of portion control and lifestyle modification for improved health outcomes-pt verbalized understanding.    Interventions:  Dietitian Consult: No  Patient to participate in Cardiac Rehab sessions three times a week  Weekly Dietitian Weight Check  Nutrition Recommendations Provided: Verbal, Reviewed  Follow Up Plan for Ongoing Self-Management Support    Education:  Dining Out; verbalizes understanding; Date: 11/9/22  Mediterranean Diet; verbalizes understanding; Date: 11/23/22  Recipe Modication; verbalizes understanding; Date: 11/16/22  Vegetarianism; verbalizes understanding; Date: 12/5/22  Vitamins; verbalizes understanding; Date: 10/26/22    Comments:   Discussed ways to incorporate healthy snacks, eating on a schedule, and monitoring sodium intake for heart health.    Diabetes  Is the patient diabetic? Yes   Other Core Components/Diabetes Assessment:   Labs:  Lab Results   Component Value Date    GLUF 136 (H) 08/25/2022     Lab Results   Component Value Date    HGBA1C 6.2 (H) 08/25/2022    HGBA1C 7.2 (H) 07/28/2022    HGBA1C 7.6 (H) 07/12/2022      Lab Results   Component Value Date    ESTIMATEDAVG 131 08/25/2022    ESTIMATEDAVG 160 (H) 07/28/2022    ESTIMATEDAVG 171 (H) 07/12/2022       History of diabetes since 2018  Diabetes medications: Ozempic, Januvia 100mg daily, metformin 1000mg daily  Blood Glucose Checks at Home: Yes: blood sugars ac & HS  Typical morning range: 130-160 mg/dl  Endocrinologist or PCP following DM: Dr. Burrows    Other Core Components/Diabetes Plan:   Goals:  Hgb A1c < 7%  Exercise Blood Glucose: 100-300 mg/dl    Interventions:  Reviewed and Discussed Labs  Medication Compliance  Med Card Reconciled  Low Sodium, Mediterranean, ADA Diet  Weight Management  Physical Activity  Increase Knowledge of Contributory Factors  Home Monitoring    Education:  Risk Factors; verbalizes understanding; Date: 10/28/22  Stress; verbalizes understanding; Date: 11/11/22  Mediterranean Diet;  verbalizes understanding; Date: 22  Dining Out; verbalizes understanding; Date: 22    Comments:   Patient verbalizes understanding to bring home glucometer and check glucose pre and post each exercise session.  Per cardiac rehab protocols, patient's glucose must be between 90 and 270 mg/dL to exercise.  Patient denies any recent glucose levels less than 60 mg/dL or greater than 300 mg/dL. Patient verbalizes importance of notifying rehab staff if symptoms of hypoglycemia occur while at cardiac rehab.  Abnormal labs will be reported to patient's PCP/Endocrinologist by rehab staff.    Noted updated glucose parameters of 100-300    Sarita Tirado MS, RDN/LDN     Session: 24 Session Follow Up   Cardiac Rehab Individual Treatment Plan - Reassessment      Patient Name: Isaiah Dorsey Jr. MRN: [id number]   : 1960   Age: 63 y.o.   Primary Diagnosis: CABG 2022      Psychosocial Assessment:   Living Arrangements: Lives with spouse  Family Support: family  Self Reported: no change Stress  Displays: happiness  Medication: not applicable    Psychosocial Plan:   Goals:  Improved psychosocial coping strategies: Not Met: pt states he has a lot of stress  Verbalizes coping mechanisms: In Progress  Reduce manifestation of stress: In Progress  Maintain positive outlook: In Progress  Improve overall quality of life: In Progress    Comments on Goal Progression:  Pt is working towards decreasing stress in his life and improving his management of stressful events.    Interventions:  Patient to Self Report Emotional Changes at Session Check In  Recommend Physical Activity  Recommend Attending Education Lectures  Notify MD: No  Program Referral: No  Pharmaceutical Intervention/Therapy: No  Other Needs: not applicable  Stage of Readiness to Change: Action    Education:  Stress Management; verbalizes understanding; Date: 2022  Stress; verbalizes understanding; Date: 2022  Risk Factors; verbalizes  understanding; Date: 10/28/2022    Comments:  Pt attendance to cardiac rehab class has improved. Pt denies overwhelming stress or anxiety.   Patient has been instructed to notify staff in the event that circumstances worsen.  Patient verbalizes understanding.    Other Core Components/Risk Factors Assessment:   RISK FACTORS:  diabetes, hyperlipidemia, hypertension, obesity, sedentary lifestyle, stress    Learning Barriers: None    Medication Compliance: has been compliant with the medicaiton regimen    Other Core Components/Risk Factors Plan:   Goals:  Decrease cholesterol level: In Progress  Increase exercise tolerance: Met  Increase knowledge of CAD: Met  Decrease blood pressure: Met  Weight loss: Not Met: Pt has gained 14 lbs since starting rehab  Demonstrate accurate pulse taking: Met  Identify and manage personal areas of stress: In Progress  Control diabetes by adjusting diet and exercise: In Progress  Learn more about healthy eating: In Progress    Comments on Goal Progression:  Pt acknowledges his weight gain and states he is trying to eat healthier.     Interventions:  Individual Education/ Counseling: Yes  Physician Referral: No    Education:    fluid overload/CHF, verbalizes understanding; Date: 10/21/2022  risk factors, verbalizes understanding; Date: 10/28/2022  stress, verbalizes understanding; Date: 11/11/2022         Education method adapted to patients education level and preferred method of learning.  Method: explanation  handouts    Comments:  Pt states he is improved and continues to try to modify his risk factors to improve his cardiovascular health.    Other Core Components/Hypertension Assessment:   BP Range: 100-160 systolic; 70-90 diastolic  BP at Goal: No  Patient reported symptoms: none    Other Core Components/Hypertension Plan:   Goals:  Blood Pressure <130/80    Comments on Goal Progression:  Pt states he is interested in possibly joining the digital hypertension  program    Interventions:  Med Card Reconciled: No  Encourage medication compliance  Encourage sodium reduction  Encourage weight loss  Recommend physical activity  Educate on contributory factors  Reduce stress, anxiety, anger, depression, and/or chronic pain  Encourage home blood pressure monitoring  Recommend daily weights  MD notified/Physician Referral: Yes    Education:    Congestive Heart Failure; verbalizes understanding; Date: 10/21/2022  Risk Factors; verbalizes understanding; Date: 10/28/2022  Stress; verbalizes understanding; Date: 11/11/2022         Comments:  Pt has better blood pressure readings than on entry but still has occasional isolated elevated reading  >130/80  Does the patient have Heart Failure? Yes   Other Core Components/Congestive Heart Failure Assessment:   Ejection Fraction: 40-45% %  Patient reported symptoms: peripheral edema and weight gain  Lung Sounds: normal air entry, lungs clear to auscultation  Right Leg Edema: Trace  Left Leg Edema Trace    Other Core Components/Congestive Heart Failure Plan:   Goals:  Patient free from CHF Exacerbation    Comments on Goal Progression:  Pt is more aware of his symptoms and is making effort to decrease exacerbation of CHF.    Interventions/Recommendations:  Encourage medication compliance  Encourage daily weight checks  Promote sodium reduction  Encourage tracking fluid intake  Promote physical activity  Educate on contributory factors  Recommend home blood pressure monitoring  MD notified/MD visit scheduled: No not until next year      Education:    Congestive Heart Failure; verbalizes understanding; Date: 10/21/2022  Risk Factors; verbalizes understanding; Date: 10/28/2022  Stress; verbalizes understanding; Date: 11/11/2022         Comments:  Pt is not having CHF symptoms at this time.      Other Core Components/Tobacco Cessation Assessment:   Smoking Status: past smoker who quit many years ago  Smoking Cessation Barriers:  none  Stage of  Readiness to Change: Maintenance    Other Core Components/Tobacco Cessation Plan:   Goals:  Maintain non-smoking status    Comments on Goal Progression:  Pt does not smoke    Interventions:  Maintains non-smoking status    Education:    Risk Factors; verbalizes understanding; Date: 10/28/2022         Comments:  Pt has no desire to use tobacco products at this time.    Jaden Penaloza RN

## 2023-02-16 ENCOUNTER — CLINICAL SUPPORT (OUTPATIENT)
Dept: REHABILITATION | Facility: HOSPITAL | Age: 63
End: 2023-02-16
Attending: PODIATRIST
Payer: COMMERCIAL

## 2023-02-16 DIAGNOSIS — R29.898 DECREASED STRENGTH OF UPPER EXTREMITY: ICD-10-CM

## 2023-02-16 DIAGNOSIS — M25.611 IMPAIRED RANGE OF MOTION OF RIGHT SHOULDER: Primary | ICD-10-CM

## 2023-02-16 PROCEDURE — 97110 THERAPEUTIC EXERCISES: CPT | Mod: PO,CQ

## 2023-02-16 PROCEDURE — 97112 NEUROMUSCULAR REEDUCATION: CPT | Mod: PO,CQ

## 2023-02-16 PROCEDURE — 97140 MANUAL THERAPY 1/> REGIONS: CPT | Mod: PO,CQ

## 2023-02-16 NOTE — PROGRESS NOTES
"  [facility]   Physical Therapy Treatment Note     Name: Isaiah Dorsey Jr.  Clinic Number: [id number]    Therapy Diagnosis:   Encounter Diagnoses   Name Primary?    Impaired range of motion of right shoulder Yes    Decreased strength of upper extremity        Physician: Vincenzo Jaeger PA-C    Visit Date: 2/16/2023  Evaluation Date: 1/10/2023  Authorization Period Expiration: 12/21/23  Plan of Care Expiration: 4/4/23  Progress Note Due: 2/10/23  Visit # / Visits authorized: 5/ 20  FOTO: NEEDS TO BE PERFORMED AT FIRST VISIT     Precautions: post op shoulder precautions       PTA Visit #: 1/5     Time In: 8:06 AM  Time Out: 8:44 AM  Total Billable Time: 38 minutes (TE-3)    SUBJECTIVE     Pt reports: "The shoulder do feel a little stiff."    He was compliant with home exercise program.  Response to previous treatment: good  Functional change: improved motion    Pain: 0/10 (when not moving) 0/10 (with movement)  Location: right shoulder      OBJECTIVE     Taken: 02/9/2023  R shoulder PROM:   Flexion: 155  Abduction: 120  ER: 75  IR: to belly      Treatment     Isaiah received the treatments listed below:    Bold=exercises performed today    Pt received therapeutic exercises to develop strength and ROM for 15 minutes including:    UBE 3' forward/3' backward    ROM:  Supine shoulder flexion 2.5# dowel 3x10   Seated abduction table slides with foam - x20     Strengthening:  Sidelying ER, Flex, abd 0 # - 3x10   Landmine press 3x6  Supine serratus punch 3x10 3#    Pt received the following manual therapy techniques: Joint mobilizations and Soft tissue Mobilization were applied to the: R shoulder for 9 minutes, including:  GHJ posterior and inf mobs Grades 3-4   PROM all directions     Pt received neuromuscular re-education in order to improve scapulohumeral rhythm for 14 mins including:  Prone Rows 3x10  Prone extension 3x10   Prone scap squeeze x30     Patient Education and Home Exercises "     Home Exercises Provided and Patient Education Provided     Education provided:   - review of precautions  - pt educated on all interventions performed today     Written Home Exercises Provided: Patient instructed to cont prior HEP. Exercises were reviewed and Isaiah was able to demonstrate them prior to the end of the session.  Isaiah demonstrated good  understanding of the education provided. See EMR under Patient Instructions for exercises provided during therapy sessions    ASSESSMENT     GHJ inferior glide and posterior mob was used to reduce stiffness in right shoulder. Following this mobilization, pt demonstrated improved ability to move through the existing range.     Isaiah Is progressing well towards his goals.   Pt prognosis is Good.     Pt will continue to benefit from skilled outpatient physical therapy to address the deficits listed in the problem list box on initial evaluation, provide pt/family education and to maximize pt's level of independence in the home and community environment.     Pt's spiritual, cultural and educational needs considered and pt agreeable to plan of care and goals.     Anticipated barriers to physical therapy: failure to follow precautions    Goals:     Short Term Goals 6 weeks   1. Pt will be independent with HEP to supplement PT in improving functional use of R UE.  2. Pt will increase pain free right shoulder PROM in all planes to WNL to improve functional mobility of UE-  3. Formally assess strength when allowed and set goals accordingly  4. Pt will adhere to post op precautions to allow for proper tissue healing     Long Term Goals 12 weeks   1.  Pt will have full PROM of right shoulder to show improvements in overall movement patterns.  2. Pt to show proper postural awareness with no VC from PT  3. Pt to have good tolerance to AAROM and AROM activities per protocol   4. Patient will increase right shoulder strength to >/=4/5.    PLAN     Cont to follow protocol listed  by MD Cher Brewer, PTA

## 2023-02-19 DIAGNOSIS — N52.9 ERECTILE DYSFUNCTION, UNSPECIFIED ERECTILE DYSFUNCTION TYPE: ICD-10-CM

## 2023-02-20 RX ORDER — SILDENAFIL 50 MG/1
50 TABLET, FILM COATED ORAL DAILY PRN
Qty: 10 TABLET | Refills: 5 | Status: SHIPPED | OUTPATIENT
Start: 2023-02-20 | End: 2023-08-19

## 2023-02-28 ENCOUNTER — CLINICAL SUPPORT (OUTPATIENT)
Dept: REHABILITATION | Facility: HOSPITAL | Age: 63
End: 2023-02-28
Attending: PODIATRIST
Payer: COMMERCIAL

## 2023-02-28 DIAGNOSIS — R29.898 DECREASED STRENGTH OF UPPER EXTREMITY: ICD-10-CM

## 2023-02-28 DIAGNOSIS — M25.611 IMPAIRED RANGE OF MOTION OF RIGHT SHOULDER: Primary | ICD-10-CM

## 2023-02-28 PROCEDURE — 97112 NEUROMUSCULAR REEDUCATION: CPT | Mod: PO

## 2023-02-28 PROCEDURE — 97110 THERAPEUTIC EXERCISES: CPT | Mod: PO

## 2023-02-28 NOTE — PROGRESS NOTES
"  [facility]   Physical Therapy Treatment Note / Progress Note    Name: Isaiah Dorsey Jr.  Clinic Number: [id number]    Therapy Diagnosis:   Encounter Diagnoses   Name Primary?    Impaired range of motion of right shoulder Yes    Decreased strength of upper extremity          Physician: Vincenzo Jaeger PA-C    Visit Date: 2/28/2023  Evaluation Date: 1/10/2023  Authorization Period Expiration: 12/31/23  Plan of Care Expiration: 4/4/23  Progress Note Due: 3/28/23  Visit # / Visits authorized: 6/ 20  FOTO: NEEDS TO BE PERFORMED AT FIRST VISIT     Precautions: post op shoulder precautions           PTA Visit #: 1/5     Time In: 9:50 AM  Time Out: 10:25 AM  Total Billable Time: 35 minutes (TE-2, NMR-1)    SUBJECTIVE     Pt reports: it's getting better, it still twinges. "If I  something real heavy, I gotta watch that"    He was compliant with home exercise program.  Response to previous treatment: good  Functional change: improved range of motion    Pain: 0/10 (when not moving) 0/10 (with movement)  Location: right shoulder      OBJECTIVE       R shoulder PROM:   Flexion: WNL (supine)  Abduction: 160 (supine)  ER: 75 (supine)        R shoulder AROM:   Flexion: 133 deg (pain eccentrically)- measured in seated   Abduction: 120 deg (twinge eccentrically)- measured in seated  ER: 55 deg (measures supine at 45 deg abd)  IR: 70 deg (measured in supine at 45 deg abd)    Treatment     Isaiah received the treatments listed below:    Bold=performed today    Pt received therapeutic exercises to develop strength and ROM for 25 minutes including:      Reassessment of ROM as above  UBE 3' forward/3' backward    ROM:  Supine shoulder flexion 2.5# dowel 3x10   Seated abduction table slides with foam - x20   +Wall slides flexion 3x10 3" holds flexion  +wall slides abduction 3x10 3" holds abduction  +Standing scaption (to tolerance) in mirror 2x10  +standing abduction (to tolerance, <90 deg) in mirror " 2x10    Strengthening:  Sidelying ER, Flex, abd 0 # - 2x10   Landmine press 3x6  Supine serratus punch 3x10 3#    Pt received the following manual therapy techniques: Joint mobilizations and Soft tissue Mobilization were applied to the: R shoulder for 00 minutes, including:  GHJ posterior and inf mobs Grades 3-4   PROM all directions     Pt received neuromuscular re-education in order to improve scapulohumeral rhythm for 10 mins including:  +prone Y 2x10  +prone T 2x10  Prone Rows 3x10  Prone extension 3x10   Prone scap squeeze x30     Patient Education and Home Exercises     Home Exercises Provided and Patient Education Provided     Education provided:   - review of precautions  - pt educated on all interventions performed today     Written Home Exercises Provided: Patient instructed to cont prior HEP. Exercises were reviewed and Isaiah was able to demonstrate them prior to the end of the session.  Isaiah demonstrated good  understanding of the education provided. See EMR under Patient Instructions for exercises provided during therapy sessions    ASSESSMENT     Patient is 9 and a half weeks post op today. He continues to be noncompliant with following guidelines from PT and  post op precautions. He states that he lifts things, but the heaviest thing he has lifted was probably 8-10 pounds. I educated him that it is important to avoid heavy lifting, pushing, or pulling for several months to allow proper tissue healing. Despite all of this, patient is making good improvement in passive ROM and demos good AROM as well. Initiated wall slides today with good response from patient. Also initiated standing scaption and abduction in mirror. Patient is challenged by these exercises, and can only perform <90 deg of standing shoulder abduction in mirror and compensates with UT to elevate arm. Continue to progress per protocol and encourage/educate patient on importance of adherence to precautions.    Isaiah Is progressing  well towards his goals.   Pt prognosis is Good.     Pt will continue to benefit from skilled outpatient physical therapy to address the deficits listed in the problem list box on initial evaluation, provide pt/family education and to maximize pt's level of independence in the home and community environment.     Pt's spiritual, cultural and educational needs considered and pt agreeable to plan of care and goals.     Anticipated barriers to physical therapy: failure to follow precautions    Goals:     Short Term Goals 6 weeks   1. Pt will be independent with HEP to supplement PT in improving functional use of R UE. MET  2. Pt will increase pain free right shoulder PROM in all planes to WNL to improve functional mobility of UE-MET  3. Formally assess strength when allowed and set goals accordingly  4. Pt will adhere to post op precautions to allow for proper tissue healing- NOT MET     Long Term Goals 12 weeks   1.  Pt will have full PROM of right shoulder to show improvements in overall movement patterns.  2. Pt to show proper postural awareness with no VC from PT  3. Pt to have good tolerance to AAROM and AROM activities per protocol   4. Patient will increase right shoulder strength to >/=4/5.    PLAN     Cont to follow protocol listed by MD Luz Elena Kaufman, PT

## 2023-03-02 ENCOUNTER — DOCUMENTATION ONLY (OUTPATIENT)
Dept: CARDIAC REHAB | Facility: CLINIC | Age: 63
End: 2023-03-02
Payer: COMMERCIAL

## 2023-03-02 ENCOUNTER — CLINICAL SUPPORT (OUTPATIENT)
Dept: REHABILITATION | Facility: HOSPITAL | Age: 63
End: 2023-03-02
Attending: PODIATRIST
Payer: COMMERCIAL

## 2023-03-02 DIAGNOSIS — M25.611 IMPAIRED RANGE OF MOTION OF RIGHT SHOULDER: Primary | ICD-10-CM

## 2023-03-02 DIAGNOSIS — R29.898 DECREASED STRENGTH OF UPPER EXTREMITY: ICD-10-CM

## 2023-03-02 PROCEDURE — 97140 MANUAL THERAPY 1/> REGIONS: CPT | Mod: PO,CQ

## 2023-03-02 PROCEDURE — 97110 THERAPEUTIC EXERCISES: CPT | Mod: PO,CQ

## 2023-03-02 PROCEDURE — 97112 NEUROMUSCULAR REEDUCATION: CPT | Mod: PO,CQ

## 2023-03-02 NOTE — PROGRESS NOTES
As Dr. Mosquera requested me to do is to inform Mr. Sunita Wells about his test results.  I called and informed Mr. Sunita Wells That his event monitor is normal. He verbalized and understood.

## 2023-03-02 NOTE — PROGRESS NOTES
Mr. Mason has completed 27 out of 36 exercise session of Phase II cardiac rehab.  A follow up reassessment will be completed at exit interview.   Mr. Mason is currently on hold due to having shoulder surgery.  We do not know if he will be able to return to rehab.    24 Session Follow Up   Cardiac Rehab Individual Treatment Plan - Reassessment      Patient Name: Isaiah Dorsey Jr. MRN: [id number]   : 1960   Age: 63 y.o.   Primary Diagnosis: CABG Date of Event: 22   EF: 40-45%  Risk Stratification: high  Referring Physician: ADRIANO   Exercise Assessment:     Angina with exercise?: No   ST Depression with Exercise?: No  Fall Risk: No   Assistive Devices:  independent  There were no limitations noted by the patient.  Comments on Progression: Mr. Mason is progressing fair and his workloads will continue to be increased as he tolerates exercise intensity.     Exercise Plan:   Goals:  CR Exercise Goals: Attend Cardiac Rehab 3 times/week: In Progress  Home Aerobic Exercise: 2 additional days/week for 30-60 minutes: In Progress  Intensity of 12-15 on the Rate of Perceived Exertion (RPE) scale: In Progress  30% increase in entry estimated METS: 7.8 : In Progress  5 days/week for 30-60 minutes: In Progress    Comments on Goal Progression:  Patient Consistency: inconsistent with attendance  Home exercise? No   Patient reports intensity rate 11-14 on RPE scale    Intervention/Recommendations:   Discussed importance of regular attendance to cardiac rehab class    Exercise Prescription:  THR Range 63-74   Mode: Treadmill  Nustep   Frequency:  3 days/week   Duration:  30-60 minutes   Intensity:  12-15 RPE   Resistance Training:  Yes: 3 to 5 lb weights with 10-15 reps based on strength and range of motion and adjusted accordingly     Home Prescription:  Mode Aerobic exercise   Frequency: 2- 3 days/week   Duration: 30-60 minutes   Resistance Training: None     Education:  Arthritis/Osteporosis; verbalizes understanding; Date:  22  Body Composition; verbalizes understanding; Date: 22  Exercise and Rehydration; verbalizes understanding; Date: 22  Flexibility/Stretching; verbalizes understanding; Date: 10-24-22  Relaxation Techniques; verbalizes understanding; Date: 22    Comments:  I reviewed exercise recommendations with Mr. Mason.  I strongly encouraged him to begin exercising.  He stated understanding but he also stated he doesn't feel he has time.        The exercise prescription will continue to be adjusted based on tolerance of exercise intensity by patient.    Jorje Ceballos, CEP     24 Session Follow Up   Cardiac Rehab Individual Treatment Plan - Reassessment    Patient Name: Isaiah Dorsey Jr. MRN: [id number]   : 1960   Age: 63 y.o.   Primary Diagnosis: s/p CABG, CAD, CHF, HLD, NSTEMI, DM    Nutrition Assessment:     Anthropometrics    Height 72 inches   Weight 288 lbs   BMI 39.1     Patient confirms he is taking lipitor 40mg for cholesterol control.  Difficulty Chewing or Swallowing: no  Current Exercise: See Exercise Physiologist Note  Food Safety/Food Preparation: self  Living Arrangements/Family Support: Lives with spouse  Cultural/Spiritual/Personal Preferences: not applicable   Barriers to Education: none identified  Stage of Change Related to Diet Habits: Action  Recent Changes to Diet: Yes - more produce intake  Food Diary: Completed      Nutrition Plan:   Goals:  LDL-C < 70 (for high risk patients)  Hgb A1c < 7%  BMI < 25 and abdominal girth < 40M/<35 F  2 gram sodium, Mediterranean diet: In Progress  Rehab weight loss goal of 10 lbs, 1 lb per week: Not Met: pt has gained 14lbs since starting rehab  Fish intake (non-fried varieties) to a goal of 2-3 servings per week: In Progress  Increase fruit and vegetable intake: In Progress    Comments on Goal Progression:  Discussed significant weight gain, pt is aware he has been making poor food choices and not exercising outside of rehab. Emphasized  importance of portion control and lifestyle modification for improved health outcomes-pt verbalized understanding.    Interventions:  Dietitian Consult: No  Patient to participate in Cardiac Rehab sessions three times a week  Weekly Dietitian Weight Check  Nutrition Recommendations Provided: Verbal, Reviewed  Follow Up Plan for Ongoing Self-Management Support    Education:  Dining Out; verbalizes understanding; Date: 11/9/22  Mediterranean Diet; verbalizes understanding; Date: 11/23/22  Recipe Modication; verbalizes understanding; Date: 11/16/22  Vegetarianism; verbalizes understanding; Date: 12/5/22  Vitamins; verbalizes understanding; Date: 10/26/22    Comments:   Discussed ways to incorporate healthy snacks, eating on a schedule, and monitoring sodium intake for heart health.    Diabetes  Is the patient diabetic? Yes   Other Core Components/Diabetes Assessment:   Labs:  Lab Results   Component Value Date    GLUF 136 (H) 08/25/2022     Lab Results   Component Value Date    HGBA1C 6.2 (H) 08/25/2022    HGBA1C 7.2 (H) 07/28/2022    HGBA1C 7.6 (H) 07/12/2022      Lab Results   Component Value Date    ESTIMATEDAVG 131 08/25/2022    ESTIMATEDAVG 160 (H) 07/28/2022    ESTIMATEDAVG 171 (H) 07/12/2022       History of diabetes since 2018  Diabetes medications: Ozempic, Januvia 100mg daily, metformin 1000mg daily  Blood Glucose Checks at Home: Yes: blood sugars ac & HS  Typical morning range: 130-160 mg/dl  Endocrinologist or PCP following DM: Dr. Burrows    Other Core Components/Diabetes Plan:   Goals:  Hgb A1c < 7%  Exercise Blood Glucose: 100-300 mg/dl    Interventions:  Reviewed and Discussed Labs  Medication Compliance  Med Card Reconciled  Low Sodium, Mediterranean, ADA Diet  Weight Management  Physical Activity  Increase Knowledge of Contributory Factors  Home Monitoring    Education:  Risk Factors; verbalizes understanding; Date: 10/28/22  Stress; verbalizes understanding; Date: 11/11/22  Mediterranean Diet;  verbalizes understanding; Date: 22  Dining Out; verbalizes understanding; Date: 22    Comments:   Patient verbalizes understanding to bring home glucometer and check glucose pre and post each exercise session.  Per cardiac rehab protocols, patient's glucose must be between 90 and 270 mg/dL to exercise.  Patient denies any recent glucose levels less than 60 mg/dL or greater than 300 mg/dL. Patient verbalizes importance of notifying rehab staff if symptoms of hypoglycemia occur while at cardiac rehab.  Abnormal labs will be reported to patient's PCP/Endocrinologist by rehab staff.    Noted updated glucose parameters of 100-300    Sarita Tirado MS, RDN/LDN     Session: 24 Session Follow Up   Cardiac Rehab Individual Treatment Plan - Reassessment      Patient Name: Isaiah Dorsey Jr. MRN: [id number]   : 1960   Age: 63 y.o.   Primary Diagnosis: CABG 2022      Psychosocial Assessment:   Living Arrangements: Lives with spouse  Family Support: family  Self Reported: no change Stress  Displays: happiness  Medication: not applicable    Psychosocial Plan:   Goals:  Improved psychosocial coping strategies: Not Met: pt states he has a lot of stress  Verbalizes coping mechanisms: In Progress  Reduce manifestation of stress: In Progress  Maintain positive outlook: In Progress  Improve overall quality of life: In Progress    Comments on Goal Progression:  Pt is working towards decreasing stress in his life and improving his management of stressful events.    Interventions:  Patient to Self Report Emotional Changes at Session Check In  Recommend Physical Activity  Recommend Attending Education Lectures  Notify MD: No  Program Referral: No  Pharmaceutical Intervention/Therapy: No  Other Needs: not applicable  Stage of Readiness to Change: Action    Education:  Stress Management; verbalizes understanding; Date: 2022  Stress; verbalizes understanding; Date: 2022  Risk Factors; verbalizes  understanding; Date: 10/28/2022    Comments:  Pt attendance to cardiac rehab class has improved. Pt denies overwhelming stress or anxiety.   Patient has been instructed to notify staff in the event that circumstances worsen.  Patient verbalizes understanding.    Other Core Components/Risk Factors Assessment:   RISK FACTORS:  diabetes, hyperlipidemia, hypertension, obesity, sedentary lifestyle, stress    Learning Barriers: None    Medication Compliance: has been compliant with the medicaiton regimen    Other Core Components/Risk Factors Plan:   Goals:  Decrease cholesterol level: In Progress  Increase exercise tolerance: Met  Increase knowledge of CAD: Met  Decrease blood pressure: Met  Weight loss: Not Met: Pt has gained 14 lbs since starting rehab  Demonstrate accurate pulse taking: Met  Identify and manage personal areas of stress: In Progress  Control diabetes by adjusting diet and exercise: In Progress  Learn more about healthy eating: In Progress    Comments on Goal Progression:  Pt acknowledges his weight gain and states he is trying to eat healthier.     Interventions:  Individual Education/ Counseling: Yes  Physician Referral: No    Education:    fluid overload/CHF, verbalizes understanding; Date: 10/21/2022  risk factors, verbalizes understanding; Date: 10/28/2022  stress, verbalizes understanding; Date: 11/11/2022         Education method adapted to patients education level and preferred method of learning.  Method: explanation  handouts    Comments:  Pt states he is improved and continues to try to modify his risk factors to improve his cardiovascular health.    Other Core Components/Hypertension Assessment:   BP Range: 100-160 systolic; 70-90 diastolic  BP at Goal: No  Patient reported symptoms: none    Other Core Components/Hypertension Plan:   Goals:  Blood Pressure <130/80    Comments on Goal Progression:  Pt states he is interested in possibly joining the digital hypertension  program    Interventions:  Med Card Reconciled: No  Encourage medication compliance  Encourage sodium reduction  Encourage weight loss  Recommend physical activity  Educate on contributory factors  Reduce stress, anxiety, anger, depression, and/or chronic pain  Encourage home blood pressure monitoring  Recommend daily weights  MD notified/Physician Referral: Yes    Education:    Congestive Heart Failure; verbalizes understanding; Date: 10/21/2022  Risk Factors; verbalizes understanding; Date: 10/28/2022  Stress; verbalizes understanding; Date: 11/11/2022         Comments:  Pt has better blood pressure readings than on entry but still has occasional isolated elevated reading  >130/80  Does the patient have Heart Failure? Yes   Other Core Components/Congestive Heart Failure Assessment:   Ejection Fraction: 40-45% %  Patient reported symptoms: peripheral edema and weight gain  Lung Sounds: normal air entry, lungs clear to auscultation  Right Leg Edema: Trace  Left Leg Edema Trace    Other Core Components/Congestive Heart Failure Plan:   Goals:  Patient free from CHF Exacerbation    Comments on Goal Progression:  Pt is more aware of his symptoms and is making effort to decrease exacerbation of CHF.    Interventions/Recommendations:  Encourage medication compliance  Encourage daily weight checks  Promote sodium reduction  Encourage tracking fluid intake  Promote physical activity  Educate on contributory factors  Recommend home blood pressure monitoring  MD notified/MD visit scheduled: No not until next year      Education:    Congestive Heart Failure; verbalizes understanding; Date: 10/21/2022  Risk Factors; verbalizes understanding; Date: 10/28/2022  Stress; verbalizes understanding; Date: 11/11/2022         Comments:  Pt is not having CHF symptoms at this time.      Other Core Components/Tobacco Cessation Assessment:   Smoking Status: past smoker who quit many years ago  Smoking Cessation Barriers:  none  Stage of  Readiness to Change: Maintenance    Other Core Components/Tobacco Cessation Plan:   Goals:  Maintain non-smoking status    Comments on Goal Progression:  Pt does not smoke    Interventions:  Maintains non-smoking status    Education:    Risk Factors; verbalizes understanding; Date: 10/28/2022         Comments:  Pt has no desire to use tobacco products at this time.    Jaden Penaloza RN

## 2023-03-02 NOTE — PROGRESS NOTES
"  [facility]   Physical Therapy Treatment Note / Progress Note    Name: Isaiah Dorsey Jr.  Clinic Number: [id number]    Therapy Diagnosis:   Encounter Diagnoses   Name Primary?    Impaired range of motion of right shoulder Yes    Decreased strength of upper extremity          Physician: Vincenzo Jaeger PA-C    Visit Date: 3/2/2023  Evaluation Date: 1/10/2023  Authorization Period Expiration: 12/31/23  Plan of Care Expiration: 4/4/23  Progress Note Due: 3/28/23  Visit # / Visits authorized: 7/ 20  FOTO: NEEDS TO BE PERFORMED AT FIRST VISIT     Precautions: post op shoulder precautions           PTA Visit #: 1/5     Time In: 8:05 AM  Time Out: 8:45 AM  Total Billable Time: 40 minutes (TE-2, NMR-1)    SUBJECTIVE     Pt reports: I started to have a pain right here (points at top of right shoulder) yesterday, and it's still there. 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  Response to previous treatment: good  Functional change: improved range of motion    Pain: 1.5/10 (with movement)  Location: right shoulder      OBJECTIVE     N/A    Treatment     Isaiah received the treatments listed below:    Bold=performed today    Pt received therapeutic exercises to develop strength and ROM for 21 minutes including:    UBE 3' forward/3' backward    ROM:  Supine shoulder flexion 2.5# dowel 3x10   Seated abduction table slides with foam - x20   Wall slides flexion 3x10 3" holds flexion  Wall slides abduction 3x10 3" holds abduction  Standing scaption (to tolerance) in mirror 2x10  Standing abduction (to tolerance, <90 deg) in mirror 2x10    Strengthening:  Sidelying ER, Flex, abd 2 # - 2x10   Landmine press 3x6  Supine serratus punch 3x10 3#    Pt received the following manual therapy techniques: Joint mobilizations and Soft tissue Mobilization were applied to the: R shoulder for 9 minutes, including:  GHJ posterior and inf mobs Grades 3-4   PROM all " directions     Pt received neuromuscular re-education in order to improve scapulohumeral rhythm for 10 mins including:  Prone Y 2x10  Prone T 2x10  Prone Rows 3x10  Prone extension 3x10   Prone scap squeeze x30     Patient Education and Home Exercises     Home Exercises Provided and Patient Education Provided     Education provided:   - review of precautions  - pt educated on all interventions performed today     Written Home Exercises Provided: Patient instructed to cont prior HEP. Exercises were reviewed and Isaiah was able to demonstrate them prior to the end of the session.  Isaiah demonstrated good  understanding of the education provided. See EMR under Patient Instructions for exercises provided during therapy sessions    ASSESSMENT     Prior to treatment pt reported mild discomfort in the along the top of right shoulder, however pt is unsure of cause. Advised pt to inform PTA if discomfort intensify during treatment. Pt was able to perform AROM weighted shoulder flexion and scaption, however pt required verbal cueing to retract and depress scapula to avoid UT substitution. Pt is making good progress within RCR protocol, however pt required reminders about restrictions per surgery.    Isaiah Is progressing well towards his goals.   Pt prognosis is Good.     Pt will continue to benefit from skilled outpatient physical therapy to address the deficits listed in the problem list box on initial evaluation, provide pt/family education and to maximize pt's level of independence in the home and community environment.     Pt's spiritual, cultural and educational needs considered and pt agreeable to plan of care and goals.     Anticipated barriers to physical therapy: failure to follow precautions    Goals:     Short Term Goals 6 weeks   1. Pt will be independent with HEP to supplement PT in improving functional use of R UE. MET  2. Pt will increase pain free right shoulder PROM in all planes to WNL to improve  functional mobility of UE-MET  3. Formally assess strength when allowed and set goals accordingly  4. Pt will adhere to post op precautions to allow for proper tissue healing- NOT MET     Long Term Goals 12 weeks   1.  Pt will have full PROM of right shoulder to show improvements in overall movement patterns.  2. Pt to show proper postural awareness with no VC from PT  3. Pt to have good tolerance to AAROM and AROM activities per protocol   4. Patient will increase right shoulder strength to >/=4/5.    PLAN     Cont to follow protocol listed by MD Cher Brewer, PTA

## 2023-03-07 ENCOUNTER — CLINICAL SUPPORT (OUTPATIENT)
Dept: REHABILITATION | Facility: HOSPITAL | Age: 63
End: 2023-03-07
Attending: PODIATRIST
Payer: COMMERCIAL

## 2023-03-07 DIAGNOSIS — M25.611 IMPAIRED RANGE OF MOTION OF RIGHT SHOULDER: Primary | ICD-10-CM

## 2023-03-07 DIAGNOSIS — R29.898 DECREASED STRENGTH OF UPPER EXTREMITY: ICD-10-CM

## 2023-03-07 PROCEDURE — 97112 NEUROMUSCULAR REEDUCATION: CPT | Mod: PO,CQ

## 2023-03-07 PROCEDURE — 97110 THERAPEUTIC EXERCISES: CPT | Mod: PO,CQ

## 2023-03-07 NOTE — PROGRESS NOTES
"  [facility]   Physical Therapy Treatment Note / Progress Note    Name: Isaiah Dorsey Jr.  Clinic Number: [id number]    Therapy Diagnosis:   No diagnosis found.    Physician: Vincenzo Jaeger PA-C    Visit Date: 3/7/2023  Evaluation Date: 1/10/2023  Authorization Period Expiration: 12/31/23  Plan of Care Expiration: 4/4/23  Progress Note Due: 3/28/23  Visit # / Visits authorized: 8/ 20  FOTO: NEEDS TO BE PERFORMED AT FIRST VISIT     Precautions: post op shoulder precautions           PTA Visit #: 2/5     Time In: 9:46 AM   Time Out: 10:26 AM  Total Billable Time: 40 minutes (TE-2, NMR-1)    SUBJECTIVE     Pt reports: "I did my exercise this morning. 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  Response to previous treatment: good  Functional change: improved range of motion    Pain: 1.5/10 (with movement)  Location: right shoulder      OBJECTIVE     10 weeks 5 days    Treatment     Isaiah received the treatments listed below:    Bold=performed today    Pt received therapeutic exercises to develop strength and ROM for 26 minutes including:    UBE 3' forward/3' backward    ROM:  Supine shoulder flexion 3# dowel 3x10   Seated abduction table slides with foam - x20   Wall slides flexion 2x10 3" holds flexion  Wall slides abduction 2x10 3" holds abduction  Standing scaption 2# (to tolerance) in mirror 2x10  Standing abduction 2# (to tolerance, <90 deg) in mirror 2x10    Strengthening:  Supine serratus punch 3# 3x10   Sidelying ER, Flex, abd 3# - 2x10   +Landmine press 3# 2x10    Pt received the following manual therapy techniques: Joint mobilizations and Soft tissue Mobilization were applied to the: R shoulder for 00 minutes, including:  GHJ posterior and inf mobs Grades 3-4   PROM all directions     Pt received neuromuscular re-education in order to improve scapulohumeral rhythm for 14 mins including:  Prone Y 2x10  Prone T 2x10  Prone Rows 3# 3x10  Prone extension " 3x10   +Supine D2 shoulder YTB 2x10  Prone scap squeeze x30     Patient Education and Home Exercises     Home Exercises Provided and Patient Education Provided     Education provided:   - review of precautions  - pt educated on all interventions performed today     Written Home Exercises Provided: Patient instructed to cont prior HEP. Exercises were reviewed and Isaiah was able to demonstrate them prior to the end of the session.  Isaiah demonstrated good  understanding of the education provided. See EMR under Patient Instructions for exercises provided during therapy sessions    ASSESSMENT     Isaiah was able to properly perform reaching activities with 3# weights, simulating home environment tasks. Continued progression of weight is required for  safe handling of household items as protocol allows. Pt reported fatigue in his shoulder following todays session, but did not experience increased pain with the activity.    Isaiah Is progressing well towards his goals.   Pt prognosis is Good.     Pt will continue to benefit from skilled outpatient physical therapy to address the deficits listed in the problem list box on initial evaluation, provide pt/family education and to maximize pt's level of independence in the home and community environment.     Pt's spiritual, cultural and educational needs considered and pt agreeable to plan of care and goals.     Anticipated barriers to physical therapy: failure to follow precautions    Goals:     Short Term Goals 6 weeks   1. Pt will be independent with HEP to supplement PT in improving functional use of R UE. MET  2. Pt will increase pain free right shoulder PROM in all planes to WNL to improve functional mobility of UE-MET  3. Formally assess strength when allowed and set goals accordingly  4. Pt will adhere to post op precautions to allow for proper tissue healing- NOT MET     Long Term Goals 12 weeks   1.  Pt will have full PROM of right shoulder to show improvements in  overall movement patterns.  2. Pt to show proper postural awareness with no VC from PT  3. Pt to have good tolerance to AAROM and AROM activities per protocol   4. Patient will increase right shoulder strength to >/=4/5.    PLAN     Cont to follow protocol listed by MD Cher Brewer, PTA

## 2023-03-23 ENCOUNTER — LAB VISIT (OUTPATIENT)
Dept: LAB | Facility: HOSPITAL | Age: 63
End: 2023-03-23
Attending: INTERNAL MEDICINE
Payer: COMMERCIAL

## 2023-03-23 ENCOUNTER — DOCUMENTATION ONLY (OUTPATIENT)
Dept: CARDIAC REHAB | Facility: CLINIC | Age: 63
End: 2023-03-23
Payer: COMMERCIAL

## 2023-03-23 DIAGNOSIS — E11.59 HYPERTENSION ASSOCIATED WITH DIABETES: ICD-10-CM

## 2023-03-23 DIAGNOSIS — I15.2 HYPERTENSION ASSOCIATED WITH DIABETES: ICD-10-CM

## 2023-03-23 LAB
ALBUMIN SERPL BCP-MCNC: 3.8 G/DL (ref 3.5–5.2)
ALP SERPL-CCNC: 92 U/L (ref 55–135)
ALT SERPL W/O P-5'-P-CCNC: 26 U/L (ref 10–44)
ANION GAP SERPL CALC-SCNC: 9 MMOL/L (ref 8–16)
AST SERPL-CCNC: 19 U/L (ref 10–40)
BASOPHILS # BLD AUTO: 0.18 K/UL (ref 0–0.2)
BASOPHILS NFR BLD: 1.9 % (ref 0–1.9)
BILIRUB SERPL-MCNC: 0.4 MG/DL (ref 0.1–1)
BUN SERPL-MCNC: 16 MG/DL (ref 8–23)
CALCIUM SERPL-MCNC: 9.5 MG/DL (ref 8.7–10.5)
CHLORIDE SERPL-SCNC: 105 MMOL/L (ref 95–110)
CO2 SERPL-SCNC: 26 MMOL/L (ref 23–29)
CREAT SERPL-MCNC: 1 MG/DL (ref 0.5–1.4)
DIFFERENTIAL METHOD: ABNORMAL
EOSINOPHIL # BLD AUTO: 0.2 K/UL (ref 0–0.5)
EOSINOPHIL NFR BLD: 2.2 % (ref 0–8)
ERYTHROCYTE [DISTWIDTH] IN BLOOD BY AUTOMATED COUNT: 19.3 % (ref 11.5–14.5)
EST. GFR  (NO RACE VARIABLE): >60 ML/MIN/1.73 M^2
ESTIMATED AVG GLUCOSE: 131 MG/DL (ref 68–131)
GLUCOSE SERPL-MCNC: 113 MG/DL (ref 70–110)
HBA1C MFR BLD: 6.2 % (ref 4–5.6)
HCT VFR BLD AUTO: 44.7 % (ref 40–54)
HGB BLD-MCNC: 12.7 G/DL (ref 14–18)
IMM GRANULOCYTES # BLD AUTO: 0.02 K/UL (ref 0–0.04)
IMM GRANULOCYTES NFR BLD AUTO: 0.2 % (ref 0–0.5)
LYMPHOCYTES # BLD AUTO: 3.2 K/UL (ref 1–4.8)
LYMPHOCYTES NFR BLD: 34.4 % (ref 18–48)
MCH RBC QN AUTO: 23.7 PG (ref 27–31)
MCHC RBC AUTO-ENTMCNC: 28.4 G/DL (ref 32–36)
MCV RBC AUTO: 84 FL (ref 82–98)
MONOCYTES # BLD AUTO: 0.9 K/UL (ref 0.3–1)
MONOCYTES NFR BLD: 9.7 % (ref 4–15)
NEUTROPHILS # BLD AUTO: 4.8 K/UL (ref 1.8–7.7)
NEUTROPHILS NFR BLD: 51.6 % (ref 38–73)
NRBC BLD-RTO: 0 /100 WBC
PLATELET # BLD AUTO: 558 K/UL (ref 150–450)
PMV BLD AUTO: 9.2 FL (ref 9.2–12.9)
POTASSIUM SERPL-SCNC: 5 MMOL/L (ref 3.5–5.1)
PROT SERPL-MCNC: 7.2 G/DL (ref 6–8.4)
RBC # BLD AUTO: 5.35 M/UL (ref 4.6–6.2)
SODIUM SERPL-SCNC: 140 MMOL/L (ref 136–145)
WBC # BLD AUTO: 9.35 K/UL (ref 3.9–12.7)

## 2023-03-23 PROCEDURE — 36415 COLL VENOUS BLD VENIPUNCTURE: CPT | Mod: PO | Performed by: INTERNAL MEDICINE

## 2023-03-23 PROCEDURE — 80053 COMPREHEN METABOLIC PANEL: CPT | Performed by: INTERNAL MEDICINE

## 2023-03-23 PROCEDURE — 83036 HEMOGLOBIN GLYCOSYLATED A1C: CPT | Performed by: INTERNAL MEDICINE

## 2023-03-23 PROCEDURE — 85025 COMPLETE CBC W/AUTO DIFF WBC: CPT | Performed by: INTERNAL MEDICINE

## 2023-03-23 NOTE — PROGRESS NOTES
Mr. Mason has completed 27 out of 36 exercise session of Phase II cardiac rehab.  A follow up reassessment will be completed at exit interview.   Mr. Mason is currently on hold due to having shoulder surgery.  We do not know if he will be able to return to rehab.    24 Session Follow Up   Cardiac Rehab Individual Treatment Plan - Reassessment      Patient Name: Isaiah Dorsey Jr. MRN: [id number]   : 1960   Age: 63 y.o.   Primary Diagnosis: CABG Date of Event: 22   EF: 40-45%  Risk Stratification: high  Referring Physician: ADRIANO   Exercise Assessment:     Angina with exercise?: No   ST Depression with Exercise?: No  Fall Risk: No   Assistive Devices:  independent  There were no limitations noted by the patient.  Comments on Progression: Mr. Mason is progressing fair and his workloads will continue to be increased as he tolerates exercise intensity.     Exercise Plan:   Goals:  CR Exercise Goals: Attend Cardiac Rehab 3 times/week: In Progress  Home Aerobic Exercise: 2 additional days/week for 30-60 minutes: In Progress  Intensity of 12-15 on the Rate of Perceived Exertion (RPE) scale: In Progress  30% increase in entry estimated METS: 7.8 : In Progress  5 days/week for 30-60 minutes: In Progress    Comments on Goal Progression:  Patient Consistency: inconsistent with attendance  Home exercise? No   Patient reports intensity rate 11-14 on RPE scale    Intervention/Recommendations:   Discussed importance of regular attendance to cardiac rehab class    Exercise Prescription:  THR Range 63-74   Mode: Treadmill  Nustep   Frequency:  3 days/week   Duration:  30-60 minutes   Intensity:  12-15 RPE   Resistance Training:  Yes: 3 to 5 lb weights with 10-15 reps based on strength and range of motion and adjusted accordingly     Home Prescription:  Mode Aerobic exercise   Frequency: 2- 3 days/week   Duration: 30-60 minutes   Resistance Training: None     Education:  Arthritis/Osteporosis; verbalizes understanding; Date:  22  Body Composition; verbalizes understanding; Date: 22  Exercise and Rehydration; verbalizes understanding; Date: 22  Flexibility/Stretching; verbalizes understanding; Date: 10-24-22  Relaxation Techniques; verbalizes understanding; Date: 22    Comments:  I reviewed exercise recommendations with Mr. Mason.  I strongly encouraged him to begin exercising.  He stated understanding but he also stated he doesn't feel he has time.        The exercise prescription will continue to be adjusted based on tolerance of exercise intensity by patient.    Jorje Ceballos, CEP     24 Session Follow Up   Cardiac Rehab Individual Treatment Plan - Reassessment    Patient Name: Isaiah Dorsey Jr. MRN: [id number]   : 1960   Age: 63 y.o.   Primary Diagnosis: s/p CABG, CAD, CHF, HLD, NSTEMI, DM    Nutrition Assessment:     Anthropometrics    Height 72 inches   Weight 288 lbs   BMI 39.1     Patient confirms he is taking lipitor 40mg for cholesterol control.  Difficulty Chewing or Swallowing: no  Current Exercise: See Exercise Physiologist Note  Food Safety/Food Preparation: self  Living Arrangements/Family Support: Lives with spouse  Cultural/Spiritual/Personal Preferences: not applicable   Barriers to Education: none identified  Stage of Change Related to Diet Habits: Action  Recent Changes to Diet: Yes - more produce intake  Food Diary: Completed      Nutrition Plan:   Goals:  LDL-C < 70 (for high risk patients)  Hgb A1c < 7%  BMI < 25 and abdominal girth < 40M/<35 F  2 gram sodium, Mediterranean diet: In Progress  Rehab weight loss goal of 10 lbs, 1 lb per week: Not Met: pt has gained 14lbs since starting rehab  Fish intake (non-fried varieties) to a goal of 2-3 servings per week: In Progress  Increase fruit and vegetable intake: In Progress    Comments on Goal Progression:  Discussed significant weight gain, pt is aware he has been making poor food choices and not exercising outside of rehab. Emphasized  importance of portion control and lifestyle modification for improved health outcomes-pt verbalized understanding.    Interventions:  Dietitian Consult: No  Patient to participate in Cardiac Rehab sessions three times a week  Weekly Dietitian Weight Check  Nutrition Recommendations Provided: Verbal, Reviewed  Follow Up Plan for Ongoing Self-Management Support    Education:  Dining Out; verbalizes understanding; Date: 11/9/22  Mediterranean Diet; verbalizes understanding; Date: 11/23/22  Recipe Modication; verbalizes understanding; Date: 11/16/22  Vegetarianism; verbalizes understanding; Date: 12/5/22  Vitamins; verbalizes understanding; Date: 10/26/22    Comments:   Discussed ways to incorporate healthy snacks, eating on a schedule, and monitoring sodium intake for heart health.    Diabetes  Is the patient diabetic? Yes   Other Core Components/Diabetes Assessment:   Labs:  Lab Results   Component Value Date    GLUF 136 (H) 08/25/2022     Lab Results   Component Value Date    HGBA1C 6.2 (H) 08/25/2022    HGBA1C 7.2 (H) 07/28/2022    HGBA1C 7.6 (H) 07/12/2022      Lab Results   Component Value Date    ESTIMATEDAVG 131 08/25/2022    ESTIMATEDAVG 160 (H) 07/28/2022    ESTIMATEDAVG 171 (H) 07/12/2022       History of diabetes since 2018  Diabetes medications: Ozempic, Januvia 100mg daily, metformin 1000mg daily  Blood Glucose Checks at Home: Yes: blood sugars ac & HS  Typical morning range: 130-160 mg/dl  Endocrinologist or PCP following DM: Dr. Burrows    Other Core Components/Diabetes Plan:   Goals:  Hgb A1c < 7%  Exercise Blood Glucose: 100-300 mg/dl    Interventions:  Reviewed and Discussed Labs  Medication Compliance  Med Card Reconciled  Low Sodium, Mediterranean, ADA Diet  Weight Management  Physical Activity  Increase Knowledge of Contributory Factors  Home Monitoring    Education:  Risk Factors; verbalizes understanding; Date: 10/28/22  Stress; verbalizes understanding; Date: 11/11/22  Mediterranean Diet;  verbalizes understanding; Date: 22  Dining Out; verbalizes understanding; Date: 22    Comments:   Patient verbalizes understanding to bring home glucometer and check glucose pre and post each exercise session.  Per cardiac rehab protocols, patient's glucose must be between 90 and 270 mg/dL to exercise.  Patient denies any recent glucose levels less than 60 mg/dL or greater than 300 mg/dL. Patient verbalizes importance of notifying rehab staff if symptoms of hypoglycemia occur while at cardiac rehab.  Abnormal labs will be reported to patient's PCP/Endocrinologist by rehab staff.    Noted updated glucose parameters of 100-300    Sarita Tirado MS, RDN/LDN     Session: 24 Session Follow Up   Cardiac Rehab Individual Treatment Plan - Reassessment      Patient Name: Isaiah Dorsey Jr. MRN: [id number]   : 1960   Age: 63 y.o.   Primary Diagnosis: CABG 2022      Psychosocial Assessment:   Living Arrangements: Lives with spouse  Family Support: family  Self Reported: no change Stress  Displays: happiness  Medication: not applicable    Psychosocial Plan:   Goals:  Improved psychosocial coping strategies: Not Met: pt states he has a lot of stress  Verbalizes coping mechanisms: In Progress  Reduce manifestation of stress: In Progress  Maintain positive outlook: In Progress  Improve overall quality of life: In Progress    Comments on Goal Progression:  Pt is working towards decreasing stress in his life and improving his management of stressful events.    Interventions:  Patient to Self Report Emotional Changes at Session Check In  Recommend Physical Activity  Recommend Attending Education Lectures  Notify MD: No  Program Referral: No  Pharmaceutical Intervention/Therapy: No  Other Needs: not applicable  Stage of Readiness to Change: Action    Education:  Stress Management; verbalizes understanding; Date: 2022  Stress; verbalizes understanding; Date: 2022  Risk Factors; verbalizes  understanding; Date: 10/28/2022    Comments:  Pt attendance to cardiac rehab class has improved. Pt denies overwhelming stress or anxiety.   Patient has been instructed to notify staff in the event that circumstances worsen.  Patient verbalizes understanding.    Other Core Components/Risk Factors Assessment:   RISK FACTORS:  diabetes, hyperlipidemia, hypertension, obesity, sedentary lifestyle, stress    Learning Barriers: None    Medication Compliance: has been compliant with the medicaiton regimen    Other Core Components/Risk Factors Plan:   Goals:  Decrease cholesterol level: In Progress  Increase exercise tolerance: Met  Increase knowledge of CAD: Met  Decrease blood pressure: Met  Weight loss: Not Met: Pt has gained 14 lbs since starting rehab  Demonstrate accurate pulse taking: Met  Identify and manage personal areas of stress: In Progress  Control diabetes by adjusting diet and exercise: In Progress  Learn more about healthy eating: In Progress    Comments on Goal Progression:  Pt acknowledges his weight gain and states he is trying to eat healthier.     Interventions:  Individual Education/ Counseling: Yes  Physician Referral: No    Education:    fluid overload/CHF, verbalizes understanding; Date: 10/21/2022  risk factors, verbalizes understanding; Date: 10/28/2022  stress, verbalizes understanding; Date: 11/11/2022         Education method adapted to patients education level and preferred method of learning.  Method: explanation  handouts    Comments:  Pt states he is improved and continues to try to modify his risk factors to improve his cardiovascular health.    Other Core Components/Hypertension Assessment:   BP Range: 100-160 systolic; 70-90 diastolic  BP at Goal: No  Patient reported symptoms: none    Other Core Components/Hypertension Plan:   Goals:  Blood Pressure <130/80    Comments on Goal Progression:  Pt states he is interested in possibly joining the digital hypertension  program    Interventions:  Med Card Reconciled: No  Encourage medication compliance  Encourage sodium reduction  Encourage weight loss  Recommend physical activity  Educate on contributory factors  Reduce stress, anxiety, anger, depression, and/or chronic pain  Encourage home blood pressure monitoring  Recommend daily weights  MD notified/Physician Referral: Yes    Education:    Congestive Heart Failure; verbalizes understanding; Date: 10/21/2022  Risk Factors; verbalizes understanding; Date: 10/28/2022  Stress; verbalizes understanding; Date: 11/11/2022         Comments:  Pt has better blood pressure readings than on entry but still has occasional isolated elevated reading  >130/80  Does the patient have Heart Failure? Yes   Other Core Components/Congestive Heart Failure Assessment:   Ejection Fraction: 40-45% %  Patient reported symptoms: peripheral edema and weight gain  Lung Sounds: normal air entry, lungs clear to auscultation  Right Leg Edema: Trace  Left Leg Edema Trace    Other Core Components/Congestive Heart Failure Plan:   Goals:  Patient free from CHF Exacerbation    Comments on Goal Progression:  Pt is more aware of his symptoms and is making effort to decrease exacerbation of CHF.    Interventions/Recommendations:  Encourage medication compliance  Encourage daily weight checks  Promote sodium reduction  Encourage tracking fluid intake  Promote physical activity  Educate on contributory factors  Recommend home blood pressure monitoring  MD notified/MD visit scheduled: No not until next year      Education:    Congestive Heart Failure; verbalizes understanding; Date: 10/21/2022  Risk Factors; verbalizes understanding; Date: 10/28/2022  Stress; verbalizes understanding; Date: 11/11/2022         Comments:  Pt is not having CHF symptoms at this time.      Other Core Components/Tobacco Cessation Assessment:   Smoking Status: past smoker who quit many years ago  Smoking Cessation Barriers:  none  Stage of  Readiness to Change: Maintenance    Other Core Components/Tobacco Cessation Plan:   Goals:  Maintain non-smoking status    Comments on Goal Progression:  Pt does not smoke    Interventions:  Maintains non-smoking status    Education:    Risk Factors; verbalizes understanding; Date: 10/28/2022         Comments:  Pt has no desire to use tobacco products at this time.    Jaden Penaloza RN

## 2023-04-03 ENCOUNTER — PATIENT MESSAGE (OUTPATIENT)
Dept: ADMINISTRATIVE | Facility: HOSPITAL | Age: 63
End: 2023-04-03
Payer: COMMERCIAL

## 2023-04-13 ENCOUNTER — DOCUMENTATION ONLY (OUTPATIENT)
Dept: CARDIAC REHAB | Facility: CLINIC | Age: 63
End: 2023-04-13
Payer: COMMERCIAL

## 2023-04-13 NOTE — PROGRESS NOTES
Mr. Mason has completed 27 out of 36 exercise session of Phase II cardiac rehab.  A follow up reassessment will be completed at exit interview.   Mr. Mason is currently on hold due to having shoulder surgery.  We do not know if he will be able to return to rehab.    24 Session Follow Up   Cardiac Rehab Individual Treatment Plan - Reassessment      Patient Name: Isaiah Dorsey Jr. MRN: [id number]   : 1960   Age: 63 y.o.   Primary Diagnosis: CABG Date of Event: 22   EF: 40-45%  Risk Stratification: high  Referring Physician: ADRIANO   Exercise Assessment:     Angina with exercise?: No   ST Depression with Exercise?: No  Fall Risk: No   Assistive Devices:  independent  There were no limitations noted by the patient.  Comments on Progression: Mr. Mason is progressing fair and his workloads will continue to be increased as he tolerates exercise intensity.     Exercise Plan:   Goals:  CR Exercise Goals: Attend Cardiac Rehab 3 times/week: In Progress  Home Aerobic Exercise: 2 additional days/week for 30-60 minutes: In Progress  Intensity of 12-15 on the Rate of Perceived Exertion (RPE) scale: In Progress  30% increase in entry estimated METS: 7.8 : In Progress  5 days/week for 30-60 minutes: In Progress    Comments on Goal Progression:  Patient Consistency: inconsistent with attendance  Home exercise? No   Patient reports intensity rate 11-14 on RPE scale    Intervention/Recommendations:   Discussed importance of regular attendance to cardiac rehab class    Exercise Prescription:  THR Range 63-74   Mode: Treadmill  Nustep   Frequency:  3 days/week   Duration:  30-60 minutes   Intensity:  12-15 RPE   Resistance Training:  Yes: 3 to 5 lb weights with 10-15 reps based on strength and range of motion and adjusted accordingly     Home Prescription:  Mode Aerobic exercise   Frequency: 2- 3 days/week   Duration: 30-60 minutes   Resistance Training: None     Education:  Arthritis/Osteporosis; verbalizes understanding; Date:  22  Body Composition; verbalizes understanding; Date: 22  Exercise and Rehydration; verbalizes understanding; Date: 22  Flexibility/Stretching; verbalizes understanding; Date: 10-24-22  Relaxation Techniques; verbalizes understanding; Date: 22    Comments:  I reviewed exercise recommendations with Mr. Mason.  I strongly encouraged him to begin exercising.  He stated understanding but he also stated he doesn't feel he has time.        The exercise prescription will continue to be adjusted based on tolerance of exercise intensity by patient.    Jorje Ceballos, CEP     24 Session Follow Up   Cardiac Rehab Individual Treatment Plan - Reassessment    Patient Name: Isaiah Dorsey Jr. MRN: [id number]   : 1960   Age: 63 y.o.   Primary Diagnosis: s/p CABG, CAD, CHF, HLD, NSTEMI, DM    Nutrition Assessment:     Anthropometrics    Height 72 inches   Weight 288 lbs   BMI 39.1     Patient confirms he is taking lipitor 40mg for cholesterol control.  Difficulty Chewing or Swallowing: no  Current Exercise: See Exercise Physiologist Note  Food Safety/Food Preparation: self  Living Arrangements/Family Support: Lives with spouse  Cultural/Spiritual/Personal Preferences: not applicable   Barriers to Education: none identified  Stage of Change Related to Diet Habits: Action  Recent Changes to Diet: Yes - more produce intake  Food Diary: Completed      Nutrition Plan:   Goals:  LDL-C < 70 (for high risk patients)  Hgb A1c < 7%  BMI < 25 and abdominal girth < 40M/<35 F  2 gram sodium, Mediterranean diet: In Progress  Rehab weight loss goal of 10 lbs, 1 lb per week: Not Met: pt has gained 14lbs since starting rehab  Fish intake (non-fried varieties) to a goal of 2-3 servings per week: In Progress  Increase fruit and vegetable intake: In Progress    Comments on Goal Progression:  Discussed significant weight gain, pt is aware he has been making poor food choices and not exercising outside of rehab. Emphasized  importance of portion control and lifestyle modification for improved health outcomes-pt verbalized understanding.    Interventions:  Dietitian Consult: No  Patient to participate in Cardiac Rehab sessions three times a week  Weekly Dietitian Weight Check  Nutrition Recommendations Provided: Verbal, Reviewed  Follow Up Plan for Ongoing Self-Management Support    Education:  Dining Out; verbalizes understanding; Date: 11/9/22  Mediterranean Diet; verbalizes understanding; Date: 11/23/22  Recipe Modication; verbalizes understanding; Date: 11/16/22  Vegetarianism; verbalizes understanding; Date: 12/5/22  Vitamins; verbalizes understanding; Date: 10/26/22    Comments:   Discussed ways to incorporate healthy snacks, eating on a schedule, and monitoring sodium intake for heart health.    Diabetes  Is the patient diabetic? Yes   Other Core Components/Diabetes Assessment:   Labs:  Lab Results   Component Value Date    GLUF 136 (H) 08/25/2022     Lab Results   Component Value Date    HGBA1C 6.2 (H) 03/23/2023    HGBA1C 6.2 (H) 08/25/2022    HGBA1C 7.2 (H) 07/28/2022      Lab Results   Component Value Date    ESTIMATEDAVG 131 03/23/2023    ESTIMATEDAVG 131 08/25/2022    ESTIMATEDAVG 160 (H) 07/28/2022       History of diabetes since 2018  Diabetes medications: Ozempic, Januvia 100mg daily, metformin 1000mg daily  Blood Glucose Checks at Home: Yes: blood sugars ac & HS  Typical morning range: 130-160 mg/dl  Endocrinologist or PCP following DM: Dr. Burrows    Other Core Components/Diabetes Plan:   Goals:  Hgb A1c < 7%  Exercise Blood Glucose: 100-300 mg/dl    Interventions:  Reviewed and Discussed Labs  Medication Compliance  Med Card Reconciled  Low Sodium, Mediterranean, ADA Diet  Weight Management  Physical Activity  Increase Knowledge of Contributory Factors  Home Monitoring    Education:  Risk Factors; verbalizes understanding; Date: 10/28/22  Stress; verbalizes understanding; Date: 11/11/22  Mediterranean Diet; verbalizes  understanding; Date: 22  Dining Out; verbalizes understanding; Date: 22    Comments:   Patient verbalizes understanding to bring home glucometer and check glucose pre and post each exercise session.  Per cardiac rehab protocols, patient's glucose must be between 90 and 270 mg/dL to exercise.  Patient denies any recent glucose levels less than 60 mg/dL or greater than 300 mg/dL. Patient verbalizes importance of notifying rehab staff if symptoms of hypoglycemia occur while at cardiac rehab.  Abnormal labs will be reported to patient's PCP/Endocrinologist by rehab staff.    Noted updated glucose parameters of 100-300    Sarita Tirado MS, RDN/LDN     Session: 24 Session Follow Up   Cardiac Rehab Individual Treatment Plan - Reassessment      Patient Name: Isaiah Dorsey Jr. MRN: [id number]   : 1960   Age: 63 y.o.   Primary Diagnosis: CABG 2022      Psychosocial Assessment:   Living Arrangements: Lives with spouse  Family Support: family  Self Reported: no change Stress  Displays: happiness  Medication: not applicable    Psychosocial Plan:   Goals:  Improved psychosocial coping strategies: Not Met: pt states he has a lot of stress  Verbalizes coping mechanisms: In Progress  Reduce manifestation of stress: In Progress  Maintain positive outlook: In Progress  Improve overall quality of life: In Progress    Comments on Goal Progression:  Pt is working towards decreasing stress in his life and improving his management of stressful events.    Interventions:  Patient to Self Report Emotional Changes at Session Check In  Recommend Physical Activity  Recommend Attending Education Lectures  Notify MD: No  Program Referral: No  Pharmaceutical Intervention/Therapy: No  Other Needs: not applicable  Stage of Readiness to Change: Action    Education:  Stress Management; verbalizes understanding; Date: 2022  Stress; verbalizes understanding; Date: 2022  Risk Factors; verbalizes understanding; Date:  10/28/2022    Comments:  Pt attendance to cardiac rehab class has improved. Pt denies overwhelming stress or anxiety.   Patient has been instructed to notify staff in the event that circumstances worsen.  Patient verbalizes understanding.    Other Core Components/Risk Factors Assessment:   RISK FACTORS:  diabetes, hyperlipidemia, hypertension, obesity, sedentary lifestyle, stress    Learning Barriers: None    Medication Compliance: has been compliant with the medicaiton regimen    Other Core Components/Risk Factors Plan:   Goals:  Decrease cholesterol level: In Progress  Increase exercise tolerance: Met  Increase knowledge of CAD: Met  Decrease blood pressure: Met  Weight loss: Not Met: Pt has gained 14 lbs since starting rehab  Demonstrate accurate pulse taking: Met  Identify and manage personal areas of stress: In Progress  Control diabetes by adjusting diet and exercise: In Progress  Learn more about healthy eating: In Progress    Comments on Goal Progression:  Pt acknowledges his weight gain and states he is trying to eat healthier.     Interventions:  Individual Education/ Counseling: Yes  Physician Referral: No    Education:    fluid overload/CHF, verbalizes understanding; Date: 10/21/2022  risk factors, verbalizes understanding; Date: 10/28/2022  stress, verbalizes understanding; Date: 11/11/2022         Education method adapted to patients education level and preferred method of learning.  Method: explanation  handouts    Comments:  Pt states he is improved and continues to try to modify his risk factors to improve his cardiovascular health.    Other Core Components/Hypertension Assessment:   BP Range: 100-160 systolic; 70-90 diastolic  BP at Goal: No  Patient reported symptoms: none    Other Core Components/Hypertension Plan:   Goals:  Blood Pressure <130/80    Comments on Goal Progression:  Pt states he is interested in possibly joining the digital hypertension program    Interventions:  Med Card  Reconciled: No  Encourage medication compliance  Encourage sodium reduction  Encourage weight loss  Recommend physical activity  Educate on contributory factors  Reduce stress, anxiety, anger, depression, and/or chronic pain  Encourage home blood pressure monitoring  Recommend daily weights  MD notified/Physician Referral: Yes    Education:    Congestive Heart Failure; verbalizes understanding; Date: 10/21/2022  Risk Factors; verbalizes understanding; Date: 10/28/2022  Stress; verbalizes understanding; Date: 11/11/2022         Comments:  Pt has better blood pressure readings than on entry but still has occasional isolated elevated reading  >130/80  Does the patient have Heart Failure? Yes   Other Core Components/Congestive Heart Failure Assessment:   Ejection Fraction: 40-45% %  Patient reported symptoms: peripheral edema and weight gain  Lung Sounds: normal air entry, lungs clear to auscultation  Right Leg Edema: Trace  Left Leg Edema Trace    Other Core Components/Congestive Heart Failure Plan:   Goals:  Patient free from CHF Exacerbation    Comments on Goal Progression:  Pt is more aware of his symptoms and is making effort to decrease exacerbation of CHF.    Interventions/Recommendations:  Encourage medication compliance  Encourage daily weight checks  Promote sodium reduction  Encourage tracking fluid intake  Promote physical activity  Educate on contributory factors  Recommend home blood pressure monitoring  MD notified/MD visit scheduled: No not until next year      Education:    Congestive Heart Failure; verbalizes understanding; Date: 10/21/2022  Risk Factors; verbalizes understanding; Date: 10/28/2022  Stress; verbalizes understanding; Date: 11/11/2022         Comments:  Pt is not having CHF symptoms at this time.      Other Core Components/Tobacco Cessation Assessment:   Smoking Status: past smoker who quit many years ago  Smoking Cessation Barriers:  none  Stage of Readiness to Change:  Maintenance    Other Core Components/Tobacco Cessation Plan:   Goals:  Maintain non-smoking status    Comments on Goal Progression:  Pt does not smoke    Interventions:  Maintains non-smoking status    Education:    Risk Factors; verbalizes understanding; Date: 10/28/2022         Comments:  Pt has no desire to use tobacco products at this time.    Jaden Penaloza RN

## 2023-04-17 ENCOUNTER — OFFICE VISIT (OUTPATIENT)
Dept: SPORTS MEDICINE | Facility: CLINIC | Age: 63
End: 2023-04-17
Payer: COMMERCIAL

## 2023-04-17 VITALS
BODY MASS INDEX: 39.55 KG/M2 | DIASTOLIC BLOOD PRESSURE: 85 MMHG | HEART RATE: 58 BPM | WEIGHT: 292 LBS | SYSTOLIC BLOOD PRESSURE: 139 MMHG | HEIGHT: 72 IN

## 2023-04-17 DIAGNOSIS — Z98.890 S/P RIGHT ROTATOR CUFF REPAIR: Primary | ICD-10-CM

## 2023-04-17 PROCEDURE — 3072F LOW RISK FOR RETINOPATHY: CPT | Mod: CPTII,S$GLB,, | Performed by: ORTHOPAEDIC SURGERY

## 2023-04-17 PROCEDURE — 3075F PR MOST RECENT SYSTOLIC BLOOD PRESS GE 130-139MM HG: ICD-10-PCS | Mod: CPTII,S$GLB,, | Performed by: ORTHOPAEDIC SURGERY

## 2023-04-17 PROCEDURE — 4010F ACE/ARB THERAPY RXD/TAKEN: CPT | Mod: CPTII,S$GLB,, | Performed by: ORTHOPAEDIC SURGERY

## 2023-04-17 PROCEDURE — 99999 PR PBB SHADOW E&M-EST. PATIENT-LVL IV: CPT | Mod: PBBFAC,,, | Performed by: ORTHOPAEDIC SURGERY

## 2023-04-17 PROCEDURE — 3008F BODY MASS INDEX DOCD: CPT | Mod: CPTII,S$GLB,, | Performed by: ORTHOPAEDIC SURGERY

## 2023-04-17 PROCEDURE — 3072F PR LOW RISK FOR RETINOPATHY: ICD-10-PCS | Mod: CPTII,S$GLB,, | Performed by: ORTHOPAEDIC SURGERY

## 2023-04-17 PROCEDURE — 1159F MED LIST DOCD IN RCRD: CPT | Mod: CPTII,S$GLB,, | Performed by: ORTHOPAEDIC SURGERY

## 2023-04-17 PROCEDURE — 4010F PR ACE/ARB THEARPY RXD/TAKEN: ICD-10-PCS | Mod: CPTII,S$GLB,, | Performed by: ORTHOPAEDIC SURGERY

## 2023-04-17 PROCEDURE — 99999 PR PBB SHADOW E&M-EST. PATIENT-LVL IV: ICD-10-PCS | Mod: PBBFAC,,, | Performed by: ORTHOPAEDIC SURGERY

## 2023-04-17 PROCEDURE — 3044F PR MOST RECENT HEMOGLOBIN A1C LEVEL <7.0%: ICD-10-PCS | Mod: CPTII,S$GLB,, | Performed by: ORTHOPAEDIC SURGERY

## 2023-04-17 PROCEDURE — 3079F PR MOST RECENT DIASTOLIC BLOOD PRESSURE 80-89 MM HG: ICD-10-PCS | Mod: CPTII,S$GLB,, | Performed by: ORTHOPAEDIC SURGERY

## 2023-04-17 PROCEDURE — 99213 PR OFFICE/OUTPT VISIT, EST, LEVL III, 20-29 MIN: ICD-10-PCS | Mod: S$GLB,,, | Performed by: ORTHOPAEDIC SURGERY

## 2023-04-17 PROCEDURE — 3008F PR BODY MASS INDEX (BMI) DOCUMENTED: ICD-10-PCS | Mod: CPTII,S$GLB,, | Performed by: ORTHOPAEDIC SURGERY

## 2023-04-17 PROCEDURE — 3044F HG A1C LEVEL LT 7.0%: CPT | Mod: CPTII,S$GLB,, | Performed by: ORTHOPAEDIC SURGERY

## 2023-04-17 PROCEDURE — 3079F DIAST BP 80-89 MM HG: CPT | Mod: CPTII,S$GLB,, | Performed by: ORTHOPAEDIC SURGERY

## 2023-04-17 PROCEDURE — 99213 OFFICE O/P EST LOW 20 MIN: CPT | Mod: S$GLB,,, | Performed by: ORTHOPAEDIC SURGERY

## 2023-04-17 PROCEDURE — 3075F SYST BP GE 130 - 139MM HG: CPT | Mod: CPTII,S$GLB,, | Performed by: ORTHOPAEDIC SURGERY

## 2023-04-17 PROCEDURE — 1159F PR MEDICATION LIST DOCUMENTED IN MEDICAL RECORD: ICD-10-PCS | Mod: CPTII,S$GLB,, | Performed by: ORTHOPAEDIC SURGERY

## 2023-04-17 NOTE — PROGRESS NOTES
POST-OPERATIVE EXAMINATION    63 y.o. Male who returns for follow after surgery. He is 4 months s/p    Procedures Performed:  1. Right shoulder Arthroscopic rotator cuff repair CPT - 56228     2. Right shoulder Arthroscopic Biceps tenodesis CPT - 90404     3. Right shoulder Arthroscopic extensive debridement CPT - 28512     4. Right shoulder Arthroscopic subacromial decompression and bursectomy CPT - 25547     He is doing well without any issues.       PHYSICAL EXAMINATION:  Ht 6' (1.829 m)   Wt 132.5 kg (292 lb)   BMI 39.60 kg/m²   General: Well-developed well-nourished 63 y.o. male in no acute distress   Cardiovascular: Regular rhythm   Lungs: No labored breathing or wheezing appreciated   Neuro: Alert and oriented ×3   Psychiatric: well oriented to person, place and time, demonstrates normal mood and affect   Skin: No rashes, lesions or ulcers, normal temperature, turgor, and texture on involved extremity    ORTHOPEDIC EXAM:  Normal post-operative swelling  Normal post-operative scarring  Strength: 4+/5 ER  ROM: FF: 170 degrees; ER/Abd: 85 degrees ; IR/Abd: 15 degrees; ER/Add: 60 degrees  Tests: Negative Salinas's, Negative Hawkin's, Negative Neer's    ASSESSMENT:      ICD-10-CM ICD-9-CM   1. S/P right rotator cuff repair  Z98.890 V45.89       PLAN:       Continue physical therapy  RTC in 2 months

## 2023-04-20 ENCOUNTER — OFFICE VISIT (OUTPATIENT)
Dept: INTERNAL MEDICINE | Facility: CLINIC | Age: 63
End: 2023-04-20
Payer: COMMERCIAL

## 2023-04-20 VITALS
BODY MASS INDEX: 39.08 KG/M2 | RESPIRATION RATE: 12 BRPM | SYSTOLIC BLOOD PRESSURE: 122 MMHG | DIASTOLIC BLOOD PRESSURE: 76 MMHG | HEIGHT: 72 IN | HEART RATE: 62 BPM | TEMPERATURE: 98 F | WEIGHT: 288.56 LBS

## 2023-04-20 DIAGNOSIS — I48.0 PAROXYSMAL ATRIAL FIBRILLATION: ICD-10-CM

## 2023-04-20 DIAGNOSIS — E66.2 OBESITY HYPOVENTILATION SYNDROME: ICD-10-CM

## 2023-04-20 DIAGNOSIS — E11.65 TYPE 2 DIABETES MELLITUS WITH HYPERGLYCEMIA, WITHOUT LONG-TERM CURRENT USE OF INSULIN: ICD-10-CM

## 2023-04-20 DIAGNOSIS — Z90.81 H/O SPLENECTOMY: ICD-10-CM

## 2023-04-20 DIAGNOSIS — E11.59 HYPERTENSION ASSOCIATED WITH DIABETES: ICD-10-CM

## 2023-04-20 DIAGNOSIS — I50.22 HEART FAILURE WITH MID-RANGE EJECTION FRACTION: ICD-10-CM

## 2023-04-20 DIAGNOSIS — I25.110 ATHEROSCLEROSIS OF NATIVE CORONARY ARTERY OF NATIVE HEART WITH UNSTABLE ANGINA PECTORIS: ICD-10-CM

## 2023-04-20 DIAGNOSIS — Z95.1 S/P CABG (CORONARY ARTERY BYPASS GRAFT): ICD-10-CM

## 2023-04-20 DIAGNOSIS — E78.5 HYPERLIPIDEMIA ASSOCIATED WITH TYPE 2 DIABETES MELLITUS: Primary | ICD-10-CM

## 2023-04-20 DIAGNOSIS — I15.2 HYPERTENSION ASSOCIATED WITH DIABETES: ICD-10-CM

## 2023-04-20 DIAGNOSIS — E11.69 HYPERLIPIDEMIA ASSOCIATED WITH TYPE 2 DIABETES MELLITUS: Primary | ICD-10-CM

## 2023-04-20 DIAGNOSIS — I25.810 CORONARY ARTERY DISEASE INVOLVING CORONARY BYPASS GRAFT OF NATIVE HEART WITHOUT ANGINA PECTORIS: ICD-10-CM

## 2023-04-20 PROCEDURE — 1159F PR MEDICATION LIST DOCUMENTED IN MEDICAL RECORD: ICD-10-PCS | Mod: CPTII,S$GLB,, | Performed by: INTERNAL MEDICINE

## 2023-04-20 PROCEDURE — 3008F BODY MASS INDEX DOCD: CPT | Mod: CPTII,S$GLB,, | Performed by: INTERNAL MEDICINE

## 2023-04-20 PROCEDURE — 1160F PR REVIEW ALL MEDS BY PRESCRIBER/CLIN PHARMACIST DOCUMENTED: ICD-10-PCS | Mod: CPTII,S$GLB,, | Performed by: INTERNAL MEDICINE

## 2023-04-20 PROCEDURE — 99999 PR PBB SHADOW E&M-EST. PATIENT-LVL IV: CPT | Mod: PBBFAC,,, | Performed by: INTERNAL MEDICINE

## 2023-04-20 PROCEDURE — 4010F ACE/ARB THERAPY RXD/TAKEN: CPT | Mod: CPTII,S$GLB,, | Performed by: INTERNAL MEDICINE

## 2023-04-20 PROCEDURE — 3078F DIAST BP <80 MM HG: CPT | Mod: CPTII,S$GLB,, | Performed by: INTERNAL MEDICINE

## 2023-04-20 PROCEDURE — 3008F PR BODY MASS INDEX (BMI) DOCUMENTED: ICD-10-PCS | Mod: CPTII,S$GLB,, | Performed by: INTERNAL MEDICINE

## 2023-04-20 PROCEDURE — 99214 PR OFFICE/OUTPT VISIT, EST, LEVL IV, 30-39 MIN: ICD-10-PCS | Mod: S$GLB,,, | Performed by: INTERNAL MEDICINE

## 2023-04-20 PROCEDURE — 1160F RVW MEDS BY RX/DR IN RCRD: CPT | Mod: CPTII,S$GLB,, | Performed by: INTERNAL MEDICINE

## 2023-04-20 PROCEDURE — 99214 OFFICE O/P EST MOD 30 MIN: CPT | Mod: S$GLB,,, | Performed by: INTERNAL MEDICINE

## 2023-04-20 PROCEDURE — 3078F PR MOST RECENT DIASTOLIC BLOOD PRESSURE < 80 MM HG: ICD-10-PCS | Mod: CPTII,S$GLB,, | Performed by: INTERNAL MEDICINE

## 2023-04-20 PROCEDURE — 3044F PR MOST RECENT HEMOGLOBIN A1C LEVEL <7.0%: ICD-10-PCS | Mod: CPTII,S$GLB,, | Performed by: INTERNAL MEDICINE

## 2023-04-20 PROCEDURE — 1159F MED LIST DOCD IN RCRD: CPT | Mod: CPTII,S$GLB,, | Performed by: INTERNAL MEDICINE

## 2023-04-20 PROCEDURE — 3072F PR LOW RISK FOR RETINOPATHY: ICD-10-PCS | Mod: CPTII,S$GLB,, | Performed by: INTERNAL MEDICINE

## 2023-04-20 PROCEDURE — 3072F LOW RISK FOR RETINOPATHY: CPT | Mod: CPTII,S$GLB,, | Performed by: INTERNAL MEDICINE

## 2023-04-20 PROCEDURE — 99999 PR PBB SHADOW E&M-EST. PATIENT-LVL IV: ICD-10-PCS | Mod: PBBFAC,,, | Performed by: INTERNAL MEDICINE

## 2023-04-20 PROCEDURE — 3074F SYST BP LT 130 MM HG: CPT | Mod: CPTII,S$GLB,, | Performed by: INTERNAL MEDICINE

## 2023-04-20 PROCEDURE — 4010F PR ACE/ARB THEARPY RXD/TAKEN: ICD-10-PCS | Mod: CPTII,S$GLB,, | Performed by: INTERNAL MEDICINE

## 2023-04-20 PROCEDURE — 3074F PR MOST RECENT SYSTOLIC BLOOD PRESSURE < 130 MM HG: ICD-10-PCS | Mod: CPTII,S$GLB,, | Performed by: INTERNAL MEDICINE

## 2023-04-20 PROCEDURE — 3044F HG A1C LEVEL LT 7.0%: CPT | Mod: CPTII,S$GLB,, | Performed by: INTERNAL MEDICINE
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
## 2023-04-25 ENCOUNTER — OFFICE VISIT (OUTPATIENT)
Dept: CARDIOLOGY | Facility: CLINIC | Age: 63
End: 2023-04-25
Payer: COMMERCIAL

## 2023-04-25 VITALS
DIASTOLIC BLOOD PRESSURE: 78 MMHG | SYSTOLIC BLOOD PRESSURE: 142 MMHG | WEIGHT: 293.19 LBS | BODY MASS INDEX: 39.71 KG/M2 | HEIGHT: 72 IN | HEART RATE: 62 BPM

## 2023-04-25 DIAGNOSIS — I25.810 CORONARY ARTERY DISEASE INVOLVING CORONARY BYPASS GRAFT OF NATIVE HEART WITHOUT ANGINA PECTORIS: Primary | ICD-10-CM

## 2023-04-25 DIAGNOSIS — I48.0 PAROXYSMAL ATRIAL FIBRILLATION: ICD-10-CM

## 2023-04-25 DIAGNOSIS — E11.65 TYPE 2 DIABETES MELLITUS WITH HYPERGLYCEMIA, WITHOUT LONG-TERM CURRENT USE OF INSULIN: ICD-10-CM

## 2023-04-25 DIAGNOSIS — G47.33 OSA (OBSTRUCTIVE SLEEP APNEA): ICD-10-CM

## 2023-04-25 DIAGNOSIS — I50.22 HEART FAILURE WITH MID-RANGE EJECTION FRACTION: ICD-10-CM

## 2023-04-25 DIAGNOSIS — Z95.1 S/P CABG (CORONARY ARTERY BYPASS GRAFT): ICD-10-CM

## 2023-04-25 DIAGNOSIS — I15.2 HYPERTENSION ASSOCIATED WITH DIABETES: ICD-10-CM

## 2023-04-25 DIAGNOSIS — I21.4 NON-STEMI (NON-ST ELEVATED MYOCARDIAL INFARCTION): ICD-10-CM

## 2023-04-25 DIAGNOSIS — E11.59 HYPERTENSION ASSOCIATED WITH DIABETES: ICD-10-CM

## 2023-04-25 PROCEDURE — 1160F PR REVIEW ALL MEDS BY PRESCRIBER/CLIN PHARMACIST DOCUMENTED: ICD-10-PCS | Mod: CPTII,S$GLB,, | Performed by: INTERNAL MEDICINE

## 2023-04-25 PROCEDURE — 99999 PR PBB SHADOW E&M-EST. PATIENT-LVL IV: CPT | Mod: PBBFAC,,, | Performed by: INTERNAL MEDICINE

## 2023-04-25 PROCEDURE — 99214 OFFICE O/P EST MOD 30 MIN: CPT | Mod: S$GLB,,, | Performed by: INTERNAL MEDICINE

## 2023-04-25 PROCEDURE — 3008F PR BODY MASS INDEX (BMI) DOCUMENTED: ICD-10-PCS | Mod: CPTII,S$GLB,, | Performed by: INTERNAL MEDICINE

## 2023-04-25 PROCEDURE — 99999 PR PBB SHADOW E&M-EST. PATIENT-LVL IV: ICD-10-PCS | Mod: PBBFAC,,, | Performed by: INTERNAL MEDICINE

## 2023-04-25 PROCEDURE — 3078F DIAST BP <80 MM HG: CPT | Mod: CPTII,S$GLB,, | Performed by: INTERNAL MEDICINE

## 2023-04-25 PROCEDURE — 1160F RVW MEDS BY RX/DR IN RCRD: CPT | Mod: CPTII,S$GLB,, | Performed by: INTERNAL MEDICINE

## 2023-04-25 PROCEDURE — 3072F LOW RISK FOR RETINOPATHY: CPT | Mod: CPTII,S$GLB,, | Performed by: INTERNAL MEDICINE

## 2023-04-25 PROCEDURE — 3044F HG A1C LEVEL LT 7.0%: CPT | Mod: CPTII,S$GLB,, | Performed by: INTERNAL MEDICINE

## 2023-04-25 PROCEDURE — 3008F BODY MASS INDEX DOCD: CPT | Mod: CPTII,S$GLB,, | Performed by: INTERNAL MEDICINE

## 2023-04-25 PROCEDURE — 99214 PR OFFICE/OUTPT VISIT, EST, LEVL IV, 30-39 MIN: ICD-10-PCS | Mod: S$GLB,,, | Performed by: INTERNAL MEDICINE

## 2023-04-25 PROCEDURE — 3077F SYST BP >= 140 MM HG: CPT | Mod: CPTII,S$GLB,, | Performed by: INTERNAL MEDICINE

## 2023-04-25 PROCEDURE — 3072F PR LOW RISK FOR RETINOPATHY: ICD-10-PCS | Mod: CPTII,S$GLB,, | Performed by: INTERNAL MEDICINE

## 2023-04-25 PROCEDURE — 3078F PR MOST RECENT DIASTOLIC BLOOD PRESSURE < 80 MM HG: ICD-10-PCS | Mod: CPTII,S$GLB,, | Performed by: INTERNAL MEDICINE

## 2023-04-25 PROCEDURE — 1159F PR MEDICATION LIST DOCUMENTED IN MEDICAL RECORD: ICD-10-PCS | Mod: CPTII,S$GLB,, | Performed by: INTERNAL MEDICINE

## 2023-04-25 PROCEDURE — 4010F ACE/ARB THERAPY RXD/TAKEN: CPT | Mod: CPTII,S$GLB,, | Performed by: INTERNAL MEDICINE

## 2023-04-25 PROCEDURE — 1159F MED LIST DOCD IN RCRD: CPT | Mod: CPTII,S$GLB,, | Performed by: INTERNAL MEDICINE

## 2023-04-25 PROCEDURE — 3044F PR MOST RECENT HEMOGLOBIN A1C LEVEL <7.0%: ICD-10-PCS | Mod: CPTII,S$GLB,, | Performed by: INTERNAL MEDICINE

## 2023-04-25 PROCEDURE — 4010F PR ACE/ARB THEARPY RXD/TAKEN: ICD-10-PCS | Mod: CPTII,S$GLB,, | Performed by: INTERNAL MEDICINE

## 2023-04-25 PROCEDURE — 3077F PR MOST RECENT SYSTOLIC BLOOD PRESSURE >= 140 MM HG: ICD-10-PCS | Mod: CPTII,S$GLB,, | Performed by: INTERNAL MEDICINE

## 2023-05-02 ENCOUNTER — LAB VISIT (OUTPATIENT)
Dept: LAB | Facility: HOSPITAL | Age: 63
End: 2023-05-02
Attending: INTERNAL MEDICINE
Payer: COMMERCIAL

## 2023-05-02 DIAGNOSIS — I25.810 CORONARY ARTERY DISEASE INVOLVING CORONARY BYPASS GRAFT OF NATIVE HEART WITHOUT ANGINA PECTORIS: ICD-10-CM

## 2023-05-02 LAB
CHOLEST SERPL-MCNC: 123 MG/DL (ref 120–199)
CHOLEST/HDLC SERPL: 3.3 {RATIO} (ref 2–5)
HDLC SERPL-MCNC: 37 MG/DL (ref 40–75)
HDLC SERPL: 30.1 % (ref 20–50)
LDLC SERPL CALC-MCNC: 65.4 MG/DL (ref 63–159)
NONHDLC SERPL-MCNC: 86 MG/DL
TRIGL SERPL-MCNC: 103 MG/DL (ref 30–150)

## 2023-05-02 PROCEDURE — 36415 COLL VENOUS BLD VENIPUNCTURE: CPT | Mod: PN | Performed by: INTERNAL MEDICINE

## 2023-05-02 PROCEDURE — 80061 LIPID PANEL: CPT | Performed by: INTERNAL MEDICINE

## 2023-05-04 ENCOUNTER — DOCUMENTATION ONLY (OUTPATIENT)
Dept: CARDIAC REHAB | Facility: CLINIC | Age: 63
End: 2023-05-04
Payer: COMMERCIAL

## 2023-05-04 NOTE — PROGRESS NOTES
Mr. Mason has completed 27 out of 36 exercise session of Phase II cardiac rehab.  A follow up reassessment will be completed at exit interview.   Mr. Mason is currently on hold due to having shoulder surgery.  We do not know if he will be able to return to rehab.    24 Session Follow Up   Cardiac Rehab Individual Treatment Plan - Reassessment      Patient Name: Isaiah Dorsey Jr. MRN: [id number]   : 1960   Age: 63 y.o.   Primary Diagnosis: CABG Date of Event: 22   EF: 40-45%  Risk Stratification: high  Referring Physician: ADRIANO   Exercise Assessment:     Angina with exercise?: No   ST Depression with Exercise?: No  Fall Risk: No   Assistive Devices:  independent  There were no limitations noted by the patient.  Comments on Progression: Mr. Mason is progressing fair and his workloads will continue to be increased as he tolerates exercise intensity.     Exercise Plan:   Goals:  CR Exercise Goals: Attend Cardiac Rehab 3 times/week: In Progress  Home Aerobic Exercise: 2 additional days/week for 30-60 minutes: In Progress  Intensity of 12-15 on the Rate of Perceived Exertion (RPE) scale: In Progress  30% increase in entry estimated METS: 7.8 : In Progress  5 days/week for 30-60 minutes: In Progress    Comments on Goal Progression:  Patient Consistency: inconsistent with attendance  Home exercise? No   Patient reports intensity rate 11-14 on RPE scale    Intervention/Recommendations:   Discussed importance of regular attendance to cardiac rehab class    Exercise Prescription:  THR Range 63-74   Mode: Treadmill  Nustep   Frequency:  3 days/week   Duration:  30-60 minutes   Intensity:  12-15 RPE   Resistance Training:  Yes: 3 to 5 lb weights with 10-15 reps based on strength and range of motion and adjusted accordingly     Home Prescription:  Mode Aerobic exercise   Frequency: 2- 3 days/week   Duration: 30-60 minutes   Resistance Training: None     Education:  Arthritis/Osteporosis; verbalizes understanding; Date:  22  Body Composition; verbalizes understanding; Date: 22  Exercise and Rehydration; verbalizes understanding; Date: 22  Flexibility/Stretching; verbalizes understanding; Date: 10-24-22  Relaxation Techniques; verbalizes understanding; Date: 22    Comments:  I reviewed exercise recommendations with Mr. Mason.  I strongly encouraged him to begin exercising.  He stated understanding but he also stated he doesn't feel he has time.        The exercise prescription will continue to be adjusted based on tolerance of exercise intensity by patient.    Jorje Ceballos, CEP     24 Session Follow Up   Cardiac Rehab Individual Treatment Plan - Reassessment    Patient Name: Isaiah Dorsey Jr. MRN: [id number]   : 1960   Age: 63 y.o.   Primary Diagnosis: s/p CABG, CAD, CHF, HLD, NSTEMI, DM    Nutrition Assessment:     Anthropometrics    Height 72 inches   Weight 288 lbs   BMI 39.1     Patient confirms he is taking lipitor 40mg for cholesterol control.  Difficulty Chewing or Swallowing: no  Current Exercise: See Exercise Physiologist Note  Food Safety/Food Preparation: self  Living Arrangements/Family Support: Lives with spouse  Cultural/Spiritual/Personal Preferences: not applicable   Barriers to Education: none identified  Stage of Change Related to Diet Habits: Action  Recent Changes to Diet: Yes - more produce intake  Food Diary: Completed      Nutrition Plan:   Goals:  LDL-C < 70 (for high risk patients)  Hgb A1c < 7%  BMI < 25 and abdominal girth < 40M/<35 F  2 gram sodium, Mediterranean diet: In Progress  Rehab weight loss goal of 10 lbs, 1 lb per week: Not Met: pt has gained 14lbs since starting rehab  Fish intake (non-fried varieties) to a goal of 2-3 servings per week: In Progress  Increase fruit and vegetable intake: In Progress    Comments on Goal Progression:  Discussed significant weight gain, pt is aware he has been making poor food choices and not exercising outside of rehab. Emphasized  importance of portion control and lifestyle modification for improved health outcomes-pt verbalized understanding.    Interventions:  Dietitian Consult: No  Patient to participate in Cardiac Rehab sessions three times a week  Weekly Dietitian Weight Check  Nutrition Recommendations Provided: Verbal, Reviewed  Follow Up Plan for Ongoing Self-Management Support    Education:  Dining Out; verbalizes understanding; Date: 11/9/22  Mediterranean Diet; verbalizes understanding; Date: 11/23/22  Recipe Modication; verbalizes understanding; Date: 11/16/22  Vegetarianism; verbalizes understanding; Date: 12/5/22  Vitamins; verbalizes understanding; Date: 10/26/22    Comments:   Discussed ways to incorporate healthy snacks, eating on a schedule, and monitoring sodium intake for heart health.    Diabetes  Is the patient diabetic? Yes   Other Core Components/Diabetes Assessment:   Labs:  Lab Results   Component Value Date    GLUF 136 (H) 08/25/2022     Lab Results   Component Value Date    HGBA1C 6.2 (H) 03/23/2023    HGBA1C 6.2 (H) 08/25/2022    HGBA1C 7.2 (H) 07/28/2022      Lab Results   Component Value Date    ESTIMATEDAVG 131 03/23/2023    ESTIMATEDAVG 131 08/25/2022    ESTIMATEDAVG 160 (H) 07/28/2022       History of diabetes since 2018  Diabetes medications: Ozempic, Januvia 100mg daily, metformin 1000mg daily  Blood Glucose Checks at Home: Yes: blood sugars ac & HS  Typical morning range: 130-160 mg/dl  Endocrinologist or PCP following DM: Dr. Burrows    Other Core Components/Diabetes Plan:   Goals:  Hgb A1c < 7%  Exercise Blood Glucose: 100-300 mg/dl    Interventions:  Reviewed and Discussed Labs  Medication Compliance  Med Card Reconciled  Low Sodium, Mediterranean, ADA Diet  Weight Management  Physical Activity  Increase Knowledge of Contributory Factors  Home Monitoring    Education:  Risk Factors; verbalizes understanding; Date: 10/28/22  Stress; verbalizes understanding; Date: 11/11/22  Mediterranean Diet; verbalizes  understanding; Date: 22  Dining Out; verbalizes understanding; Date: 22    Comments:   Patient verbalizes understanding to bring home glucometer and check glucose pre and post each exercise session.  Per cardiac rehab protocols, patient's glucose must be between 90 and 270 mg/dL to exercise.  Patient denies any recent glucose levels less than 60 mg/dL or greater than 300 mg/dL. Patient verbalizes importance of notifying rehab staff if symptoms of hypoglycemia occur while at cardiac rehab.  Abnormal labs will be reported to patient's PCP/Endocrinologist by rehab staff.    Noted updated glucose parameters of 100-300    Sarita Tirado MS, RDN/LDN     Session: 24 Session Follow Up   Cardiac Rehab Individual Treatment Plan - Reassessment      Patient Name: Isaaih Dorsey Jr. MRN: [id number]   : 1960   Age: 63 y.o.   Primary Diagnosis: CABG 2022      Psychosocial Assessment:   Living Arrangements: Lives with spouse  Family Support: family  Self Reported: no change Stress  Displays: happiness  Medication: not applicable    Psychosocial Plan:   Goals:  Improved psychosocial coping strategies: Not Met: pt states he has a lot of stress  Verbalizes coping mechanisms: In Progress  Reduce manifestation of stress: In Progress  Maintain positive outlook: In Progress  Improve overall quality of life: In Progress    Comments on Goal Progression:  Pt is working towards decreasing stress in his life and improving his management of stressful events.    Interventions:  Patient to Self Report Emotional Changes at Session Check In  Recommend Physical Activity  Recommend Attending Education Lectures  Notify MD: No  Program Referral: No  Pharmaceutical Intervention/Therapy: No  Other Needs: not applicable  Stage of Readiness to Change: Action    Education:  Stress Management; verbalizes understanding; Date: 2022  Stress; verbalizes understanding; Date: 2022  Risk Factors; verbalizes understanding; Date:  10/28/2022    Comments:  Pt attendance to cardiac rehab class has improved. Pt denies overwhelming stress or anxiety.   Patient has been instructed to notify staff in the event that circumstances worsen.  Patient verbalizes understanding.    Other Core Components/Risk Factors Assessment:   RISK FACTORS:  diabetes, hyperlipidemia, hypertension, obesity, sedentary lifestyle, stress    Learning Barriers: None    Medication Compliance: has been compliant with the medicaiton regimen    Other Core Components/Risk Factors Plan:   Goals:  Decrease cholesterol level: In Progress  Increase exercise tolerance: Met  Increase knowledge of CAD: Met  Decrease blood pressure: Met  Weight loss: Not Met: Pt has gained 14 lbs since starting rehab  Demonstrate accurate pulse taking: Met  Identify and manage personal areas of stress: In Progress  Control diabetes by adjusting diet and exercise: In Progress  Learn more about healthy eating: In Progress    Comments on Goal Progression:  Pt acknowledges his weight gain and states he is trying to eat healthier.     Interventions:  Individual Education/ Counseling: Yes  Physician Referral: No    Education:    fluid overload/CHF, verbalizes understanding; Date: 10/21/2022  risk factors, verbalizes understanding; Date: 10/28/2022  stress, verbalizes understanding; Date: 11/11/2022         Education method adapted to patients education level and preferred method of learning.  Method: explanation  handouts    Comments:  Pt states he is improved and continues to try to modify his risk factors to improve his cardiovascular health.    Other Core Components/Hypertension Assessment:   BP Range: 100-160 systolic; 70-90 diastolic  BP at Goal: No  Patient reported symptoms: none    Other Core Components/Hypertension Plan:   Goals:  Blood Pressure <130/80    Comments on Goal Progression:  Pt states he is interested in possibly joining the digital hypertension program    Interventions:  Med Card  Reconciled: No  Encourage medication compliance  Encourage sodium reduction  Encourage weight loss  Recommend physical activity  Educate on contributory factors  Reduce stress, anxiety, anger, depression, and/or chronic pain  Encourage home blood pressure monitoring  Recommend daily weights  MD notified/Physician Referral: Yes    Education:    Congestive Heart Failure; verbalizes understanding; Date: 10/21/2022  Risk Factors; verbalizes understanding; Date: 10/28/2022  Stress; verbalizes understanding; Date: 11/11/2022         Comments:  Pt has better blood pressure readings than on entry but still has occasional isolated elevated reading  >130/80  Does the patient have Heart Failure? Yes   Other Core Components/Congestive Heart Failure Assessment:   Ejection Fraction: 40-45% %  Patient reported symptoms: peripheral edema and weight gain  Lung Sounds: normal air entry, lungs clear to auscultation  Right Leg Edema: Trace  Left Leg Edema Trace    Other Core Components/Congestive Heart Failure Plan:   Goals:  Patient free from CHF Exacerbation    Comments on Goal Progression:  Pt is more aware of his symptoms and is making effort to decrease exacerbation of CHF.    Interventions/Recommendations:  Encourage medication compliance  Encourage daily weight checks  Promote sodium reduction  Encourage tracking fluid intake  Promote physical activity  Educate on contributory factors  Recommend home blood pressure monitoring  MD notified/MD visit scheduled: No not until next year      Education:    Congestive Heart Failure; verbalizes understanding; Date: 10/21/2022  Risk Factors; verbalizes understanding; Date: 10/28/2022  Stress; verbalizes understanding; Date: 11/11/2022         Comments:  Pt is not having CHF symptoms at this time.      Other Core Components/Tobacco Cessation Assessment:   Smoking Status: past smoker who quit many years ago  Smoking Cessation Barriers:  none  Stage of Readiness to Change:  Maintenance    Other Core Components/Tobacco Cessation Plan:   Goals:  Maintain non-smoking status    Comments on Goal Progression:  Pt does not smoke    Interventions:  Maintains non-smoking status    Education:    Risk Factors; verbalizes understanding; Date: 10/28/2022         Comments:  Pt has no desire to use tobacco products at this time.    Jaden Penaloza RN

## 2023-05-19 ENCOUNTER — PATIENT MESSAGE (OUTPATIENT)
Dept: ADMINISTRATIVE | Facility: HOSPITAL | Age: 63
End: 2023-05-19
Payer: COMMERCIAL

## 2023-05-19 ENCOUNTER — PATIENT OUTREACH (OUTPATIENT)
Dept: ADMINISTRATIVE | Facility: HOSPITAL | Age: 63
End: 2023-05-19
Payer: COMMERCIAL

## 2023-05-25 ENCOUNTER — DOCUMENTATION ONLY (OUTPATIENT)
Dept: CARDIAC REHAB | Facility: CLINIC | Age: 63
End: 2023-05-25
Payer: COMMERCIAL

## 2023-05-25 NOTE — PROGRESS NOTES
Mr. Mason has completed 27 out of 36 exercise session of Phase II cardiac rehab.  A follow up reassessment will be completed at exit interview.   Mr. Mason is currently on hold due to having shoulder surgery.  We do not know if he will be able to return to rehab.  As of 23, Mr. Mason is now out of the 36 week time period to return to the program and has been discharged from the program.     24 Session Follow Up   Cardiac Rehab Individual Treatment Plan - Reassessment      Patient Name: Isaiah Dorsey Jr. MRN: [id number]   : 1960   Age: 63 y.o.   Primary Diagnosis: CABG Date of Event: 22   EF: 40-45%  Risk Stratification: high  Referring Physician: ADRIANO   Exercise Assessment:     Angina with exercise?: No   ST Depression with Exercise?: No  Fall Risk: No   Assistive Devices:  independent  There were no limitations noted by the patient.  Comments on Progression: Mr. Mason is progressing fair and his workloads will continue to be increased as he tolerates exercise intensity.     Exercise Plan:   Goals:  CR Exercise Goals: Attend Cardiac Rehab 3 times/week: In Progress  Home Aerobic Exercise: 2 additional days/week for 30-60 minutes: In Progress  Intensity of 12-15 on the Rate of Perceived Exertion (RPE) scale: In Progress  30% increase in entry estimated METS: 7.8 : In Progress  5 days/week for 30-60 minutes: In Progress    Comments on Goal Progression:  Patient Consistency: inconsistent with attendance  Home exercise? No   Patient reports intensity rate 11-14 on RPE scale    Intervention/Recommendations:   Discussed importance of regular attendance to cardiac rehab class    Exercise Prescription:  THR Range 63-74   Mode: Treadmill  Nustep   Frequency:  3 days/week   Duration:  30-60 minutes   Intensity:  12-15 RPE   Resistance Training:  Yes: 3 to 5 lb weights with 10-15 reps based on strength and range of motion and adjusted accordingly     Home Prescription:  Mode Aerobic exercise   Frequency: 2- 3  days/week   Duration: 30-60 minutes   Resistance Training: None     Education:  Arthritis/Osteporosis; verbalizes understanding; Date: 22  Body Composition; verbalizes understanding; Date: 22  Exercise and Rehydration; verbalizes understanding; Date: 22  Flexibility/Stretching; verbalizes understanding; Date: 10-24-22  Relaxation Techniques; verbalizes understanding; Date: 22    Comments:  I reviewed exercise recommendations with Mr. Mason.  I strongly encouraged him to begin exercising.  He stated understanding but he also stated he doesn't feel he has time.        The exercise prescription will continue to be adjusted based on tolerance of exercise intensity by patient.    Jorje Ceballos, CEP     24 Session Follow Up   Cardiac Rehab Individual Treatment Plan - Reassessment    Patient Name: Isaiah Dorsey Jr. MRN: [id number]   : 1960   Age: 63 y.o.   Primary Diagnosis: s/p CABG, CAD, CHF, HLD, NSTEMI, DM    Nutrition Assessment:     Anthropometrics    Height 72 inches   Weight 288 lbs   BMI 39.1     Patient confirms he is taking lipitor 40mg for cholesterol control.  Difficulty Chewing or Swallowing: no  Current Exercise: See Exercise Physiologist Note  Food Safety/Food Preparation: self  Living Arrangements/Family Support: Lives with spouse  Cultural/Spiritual/Personal Preferences: not applicable   Barriers to Education: none identified  Stage of Change Related to Diet Habits: Action  Recent Changes to Diet: Yes - more produce intake  Food Diary: Completed      Nutrition Plan:   Goals:  LDL-C < 70 (for high risk patients)  Hgb A1c < 7%  BMI < 25 and abdominal girth < 40M/<35 F  2 gram sodium, Mediterranean diet: In Progress  Rehab weight loss goal of 10 lbs, 1 lb per week: Not Met: pt has gained 14lbs since starting rehab  Fish intake (non-fried varieties) to a goal of 2-3 servings per week: In Progress  Increase fruit and vegetable intake: In Progress    Comments on Goal  Progression:  Discussed significant weight gain, pt is aware he has been making poor food choices and not exercising outside of rehab. Emphasized importance of portion control and lifestyle modification for improved health outcomes-pt verbalized understanding.    Interventions:  Dietitian Consult: No  Patient to participate in Cardiac Rehab sessions three times a week  Weekly Dietitian Weight Check  Nutrition Recommendations Provided: Verbal, Reviewed  Follow Up Plan for Ongoing Self-Management Support    Education:  Dining Out; verbalizes understanding; Date: 11/9/22  Mediterranean Diet; verbalizes understanding; Date: 11/23/22  Recipe Modication; verbalizes understanding; Date: 11/16/22  Vegetarianism; verbalizes understanding; Date: 12/5/22  Vitamins; verbalizes understanding; Date: 10/26/22    Comments:   Discussed ways to incorporate healthy snacks, eating on a schedule, and monitoring sodium intake for heart health.    Diabetes  Is the patient diabetic? Yes   Other Core Components/Diabetes Assessment:   Labs:  Lab Results   Component Value Date    GLUF 136 (H) 08/25/2022     Lab Results   Component Value Date    HGBA1C 6.2 (H) 03/23/2023    HGBA1C 6.2 (H) 08/25/2022    HGBA1C 7.2 (H) 07/28/2022      Lab Results   Component Value Date    ESTIMATEDAVG 131 03/23/2023    ESTIMATEDAVG 131 08/25/2022    ESTIMATEDAVG 160 (H) 07/28/2022       History of diabetes since 2018  Diabetes medications: Ozempic, Januvia 100mg daily, metformin 1000mg daily  Blood Glucose Checks at Home: Yes: blood sugars ac & HS  Typical morning range: 130-160 mg/dl  Endocrinologist or PCP following DM: Dr. Burrows    Other Core Components/Diabetes Plan:   Goals:  Hgb A1c < 7%  Exercise Blood Glucose: 100-300 mg/dl    Interventions:  Reviewed and Discussed Labs  Medication Compliance  Med Card Reconciled  Low Sodium, Mediterranean, ADA Diet  Weight Management  Physical Activity  Increase Knowledge of Contributory Factors  Home  Monitoring    Education:  Risk Factors; verbalizes understanding; Date: 10/28/22  Stress; verbalizes understanding; Date: 22  Mediterranean Diet; verbalizes understanding; Date: 22  Dining Out; verbalizes understanding; Date: 22    Comments:   Patient verbalizes understanding to bring home glucometer and check glucose pre and post each exercise session.  Per cardiac rehab protocols, patient's glucose must be between 90 and 270 mg/dL to exercise.  Patient denies any recent glucose levels less than 60 mg/dL or greater than 300 mg/dL. Patient verbalizes importance of notifying rehab staff if symptoms of hypoglycemia occur while at cardiac rehab.  Abnormal labs will be reported to patient's PCP/Endocrinologist by rehab staff.    Noted updated glucose parameters of 100-300    Sarita Tirado MS, RDN/LDN     Session: 24 Session Follow Up   Cardiac Rehab Individual Treatment Plan - Reassessment      Patient Name: Isaiah Dorsey Jr. MRN: [id number]   : 1960   Age: 63 y.o.   Primary Diagnosis: CABG 2022      Psychosocial Assessment:   Living Arrangements: Lives with spouse  Family Support: family  Self Reported: no change Stress  Displays: happiness  Medication: not applicable    Psychosocial Plan:   Goals:  Improved psychosocial coping strategies: Not Met: pt states he has a lot of stress  Verbalizes coping mechanisms: In Progress  Reduce manifestation of stress: In Progress  Maintain positive outlook: In Progress  Improve overall quality of life: In Progress    Comments on Goal Progression:  Pt is working towards decreasing stress in his life and improving his management of stressful events.    Interventions:  Patient to Self Report Emotional Changes at Session Check In  Recommend Physical Activity  Recommend Attending Education Lectures  Notify MD: No  Program Referral: No  Pharmaceutical Intervention/Therapy: No  Other Needs: not applicable  Stage of Readiness to Change:  Action    Education:  Stress Management; verbalizes understanding; Date: 11/11/2022  Stress; verbalizes understanding; Date: 11/11/2022  Risk Factors; verbalizes understanding; Date: 10/28/2022    Comments:  Pt attendance to cardiac rehab class has improved. Pt denies overwhelming stress or anxiety.   Patient has been instructed to notify staff in the event that circumstances worsen.  Patient verbalizes understanding.    Other Core Components/Risk Factors Assessment:   RISK FACTORS:  diabetes, hyperlipidemia, hypertension, obesity, sedentary lifestyle, stress    Learning Barriers: None    Medication Compliance: has been compliant with the medicaiton regimen    Other Core Components/Risk Factors Plan:   Goals:  Decrease cholesterol level: In Progress  Increase exercise tolerance: Met  Increase knowledge of CAD: Met  Decrease blood pressure: Met  Weight loss: Not Met: Pt has gained 14 lbs since starting rehab  Demonstrate accurate pulse taking: Met  Identify and manage personal areas of stress: In Progress  Control diabetes by adjusting diet and exercise: In Progress  Learn more about healthy eating: In Progress    Comments on Goal Progression:  Pt acknowledges his weight gain and states he is trying to eat healthier.     Interventions:  Individual Education/ Counseling: Yes  Physician Referral: No    Education:    fluid overload/CHF, verbalizes understanding; Date: 10/21/2022  risk factors, verbalizes understanding; Date: 10/28/2022  stress, verbalizes understanding; Date: 11/11/2022         Education method adapted to patients education level and preferred method of learning.  Method: explanation  handouts    Comments:  Pt states he is improved and continues to try to modify his risk factors to improve his cardiovascular health.    Other Core Components/Hypertension Assessment:   BP Range: 100-160 systolic; 70-90 diastolic  BP at Goal: No  Patient reported symptoms: none    Other Core Components/Hypertension Plan:    Goals:  Blood Pressure <130/80    Comments on Goal Progression:  Pt states he is interested in possibly joining the digital hypertension program    Interventions:  Med Card Reconciled: No  Encourage medication compliance  Encourage sodium reduction  Encourage weight loss  Recommend physical activity  Educate on contributory factors  Reduce stress, anxiety, anger, depression, and/or chronic pain  Encourage home blood pressure monitoring  Recommend daily weights  MD notified/Physician Referral: Yes    Education:    Congestive Heart Failure; verbalizes understanding; Date: 10/21/2022  Risk Factors; verbalizes understanding; Date: 10/28/2022  Stress; verbalizes understanding; Date: 11/11/2022         Comments:  Pt has better blood pressure readings than on entry but still has occasional isolated elevated reading  >130/80  Does the patient have Heart Failure? Yes   Other Core Components/Congestive Heart Failure Assessment:   Ejection Fraction: 40-45% %  Patient reported symptoms: peripheral edema and weight gain  Lung Sounds: normal air entry, lungs clear to auscultation  Right Leg Edema: Trace  Left Leg Edema Trace    Other Core Components/Congestive Heart Failure Plan:   Goals:  Patient free from CHF Exacerbation    Comments on Goal Progression:  Pt is more aware of his symptoms and is making effort to decrease exacerbation of CHF.    Interventions/Recommendations:  Encourage medication compliance  Encourage daily weight checks  Promote sodium reduction  Encourage tracking fluid intake  Promote physical activity  Educate on contributory factors  Recommend home blood pressure monitoring  MD notified/MD visit scheduled: No not until next year      Education:    Congestive Heart Failure; verbalizes understanding; Date: 10/21/2022  Risk Factors; verbalizes understanding; Date: 10/28/2022  Stress; verbalizes understanding; Date: 11/11/2022         Comments:  Pt is not having CHF symptoms at this time.      Other Core  Components/Tobacco Cessation Assessment:   Smoking Status: past smoker who quit many years ago  Smoking Cessation Barriers:  none  Stage of Readiness to Change: Maintenance    Other Core Components/Tobacco Cessation Plan:   Goals:  Maintain non-smoking status    Comments on Goal Progression:  Pt does not smoke    Interventions:  Maintains non-smoking status    Education:    Risk Factors; verbalizes understanding; Date: 10/28/2022         Comments:  Pt has no desire to use tobacco products at this time.    Jaden Pnealoza RN

## 2023-09-12 DIAGNOSIS — H60.63 CHRONIC OTITIS EXTERNA OF BOTH EARS, UNSPECIFIED TYPE: Chronic | ICD-10-CM

## 2023-09-14 RX ORDER — PREDNISOLONE ACETATE 10 MG/ML
SUSPENSION/ DROPS OPHTHALMIC
Qty: 10 ML | Refills: 0 | OUTPATIENT
Start: 2023-09-14

## 2023-10-23 ENCOUNTER — PATIENT OUTREACH (OUTPATIENT)
Dept: ADMINISTRATIVE | Facility: HOSPITAL | Age: 63
End: 2023-10-23
Payer: COMMERCIAL

## 2023-10-24 ENCOUNTER — TELEPHONE (OUTPATIENT)
Dept: INTERNAL MEDICINE | Facility: CLINIC | Age: 63
End: 2023-10-24

## 2023-10-24 ENCOUNTER — OFFICE VISIT (OUTPATIENT)
Dept: INTERNAL MEDICINE | Facility: CLINIC | Age: 63
End: 2023-10-24
Payer: COMMERCIAL

## 2023-10-24 ENCOUNTER — LAB VISIT (OUTPATIENT)
Dept: LAB | Facility: HOSPITAL | Age: 63
End: 2023-10-24
Attending: INTERNAL MEDICINE
Payer: COMMERCIAL

## 2023-10-24 VITALS
RESPIRATION RATE: 18 BRPM | HEART RATE: 70 BPM | DIASTOLIC BLOOD PRESSURE: 86 MMHG | SYSTOLIC BLOOD PRESSURE: 120 MMHG | BODY MASS INDEX: 39.26 KG/M2 | OXYGEN SATURATION: 96 % | TEMPERATURE: 97 F | HEIGHT: 72 IN | WEIGHT: 289.88 LBS

## 2023-10-24 DIAGNOSIS — E11.59 OBESITY, DIABETES, AND HYPERTENSION SYNDROME: ICD-10-CM

## 2023-10-24 DIAGNOSIS — I15.2 HYPERTENSION ASSOCIATED WITH DIABETES: ICD-10-CM

## 2023-10-24 DIAGNOSIS — E66.2 OBESITY HYPOVENTILATION SYNDROME: ICD-10-CM

## 2023-10-24 DIAGNOSIS — I25.810 CORONARY ARTERY DISEASE INVOLVING CORONARY BYPASS GRAFT OF NATIVE HEART WITHOUT ANGINA PECTORIS: Primary | ICD-10-CM

## 2023-10-24 DIAGNOSIS — E11.59 HYPERTENSION ASSOCIATED WITH DIABETES: ICD-10-CM

## 2023-10-24 DIAGNOSIS — E11.69 HYPERLIPIDEMIA ASSOCIATED WITH TYPE 2 DIABETES MELLITUS: Primary | ICD-10-CM

## 2023-10-24 DIAGNOSIS — E66.9 OBESITY, DIABETES, AND HYPERTENSION SYNDROME: ICD-10-CM

## 2023-10-24 DIAGNOSIS — I48.0 PAROXYSMAL ATRIAL FIBRILLATION: ICD-10-CM

## 2023-10-24 DIAGNOSIS — I15.2 OBESITY, DIABETES, AND HYPERTENSION SYNDROME: ICD-10-CM

## 2023-10-24 DIAGNOSIS — E11.65 TYPE 2 DIABETES MELLITUS WITH HYPERGLYCEMIA, WITHOUT LONG-TERM CURRENT USE OF INSULIN: ICD-10-CM

## 2023-10-24 DIAGNOSIS — E11.69 OBESITY, DIABETES, AND HYPERTENSION SYNDROME: ICD-10-CM

## 2023-10-24 DIAGNOSIS — E78.5 HYPERLIPIDEMIA ASSOCIATED WITH TYPE 2 DIABETES MELLITUS: ICD-10-CM

## 2023-10-24 DIAGNOSIS — Z00.00 ANNUAL PHYSICAL EXAM: ICD-10-CM

## 2023-10-24 DIAGNOSIS — I50.22 HEART FAILURE WITH MID-RANGE EJECTION FRACTION: ICD-10-CM

## 2023-10-24 DIAGNOSIS — I25.110 ATHEROSCLEROSIS OF NATIVE CORONARY ARTERY OF NATIVE HEART WITH UNSTABLE ANGINA PECTORIS: ICD-10-CM

## 2023-10-24 DIAGNOSIS — E11.69 HYPERLIPIDEMIA ASSOCIATED WITH TYPE 2 DIABETES MELLITUS: ICD-10-CM

## 2023-10-24 DIAGNOSIS — E78.5 HYPERLIPIDEMIA ASSOCIATED WITH TYPE 2 DIABETES MELLITUS: Primary | ICD-10-CM

## 2023-10-24 DIAGNOSIS — Z95.1 S/P CABG (CORONARY ARTERY BYPASS GRAFT): ICD-10-CM

## 2023-10-24 LAB
ALBUMIN SERPL BCP-MCNC: 3.8 G/DL (ref 3.5–5.2)
ALP SERPL-CCNC: 85 U/L (ref 55–135)
ALT SERPL W/O P-5'-P-CCNC: 29 U/L (ref 10–44)
ANION GAP SERPL CALC-SCNC: 11 MMOL/L (ref 8–16)
AST SERPL-CCNC: 23 U/L (ref 10–40)
BACTERIA #/AREA URNS AUTO: NORMAL /HPF
BASOPHILS # BLD AUTO: 0.16 K/UL (ref 0–0.2)
BASOPHILS NFR BLD: 1.4 % (ref 0–1.9)
BILIRUB SERPL-MCNC: 0.4 MG/DL (ref 0.1–1)
BILIRUB UR QL STRIP: NEGATIVE
BUN SERPL-MCNC: 19 MG/DL (ref 8–23)
CALCIUM SERPL-MCNC: 9.9 MG/DL (ref 8.7–10.5)
CAOX CRY UR QL COMP ASSIST: NORMAL
CHLORIDE SERPL-SCNC: 104 MMOL/L (ref 95–110)
CHOLEST SERPL-MCNC: 111 MG/DL (ref 120–199)
CHOLEST/HDLC SERPL: 3.2 {RATIO} (ref 2–5)
CLARITY UR REFRACT.AUTO: CLEAR
CO2 SERPL-SCNC: 22 MMOL/L (ref 23–29)
COLOR UR AUTO: YELLOW
COMPLEXED PSA SERPL-MCNC: 0.37 NG/ML (ref 0–4)
CREAT SERPL-MCNC: 1 MG/DL (ref 0.5–1.4)
DIFFERENTIAL METHOD: ABNORMAL
EOSINOPHIL # BLD AUTO: 0.3 K/UL (ref 0–0.5)
EOSINOPHIL NFR BLD: 2.4 % (ref 0–8)
ERYTHROCYTE [DISTWIDTH] IN BLOOD BY AUTOMATED COUNT: 20.7 % (ref 11.5–14.5)
EST. GFR  (NO RACE VARIABLE): >60 ML/MIN/1.73 M^2
ESTIMATED AVG GLUCOSE: 146 MG/DL (ref 68–131)
GLUCOSE SERPL-MCNC: 155 MG/DL (ref 70–110)
GLUCOSE UR QL STRIP: ABNORMAL
HBA1C MFR BLD: 6.7 % (ref 4–5.6)
HCT VFR BLD AUTO: 50 % (ref 40–54)
HDLC SERPL-MCNC: 35 MG/DL (ref 40–75)
HDLC SERPL: 31.5 % (ref 20–50)
HGB BLD-MCNC: 14.8 G/DL (ref 14–18)
HGB UR QL STRIP: NEGATIVE
IMM GRANULOCYTES # BLD AUTO: 0.04 K/UL (ref 0–0.04)
IMM GRANULOCYTES NFR BLD AUTO: 0.4 % (ref 0–0.5)
KETONES UR QL STRIP: NEGATIVE
LDLC SERPL CALC-MCNC: 56 MG/DL (ref 63–159)
LEUKOCYTE ESTERASE UR QL STRIP: NEGATIVE
LYMPHOCYTES # BLD AUTO: 3.7 K/UL (ref 1–4.8)
LYMPHOCYTES NFR BLD: 32.7 % (ref 18–48)
MCH RBC QN AUTO: 25 PG (ref 27–31)
MCHC RBC AUTO-ENTMCNC: 29.6 G/DL (ref 32–36)
MCV RBC AUTO: 84 FL (ref 82–98)
MICROSCOPIC COMMENT: NORMAL
MONOCYTES # BLD AUTO: 1.2 K/UL (ref 0.3–1)
MONOCYTES NFR BLD: 10.2 % (ref 4–15)
NEUTROPHILS # BLD AUTO: 6 K/UL (ref 1.8–7.7)
NEUTROPHILS NFR BLD: 52.9 % (ref 38–73)
NITRITE UR QL STRIP: NEGATIVE
NONHDLC SERPL-MCNC: 76 MG/DL
NRBC BLD-RTO: 0 /100 WBC
PH UR STRIP: 5 [PH] (ref 5–8)
PLATELET # BLD AUTO: 498 K/UL (ref 150–450)
PMV BLD AUTO: 10.1 FL (ref 9.2–12.9)
POTASSIUM SERPL-SCNC: 4.7 MMOL/L (ref 3.5–5.1)
PROT SERPL-MCNC: 7.3 G/DL (ref 6–8.4)
PROT UR QL STRIP: NEGATIVE
RBC # BLD AUTO: 5.93 M/UL (ref 4.6–6.2)
RBC #/AREA URNS AUTO: 0 /HPF (ref 0–4)
SODIUM SERPL-SCNC: 137 MMOL/L (ref 136–145)
SP GR UR STRIP: 1.02 (ref 1–1.03)
TRIGL SERPL-MCNC: 100 MG/DL (ref 30–150)
TSH SERPL DL<=0.005 MIU/L-ACNC: 3 UIU/ML (ref 0.4–4)
URN SPEC COLLECT METH UR: ABNORMAL
WBC # BLD AUTO: 11.26 K/UL (ref 3.9–12.7)
WBC #/AREA URNS AUTO: 1 /HPF (ref 0–5)
YEAST UR QL AUTO: NORMAL

## 2023-10-24 PROCEDURE — 3079F DIAST BP 80-89 MM HG: CPT | Mod: CPTII,S$GLB,, | Performed by: INTERNAL MEDICINE

## 2023-10-24 PROCEDURE — 3008F BODY MASS INDEX DOCD: CPT | Mod: CPTII,S$GLB,, | Performed by: INTERNAL MEDICINE

## 2023-10-24 PROCEDURE — 3044F HG A1C LEVEL LT 7.0%: CPT | Mod: CPTII,S$GLB,, | Performed by: INTERNAL MEDICINE

## 2023-10-24 PROCEDURE — 84153 ASSAY OF PSA TOTAL: CPT | Performed by: INTERNAL MEDICINE

## 2023-10-24 PROCEDURE — 1159F MED LIST DOCD IN RCRD: CPT | Mod: CPTII,S$GLB,, | Performed by: INTERNAL MEDICINE

## 2023-10-24 PROCEDURE — 80053 COMPREHEN METABOLIC PANEL: CPT | Performed by: INTERNAL MEDICINE

## 2023-10-24 PROCEDURE — 3008F PR BODY MASS INDEX (BMI) DOCUMENTED: ICD-10-PCS | Mod: CPTII,S$GLB,, | Performed by: INTERNAL MEDICINE

## 2023-10-24 PROCEDURE — 99999 PR PBB SHADOW E&M-EST. PATIENT-LVL IV: ICD-10-PCS | Mod: PBBFAC,,, | Performed by: INTERNAL MEDICINE

## 2023-10-24 PROCEDURE — 3072F LOW RISK FOR RETINOPATHY: CPT | Mod: CPTII,S$GLB,, | Performed by: INTERNAL MEDICINE

## 2023-10-24 PROCEDURE — 3079F PR MOST RECENT DIASTOLIC BLOOD PRESSURE 80-89 MM HG: ICD-10-PCS | Mod: CPTII,S$GLB,, | Performed by: INTERNAL MEDICINE

## 2023-10-24 PROCEDURE — 99396 PREV VISIT EST AGE 40-64: CPT | Mod: S$GLB,,, | Performed by: INTERNAL MEDICINE

## 2023-10-24 PROCEDURE — 1160F PR REVIEW ALL MEDS BY PRESCRIBER/CLIN PHARMACIST DOCUMENTED: ICD-10-PCS | Mod: CPTII,S$GLB,, | Performed by: INTERNAL MEDICINE

## 2023-10-24 PROCEDURE — 4010F ACE/ARB THERAPY RXD/TAKEN: CPT | Mod: CPTII,S$GLB,, | Performed by: INTERNAL MEDICINE

## 2023-10-24 PROCEDURE — 80061 LIPID PANEL: CPT | Performed by: INTERNAL MEDICINE

## 2023-10-24 PROCEDURE — 81001 URINALYSIS AUTO W/SCOPE: CPT | Performed by: INTERNAL MEDICINE

## 2023-10-24 PROCEDURE — 4010F PR ACE/ARB THEARPY RXD/TAKEN: ICD-10-PCS | Mod: CPTII,S$GLB,, | Performed by: INTERNAL MEDICINE

## 2023-10-24 PROCEDURE — 83036 HEMOGLOBIN GLYCOSYLATED A1C: CPT | Performed by: INTERNAL MEDICINE

## 2023-10-24 PROCEDURE — 36415 COLL VENOUS BLD VENIPUNCTURE: CPT | Mod: PO | Performed by: INTERNAL MEDICINE

## 2023-10-24 PROCEDURE — 99999 PR PBB SHADOW E&M-EST. PATIENT-LVL IV: CPT | Mod: PBBFAC,,, | Performed by: INTERNAL MEDICINE

## 2023-10-24 PROCEDURE — 3044F PR MOST RECENT HEMOGLOBIN A1C LEVEL <7.0%: ICD-10-PCS | Mod: CPTII,S$GLB,, | Performed by: INTERNAL MEDICINE

## 2023-10-24 PROCEDURE — 3072F PR LOW RISK FOR RETINOPATHY: ICD-10-PCS | Mod: CPTII,S$GLB,, | Performed by: INTERNAL MEDICINE

## 2023-10-24 PROCEDURE — 3074F PR MOST RECENT SYSTOLIC BLOOD PRESSURE < 130 MM HG: ICD-10-PCS | Mod: CPTII,S$GLB,, | Performed by: INTERNAL MEDICINE

## 2023-10-24 PROCEDURE — 99396 PR PREVENTIVE VISIT,EST,40-64: ICD-10-PCS | Mod: S$GLB,,, | Performed by: INTERNAL MEDICINE

## 2023-10-24 PROCEDURE — 84443 ASSAY THYROID STIM HORMONE: CPT | Performed by: INTERNAL MEDICINE

## 2023-10-24 PROCEDURE — 1160F RVW MEDS BY RX/DR IN RCRD: CPT | Mod: CPTII,S$GLB,, | Performed by: INTERNAL MEDICINE

## 2023-10-24 PROCEDURE — 85025 COMPLETE CBC W/AUTO DIFF WBC: CPT | Performed by: INTERNAL MEDICINE

## 2023-10-24 PROCEDURE — 1159F PR MEDICATION LIST DOCUMENTED IN MEDICAL RECORD: ICD-10-PCS | Mod: CPTII,S$GLB,, | Performed by: INTERNAL MEDICINE

## 2023-10-24 PROCEDURE — 3074F SYST BP LT 130 MM HG: CPT | Mod: CPTII,S$GLB,, | Performed by: INTERNAL MEDICINE

## 2023-10-24 RX ORDER — PANTOPRAZOLE SODIUM 40 MG/1
TABLET, DELAYED RELEASE ORAL
COMMUNITY
End: 2023-10-24 | Stop reason: SDUPTHER

## 2023-10-24 RX ORDER — IRBESARTAN 300 MG/1
TABLET ORAL
COMMUNITY

## 2023-10-24 RX ORDER — HYDROCHLOROTHIAZIDE 12.5 MG/1
TABLET ORAL
COMMUNITY

## 2023-10-24 RX ORDER — TIRZEPATIDE 2.5 MG/.5ML
2.5 INJECTION, SOLUTION SUBCUTANEOUS
Qty: 0.5 PEN | Refills: 5 | Status: SHIPPED | OUTPATIENT
Start: 2023-10-24 | End: 2023-11-22

## 2023-10-24 RX ORDER — ATORVASTATIN CALCIUM 80 MG/1
80 TABLET, FILM COATED ORAL DAILY
Qty: 90 TABLET | Refills: 3 | Status: SHIPPED | OUTPATIENT
Start: 2023-10-24 | End: 2023-12-27 | Stop reason: SDUPTHER

## 2023-10-24 RX ORDER — PANTOPRAZOLE SODIUM 40 MG/1
40 TABLET, DELAYED RELEASE ORAL DAILY
Qty: 90 TABLET | Refills: 1 | Status: SHIPPED | OUTPATIENT
Start: 2023-10-24
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
## 2023-10-25 ENCOUNTER — PATIENT MESSAGE (OUTPATIENT)
Dept: ADMINISTRATIVE | Facility: OTHER | Age: 63
End: 2023-10-25
Payer: COMMERCIAL

## 2023-10-26 ENCOUNTER — PATIENT MESSAGE (OUTPATIENT)
Dept: ADMINISTRATIVE | Facility: OTHER | Age: 63
End: 2023-10-26
Payer: COMMERCIAL

## 2023-11-07 ENCOUNTER — OFFICE VISIT (OUTPATIENT)
Dept: OPTOMETRY | Facility: CLINIC | Age: 63
End: 2023-11-07
Payer: COMMERCIAL

## 2023-11-07 DIAGNOSIS — E11.9 TYPE 2 DIABETES MELLITUS WITHOUT RETINOPATHY: Primary | ICD-10-CM

## 2023-11-07 DIAGNOSIS — Z13.5 GLAUCOMA SCREENING: ICD-10-CM

## 2023-11-07 DIAGNOSIS — Z96.1 PSEUDOPHAKIA: ICD-10-CM

## 2023-11-07 PROCEDURE — 1159F PR MEDICATION LIST DOCUMENTED IN MEDICAL RECORD: ICD-10-PCS | Mod: CPTII,S$GLB,, | Performed by: OPTOMETRIST

## 2023-11-07 PROCEDURE — 92014 PR EYE EXAM, EST PATIENT,COMPREHESV: ICD-10-PCS | Mod: S$GLB,,, | Performed by: OPTOMETRIST

## 2023-11-07 PROCEDURE — 99999 PR PBB SHADOW E&M-EST. PATIENT-LVL III: CPT | Mod: PBBFAC,,, | Performed by: OPTOMETRIST

## 2023-11-07 PROCEDURE — 3044F HG A1C LEVEL LT 7.0%: CPT | Mod: CPTII,S$GLB,, | Performed by: OPTOMETRIST

## 2023-11-07 PROCEDURE — 2023F DILAT RTA XM W/O RTNOPTHY: CPT | Mod: CPTII,S$GLB,, | Performed by: OPTOMETRIST

## 2023-11-07 PROCEDURE — 2023F PR DILATED RETINAL EXAM W/O EVID OF RETINOPATHY: ICD-10-PCS | Mod: CPTII,S$GLB,, | Performed by: OPTOMETRIST

## 2023-11-07 PROCEDURE — 3044F PR MOST RECENT HEMOGLOBIN A1C LEVEL <7.0%: ICD-10-PCS | Mod: CPTII,S$GLB,, | Performed by: OPTOMETRIST

## 2023-11-07 PROCEDURE — 4010F PR ACE/ARB THEARPY RXD/TAKEN: ICD-10-PCS | Mod: CPTII,S$GLB,, | Performed by: OPTOMETRIST

## 2023-11-07 PROCEDURE — 1159F MED LIST DOCD IN RCRD: CPT | Mod: CPTII,S$GLB,, | Performed by: OPTOMETRIST

## 2023-11-07 PROCEDURE — 4010F ACE/ARB THERAPY RXD/TAKEN: CPT | Mod: CPTII,S$GLB,, | Performed by: OPTOMETRIST

## 2023-11-07 PROCEDURE — 92014 COMPRE OPH EXAM EST PT 1/>: CPT | Mod: S$GLB,,, | Performed by: OPTOMETRIST

## 2023-11-07 PROCEDURE — 99999 PR PBB SHADOW E&M-EST. PATIENT-LVL III: ICD-10-PCS | Mod: PBBFAC,,, | Performed by: OPTOMETRIST

## 2023-11-07 NOTE — PROGRESS NOTES
HPI    Annual Diabetic Exam   Pt states vision is blurry @ Near   OTC +1.75, failed last Rx     Pt denies F/F   Pt denies Dry/ Itchy/ Burning  Gtt: No    DM Dx'ed   BS: Unknown   Hemoglobin A1C       Date                     Value               Ref Range             Status                10/24/2023               6.7 (H)             4.0 - 5.6 %           Final                    08/25/2022               6.2 (H)             4.0 - 5.6 %           Final                Last edited by Enrrique Yanes, OD on 11/7/2023 11:30 AM.            Assessment /Plan     For exam results, see Encounter Report.    Type 2 diabetes mellitus without retinopathy  -No retinopathy noted today.  Continued control with primary care physician and annual comprehensive eye exam.    Pseudophakia  -clear, centered    Glaucoma screening  -Monitor with annual eye exam and IOP check      RTC 1 yr

## 2023-11-08 ENCOUNTER — TELEPHONE (OUTPATIENT)
Dept: PHARMACY | Facility: CLINIC | Age: 63
End: 2023-11-08
Payer: COMMERCIAL

## 2023-11-08 NOTE — TELEPHONE ENCOUNTER
Assessed patient medication adherence for population health /[facility] medication adherence project

## 2023-11-09 ENCOUNTER — OFFICE VISIT (OUTPATIENT)
Dept: PODIATRY | Facility: CLINIC | Age: 63
End: 2023-11-09
Payer: COMMERCIAL

## 2023-11-09 VITALS
DIASTOLIC BLOOD PRESSURE: 84 MMHG | HEIGHT: 72 IN | WEIGHT: 292.13 LBS | SYSTOLIC BLOOD PRESSURE: 131 MMHG | HEART RATE: 73 BPM | BODY MASS INDEX: 39.57 KG/M2

## 2023-11-09 DIAGNOSIS — E11.9 ENCOUNTER FOR COMPREHENSIVE DIABETIC FOOT EXAMINATION, TYPE 2 DIABETES MELLITUS: Primary | ICD-10-CM

## 2023-11-09 DIAGNOSIS — M20.41 HAMMER TOES OF BOTH FEET: ICD-10-CM

## 2023-11-09 DIAGNOSIS — E11.65 TYPE 2 DIABETES MELLITUS WITH HYPERGLYCEMIA, WITHOUT LONG-TERM CURRENT USE OF INSULIN: ICD-10-CM

## 2023-11-09 DIAGNOSIS — M20.42 HAMMER TOES OF BOTH FEET: ICD-10-CM

## 2023-11-09 PROCEDURE — 99999 PR PBB SHADOW E&M-EST. PATIENT-LVL IV: ICD-10-PCS | Mod: PBBFAC,,, | Performed by: PODIATRIST

## 2023-11-09 PROCEDURE — 1159F PR MEDICATION LIST DOCUMENTED IN MEDICAL RECORD: ICD-10-PCS | Mod: CPTII,S$GLB,, | Performed by: PODIATRIST

## 2023-11-09 PROCEDURE — 3008F BODY MASS INDEX DOCD: CPT | Mod: CPTII,S$GLB,, | Performed by: PODIATRIST

## 2023-11-09 PROCEDURE — 99213 OFFICE O/P EST LOW 20 MIN: CPT | Mod: S$GLB,,, | Performed by: PODIATRIST

## 2023-11-09 PROCEDURE — 3008F PR BODY MASS INDEX (BMI) DOCUMENTED: ICD-10-PCS | Mod: CPTII,S$GLB,, | Performed by: PODIATRIST

## 2023-11-09 PROCEDURE — 3079F PR MOST RECENT DIASTOLIC BLOOD PRESSURE 80-89 MM HG: ICD-10-PCS | Mod: CPTII,S$GLB,, | Performed by: PODIATRIST

## 2023-11-09 PROCEDURE — 3044F PR MOST RECENT HEMOGLOBIN A1C LEVEL <7.0%: ICD-10-PCS | Mod: CPTII,S$GLB,, | Performed by: PODIATRIST

## 2023-11-09 PROCEDURE — 3075F SYST BP GE 130 - 139MM HG: CPT | Mod: CPTII,S$GLB,, | Performed by: PODIATRIST

## 2023-11-09 PROCEDURE — 3075F PR MOST RECENT SYSTOLIC BLOOD PRESS GE 130-139MM HG: ICD-10-PCS | Mod: CPTII,S$GLB,, | Performed by: PODIATRIST

## 2023-11-09 PROCEDURE — 99213 PR OFFICE/OUTPT VISIT, EST, LEVL III, 20-29 MIN: ICD-10-PCS | Mod: S$GLB,,, | Performed by: PODIATRIST

## 2023-11-09 PROCEDURE — 1159F MED LIST DOCD IN RCRD: CPT | Mod: CPTII,S$GLB,, | Performed by: PODIATRIST

## 2023-11-09 PROCEDURE — 3044F HG A1C LEVEL LT 7.0%: CPT | Mod: CPTII,S$GLB,, | Performed by: PODIATRIST

## 2023-11-09 PROCEDURE — 1160F PR REVIEW ALL MEDS BY PRESCRIBER/CLIN PHARMACIST DOCUMENTED: ICD-10-PCS | Mod: CPTII,S$GLB,, | Performed by: PODIATRIST

## 2023-11-09 PROCEDURE — 4010F PR ACE/ARB THEARPY RXD/TAKEN: ICD-10-PCS | Mod: CPTII,S$GLB,, | Performed by: PODIATRIST

## 2023-11-09 PROCEDURE — 4010F ACE/ARB THERAPY RXD/TAKEN: CPT | Mod: CPTII,S$GLB,, | Performed by: PODIATRIST

## 2023-11-09 PROCEDURE — 3079F DIAST BP 80-89 MM HG: CPT | Mod: CPTII,S$GLB,, | Performed by: PODIATRIST

## 2023-11-09 PROCEDURE — 99999 PR PBB SHADOW E&M-EST. PATIENT-LVL IV: CPT | Mod: PBBFAC,,, | Performed by: PODIATRIST

## 2023-11-09 PROCEDURE — 1160F RVW MEDS BY RX/DR IN RCRD: CPT | Mod: CPTII,S$GLB,, | Performed by: PODIATRIST

## 2023-11-09 RX ORDER — AMLODIPINE BESYLATE 5 MG/1
TABLET ORAL
COMMUNITY

## 2023-11-09 RX ORDER — AMLODIPINE BESYLATE AND ATORVASTATIN CALCIUM 5; 40 MG/1; MG/1
TABLET, FILM COATED ORAL
COMMUNITY
End: 2023-12-28

## 2023-11-09 RX ORDER — AMLODIPINE AND BENAZEPRIL HYDROCHLORIDE 10; 40 MG/1; MG/1
CAPSULE ORAL
COMMUNITY

## 2023-11-09 NOTE — PROGRESS NOTES
Subjective:      Patient ID: Isaiah Dorsey Jr. is a 63 y.o. male.    Chief Complaint:   Diabetic Foot Exam (PCP- 10/24/2023/Nitin Cedeño DO)      Isaiah is a 63 y.o. male who presents to the clinic upon referral from Dr. Jhoana arreola. provider found  for evaluation and treatment of diabetic feet. Isaiah has a past medical history of CHF (congestive heart failure), Coronary artery disease, Diabetes mellitus type 2, uncontrolled, without complications, Elevated platelet count (07/23/2015), H/O splenectomy (04/24/2015), HLD (hyperlipidemia) (01/22/2015), HTN (hypertension), benign (01/22/2015), Morbid obesity with BMI of 45.0-49.9, adult (06/11/2014), RODRIGUEZ (obstructive sleep apnea), PAF (paroxysmal atrial fibrillation), Polyp of transverse colon (09/05/2018), and Severe uncontrolled hypertension (06/11/2014).    Here for diabetic foot exam  No new complaints  Still wearing [employer]by tennis shoes  Last visit achilles tendon pain= this has resolved  Patient relates an occasional pain to the right forefoot with walking barefoot in the morning   No pain throughout the day    PCP: Nitin Cedeño DO    Date Last Seen by PCP: 10/24/23    Current shoe gear: Tennis shoes    Hemoglobin A1C   Date Value Ref Range Status   10/24/2023 6.7 (H) 4.0 - 5.6 % Final     Comment:     ADA Screening Guidelines:  5.7-6.4%  Consistent with prediabetes  >or=6.5%  Consistent with diabetes    High levels of fetal hemoglobin interfere with the HbA1C  assay. Heterozygous hemoglobin variants (HbS, HgC, etc)do  not significantly interfere with this assay.   However, presence of multiple variants may affect accuracy.     03/23/2023 6.2 (H) 4.0 - 5.6 % Final     Comment:     ADA Screening Guidelines:  5.7-6.4%  Consistent with prediabetes  >or=6.5%  Consistent with diabetes    High levels of fetal hemoglobin interfere with the HbA1C  assay. Heterozygous hemoglobin variants (HbS, HgC, etc)do  not significantly interfere with this assay.    However, presence of multiple variants may affect accuracy.     08/25/2022 6.2 (H) 4.0 - 5.6 % Final     Comment:     ADA Screening Guidelines:  5.7-6.4%  Consistent with prediabetes  >or=6.5%  Consistent with diabetes    High levels of fetal hemoglobin interfere with the HbA1C  assay. Heterozygous hemoglobin variants (HbS, HgC, etc)do  not significantly interfere with this assay.   However, presence of multiple variants may affect accuracy.            Past Medical History:   Diagnosis Date    CHF (congestive heart failure)     Coronary artery disease     Diabetes mellitus type 2, uncontrolled, without complications     6.7% Metf 1 g qd, eye 2015, [facility]-2015,     Elevated platelet count 07/23/2015    H/O splenectomy 04/24/2015    doesn't like pneumovax bc caused side effects    HLD (hyperlipidemia) 01/22/2015    goal LDL < 100, reluctant to take statin    HTN (hypertension), benign 01/22/2015    losartan 100    Morbid obesity with BMI of 45.0-49.9, adult 06/11/2014    RODRIGUEZ (obstructive sleep apnea)     PAF (paroxysmal atrial fibrillation)     Polyp of transverse colon 09/05/2018 2018, repeat 2023    Severe uncontrolled hypertension 06/11/2014     Past Surgical History:   Procedure Laterality Date    AORTOGRAPHY N/A 7/11/2022    Procedure: AORTOGRAM;  Surgeon: Bjorn Cheatham MD;  Location: [facility] CATH LAB;  Service: Cardiology;  Laterality: N/A;    ARTHROSCOPIC DEBRIDEMENT OF SHOULDER Right 12/22/2022    Procedure: DEBRIDEMENT, SHOULDER, ARTHROSCOPIC;  Surgeon: Alexandria Morse MD;  Location: [facility] OR;  Service: Orthopedics;  Laterality: Right;    ARTHROSCOPIC REPAIR OF ROTATOR CUFF OF SHOULDER Right 12/22/2022    Procedure: REPAIR, ROTATOR CUFF, ARTHROSCOPIC;  Surgeon: Alexandria Morse MD;  Location: [facility] OR;  Service: Orthopedics;  Laterality: Right;    ARTHROSCOPY OF SHOULDER WITH DECOMPRESSION OF SUBACROMIAL SPACE Right 12/22/2022    Procedure: ARTHROSCOPY, SHOULDER, WITH SUBACROMIAL SPACE DECOMPRESSION;   Surgeon: Alexandria Morse MD;  Location: [facility] OR;  Service: Orthopedics;  Laterality: Right;    COLONOSCOPY N/A 11/22/2022    Procedure: COLONOSCOPY;  Surgeon: Castro Jasso MD;  Location: [facility] ENDO (4TH FLR);  Service: Endoscopy;  Laterality: N/A;  ok to hold Xarelto-see telephone encounter dated 11/1  inst via portal  pre call done    CORONARY ARTERY BYPASS GRAFT (CABG) Left 7/18/2022    Procedure: CORONARY ARTERY BYPASS GRAFT (CABG);  Surgeon: Blu Rhodes MD;  Location: [facility] 2ND FLR;  Service: Cardiovascular;  Laterality: Left;  CABG x 1 (LIMA to LAD)    ENDOSCOPIC HARVEST OF VEIN Left 7/18/2022    Procedure: HARVEST-VEIN-ENDOVASCULAR;  Surgeon: Blu Rhodes MD;  Location: [facility] 2ND FLR;  Service: Cardiovascular;  Laterality: Left;  Vein [place] Start time: 0823am  Vein [place] Stop time: 0836am    Vein [place] Start time: 0848am  Vein [place] Stop time: 0904am    FIXATION OF TENDON Right 12/22/2022    Procedure: FIXATION, TENDON-biceps tenodesis;  Surgeon: Alexandria Morse MD;  Location: [facility] OR;  Service: Orthopedics;  Laterality: Right;    LEFT HEART CATHETERIZATION N/A 7/11/2022    Procedure: Left heart cath;  Surgeon: Bjorn Cheatham MD;  Location: [facility] CATH LAB;  Service: Cardiology;  Laterality: N/A;    splenecectomy       Current Outpatient Medications on File Prior to Visit   Medication Sig Dispense Refill    amLODIPine (NORVASC) 5 MG tablet       amlodipine-atorvastatin (CADUET) 5-40 mg per tablet       amLODIPine-benazepriL (LOTREL) 10-40 mg per capsule TAKE 1 CAPSULE BY MOUTH DAILY (STANDARD)      aspirin 81 MG Chew Take 1 tablet (81 mg total) by mouth once daily. 90 tablet 3    atorvastatin (LIPITOR) 80 MG tablet Take 1 tablet (80 mg total) by mouth once daily. 90 tablet 3    dapagliflozin (FARXIGA) 10 mg tablet Take 1 tablet (10 mg total) by mouth before breakfast. 90 tablet 3    fluocinolone acetonide oiL (DERMOTIC OIL) 0.01 % Drop Place 3 drops into the right ear 2 (two)  times daily. 20 mL 3    hydroCHLOROthiazide (HYDRODIURIL) 12.5 MG Tab       irbesartan (AVAPRO) 300 MG tablet       ketoconazole (NIZORAL) 2 % cream Apply topically 2 (two) times daily. Prn rash face 60 g 3    ketoconazole (NIZORAL) 2 % shampoo APPLY TOPICALLY EVERY DAY FOR FACE AND SCALP SOAKS 120 mL 4    losartan (COZAAR) 25 MG tablet Take 1 tablet (25 mg total) by mouth once daily. 90 tablet 3    metFORMIN (GLUCOPHAGE) 1000 MG tablet Take 1 tablet (1,000 mg total) by mouth 2 (two) times daily with meals. 180 tablet 3    metoprolol succinate (TOPROL-XL) 100 MG 24 hr tablet Take 1 tablet (100 mg total) by mouth once daily. 90 tablet 3    pantoprazole (PROTONIX) 40 MG tablet Take 1 tablet (40 mg total) by mouth once daily. 90 tablet 1    tamsulosin (FLOMAX) 0.4 mg Cap Take 1 capsule (0.4 mg total) by mouth once daily. 90 capsule 3    tirzepatide (MOUNJARO) 2.5 mg/0.5 mL PnIj Inject 2.5 mg into the skin every 7 days. 0.5 Pen 5    acetaminophen (TYLENOL) 500 MG tablet Take 1,000 mg by mouth as needed for Pain.      naproxen sodium (ANAPROX) 220 MG tablet Take 220 mg by mouth as needed.      prednisoLONE acetate (PRED FORTE) 1 % DrpS Apply 2 drops  to left ear BID (Patient not taking: Reported on 10/24/2023) 10 mL 0    sildenafiL (VIAGRA) 50 MG tablet Take 1 tablet (50 mg total) by mouth daily as needed for Erectile Dysfunction. Take 1 hour before sexual activity on an empty stomach. 10 tablet 5     No current facility-administered medications on file prior to visit.     Review of patient's allergies indicates:   Allergen Reactions    Penicillins      Other reaction(s): Itching  Other reaction(s): Hives    Wasp venom Edema       Review of Systems   Constitutional: Negative for chills, decreased appetite, fever, malaise/fatigue, night sweats, weight gain and weight loss.   HENT:  Positive for hearing loss (Hard of hearing).    Cardiovascular:  Negative for chest pain, claudication, dyspnea on exertion, leg swelling,  palpitations and syncope.   Respiratory:  Negative for cough and shortness of breath.    Endocrine: Negative for cold intolerance and heat intolerance.   Hematologic/Lymphatic: Negative for bleeding problem. Bruises/bleeds easily.   Skin:  Negative for color change, dry skin, flushing, itching, nail changes, poor wound healing, rash, skin cancer, suspicious lesions and unusual hair distribution.   Musculoskeletal:  Negative for arthritis, back pain, falls, gout, joint pain, joint swelling, muscle cramps, muscle weakness, myalgias, neck pain and stiffness.        Occasional right foot pain   Gastrointestinal:  Negative for diarrhea, nausea and vomiting.   Neurological:  Negative for dizziness, focal weakness, light-headedness, numbness, paresthesias, tremors, vertigo and weakness.   Psychiatric/Behavioral:  Negative for altered mental status and depression. The patient does not have insomnia.    Allergic/Immunologic: Negative.            Objective:       Vitals:    11/09/23 0757   BP: 131/84   Pulse: 73   Weight: 132.5 kg (292 lb 1.8 oz)   Height: 6' (1.829 m)   PainSc: 0-No pain   132.5 kg (292 lb 1.8 oz)     Physical Exam  Vitals reviewed.   Constitutional:       General: He is not in acute distress.     Appearance: He is well-developed. He is not ill-appearing, toxic-appearing or diaphoretic.      Comments: Proper supportive shoegear      Cardiovascular:      Pulses:           Dorsalis pedis pulses are 2+ on the right side and 2+ on the left side.        Posterior tibial pulses are 2+ on the right side and 2+ on the left side.   Musculoskeletal:         General: No swelling or tenderness.      Right lower leg: No edema.      Left lower leg: No edema.      Right ankle:      Right Achilles Tendon: No tenderness or defects.      Left ankle:      Left Achilles Tendon: No tenderness or defects.      Right foot: Decreased range of motion. Deformity and prominent metatarsal heads present. No tenderness or bony  tenderness.      Left foot: Decreased range of motion. Deformity and prominent metatarsal heads present. No tenderness or bony tenderness.      Comments: Prominent forefoot    Flexible pes planus foot type w/ medial arch collapse and mild gastroc equinus      Strength 5/5 bilateral    No pain on palpation  No pain with digital range of motion   Feet:      Right foot:      Protective Sensation: 10 sites tested.  10 sites sensed.      Skin integrity: No ulcer, blister, skin breakdown, erythema, warmth, callus or dry skin.      Toenail Condition: Right toenails are normal.      Left foot:      Protective Sensation: 10 sites tested.  10 sites sensed.      Skin integrity: No ulcer, blister, skin breakdown, erythema, warmth, callus or dry skin.      Toenail Condition: Left toenails are normal.      Comments: [place] intact      Skin:     General: Skin is warm and dry.      Capillary Refill: Capillary refill takes 2 to 3 seconds.      Coloration: Skin is not pale.      Findings: No erythema or rash.      Nails: There is no clubbing.   Neurological:      Mental Status: He is alert and oriented to person, place, and time.      Sensory: No sensory deficit.      Gait: Gait is intact.   Psychiatric:         Attention and Perception: Attention normal.         Mood and Affect: Mood normal.         Speech: Speech normal.         Behavior: Behavior normal.         Thought Content: Thought content normal.         Cognition and Memory: Cognition normal.         Judgment: Judgment normal.                Assessment:       Encounter Diagnoses   Name Primary?    Encounter for comprehensive diabetic foot examination, type 2 diabetes mellitus Yes    Type 2 diabetes mellitus with hyperglycemia, without long-term current use of insulin     Hammer toes of both feet              Plan:       Isaiah was seen today for diabetic foot exam.    Diagnoses and all orders for this visit:    Encounter for comprehensive diabetic foot  examination, type 2 diabetes mellitus    Type 2 diabetes mellitus with hyperglycemia, without long-term current use of insulin    Hammer toes of both feet          I counseled the patient on his conditions, their implications and medical management.    DM foot exam    - Shoe inspection. Diabetic Foot Education. Patient reminded of the importance of good nutrition and blood sugar control to help prevent podiatric complications of diabetes. Patient instructed on proper foot hygeine. We discussed wearing proper shoe gear, daily foot inspections, never walking without protective shoe gear, never putting sharp instruments to feet, routine podiatric nail visits every 2-3 months.      - diabetic foot care handout discussed    - no indication for xray    - avoid barefoot. Recommend orthofeet slippers         Follow up in about 1 year (around 11/9/2024).

## 2023-11-22 ENCOUNTER — PATIENT MESSAGE (OUTPATIENT)
Dept: INTERNAL MEDICINE | Facility: CLINIC | Age: 63
End: 2023-11-22
Payer: COMMERCIAL

## 2023-11-22 DIAGNOSIS — E11.65 TYPE 2 DIABETES MELLITUS WITH HYPERGLYCEMIA, WITHOUT LONG-TERM CURRENT USE OF INSULIN: ICD-10-CM

## 2023-11-22 RX ORDER — TIRZEPATIDE 5 MG/.5ML
5 INJECTION, SOLUTION SUBCUTANEOUS
Qty: 0.5 PEN | Refills: 5 | Status: SHIPPED | OUTPATIENT
Start: 2023-11-22 | End: 2024-02-02

## 2023-11-22 RX ORDER — TIRZEPATIDE 2.5 MG/.5ML
2.5 INJECTION, SOLUTION SUBCUTANEOUS
Qty: 0.5 PEN | Refills: 5 | Status: CANCELLED | OUTPATIENT
Start: 2023-11-22

## 2023-11-22 NOTE — TELEPHONE ENCOUNTER
No care due was identified.  Health [facility] Embedded Care Due Messages. Reference number: [id number].   11/22/2023 10:49:58 AM CST

## 2023-11-29 DIAGNOSIS — E11.9 TYPE 2 DIABETES MELLITUS WITHOUT COMPLICATION: ICD-10-CM

## 2023-12-04 RX ORDER — LOSARTAN POTASSIUM 25 MG/1
25 TABLET ORAL
Qty: 90 TABLET | Refills: 3 | Status: SHIPPED | OUTPATIENT
Start: 2023-12-04

## 2023-12-14 ENCOUNTER — OFFICE VISIT (OUTPATIENT)
Dept: DERMATOLOGY | Facility: CLINIC | Age: 63
End: 2023-12-14
Payer: COMMERCIAL

## 2023-12-14 VITALS — WEIGHT: 292 LBS | BODY MASS INDEX: 39.6 KG/M2

## 2023-12-14 DIAGNOSIS — L21.9 SEBORRHEA: ICD-10-CM

## 2023-12-14 DIAGNOSIS — D22.9 NEVUS OF MULTIPLE SITES: Primary | ICD-10-CM

## 2023-12-14 PROCEDURE — 1159F PR MEDICATION LIST DOCUMENTED IN MEDICAL RECORD: ICD-10-PCS | Mod: CPTII,S$GLB,, | Performed by: DERMATOLOGY

## 2023-12-14 PROCEDURE — 99999 PR PBB SHADOW E&M-EST. PATIENT-LVL IV: CPT | Mod: PBBFAC,,, | Performed by: DERMATOLOGY

## 2023-12-14 PROCEDURE — 1159F MED LIST DOCD IN RCRD: CPT | Mod: CPTII,S$GLB,, | Performed by: DERMATOLOGY

## 2023-12-14 PROCEDURE — 3008F BODY MASS INDEX DOCD: CPT | Mod: CPTII,S$GLB,, | Performed by: DERMATOLOGY

## 2023-12-14 PROCEDURE — 4010F ACE/ARB THERAPY RXD/TAKEN: CPT | Mod: CPTII,S$GLB,, | Performed by: DERMATOLOGY

## 2023-12-14 PROCEDURE — 3044F PR MOST RECENT HEMOGLOBIN A1C LEVEL <7.0%: ICD-10-PCS | Mod: CPTII,S$GLB,, | Performed by: DERMATOLOGY

## 2023-12-14 PROCEDURE — 99999 PR PBB SHADOW E&M-EST. PATIENT-LVL IV: ICD-10-PCS | Mod: PBBFAC,,, | Performed by: DERMATOLOGY

## 2023-12-14 PROCEDURE — 3008F PR BODY MASS INDEX (BMI) DOCUMENTED: ICD-10-PCS | Mod: CPTII,S$GLB,, | Performed by: DERMATOLOGY

## 2023-12-14 PROCEDURE — 99214 PR OFFICE/OUTPT VISIT, EST, LEVL IV, 30-39 MIN: ICD-10-PCS | Mod: S$GLB,,, | Performed by: DERMATOLOGY

## 2023-12-14 PROCEDURE — 99214 OFFICE O/P EST MOD 30 MIN: CPT | Mod: S$GLB,,, | Performed by: DERMATOLOGY

## 2023-12-14 PROCEDURE — 1160F PR REVIEW ALL MEDS BY PRESCRIBER/CLIN PHARMACIST DOCUMENTED: ICD-10-PCS | Mod: CPTII,S$GLB,, | Performed by: DERMATOLOGY

## 2023-12-14 PROCEDURE — 1160F RVW MEDS BY RX/DR IN RCRD: CPT | Mod: CPTII,S$GLB,, | Performed by: DERMATOLOGY

## 2023-12-14 PROCEDURE — 3044F HG A1C LEVEL LT 7.0%: CPT | Mod: CPTII,S$GLB,, | Performed by: DERMATOLOGY

## 2023-12-14 PROCEDURE — 4010F PR ACE/ARB THEARPY RXD/TAKEN: ICD-10-PCS | Mod: CPTII,S$GLB,, | Performed by: DERMATOLOGY

## 2023-12-14 RX ORDER — KETOCONAZOLE 20 MG/ML
SHAMPOO, SUSPENSION TOPICAL
Qty: 120 ML | Refills: 4 | Status: SHIPPED | OUTPATIENT
Start: 2023-12-14

## 2023-12-14 RX ORDER — ALCLOMETASONE DIPROPIONATE 0.5 MG/G
CREAM TOPICAL
Qty: 60 G | Refills: 3 | Status: SHIPPED | OUTPATIENT
Start: 2023-12-14

## 2023-12-14 RX ORDER — KETOCONAZOLE 20 MG/G
CREAM TOPICAL 2 TIMES DAILY
Qty: 60 G | Refills: 3 | Status: SHIPPED | OUTPATIENT
Start: 2023-12-14

## 2023-12-18 DIAGNOSIS — M79.642 LEFT HAND PAIN: Primary | ICD-10-CM

## 2023-12-19 ENCOUNTER — OFFICE VISIT (OUTPATIENT)
Dept: ORTHOPEDICS | Facility: CLINIC | Age: 63
End: 2023-12-19
Payer: COMMERCIAL

## 2023-12-19 ENCOUNTER — HOSPITAL ENCOUNTER (OUTPATIENT)
Dept: RADIOLOGY | Facility: HOSPITAL | Age: 63
Discharge: HOME | End: 2023-12-19
Attending: PHYSICIAN ASSISTANT
Payer: COMMERCIAL

## 2023-12-19 DIAGNOSIS — S63.653A SPRAIN OF METACARPOPHALANGEAL (MCP) JOINT OF LEFT MIDDLE FINGER, INITIAL ENCOUNTER: Primary | ICD-10-CM

## 2023-12-19 DIAGNOSIS — M79.642 BILATERAL HAND PAIN: Primary | ICD-10-CM

## 2023-12-19 DIAGNOSIS — M79.641 BILATERAL HAND PAIN: Primary | ICD-10-CM

## 2023-12-19 DIAGNOSIS — M79.642 BILATERAL HAND PAIN: ICD-10-CM

## 2023-12-19 DIAGNOSIS — M79.641 BILATERAL HAND PAIN: ICD-10-CM

## 2023-12-19 PROCEDURE — 3044F PR MOST RECENT HEMOGLOBIN A1C LEVEL <7.0%: ICD-10-PCS | Mod: CPTII,S$GLB,, | Performed by: PHYSICIAN ASSISTANT

## 2023-12-19 PROCEDURE — 99203 OFFICE O/P NEW LOW 30 MIN: CPT | Mod: S$GLB,,, | Performed by: PHYSICIAN ASSISTANT

## 2023-12-19 PROCEDURE — 73130 X-RAY EXAM OF HAND: CPT | Mod: TC,50

## 2023-12-19 PROCEDURE — 73130 XR HAND COMPLETE 3 VIEWS BILATERAL: ICD-10-PCS | Mod: 26,,, | Performed by: RADIOLOGY

## 2023-12-19 PROCEDURE — 73130 X-RAY EXAM OF HAND: CPT | Mod: 26,,, | Performed by: RADIOLOGY

## 2023-12-19 PROCEDURE — 4010F ACE/ARB THERAPY RXD/TAKEN: CPT | Mod: CPTII,S$GLB,, | Performed by: PHYSICIAN ASSISTANT

## 2023-12-19 PROCEDURE — 1159F MED LIST DOCD IN RCRD: CPT | Mod: CPTII,S$GLB,, | Performed by: PHYSICIAN ASSISTANT

## 2023-12-19 PROCEDURE — 99999 PR PBB SHADOW E&M-EST. PATIENT-LVL III: ICD-10-PCS | Mod: PBBFAC,,, | Performed by: PHYSICIAN ASSISTANT

## 2023-12-19 PROCEDURE — 3044F HG A1C LEVEL LT 7.0%: CPT | Mod: CPTII,S$GLB,, | Performed by: PHYSICIAN ASSISTANT

## 2023-12-19 PROCEDURE — 99203 PR OFFICE/OUTPT VISIT, NEW, LEVL III, 30-44 MIN: ICD-10-PCS | Mod: S$GLB,,, | Performed by: PHYSICIAN ASSISTANT

## 2023-12-19 PROCEDURE — 99999 PR PBB SHADOW E&M-EST. PATIENT-LVL III: CPT | Mod: PBBFAC,,, | Performed by: PHYSICIAN ASSISTANT

## 2023-12-19 PROCEDURE — 4010F PR ACE/ARB THEARPY RXD/TAKEN: ICD-10-PCS | Mod: CPTII,S$GLB,, | Performed by: PHYSICIAN ASSISTANT

## 2023-12-19 PROCEDURE — 1159F PR MEDICATION LIST DOCUMENTED IN MEDICAL RECORD: ICD-10-PCS | Mod: CPTII,S$GLB,, | Performed by: PHYSICIAN ASSISTANT

## 2023-12-19 NOTE — PROGRESS NOTES
[facility]  History & Physical  Orthopedics    SUBJECTIVE:      Chief Complaint: Left hand pain    Referring Provider: Reji Napier Dr. is the supervising physician for this encounter/patient    History of Present Illness:  Patient is a 63 y.o. right hand dominant male who presents today with complaints of left hand pain, present for about 1-2 months, no trauma that he is aware of. He reports pain when gripping his cellphone, this pain is mostly notable over the knuckle of the long finger. He denies any numbness/tingling, no locking of the fingers. He has not tried any specific treatments for this. No surgical history on the hands.     Onset of symptoms/DOI was 1-2 months.    Symptoms are aggravated by activity.    Symptoms are alleviated by rest.    Symptoms consist of pain.    The patient rates their pain as a 7/10 off/on    Attempted treatment(s) and/or interventions include activity modifications, rest.     The patient denies any fevers, chills, N/V, D/C and presents for evaluation.       Past Medical History:   Diagnosis Date    CHF (congestive heart failure)     Coronary artery disease     Diabetes mellitus type 2, uncontrolled, without complications     6.7% Metf 1 g qd, eye 2015, [facility]- [facility]-2015,     Elevated platelet count 07/23/2015    H/O splenectomy 04/24/2015    doesn't like pneumovax bc caused side effects    HLD (hyperlipidemia) 01/22/2015    goal LDL < 100, reluctant to take statin    HTN (hypertension), benign 01/22/2015    losartan 100    Morbid obesity with BMI of 45.0-49.9, adult 06/11/2014    RODRIGUEZ (obstructive sleep apnea)     PAF (paroxysmal atrial fibrillation)     Polyp of transverse colon 09/05/2018 2018, repeat 2023    Severe uncontrolled hypertension 06/11/2014     Past Surgical History:   Procedure Laterality Date    AORTOGRAPHY N/A 7/11/2022    Procedure: AORTOGRAM;  Surgeon: Bjorn Cheatham MD;  Location: [facility] CATH LAB;  Service: Cardiology;   Laterality: N/A;    ARTHROSCOPIC DEBRIDEMENT OF SHOULDER Right 12/22/2022    Procedure: DEBRIDEMENT, SHOULDER, ARTHROSCOPIC;  Surgeon: Alexandria Morse MD;  Location: [facility] OR;  Service: Orthopedics;  Laterality: Right;    ARTHROSCOPIC REPAIR OF ROTATOR CUFF OF SHOULDER Right 12/22/2022    Procedure: REPAIR, ROTATOR CUFF, ARTHROSCOPIC;  Surgeon: Alexandria Morse MD;  Location: [facility] OR;  Service: Orthopedics;  Laterality: Right;    ARTHROSCOPY OF SHOULDER WITH DECOMPRESSION OF SUBACROMIAL SPACE Right 12/22/2022    Procedure: ARTHROSCOPY, SHOULDER, WITH SUBACROMIAL SPACE DECOMPRESSION;  Surgeon: Alexandria Morse MD;  Location: [facility] OR;  Service: Orthopedics;  Laterality: Right;    COLONOSCOPY N/A 11/22/2022    Procedure: COLONOSCOPY;  Surgeon: Castro Jasso MD;  Location: [facility] ENDO (4TH FLR);  Service: Endoscopy;  Laterality: N/A;  ok to hold Xarelto-see telephone encounter dated 11/1  [facility] via portal  pre call done    CORONARY ARTERY BYPASS GRAFT (CABG) Left 7/18/2022    Procedure: CORONARY ARTERY BYPASS GRAFT (CABG);  Surgeon: Blu Rhodes MD;  Location: [facility] OR 2ND FLR;  Service: Cardiovascular;  Laterality: Left;  CABG x 1 (LIMA to LAD)    ENDOSCOPIC HARVEST OF VEIN Left 7/18/2022    Procedure: HARVEST-VEIN-ENDOVASCULAR;  Surgeon: Blu Rhodes MD;  Location: [facility] OR 2ND FLR;  Service: Cardiovascular;  Laterality: Left;  Vein [place] Start time: 0823am  Vein [place] Stop time: 0836am    Vein [place] Start time: 0848am  Vein [place] Stop time: 0904am    FIXATION OF TENDON Right 12/22/2022    Procedure: FIXATION, TENDON-biceps tenodesis;  Surgeon: Alexandria Morse MD;  Location: [facility] OR;  Service: Orthopedics;  Laterality: Right;    LEFT HEART CATHETERIZATION N/A 7/11/2022    Procedure: Left heart cath;  Surgeon: Bjorn Cheatham MD;  Location: [facility] CATH LAB;  Service: Cardiology;  Laterality: N/A;    splenecectomy       Review of patient's allergies indicates:   Allergen Reactions    Penicillins       Other reaction(s): Itching  Other reaction(s): Hives    Wasp venom Edema     Social History     Social History Narrative     , Quit smoking 20 yrs, ETOH 2 beers on weekend, colonoscopy 2011     Family History   Problem Relation Age of Onset    Lung cancer Mother     Hypertension Mother     Heart attack Mother 58    Heart disease Mother     Stroke Mother     Heart failure Mother     Hyperlipidemia Mother     Asbestos Mother     Diabetes Father     Colon cancer Father 65    Coronary artery disease Brother     Coronary artery disease Maternal Grandmother          Current Outpatient Medications:     acetaminophen (TYLENOL) 500 MG tablet, Take 1,000 mg by mouth as needed for Pain., Disp: , Rfl:     alclometasone (ACLOVATE) 0.05 % cream, AAA face bid prn redness or scaling or itching, Disp: 60 g, Rfl: 3    amLODIPine (NORVASC) 5 MG tablet, , Disp: , Rfl:     amlodipine-atorvastatin (CADUET) 5-40 mg per tablet, , Disp: , Rfl:     amLODIPine-benazepriL (LOTREL) 10-40 mg per capsule, TAKE 1 CAPSULE BY MOUTH DAILY (STANDARD), Disp: , Rfl:     aspirin 81 MG Chew, Take 1 tablet (81 mg total) by mouth once daily., Disp: 90 tablet, Rfl: 3    atorvastatin (LIPITOR) 80 MG tablet, Take 1 tablet (80 mg total) by mouth once daily., Disp: 90 tablet, Rfl: 3    dapagliflozin (FARXIGA) 10 mg tablet, Take 1 tablet (10 mg total) by mouth before breakfast., Disp: 90 tablet, Rfl: 3    fluocinolone acetonide oiL (DERMOTIC OIL) 0.01 % Drop, Place 3 drops into the right ear 2 (two) times daily., Disp: 20 mL, Rfl: 3    hydroCHLOROthiazide (HYDRODIURIL) 12.5 MG Tab, , Disp: , Rfl:     irbesartan (AVAPRO) 300 MG tablet, , Disp: , Rfl:     ketoconazole (NIZORAL) 2 % cream, Apply topically 2 (two) times daily. Prn rash face, Disp: 60 g, Rfl: 3    ketoconazole (NIZORAL) 2 % shampoo, Use in shower for scalp and face, Disp: 120 mL, Rfl: 4    losartan (COZAAR) 25 MG tablet, TAKE 1 TABLET(25 MG) BY MOUTH EVERY DAY, Disp: 90 tablet, Rfl: 3     metFORMIN (GLUCOPHAGE) 1000 MG tablet, Take 1 tablet (1,000 mg total) by mouth 2 (two) times daily with meals., Disp: 180 tablet, Rfl: 1    metoprolol succinate (TOPROL-XL) 100 MG 24 hr tablet, Take 1 tablet (100 mg total) by mouth once daily., Disp: 90 tablet, Rfl: 3    naproxen sodium (ANAPROX) 220 MG tablet, Take 220 mg by mouth as needed., Disp: , Rfl:     pantoprazole (PROTONIX) 40 MG tablet, Take 1 tablet (40 mg total) by mouth once daily., Disp: 90 tablet, Rfl: 1    prednisoLONE acetate (PRED FORTE) 1 % DrpS, Apply 2 drops  to left ear BID, Disp: 10 mL, Rfl: 0    sildenafiL (VIAGRA) 50 MG tablet, Take 1 tablet (50 mg total) by mouth daily as needed for Erectile Dysfunction. Take 1 hour before sexual activity on an empty stomach., Disp: 10 tablet, Rfl: 5    tamsulosin (FLOMAX) 0.4 mg Cap, Take 1 capsule (0.4 mg total) by mouth once daily., Disp: 90 capsule, Rfl: 3    tirzepatide (MOUNJARO) 5 mg/0.5 mL PnIj, Inject 5 mg into the skin every 7 days., Disp: 0.5 Pen, Rfl: 5      Review of Systems:  Constitutional: no fever or chills  Eyes: no visual changes  ENT: no nasal congestion or sore throat  Respiratory: no cough or shortness of breath  Cardiovascular: no chest pain  Gastrointestinal: no nausea or vomiting, tolerating diet  Musculoskeletal: pain and soreness    OBJECTIVE:      Vital Signs (Most Recent):  There were no vitals filed for this visit.  There is no height or weight on file to calculate BMI.      Physical Exam:  Constitutional: The patient appears well-developed and well-nourished. No distress.   Skin: No lesions appreciated  Head: Normocephalic and atraumatic.   Nose: Nose normal.   Ears: No deformities seen  Eyes: Conjunctivae and EOM are normal.   Neck: No tracheal deviation present.   Cardiovascular: Normal rate and intact distal pulses.    Pulmonary/Chest: Effort normal. No respiratory distress.   Abdominal: There is no guarding.   Neurological: The patient is alert.   Psychiatric: The patient  has a normal mood and affect.     Left Hand/Wrist Examination:    Observation/Inspection:  Swelling  none    Deformity  none  Discoloration  none     Scars   none    Atrophy  none    HAND/WRIST EXAMINATION:  Finkelstein's Test   Neg  WHAT Test    Neg  Snuff box tenderness   Neg  Eckert's Test    Neg  Hook of Hamate Tenderness  Neg  CMC grind    Neg  Circumduction test   Neg  NTTP on exam, however he does have pain to the ulnar boarder of the dorsal long finger MCP only with resisted extension of the finger. There is no notable instability of the extensor tendons    Neurovascular Exam:  Digits WWP, brisk CR < 3s throughout  NVI motor/LTS to M/R/U nerves, radial pulse 2+  Tinel's Test - Carpal Tunnel  Neg  Tinel's Test - Cubital Tunnel  Neg  Phalen's Test    Neg  Median Nerve Compression Test Neg    ROM hand full, painless, no viviana trigger.    ROM wrist full, painless    ROM elbow full, painless    Abdomen not guarded  Respirations nonlabored  Perfusion intact    Diagnostic Results:     Imaging - I independently viewed the patient's imaging as well as the radiology report.  Xrays of the patient's bilateral hand  demonstrate no evidence of any acute fractures or dislocations or significant degenerative changes.    EMG - none    ASSESSMENT/PLAN:      63 y.o. yo male with suspect left long finger MCP sprain    Plan: The patient and I had a thorough discussion today.  We discussed the working diagnosis as well as several other potential alternative diagnoses.  Treatment options were discussed, both conservative and surgical.  Conservative treatment options would include things such as activity modifications, workplace modifications, a period of rest, oral vs topical OTC and prescription anti-inflammatory medications, occupational therapy, splinting/bracing, immobilization, corticosteroid injections, and others.  Surgical options were discussed as well.     At this time, the patient would like to proceed with a trial of  NSAIDs/voltaren gel massage, ice/heat, activity as tolerated. If no improvement in 1-2 months, he will message me for MRI of the finger. He declines CSI today.    Should the patient's symptoms worsen, persist, or fail to improve they should return for reevaluation and I would be happy to see them back anytime.           Please do not hesitate to reach out to us via email, phone, or MyChart with any questions, concerns, or feedback.

## 2023-12-27 ENCOUNTER — PATIENT MESSAGE (OUTPATIENT)
Dept: INTERNAL MEDICINE | Facility: CLINIC | Age: 63
End: 2023-12-27
Payer: COMMERCIAL

## 2023-12-27 NOTE — TELEPHONE ENCOUNTER
No care due was identified.  Health [facility] Embedded Care Due Messages. Reference number: [id number].   12/27/2023 9:40:35 AM CST

## 2023-12-28 RX ORDER — METOPROLOL SUCCINATE 100 MG/1
100 TABLET, EXTENDED RELEASE ORAL DAILY
Qty: 90 TABLET | Refills: 3 | Status: SHIPPED | OUTPATIENT
Start: 2023-12-28

## 2023-12-28 RX ORDER — ATORVASTATIN CALCIUM 80 MG/1
80 TABLET, FILM COATED ORAL DAILY
Qty: 90 TABLET | Refills: 3 | Status: SHIPPED | OUTPATIENT
Start: 2023-12-28

## 2023-12-28 NOTE — TELEPHONE ENCOUNTER
Refill Decision Note   Isaiah Sunita  is requesting a refill authorization.  Brief Assessment and Rationale for Refill:  Approve     Medication Therapy Plan:         Pharmacist review requested: Yes   Extended chart review required: Yes   Comments:     Note composed:3:52 PM 12/28/2023

## 2023-12-28 NOTE — TELEPHONE ENCOUNTER
Refill Routing Note   Medication(s) are not appropriate for processing by [facility] for the following reason(s):        Drug-drug interaction  Drug-disease interaction:     [facility] action(s):  Defer      Medication Therapy Plan:       Pharmacist review requested: Yes     Appointments  past 12m or future 3m with PCP    Date Provider   Last Visit   10/24/2023 Nitin Cedeño, DO   Next Visit   4/30/2024 Nitin Cedeño, DO   ED visits in past 90 days: 0        Note composed:6:53 PM 12/27/2023

## 2024-01-30 DIAGNOSIS — Z00.00 ENCOUNTER FOR MEDICARE ANNUAL WELLNESS EXAM: ICD-10-CM

## 2024-01-31 ENCOUNTER — PATIENT MESSAGE (OUTPATIENT)
Dept: INTERNAL MEDICINE | Facility: CLINIC | Age: 64
End: 2024-01-31
Payer: COMMERCIAL

## 2024-01-31 DIAGNOSIS — E11.65 TYPE 2 DIABETES MELLITUS WITH HYPERGLYCEMIA, WITHOUT LONG-TERM CURRENT USE OF INSULIN: ICD-10-CM

## 2024-02-19 NOTE — PROGRESS NOTES
Subjective:       Patient ID: Isaiah Dorsey Jr. is a 64 y.o. male.    Chief Complaint: No chief complaint on file.      The patient is coming in for evaluation of his hearing.  He has had a hearing loss for over 10 years and wears bilateral hearing aids.  He does have tinnitus intermittently.  He has no significant occupational noise exposure.  He does shoot guns periodically.  When he does so he wears hearing protection.  Hearing family history is positive for hearing loss in his father.  He does not have significant balance issues.  He does have obstructive sleep apnea.        Review of Systems     Constitutional: Positive for fatigue.  Negative for appetite change, chills, fever and unexpected weight loss.      HENT: Positive for ear infection and hearing loss.  Negative for ear discharge, ear pain, facial swelling, mouth sores, nosebleeds, postnasal drip, ringing in the ears, runny nose, sinus infection, sinus pressure, sore throat, stuffy nose, tonsil infection, dental problems, trouble swallowing and voice change.      Eyes:  Negative for change in eyesight, eye drainage, eye itching and photophobia.     Respiratory:  Positive for sleep apnea and snoring. Negative for cough, shortness of breath and wheezing.      Cardiovascular:  Negative for chest pain, foot swelling, irregular heartbeat and swollen veins.     Gastrointestinal:  Negative for abdominal pain, acid reflux, constipation, diarrhea, heartburn and vomiting.     Genitourinary: Negative for difficulty urinating, sexual problems and frequent urination.     Musc: Negative for aching joints, aching muscles, back pain and neck pain.     Skin: Positive for rash.     Allergy: Negative for food allergies and seasonal allergies.     Endocrine: Negative for cold intolerance and heat intolerance.  Type 2 diabetes mellitus    Neurological: Negative for dizziness, headaches, light-headedness, seizures and tremors.      Hematologic: Negative for bruises/bleeds  easily and swollen glands.      Psychiatric: Positive for decreased concentration. Negative for depression, nervous/anxious and sleep disturbance.              Objective:      Physical Exam  Vitals and nursing note reviewed.   Constitutional:       General: He is awake.      Appearance: Normal appearance. He is well-developed and well-groomed. He is morbidly obese.   HENT:      Head: Normocephalic.      Jaw: There is normal jaw occlusion.      Salivary Glands: Right salivary gland is not diffusely enlarged or tender. Left salivary gland is not diffusely enlarged or tender.      Right Ear: Ear canal and external ear normal. Decreased hearing noted. Tympanic membrane is retracted.      Left Ear: Ear canal and external ear normal. Decreased hearing noted. Tympanic membrane is perforated and retracted.      Ears:        Nose: Septal deviation, mucosal edema (cyanotic, boggy inferior turbinates bilaterally) and rhinorrhea (clear mucus bilaterally) present. No nasal deformity. Rhinorrhea is clear.      Right Turbinates: Enlarged and pale.      Left Turbinates: Enlarged and pale.      Mouth/Throat:      Lips: No lesions.      Mouth: Mucous membranes are moist. No oral lesions.      Dentition: No gum lesions.      Tongue: No lesions.      Palate: No mass and lesions.      Pharynx: Oropharynx is clear. Uvula midline.      Comments: Oral cavity-Bullard class 3, hypertrophy of the base of tongue, long thick palate and long thick uvula with significant narrowing of the oropharyngeal inlet,  Eyes:      General: Lids are normal. Vision grossly intact.         Right eye: No discharge.         Left eye: No discharge.      Extraocular Movements: Extraocular movements intact.      Conjunctiva/sclera: Conjunctivae normal.      Pupils: Pupils are equal, round, and reactive to light.   Neck:      Thyroid: No thyroid mass or thyromegaly.      Trachea: Trachea normal. No tracheal deviation.   Cardiovascular:      Rate and Rhythm: Normal  rate and regular rhythm.      Pulses: Normal pulses.      Heart sounds: Normal heart sounds.   Pulmonary:      Effort: Pulmonary effort is normal.      Breath sounds: Normal breath sounds. No stridor. No decreased breath sounds, wheezing, rhonchi or rales.   Abdominal:      General: Bowel sounds are normal.      Palpations: Abdomen is soft.      Tenderness: There is no abdominal tenderness.   Musculoskeletal:      Cervical back: Neck supple. No muscular tenderness. Decreased range of motion.   Lymphadenopathy:      Head:      Right side of head: No submental, submandibular, preauricular, posterior auricular or occipital adenopathy.      Left side of head: No submental, submandibular, preauricular, posterior auricular or occipital adenopathy.      Cervical: No cervical adenopathy.   Skin:     General: Skin is warm and dry.      Findings: No petechiae or rash.      Nails: There is no clubbing.   Neurological:      Mental Status: He is alert and oriented to person, place, and time.      Cranial Nerves: No cranial nerve deficit.      Sensory: No sensory deficit.      Gait: Gait normal.   Psychiatric:         Speech: Speech normal.         Behavior: Behavior normal. Behavior is cooperative.         Thought Content: Thought content normal.         Judgment: Judgment normal.       Review of audiogram performed 12/08/2022    I personally reviewed the patient's most recent audiogram and discussed with him in detail.  I answered all his questions.    Assessment:       1. Sensorineural hearing loss (SNHL) of both ears    2. Chronic otitis externa of both ears, unspecified type    3. Tympanic membrane perforation, left    4. Nasal septal deviation    5. Tinnitus of both ears        Plan:       I will schedule patient for a current audiogram.  In addition, I am giving him Dermotic drops to use in his right ear and Pred Forte ophthalmic drops to use in his left ear.  I have advised that he obtain a copy of the audiogram to take  to a hearing aid dealer of his choice.  He is medically cleared for bilateral amplification.  I will recheck him in 1 year, or sooner on an as-needed basis.    Addendum:  See results of audiogram performed 02/27/2024 below.  Today's audiogram reviewed by me.  Pertinent findings asymmetrical hearing loss with left ear worse and bilateral sensorineural hearing loss, discrimination is slightly worse than at last audiogram, tympanograms are Type A on the left and Type B on the right.  Patient notified of results.  He is medically cleared for bilateral amplification.                DISCLAIMER: This note was prepared with [employer] voice recognition transcription software. Garbled syntax, mangled pronouns, and other bizarre constructions may be attributed to that software system. While efforts were made to correct any mistakes made by this voice recognition program, some errors and/or omissions may remain in the note that were missed when the note was originally created.

## 2024-02-20 ENCOUNTER — TELEPHONE (OUTPATIENT)
Dept: PHARMACY | Facility: CLINIC | Age: 64
End: 2024-02-20
Payer: MEDICARE

## 2024-02-20 ENCOUNTER — OFFICE VISIT (OUTPATIENT)
Dept: OTOLARYNGOLOGY | Facility: CLINIC | Age: 64
End: 2024-02-20
Payer: MEDICARE

## 2024-02-20 VITALS
HEART RATE: 69 BPM | OXYGEN SATURATION: 96 % | DIASTOLIC BLOOD PRESSURE: 96 MMHG | BODY MASS INDEX: 38.06 KG/M2 | SYSTOLIC BLOOD PRESSURE: 177 MMHG | WEIGHT: 280.63 LBS

## 2024-02-20 DIAGNOSIS — H93.13 TINNITUS OF BOTH EARS: ICD-10-CM

## 2024-02-20 DIAGNOSIS — H72.92 TYMPANIC MEMBRANE PERFORATION, LEFT: ICD-10-CM

## 2024-02-20 DIAGNOSIS — H90.3 SENSORINEURAL HEARING LOSS (SNHL) OF BOTH EARS: Primary | ICD-10-CM

## 2024-02-20 DIAGNOSIS — J34.2 NASAL SEPTAL DEVIATION: ICD-10-CM

## 2024-02-20 DIAGNOSIS — H60.63 CHRONIC OTITIS EXTERNA OF BOTH EARS, UNSPECIFIED TYPE: Chronic | ICD-10-CM

## 2024-02-20 PROCEDURE — 3080F DIAST BP >= 90 MM HG: CPT | Mod: CPTII,S$GLB,, | Performed by: SPECIALIST

## 2024-02-20 PROCEDURE — 3072F LOW RISK FOR RETINOPATHY: CPT | Mod: CPTII,S$GLB,, | Performed by: SPECIALIST

## 2024-02-20 PROCEDURE — 3288F FALL RISK ASSESSMENT DOCD: CPT | Mod: CPTII,S$GLB,, | Performed by: SPECIALIST

## 2024-02-20 PROCEDURE — 1160F RVW MEDS BY RX/DR IN RCRD: CPT | Mod: CPTII,S$GLB,, | Performed by: SPECIALIST

## 2024-02-20 PROCEDURE — 1101F PT FALLS ASSESS-DOCD LE1/YR: CPT | Mod: CPTII,S$GLB,, | Performed by: SPECIALIST

## 2024-02-20 PROCEDURE — 3008F BODY MASS INDEX DOCD: CPT | Mod: CPTII,S$GLB,, | Performed by: SPECIALIST

## 2024-02-20 PROCEDURE — 99214 OFFICE O/P EST MOD 30 MIN: CPT | Mod: S$GLB,,, | Performed by: SPECIALIST

## 2024-02-20 PROCEDURE — 3077F SYST BP >= 140 MM HG: CPT | Mod: CPTII,S$GLB,, | Performed by: SPECIALIST

## 2024-02-20 PROCEDURE — 1159F MED LIST DOCD IN RCRD: CPT | Mod: CPTII,S$GLB,, | Performed by: SPECIALIST

## 2024-02-20 PROCEDURE — 99999 PR PBB SHADOW E&M-EST. PATIENT-LVL IV: CPT | Mod: PBBFAC,,, | Performed by: SPECIALIST

## 2024-02-20 RX ORDER — PREDNISOLONE ACETATE 10 MG/ML
SUSPENSION/ DROPS OPHTHALMIC
Qty: 10 ML | Refills: 5 | Status: SHIPPED | OUTPATIENT
Start: 2024-02-20 | End: 2024-03-06 | Stop reason: SDUPTHER

## 2024-02-20 RX ORDER — FLUOCINOLONE ACETONIDE 0.11 MG/ML
3 OIL AURICULAR (OTIC) 2 TIMES DAILY
Qty: 20 ML | Refills: 3 | Status: SHIPPED | OUTPATIENT
Start: 2024-02-20

## 2024-02-27 ENCOUNTER — CLINICAL SUPPORT (OUTPATIENT)
Dept: AUDIOLOGY | Facility: CLINIC | Age: 64
End: 2024-02-27
Payer: MEDICARE

## 2024-02-27 ENCOUNTER — PATIENT MESSAGE (OUTPATIENT)
Dept: OTOLARYNGOLOGY | Facility: CLINIC | Age: 64
End: 2024-02-27
Payer: MEDICARE

## 2024-02-27 DIAGNOSIS — H93.13 TINNITUS OF BOTH EARS: ICD-10-CM

## 2024-02-27 DIAGNOSIS — H90.3 SENSORINEURAL HEARING LOSS (SNHL) OF BOTH EARS: Primary | ICD-10-CM

## 2024-02-27 PROCEDURE — 99999 PR PBB SHADOW E&M-EST. PATIENT-LVL I: CPT | Mod: PBBFAC,,, | Performed by: AUDIOLOGIST

## 2024-02-27 PROCEDURE — 92557 COMPREHENSIVE HEARING TEST: CPT | Mod: S$GLB,,, | Performed by: AUDIOLOGIST

## 2024-02-27 PROCEDURE — 92567 TYMPANOMETRY: CPT | Mod: S$GLB,,, | Performed by: AUDIOLOGIST

## 2024-02-27 NOTE — PROGRESS NOTES
Isaiah Dorsey Jr. was seen today in the clinic for an audiologic evaluation.  Patient's main complaint was decreased hearing in both ears.  Mr. Dorsey reported he has [employer] hearing aids that are almost 10 years old that he wears all day, every day. He tried purchasing new hearing aids from [employer] a few years ago but is not impressed with the sound quality and will only use them as a back up to his current hearing aids. He purchased his hearing aids from another provider. Mr. Dorsey denied any aural pressure or otalgia but noted some non-bothersome, intermittent tinnitus. He reported a history of a TM perforation in his left ear.     Otoscopy revealed clear EAC's with visible TM's in both ears. Perforation was visible in anterior inferior quadrant of TM.     Tympanometry revealed Type A in the right ear and Type B (large ECV consistent with perforation) in the left ear.     Audiogram results revealed normal hearing steeply sloping to a moderate to severe sensorineural hearing loss (SNHL) in the right ear and a mild steeply sloping to severe SNHL in the left ear.      Speech reception thresholds were noted at 30 dB in the right ear and 55 dB in the left ear.    Speech discrimination scores were 72% in the right ear and 76% in the left ear.    Results were discussed with Mr. Dorsey following testing. He is interested in new hearing aids. Mr. Dorsey would like to wait to use his insurance benefit towards the end of the year so he can get one hearing aid per year to maximize his benefit. He was advised he would need a new audiogram at the time of his consultation. Mr. Dorsey is not interested in rechargeable technology. It is recommended he have his current hearing aids programmed with his updated audiogram, which he received a copy of at today's visit.     Recommendations:  Otologic evaluation  Annual audiogram  Hearing protection when in noise  Hearing aid consultation for new amplification  Daily use of binaural  amplification

## 2024-03-06 DIAGNOSIS — H60.63 CHRONIC OTITIS EXTERNA OF BOTH EARS, UNSPECIFIED TYPE: Chronic | ICD-10-CM

## 2024-03-06 RX ORDER — PREDNISOLONE ACETATE 10 MG/ML
SUSPENSION/ DROPS OPHTHALMIC
Qty: 10 ML | Refills: 5 | Status: SHIPPED | OUTPATIENT
Start: 2024-03-06 | End: 2024-04-30

## 2024-03-19 ENCOUNTER — TELEPHONE (OUTPATIENT)
Dept: OTOLARYNGOLOGY | Facility: CLINIC | Age: 64
End: 2024-03-19
Payer: MEDICARE

## 2024-03-19 NOTE — TELEPHONE ENCOUNTER
----- Message from SAVANNAH Caputo MD sent at 3/15/2024  3:59 PM CDT -----  Contact: [phone]  I have completed papers from the insurance company and the forms have been faxed back to them.  Please notify the patient.  ----- Message -----  From: Gabrielle Cannon LPN  Sent: 3/15/2024   3:24 PM CDT  To: SAVANNAH Caputo MD    Please see pharmacy request  ----- Message -----  From: Carmelina Jimenes  Sent: 3/15/2024   2:07 PM CDT  To: Harshal Dorsey calling regarding Pharmacy Authorization (message) for Luiza Montero from [employer] is calling regarding medication   prednisoLONE acetate (PRED FORTE) 1 % [facility] needs more information for medication before appeal       Call @ [phone]

## 2024-04-17 ENCOUNTER — LAB VISIT (OUTPATIENT)
Dept: LAB | Facility: HOSPITAL | Age: 64
End: 2024-04-17
Attending: INTERNAL MEDICINE
Payer: MEDICARE

## 2024-04-17 DIAGNOSIS — E11.65 TYPE 2 DIABETES MELLITUS WITH HYPERGLYCEMIA, WITHOUT LONG-TERM CURRENT USE OF INSULIN: ICD-10-CM

## 2024-04-17 DIAGNOSIS — E78.5 HYPERLIPIDEMIA ASSOCIATED WITH TYPE 2 DIABETES MELLITUS: ICD-10-CM

## 2024-04-17 DIAGNOSIS — I15.2 HYPERTENSION ASSOCIATED WITH DIABETES: ICD-10-CM

## 2024-04-17 DIAGNOSIS — E11.69 HYPERLIPIDEMIA ASSOCIATED WITH TYPE 2 DIABETES MELLITUS: ICD-10-CM

## 2024-04-17 DIAGNOSIS — E11.59 HYPERTENSION ASSOCIATED WITH DIABETES: ICD-10-CM

## 2024-04-17 LAB
ALBUMIN SERPL BCP-MCNC: 3.7 G/DL (ref 3.5–5.2)
ALP SERPL-CCNC: 101 U/L (ref 55–135)
ALT SERPL W/O P-5'-P-CCNC: 34 U/L (ref 10–44)
ANION GAP SERPL CALC-SCNC: 10 MMOL/L (ref 8–16)
AST SERPL-CCNC: 25 U/L (ref 10–40)
BASOPHILS # BLD AUTO: 0.12 K/UL (ref 0–0.2)
BASOPHILS NFR BLD: 1.1 % (ref 0–1.9)
BILIRUB SERPL-MCNC: 0.4 MG/DL (ref 0.1–1)
BUN SERPL-MCNC: 11 MG/DL (ref 8–23)
CALCIUM SERPL-MCNC: 9.7 MG/DL (ref 8.7–10.5)
CHLORIDE SERPL-SCNC: 107 MMOL/L (ref 95–110)
CO2 SERPL-SCNC: 23 MMOL/L (ref 23–29)
CREAT SERPL-MCNC: 1 MG/DL (ref 0.5–1.4)
DIFFERENTIAL METHOD BLD: ABNORMAL
EOSINOPHIL # BLD AUTO: 0.2 K/UL (ref 0–0.5)
EOSINOPHIL NFR BLD: 1.5 % (ref 0–8)
ERYTHROCYTE [DISTWIDTH] IN BLOOD BY AUTOMATED COUNT: 18.6 % (ref 11.5–14.5)
EST. GFR  (NO RACE VARIABLE): >60 ML/MIN/1.73 M^2
ESTIMATED AVG GLUCOSE: 131 MG/DL (ref 68–131)
GLUCOSE SERPL-MCNC: 112 MG/DL (ref 70–110)
HBA1C MFR BLD: 6.2 % (ref 4–5.6)
HCT VFR BLD AUTO: 50.7 % (ref 40–54)
HGB BLD-MCNC: 15.5 G/DL (ref 14–18)
IMM GRANULOCYTES # BLD AUTO: 0.03 K/UL (ref 0–0.04)
IMM GRANULOCYTES NFR BLD AUTO: 0.3 % (ref 0–0.5)
LYMPHOCYTES # BLD AUTO: 3.4 K/UL (ref 1–4.8)
LYMPHOCYTES NFR BLD: 32.4 % (ref 18–48)
MCH RBC QN AUTO: 26.7 PG (ref 27–31)
MCHC RBC AUTO-ENTMCNC: 30.6 G/DL (ref 32–36)
MCV RBC AUTO: 87 FL (ref 82–98)
MONOCYTES # BLD AUTO: 1 K/UL (ref 0.3–1)
MONOCYTES NFR BLD: 9.3 % (ref 4–15)
NEUTROPHILS # BLD AUTO: 5.8 K/UL (ref 1.8–7.7)
NEUTROPHILS NFR BLD: 55.4 % (ref 38–73)
NRBC BLD-RTO: 0 /100 WBC
PLATELET # BLD AUTO: 487 K/UL (ref 150–450)
PMV BLD AUTO: 9.7 FL (ref 9.2–12.9)
POTASSIUM SERPL-SCNC: 4.3 MMOL/L (ref 3.5–5.1)
PROT SERPL-MCNC: 7.3 G/DL (ref 6–8.4)
RBC # BLD AUTO: 5.81 M/UL (ref 4.6–6.2)
SODIUM SERPL-SCNC: 140 MMOL/L (ref 136–145)
WBC # BLD AUTO: 10.54 K/UL (ref 3.9–12.7)

## 2024-04-17 PROCEDURE — 83036 HEMOGLOBIN GLYCOSYLATED A1C: CPT | Performed by: INTERNAL MEDICINE

## 2024-04-17 PROCEDURE — 85025 COMPLETE CBC W/AUTO DIFF WBC: CPT | Performed by: INTERNAL MEDICINE

## 2024-04-17 PROCEDURE — 80053 COMPREHEN METABOLIC PANEL: CPT | Performed by: INTERNAL MEDICINE

## 2024-04-17 PROCEDURE — 36415 COLL VENOUS BLD VENIPUNCTURE: CPT | Mod: PN | Performed by: INTERNAL MEDICINE

## 2024-04-19 NOTE — PROGRESS NOTES
Session: 24 Session Follow Up   Cardiac Rehab Individual Treatment Plan - Reassessment      Patient Name: Isaiah Dorsey Jr. MRN: [id number]   : 1960   Age: 62 y.o.   Primary Diagnosis: CABG 2022      Psychosocial Assessment:   Living Arrangements: Lives with spouse  Family Support: family  Self Reported: no change Stress  Displays: happiness  Medication: not applicable    Psychosocial Plan:   Goals:  Improved psychosocial coping strategies: Not Met: pt states he has a lot of stress  Verbalizes coping mechanisms: In Progress  Reduce manifestation of stress: In Progress  Maintain positive outlook: In Progress  Improve overall quality of life: In Progress    Comments on Goal Progression:  Pt is working towards decreasing stress in his life and improving his management of stressful events.    Interventions:  Patient to Self Report Emotional Changes at Session Check In  Recommend Physical Activity  Recommend Attending Education Lectures  Notify MD: No  Program Referral: No  Pharmaceutical Intervention/Therapy: No  Other Needs: not applicable  Stage of Readiness to Change: Action    Education:  Stress Management; verbalizes understanding; Date: 2022  Stress; verbalizes understanding; Date: 2022  Risk Factors; verbalizes understanding; Date: 10/28/2022    Comments:  Pt attendance to cardiac rehab class has improved. Pt denies overwhelming stress or anxiety.   Patient has been instructed to notify staff in the event that circumstances worsen.  Patient verbalizes understanding.    Other Core Components/Risk Factors Assessment:   RISK FACTORS:  diabetes, hyperlipidemia, hypertension, obesity, sedentary lifestyle, stress    Learning Barriers: None    Medication Compliance: has been compliant with the medicaiton regimen    Other Core Components/Risk Factors Plan:   Goals:  Decrease cholesterol level: In Progress  Increase exercise tolerance: Met  Increase knowledge of CAD: Met  Decrease blood pressure:  Met  Weight loss: Not Met: Pt has gained 14 lbs since starting rehab  Demonstrate accurate pulse taking: Met  Identify and manage personal areas of stress: In Progress  Control diabetes by adjusting diet and exercise: In Progress  Learn more about healthy eating: In Progress    Comments on Goal Progression:  Pt acknowledges his weight gain and states he is trying to eat healthier.     Interventions:  Individual Education/ Counseling: Yes  Physician Referral: No    Education:    fluid overload/CHF, verbalizes understanding; Date: 10/21/2022  risk factors, verbalizes understanding; Date: 10/28/2022  stress, verbalizes understanding; Date: 11/11/2022         Education method adapted to patients education level and preferred method of learning.  Method: explanation  handouts    Comments:  Pt states he is improved and continues to try to modify his risk factors to improve his cardiovascular health.    Other Core Components/Hypertension Assessment:   BP Range: 100-160 systolic; 70-90 diastolic  BP at Goal: No  Patient reported symptoms: none    Other Core Components/Hypertension Plan:   Goals:  Blood Pressure <130/80    Comments on Goal Progression:  Pt states he is interested in possibly joining the digital hypertension program    Interventions:  Med Card Reconciled: No  Encourage medication compliance  Encourage sodium reduction  Encourage weight loss  Recommend physical activity  Educate on contributory factors  Reduce stress, anxiety, anger, depression, and/or chronic pain  Encourage home blood pressure monitoring  Recommend daily weights  MD notified/Physician Referral: Yes    Education:    Congestive Heart Failure; verbalizes understanding; Date: 10/21/2022  Risk Factors; verbalizes understanding; Date: 10/28/2022  Stress; verbalizes understanding; Date: 11/11/2022         Comments:  Pt has better blood pressure readings than on entry but still has occasional isolated elevated reading  >130/80  Does the patient  have Heart Failure? Yes   Other Core Components/Congestive Heart Failure Assessment:   Ejection Fraction: 40-45% %  Patient reported symptoms: peripheral edema and weight gain  Lung Sounds: normal air entry, lungs clear to auscultation  Right Leg Edema: Trace  Left Leg Edema Trace    Other Core Components/Congestive Heart Failure Plan:   Goals:  Patient free from CHF Exacerbation    Comments on Goal Progression:  Pt is more aware of his symptoms and is making effort to decrease exacerbation of CHF.    Interventions/Recommendations:  Encourage medication compliance  Encourage daily weight checks  Promote sodium reduction  Encourage tracking fluid intake  Promote physical activity  Educate on contributory factors  Recommend home blood pressure monitoring  MD notified/MD visit scheduled: No not until next year      Education:    Congestive Heart Failure; verbalizes understanding; Date: 10/21/2022  Risk Factors; verbalizes understanding; Date: 10/28/2022  Stress; verbalizes understanding; Date: 11/11/2022         Comments:  Pt is not having CHF symptoms at this time.      Other Core Components/Tobacco Cessation Assessment:   Smoking Status: past smoker who quit many years ago  Smoking Cessation Barriers:  none  Stage of Readiness to Change: Maintenance    Other Core Components/Tobacco Cessation Plan:   Goals:  Maintain non-smoking status    Comments on Goal Progression:  Pt does not smoke    Interventions:  Maintains non-smoking status    Education:    Risk Factors; verbalizes understanding; Date: 10/28/2022         Comments:  Pt has no desire to use tobacco products at this time.    Jaden Penaloza RN      You can access the FollowMyHealth Patient Portal offered by [facility] by registering at the following website: [email or web address]. By joining [employer]’s FollowMyHealth portal, you will also be able to view your health information using other applications (apps) compatible with our system.

## 2024-04-30 ENCOUNTER — PATIENT MESSAGE (OUTPATIENT)
Dept: INTERNAL MEDICINE | Facility: CLINIC | Age: 64
End: 2024-04-30

## 2024-04-30 ENCOUNTER — OFFICE VISIT (OUTPATIENT)
Dept: INTERNAL MEDICINE | Facility: CLINIC | Age: 64
End: 2024-04-30
Payer: MEDICARE

## 2024-04-30 VITALS
OXYGEN SATURATION: 97 % | HEIGHT: 72 IN | HEART RATE: 81 BPM | TEMPERATURE: 98 F | RESPIRATION RATE: 18 BRPM | SYSTOLIC BLOOD PRESSURE: 160 MMHG | BODY MASS INDEX: 37.49 KG/M2 | DIASTOLIC BLOOD PRESSURE: 100 MMHG | WEIGHT: 276.81 LBS

## 2024-04-30 DIAGNOSIS — E11.59 HYPERTENSION ASSOCIATED WITH DIABETES: ICD-10-CM

## 2024-04-30 DIAGNOSIS — D75.839 THROMBOCYTOSIS: ICD-10-CM

## 2024-04-30 DIAGNOSIS — E11.69 OBESITY, DIABETES, AND HYPERTENSION SYNDROME: ICD-10-CM

## 2024-04-30 DIAGNOSIS — G47.33 OSA (OBSTRUCTIVE SLEEP APNEA): ICD-10-CM

## 2024-04-30 DIAGNOSIS — I48.0 PAROXYSMAL ATRIAL FIBRILLATION: ICD-10-CM

## 2024-04-30 DIAGNOSIS — I25.810 CORONARY ARTERY DISEASE INVOLVING CORONARY BYPASS GRAFT OF NATIVE HEART WITHOUT ANGINA PECTORIS: ICD-10-CM

## 2024-04-30 DIAGNOSIS — I25.110 ATHEROSCLEROSIS OF NATIVE CORONARY ARTERY OF NATIVE HEART WITH UNSTABLE ANGINA PECTORIS: Primary | ICD-10-CM

## 2024-04-30 DIAGNOSIS — I50.22 HEART FAILURE WITH MID-RANGE EJECTION FRACTION: ICD-10-CM

## 2024-04-30 DIAGNOSIS — I77.819 DILATATION OF AORTA: ICD-10-CM

## 2024-04-30 DIAGNOSIS — E66.9 OBESITY, DIABETES, AND HYPERTENSION SYNDROME: ICD-10-CM

## 2024-04-30 DIAGNOSIS — E78.5 HYPERLIPIDEMIA ASSOCIATED WITH TYPE 2 DIABETES MELLITUS: ICD-10-CM

## 2024-04-30 DIAGNOSIS — I15.2 HYPERTENSION ASSOCIATED WITH DIABETES: ICD-10-CM

## 2024-04-30 DIAGNOSIS — E11.65 TYPE 2 DIABETES MELLITUS WITH HYPERGLYCEMIA, WITHOUT LONG-TERM CURRENT USE OF INSULIN: ICD-10-CM

## 2024-04-30 DIAGNOSIS — I15.2 OBESITY, DIABETES, AND HYPERTENSION SYNDROME: ICD-10-CM

## 2024-04-30 DIAGNOSIS — Z90.81 H/O SPLENECTOMY: ICD-10-CM

## 2024-04-30 DIAGNOSIS — E11.69 HYPERLIPIDEMIA ASSOCIATED WITH TYPE 2 DIABETES MELLITUS: ICD-10-CM

## 2024-04-30 DIAGNOSIS — Z95.1 S/P CABG (CORONARY ARTERY BYPASS GRAFT): ICD-10-CM

## 2024-04-30 DIAGNOSIS — E11.59 OBESITY, DIABETES, AND HYPERTENSION SYNDROME: ICD-10-CM

## 2024-04-30 PROBLEM — E66.2 OBESITY HYPOVENTILATION SYNDROME: Status: RESOLVED | Noted: 2022-09-30 | Resolved: 2024-04-30

## 2024-04-30 PROBLEM — R65.10 SIRS (SYSTEMIC INFLAMMATORY RESPONSE SYNDROME): Status: RESOLVED | Noted: 2022-07-13 | Resolved: 2024-04-30

## 2024-04-30 PROCEDURE — 1160F RVW MEDS BY RX/DR IN RCRD: CPT | Mod: CPTII,S$GLB,, | Performed by: INTERNAL MEDICINE

## 2024-04-30 PROCEDURE — 99214 OFFICE O/P EST MOD 30 MIN: CPT | Mod: S$GLB,,, | Performed by: INTERNAL MEDICINE

## 2024-04-30 PROCEDURE — 3008F BODY MASS INDEX DOCD: CPT | Mod: CPTII,S$GLB,, | Performed by: INTERNAL MEDICINE

## 2024-04-30 PROCEDURE — 99999 PR PBB SHADOW E&M-EST. PATIENT-LVL V: CPT | Mod: PBBFAC,,, | Performed by: INTERNAL MEDICINE

## 2024-04-30 PROCEDURE — 3080F DIAST BP >= 90 MM HG: CPT | Mod: CPTII,S$GLB,, | Performed by: INTERNAL MEDICINE

## 2024-04-30 PROCEDURE — 3072F LOW RISK FOR RETINOPATHY: CPT | Mod: CPTII,S$GLB,, | Performed by: INTERNAL MEDICINE

## 2024-04-30 PROCEDURE — 4010F ACE/ARB THERAPY RXD/TAKEN: CPT | Mod: CPTII,S$GLB,, | Performed by: INTERNAL MEDICINE

## 2024-04-30 PROCEDURE — 1101F PT FALLS ASSESS-DOCD LE1/YR: CPT | Mod: CPTII,S$GLB,, | Performed by: INTERNAL MEDICINE

## 2024-04-30 PROCEDURE — 3044F HG A1C LEVEL LT 7.0%: CPT | Mod: CPTII,S$GLB,, | Performed by: INTERNAL MEDICINE

## 2024-04-30 PROCEDURE — 3077F SYST BP >= 140 MM HG: CPT | Mod: CPTII,S$GLB,, | Performed by: INTERNAL MEDICINE

## 2024-04-30 PROCEDURE — 3066F NEPHROPATHY DOC TX: CPT | Mod: CPTII,S$GLB,, | Performed by: INTERNAL MEDICINE

## 2024-04-30 PROCEDURE — 3288F FALL RISK ASSESSMENT DOCD: CPT | Mod: CPTII,S$GLB,, | Performed by: INTERNAL MEDICINE

## 2024-04-30 PROCEDURE — 3061F NEG MICROALBUMINURIA REV: CPT | Mod: CPTII,S$GLB,, | Performed by: INTERNAL MEDICINE

## 2024-04-30 PROCEDURE — 1159F MED LIST DOCD IN RCRD: CPT | Mod: CPTII,S$GLB,, | Performed by: INTERNAL MEDICINE

## 2024-04-30 RX ORDER — VALSARTAN AND HYDROCHLOROTHIAZIDE 160; 12.5 MG/1; MG/1
TABLET, FILM COATED ORAL
Qty: 30 TABLET | Refills: 0 | Status: SHIPPED | OUTPATIENT
Start: 2024-04-30 | End: 2024-04-30 | Stop reason: SDUPTHER
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
## 2024-04-30 NOTE — TELEPHONE ENCOUNTER
No care due was identified.  Health [facility] Embedded Care Due Messages. Reference number: [id number].   4/30/2024 2:17:26 PM CDT

## 2024-05-01 RX ORDER — ATORVASTATIN CALCIUM 80 MG/1
80 TABLET, FILM COATED ORAL DAILY
Qty: 90 TABLET | Refills: 1 | Status: SHIPPED | OUTPATIENT
Start: 2024-05-01

## 2024-05-01 RX ORDER — VALSARTAN AND HYDROCHLOROTHIAZIDE 160; 12.5 MG/1; MG/1
TABLET, FILM COATED ORAL
Qty: 30 TABLET | Refills: 0 | Status: SHIPPED | OUTPATIENT
Start: 2024-05-01 | End: 2024-05-16 | Stop reason: SDUPTHER

## 2024-05-01 RX ORDER — NAPROXEN SODIUM 220 MG/1
81 TABLET, FILM COATED ORAL DAILY
Qty: 90 TABLET | Refills: 3 | Status: SHIPPED | OUTPATIENT
Start: 2024-05-01 | End: 2025-05-01

## 2024-05-01 NOTE — TELEPHONE ENCOUNTER
Refill Routing Note   Medication(s) are not appropriate for processing by [facility] for the following reason(s):        Outside of protocol  No active prescription written by provider    [facility] action(s):  Route  Approve        Medication Therapy Plan: Diovan-HCT New start but simply resending with no changes to updated pharmacy with atorvastatin; 81mg ASA is off protocol      Appointments  past 12m or future 3m with PCP    Date Provider   Last Visit   4/30/2024 Nitin Cedeño, DO   Next Visit   Visit date not found Nitin Cedeño, DO   ED visits in past 90 days: 0        Note composed:1:02 PM 05/01/2024

## 2024-05-03 ENCOUNTER — TELEPHONE (OUTPATIENT)
Dept: INTERNAL MEDICINE | Facility: CLINIC | Age: 64
End: 2024-05-03
Payer: MEDICARE

## 2024-05-03 DIAGNOSIS — E11.65 TYPE 2 DIABETES MELLITUS WITH HYPERGLYCEMIA, WITHOUT LONG-TERM CURRENT USE OF INSULIN: ICD-10-CM

## 2024-05-03 DIAGNOSIS — E11.69 HYPERLIPIDEMIA ASSOCIATED WITH TYPE 2 DIABETES MELLITUS: ICD-10-CM

## 2024-05-03 DIAGNOSIS — Z12.5 SCREENING PSA (PROSTATE SPECIFIC ANTIGEN): ICD-10-CM

## 2024-05-03 DIAGNOSIS — E78.5 HYPERLIPIDEMIA ASSOCIATED WITH TYPE 2 DIABETES MELLITUS: ICD-10-CM

## 2024-05-03 DIAGNOSIS — I15.2 HYPERTENSION ASSOCIATED WITH DIABETES: Primary | ICD-10-CM

## 2024-05-03 DIAGNOSIS — E11.59 HYPERTENSION ASSOCIATED WITH DIABETES: Primary | ICD-10-CM

## 2024-05-16 ENCOUNTER — OFFICE VISIT (OUTPATIENT)
Dept: INTERNAL MEDICINE | Facility: CLINIC | Age: 64
End: 2024-05-16
Payer: MEDICARE

## 2024-05-16 VITALS
RESPIRATION RATE: 16 BRPM | TEMPERATURE: 99 F | OXYGEN SATURATION: 96 % | DIASTOLIC BLOOD PRESSURE: 92 MMHG | WEIGHT: 271.19 LBS | BODY MASS INDEX: 36.73 KG/M2 | HEIGHT: 72 IN | SYSTOLIC BLOOD PRESSURE: 122 MMHG | HEART RATE: 91 BPM

## 2024-05-16 DIAGNOSIS — E11.59 HYPERTENSION ASSOCIATED WITH DIABETES: Primary | ICD-10-CM

## 2024-05-16 DIAGNOSIS — I25.810 CORONARY ARTERY DISEASE INVOLVING CORONARY BYPASS GRAFT OF NATIVE HEART WITHOUT ANGINA PECTORIS: ICD-10-CM

## 2024-05-16 DIAGNOSIS — I15.2 OBESITY, DIABETES, AND HYPERTENSION SYNDROME: ICD-10-CM

## 2024-05-16 DIAGNOSIS — E11.59 OBESITY, DIABETES, AND HYPERTENSION SYNDROME: ICD-10-CM

## 2024-05-16 DIAGNOSIS — E11.69 OBESITY, DIABETES, AND HYPERTENSION SYNDROME: ICD-10-CM

## 2024-05-16 DIAGNOSIS — E11.69 HYPERLIPIDEMIA ASSOCIATED WITH TYPE 2 DIABETES MELLITUS: ICD-10-CM

## 2024-05-16 DIAGNOSIS — E66.9 OBESITY, DIABETES, AND HYPERTENSION SYNDROME: ICD-10-CM

## 2024-05-16 DIAGNOSIS — E78.5 HYPERLIPIDEMIA ASSOCIATED WITH TYPE 2 DIABETES MELLITUS: ICD-10-CM

## 2024-05-16 DIAGNOSIS — I15.2 HYPERTENSION ASSOCIATED WITH DIABETES: Primary | ICD-10-CM

## 2024-05-16 PROCEDURE — 3080F DIAST BP >= 90 MM HG: CPT | Mod: CPTII,S$GLB,, | Performed by: NURSE PRACTITIONER

## 2024-05-16 PROCEDURE — 99214 OFFICE O/P EST MOD 30 MIN: CPT | Mod: S$GLB,,, | Performed by: NURSE PRACTITIONER

## 2024-05-16 PROCEDURE — 3072F LOW RISK FOR RETINOPATHY: CPT | Mod: CPTII,S$GLB,, | Performed by: NURSE PRACTITIONER

## 2024-05-16 PROCEDURE — 3008F BODY MASS INDEX DOCD: CPT | Mod: CPTII,S$GLB,, | Performed by: NURSE PRACTITIONER

## 2024-05-16 PROCEDURE — 3061F NEG MICROALBUMINURIA REV: CPT | Mod: CPTII,S$GLB,, | Performed by: NURSE PRACTITIONER

## 2024-05-16 PROCEDURE — 1160F RVW MEDS BY RX/DR IN RCRD: CPT | Mod: CPTII,S$GLB,, | Performed by: NURSE PRACTITIONER

## 2024-05-16 PROCEDURE — 4010F ACE/ARB THERAPY RXD/TAKEN: CPT | Mod: CPTII,S$GLB,, | Performed by: NURSE PRACTITIONER

## 2024-05-16 PROCEDURE — 3044F HG A1C LEVEL LT 7.0%: CPT | Mod: CPTII,S$GLB,, | Performed by: NURSE PRACTITIONER

## 2024-05-16 PROCEDURE — 99999 PR PBB SHADOW E&M-EST. PATIENT-LVL IV: CPT | Mod: PBBFAC,,, | Performed by: NURSE PRACTITIONER

## 2024-05-16 PROCEDURE — 3066F NEPHROPATHY DOC TX: CPT | Mod: CPTII,S$GLB,, | Performed by: NURSE PRACTITIONER

## 2024-05-16 PROCEDURE — 1159F MED LIST DOCD IN RCRD: CPT | Mod: CPTII,S$GLB,, | Performed by: NURSE PRACTITIONER

## 2024-05-16 PROCEDURE — 3074F SYST BP LT 130 MM HG: CPT | Mod: CPTII,S$GLB,, | Performed by: NURSE PRACTITIONER

## 2024-05-16 RX ORDER — VALSARTAN AND HYDROCHLOROTHIAZIDE 160; 12.5 MG/1; MG/1
TABLET, FILM COATED ORAL
Qty: 90 TABLET | Refills: 1 | Status: SHIPPED | OUTPATIENT
Start: 2024-05-16

## 2024-05-16 NOTE — ASSESSMENT & PLAN NOTE
Improved, but still not at goal.  Patient to continue with current regimen of Diovan/HCTZ 160/12.5 mg daily and encouraged to reduce salt intake and to increase physical activity to help augment effectiveness of medication.  There is hesitation with increasing the dose of the valsartan which may cause hypotension.  Patient recommended to follow up in 6 months for re-evaluation

## 2024-05-16 NOTE — ASSESSMENT & PLAN NOTE
We discussed importance of increasing physical activity to help with cardiac health and overall well-being in addition to encouraging weight loss.

## 2024-05-16 NOTE — PROGRESS NOTES
Subjective     Patient ID: Isaiah Dorsey Jr. is a 64 y.o. male.    Chief Complaint: Follow-up and Hypertension    Pt in office for 4 week HTN f/u.  At last visit patient was started on Diovan/hydrochlorothiazide 160/12.5 mg daily which patient has been taking as recommended.  He has tolerated well but does report feeling 'spacey' since starting medication but not enough to discontinue.  Home blood pressures have been consistently in the 120s to 130s systolically but DBP have been in the 80s-100s.  Admittedly, patient has not made any additional lifestyle changes to help augment effectiveness of medication.     Follow-up    Hypertension    Review of Systems   All other systems reviewed and are negative.     Objective   Physical Exam  Vitals reviewed.   Constitutional:       Appearance: Normal appearance. He is obese.   HENT:      Head: Normocephalic and atraumatic.      Nose: Nose normal.      Mouth/Throat:      Mouth: Mucous membranes are moist.   Eyes:      Pupils: Pupils are equal, round, and reactive to light.   Cardiovascular:      Rate and Rhythm: Normal rate and regular rhythm.      Heart sounds: Normal heart sounds.   Pulmonary:      Effort: Pulmonary effort is normal.      Breath sounds: Normal breath sounds.   Abdominal:      Palpations: Abdomen is soft.   Musculoskeletal:         General: Normal range of motion.      Cervical back: Normal range of motion.   Skin:     General: Skin is warm and dry.      Comments: Well healed midline chest scar   Neurological:      General: No focal deficit present.      Mental Status: He is alert and oriented to person, place, and time.   Psychiatric:         Mood and Affect: Mood normal.         Behavior: Behavior normal.        Assessment and Plan   1. Hypertension associated with diabetes  Assessment & Plan:  Improved, but still not at goal.  Patient to continue with current regimen of Diovan/HCTZ 160/12.5 mg daily and encouraged to reduce salt intake and to increase  physical activity to help augment effectiveness of medication.  There is hesitation with increasing the dose of the valsartan which may cause hypotension.  Patient recommended to follow up in 6 months for re-evaluation    Orders:  -     valsartan-hydrochlorothiazide (DIOVAN-HCT) 160-12.5 mg per tablet; Take one tablet by mouth every morning  Dispense: 90 tablet; Refill: 1    2. Coronary artery disease involving coronary bypass graft of native heart without angina pectoris  Assessment & Plan:  Chronic, stable.  Patient to continue with the atorvastatin, aspirin and Diovan HCTZ.         3. Hyperlipidemia associated with type 2 diabetes mellitus    4. Obesity, diabetes, and hypertension syndrome  Assessment & Plan:  We discussed importance of increasing physical activity to help with cardiac health and overall well-being in addition to encouraging weight loss.      F/u in 6 months or sooner as needed.

## 2024-06-19 NOTE — TELEPHONE ENCOUNTER
Letter regarding Phase II cardiac rehab was sent to patient.  Will contact patient in 2 weeks to see if interested.  Lilibeth Gomez RN  Cardiac Rehab Nurse   Pt . Provided pt with sandwich, crackers and juice.

## 2024-07-30 ENCOUNTER — PATIENT MESSAGE (OUTPATIENT)
Dept: INTERNAL MEDICINE | Facility: CLINIC | Age: 64
End: 2024-07-30
Payer: MEDICARE

## 2024-07-30 DIAGNOSIS — E11.9 TYPE 2 DIABETES MELLITUS WITHOUT COMPLICATION, WITHOUT LONG-TERM CURRENT USE OF INSULIN: Primary | ICD-10-CM

## 2024-08-01 ENCOUNTER — OFFICE VISIT (OUTPATIENT)
Dept: PRIMARY CARE CLINIC | Facility: CLINIC | Age: 64
End: 2024-08-01
Payer: MEDICARE

## 2024-08-01 VITALS
SYSTOLIC BLOOD PRESSURE: 140 MMHG | DIASTOLIC BLOOD PRESSURE: 88 MMHG | OXYGEN SATURATION: 95 % | HEART RATE: 96 BPM | BODY MASS INDEX: 37.32 KG/M2 | WEIGHT: 275.56 LBS | HEIGHT: 72 IN

## 2024-08-01 DIAGNOSIS — I25.810 CORONARY ARTERY DISEASE INVOLVING CORONARY BYPASS GRAFT OF NATIVE HEART WITHOUT ANGINA PECTORIS: ICD-10-CM

## 2024-08-01 DIAGNOSIS — I77.819 DILATATION OF AORTA: ICD-10-CM

## 2024-08-01 DIAGNOSIS — I21.4 NON-STEMI (NON-ST ELEVATED MYOCARDIAL INFARCTION): ICD-10-CM

## 2024-08-01 DIAGNOSIS — E11.59 HYPERTENSION ASSOCIATED WITH DIABETES: ICD-10-CM

## 2024-08-01 DIAGNOSIS — G47.33 OSA (OBSTRUCTIVE SLEEP APNEA): ICD-10-CM

## 2024-08-01 DIAGNOSIS — N40.1 BENIGN PROSTATIC HYPERPLASIA WITH WEAK URINARY STREAM: ICD-10-CM

## 2024-08-01 DIAGNOSIS — R39.12 BENIGN PROSTATIC HYPERPLASIA WITH WEAK URINARY STREAM: ICD-10-CM

## 2024-08-01 DIAGNOSIS — Z00.00 ENCOUNTER FOR PREVENTIVE HEALTH EXAMINATION: Primary | ICD-10-CM

## 2024-08-01 DIAGNOSIS — D75.839 THROMBOCYTOSIS: ICD-10-CM

## 2024-08-01 DIAGNOSIS — I15.2 HYPERTENSION ASSOCIATED WITH DIABETES: ICD-10-CM

## 2024-08-01 DIAGNOSIS — E11.69 HYPERLIPIDEMIA ASSOCIATED WITH TYPE 2 DIABETES MELLITUS: ICD-10-CM

## 2024-08-01 DIAGNOSIS — E11.59 OBESITY, DIABETES, AND HYPERTENSION SYNDROME: ICD-10-CM

## 2024-08-01 DIAGNOSIS — E11.65 TYPE 2 DIABETES MELLITUS WITH HYPERGLYCEMIA, WITHOUT LONG-TERM CURRENT USE OF INSULIN: ICD-10-CM

## 2024-08-01 DIAGNOSIS — I25.110 ATHEROSCLEROSIS OF NATIVE CORONARY ARTERY OF NATIVE HEART WITH UNSTABLE ANGINA PECTORIS: ICD-10-CM

## 2024-08-01 DIAGNOSIS — E66.9 OBESITY, DIABETES, AND HYPERTENSION SYNDROME: ICD-10-CM

## 2024-08-01 DIAGNOSIS — I48.0 PAROXYSMAL ATRIAL FIBRILLATION: ICD-10-CM

## 2024-08-01 DIAGNOSIS — E78.5 HYPERLIPIDEMIA ASSOCIATED WITH TYPE 2 DIABETES MELLITUS: ICD-10-CM

## 2024-08-01 DIAGNOSIS — I50.22 HEART FAILURE WITH MID-RANGE EJECTION FRACTION: ICD-10-CM

## 2024-08-01 DIAGNOSIS — E11.69 OBESITY, DIABETES, AND HYPERTENSION SYNDROME: ICD-10-CM

## 2024-08-01 DIAGNOSIS — I15.2 OBESITY, DIABETES, AND HYPERTENSION SYNDROME: ICD-10-CM

## 2024-08-01 PROBLEM — M25.611 IMPAIRED RANGE OF MOTION OF RIGHT SHOULDER: Status: RESOLVED | Noted: 2023-01-10 | Resolved: 2024-08-01

## 2024-08-01 PROBLEM — M76.62 TENDONITIS, ACHILLES, LEFT: Status: RESOLVED | Noted: 2022-11-18 | Resolved: 2024-08-01

## 2024-08-01 PROCEDURE — 99999 PR PBB SHADOW E&M-EST. PATIENT-LVL IV: CPT | Mod: PBBFAC,,,

## 2024-08-01 NOTE — PATIENT INSTRUCTIONS
Counseling and Referral of Other Preventative  (Italic type indicates deductible and co-insurance are waived)    Patient Name: Isaiah Dorsey  Today's Date: 8/1/2024    Health Maintenance       Date Due Completion Date    Pneumococcal Vaccines (Age 65+) (1 of 2 - PCV) Never done ---    HIV Screening Never done ---    TETANUS VACCINE Never done ---    Shingles Vaccine (1 of 2) Never done ---    RSV Vaccine (Age 60+ and Pregnant patients) (1 - 1-dose 60+ series) Never done ---    COVID-19 Vaccine (3 - 2023-24 season) 09/01/2023 8/24/2021    Abdominal Aortic Aneurysm Screening Never done ---    Influenza Vaccine (1) 09/01/2024 ---    Hemoglobin A1c 10/17/2024 4/17/2024    Lipid Panel 10/24/2024 10/24/2023    Eye Exam 11/07/2024 11/7/2023    Foot Exam 11/09/2024 11/9/2023    Diabetes Urine Screening 04/17/2025 4/17/2024    Aspirin/Antiplatelet Therapy 05/01/2025 5/1/2024    High Dose Statin 08/01/2025 8/1/2024    Colorectal Cancer Screening 11/22/2027 11/22/2022    Override on 9/1/2018: Done        No orders of the defined types were placed in this encounter.      The following information is provided to all patients.  This information is to help you find resources for any of the problems found today that may be affecting your health:                  Living healthy guide: www.[facility].[place].gov      Understanding Diabetes: www.diabetes.org      Eating healthy: www.cdc.gov/healthyweight      CDC home safety checklist: www.cdc.gov/steadi/patient.html      Agency on Aging: www.goea.[place].[facility]      Alcoholics anonymous (AA): www.aa.org      Physical Activity: www.richard.nih.gov/ft6ybml      Tobacco use: www.quitwithusla.org         Counseling and Referral of Other Preventative  (Italic type indicates deductible and co-insurance are waived)    Patient Name: Isaiah Dorsey  Today's Date: 8/1/2024    Health Maintenance       Date Due Completion Date    Pneumococcal Vaccines (Age 65+) (1 of 2 - PCV) Never done ---    HIV Screening  Never done ---    TETANUS VACCINE Never done ---    Shingles Vaccine (1 of 2) Never done ---    RSV Vaccine (Age 60+ and Pregnant patients) (1 - 1-dose 60+ series) Never done ---    COVID-19 Vaccine (3 - 2023-24 season) 09/01/2023 8/24/2021    Abdominal Aortic Aneurysm Screening Never done ---    Influenza Vaccine (1) 09/01/2024 ---    Hemoglobin A1c 10/17/2024 4/17/2024    Lipid Panel 10/24/2024 10/24/2023    Eye Exam 11/07/2024 11/7/2023    Foot Exam 11/09/2024 11/9/2023    Diabetes Urine Screening 04/17/2025 4/17/2024    Aspirin/Antiplatelet Therapy 05/01/2025 5/1/2024    High Dose Statin 08/01/2025 8/1/2024    Colorectal Cancer Screening 11/22/2027 11/22/2022    Override on 9/1/2018: Done        No orders of the defined types were placed in this encounter.      The following information is provided to all patients.  This information is to help you find resources for any of the problems found today that may be affecting your health:                  Living healthy guide: www.[facility].[place]      Understanding Diabetes: www.diabetes.org      Eating healthy: www.cdc.gov/healthyweight      CDC home safety checklist: www.cdc.gov/steadi/patient.html      Agency on Aging: www.goea.[place]      Alcoholics anonymous (AA): www.aa.org      Physical Activity: www.richard.nih.gov/dl0koey      Tobacco use: www.quitwithusla.org         Counseling and Referral of Other Preventative  (Italic type indicates deductible and co-insurance are waived)    Patient Name: Isaiah Dorsey  Today's Date: 8/1/2024    Health Maintenance       Date Due Completion Date    Pneumococcal Vaccines (Age 65+) (1 of 2 - PCV) Never done ---    HIV Screening Never done ---    TETANUS VACCINE Never done ---    Shingles Vaccine (1 of 2) Never done ---    RSV Vaccine (Age 60+ and Pregnant patients) (1 - 1-dose 60+ series) Never done ---    COVID-19 Vaccine (3 - 2023-24 season) 09/01/2023 8/24/2021    Abdominal Aortic Aneurysm Screening Never done ---     Influenza Vaccine (1) 09/01/2024 ---    Hemoglobin A1c 10/17/2024 4/17/2024    Lipid Panel 10/24/2024 10/24/2023    Eye Exam 11/07/2024 11/7/2023    Foot Exam 11/09/2024 11/9/2023    Diabetes Urine Screening 04/17/2025 4/17/2024    Aspirin/Antiplatelet Therapy 05/01/2025 5/1/2024    High Dose Statin 08/01/2025 8/1/2024    Colorectal Cancer Screening 11/22/2027 11/22/2022    Override on 9/1/2018: Done        No orders of the defined types were placed in this encounter.      The following information is provided to all patients.  This information is to help you find resources for any of the problems found today that may be affecting your health:                  Living healthy guide: www.[facility].[place].[facility]      Understanding Diabetes: www.diabetes.org      Eating healthy: www.cdc.gov/healthyweight      CDC home safety checklist: www.cdc.gov/steadi/patient.html      Agency on Aging: www.goea.[place]      Alcoholics anonymous (AA): www.aa.org      Physical Activity: www.richard.nih.gov/pi0aknw      Tobacco use: www.quitwithusla.org

## 2024-08-01 NOTE — PROGRESS NOTES
Isaiah Dorsey presented for an initial Medicare AWV today. He is a patient of Dr. Cedeño and is new to me.  The following components were reviewed and updated:    Medical history  Family History  Social history  Allergies and Current Medications  Health Risk Assessment  Health Maintenance  Care Team    **See Completed Assessments for Annual Wellness visit with in the encounter summary    The following assessments were completed:  Depression Screening  Cognitive function Screening  Timed Get Up Test  Whisper Test      Opioid documentation:      Patient does not have a current opioid prescription.            Vitals:    08/01/24 0753 08/01/24 0814   BP: (!) 134/90 (!) 140/88   BP Location: Right arm Right arm   Patient Position:  Sitting   Pulse: 96    SpO2: 95%    Weight: 125 kg (275 lb 9.2 oz)    Height: 6' (1.829 m)      Body mass index is 37.37 kg/m².       Physical Exam  Vitals reviewed.   Constitutional:       Appearance: Normal appearance.   HENT:      Head: Normocephalic and atraumatic.   Cardiovascular:      Rate and Rhythm: Normal rate and regular rhythm.      Pulses: Normal pulses.           Radial pulses are 2+ on the right side and 2+ on the left side.        Dorsalis pedis pulses are 2+ on the right side and 2+ on the left side.        Posterior tibial pulses are 2+ on the right side and 2+ on the left side.      Heart sounds: Normal heart sounds.   Pulmonary:      Effort: Pulmonary effort is normal.      Breath sounds: Normal breath sounds.   Musculoskeletal:      Right lower leg: No edema.      Left lower leg: No edema.   Skin:     General: Skin is warm and dry.      Capillary Refill: Capillary refill takes less than 2 seconds.   Neurological:      General: No focal deficit present.      Mental Status: He is alert and oriented to person, place, and time.   Psychiatric:         Mood and Affect: Mood normal.         Behavior: Behavior normal.       Diagnoses and health risks identified today and  "associated recommendations/orders:  1. Encounter for preventive health examination  Assessment and evaluation performed as stated above.    2. Atherosclerosis of native coronary artery of native heart with unstable angina pectoris  Problem is stable on atorvastatin. Will continue current medication and treatment regimen.  Encouraged pt to follow low fat, low cholesterol diet.  Follow up with cardiology    3. Paroxysmal atrial fibrillation  Stable.  RRR on PE.  Followed by cardiology    4. Non-STEMI (non-ST elevated myocardial infarction)  Stable on current regimen.  Followed by cardiology    5. Hypertension associated with diabetes  Stable. Encouraged pt to reduce Na intake and increase activity.  Reminded pt of upcoming appt with cardiology on 10/25/24.  Followed by cardiology     6. Hyperlipidemia associated with type 2 diabetes mellitus  Stable on atorvastatin.  Followed by cardiology    7. Heart failure with mid-range ejection fraction  Stable. 12/29/2022:TTE with "EF 45% and normal RV function; overall unchanged from prior". No s/sx of CP, SOB, wheezing, or edema to BLE noted during today's visit.  Continue Diovan-HCTZ.  Followed by cardiology    8. Dilatation of aorta  Stable.  2+ pulses in BUE and BLE.  CT chest on 7/12/2022 shows "ascending aortic aneurysm up to 4.7 cm; remainder of aorta appears normal in caliber".  Followed by cardiology     9. Coronary artery disease involving coronary bypass graft of native heart without angina pectoris  Chronic, stable on atorvastatin, Diovan-HCTZ.  Followed by cardiology    10. Benign prostatic hyperplasia with weak urinary stream  Stable.  No weak urinary stream, urgency, nocturia noted during visit.  Followed by pcp and urology    11. Type 2 diabetes mellitus with hyperglycemia, without long-term current use of insulin  Stable on metformin.  Followed by endocrine.    12. Thrombocytosis  Stable, followed by pcp.    13. Obesity, diabetes, and hypertension " syndrome  Stable on current regimen.  Encouraged pt to increase activity and weight loss to help with overall health.  Followed by pcp, endocrine and cardiology    14. RODRIGUEZ (obstructive sleep apnea)  Stable.  Pt does not use CPAP.  Encouraged pt to follow up with pcp for reevaluation.      Provided Isaaih with a 5-10 year written screening schedule and personal prevention plan. Recommendations were developed using the USPSTF age appropriate recommendations. Education, counseling, and referrals were provided as needed.  After Visit Summary printed and given to patient which includes a list of additional screenings\tests needed.    Follow up in about 1 year (around 8/1/2025), or if symptoms worsen or fail to improve.      Carlie Guo, AGUSTIN    I offered to discuss advanced care planning, including how to pick a person who would make decisions for you if you were unable to make them for yourself, called a health care power of , and what kind of decisions you might make such as use of life sustaining treatments such as ventilators and tube feeding when faced with a life limiting illness recorded on a living will that they will need to know. (How you want to be cared for as you near the end of your natural life)     X Patient is interested in learning more about how to make advanced directives.  I provided them paperwork and offered to discuss this with them.

## 2024-08-07 ENCOUNTER — HOSPITAL ENCOUNTER (EMERGENCY)
Facility: HOSPITAL | Age: 64
Discharge: HOME | End: 2024-08-07
Attending: EMERGENCY MEDICINE
Payer: MEDICARE

## 2024-08-07 VITALS
OXYGEN SATURATION: 94 % | RESPIRATION RATE: 17 BRPM | DIASTOLIC BLOOD PRESSURE: 92 MMHG | HEART RATE: 83 BPM | SYSTOLIC BLOOD PRESSURE: 142 MMHG | TEMPERATURE: 98 F

## 2024-08-07 DIAGNOSIS — R03.0 ELEVATED BLOOD PRESSURE READING: Primary | ICD-10-CM

## 2024-08-07 DIAGNOSIS — I10 HYPERTENSION: ICD-10-CM

## 2024-08-07 LAB
BUN SERPL-MCNC: 14 MG/DL (ref 6–30)
CHLORIDE SERPL-SCNC: 98 MMOL/L (ref 95–110)
CREAT SERPL-MCNC: 1 MG/DL (ref 0.5–1.4)
GLUCOSE SERPL-MCNC: 170 MG/DL (ref 70–110)
HCT VFR BLD CALC: 54 %PCV (ref 36–54)
OHS QRS DURATION: 104 MS
OHS QTC CALCULATION: 435 MS
POC IONIZED CALCIUM: 1.22 MMOL/L (ref 1.06–1.42)
POC TCO2 (MEASURED): 26 MMOL/L (ref 23–29)
POTASSIUM BLD-SCNC: 4.3 MMOL/L (ref 3.5–5.1)
SAMPLE: ABNORMAL
SODIUM BLD-SCNC: 138 MMOL/L (ref 136–145)
TROPONIN I SERPL DL<=0.01 NG/ML-MCNC: <0.006 NG/ML (ref 0–0.03)

## 2024-08-07 PROCEDURE — 99284 EMERGENCY DEPT VISIT MOD MDM: CPT | Mod: 25

## 2024-08-07 PROCEDURE — 84484 ASSAY OF TROPONIN QUANT: CPT | Performed by: EMERGENCY MEDICINE

## 2024-08-07 PROCEDURE — 93010 ELECTROCARDIOGRAM REPORT: CPT | Mod: ,,, | Performed by: INTERNAL MEDICINE

## 2024-08-07 PROCEDURE — 93005 ELECTROCARDIOGRAM TRACING: CPT

## 2024-08-07 PROCEDURE — 80047 BASIC METABLC PNL IONIZED CA: CPT

## 2024-08-07 NOTE — ED NOTES
Patient comes into the emergency department by POV with complaints of HTN. Patient states that took his BP meds this AM and noticed his Bp was in the 150s, 180s systolic in triage. Endorsing dizziness.     LOC: The patient is awake, alert and aware of environment with an appropriate affect, the patient is oriented x 3 and speaking appropriately.   APPEARANCE: Patient appears comfortable and in no acute distress, patient is clean and well groomed.  SKIN: The skin is warm and dry, color consistent with ethnicity, patient has normal skin turgor and moist mucus membranes, skin intact, no breakdown or bruising noted.   MUSCULOSKELETAL: Patient moving all extremities spontaneously, no swelling noted.  RESPIRATORY: Airway is open and patent, respirations are spontaneous, patient has a normal effort and rate, no accessory muscle.  CARDIAC: Pt placed on cardiac monitor. Patient has a normal rate and regular rhythm, no edema noted, capillary refill < 3 seconds.   GASTRO: Soft and non tender to palpation, no distention noted.  : Pt denies any pain or frequency with urination.  NEURO: Pt opens eyes spontaneously, behavior appropriate to situation, follows commands, facial expression symmetrical, bilateral hand grasp equal and even, purposeful motor response noted, normal sensation in all extremities when touched with a finger. Endorsing dizziness when ambulating.

## 2024-08-07 NOTE — ED NOTES
I-STAT Chem-8+ Results:   Value Reference Range   Sodium 138 136-145 mmol/L   Potassium  4.3 3.5-5.1 mmol/L   Chloride 98  mmol/L   Ionized Calcium 1.22 1.06-1.42 mmol/L   CO2 (measured) 26 23-29 mmol/L   Glucose 170  mg/dL   BUN 14 6-30 mg/dL   Creatinine 1.0 0.5-1.4 mg/dL   Hematocrit 54 36-54%

## 2024-08-07 NOTE — ED PROVIDER NOTES
"Encounter Date: 8/7/2024       History     Chief Complaint   Patient presents with    Hypertension     Took his BP this morning, and it was in the 150s even after taking his home medications, endorsing dizziness.      65 yo M with pmhx CAD s/p CABG, DM + obesity on mounjaro, HTN, pAfib, RODRIGUEZ, HLD presents with a chief complaint of hypertension.  Patient noted a blood pressure of 151/104 this morning and came to the ER.  He notes that typically his blood pressure is 131/88 but given that the diastolic level was so high, he came to the ER because he was concerned he could have a stroke.  He denies any weakness, numbness, changes in vision, chest pain, shortness of breath, headache.  He notes that 2 days ago after bending over for awhile, he stood over and became "dizzy. " he clarifies that this was more lightheaded and feeling like he was going to pass out.  Episode lasted less than an hour and has since had no recurrence.  He was felt well otherwise.  He notes that when his blood pressure is elevated he feels fullness behind his eyes which he felt last night.  He took a left overdose of losartan that he was prescribed previously last night and again this morning.  Otherwise, he reports compliance with his blood pressure medicine valsartan-HCTZ 160-12.5 mg.  He does take his blood pressure somewhat regularly at home but has been doing it more so over the past few days as he was felt pressure behind his eyes.  No headache.  He does not feel the pressure currently.        Review of patient's allergies indicates:   Allergen Reactions    Penicillins      Other reaction(s): Itching  Other reaction(s): Hives    Wasp venom Edema     Past Medical History:   Diagnosis Date    CHF (congestive heart failure)     Coronary artery disease     Diabetes mellitus type 2, uncontrolled, without complications     6.7% Metf 1 g qd, eye 2015, U- F-2015,     Elevated platelet count 07/23/2015    H/O splenectomy 04/24/2015    doesn't like " pneumovax bc caused side effects    HLD (hyperlipidemia) 01/22/2015    goal LDL < 100, reluctant to take statin    HTN (hypertension), benign 01/22/2015    losartan 100    Morbid obesity with BMI of 45.0-49.9, adult 06/11/2014    RODRIGUEZ (obstructive sleep apnea)     PAF (paroxysmal atrial fibrillation)     Polyp of transverse colon 09/05/2018 2018, repeat 2023    Severe uncontrolled hypertension 06/11/2014    Tendonitis, Achilles, left 11/18/2022     Past Surgical History:   Procedure Laterality Date    AORTOGRAPHY N/A 7/11/2022    Procedure: AORTOGRAM;  Surgeon: Bjorn Cheatham MD;  Location: [facility] CATH LAB;  Service: Cardiology;  Laterality: N/A;    ARTHROSCOPIC DEBRIDEMENT OF SHOULDER Right 12/22/2022    Procedure: DEBRIDEMENT, SHOULDER, ARTHROSCOPIC;  Surgeon: Alexandria Morse MD;  Location: [facility] OR;  Service: Orthopedics;  Laterality: Right;    ARTHROSCOPIC REPAIR OF ROTATOR CUFF OF SHOULDER Right 12/22/2022    Procedure: REPAIR, ROTATOR CUFF, ARTHROSCOPIC;  Surgeon: Alexandria Morse MD;  Location: [facility] OR;  Service: Orthopedics;  Laterality: Right;    ARTHROSCOPY OF SHOULDER WITH DECOMPRESSION OF SUBACROMIAL SPACE Right 12/22/2022    Procedure: ARTHROSCOPY, SHOULDER, WITH SUBACROMIAL SPACE DECOMPRESSION;  Surgeon: Alexandria Morse MD;  Location: [facility] OR;  Service: Orthopedics;  Laterality: Right;    COLONOSCOPY N/A 11/22/2022    Procedure: COLONOSCOPY;  Surgeon: Castro Jasso MD;  Location: [facility] ENDO (4TH FLR);  Service: Endoscopy;  Laterality: N/A;  ok to hold Xarelto-see telephone encounter dated 11/1  [facility] via portal  pre call done    CORONARY ARTERY BYPASS GRAFT (CABG) Left 7/18/2022    Procedure: CORONARY ARTERY BYPASS GRAFT (CABG);  Surgeon: Blu Rhodes MD;  Location: [facility] OR 2ND FLR;  Service: Cardiovascular;  Laterality: Left;  CABG x 1 (LIMA to LAD)    ENDOSCOPIC HARVEST OF VEIN Left 7/18/2022    Procedure: HARVEST-VEIN-ENDOVASCULAR;  Surgeon: Blu Rhodes MD;  Location: [facility] OR  2ND FLR;  Service: Cardiovascular;  Laterality: Left;  [place] Start time: 0823am  [place] Stop time: 0836am    [place] Start time: 0848am  [place] Stop time: 0904am    FIXATION OF TENDON Right 12/22/2022    Procedure: FIXATION, TENDON-biceps tenodesis;  Surgeon: Alexandria Morse MD;  Location: [facility] OR;  Service: Orthopedics;  Laterality: Right;    LEFT HEART CATHETERIZATION N/A 7/11/2022    Procedure: Left heart cath;  Surgeon: Bjorn Cheatham MD;  Location: [facility] CATH LAB;  Service: Cardiology;  Laterality: N/A;    splenecectomy       Family History   Problem Relation Name Age of Onset    Lung cancer Mother      Hypertension Mother      Heart attack Mother  58    Heart disease Mother      Stroke Mother      Heart failure Mother      Hyperlipidemia Mother      Asbestos Mother      Diabetes Father      Colon cancer Father  65    Coronary artery disease Brother      Coronary artery disease Maternal Grandmother       Social History     Tobacco Use    Smoking status: Former     Types: Cigarettes    Smokeless tobacco: Never   Substance Use Topics    Alcohol use: Yes     Alcohol/week: 0.0 standard drinks of alcohol     Comment: 1 a week    Drug use: Never     Review of Systems    Physical Exam     Initial Vitals [08/07/24 0810]   BP Pulse Resp Temp SpO2   (!) 181/99 98 17 98.1 °F (36.7 °C) 98 %      MAP       --         Physical Exam    Nursing note and vitals reviewed.  Constitutional: He appears well-developed and well-nourished. He is not diaphoretic. No distress.   HENT:   Head: Normocephalic and atraumatic.   Eyes: EOM are normal. Right eye exhibits no discharge. Left eye exhibits no discharge. No scleral icterus.   Neck: Neck supple. No JVD present.   Normal range of motion.  Cardiovascular:  Normal rate, regular rhythm and normal heart sounds.     Exam reveals no gallop and no friction rub.       No murmur heard.  Pulmonary/Chest: Breath sounds normal. No respiratory distress. He has no  wheezes. He has no rhonchi. He has no rales.   Abdominal: Abdomen is soft. He exhibits no distension and no mass. There is no abdominal tenderness. There is no rebound and no guarding.   Musculoskeletal:         General: No tenderness. Normal range of motion.      Cervical back: Normal range of motion and neck supple.     Neurological: He is alert and oriented to person, place, and time. He has normal strength. No cranial nerve deficit or sensory deficit.   FTN normal and symmetric bilaterally   Skin: Skin is warm and dry. Capillary refill takes less than 2 seconds.   Psychiatric: Thought content normal.         ED Course   Procedures  Labs Reviewed   ISTAT PROCEDURE - Abnormal       Result Value    POC Glucose 170 (*)     POC BUN 14      POC Creatinine 1.0      POC Sodium 138      POC Potassium 4.3      POC Chloride 98      POC TCO2 (MEASURED) 26      POC Ionized Calcium 1.22      POC Hematocrit 54      Sample EMELY     TROPONIN I    Troponin I <0.006       EKG Readings: (Independently Interpreted)   Initial Reading: No STEMI. Rhythm: Normal Sinus Rhythm. Heart Rate: 92. ST Segments: Normal ST Segments. T Waves Flipped: V2. Axis: Normal.     ECG Results              EKG 12-lead (Final result)        Collection Time Result Time QRS Duration OHS QTC Calculation    08/07/24 08:11:54 08/07/24 09:39:36 104 435                     Final result by Interface, Lab In [facility] (08/07/24 09:39:41)                   Narrative:    Test Reason : I10,    Vent. Rate : 092 BPM     Atrial Rate : 092 BPM     P-R Int : 134 ms          QRS Dur : 104 ms      QT Int : 352 ms       P-R-T Axes : 036 038 054 degrees     QTc Int : 435 ms    Normal sinus rhythm  Possible Left atrial enlargement  Tint inferior Qs  Abnormal ECG  When compared with ECG of 07-FEB-2023 10:30,  No significant change was found    Confirmed by Neftaly MEYER MD ([id number]) on 8/7/2024 9:39:32 AM    Referred By:             Confirmed By:Neftaly MEYER MD                                   Imaging Results    None          Medications - No data to display  Medical Decision Making  65 yo M with pmhx CAD s/p CABG, DM + obesity on mounjaro, HTN, pAfib, RODRIGUEZ, HLD presents with a chief complaint of hypertension.  Currently asymptomatic but did have some lightheadedness when standing 2 days ago.  Neuro exam is totally normal.  No subjective complaints to suggest hypertensive emergency.  Neuro exam is normal, doubt ICH or CVA.  Blood pressure at triage was quite elevated at 181/99.  Diastolic blood pressure reading at home was slightly elevated.  Will obtain labs to evaluate for end-organ damage.  Will monitor and obtain repeat blood pressure.  EKG was unchanged from baseline.    Reassessment:  Troponin is undetectable.  Chem 8 reveals hyperglycemia of 170 but labs otherwise normal.  Creatinine is normal.  Labs did not reveal any evidence of end-organ damage to suggest hypertensive emergency.  Repeat blood pressure was obtained and was 142/92.  On repeat assessment, he remains well appearing and asymptomatic.  He was explained that in the absence of any end-organ damage, this is not hypertensive emergency and I do not see any emergent indication to adjust his home blood pressure medications at this time.  I advised him to follow up with his PCP for adjustments.  Provided with extensive return precautions.  He is comfortable with plan and follow-up.                                          Clinical Impression:  Final diagnoses:  [I10] Hypertension  [R03.0] Elevated blood pressure reading (Primary)          ED Disposition Condition    Discharge Stable          ED Prescriptions    None       Follow-up Information       Follow up With Specialties Details Why Contact Nitin Chambers, DO Internal Medicine Schedule an appointment as soon as possible for a visit   [address] [facility] [address]  [phone]      [facility] Emergency Medicine  As needed, If symptoms  [address]orsen [address]  [facility] [address]  [phone]             Victor M Martinez MD  08/07/24 1008

## 2024-08-07 NOTE — DISCHARGE INSTRUCTIONS
As explained, we saw no signs of an emergency related to your elevated blood pressure readings.  You can discuss the elevated blood pressure readings with your primary care doctor to see if you would benefit from medication adjustment.    Return to the ER if you have high blood pressure and develop a severe headache, changes in vision, chest pain, shortness of breath, dizziness, weakness, numbness, or other concerning symptoms.

## 2024-08-09 ENCOUNTER — PATIENT OUTREACH (OUTPATIENT)
Dept: EMERGENCY MEDICINE | Facility: HOSPITAL | Age: 64
End: 2024-08-09
Payer: MEDICARE

## 2024-08-13 NOTE — PROGRESS NOTES
Call placed to Pt to f/u from recent ED visit. No answer x 2 attempts. Encounter to be closed at this time.Evisit link sent.

## 2024-08-16 ENCOUNTER — OFFICE VISIT (OUTPATIENT)
Dept: INTERNAL MEDICINE | Facility: CLINIC | Age: 64
End: 2024-08-16
Payer: MEDICARE

## 2024-08-16 VITALS
WEIGHT: 281 LBS | OXYGEN SATURATION: 97 % | SYSTOLIC BLOOD PRESSURE: 115 MMHG | HEART RATE: 88 BPM | HEIGHT: 72 IN | TEMPERATURE: 98 F | BODY MASS INDEX: 38.06 KG/M2 | DIASTOLIC BLOOD PRESSURE: 83 MMHG

## 2024-08-16 DIAGNOSIS — I25.810 CORONARY ARTERY DISEASE INVOLVING CORONARY BYPASS GRAFT OF NATIVE HEART WITHOUT ANGINA PECTORIS: ICD-10-CM

## 2024-08-16 DIAGNOSIS — I15.2 OBESITY, DIABETES, AND HYPERTENSION SYNDROME: ICD-10-CM

## 2024-08-16 DIAGNOSIS — E11.59 OBESITY, DIABETES, AND HYPERTENSION SYNDROME: ICD-10-CM

## 2024-08-16 DIAGNOSIS — E78.5 HYPERLIPIDEMIA ASSOCIATED WITH TYPE 2 DIABETES MELLITUS: ICD-10-CM

## 2024-08-16 DIAGNOSIS — Z95.1 S/P CABG (CORONARY ARTERY BYPASS GRAFT): ICD-10-CM

## 2024-08-16 DIAGNOSIS — I48.0 PAROXYSMAL ATRIAL FIBRILLATION: ICD-10-CM

## 2024-08-16 DIAGNOSIS — E11.69 HYPERLIPIDEMIA ASSOCIATED WITH TYPE 2 DIABETES MELLITUS: ICD-10-CM

## 2024-08-16 DIAGNOSIS — I15.2 HYPERTENSION ASSOCIATED WITH DIABETES: Primary | ICD-10-CM

## 2024-08-16 DIAGNOSIS — E11.69 OBESITY, DIABETES, AND HYPERTENSION SYNDROME: ICD-10-CM

## 2024-08-16 DIAGNOSIS — E66.9 OBESITY, DIABETES, AND HYPERTENSION SYNDROME: ICD-10-CM

## 2024-08-16 DIAGNOSIS — E11.59 HYPERTENSION ASSOCIATED WITH DIABETES: Primary | ICD-10-CM

## 2024-08-16 DIAGNOSIS — D75.839 THROMBOCYTOSIS: ICD-10-CM

## 2024-08-16 DIAGNOSIS — E11.65 TYPE 2 DIABETES MELLITUS WITH HYPERGLYCEMIA, WITHOUT LONG-TERM CURRENT USE OF INSULIN: ICD-10-CM

## 2024-08-16 DIAGNOSIS — G47.33 OSA (OBSTRUCTIVE SLEEP APNEA): ICD-10-CM

## 2024-08-16 DIAGNOSIS — Z90.81 H/O SPLENECTOMY: ICD-10-CM

## 2024-08-16 PROCEDURE — 99999 PR PBB SHADOW E&M-EST. PATIENT-LVL III: CPT | Mod: PBBFAC,,, | Performed by: INTERNAL MEDICINE

## 2024-08-16 RX ORDER — SILVER SULFADIAZINE 10 G/1000G
CREAM TOPICAL 2 TIMES DAILY
Qty: 50 G | Refills: 1 | Status: SHIPPED | OUTPATIENT
Start: 2024-08-16

## 2024-08-16 RX ORDER — ONDANSETRON 4 MG/1
4 TABLET, ORALLY DISINTEGRATING ORAL EVERY 6 HOURS PRN
Qty: 20 TABLET | Refills: 1 | Status: SHIPPED | OUTPATIENT
Start: 2024-08-16

## 2024-08-16 RX ORDER — CLONIDINE HYDROCHLORIDE 0.1 MG/1
TABLET ORAL
Qty: 60 TABLET | Refills: 0 | Status: SHIPPED | OUTPATIENT
Start: 2024-08-16
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
## 2024-09-28 ENCOUNTER — PATIENT MESSAGE (OUTPATIENT)
Dept: AUDIOLOGY | Facility: CLINIC | Age: 64
End: 2024-09-28
Payer: MEDICARE

## 2024-09-30 ENCOUNTER — TELEPHONE (OUTPATIENT)
Dept: AUDIOLOGY | Facility: CLINIC | Age: 64
End: 2024-09-30
Payer: MEDICARE

## 2024-10-01 ENCOUNTER — PATIENT MESSAGE (OUTPATIENT)
Dept: INTERNAL MEDICINE | Facility: CLINIC | Age: 64
End: 2024-10-01
Payer: MEDICARE

## 2024-10-04 ENCOUNTER — TELEPHONE (OUTPATIENT)
Dept: PHARMACY | Facility: CLINIC | Age: 64
End: 2024-10-04
Payer: MEDICARE

## 2024-10-04 NOTE — TELEPHONE ENCOUNTER
[facility]/Population Health Chart Review & Patient Outreach Details For Medication Adherence Project    Reason for Outreach Encounter: 3rd Party payor non-compliance report ([employer], [employer], C, etc)  2.  Patient Outreach Method: Reviewed patient chart   3.   Medication in question:   Diabetes Medications               tirzepatide 12.5 mg/0.5 mL PnIj Inject 12.5 mg into the skin every 7 days.              Hypertension Medications               cloNIDine (CATAPRES) 0.1 MG tablet Take one tablet by mouth three times daily as needed SBP > 180 mmHG or DBP > 110 mmHg    valsartan-hydrochlorothiazide (DIOVAN-HCT) 160-12.5 mg per tablet Take one tablet by mouth every morning               LAST FILLED:   Mounjaro 10/1/24 for 28 day supply   Valsartan/HCTZ 8/16/24 for 90 day supply    4.  Reviewed and or Updates Made To: Patient Chart  5. Outreach Outcomes and/or actions taken: Patient filled medication and is on track to be adherent  Additional Notes:

## 2024-10-17 ENCOUNTER — LAB VISIT (OUTPATIENT)
Dept: LAB | Facility: HOSPITAL | Age: 64
End: 2024-10-17
Attending: INTERNAL MEDICINE
Payer: MEDICARE

## 2024-10-17 DIAGNOSIS — Z12.5 SCREENING PSA (PROSTATE SPECIFIC ANTIGEN): ICD-10-CM

## 2024-10-17 DIAGNOSIS — E11.59 HYPERTENSION ASSOCIATED WITH DIABETES: ICD-10-CM

## 2024-10-17 DIAGNOSIS — E11.69 HYPERLIPIDEMIA ASSOCIATED WITH TYPE 2 DIABETES MELLITUS: ICD-10-CM

## 2024-10-17 DIAGNOSIS — I15.2 HYPERTENSION ASSOCIATED WITH DIABETES: ICD-10-CM

## 2024-10-17 DIAGNOSIS — E11.65 TYPE 2 DIABETES MELLITUS WITH HYPERGLYCEMIA, WITHOUT LONG-TERM CURRENT USE OF INSULIN: ICD-10-CM

## 2024-10-17 DIAGNOSIS — E78.5 HYPERLIPIDEMIA ASSOCIATED WITH TYPE 2 DIABETES MELLITUS: ICD-10-CM

## 2024-10-17 LAB
ALBUMIN SERPL BCP-MCNC: 3.6 G/DL (ref 3.5–5.2)
ALP SERPL-CCNC: 93 U/L (ref 40–150)
ALT SERPL W/O P-5'-P-CCNC: 36 U/L (ref 10–44)
ANION GAP SERPL CALC-SCNC: 9 MMOL/L (ref 8–16)
AST SERPL-CCNC: 24 U/L (ref 10–40)
BASOPHILS # BLD AUTO: 0.17 K/UL (ref 0–0.2)
BASOPHILS NFR BLD: 1.8 % (ref 0–1.9)
BILIRUB SERPL-MCNC: 0.5 MG/DL (ref 0.1–1)
BUN SERPL-MCNC: 15 MG/DL (ref 8–23)
CALCIUM SERPL-MCNC: 9.5 MG/DL (ref 8.7–10.5)
CHLORIDE SERPL-SCNC: 104 MMOL/L (ref 95–110)
CHOLEST SERPL-MCNC: 176 MG/DL (ref 120–199)
CHOLEST/HDLC SERPL: 4.4 {RATIO} (ref 2–5)
CO2 SERPL-SCNC: 25 MMOL/L (ref 23–29)
COMPLEXED PSA SERPL-MCNC: 0.57 NG/ML (ref 0–4)
CREAT SERPL-MCNC: 1.1 MG/DL (ref 0.5–1.4)
DIFFERENTIAL METHOD BLD: ABNORMAL
EOSINOPHIL # BLD AUTO: 0.3 K/UL (ref 0–0.5)
EOSINOPHIL NFR BLD: 3.2 % (ref 0–8)
ERYTHROCYTE [DISTWIDTH] IN BLOOD BY AUTOMATED COUNT: 14.9 % (ref 11.5–14.5)
EST. GFR  (NO RACE VARIABLE): >60 ML/MIN/1.73 M^2
ESTIMATED AVG GLUCOSE: 134 MG/DL (ref 68–131)
GLUCOSE SERPL-MCNC: 128 MG/DL (ref 70–110)
HBA1C MFR BLD: 6.3 % (ref 4–5.6)
HCT VFR BLD AUTO: 54.2 % (ref 40–54)
HDLC SERPL-MCNC: 40 MG/DL (ref 40–75)
HDLC SERPL: 22.7 % (ref 20–50)
HGB BLD-MCNC: 17.2 G/DL (ref 14–18)
IMM GRANULOCYTES # BLD AUTO: 0.02 K/UL (ref 0–0.04)
IMM GRANULOCYTES NFR BLD AUTO: 0.2 % (ref 0–0.5)
LDLC SERPL CALC-MCNC: 114.4 MG/DL (ref 63–159)
LYMPHOCYTES # BLD AUTO: 3.6 K/UL (ref 1–4.8)
LYMPHOCYTES NFR BLD: 37.4 % (ref 18–48)
MCH RBC QN AUTO: 29.2 PG (ref 27–31)
MCHC RBC AUTO-ENTMCNC: 31.7 G/DL (ref 32–36)
MCV RBC AUTO: 92 FL (ref 82–98)
MONOCYTES # BLD AUTO: 1 K/UL (ref 0.3–1)
MONOCYTES NFR BLD: 10.1 % (ref 4–15)
NEUTROPHILS # BLD AUTO: 4.6 K/UL (ref 1.8–7.7)
NEUTROPHILS NFR BLD: 47.3 % (ref 38–73)
NONHDLC SERPL-MCNC: 136 MG/DL
NRBC BLD-RTO: 0 /100 WBC
PLATELET # BLD AUTO: 448 K/UL (ref 150–450)
PMV BLD AUTO: 9.7 FL (ref 9.2–12.9)
POTASSIUM SERPL-SCNC: 4.7 MMOL/L (ref 3.5–5.1)
PROT SERPL-MCNC: 7.4 G/DL (ref 6–8.4)
RBC # BLD AUTO: 5.9 M/UL (ref 4.6–6.2)
SODIUM SERPL-SCNC: 138 MMOL/L (ref 136–145)
TRIGL SERPL-MCNC: 108 MG/DL (ref 30–150)
TSH SERPL DL<=0.005 MIU/L-ACNC: 2.54 UIU/ML (ref 0.4–4)
WBC # BLD AUTO: 9.66 K/UL (ref 3.9–12.7)

## 2024-10-17 PROCEDURE — 84443 ASSAY THYROID STIM HORMONE: CPT | Performed by: INTERNAL MEDICINE

## 2024-10-17 PROCEDURE — 80053 COMPREHEN METABOLIC PANEL: CPT | Performed by: INTERNAL MEDICINE

## 2024-10-17 PROCEDURE — 83036 HEMOGLOBIN GLYCOSYLATED A1C: CPT | Performed by: INTERNAL MEDICINE

## 2024-10-17 PROCEDURE — 80061 LIPID PANEL: CPT | Performed by: INTERNAL MEDICINE

## 2024-10-17 PROCEDURE — 84153 ASSAY OF PSA TOTAL: CPT | Performed by: INTERNAL MEDICINE

## 2024-10-17 PROCEDURE — 85025 COMPLETE CBC W/AUTO DIFF WBC: CPT | Performed by: INTERNAL MEDICINE

## 2024-10-29 ENCOUNTER — OFFICE VISIT (OUTPATIENT)
Dept: INTERNAL MEDICINE | Facility: CLINIC | Age: 64
End: 2024-10-29
Payer: MEDICARE

## 2024-10-29 ENCOUNTER — CLINICAL SUPPORT (OUTPATIENT)
Dept: AUDIOLOGY | Facility: CLINIC | Age: 64
End: 2024-10-29
Payer: MEDICARE

## 2024-10-29 ENCOUNTER — TELEPHONE (OUTPATIENT)
Dept: INTERNAL MEDICINE | Facility: CLINIC | Age: 64
End: 2024-10-29

## 2024-10-29 VITALS
RESPIRATION RATE: 18 BRPM | OXYGEN SATURATION: 97 % | BODY MASS INDEX: 36.05 KG/M2 | HEART RATE: 90 BPM | TEMPERATURE: 97 F | WEIGHT: 266.19 LBS | SYSTOLIC BLOOD PRESSURE: 138 MMHG | HEIGHT: 72 IN | DIASTOLIC BLOOD PRESSURE: 88 MMHG

## 2024-10-29 DIAGNOSIS — E11.59 OBESITY, DIABETES, AND HYPERTENSION SYNDROME: ICD-10-CM

## 2024-10-29 DIAGNOSIS — E11.59 HYPERTENSION ASSOCIATED WITH DIABETES: ICD-10-CM

## 2024-10-29 DIAGNOSIS — D75.839 THROMBOCYTOSIS: ICD-10-CM

## 2024-10-29 DIAGNOSIS — I48.0 PAROXYSMAL ATRIAL FIBRILLATION: ICD-10-CM

## 2024-10-29 DIAGNOSIS — E11.69 OBESITY, DIABETES, AND HYPERTENSION SYNDROME: ICD-10-CM

## 2024-10-29 DIAGNOSIS — I25.810 CORONARY ARTERY DISEASE INVOLVING CORONARY BYPASS GRAFT OF NATIVE HEART WITHOUT ANGINA PECTORIS: ICD-10-CM

## 2024-10-29 DIAGNOSIS — Z95.1 S/P CABG (CORONARY ARTERY BYPASS GRAFT): ICD-10-CM

## 2024-10-29 DIAGNOSIS — I50.22 HEART FAILURE WITH MID-RANGE EJECTION FRACTION: ICD-10-CM

## 2024-10-29 DIAGNOSIS — E11.69 HYPERLIPIDEMIA ASSOCIATED WITH TYPE 2 DIABETES MELLITUS: ICD-10-CM

## 2024-10-29 DIAGNOSIS — I25.110 ATHEROSCLEROSIS OF NATIVE CORONARY ARTERY OF NATIVE HEART WITH UNSTABLE ANGINA PECTORIS: Primary | ICD-10-CM

## 2024-10-29 DIAGNOSIS — H90.3 SENSORINEURAL HEARING LOSS, BILATERAL: Primary | ICD-10-CM

## 2024-10-29 DIAGNOSIS — E66.9 OBESITY, DIABETES, AND HYPERTENSION SYNDROME: ICD-10-CM

## 2024-10-29 DIAGNOSIS — I15.2 HYPERTENSION ASSOCIATED WITH DIABETES: ICD-10-CM

## 2024-10-29 DIAGNOSIS — I15.2 HYPERTENSION ASSOCIATED WITH DIABETES: Primary | ICD-10-CM

## 2024-10-29 DIAGNOSIS — E78.5 HYPERLIPIDEMIA ASSOCIATED WITH TYPE 2 DIABETES MELLITUS: ICD-10-CM

## 2024-10-29 DIAGNOSIS — G47.33 OSA (OBSTRUCTIVE SLEEP APNEA): ICD-10-CM

## 2024-10-29 DIAGNOSIS — Z90.81 H/O SPLENECTOMY: ICD-10-CM

## 2024-10-29 DIAGNOSIS — E11.59 HYPERTENSION ASSOCIATED WITH DIABETES: Primary | ICD-10-CM

## 2024-10-29 DIAGNOSIS — I15.2 OBESITY, DIABETES, AND HYPERTENSION SYNDROME: ICD-10-CM

## 2024-10-29 DIAGNOSIS — E11.65 TYPE 2 DIABETES MELLITUS WITH HYPERGLYCEMIA, WITHOUT LONG-TERM CURRENT USE OF INSULIN: ICD-10-CM

## 2024-10-29 PROCEDURE — 1160F RVW MEDS BY RX/DR IN RCRD: CPT | Mod: CPTII,S$GLB,, | Performed by: INTERNAL MEDICINE

## 2024-10-29 PROCEDURE — 1159F MED LIST DOCD IN RCRD: CPT | Mod: CPTII,S$GLB,, | Performed by: INTERNAL MEDICINE

## 2024-10-29 PROCEDURE — G2211 COMPLEX E/M VISIT ADD ON: HCPCS | Mod: S$GLB,,, | Performed by: INTERNAL MEDICINE

## 2024-10-29 PROCEDURE — 3066F NEPHROPATHY DOC TX: CPT | Mod: CPTII,S$GLB,, | Performed by: INTERNAL MEDICINE

## 2024-10-29 PROCEDURE — 1101F PT FALLS ASSESS-DOCD LE1/YR: CPT | Mod: CPTII,S$GLB,, | Performed by: INTERNAL MEDICINE

## 2024-10-29 PROCEDURE — 99499 UNLISTED E&M SERVICE: CPT | Mod: S$GLB,,, | Performed by: AUDIOLOGIST

## 2024-10-29 PROCEDURE — 3079F DIAST BP 80-89 MM HG: CPT | Mod: CPTII,S$GLB,, | Performed by: INTERNAL MEDICINE

## 2024-10-29 PROCEDURE — 3008F BODY MASS INDEX DOCD: CPT | Mod: CPTII,S$GLB,, | Performed by: INTERNAL MEDICINE

## 2024-10-29 PROCEDURE — 3044F HG A1C LEVEL LT 7.0%: CPT | Mod: CPTII,S$GLB,, | Performed by: INTERNAL MEDICINE

## 2024-10-29 PROCEDURE — 99215 OFFICE O/P EST HI 40 MIN: CPT | Mod: S$GLB,,, | Performed by: INTERNAL MEDICINE

## 2024-10-29 PROCEDURE — 3061F NEG MICROALBUMINURIA REV: CPT | Mod: CPTII,S$GLB,, | Performed by: INTERNAL MEDICINE

## 2024-10-29 PROCEDURE — 99999 PR PBB SHADOW E&M-EST. PATIENT-LVL III: CPT | Mod: PBBFAC,,, | Performed by: INTERNAL MEDICINE

## 2024-10-29 PROCEDURE — 3072F LOW RISK FOR RETINOPATHY: CPT | Mod: CPTII,S$GLB,, | Performed by: INTERNAL MEDICINE

## 2024-10-29 PROCEDURE — 3288F FALL RISK ASSESSMENT DOCD: CPT | Mod: CPTII,S$GLB,, | Performed by: INTERNAL MEDICINE

## 2024-10-29 PROCEDURE — 4010F ACE/ARB THERAPY RXD/TAKEN: CPT | Mod: CPTII,S$GLB,, | Performed by: INTERNAL MEDICINE

## 2024-10-29 PROCEDURE — 3075F SYST BP GE 130 - 139MM HG: CPT | Mod: CPTII,S$GLB,, | Performed by: INTERNAL MEDICINE

## 2024-10-29 RX ORDER — ATORVASTATIN CALCIUM 80 MG/1
80 TABLET, FILM COATED ORAL DAILY
Qty: 90 TABLET | Refills: 3 | Status: SHIPPED | OUTPATIENT
Start: 2024-10-29

## 2024-10-30 RX ORDER — VALSARTAN AND HYDROCHLOROTHIAZIDE 160; 12.5 MG/1; MG/1
TABLET, FILM COATED ORAL
Qty: 90 TABLET | Refills: 3 | Status: SHIPPED | OUTPATIENT
Start: 2024-10-30 | End: 2024-10-31

## 2024-10-31 ENCOUNTER — OFFICE VISIT (OUTPATIENT)
Dept: CARDIOLOGY | Facility: CLINIC | Age: 64
End: 2024-10-31
Payer: MEDICARE

## 2024-10-31 ENCOUNTER — PATIENT OUTREACH (OUTPATIENT)
Dept: ADMINISTRATIVE | Facility: HOSPITAL | Age: 64
End: 2024-10-31
Payer: MEDICARE

## 2024-10-31 VITALS
OXYGEN SATURATION: 95 % | SYSTOLIC BLOOD PRESSURE: 141 MMHG | DIASTOLIC BLOOD PRESSURE: 83 MMHG | BODY MASS INDEX: 36.1 KG/M2 | WEIGHT: 266.56 LBS | HEIGHT: 72 IN | HEART RATE: 88 BPM

## 2024-10-31 DIAGNOSIS — E11.69 HYPERLIPIDEMIA ASSOCIATED WITH TYPE 2 DIABETES MELLITUS: ICD-10-CM

## 2024-10-31 DIAGNOSIS — Z95.1 HX OF CABG: ICD-10-CM

## 2024-10-31 DIAGNOSIS — I15.2 HYPERTENSION ASSOCIATED WITH DIABETES: ICD-10-CM

## 2024-10-31 DIAGNOSIS — I77.819 DILATATION OF AORTA: ICD-10-CM

## 2024-10-31 DIAGNOSIS — E78.5 HYPERLIPIDEMIA ASSOCIATED WITH TYPE 2 DIABETES MELLITUS: ICD-10-CM

## 2024-10-31 DIAGNOSIS — E11.59 HYPERTENSION ASSOCIATED WITH DIABETES: ICD-10-CM

## 2024-10-31 DIAGNOSIS — I25.119 CORONARY ARTERY DISEASE INVOLVING NATIVE HEART WITH ANGINA PECTORIS, UNSPECIFIED VESSEL OR LESION TYPE: Primary | ICD-10-CM

## 2024-10-31 DIAGNOSIS — I48.0 PAROXYSMAL ATRIAL FIBRILLATION: ICD-10-CM

## 2024-10-31 DIAGNOSIS — E11.65 TYPE 2 DIABETES MELLITUS WITH HYPERGLYCEMIA, WITHOUT LONG-TERM CURRENT USE OF INSULIN: ICD-10-CM

## 2024-10-31 PROCEDURE — 4010F ACE/ARB THERAPY RXD/TAKEN: CPT | Mod: CPTII,S$GLB,, | Performed by: INTERNAL MEDICINE

## 2024-10-31 PROCEDURE — 99999 PR PBB SHADOW E&M-EST. PATIENT-LVL III: CPT | Mod: PBBFAC,,, | Performed by: INTERNAL MEDICINE

## 2024-10-31 PROCEDURE — 99214 OFFICE O/P EST MOD 30 MIN: CPT | Mod: S$GLB,,, | Performed by: INTERNAL MEDICINE

## 2024-10-31 PROCEDURE — 3077F SYST BP >= 140 MM HG: CPT | Mod: CPTII,S$GLB,, | Performed by: INTERNAL MEDICINE

## 2024-10-31 PROCEDURE — 3072F LOW RISK FOR RETINOPATHY: CPT | Mod: CPTII,S$GLB,, | Performed by: INTERNAL MEDICINE

## 2024-10-31 PROCEDURE — 3044F HG A1C LEVEL LT 7.0%: CPT | Mod: CPTII,S$GLB,, | Performed by: INTERNAL MEDICINE

## 2024-10-31 PROCEDURE — 3008F BODY MASS INDEX DOCD: CPT | Mod: CPTII,S$GLB,, | Performed by: INTERNAL MEDICINE

## 2024-10-31 PROCEDURE — 3066F NEPHROPATHY DOC TX: CPT | Mod: CPTII,S$GLB,, | Performed by: INTERNAL MEDICINE

## 2024-10-31 PROCEDURE — 3079F DIAST BP 80-89 MM HG: CPT | Mod: CPTII,S$GLB,, | Performed by: INTERNAL MEDICINE

## 2024-10-31 PROCEDURE — 3061F NEG MICROALBUMINURIA REV: CPT | Mod: CPTII,S$GLB,, | Performed by: INTERNAL MEDICINE

## 2024-10-31 RX ORDER — VALSARTAN AND HYDROCHLOROTHIAZIDE 320; 25 MG/1; MG/1
1 TABLET, FILM COATED ORAL DAILY
Qty: 90 TABLET | Refills: 3 | Status: SHIPPED | OUTPATIENT
Start: 2024-10-31 | End: 2025-10-31

## 2024-10-31 NOTE — ASSESSMENT & PLAN NOTE
Most recent  mg% while off atorvastatin.  Atorvastatin 80 mg resumed.  Previous LDL 56 1 year ago while on atorvastatin.  Compliance needed.  Follow-up labs scheduled.  Importance of low LDL reinforced to the patient.

## 2024-10-31 NOTE — ASSESSMENT & PLAN NOTE
LIMA to LAD completed 2022.  He had NSTEMI presentation.  He is without anginal symptoms currently.  Cath films reviewed-diffuse LAD disease proximally.    There was significant stenosis to posterolateral branch of dominant right coronary artery.  Last EF was prebypass 45%.  There was 50% proximal circumflex disease in 2022 as well.    Nuclear stress test recommended to inspect for myocardial ischemia and assess ejection fraction improvement.  He will be able to walk on the treadmill.

## 2024-11-26 ENCOUNTER — OFFICE VISIT (OUTPATIENT)
Dept: OTOLARYNGOLOGY | Facility: CLINIC | Age: 64
End: 2024-11-26
Payer: MEDICARE

## 2024-11-26 VITALS
HEART RATE: 93 BPM | WEIGHT: 263.69 LBS | OXYGEN SATURATION: 95 % | DIASTOLIC BLOOD PRESSURE: 84 MMHG | SYSTOLIC BLOOD PRESSURE: 129 MMHG | BODY MASS INDEX: 35.76 KG/M2

## 2024-11-26 DIAGNOSIS — H90.3 SENSORINEURAL HEARING LOSS (SNHL) OF BOTH EARS: Primary | ICD-10-CM

## 2024-11-26 DIAGNOSIS — H72.92 TYMPANIC MEMBRANE PERFORATION, LEFT: ICD-10-CM

## 2024-11-26 DIAGNOSIS — H60.63 CHRONIC OTITIS EXTERNA OF BOTH EARS, UNSPECIFIED TYPE: ICD-10-CM

## 2024-11-26 DIAGNOSIS — H93.13 TINNITUS OF BOTH EARS: ICD-10-CM

## 2024-11-26 DIAGNOSIS — J34.2 NASAL SEPTAL DEVIATION: ICD-10-CM

## 2024-11-26 PROCEDURE — 99999 PR PBB SHADOW E&M-EST. PATIENT-LVL III: CPT | Mod: PBBFAC,,, | Performed by: SPECIALIST

## 2024-11-26 NOTE — PROGRESS NOTES
Subjective:       Patient ID: Isaiah Dorsey Jr. is a 64 y.o. male.    Chief Complaint: No chief complaint on file.      The patient is returning for follow-up appointment.  It has been 9 months since I last saw him.  There are multiple issues to discuss.  1.  Sensorineural hearing loss/tinnitus:  He does feel that his hearing has worsened some what he is coming in to discuss his most recent audiogram.  2.  Chronic  eczematoid otitis externa:  The patient is using Dermotic oil on an as-needed basis.  His ear symptoms are controlled with the p.r.n. use of the Dermotic.          Review of Systems     Constitutional: Positive for fatigue.  Negative for appetite change, chills, fever and unexpected weight loss.      HENT: Positive for ear infection and hearing loss.  Negative for ear discharge, ear pain, facial swelling, mouth sores, nosebleeds, postnasal drip, ringing in the ears, runny nose, sinus infection, sinus pressure, sore throat, stuffy nose, tonsil infection, dental problems, trouble swallowing and voice change.      Eyes:  Negative for change in eyesight, eye drainage, eye itching and photophobia.     Respiratory:  Positive for sleep apnea and snoring. Negative for cough, shortness of breath and wheezing.      Cardiovascular:  Negative for chest pain, foot swelling, irregular heartbeat and swollen veins.     Gastrointestinal:  Negative for abdominal pain, acid reflux, constipation, diarrhea, heartburn and vomiting.     Genitourinary: Negative for difficulty urinating, sexual problems and frequent urination.     Musc: Negative for aching joints, aching muscles, back pain and neck pain.     Skin: Positive for rash.     Allergy: Negative for food allergies and seasonal allergies.     Endocrine: Negative for cold intolerance and heat intolerance.  Type 2 diabetes mellitus    Neurological: Negative for dizziness, headaches, light-headedness, seizures and tremors.      Hematologic: Negative for bruises/bleeds  easily and swollen glands.      Psychiatric: Positive for decreased concentration. Negative for depression, nervous/anxious and sleep disturbance.                Objective:      Physical Exam  Vitals and nursing note reviewed.   Constitutional:       General: He is awake.      Appearance: Normal appearance. He is well-developed and well-groomed. He is morbidly obese.   HENT:      Head: Normocephalic.      Jaw: There is normal jaw occlusion.      Salivary Glands: Right salivary gland is not diffusely enlarged or tender. Left salivary gland is not diffusely enlarged or tender.      Right Ear: Ear canal and external ear normal. Decreased hearing noted. Tympanic membrane is retracted.      Left Ear: Ear canal and external ear normal. Decreased hearing noted. Tympanic membrane is perforated and retracted.      Ears:        Nose: Septal deviation, mucosal edema (cyanotic, boggy inferior turbinates bilaterally) and rhinorrhea (clear mucus bilaterally) present. No nasal deformity. Rhinorrhea is clear.      Right Turbinates: Enlarged and pale.      Left Turbinates: Enlarged and pale.      Mouth/Throat:      Lips: No lesions.      Mouth: Mucous membranes are moist. No oral lesions.      Dentition: No gum lesions.      Tongue: No lesions.      Palate: No mass and lesions.      Pharynx: Oropharynx is clear. Uvula midline.      Comments: Oral cavity-Bullard class 3, hypertrophy of the base of tongue, long thick palate and long thick uvula with significant narrowing of the oropharyngeal inlet,  Eyes:      General: Lids are normal. Vision grossly intact.         Right eye: No discharge.         Left eye: No discharge.      Extraocular Movements: Extraocular movements intact.      Conjunctiva/sclera: Conjunctivae normal.      Pupils: Pupils are equal, round, and reactive to light.   Neck:      Thyroid: No thyroid mass or thyromegaly.      Trachea: Trachea normal. No tracheal deviation.   Cardiovascular:      Rate and Rhythm:  Normal rate and regular rhythm.      Pulses: Normal pulses.      Heart sounds: Normal heart sounds.   Pulmonary:      Effort: Pulmonary effort is normal.      Breath sounds: Normal breath sounds. No stridor. No decreased breath sounds, wheezing, rhonchi or rales.   Abdominal:      General: Bowel sounds are normal.      Palpations: Abdomen is soft.      Tenderness: There is no abdominal tenderness.   Musculoskeletal:      Cervical back: Neck supple. No muscular tenderness. Decreased range of motion.   Lymphadenopathy:      Head:      Right side of head: No submental, submandibular, preauricular, posterior auricular or occipital adenopathy.      Left side of head: No submental, submandibular, preauricular, posterior auricular or occipital adenopathy.      Cervical: No cervical adenopathy.   Skin:     General: Skin is warm and dry.      Findings: No petechiae or rash.      Nails: There is no clubbing.   Neurological:      Mental Status: He is alert and oriented to person, place, and time.      Cranial Nerves: No cranial nerve deficit.      Sensory: No sensory deficit.      Gait: Gait normal.   Psychiatric:         Speech: Speech normal.         Behavior: Behavior normal. Behavior is cooperative.         Thought Content: Thought content normal.         Judgment: Judgment normal.     Review of audiogram performed 10/29/2024 with prior audiogram for comparison results below:                   Pertinent findings asymmetrical hearing loss with left ear worse and bilateral sensorineural hearing loss, discrimination is slightly worse than at last audiogram, tympanograms are Type A on the left and Type B on the right.  Patient notified of results.  He is medically cleared for bilateral amplification.      Assessment:       1. Sensorineural hearing loss (SNHL) of both ears    2. Tympanic membrane perforation, left    3. Tinnitus of both ears    4. Chronic otitis externa of both ears, unspecified type    5. 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  He should wear double protection if possible.  He is obtaining new hearing aids and I feel he would benefit from a Powerade speaker.  I will recheck him in 1 year.  He will need undergo a comprehensive auditory evaluation shortly prior to or day of his visit with me.                        DISCLAIMER: This note was prepared with [employer] voice recognition transcription software. Garbled syntax, mangled pronouns, and other bizarre constructions may be attributed to that software system. While efforts were made to correct any mistakes made by this voice recognition program, some errors and/or omissions may remain in the note that were missed when the note was originally created.

## 2024-12-03 ENCOUNTER — OFFICE VISIT (OUTPATIENT)
Dept: OPTOMETRY | Facility: CLINIC | Age: 64
End: 2024-12-03
Payer: MEDICARE

## 2024-12-03 ENCOUNTER — PATIENT MESSAGE (OUTPATIENT)
Dept: AUDIOLOGY | Facility: CLINIC | Age: 64
End: 2024-12-03

## 2024-12-03 ENCOUNTER — CLINICAL SUPPORT (OUTPATIENT)
Dept: AUDIOLOGY | Facility: CLINIC | Age: 64
End: 2024-12-03
Payer: MEDICARE

## 2024-12-03 DIAGNOSIS — Z95.1 HX OF CABG: ICD-10-CM

## 2024-12-03 DIAGNOSIS — H90.3 SENSORINEURAL HEARING LOSS, BILATERAL: Primary | ICD-10-CM

## 2024-12-03 DIAGNOSIS — I25.10 ATHEROSCLEROSIS OF NATIVE CORONARY ARTERY OF NATIVE HEART WITHOUT ANGINA PECTORIS: Primary | ICD-10-CM

## 2024-12-03 DIAGNOSIS — Z96.1 PSEUDOPHAKIA OF BOTH EYES: ICD-10-CM

## 2024-12-03 DIAGNOSIS — E11.9 TYPE 2 DIABETES MELLITUS WITHOUT RETINOPATHY: Primary | ICD-10-CM

## 2024-12-03 PROCEDURE — 1101F PT FALLS ASSESS-DOCD LE1/YR: CPT | Mod: CPTII,S$GLB,, | Performed by: OPTOMETRIST

## 2024-12-03 PROCEDURE — 3288F FALL RISK ASSESSMENT DOCD: CPT | Mod: CPTII,S$GLB,, | Performed by: OPTOMETRIST

## 2024-12-03 PROCEDURE — 4010F ACE/ARB THERAPY RXD/TAKEN: CPT | Mod: CPTII,S$GLB,, | Performed by: OPTOMETRIST

## 2024-12-03 PROCEDURE — 2023F DILAT RTA XM W/O RTNOPTHY: CPT | Mod: CPTII,S$GLB,, | Performed by: OPTOMETRIST

## 2024-12-03 PROCEDURE — 3044F HG A1C LEVEL LT 7.0%: CPT | Mod: CPTII,S$GLB,, | Performed by: OPTOMETRIST

## 2024-12-03 PROCEDURE — 92014 COMPRE OPH EXAM EST PT 1/>: CPT | Mod: S$GLB,,, | Performed by: OPTOMETRIST

## 2024-12-03 PROCEDURE — 3066F NEPHROPATHY DOC TX: CPT | Mod: CPTII,S$GLB,, | Performed by: OPTOMETRIST

## 2024-12-03 PROCEDURE — 99999 PR PBB SHADOW E&M-EST. PATIENT-LVL II: CPT | Mod: PBBFAC,,, | Performed by: OPTOMETRIST

## 2024-12-03 PROCEDURE — 1159F MED LIST DOCD IN RCRD: CPT | Mod: CPTII,S$GLB,, | Performed by: OPTOMETRIST

## 2024-12-03 PROCEDURE — 3061F NEG MICROALBUMINURIA REV: CPT | Mod: CPTII,S$GLB,, | Performed by: OPTOMETRIST

## 2024-12-03 NOTE — PROGRESS NOTES
HPI    Pt is here today for diabetic eye exam. Denies pain/discomfort.  DLS: 11/7/2023 Dr. Yanes   (-)Flashes   (+)Floaters   (-)Diplopia   (-)Headaches   (-)Itching   (-)Tearing  (-)Burning  (-)Dryness   (+)Photophobia  (+)Glare   (-)Blurred VA  Past Eye Sx: Cataract with IOL OU   Eye Meds: (-)   Hemoglobin A1C       Date                     Value               Ref Range             Status                10/17/2024               6.3 (H)             4.0 - 5.6 %           Final           Last edited by Laura Rodriguez, OD on 12/3/2024  9:23 AM.            Assessment /Plan     For exam results, see Encounter Report.    Type 2 diabetes mellitus without retinopathy    Pseudophakia of both eyes      1. No retinopathy noted today.  Continued control with primary care physician and annual comprehensive eye exam.     2. Monitor; pt educated on condition and visual status.    Continue use of OTC reading glasses prn. Monitor yearly.      RTC in 1 year for annual eye exam unless changes noted sooner.

## 2024-12-03 NOTE — PROGRESS NOTES
HEARING AID FITTING    Isaiah Dorsey Jr. was seen today for a hearing aid fitting.  All parts of the hearing aid, including battery, domes, wax filters, and microphones, were discussed with the patient.  Battery charging was also reviewed and practiced with the patient.  Feedback measures were taken and hearing aid was fit to patient's satisfaction.  Counseled patient on daily wear and maintenance of the hearing aid.  Mr. Dorsey acknowledged that he understood.  The hearing aids were not connected to the patient's phone. Mr. Dorsey elected to connect his hearing aid to his phone himself due to time. The purchase agreement, 30-day trial period, and warranties were also reviewed with patient.  It is recommended Mr. Dorsey return to clinic in as needed; he is scheduled to be fit with the left hearing aid on 01/07/2025.      Hearing Aid Information  Service Type: Itemized  Service End Date: 03/03/25  Trial Period End Date: 01/02/25   and Model: Phonak Audeo [id number]  Color: sand beige  Right SN: [id number]  Battery: Li-Ion Rechargeable 312  Right  and Dome: 1M & large power dome  Repair Warranty Expiration Date: 12/17/27  L&D Warranty Expiration Date: 12/17/27   SN: [id number]

## 2024-12-05 ENCOUNTER — HOSPITAL ENCOUNTER (OUTPATIENT)
Dept: RADIOLOGY | Facility: HOSPITAL | Age: 64
Discharge: HOME | End: 2024-12-05
Attending: INTERNAL MEDICINE
Payer: MEDICARE

## 2024-12-05 ENCOUNTER — HOSPITAL ENCOUNTER (OUTPATIENT)
Dept: CARDIOLOGY | Facility: HOSPITAL | Age: 64
Discharge: HOME | End: 2024-12-05
Attending: INTERNAL MEDICINE
Payer: MEDICARE

## 2024-12-05 ENCOUNTER — PATIENT MESSAGE (OUTPATIENT)
Dept: CARDIOLOGY | Facility: CLINIC | Age: 64
End: 2024-12-05
Payer: MEDICARE

## 2024-12-05 VITALS — HEIGHT: 72 IN | BODY MASS INDEX: 35.62 KG/M2 | WEIGHT: 263 LBS

## 2024-12-05 DIAGNOSIS — I25.119 CORONARY ARTERY DISEASE INVOLVING NATIVE HEART WITH ANGINA PECTORIS, UNSPECIFIED VESSEL OR LESION TYPE: ICD-10-CM

## 2024-12-05 DIAGNOSIS — I25.10 ATHEROSCLEROSIS OF NATIVE CORONARY ARTERY OF NATIVE HEART WITHOUT ANGINA PECTORIS: ICD-10-CM

## 2024-12-05 LAB
CV STRESS BASE HR: 75 BPM
DIASTOLIC BLOOD PRESSURE: 98 MMHG
OHS CV CPX 1 MINUTE RECOVERY HEART RATE: 130 BPM
OHS CV CPX 85 PERCENT MAX PREDICTED HEART RATE MALE: 132
OHS CV CPX ESTIMATED METS: 8
OHS CV CPX MAX PREDICTED HEART RATE: 155
OHS CV CPX PATIENT IS FEMALE: 0
OHS CV CPX PATIENT IS MALE: 1
OHS CV CPX PEAK DIASTOLIC BLOOD PRESSURE: 86 MMHG
OHS CV CPX PEAK HEAR RATE: 141 BPM
OHS CV CPX PEAK RATE PRESSURE PRODUCT: NORMAL
OHS CV CPX PEAK SYSTOLIC BLOOD PRESSURE: 185 MMHG
OHS CV CPX PERCENT MAX PREDICTED HEART RATE ACHIEVED: 91
OHS CV CPX RATE PRESSURE PRODUCT PRESENTING: 9675
STRESS ECHO POST EXERCISE DUR MIN: 6 MINUTES
STRESS ECHO POST EXERCISE DUR SEC: 21 SECONDS
SYSTOLIC BLOOD PRESSURE: 129 MMHG

## 2024-12-05 PROCEDURE — 78452 HT MUSCLE IMAGE SPECT MULT: CPT | Mod: TC

## 2024-12-05 PROCEDURE — 93017 CV STRESS TEST TRACING ONLY: CPT

## 2024-12-05 PROCEDURE — 78452 HT MUSCLE IMAGE SPECT MULT: CPT | Mod: 26,,, | Performed by: STUDENT IN AN ORGANIZED HEALTH CARE EDUCATION/TRAINING PROGRAM

## 2024-12-05 PROCEDURE — A9502 TC99M TETROFOSMIN: HCPCS | Performed by: INTERNAL MEDICINE

## 2024-12-05 RX ADMIN — TETROFOSMIN 30 MILLICURIE: 1.38 INJECTION, POWDER, LYOPHILIZED, FOR SOLUTION INTRAVENOUS at 08:12

## 2024-12-05 RX ADMIN — TETROFOSMIN 10 MILLICURIE: 1.38 INJECTION, POWDER, LYOPHILIZED, FOR SOLUTION INTRAVENOUS at 07:12

## 2024-12-26 ENCOUNTER — OFFICE VISIT (OUTPATIENT)
Dept: PODIATRY | Facility: CLINIC | Age: 64
End: 2024-12-26
Payer: MEDICARE

## 2024-12-26 VITALS
WEIGHT: 120 LBS | HEIGHT: 72 IN | DIASTOLIC BLOOD PRESSURE: 65 MMHG | SYSTOLIC BLOOD PRESSURE: 132 MMHG | BODY MASS INDEX: 16.25 KG/M2 | HEART RATE: 102 BPM

## 2024-12-26 DIAGNOSIS — E11.9 ENCOUNTER FOR COMPREHENSIVE DIABETIC FOOT EXAMINATION, TYPE 2 DIABETES MELLITUS: ICD-10-CM

## 2024-12-26 DIAGNOSIS — E11.65 TYPE 2 DIABETES MELLITUS WITH HYPERGLYCEMIA, WITHOUT LONG-TERM CURRENT USE OF INSULIN: Primary | ICD-10-CM

## 2024-12-26 DIAGNOSIS — Q82.8 POROKERATOSIS: ICD-10-CM

## 2024-12-26 DIAGNOSIS — M79.671 BILATERAL FOOT PAIN: ICD-10-CM

## 2024-12-26 DIAGNOSIS — M79.672 BILATERAL FOOT PAIN: ICD-10-CM

## 2024-12-26 PROCEDURE — 99999 PR PBB SHADOW E&M-EST. PATIENT-LVL III: CPT | Mod: PBBFAC,,, | Performed by: PODIATRIST

## 2024-12-26 NOTE — PROGRESS NOTES
Subjective:      Patient ID: Isaiah Dorsey Jr. is a 64 y.o. male.    Chief Complaint:   Diabetic Foot Exam (Pcp-Nitin Cedeño, DO-10/29/2024) and Foot Problem (Left foot bal of foot /Right lateral side of midfoot )      Isaiah is a 64 y.o. male who presents to the clinic upon referral from Dr. Jhoana arreola. provider found  for evaluation and treatment of diabetic feet. Isaiah has a past medical history of CHF (congestive heart failure), Coronary artery disease, Diabetes mellitus type 2, uncontrolled, without complications, Elevated platelet count (07/23/2015), H/O splenectomy (04/24/2015), Morbid obesity with BMI of 45.0-49.9, adult (06/11/2014), RODRIGUEZ (obstructive sleep apnea), Polyp of transverse colon (09/05/2018), Severe uncontrolled hypertension (06/11/2014), and Tendonitis, Achilles, left (11/18/2022).    Here for diabetic foot exam  C/o foot pain; patient relates that he might have a piece of glass stuck in his foot for the last month  He has not seen it he just notices that it hurts and he sometimes walks barefoot    No drainage no bleeding  Wearing his boots  Diabetes wear controlled  Still working    Last seen 11/23        PCP: Nitin Cedeño,     Date Last Seen by PCP: 10/29/24    DM =    T2DM- controlled on Mounjaro 12.5 mg qwk   Current shoe gear: Tennis shoes    Hemoglobin A1C   Date Value Ref Range Status   10/17/2024 6.3 (H) 4.0 - 5.6 % Final     Comment:     ADA Screening Guidelines:  5.7-6.4%  Consistent with prediabetes  >or=6.5%  Consistent with diabetes    High levels of fetal hemoglobin interfere with the HbA1C  assay. Heterozygous hemoglobin variants (HbS, HgC, etc)do  not significantly interfere with this assay.   However, presence of multiple variants may affect accuracy.     04/17/2024 6.2 (H) 4.0 - 5.6 % Final     Comment:     ADA Screening Guidelines:  5.7-6.4%  Consistent with prediabetes  >or=6.5%  Consistent with diabetes    High levels of fetal hemoglobin interfere with the  HbA1C  assay. Heterozygous hemoglobin variants (HbS, HgC, etc)do  not significantly interfere with this assay.   However, presence of multiple variants may affect accuracy.     10/24/2023 6.7 (H) 4.0 - 5.6 % Final     Comment:     ADA Screening Guidelines:  5.7-6.4%  Consistent with prediabetes  >or=6.5%  Consistent with diabetes    High levels of fetal hemoglobin interfere with the HbA1C  assay. Heterozygous hemoglobin variants (HbS, HgC, etc)do  not significantly interfere with this assay.   However, presence of multiple variants may affect accuracy.            Past Medical History:   Diagnosis Date    CHF (congestive heart failure)     Coronary artery disease     Diabetes mellitus type 2, uncontrolled, without complications     6.7% Metf 1 g qd, eye 2015, [facility]-2015,     Elevated platelet count 07/23/2015    H/O splenectomy 04/24/2015    doesn't like pneumovax bc caused side effects    Morbid obesity with BMI of 45.0-49.9, adult 06/11/2014    RODRIGUEZ (obstructive sleep apnea)     Polyp of transverse colon 09/05/2018 2018, repeat 2023    Severe uncontrolled hypertension 06/11/2014    Tendonitis, Achilles, left 11/18/2022     Past Surgical History:   Procedure Laterality Date    AORTOGRAPHY N/A 7/11/2022    Procedure: AORTOGRAM;  Surgeon: Bjorn Cheatham MD;  Location: [facility] CATH LAB;  Service: Cardiology;  Laterality: N/A;    ARTHROSCOPIC DEBRIDEMENT OF SHOULDER Right 12/22/2022    Procedure: DEBRIDEMENT, SHOULDER, ARTHROSCOPIC;  Surgeon: Alexandria Morse MD;  Location: [facility] OR;  Service: Orthopedics;  Laterality: Right;    ARTHROSCOPIC REPAIR OF ROTATOR CUFF OF SHOULDER Right 12/22/2022    Procedure: REPAIR, ROTATOR CUFF, ARTHROSCOPIC;  Surgeon: Alexandria Morse MD;  Location: [facility] OR;  Service: Orthopedics;  Laterality: Right;    ARTHROSCOPY OF SHOULDER WITH DECOMPRESSION OF SUBACROMIAL SPACE Right 12/22/2022    Procedure: ARTHROSCOPY, SHOULDER, WITH SUBACROMIAL SPACE DECOMPRESSION;  Surgeon: Alexandria Morse MD;   Location: [facility] OR;  Service: Orthopedics;  Laterality: Right;    COLONOSCOPY N/A 11/22/2022    Procedure: COLONOSCOPY;  Surgeon: Castro Jasso MD;  Location: [facility] ENDO (4TH FLR);  Service: Endoscopy;  Laterality: N/A;  ok to hold Xarelto-see telephone encounter dated 11/1  inst via portal  pre call done    CORONARY ARTERY BYPASS GRAFT (CABG) Left 7/18/2022    Procedure: CORONARY ARTERY BYPASS GRAFT (CABG);  Surgeon: Blu Rhodes MD;  Location: [facility] 2ND FLR;  Service: Cardiovascular;  Laterality: Left;  CABG x 1 (LIMA to LAD)    ENDOSCOPIC HARVEST OF VEIN Left 7/18/2022    Procedure: HARVEST-VEIN-ENDOVASCULAR;  Surgeon: Blu Rhodes MD;  Location: [facility] 2ND FLR;  Service: Cardiovascular;  Laterality: Left;  Vein [place] Start time: 0823am  Vein [place] Stop time: 0836am    Vein [place] Start time: 0848am  Vein [place] Stop time: 0904am    FIXATION OF TENDON Right 12/22/2022    Procedure: FIXATION, TENDON-biceps tenodesis;  Surgeon: Alexandria Morse MD;  Location: [facility] OR;  Service: Orthopedics;  Laterality: Right;    LEFT HEART CATHETERIZATION N/A 7/11/2022    Procedure: Left heart cath;  Surgeon: Bjorn Cheatham MD;  Location: [facility] CATH LAB;  Service: Cardiology;  Laterality: N/A;    splenecectomy       Current Outpatient Medications on File Prior to Visit   Medication Sig Dispense Refill    alclometasone (ACLOVATE) 0.05 % cream Apply to face twice daily as needed for redness or scaling or itching 60 g 3    aspirin 81 MG Chew Take 1 tablet (81 mg total) by mouth once daily. 90 tablet 3    atorvastatin (LIPITOR) 80 MG tablet Take 1 tablet (80 mg total) by mouth once daily. 90 tablet 3    fluocinolone acetonide oiL (DERMOTIC OIL) 0.01 % Drop Place 3 drops into the right ear 2 (two) times daily. 20 mL 3    ketoconazole (NIZORAL) 2 % cream Apply topically 2 (two) times daily. as needed for rash on face 60 g 3    ketoconazole (NIZORAL) 2 % shampoo Use in shower for scalp and face 120 mL 4     ondansetron (ZOFRAN-ODT) 4 MG TbDL Dissolve 1 tablet (4 mg total) by mouth every 6 (six) hours as needed (nausea). 20 tablet 1    tirzepatide 12.5 mg/0.5 mL PnIj Inject 12.5 mg into the skin every 7 days. 4 Pen 11    valsartan-hydrochlorothiazide (DIOVAN-HCT) 320-25 mg per tablet Take 1 tablet by mouth once daily. 90 tablet 3     No current facility-administered medications on file prior to visit.     Review of patient's allergies indicates:   Allergen Reactions    Penicillins      Other reaction(s): Itching  Other reaction(s): Hives    Wasp venom Edema       Review of Systems   Constitutional: Negative for chills, decreased appetite, fever, malaise/fatigue, night sweats, weight gain and weight loss.   HENT:  Positive for hearing loss (Hard of hearing).    Cardiovascular:  Negative for chest pain, claudication, dyspnea on exertion, leg swelling, palpitations and syncope.   Respiratory:  Negative for cough and shortness of breath.    Endocrine: Negative for cold intolerance and heat intolerance.   Hematologic/Lymphatic: Negative for bleeding problem. Bruises/bleeds easily.   Skin:  Negative for color change, dry skin, flushing, itching, nail changes, poor wound healing, rash, skin cancer, suspicious lesions and unusual hair distribution.   Musculoskeletal:  Negative for arthritis, back pain, falls, gout, joint pain, joint swelling, muscle cramps, muscle weakness, myalgias, neck pain and stiffness.        Occasional right foot pain   Gastrointestinal:  Negative for diarrhea, nausea and vomiting.   Neurological:  Negative for dizziness, focal weakness, light-headedness, numbness, paresthesias, tremors, vertigo and weakness.   Psychiatric/Behavioral:  Negative for altered mental status and depression. The patient does not have insomnia.    Allergic/Immunologic: Negative.            Objective:       Vitals:    12/26/24 0744   BP: 132/65   Pulse: 102   Weight: 54.4 kg (120 lb)   Height: 6' (1.829 m)   PainSc: 0-No pain    54.4 kg (120 lb)     Physical Exam  Vitals reviewed.   Constitutional:       General: He is not in acute distress.     Appearance: He is well-developed. He is not ill-appearing, toxic-appearing or diaphoretic.      Comments: Proper supportive shoegear boots      Cardiovascular:      Pulses:           Dorsalis pedis pulses are 2+ on the right side and 2+ on the left side.        Posterior tibial pulses are 2+ on the right side and 2+ on the left side.   Musculoskeletal:         General: No swelling or tenderness.      Right lower leg: No edema.      Left lower leg: No edema.      Right ankle:      Right Achilles Tendon: No tenderness or defects.      Left ankle:      Left Achilles Tendon: No tenderness or defects.      Right foot: Decreased range of motion. Deformity and prominent metatarsal heads present. No tenderness or bony tenderness.      Left foot: Decreased range of motion. Deformity and prominent metatarsal heads present. No tenderness or bony tenderness.      Comments: Prominent forefoot    Flexible pes planus foot type w/ medial arch collapse and mild gastroc equinus      Strength 5/5 bilateral    No pain on palpation  No pain with digital range of motion   Feet:      Right foot:      Protective Sensation: 10 sites tested.  10 sites sensed.      Skin integrity: Skin breakdown and callus present. No ulcer, blister, erythema, warmth or dry skin.      Toenail Condition: Right toenails are normal.      Left foot:      Protective Sensation: 10 sites tested.  10 sites sensed.      Skin integrity: Callus present. No ulcer, blister, skin breakdown, erythema, warmth or dry skin.      Toenail Condition: Left toenails are normal.      Comments: [place] intact    Right lateral 5th metatarsal base abrasion no signs of infection    Focal hyperkeratotic lesion with painful central core consisting entirely of hyperkeratotic tissue without open skin, drainage, pus, fluctuance, malodor, or signs of infection:  sub2nd left and right heel    No signs of verrucae  No signs of foreign bodies              Skin:     General: Skin is warm and dry.      Capillary Refill: Capillary refill takes 2 to 3 seconds.      Coloration: Skin is not pale.      Findings: No erythema or rash.      Nails: There is no clubbing.   Neurological:      Mental Status: He is alert and oriented to person, place, and time.      Sensory: No sensory deficit.      Gait: Gait abnormal.   Psychiatric:         Attention and Perception: Attention normal.         Mood and Affect: Mood normal.         Speech: Speech normal.         Behavior: Behavior normal.         Thought Content: Thought content normal.         Cognition and Memory: Cognition normal.         Judgment: Judgment normal.                Assessment:       Encounter Diagnoses   Name Primary?    Type 2 diabetes mellitus with hyperglycemia, without long-term current use of insulin Yes    Encounter for comprehensive diabetic foot examination, type 2 diabetes mellitus     Bilateral foot pain     Porokeratosis                Plan:       Isaiah was seen today for diabetic foot exam and foot problem.    Diagnoses and all orders for this visit:    Type 2 diabetes mellitus with hyperglycemia, without long-term current use of insulin    Encounter for comprehensive diabetic foot examination, type 2 diabetes mellitus    Bilateral foot pain    Porokeratosis            I counseled the patient on his conditions, their implications and medical management.    DM foot exam    - Shoe inspection. Diabetic Foot Education. Patient reminded of the importance of good nutrition and blood sugar control to help prevent podiatric complications of diabetes. Patient instructed on proper foot hygeine. We discussed wearing proper shoe gear, daily foot inspections, never walking without protective shoe gear, never putting sharp instruments to feet, routine podiatric nail visits every 2-3 months.          Antibiotic Band-Aid  applied to abrasion  Monitor for signs of infection    The affected area was cleansed with an alcohol prep pad. Next utilizing a No.15 scalpel, the hyperkeratotic tissues were trimmed. Attention was then directed to the [facility] center where this area was carefully excised.     No signs of foreign body or verruca    Follow-up in 6-8 weeks consider x-rays at that time       Follow up in about 6 weeks (around 2/6/2025).

## 2024-12-31 DIAGNOSIS — L21.9 SEBORRHEA: ICD-10-CM

## 2024-12-31 RX ORDER — ONDANSETRON 4 MG/1
4 TABLET, ORALLY DISINTEGRATING ORAL EVERY 6 HOURS PRN
Qty: 20 TABLET | Refills: 1 | Status: SHIPPED | OUTPATIENT
Start: 2024-12-31

## 2024-12-31 NOTE — TELEPHONE ENCOUNTER
No care due was identified.  [facility] Embedded Care Due Messages. Reference number: [id number].   12/31/2024 10:21:40 AM CST

## 2025-01-06 ENCOUNTER — TELEPHONE (OUTPATIENT)
Dept: AUDIOLOGY | Facility: CLINIC | Age: 65
End: 2025-01-06
Payer: MEDICARE

## 2025-01-06 NOTE — TELEPHONE ENCOUNTER
Spoke with patient to inform him that Dr. Britt is sick and needs to cancel tomorrow's hearing aid appointment.  Assisted patient with rescheduling for 1/14/25 @ 8:30 am.  Patient confirmed new appointment date/time and verbalized understanding.  He sends his wishes to Dr. Britt to feel better soon.

## 2025-01-07 ENCOUNTER — DOCUMENTATION ONLY (OUTPATIENT)
Dept: AUDIOLOGY | Facility: CLINIC | Age: 65
End: 2025-01-07
Payer: MEDICARE

## 2025-01-07 RX ORDER — ALCLOMETASONE DIPROPIONATE 0.5 MG/G
CREAM TOPICAL
Qty: 60 G | Refills: 3 | Status: SHIPPED | OUTPATIENT
Start: 2025-01-07

## 2025-01-07 RX ORDER — KETOCONAZOLE 20 MG/G
CREAM TOPICAL 2 TIMES DAILY
Qty: 60 G | Refills: 3 | Status: SHIPPED | OUTPATIENT
Start: 2025-01-07

## 2025-01-08 NOTE — PROGRESS NOTES
Hearing aids arrived from ; devices were assembled and charged in preparation for fitting. Patient is scheduled for a fitting.    Hearing Aid Information  Service Type: Itemized   and Model: Phonak BadSeedeo [id number]  Color: sand beige  Left SN: [id number]  Battery: Li-Ion Rechargeable 312  Left  and Dome: 1M & large power  Repair Warranty Expiration Date: 01/21/28  L&D Warranty Expiration Date: 01/21/28   SN: [id number]

## 2025-01-14 ENCOUNTER — CLINICAL SUPPORT (OUTPATIENT)
Dept: AUDIOLOGY | Facility: CLINIC | Age: 65
End: 2025-01-14
Payer: MEDICARE

## 2025-01-14 DIAGNOSIS — H90.3 SENSORINEURAL HEARING LOSS, BILATERAL: Primary | ICD-10-CM

## 2025-01-27 ENCOUNTER — PATIENT MESSAGE (OUTPATIENT)
Dept: PODIATRY | Facility: CLINIC | Age: 65
End: 2025-01-27
Payer: MEDICARE

## 2025-01-28 ENCOUNTER — OFFICE VISIT (OUTPATIENT)
Dept: PODIATRY | Facility: CLINIC | Age: 65
End: 2025-01-28
Payer: MEDICARE

## 2025-01-28 VITALS
SYSTOLIC BLOOD PRESSURE: 134 MMHG | BODY MASS INDEX: 35.52 KG/M2 | WEIGHT: 262.25 LBS | HEART RATE: 84 BPM | DIASTOLIC BLOOD PRESSURE: 90 MMHG | HEIGHT: 72 IN

## 2025-01-28 DIAGNOSIS — Q82.8 POROKERATOSIS: ICD-10-CM

## 2025-01-28 DIAGNOSIS — E11.65 TYPE 2 DIABETES MELLITUS WITH HYPERGLYCEMIA, WITHOUT LONG-TERM CURRENT USE OF INSULIN: Primary | ICD-10-CM

## 2025-01-28 PROCEDURE — 3072F LOW RISK FOR RETINOPATHY: CPT | Mod: CPTII,S$GLB,, | Performed by: PODIATRIST

## 2025-01-28 PROCEDURE — 1126F AMNT PAIN NOTED NONE PRSNT: CPT | Mod: CPTII,S$GLB,, | Performed by: PODIATRIST

## 2025-01-28 PROCEDURE — 3075F SYST BP GE 130 - 139MM HG: CPT | Mod: CPTII,S$GLB,, | Performed by: PODIATRIST

## 2025-01-28 PROCEDURE — 3080F DIAST BP >= 90 MM HG: CPT | Mod: CPTII,S$GLB,, | Performed by: PODIATRIST

## 2025-01-28 PROCEDURE — 3008F BODY MASS INDEX DOCD: CPT | Mod: CPTII,S$GLB,, | Performed by: PODIATRIST

## 2025-01-28 PROCEDURE — 99999 PR PBB SHADOW E&M-EST. PATIENT-LVL III: CPT | Mod: PBBFAC,,, | Performed by: PODIATRIST

## 2025-01-28 PROCEDURE — 1159F MED LIST DOCD IN RCRD: CPT | Mod: CPTII,S$GLB,, | Performed by: PODIATRIST

## 2025-01-28 PROCEDURE — 1160F RVW MEDS BY RX/DR IN RCRD: CPT | Mod: CPTII,S$GLB,, | Performed by: PODIATRIST

## 2025-01-28 PROCEDURE — 99214 OFFICE O/P EST MOD 30 MIN: CPT | Mod: S$GLB,,, | Performed by: PODIATRIST

## 2025-01-28 NOTE — PROGRESS NOTES
Subjective:      Patient ID: Isaiah Dorsey Jr. is a 65 y.o. male.    Chief Complaint:   Wound Care (Right foot) and Diabetes Mellitus (PCP- 10/29/2024/Nitin Cedeño MD)      Isaiah is a 65 y.o. male who presents to the clinic upon referral from Dr. Jhoana arreola. provider found  for evaluation and treatment of diabetic feet. Isaiah has a past medical history of CHF (congestive heart failure), Coronary artery disease, Diabetes mellitus type 2, uncontrolled, without complications, Elevated platelet count (07/23/2015), H/O splenectomy (04/24/2015), Morbid obesity with BMI of 45.0-49.9, adult (06/11/2014), RODRIGUEZ (obstructive sleep apnea), Polyp of transverse colon (09/05/2018), Severe uncontrolled hypertension (06/11/2014), and Tendonitis, Achilles, left (11/18/2022).   Pt here to f/u previous foot pain:  Pain has resolved.  No new c/o  Using cream few times a week     Last visit:  C/o foot pain; patient relates that he might have a piece of glass stuck in his foot for the last month  He has not seen it he just notices that it hurts and he sometimes walks barefoot    No drainage no bleeding  Wearing his boots  Diabetes wear controlled  Still working        Antibiotic Band-Aid applied to abrasion  Monitor for signs of infection    The affected area was cleansed with an alcohol prep pad. Next utilizing a No.15 scalpel, the hyperkeratotic tissues were trimmed. Attention was then directed to the [facility] center where this area was carefully excised.     No signs of foreign body or verruca    Follow-up in 6-8 weeks consider x-rays at that time        PCP: Nitin Cedeño, DO    Date Last Seen by PCP: 10/29/24    DM =    T2DM- controlled on Mounjaro 12.5 mg qwk   Current shoe gear: Tennis shoes    Hemoglobin A1C   Date Value Ref Range Status   10/17/2024 6.3 (H) 4.0 - 5.6 % Final     Comment:     ADA Screening Guidelines:  5.7-6.4%  Consistent with prediabetes  >or=6.5%  Consistent with diabetes    High levels of fetal  hemoglobin interfere with the HbA1C  assay. Heterozygous hemoglobin variants (HbS, HgC, etc)do  not significantly interfere with this assay.   However, presence of multiple variants may affect accuracy.     04/17/2024 6.2 (H) 4.0 - 5.6 % Final     Comment:     ADA Screening Guidelines:  5.7-6.4%  Consistent with prediabetes  >or=6.5%  Consistent with diabetes    High levels of fetal hemoglobin interfere with the HbA1C  assay. Heterozygous hemoglobin variants (HbS, HgC, etc)do  not significantly interfere with this assay.   However, presence of multiple variants may affect accuracy.     10/24/2023 6.7 (H) 4.0 - 5.6 % Final     Comment:     ADA Screening Guidelines:  5.7-6.4%  Consistent with prediabetes  >or=6.5%  Consistent with diabetes    High levels of fetal hemoglobin interfere with the HbA1C  assay. Heterozygous hemoglobin variants (HbS, HgC, etc)do  not significantly interfere with this assay.   However, presence of multiple variants may affect accuracy.            Past Medical History:   Diagnosis Date    CHF (congestive heart failure)     Coronary artery disease     Diabetes mellitus type 2, uncontrolled, without complications     6.7% Metf 1 g qd, eye 2015, [facility]-2015,     Elevated platelet count 07/23/2015    H/O splenectomy 04/24/2015    doesn't like pneumovax bc caused side effects    Morbid obesity with BMI of 45.0-49.9, adult 06/11/2014    RODRIGUEZ (obstructive sleep apnea)     Polyp of transverse colon 09/05/2018 2018, repeat 2023    Severe uncontrolled hypertension 06/11/2014    Tendonitis, Achilles, left 11/18/2022     Past Surgical History:   Procedure Laterality Date    AORTOGRAPHY N/A 7/11/2022    Procedure: AORTOGRAM;  Surgeon: Bjorn Cheatham MD;  Location: [facility] CATH LAB;  Service: Cardiology;  Laterality: N/A;    ARTHROSCOPIC DEBRIDEMENT OF SHOULDER Right 12/22/2022    Procedure: DEBRIDEMENT, SHOULDER, ARTHROSCOPIC;  Surgeon: Alexandria Morse MD;  Location: [facility] OR;  Service:  Orthopedics;  Laterality: Right;    ARTHROSCOPIC REPAIR OF ROTATOR CUFF OF SHOULDER Right 12/22/2022    Procedure: REPAIR, ROTATOR CUFF, ARTHROSCOPIC;  Surgeon: Alexandria Morse MD;  Location: [facility] OR;  Service: Orthopedics;  Laterality: Right;    ARTHROSCOPY OF SHOULDER WITH DECOMPRESSION OF SUBACROMIAL SPACE Right 12/22/2022    Procedure: ARTHROSCOPY, SHOULDER, WITH SUBACROMIAL SPACE DECOMPRESSION;  Surgeon: Alexandria Morse MD;  Location: [facility] OR;  Service: Orthopedics;  Laterality: Right;    COLONOSCOPY N/A 11/22/2022    Procedure: COLONOSCOPY;  Surgeon: Castro Jasso MD;  Location: [facility] ENDO (4TH FLR);  Service: Endoscopy;  Laterality: N/A;  ok to hold Xarelto-see telephone encounter dated 11/1  [facility] via portal  pre call done    CORONARY ARTERY BYPASS GRAFT (CABG) Left 7/18/2022    Procedure: CORONARY ARTERY BYPASS GRAFT (CABG);  Surgeon: Blu Rhodes MD;  Location: [facility] 2ND FLR;  Service: Cardiovascular;  Laterality: Left;  CABG x 1 (LIMA to LAD)    ENDOSCOPIC HARVEST OF VEIN Left 7/18/2022    Procedure: HARVEST-VEIN-ENDOVASCULAR;  Surgeon: Blu Rhodes MD;  Location: [facility] 2ND FLR;  Service: Cardiovascular;  Laterality: Left;  Vein Gallagher Start time: 0823am  Vein Gallagher Stop time: 0836am    Vein Gallagher Start time: 0848am  Vein Gallagher Stop time: 0904am    FIXATION OF TENDON Right 12/22/2022    Procedure: FIXATION, TENDON-biceps tenodesis;  Surgeon: Alexandria Morse MD;  Location: [facility];  Service: Orthopedics;  Laterality: Right;    LEFT HEART CATHETERIZATION N/A 7/11/2022    Procedure: Left heart cath;  Surgeon: Bjorn Cheatham MD;  Location: [facility] CATH LAB;  Service: Cardiology;  Laterality: N/A;    splenecectomy       Current Outpatient Medications on File Prior to Visit   Medication Sig Dispense Refill    alclometasone (ACLOVATE) 0.05 % cream Apply to face twice daily as needed for redness or scaling or itching 60 g 3    amoxicillin (AMOXIL) 500 MG capsule take 2  capsules by mouth right now, then take 1 capsule every 6 hours until finished 32 capsule 0    atorvastatin (LIPITOR) 80 MG tablet Take 1 tablet (80 mg total) by mouth once daily. 90 tablet 3    fluocinolone acetonide oiL (DERMOTIC OIL) 0.01 % Drop Place 3 drops into the right ear 2 (two) times daily. 20 mL 3    ketoconazole (NIZORAL) 2 % cream Apply topically 2 (two) times daily. as needed for rash on face 60 g 3    ondansetron (ZOFRAN-ODT) 4 MG TbDL Dissolve 1 tablet (4 mg total) by mouth every 6 (six) hours as needed (nausea). 20 tablet 1    tirzepatide 12.5 mg/0.5 mL PnIj Inject 12.5 mg into the skin every 7 days. 4 Pen 11    valsartan-hydrochlorothiazide (DIOVAN-HCT) 320-25 mg per tablet Take 1 tablet by mouth once daily. 90 tablet 3    aspirin 81 MG Chew Take 1 tablet (81 mg total) by mouth once daily. 90 tablet 3    ketoconazole (NIZORAL) 2 % shampoo Use in shower for scalp and face 120 mL 4     No current facility-administered medications on file prior to visit.     Review of patient's allergies indicates:   Allergen Reactions    Penicillins      Other reaction(s): Itching  Other reaction(s): Hives    Wasp venom Edema       Review of Systems   Constitutional: Negative for chills, decreased appetite, fever, malaise/fatigue, night sweats, weight gain and weight loss.   HENT:  Positive for hearing loss (Hard of hearing).    Cardiovascular:  Negative for chest pain, claudication, dyspnea on exertion, leg swelling, palpitations and syncope.   Respiratory:  Negative for cough and shortness of breath.    Endocrine: Negative for cold intolerance and heat intolerance.   Hematologic/Lymphatic: Negative for bleeding problem. Bruises/bleeds easily.   Skin:  Negative for color change, dry skin, flushing, itching, nail changes, poor wound healing, rash, skin cancer, suspicious lesions and unusual hair distribution.   Musculoskeletal:  Negative for arthritis, back pain, falls, gout, joint pain, joint swelling,  muscle cramps, muscle weakness, myalgias, neck pain and stiffness.        Occasional right foot pain   Gastrointestinal:  Negative for diarrhea, nausea and vomiting.   Neurological:  Negative for dizziness, focal weakness, light-headedness, numbness, paresthesias, tremors, vertigo and weakness.   Psychiatric/Behavioral:  Negative for altered mental status and depression. The patient does not have insomnia.    Allergic/Immunologic: Negative.            Objective:       Vitals:    01/28/25 0819   BP: (!) 134/90   Pulse: 84   Weight: 118.9 kg (262 lb 3.8 oz)   Height: 6' (1.829 m)   PainSc: 0-No pain   118.9 kg (262 lb 3.8 oz)     Physical Exam  Vitals reviewed.   Constitutional:       General: He is not in acute distress.     Appearance: He is well-developed. He is not ill-appearing, toxic-appearing or diaphoretic.      Comments: Proper supportive shoegear boots      Cardiovascular:      Pulses:           Dorsalis pedis pulses are 2+ on the right side and 2+ on the left side.        Posterior tibial pulses are 2+ on the right side and 2+ on the left side.   Musculoskeletal:         General: No swelling or tenderness.      Right lower leg: No edema.      Left lower leg: No edema.      Right ankle:      Right Achilles Tendon: No tenderness or defects.      Left ankle:      Left Achilles Tendon: No tenderness or defects.      Right foot: Decreased range of motion. Deformity and prominent metatarsal heads present. No tenderness or bony tenderness.      Left foot: Decreased range of motion. Deformity and prominent metatarsal heads present. No tenderness or bony tenderness.      Comments: Prominent forefoot    Flexible pes planus foot type w/ medial arch collapse and mild gastroc equinus           No pain on palpation  No pain with digital range of motion   Feet:      Right foot:      Protective Sensation: 10 sites tested.  10 sites sensed.      Skin integrity: Skin breakdown and callus present. No ulcer, blister, erythema,  warmth or dry skin.      Toenail Condition: Right toenails are normal.      Left foot:      Protective Sensation: 10 sites tested.  10 sites sensed.      Skin integrity: Callus present. No ulcer, blister, skin breakdown, erythema, warmth or dry skin.      Toenail Condition: Left toenails are normal.      Comments: [place] intact       Focal hyperkeratotic lesion with painful central core consisting entirely of hyperkeratotic tissue without open skin, drainage, pus, fluctuance, malodor, or signs of infection: right heel    No signs of verrucae  No signs of foreign bodies              Skin:     General: Skin is warm and dry.      Capillary Refill: Capillary refill takes 2 to 3 seconds.      Coloration: Skin is not pale.      Findings: No erythema or rash.      Nails: There is no clubbing.   Neurological:      Mental Status: He is alert and oriented to person, place, and time.      Sensory: No sensory deficit.      Gait: Gait abnormal.   Psychiatric:         Attention and Perception: Attention normal.         Mood and Affect: Mood normal.         Speech: Speech normal.         Behavior: Behavior normal.         Thought Content: Thought content normal.         Cognition and Memory: Cognition normal.         Judgment: Judgment normal.              Assessment:       Encounter Diagnoses   Name Primary?    Type 2 diabetes mellitus with hyperglycemia, without long-term current use of insulin Yes    Porokeratosis                  Plan:       Isaiah was seen today for wound care and diabetes mellitus.    Diagnoses and all orders for this visit:    Type 2 diabetes mellitus with hyperglycemia, without long-term current use of insulin    Porokeratosis              I counseled the patient on his conditions, their implications and medical management.    DM foot exam    - Shoe inspection. Diabetic Foot Education. Patient reminded of the importance of good nutrition and blood sugar control to help prevent podiatric  complications of diabetes. Patient instructed on proper foot hygeine. We discussed wearing proper shoe gear, daily foot inspections, never walking without protective shoe gear, never putting sharp instruments to feet, routine podiatric nail visits every 2-3 months.      - With patient's permission, Utilizing a #15 scalpel, I trimmed the corns and calluses at the above mentioned location.      The patient will continue to monitor the areas daily, inspect the feet, wear protective shoe gear when ambulatory, and moisturizer to maintain skin integrity.     - continue proper shoes/check feet    - f/u one year

## 2025-01-30 DIAGNOSIS — Z00.00 ENCOUNTER FOR MEDICARE ANNUAL WELLNESS EXAM: ICD-10-CM

## 2025-02-06 ENCOUNTER — TELEPHONE (OUTPATIENT)
Dept: PHARMACY | Facility: CLINIC | Age: 65
End: 2025-02-06
Payer: MEDICARE

## 2025-02-06 NOTE — TELEPHONE ENCOUNTER
[facility]/Population Health Chart Review & Patient Outreach Details For Medication Adherence Project    Reason for Outreach Encounter: 3rd Party payor non-compliance report ([employer], [employer], C, etc) and Follow up to a previous patient outreach  2.  Patient Outreach Method: Reviewed patient chart  and [employer] message  3.   Medication in question:    Diabetes Medications               tirzepatide 12.5 mg/0.5 mL PnIj Inject 12.5 mg into the skin every 7 days.              Hypertension Medications               valsartan-hydrochlorothiazide (DIOVAN-HCT) 320-25 mg per tablet Take 1 tablet by mouth once daily.              Hyperlipidemia Medications               atorvastatin (LIPITOR) 80 MG tablet Take 1 tablet (80 mg total) by mouth once daily.               Atorvastatin LF 90 ds 10/30/24  Mounjaro LF 28 ds 1/28/25  Valsartan-HCTZ LF 90 ds 11/7/24    4.  Reviewed and or Updates Made To: Patient Chart  5. Outreach Outcomes and/or actions taken: Patient filled medication and is on track to be adherent and Patient agreed to refill refills from pharmacy on file  Additional Notes:   Called pharmacy and they are getting it filled (2/7/25)

## 2025-02-27 ENCOUNTER — OFFICE VISIT (OUTPATIENT)
Dept: CARDIOLOGY | Facility: CLINIC | Age: 65
End: 2025-02-27
Payer: MEDICARE

## 2025-02-27 VITALS
HEART RATE: 80 BPM | HEIGHT: 72 IN | WEIGHT: 255.88 LBS | DIASTOLIC BLOOD PRESSURE: 84 MMHG | BODY MASS INDEX: 34.66 KG/M2 | SYSTOLIC BLOOD PRESSURE: 116 MMHG

## 2025-02-27 DIAGNOSIS — I21.4 NON-STEMI (NON-ST ELEVATED MYOCARDIAL INFARCTION): ICD-10-CM

## 2025-02-27 DIAGNOSIS — Z95.1 HX OF CABG: Primary | ICD-10-CM

## 2025-02-27 DIAGNOSIS — I77.819 DILATATION OF AORTA: ICD-10-CM

## 2025-02-27 DIAGNOSIS — I48.0 PAROXYSMAL ATRIAL FIBRILLATION: ICD-10-CM

## 2025-02-27 DIAGNOSIS — I15.2 HYPERTENSION ASSOCIATED WITH DIABETES: ICD-10-CM

## 2025-02-27 DIAGNOSIS — E78.5 HYPERLIPIDEMIA ASSOCIATED WITH TYPE 2 DIABETES MELLITUS: ICD-10-CM

## 2025-02-27 DIAGNOSIS — E11.65 TYPE 2 DIABETES MELLITUS WITH HYPERGLYCEMIA, WITHOUT LONG-TERM CURRENT USE OF INSULIN: ICD-10-CM

## 2025-02-27 DIAGNOSIS — E11.59 HYPERTENSION ASSOCIATED WITH DIABETES: ICD-10-CM

## 2025-02-27 DIAGNOSIS — I25.810 CORONARY ARTERY DISEASE INVOLVING CORONARY BYPASS GRAFT OF NATIVE HEART WITHOUT ANGINA PECTORIS: ICD-10-CM

## 2025-02-27 DIAGNOSIS — E11.69 HYPERLIPIDEMIA ASSOCIATED WITH TYPE 2 DIABETES MELLITUS: ICD-10-CM

## 2025-02-27 PROCEDURE — 99999 PR PBB SHADOW E&M-EST. PATIENT-LVL III: CPT | Mod: PBBFAC,,, | Performed by: INTERNAL MEDICINE

## 2025-02-27 NOTE — ASSESSMENT & PLAN NOTE
Single-vessel bypass.  Recent stress test without ischemia.  Fixed lateral defect noted EF 45%.    Associated coronary disease-50% circumflex stenosis without ischemia on recent stress test.    He is encouraged to exercise more.

## 2025-02-27 NOTE — ASSESSMENT & PLAN NOTE
Chart reviewed, peak troponin 17.  Current stress test shows fixed lateral defect.  There was no STEMI.  His ejection fraction has remained stable since then without change.

## 2025-02-27 NOTE — ASSESSMENT & PLAN NOTE
Michelle bypass AFib only.  Anticoagulation not advised.  He is in sinus rhythm today.  A follow-up event monitor 2023 showed no AFib, there were PACs and PVCs only.

## 2025-02-27 NOTE — ASSESSMENT & PLAN NOTE
Condition stable.  Most recent hemoglobin A1c 6.3.  He has been diabetic for about 10 years.  He takes Mounjaro.

## 2025-02-27 NOTE — ASSESSMENT & PLAN NOTE
I encouraged him to check his blood pressures at home.  His blood pressure is normal today.  Current therapy to continue.

## 2025-02-27 NOTE — PROGRESS NOTES
[facility] Cardiology     Subjective:    Patient ID:  Iasiah Dorsey Jr. is a 65 y.o. male who presents for follow-up of Coronary Artery Disease, CABG 2022 SV LIMA, Diabetes Mellitus, Hyperlipidemia, and Hypertension    Review of patient's allergies indicates:   Allergen Reactions    Penicillins      Other reaction(s): Itching  Other reaction(s): Hives    Wasp venom Edema     The patient had single-vessel LIMA to LAD surgery for NSTEMI 2022.  His prebypass EF was 45% with anterolateral hypokinesis.  I ordered a follow-up stress test in 2024 December which showed fixed lateral defect EF 45%.  He functions normally without shortness of breath or angina.    When I visited with him previously in 2024 he had inadvertently dropped his atorvastatin 80 mg tablet.  It was resumed.  He has not had follow-up labs yet.  When he was on 80 atorvastatin his LDL was well controlled.  Most recent hemoglobin A1c 6.3.  He takes Mounjaro.  He has been diabetic for 10 years. He is still losing weight.  He has not been checking his blood pressures.  It is normal today.  He is an .         Review of Systems   Constitutional: Positive for weight loss. Negative for chills, decreased appetite, diaphoresis, fever, malaise/fatigue and night sweats.   HENT:  Negative for congestion, ear discharge, ear pain, hearing loss, hoarse voice, nosebleeds, odynophagia, sore throat, stridor and tinnitus.    Eyes:  Negative for blurred vision, discharge, double vision, pain, photophobia, redness, vision loss in left eye, vision loss in right eye, visual disturbance and visual halos.   Cardiovascular:  Negative for chest pain, claudication, cyanosis, dyspnea on exertion, irregular heartbeat, leg swelling, near-syncope, orthopnea, palpitations, paroxysmal nocturnal dyspnea and syncope.   Respiratory:  Negative for cough, hemoptysis, shortness of breath, sleep disturbances due to  breathing, snoring, sputum production and wheezing.    Endocrine: Negative for cold intolerance, heat intolerance, polydipsia, polyphagia and polyuria.   Hematologic/Lymphatic: Negative for adenopathy and bleeding problem. Does not bruise/bleed easily.   Skin:  Negative for color change, dry skin, flushing, itching, nail changes, poor wound healing, rash, skin cancer, suspicious lesions and unusual hair distribution.   Musculoskeletal:  Negative for arthritis, back pain, falls, gout, joint pain, joint swelling, muscle cramps, muscle weakness, myalgias, neck pain and stiffness.   Gastrointestinal:  Negative for bloating, abdominal pain, anorexia, change in bowel habit, bowel incontinence, constipation, diarrhea, dysphagia, excessive appetite, flatus, heartburn, hematemesis, hematochezia, hemorrhoids, jaundice, melena, nausea and vomiting.   Genitourinary:  Negative for bladder incontinence, decreased libido, dysuria, flank pain, frequency, genital sores, hematuria, hesitancy, incomplete emptying, nocturia and urgency.   Neurological:  Negative for aphonia, brief paralysis, difficulty with concentration, disturbances in coordination, excessive daytime sleepiness, dizziness, focal weakness, headaches, light-headedness, loss of balance, numbness, paresthesias, seizures, sensory change, tremors, vertigo and weakness.   Psychiatric/Behavioral:  Negative for altered mental status, depression, hallucinations, memory loss, substance abuse, suicidal ideas and thoughts of violence. The patient does not have insomnia and is not nervous/anxious.    Allergic/Immunologic: Negative for hives and persistent infections.        Objective:       Vitals:    02/27/25 0755   BP: 116/84   BP Location: Right arm   Patient Position: Sitting   Pulse: 80   Weight: 116 kg (255 lb 13.5 oz)   Height: 6' (1.829 m)    Physical Exam  Constitutional:       General: He is not in acute distress.     Appearance: He is well-developed. He is not  diaphoretic.   HENT:      Head: Normocephalic and atraumatic.      Nose: Nose normal.   Eyes:      General: No scleral icterus.        Right eye: No discharge.      Conjunctiva/sclera: Conjunctivae normal.      Pupils: Pupils are equal, round, and reactive to light.   Neck:      Thyroid: No thyromegaly.      Vascular: No JVD.      Trachea: No tracheal deviation.   Cardiovascular:      Rate and Rhythm: Normal rate and regular rhythm.      Pulses:           Carotid pulses are 2+ on the right side and 2+ on the left side.       Radial pulses are 2+ on the right side and 2+ on the left side.        Dorsalis pedis pulses are 2+ on the right side and 2+ on the left side.        Posterior tibial pulses are 2+ on the right side and 2+ on the left side.      Heart sounds: Normal heart sounds. No murmur heard.     No friction rub. No gallop.   Pulmonary:      Effort: Pulmonary effort is normal. No respiratory distress.      Breath sounds: Normal breath sounds. No stridor. No wheezing or rales.   Chest:      Chest wall: No tenderness.   Abdominal:      General: Bowel sounds are normal. There is no distension.      Palpations: Abdomen is soft. There is no mass.      Tenderness: There is no abdominal tenderness. There is no guarding or rebound.   Musculoskeletal:         General: No tenderness. Normal range of motion.      Cervical back: Normal range of motion and neck supple.   Lymphadenopathy:      Cervical: No cervical adenopathy.   Skin:     General: Skin is warm and dry.      Coloration: Skin is not pale.      Findings: No erythema or rash.   Neurological:      Mental Status: He is alert and oriented to person, place, and time.      Cranial Nerves: No cranial nerve deficit.      Coordination: Coordination normal.   Psychiatric:         Behavior: Behavior normal.         Thought Content: Thought content normal.         Judgment: Judgment normal.           Assessment:       1. Hx of CABG    2. Paroxysmal atrial fibrillation     3. Hypertension associated with diabetes    4. Hyperlipidemia associated with type 2 diabetes mellitus    5. Non-STEMI (non-ST elevated myocardial infarction)    6. Type 2 diabetes mellitus with hyperglycemia, without long-term current use of insulin    7. Coronary artery disease involving coronary bypass graft of native heart without angina pectoris    8. Dilatation of aorta      Results for orders placed or performed during the hospital encounter of 12/05/24   Nuclear Stress Test    Collection Time: 12/05/24  8:56 AM   Result Value Ref Range    85% Max Predicted      Max Predicted      OHS CV CPX PATIENT IS MALE 1.0     OHS CV CPX PATIENT IS FEMALE 0.0     HR at rest 75 bpm    Systolic blood pressure 129 mmHg    Diastolic blood pressure 98 mmHg    RPP 9,675     Exercise duration (min) 6 minutes    Exercise duration (sec) 21 seconds    Peak  bpm    Peak Systolic  mmHg    Peak Diatolic BP 86 mmHg    Peak RPP 26,085     Estimated METs 8     % Max HR Achieved 91     1 Minute Recovery  bpm       Current Medications[1]     Lab Results   Component Value Date    WBC 9.66 10/17/2024    RBC 5.90 10/17/2024    HGB 17.2 10/17/2024    HCT 54.2 (H) 10/17/2024    MCV 92 10/17/2024    MCH 29.2 10/17/2024    MCHC 31.7 (L) 10/17/2024    RDW 14.9 (H) 10/17/2024     10/17/2024    MPV 9.7 10/17/2024    GRAN 4.6 10/17/2024    GRAN 47.3 10/17/2024    LYMPH 3.6 10/17/2024    LYMPH 37.4 10/17/2024    MONO 1.0 10/17/2024    MONO 10.1 10/17/2024    EOS 0.3 10/17/2024    BASO 0.17 10/17/2024    EOSINOPHIL 3.2 10/17/2024    BASOPHIL 1.8 10/17/2024    MG 1.9 07/23/2022        CMP  Lab Results   Component Value Date     10/17/2024    K 4.7 10/17/2024     10/17/2024    CO2 25 10/17/2024     (H) 10/17/2024    BUN 15 10/17/2024    CREATININE 1.1 10/17/2024    CALCIUM 9.5 10/17/2024    PROT 7.4 10/17/2024    ALBUMIN 3.6 10/17/2024    BILITOT 0.5 10/17/2024    ALKPHOS 93 10/17/2024    AST 24  10/17/2024    ALT 36 10/17/2024    ANIONGAP 9 10/17/2024    ESTGFRAFRICA >60.0 07/23/2022    EGFRNONAA >60.0 07/23/2022        Lab Results   Component Value Date    LABBLOO No growth after 5 days. 07/13/2022    LABBLOO No growth after 5 days. 07/13/2022            Results for orders placed or performed during the hospital encounter of 08/07/24   EKG 12-lead    Collection Time: 08/07/24  8:11 AM   Result Value Ref Range    QRS Duration 104 ms    OHS QTC Calculation 435 ms    Narrative    Test Reason : I10,    Vent. Rate : 092 BPM     Atrial Rate : 092 BPM     P-R Int : 134 ms          QRS Dur : 104 ms      QT Int : 352 ms       P-R-T Axes : 036 038 054 degrees     QTc Int : 435 ms    Normal sinus rhythm  Possible Left atrial enlargement  Tint inferior Qs  Abnormal ECG  When compared with ECG of 07-FEB-2023 10:30,  No significant change was found    Confirmed by Neftaly MEYER MD ([id number]) on 8/7/2024 9:39:32 AM    Referred By:             Confirmed By:Neftaly MEYER MD                   Plan:       Problem List Items Addressed This Visit          Cardiac/Vascular    Dilatation of aorta    2022 measurement 4.7 midposition ascending aortic.  CT chest planned on next year's visit.         Hx of CABG - Primary    Single-vessel bypass.  Recent stress test without ischemia.  Fixed lateral defect noted EF 45%.    Associated coronary disease-50% circumflex stenosis without ischemia on recent stress test.    He is encouraged to exercise more.         Non-STEMI (non-ST elevated myocardial infarction)    Chart reviewed, peak troponin 17.  Current stress test shows fixed lateral defect.  There was no STEMI.  His ejection fraction has remained stable since then without change.         Paroxysmal atrial fibrillation    Michelle bypass AFib only.  Anticoagulation not advised.  He is in sinus rhythm today.  A follow-up event monitor 2023 showed no AFib, there were PACs and PVCs only.         Coronary artery disease involving coronary bypass graft  of native heart without angina pectoris    He is encouraged to exercise.  He had anginal symptoms with his MI. he is asymptomatic.         Hypertension associated with diabetes    I encouraged him to check his blood pressures at home.  His blood pressure is normal today.  Current therapy to continue.         Hyperlipidemia associated with type 2 diabetes mellitus    Upcoming labs.  Atorvastatin 80 mg tolerated well.  Off therapy his LDL was 114 2024 October.  On treatment he has levels of LDL that are in good range.    I am anticipating low LDL levels on upcoming lab.            Endocrine    Type 2 diabetes mellitus with hyperglycemia, without long-term current use of insulin    Condition stable.  Most recent hemoglobin A1c 6.3.  He has been diabetic for about 10 years.  He takes Mounjaro.               He will have labs in about a month.  One year follow-up advised.    He states that he can feel when his blood pressure is high and he has not had that sensation.    We discussed his reduced ejection fraction and its implications.  ARB therapy to continue.    No active ischemia on most recent stress test.             Ruddy Woodson MD  02/27/2025   8:15 AM               [1]   Current Outpatient Medications:     alclometasone (ACLOVATE) 0.05 % cream, Apply to face twice daily as needed for redness or scaling or itching, Disp: 60 g, Rfl: 3    aspirin 81 MG Chew, Take 1 tablet (81 mg total) by mouth once daily., Disp: 90 tablet, Rfl: 3    atorvastatin (LIPITOR) 80 MG tablet, Take 1 tablet (80 mg total) by mouth once daily., Disp: 90 tablet, Rfl: 3    fluocinolone acetonide oiL (DERMOTIC OIL) 0.01 % Drop, Place 3 drops into the right ear 2 (two) times daily., Disp: 20 mL, Rfl: 3    ketoconazole (NIZORAL) 2 % cream, Apply topically 2 (two) times daily. as needed for rash on face, Disp: 60 g, Rfl: 3    ondansetron (ZOFRAN-ODT) 4 MG TbDL, Dissolve 1 tablet (4 mg total) by mouth every 6 (six) hours as needed (nausea).,  Disp: 20 tablet, Rfl: 1    tirzepatide 12.5 mg/0.5 mL PnIj, Inject 12.5 mg into the skin every 7 days., Disp: 4 Pen, Rfl: 11    valsartan-hydrochlorothiazide (DIOVAN-HCT) 320-25 mg per tablet, Take 1 tablet by mouth once daily., Disp: 90 tablet, Rfl: 3

## 2025-02-27 NOTE — ASSESSMENT & PLAN NOTE
Upcoming labs.  Atorvastatin 80 mg tolerated well.  Off therapy his LDL was 114 2024 October.  On treatment he has levels of LDL that are in good range.    I am anticipating low LDL levels on upcoming lab.

## 2025-03-20 ENCOUNTER — TELEPHONE (OUTPATIENT)
Dept: PHARMACY | Facility: CLINIC | Age: 65
End: 2025-03-20
Payer: MEDICARE

## 2025-03-20 DIAGNOSIS — H60.63 CHRONIC OTITIS EXTERNA OF BOTH EARS, UNSPECIFIED TYPE: Chronic | ICD-10-CM

## 2025-03-21 ENCOUNTER — TELEPHONE (OUTPATIENT)
Dept: OTOLARYNGOLOGY | Facility: CLINIC | Age: 65
End: 2025-03-21
Payer: MEDICARE

## 2025-03-21 RX ORDER — FLUOCINOLONE ACETONIDE 0.11 MG/ML
3 OIL AURICULAR (OTIC) 2 TIMES DAILY
Qty: 20 ML | Refills: 3 | Status: SHIPPED | OUTPATIENT
Start: 2025-03-21

## 2025-04-13 NOTE — TELEPHONE ENCOUNTER
Care Due:                  Date            Visit Type   Department     Provider  --------------------------------------------------------------------------------                                EP -                              PRIMARY      MET INTERNAL  Last Visit: 10-      CARE ([facility])   MEDICINE       Nitin Cedeño                              EP -                              PRIMARY      [facility] INTERNAL  Next Visit: 04-      CARE ([facility])   MEDICINE       Ntiin Cedeño                                                            Last  Test          Frequency    Reason                     Performed    Due Date  --------------------------------------------------------------------------------    HBA1C.......  6 months...  tirzepatide..............  10-   04-    [facility] Embedded Care Due Messages. Reference number: [id number].   4/13/2025 6:25:26 PM CDT

## 2025-04-14 RX ORDER — ONDANSETRON 4 MG/1
4 TABLET, ORALLY DISINTEGRATING ORAL EVERY 6 HOURS PRN
Qty: 20 TABLET | Refills: 1 | Status: SHIPPED | OUTPATIENT
Start: 2025-04-14

## 2025-04-14 NOTE — TELEPHONE ENCOUNTER
Refill Routing Note   Medication(s) are not appropriate for processing by [facility] for the following reason(s):        Outside of protocol    [facility] action(s):  Route     Requires labs : Yes             Appointments  past 12m or future 3m with PCP    Date Provider   Last Visit   10/29/2024 Nitin Cedeño, DO   Next Visit   4/22/2025 Nitin Cedeño, DO   ED visits in past 90 days: 0        Note composed:9:16 AM 04/14/2025

## 2025-04-16 ENCOUNTER — TELEPHONE (OUTPATIENT)
Dept: PHARMACY | Facility: CLINIC | Age: 65
End: 2025-04-16
Payer: MEDICARE

## 2025-04-16 NOTE — TELEPHONE ENCOUNTER
[facility]/Population Health Chart Review & Patient Outreach Details For Medication Adherence Project    Reason for Outreach Encounter: 3rd Party payor non-compliance report ([employer], [employer], C, etc)  2.  Patient Outreach Method: [facility] Pallethart message  3.   Medication in question: mounjaro   LAST FILLED: 3/20/25 for 28 day supply  Diabetes Medications              tirzepatide 12.5 mg/0.5 mL PnIj Inject 12.5 mg into the skin every 7 days.              4.  Reviewed and or Updates Made To: Patient Chart  5. Outreach Outcomes and/or actions taken: Sent inquiry to patient: Waiting for response.

## 2025-04-22 ENCOUNTER — OFFICE VISIT (OUTPATIENT)
Dept: INTERNAL MEDICINE | Facility: CLINIC | Age: 65
End: 2025-04-22
Payer: MEDICARE

## 2025-04-22 VITALS
DIASTOLIC BLOOD PRESSURE: 84 MMHG | RESPIRATION RATE: 14 BRPM | BODY MASS INDEX: 35.37 KG/M2 | OXYGEN SATURATION: 95 % | SYSTOLIC BLOOD PRESSURE: 134 MMHG | HEART RATE: 80 BPM | TEMPERATURE: 98 F | HEIGHT: 72 IN | WEIGHT: 261.13 LBS

## 2025-04-22 DIAGNOSIS — E11.65 TYPE 2 DIABETES MELLITUS WITH HYPERGLYCEMIA, WITHOUT LONG-TERM CURRENT USE OF INSULIN: ICD-10-CM

## 2025-04-22 DIAGNOSIS — Z90.81 H/O SPLENECTOMY: ICD-10-CM

## 2025-04-22 DIAGNOSIS — E11.9 TYPE 2 DIABETES MELLITUS WITHOUT COMPLICATION, WITHOUT LONG-TERM CURRENT USE OF INSULIN: ICD-10-CM

## 2025-04-22 DIAGNOSIS — E11.69 HYPERLIPIDEMIA ASSOCIATED WITH TYPE 2 DIABETES MELLITUS: ICD-10-CM

## 2025-04-22 DIAGNOSIS — I15.2 HYPERTENSION ASSOCIATED WITH DIABETES: ICD-10-CM

## 2025-04-22 DIAGNOSIS — E78.5 HYPERLIPIDEMIA ASSOCIATED WITH TYPE 2 DIABETES MELLITUS: ICD-10-CM

## 2025-04-22 DIAGNOSIS — Z95.1 HX OF CABG: ICD-10-CM

## 2025-04-22 DIAGNOSIS — I25.810 CORONARY ARTERY DISEASE INVOLVING CORONARY BYPASS GRAFT OF NATIVE HEART WITHOUT ANGINA PECTORIS: ICD-10-CM

## 2025-04-22 DIAGNOSIS — E11.69 OBESITY, DIABETES, AND HYPERTENSION SYNDROME: ICD-10-CM

## 2025-04-22 DIAGNOSIS — I15.2 OBESITY, DIABETES, AND HYPERTENSION SYNDROME: ICD-10-CM

## 2025-04-22 DIAGNOSIS — D75.839 THROMBOCYTOSIS: ICD-10-CM

## 2025-04-22 DIAGNOSIS — E11.59 OBESITY, DIABETES, AND HYPERTENSION SYNDROME: ICD-10-CM

## 2025-04-22 DIAGNOSIS — G47.33 OSA (OBSTRUCTIVE SLEEP APNEA): ICD-10-CM

## 2025-04-22 DIAGNOSIS — E66.9 OBESITY, DIABETES, AND HYPERTENSION SYNDROME: ICD-10-CM

## 2025-04-22 DIAGNOSIS — I50.22 HEART FAILURE WITH MID-RANGE EJECTION FRACTION: Primary | ICD-10-CM

## 2025-04-22 DIAGNOSIS — E11.59 HYPERTENSION ASSOCIATED WITH DIABETES: ICD-10-CM

## 2025-04-22 DIAGNOSIS — R35.1 NOCTURIA: ICD-10-CM

## 2025-04-22 DIAGNOSIS — I48.0 PAROXYSMAL ATRIAL FIBRILLATION: ICD-10-CM

## 2025-04-22 PROCEDURE — 99999 PR PBB SHADOW E&M-EST. PATIENT-LVL III: CPT | Mod: PBBFAC,,, | Performed by: INTERNAL MEDICINE
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
## 2025-04-23 ENCOUNTER — TELEPHONE (OUTPATIENT)
Dept: SPORTS MEDICINE | Facility: CLINIC | Age: 65
End: 2025-04-23
Payer: MEDICARE

## 2025-04-23 DIAGNOSIS — M25.569 KNEE PAIN, UNSPECIFIED CHRONICITY, UNSPECIFIED LATERALITY: ICD-10-CM

## 2025-04-23 DIAGNOSIS — M79.606 PAIN OF LOWER EXTREMITY, UNSPECIFIED LATERALITY: Primary | ICD-10-CM

## 2025-04-23 NOTE — TELEPHONE ENCOUNTER
Informed the patient of x-ray appt at 10:30 on 4/24. Informed the patient he can only be seen for one area of complaint per visit. The patient stated he would like to be seen for knee pain. The patient accepted an appt on 4/30 at 10am with Pavan Hebert PA-C for left shoulder pain at the [place] location.

## 2025-04-24 ENCOUNTER — HOSPITAL ENCOUNTER (OUTPATIENT)
Dept: RADIOLOGY | Facility: HOSPITAL | Age: 65
Discharge: HOME | End: 2025-04-24
Attending: PHYSICIAN ASSISTANT
Payer: MEDICARE

## 2025-04-24 ENCOUNTER — OFFICE VISIT (OUTPATIENT)
Dept: SPORTS MEDICINE | Facility: CLINIC | Age: 65
End: 2025-04-24
Payer: MEDICARE

## 2025-04-24 VITALS
WEIGHT: 262.81 LBS | BODY MASS INDEX: 35.6 KG/M2 | HEART RATE: 77 BPM | DIASTOLIC BLOOD PRESSURE: 87 MMHG | HEIGHT: 72 IN | SYSTOLIC BLOOD PRESSURE: 137 MMHG

## 2025-04-24 DIAGNOSIS — M79.652 LEFT THIGH PAIN: Primary | ICD-10-CM

## 2025-04-24 DIAGNOSIS — M25.552 LEFT HIP PAIN: ICD-10-CM

## 2025-04-24 DIAGNOSIS — M79.606 PAIN OF LOWER EXTREMITY, UNSPECIFIED LATERALITY: ICD-10-CM

## 2025-04-24 DIAGNOSIS — M25.552 LEFT HIP PAIN: Primary | ICD-10-CM

## 2025-04-24 PROCEDURE — 1159F MED LIST DOCD IN RCRD: CPT | Mod: CPTII,S$GLB,, | Performed by: PHYSICIAN ASSISTANT

## 2025-04-24 PROCEDURE — 1125F AMNT PAIN NOTED PAIN PRSNT: CPT | Mod: CPTII,S$GLB,, | Performed by: PHYSICIAN ASSISTANT

## 2025-04-24 PROCEDURE — 3288F FALL RISK ASSESSMENT DOCD: CPT | Mod: CPTII,S$GLB,, | Performed by: PHYSICIAN ASSISTANT

## 2025-04-24 PROCEDURE — 99215 OFFICE O/P EST HI 40 MIN: CPT | Mod: S$GLB,,, | Performed by: PHYSICIAN ASSISTANT

## 2025-04-24 PROCEDURE — 73502 X-RAY EXAM HIP UNI 2-3 VIEWS: CPT | Mod: TC,PN,LT

## 2025-04-24 PROCEDURE — 3075F SYST BP GE 130 - 139MM HG: CPT | Mod: CPTII,S$GLB,, | Performed by: PHYSICIAN ASSISTANT

## 2025-04-24 PROCEDURE — 73502 X-RAY EXAM HIP UNI 2-3 VIEWS: CPT | Mod: 26,LT,, | Performed by: RADIOLOGY

## 2025-04-24 PROCEDURE — 3079F DIAST BP 80-89 MM HG: CPT | Mod: CPTII,S$GLB,, | Performed by: PHYSICIAN ASSISTANT

## 2025-04-24 PROCEDURE — 3072F LOW RISK FOR RETINOPATHY: CPT | Mod: CPTII,S$GLB,, | Performed by: PHYSICIAN ASSISTANT

## 2025-04-24 PROCEDURE — 3008F BODY MASS INDEX DOCD: CPT | Mod: CPTII,S$GLB,, | Performed by: PHYSICIAN ASSISTANT

## 2025-04-24 PROCEDURE — 1160F RVW MEDS BY RX/DR IN RCRD: CPT | Mod: CPTII,S$GLB,, | Performed by: PHYSICIAN ASSISTANT

## 2025-04-24 PROCEDURE — 73564 X-RAY EXAM KNEE 4 OR MORE: CPT | Mod: TC,50,PN

## 2025-04-24 PROCEDURE — 99999 PR PBB SHADOW E&M-EST. PATIENT-LVL III: CPT | Mod: PBBFAC,,, | Performed by: PHYSICIAN ASSISTANT

## 2025-04-24 PROCEDURE — 1101F PT FALLS ASSESS-DOCD LE1/YR: CPT | Mod: CPTII,S$GLB,, | Performed by: PHYSICIAN ASSISTANT

## 2025-04-24 NOTE — PROGRESS NOTES
"Subjective:          Chief Complaint: Isaiah Dorsey Jr. is a 65 y.o. male who had concerns including Pain of the Left Leg.    History of Present Illness    CHIEF COMPLAINT:  - Left lateral thigh pain when going up stairs for the past two months.    HPI:  Isaiah presents with left lateral thigh pain ongoing for two months. Pain is localized to the lateral side of his left thigh, rated as 8/10 in severity. He describes it as pain without burning or tingling sensations. Pain occurs primarily when ascending stairs or stepping down from a height of about a foot, exacerbated by lifting the leg and pushing down. He can run downstairs without issue. The pain occurs daily due to the stairs in his old house, particularly the last step which is higher.    He also reports simón horses in his right toes for approximately the same duration as his leg pain. These symptoms coincide with his recent work as a , sitting at a desk for 12 to 16 hours a day for the past two months.    His medical history is significant for a heart attack (described as a " maker") approximately two years ago, for which he underwent bypass surgery in July 2022. He also had rotator cuff surgery in December 2022 following the cardiac event. He reports significant weight loss over a 15-year period, from 350 lbs to his current weight of approximately 258 lbs, aided by weight loss medication.    He denies any numbness going down his leg, buttock pain, pain wrapping around to the front, calf pain, or pain past the shin or knee. He denies any current back pain.    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 Pertinent negatives include no abdominal pain, chest pain, chills, congestion, coughing, fever, headaches, joint swelling, nausea, numbness, rash, sore throat or vomiting.       Review of Systems   Constitutional: Negative. Negative for chills, fever, weight gain and weight loss.   HENT:  Negative for congestion and sore throat.    Eyes:  Negative for blurred vision and double vision.   Cardiovascular:  Negative for chest pain, leg swelling and palpitations.   Respiratory:  Negative for cough and shortness of breath.    Hematologic/Lymphatic: Does not bruise/bleed easily.   Skin:  Negative for itching, poor wound healing and rash.   Musculoskeletal:  Positive for myalgias. Negative for back pain, joint pain, joint swelling, muscle weakness and stiffness.        Left thigh pain     Gastrointestinal:  Negative for abdominal pain, constipation, diarrhea, nausea and vomiting.   Genitourinary: Negative.  Negative for frequency and hematuria.   Neurological:  Negative for dizziness, headaches, numbness, paresthesias and sensory change.   Psychiatric/Behavioral:  Negative for altered mental status and depression. The patient is not nervous/anxious.    Allergic/Immunologic: Negative for hives.                   Objective:        General: Isaiah is well-developed, well-nourished, appears stated age, in no acute distress, alert and oriented to time, place and person.       General    Vitals reviewed.  Constitutional: He is oriented to person, place, and time. He appears well-developed and well-nourished. No distress.   HENT:   Head: Normocephalic and atraumatic.   Eyes: EOM are normal.   Cardiovascular:  Normal rate and regular rhythm.            Pulmonary/Chest: Effort normal. No respiratory distress.   Neurological: He is alert and oriented to person, place, and time.   Psychiatric: He has a normal mood  and affect. His behavior is normal. Thought content normal.     General Musculoskeletal Exam   Gait: normal   Pelvic Obliquity: none      Right Knee Exam     Inspection   Alignment:  normal  Effusion: absent    Tenderness   The patient is experiencing no tenderness.     Left Knee Exam     Inspection   Alignment:  normal  Effusion: absent    Tenderness   The patient is experiencing no tenderness.     Right Hip Exam     Inspection   Scars: absent  Swelling: absent  Bruising: absent  No deformity of hip.  Quadriceps Atrophy:  Negative  Erythema: absent    Range of Motion   Abduction:  20 normal   Adduction:  10 normal   Extension:  0 normal   Flexion:  90 abnormal   External rotation:  30 abnormal   Internal rotation:  0 abnormal     Muscle Strength   The patient has normal right hip strength.    Tests   Pain w/ forced internal rotation (AGUSTO): absent  Pain w/ forced external rotation (FADIR): absent  Devonte: negative  Trendelenburg Test: negative  Circumduction test: negative  Stinchfield test: negative  Log Roll: negative  Step-down test: negative  Resisted sit-up pain: negative    Other   Sensation: normal  Left Hip Exam     Inspection   Scars: absent  Swelling: absent  No deformity of hip.  Quadriceps Atrophy:  negative  Erythema: absent  Bruising: absent    Range of Motion   Abduction:  20 normal   Adduction:  10 normal   Extension:  0 normal   Flexion:  90 abnormal   External rotation:  30 abnormal   Internal rotation: 0 abnormal     Muscle Strength   The patient has normal left hip strength.     Tests   Pain w/ forced internal rotation (AGUSTO): absent  Pain w/ forced external rotation (FADIR): absent  Devonte: negative  Trendelenburg Test: negative  Circumduction test: negative  Stinchfield test: negative  Log Roll: negative  Step-down test: negative  Resisted sit-up pain: negative    Other   Sensation: normal      Back (L-Spine & T-Spine) / Neck (C-Spine) Exam   Back exam is normal.      Muscle Strength   Right  Lower Extremity   Hip Abduction: 5/5   Hip Adduction: 5/5   Hip Flexion: 5/5   Left Lower Extremity   Hip Abduction: 5/5   Hip Adduction: 5/5   Hip Flexion: 5/5     Reflexes     Left Side  Achilles:  2+  Quadriceps:  2+    Right Side   Achilles:  2+  Quadriceps:  2+    RADIOGRAPHS: 4/24/25  Left hip:  FINDINGS:  Hip joint spaces appear adequately maintained on both sides, without significant narrowing.  No conventional radiographic evidence to specifically suggest avascular necrosis of either femoral head.  No evidence of recent or healing fracture or lytic destructive process.  SI joints appear unremarkable.  Note is made of disc narrowing and marginal vertebral endplate spurring at the L2-3 through L4-5 levels.    Bilateral knees:  FINDINGS:  Four views bilateral knees.     Right: There is DJD and a mild varus deformity.     Left: There is DJD and a mild varus deformity.     There are bilateral spurs on the patella.        Assessment:           ICD-10-CM ICD-9-CM   1. Left thigh pain  M79.652 729.5         Plan:         Assessment & Plan    IMAGING:  -I made the decision to obtain old records of the patient including previous notes and imaging. New imaging was ordered today of the extremity or extremities evaluated. I independently reviewed and interpreted the radiographs and/or MRIs today as well as prior imaging. Reviewed with patient in detail.    REFERRALS:  - Referral to healthy back program  -Discussed referral to back and spine clinic. Patient declined at this time as pain has only been present for the past 2 months. He is not interested in injections in his back and would prefer to start with formal PT at this time.     MEDICATIONS:  -NSAIDS prn pain    FOLLOW UP:  - Send messages through patient portal to update on progress.    PATIENT INSTRUCTIONS:  - Avoid prolonged sitting; consider using standing desk for part of the workday.                   This note was generated with the assistance of [employer]  listening technology. Verbal consent was obtained by the patient and accompanying visitor(s) for the recording of patient appointment to facilitate this note. I attest to having reviewed and edited the generated note for accuracy, though some syntax or spelling errors may persist. Please contact the author of this note for any clarification.       [facility] patient questionnaires have been collected today.

## 2025-04-25 ENCOUNTER — RESULTS FOLLOW-UP (OUTPATIENT)
Dept: INTERNAL MEDICINE | Facility: CLINIC | Age: 65
End: 2025-04-25

## 2025-04-28 ENCOUNTER — PATIENT MESSAGE (OUTPATIENT)
Dept: CARDIOLOGY | Facility: CLINIC | Age: 65
End: 2025-04-28
Payer: MEDICARE

## 2025-04-30 ENCOUNTER — HOSPITAL ENCOUNTER (OUTPATIENT)
Dept: RADIOLOGY | Facility: HOSPITAL | Age: 65
Discharge: HOME | End: 2025-04-30
Attending: PHYSICIAN ASSISTANT
Payer: MEDICARE

## 2025-04-30 ENCOUNTER — OFFICE VISIT (OUTPATIENT)
Dept: SPORTS MEDICINE | Facility: CLINIC | Age: 65
End: 2025-04-30
Payer: MEDICARE

## 2025-04-30 VITALS
WEIGHT: 262.38 LBS | BODY MASS INDEX: 35.54 KG/M2 | DIASTOLIC BLOOD PRESSURE: 90 MMHG | HEART RATE: 79 BPM | HEIGHT: 72 IN | SYSTOLIC BLOOD PRESSURE: 136 MMHG

## 2025-04-30 DIAGNOSIS — M25.512 LEFT SHOULDER PAIN, UNSPECIFIED CHRONICITY: ICD-10-CM

## 2025-04-30 DIAGNOSIS — M25.512 LEFT SHOULDER PAIN, UNSPECIFIED CHRONICITY: Primary | ICD-10-CM

## 2025-04-30 DIAGNOSIS — M75.22 BICEPS TENDINITIS OF LEFT UPPER EXTREMITY: ICD-10-CM

## 2025-04-30 DIAGNOSIS — M75.102 ROTATOR CUFF SYNDROME OF LEFT SHOULDER: ICD-10-CM

## 2025-04-30 PROCEDURE — 73030 X-RAY EXAM OF SHOULDER: CPT | Mod: 26,LT,, | Performed by: RADIOLOGY

## 2025-04-30 PROCEDURE — 99999 PR PBB SHADOW E&M-EST. PATIENT-LVL IV: CPT | Mod: PBBFAC,,, | Performed by: PHYSICIAN ASSISTANT

## 2025-04-30 PROCEDURE — 73030 X-RAY EXAM OF SHOULDER: CPT | Mod: TC,LT

## 2025-04-30 NOTE — PROGRESS NOTES
Subjective:     Chief Complaint: Isaiah Dorsey Jr. is a 65 y.o. male who had concerns including Pain of the Left Shoulder.    History of Present Illness    CHIEF COMPLAINT:  - Left shoulder pain and weakness.    HPI:  Isaiah presents with left shoulder pain that has been progressively worsening over the past year. Pain is localized to the joint area, specifically in the front, and is exacerbated by movements, particularly when trying to  objects. He reports weakness in the arm, stating he is unable to lift a gallon of milk. He rates the current pain as 0/10 but mentions it worsens with activity throughout the day due to his job, which involves frequent arm movements. He describes the constant arm movements required in his work as an integral part of his daily routine. Pain is also noted when bringing the arm back during range of motion exercises.    He reports tenderness in the left elbow, which started to feel painful with the left shoulder pain. Sharp pain is reported in the upper back area, but not from touching.    He has been taking Aleve at night for pain relief, typically two tablets before bed. He has also added a latex mattress topper, which has helped with leg pain.    He had previous rotator cuff surgery on the right shoulder and reports feeling similar symptoms in the left shoulder now. He denies any specific accidents, injuries, or traumas related to the shoulder pain, as well as any numbness or tingling down the arm. He denies any history of playing sports when younger.    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  - Experiences pain by end of workday due to work posture  - Able to lift a 250-pound generator, but not overhead  - Constant arm positioning required for work  contributes to shoulder discomfort      ROS:  General: negative fever, negative chills, negative fatigue, negative weight gain, negative weight loss  Eyes: negative vision changes, negative redness, negative discharge  ENT: negative ear pain, negative nasal congestion, negative sore throat  Cardiovascular: negative chest pain, negative palpitations, negative lower extremity edema  Respiratory: negative cough, negative shortness of breath  Gastrointestinal: negative abdominal pain, negative nausea, negative vomiting, negative diarrhea, negative constipation, negative blood in stool  Genitourinary: negative dysuria, negative hematuria, negative frequency  Musculoskeletal: +joint pain, +muscle pain, +muscle weakness, +limb pain, +pain with movement  Skin: negative rash, negative lesion  Neurological: negative headache, negative dizziness, negative numbness, negative tingling  Psychiatric: negative anxiety, negative depression, negative sleep difficulty           Pain Related Questions  Over the past 3 days, what was your average pain during activity? (I.e. running, jogging, walking, climbing stairs, getting dressed, ect.): 5  Over the past 3 days, what was your highest pain level?: 7  Over the past 3 days, what was your lowest pain level? : 0         Past Medical History[1]     Past Surgical History:   Procedure Laterality Date    AORTOGRAPHY N/A 7/11/2022    Procedure: AORTOGRAM;  Surgeon: Bjorn Cheatham MD;  Location: [facility] CATH LAB;  Service: Cardiology;  Laterality: N/A;    ARTHROSCOPIC DEBRIDEMENT OF SHOULDER Right 12/22/2022    Procedure: DEBRIDEMENT, SHOULDER, ARTHROSCOPIC;  Surgeon: Alexandria Morse MD;  Location: [facility] OR;  Service: Orthopedics;  Laterality: Right;    ARTHROSCOPIC REPAIR OF ROTATOR CUFF OF SHOULDER Right 12/22/2022    Procedure: REPAIR, ROTATOR CUFF, ARTHROSCOPIC;  Surgeon: Alexandria Morse MD;  Location: [facility] OR;  Service: Orthopedics;  Laterality: Right;    ARTHROSCOPY OF SHOULDER WITH  DECOMPRESSION OF SUBACROMIAL SPACE Right 12/22/2022    Procedure: ARTHROSCOPY, SHOULDER, WITH SUBACROMIAL SPACE DECOMPRESSION;  Surgeon: Alexandria Morse MD;  Location: [facility] OR;  Service: Orthopedics;  Laterality: Right;    COLONOSCOPY N/A 11/22/2022    Procedure: COLONOSCOPY;  Surgeon: Castro Jasso MD;  Location: [facility] ENDO (4TH FLR);  Service: Endoscopy;  Laterality: N/A;  ok to hold Xarelto-see telephone encounter dated 11/1  [facility] via portal  pre call done    CORONARY ARTERY BYPASS GRAFT (CABG) Left 7/18/2022    Procedure: CORONARY ARTERY BYPASS GRAFT (CABG);  Surgeon: Blu Rhodes MD;  Location: [facility] OR 2ND FLR;  Service: Cardiovascular;  Laterality: Left;  CABG x 1 (LIMA to LAD)    ENDOSCOPIC HARVEST OF VEIN Left 7/18/2022    Procedure: HARVEST-VEIN-ENDOVASCULAR;  Surgeon: Blu Rhodes MD;  Location: [facility] OR 2ND FLR;  Service: Cardiovascular;  Laterality: Left;  Vein [place] Start time: 0823am  Vein [place] Stop time: 0836am    Vein [place] Start time: 0848am  Vein [place] Stop time: 0904am    FIXATION OF TENDON Right 12/22/2022    Procedure: FIXATION, TENDON-biceps tenodesis;  Surgeon: Alexandria Morse MD;  Location: [facility] OR;  Service: Orthopedics;  Laterality: Right;    LEFT HEART CATHETERIZATION N/A 7/11/2022    Procedure: Left heart cath;  Surgeon: Bjorn Cheatham MD;  Location: [facility] CATH LAB;  Service: Cardiology;  Laterality: N/A;    splenecectomy         Objective:     General: Isaiah is well-developed, well-nourished, appears stated age, in no acute distress, alert and oriented to time, place and person.     General    Nursing note and vitals reviewed.  Constitutional: He is oriented to person, place, and time. He appears well-developed and well-nourished. No distress.   HENT:   Head: Normocephalic and atraumatic.   Nose: Nose normal.   Eyes: Conjunctivae and EOM are normal. Pupils are equal, round, and reactive to light.   Cardiovascular:  Normal rate, regular rhythm and intact  distal pulses.            Pulmonary/Chest: Effort normal and breath sounds normal.   Abdominal: Soft. Bowel sounds are normal. He exhibits no distension.   Neurological: He is alert and oriented to person, place, and time. He has normal reflexes.   Psychiatric: He has a normal mood and affect. His behavior is normal. Judgment and thought content normal.         Right Shoulder Exam     Inspection/Observation   Swelling: absent  Bruising: absent  Scars: present  Deformity: absent  Scapular Winging: absent  Scapular Dyskinesia: negative  Atrophy: absent    Range of Motion   Active abduction:  150   Passive abduction:  150   Extension:  50   Forward Flexion:  180   Forward Elevation: 180  Adduction: 30   Internal rotation 0 degrees:  T6   Internal rotation 90 degrees:  90     Tests & Signs   Apprehension: negative  Cross arm: negative  Drop arm: negative  Flowers test: negative  Impingement: negative  Lift Off Sign: negative  Belly Press: negative  Active Compression Test (Cumberland's Sign): negative  Yergason's Test: negative  Speed's Test: negative  Relocation 90 degrees: negative  Jerk Test: negative    Other   Sensation: normal    Left Shoulder Exam     Inspection/Observation   Swelling: absent  Bruising: absent  Scars: absent  Deformity: absent  Scapular Winging: absent  Scapular Dyskinesia: negative  Atrophy: absent    Tenderness   The patient is tender to palpation of the greater tuberosity and biceps tendon.    Range of Motion   Active abduction:  150   Passive abduction:  150   Extension:  50   Forward Flexion:  180   Forward Elevation: 180  Adduction: 30   Internal rotation 0 degrees:  T6   Internal rotation 90 degrees:  90     Tests & Signs   Apprehension: negative  Cross arm: positive  Drop arm: negative  Flowers test: positive  Impingement: positive  Lift Off Sign: negative  Belly Press: negative  Active Compression test (Cumberland's Sign): negative  Yergasons's Test: negative  Speed's Test:  positive  Relocation 90 degrees: negative  Jerk Test: negative    Other   Sensation: normal       Muscle Strength   Right Upper Extremity   Shoulder Abduction: 5/5   Shoulder Internal Rotation: 5/5   Shoulder External Rotation: 5/5   Supraspinatus: 5/5   Subscapularis: 5/5   Biceps: 5/5   Left Upper Extremity  Shoulder Abduction: 5/5   Shoulder Internal Rotation: 5/5   Shoulder External Rotation: 5/5 (+pain)   Supraspinatus: 5/5   Subscapularis: 5/5   Biceps: 5/5     Vascular Exam     Right Pulses      Radial:                    2+      Left Pulses      Radial:                    2+      Capillary Refill  Right Hand: normal capillary refill  Left Hand: normal capillary refill              Radiographic findings:    X-Ray Shoulder 2 or More Views Left  Narrative: EXAMINATION:  XR SHOULDER COMPLETE 2 OR MORE VIEWS LEFT    CLINICAL HISTORY:  Pain in left shoulder    TECHNIQUE:  Two or three views of the left shoulder were performed.    COMPARISON:  None    FINDINGS:  Mild DJD.  No fracture or dislocation.  No bone destruction identified.  No soft tissue calcifications  Impression: See above    Electronically signed by: Damian Bazzi MD  Date:    04/30/2025  Time:    10:45            These findings were discussed and reviewed with the patient.     Assessment:     Encounter Diagnoses   Name Primary?    Left shoulder pain, unspecified chronicity Yes    Rotator cuff syndrome of left shoulder     Biceps tendinitis of left upper extremity         Plan:     Assessment & Plan    PROCEDURES:  1. Reviewed X-ray results with patient, noting some bone spurring in the area of the most common rotator cuff muscle tear and at the glenoid.    PATIENT INSTRUCTIONS:  1. Perform home exercises as instructed by PT.    MEDICATIONS:  1. Take Aleve before bed as needed for pain.    REFERRALS:  1. Referred to PT for shoulder strengthening exercises.    FOLLOW UP:  1. Contact the office if anything is needed or not improving.           All of the  patient's questions were answered and the patient will contact us if they have any questions or concerns in the interim.    This note was generated with the assistance of ambient listening technology. Verbal consent was obtained by the patient and accompanying visitor(s) for the recording of patient appointment to facilitate this note. I attest to having reviewed and edited the generated note for accuracy, though some syntax or spelling errors may persist. Please contact the author of this note for any clarification.             [1]   Past Medical History:  Diagnosis Date    Diabetes mellitus type 2, uncontrolled, without complications     6.7% Metf 1 g qd, eye 2015, [facility]-2015,     Elevated platelet count 07/23/2015    H/O splenectomy 04/24/2015    doesn't like pneumovax bc caused side effects    Morbid obesity with BMI of 45.0-49.9, adult 06/11/2014    RODRIGUEZ (obstructive sleep apnea)     Polyp of transverse colon 09/05/2018 2018, repeat 2023    Tendonitis, Achilles, left 11/18/2022

## 2025-05-13 ENCOUNTER — CLINICAL SUPPORT (OUTPATIENT)
Dept: REHABILITATION | Facility: HOSPITAL | Age: 65
End: 2025-05-13
Payer: MEDICARE

## 2025-05-13 DIAGNOSIS — R29.3 POOR POSTURE: ICD-10-CM

## 2025-05-13 DIAGNOSIS — M25.69 DECREASED ROM OF TRUNK AND BACK: Primary | ICD-10-CM

## 2025-05-13 DIAGNOSIS — M79.652 LEFT THIGH PAIN: ICD-10-CM

## 2025-05-13 PROCEDURE — 97110 THERAPEUTIC EXERCISES: CPT

## 2025-05-13 PROCEDURE — 97161 PT EVAL LOW COMPLEX 20 MIN: CPT

## 2025-05-13 NOTE — PROGRESS NOTES
Outpatient Rehab    Physical Therapy Evaluation    Patient Name: Isaiah Dorsey Jr.  MRN: [id number]  YOB: 1960  Encounter Date: 5/13/2025    Therapy Diagnosis:   Encounter Diagnoses   Name Primary?    Left thigh pain     Decreased ROM of trunk and back Yes    Poor posture      Physician: Leia Zaldivar PA-C    Physician Orders: Eval and Treat  Medical Diagnosis: Left thigh pain    Visit # / Visits Authorized:  1 / 1  Insurance Authorization Period: 4/24/2025 to 4/24/2026  Date of Evaluation: 5/13/2025  Plan of Care Certification: 5/13/2025 to 8/13/25     Time In:   9  Time Out:  945  Total Time (in minutes):   45  Total Billable Time (in minutes): 45     Intake Outcome Measure for FOTO Survey    Therapist reviewed FOTO scores for Isaiah Dorsey Jr. on 5/13/2025.   FOTO report - see Media section or FOTO account episode details.     Intake Score: 94%    Precautions:DM/CABG/RTC R       Subjective      History of Present Illness  Isaiah is a 65 y.o. male who reports to physical therapy with a chief concern of thigh pain.         Diagnostic tests related to this condition: X-ray.   X-Ray Details: FINDINGS:  Hip joint spaces appear adequately maintained on both sides, without significant narrowing.  No conventional radiographic evidence to specifically suggest avascular necrosis of either femoral head.  No evidence of recent or healing fracture or lytic destructive process.  SI joints appear unremarkable.  Note is made of disc narrowing and marginal vertebral endplate spurring at the L2-3 through L4-5 levels.    History of Present Condition/Illness: Pt reports he has experienced some back pain in the past, but  currently is having L thigh pain.  Occassional back pain.   Started in the last couple months, when he changed his mattress to a firm one.  Left thigh pain is intermittent, LLE dominant.  Occassional tingling and numbness  in LLE when getting out of  chair. Pain is described as sharp.  Worse:  "sitting/stairs     Better: walking/AM   0/10 best    worst:7/10  Pt reports he feels his LLE is weaker.   Occupation:   sit all day long  Exercise or gym:stationary bike/elliptical/weights  Goals:"Decrease thigh pain"  Past Medical History/Physical Systems Review:   Isaiah Dorsey Jr.  has a past medical history of Diabetes mellitus type 2, uncontrolled, without complications, Elevated platelet count, H/O splenectomy, Morbid obesity with BMI of 45.0-49.9, adult, RODRIGUEZ (obstructive sleep apnea), Polyp of transverse colon, and Tendonitis, Achilles, left.    Isaiah Dorsey Jr.  has a past surgical history that includes splenecectomy; Left heart catheterization (N/A, 7/11/2022); Aortography (N/A, 7/11/2022); Coronary Artery Bypass Graft (CABG) (Left, 7/18/2022); Endoscopic harvest of vein (Left, 7/18/2022); Colonoscopy (N/A, 11/22/2022); Arthroscopic repair of rotator cuff of shoulder (Right, 12/22/2022); Arthroscopic debridement of shoulder (Right, 12/22/2022); Arthroscopy of shoulder with decompression of subacromial space (Right, 12/22/2022); and Fixation of tendon (Right, 12/22/2022).    Isaiah has a current medication list which includes the following prescription(s): alclometasone, aspirin, atorvastatin, fluocinolone acetonide oil, ketoconazole, ondansetron, tirzepatide, and valsartan-hydrochlorothiazide.    Review of patient's allergies indicates:   Allergen Reactions    Penicillins      Other reaction(s): Itching  Other reaction(s): Hives    Wasp venom Edema        Objective   Posture  Patient presents with a Forward head position. Increased thoracic kyphosis is observed.            Posture seated: fair    Posture standing: fair    Correction with lumbar roll effect: better          Sit to Stand Testing      The patient completed 10 repetitions of a sit to stand transfer in 30 seconds.                MOVEMENT LOSS - Lumbar   Norms ROM Loss Initial   Flexion Fingers touch toes, sacral angle >/= 70 deg, uniform " spinal curvature, posterior weight shift  moderate loss   Extension ASIS surpasses toes, spine of scapulae surpasses heels, uniform spinal curve moderate loss   Side glide Right  minimal loss   Side glide Left  minimal loss   Rotation Right PT observes contralateral shoulder moderate loss   Rotation Left PT observes contralateral shoulder moderate loss     Reflexes:    Left Right   Patella Tendon 1+ 1+   Achilles Tendon 1+ 1+   Babinski  N/T N/T   Clonus (--) (--)     Special Tests:   Test Name  Test Result   Prone Instability Test (--)   SI Joint Provocation Test (--)   Straight Leg Raise (+)L   Neural Tension Test (--)   Crossed Straight Leg Raise (--)   Walking on toes Able   Walking on heels  Able     REPEATED TEST MOVEMENTS:    Baseline symptoms:  Repeated Flexion in Standing no worse   Repeated Extension in Standing better   Repeated Flexion in lying pain during motion/worse   Repeated Extension in lying  No worse       STATIC TESTS and other movements:   Prone lie no worse   Prone lie on elbows no worse   Sitting slouched  better   Sitting erect no effect   Standing slouched no better   Standing erect  no better   Lying prone in extension  better   Long sitting   N/T   Sustained flexion no worse   Sustained prone using mat N/T     Lower Extremity Strength  Right LE  Left LE    Hip flexion: 4/5 Hip flexion: 4/5   Hip extension:  5/5 Hip extension: 3+/5   Hip abduction: 4/5 Hip abduction: 4-/5   Hip adduction:  4/5 Hip adduction:  4-/5   Hip External Rotation 5/5 Hip External Rotation 4/5   Hip Internal rotation   5/5 Hip Internal rotation 4/5   Knee Flexion 4+/5 Knee Flexion 4/5   Knee Extension 4+/5 Knee Extension 4/5   Ankle dorsiflexion: 4+/5 Ankle dorsiflexion: 4+/5   Ankle plantarflexion: 4+/5 Ankle plantarflexion: 4/5     Treatment:  Therapeutic Exercise  TE 1: LTR  TE 2: EIL  TE 3: EIS  TE 4: BRIDGING  TE 5: open book  TE 6: sl clams  TE 7: SL step downs      Time Entry(in minutes):  PT Evaluation (Low)  Time Entry: 30  Therapeutic Exercise Time Entry: 15    Assessment & Plan   Assessment  Isaiah                 Assessment Details: Pt presents with reported Left intermittent thigh pain  that is reproduced with sitting and going from sit to stand and reduced with standing and walking  indicating directional preference of extension.   Upon physical assessment, pt demonstrates issues with poor posture  , decreased trunk ROM/mobility, decreased trunk strength and LLE weakness.  Pt had decreased symptoms in L thigh with extension exercises.. All of the above noted supports potential  classification as a pattern 1PEP  with recurrent/ or consistent symptoms, thus pt is a good candidate for the Healthy Back Program. Pt would benefit from comprehensive UE and LE and trunk mobility training, stability training,  improved cardiovascular and muscular endurance, neuromuscular re-education for posture, coordination, and muscular recruitment and education on positional offloading techniques to decrease the intensity and frequency of flare-ups..  Pt reports that at this time he is unable to participate in the program due to time constraints with work.  Issued HEP and ergonomic education.  Pt was able to perform all HEP I.                 Goals:   Pt will be I c. HEP   met 5/13/25  Radhika Julio PT

## 2025-05-14 ENCOUNTER — PATIENT OUTREACH (OUTPATIENT)
Dept: ADMINISTRATIVE | Facility: HOSPITAL | Age: 65
End: 2025-05-14
Payer: MEDICARE

## 2025-05-14 DIAGNOSIS — E11.9 TYPE 2 DIABETES MELLITUS WITHOUT COMPLICATION, UNSPECIFIED WHETHER LONG TERM INSULIN USE: Primary | ICD-10-CM

## 2025-05-19 ENCOUNTER — TELEPHONE (OUTPATIENT)
Dept: PHARMACY | Facility: CLINIC | Age: 65
End: 2025-05-19
Payer: MEDICARE

## 2025-05-19 NOTE — TELEPHONE ENCOUNTER
[facility]/Population Health Chart Review & Patient Outreach Details For Medication Adherence Project    Reason for Outreach Encounter: 3rd Party payor non-compliance report ([employer], [employer], C, etc)  2.  Patient Outreach Method: Reviewed Patient Chart  3.   Medication in question: MOunjaro   LAST FILLED: 5/15/25 for 28 day supply  Diabetes Medications              tirzepatide 12.5 mg/0.5 mL PnIj Inject 12.5 mg into the skin every 7 days.              4.  Reviewed and or Updates Made To: Patient Chart  5. Outreach Outcomes and/or actions taken: Patient filled medication and is on track to be adherent

## 2025-06-12 ENCOUNTER — OFFICE VISIT (OUTPATIENT)
Dept: URGENT CARE | Facility: CLINIC | Age: 65
End: 2025-06-12
Payer: MEDICARE

## 2025-06-12 VITALS
SYSTOLIC BLOOD PRESSURE: 128 MMHG | RESPIRATION RATE: 17 BRPM | DIASTOLIC BLOOD PRESSURE: 89 MMHG | HEART RATE: 69 BPM | BODY MASS INDEX: 34.54 KG/M2 | OXYGEN SATURATION: 96 % | WEIGHT: 255 LBS | HEIGHT: 72 IN | TEMPERATURE: 98 F

## 2025-06-12 DIAGNOSIS — H66.002 NON-RECURRENT ACUTE SUPPURATIVE OTITIS MEDIA OF LEFT EAR WITHOUT SPONTANEOUS RUPTURE OF TYMPANIC MEMBRANE: Primary | ICD-10-CM

## 2025-06-12 PROCEDURE — 99213 OFFICE O/P EST LOW 20 MIN: CPT | Mod: S$GLB,,, | Performed by: FAMILY MEDICINE

## 2025-06-12 RX ORDER — PREDNISOLONE 10 MG/1
10 TABLET, ORALLY DISINTEGRATING ORAL
COMMUNITY

## 2025-06-12 RX ORDER — AMOXICILLIN AND CLAVULANATE POTASSIUM 875; 125 MG/1; MG/1
1 TABLET, FILM COATED ORAL EVERY 12 HOURS
Qty: 20 TABLET | Refills: 0 | Status: SHIPPED | OUTPATIENT
Start: 2025-06-12 | End: 2025-06-22

## 2025-06-12 NOTE — PROGRESS NOTES
Subjective:      Patient ID: Isaiah Dorsey Jr. is a 65 y.o. male.    Vitals:  height is 6' (1.829 m) and weight is 115.7 kg (255 lb). His tympanic temperature is 97.6 °F (36.4 °C). His blood pressure is 128/89 and his pulse is 69. His respiration is 17 and oxygen saturation is 96%.     Chief Complaint: Ear Problem (cold - Entered by patient)    65 y.o male c/o left ear drainage started x 5 days ago. Patient reports that ear has been feeling like fluid is in it and sound is starting to become muffled.    Ear Fullness   There is pain in the left ear. This is a new problem. The current episode started in the past 7 days. The problem occurs constantly. The problem has been unchanged. There has been no fever. The pain is at a severity of 0/10. The patient is experiencing no pain. Associated symptoms include ear discharge and hearing loss. Pertinent negatives include no abdominal pain, coughing, diarrhea, headaches, neck pain, rash, rhinorrhea, sore throat or vomiting. He has tried nothing for the symptoms. The treatment provided no relief.       HENT:  Positive for ear discharge and hearing loss. Negative for sore throat.    Neck: Negative for neck pain.   Respiratory:  Negative for cough.    Gastrointestinal:  Negative for abdominal pain, vomiting and diarrhea.   Skin:  Negative for rash.   Neurological:  Negative for headaches.      Objective:     Vitals:    06/12/25 1608   BP: 128/89   BP Location: Left arm   Patient Position: Sitting   Pulse: 69   Resp: 17   Temp: 97.6 °F (36.4 °C)   TempSrc: Tympanic   SpO2: 96%   Weight: 115.7 kg (255 lb)   Height: 6' (1.829 m)      Physical Exam   HENT:   Head: Atraumatic.   Ears:   Right Ear: Tympanic membrane normal.   Left Ear: There is swelling. Tympanic membrane is erythematous.   Nose: No congestion.   Eyes: Conjunctivae are normal.   Cardiovascular: Normal rate.   Pulmonary/Chest: Effort normal.   Neurological: He is alert.       Assessment:     1. Non-recurrent acute  suppurative otitis media of left ear without spontaneous rupture of tympanic membrane        Plan:       Non-recurrent acute suppurative otitis media of left ear without spontaneous rupture of tympanic membrane  -     amoxicillin-clavulanate 875-125mg (AUGMENTIN) 875-125 mg per tablet; Take 1 tablet by mouth every 12 (twelve) hours. Has tolerated in the past for 10 days  Dispense: 20 tablet; Refill: 0

## 2025-06-17 ENCOUNTER — OFFICE VISIT (OUTPATIENT)
Dept: OTOLARYNGOLOGY | Facility: CLINIC | Age: 65
End: 2025-06-17
Payer: MEDICARE

## 2025-06-17 VITALS
WEIGHT: 262.81 LBS | SYSTOLIC BLOOD PRESSURE: 130 MMHG | BODY MASS INDEX: 35.64 KG/M2 | HEART RATE: 92 BPM | DIASTOLIC BLOOD PRESSURE: 77 MMHG | OXYGEN SATURATION: 95 %

## 2025-06-17 DIAGNOSIS — H66.002 NON-RECURRENT ACUTE SUPPURATIVE OTITIS MEDIA OF LEFT EAR WITHOUT SPONTANEOUS RUPTURE OF TYMPANIC MEMBRANE: ICD-10-CM

## 2025-06-17 DIAGNOSIS — Z97.4 HEARING AID WORN: ICD-10-CM

## 2025-06-17 DIAGNOSIS — H90.A32 MIXED CONDUCTIVE AND SENSORINEURAL HEARING LOSS OF LEFT EAR WITH RESTRICTED HEARING OF RIGHT EAR: ICD-10-CM

## 2025-06-17 DIAGNOSIS — H66.012 NON-RECURRENT ACUTE SUPPURATIVE OTITIS MEDIA OF LEFT EAR WITH SPONTANEOUS RUPTURE OF TYMPANIC MEMBRANE: Primary | ICD-10-CM

## 2025-06-17 PROCEDURE — 99999 PR PBB SHADOW E&M-EST. PATIENT-LVL III: CPT | Mod: PBBFAC,,, | Performed by: SPECIALIST

## 2025-06-17 RX ORDER — AMOXICILLIN AND CLAVULANATE POTASSIUM 875; 125 MG/1; MG/1
1 TABLET, FILM COATED ORAL EVERY 12 HOURS
Qty: 20 TABLET | Refills: 0 | Status: SHIPPED | OUTPATIENT
Start: 2025-06-17 | End: 2025-06-30

## 2025-06-17 RX ORDER — SULFACETAMIDE SODIUM AND PREDNISOLONE SODIUM PHOSPHATE 100; 2.3 MG/ML; MG/ML
SOLUTION/ DROPS OPHTHALMIC
Qty: 10 ML | Refills: 5 | Status: SHIPPED | OUTPATIENT
Start: 2025-06-17

## 2025-06-17 NOTE — PROGRESS NOTES
Subjective:       Patient ID: Isaiah Dorsey Jr. is a 65 y.o. male.    Chief Complaint: Otitis Media      The patient is returning for follow-up appointment.  It has been 9 months since I last saw him.  There are multiple issues to discuss.  1.  Sensorineural hearing loss/tinnitus:  The patient is wearing hearing aids in both ears.    2.  Chronic  eczematoid otitis externa:  The patient is using Dermotic oil on an as-needed basis.  His ear symptoms are controlled with the p.r.n. use of the Dermotic.  3. Otorrhea: The patient began having drainage in his left ear 4 days ago.  The ear became painful starting 3 days ago.  He has 2 different types of drops that he uses for his ears (he has perforation in the left tympanic membrane and uses eyedrops when he has drainage) he began on the sulfa/prednisolone ophthalmic drops 4 days ago and started a prescription for Augmentin 3 days ago.  The pain has resolved; however, he continues to have drainage in the ear.          Review of Systems     Constitutional: Positive for fatigue.  Negative for appetite change, chills, fever and unexpected weight loss.      HENT: Positive for ear discharge, ear infection, ear pain and hearing loss.  Negative for facial swelling, mouth sores, nosebleeds, postnasal drip, ringing in the ears, runny nose, sinus infection, sinus pressure, sore throat, stuffy nose, tonsil infection, dental problems, trouble swallowing and voice change.      Eyes:  Negative for change in eyesight, eye drainage, eye itching and photophobia.     Respiratory:  Positive for sleep apnea and snoring. Negative for cough, shortness of breath and wheezing.      Cardiovascular:  Negative for chest pain, foot swelling, irregular heartbeat and swollen veins.     Gastrointestinal:  Negative for abdominal pain, acid reflux, constipation, diarrhea, heartburn and vomiting.     Genitourinary: Negative for difficulty urinating, sexual problems and frequent urination.     Musc:  Negative for aching joints, aching muscles, back pain and neck pain.     Skin: Positive for rash.     Allergy: Negative for food allergies and seasonal allergies.     Endocrine: Negative for cold intolerance and heat intolerance.  Type 2 diabetes mellitus    Neurological: Negative for dizziness, headaches, light-headedness, seizures and tremors.      Hematologic: Negative for bruises/bleeds easily and swollen glands.      Psychiatric: Positive for decreased concentration. Negative for depression, nervous/anxious and sleep disturbance.                Objective:      Physical Exam  Vitals and nursing note reviewed.   Constitutional:       General: He is awake.      Appearance: Normal appearance. He is well-developed and well-groomed. He is obese.   HENT:      Head: Normocephalic.      Jaw: There is normal jaw occlusion.      Salivary Glands: Right salivary gland is not diffusely enlarged or tender. Left salivary gland is not diffusely enlarged or tender.      Right Ear: Ear canal and external ear normal. Decreased hearing noted. Tympanic membrane is retracted.      Left Ear: Ear canal and external ear normal. Decreased hearing noted. Drainage, swelling and tenderness present. Tympanic membrane is perforated and retracted.      Ears:        Nose: Septal deviation, mucosal edema (cyanotic, boggy inferior turbinates bilaterally) and rhinorrhea (clear mucus bilaterally) present. No nasal deformity. Rhinorrhea is clear.      Right Turbinates: Enlarged and pale.      Left Turbinates: Enlarged and pale.      Mouth/Throat:      Lips: No lesions.      Mouth: Mucous membranes are moist. No oral lesions.      Dentition: No gum lesions.      Tongue: No lesions.      Palate: No mass and lesions.      Pharynx: Oropharynx is clear. Uvula midline.      Comments: Oral cavity-Bullard class 3, hypertrophy of the base of tongue, long thick palate and long thick uvula with significant narrowing of the oropharyngeal inlet,  Eyes:       General: Lids are normal. Vision grossly intact.         Right eye: No discharge.         Left eye: No discharge.      Extraocular Movements: Extraocular movements intact.      Conjunctiva/sclera: Conjunctivae normal.      Pupils: Pupils are equal, round, and reactive to light.   Neck:      Thyroid: No thyroid mass or thyromegaly.      Trachea: Trachea normal. No tracheal deviation.   Cardiovascular:      Rate and Rhythm: Normal rate and regular rhythm.      Pulses: Normal pulses.      Heart sounds: Normal heart sounds.   Pulmonary:      Effort: Pulmonary effort is normal.      Breath sounds: Normal breath sounds. No stridor. No decreased breath sounds, wheezing, rhonchi or rales.   Abdominal:      General: Bowel sounds are normal.      Palpations: Abdomen is soft.      Tenderness: There is no abdominal tenderness.   Musculoskeletal:      Cervical back: Neck supple. No muscular tenderness. Decreased range of motion.   Lymphadenopathy:      Head:      Right side of head: No submental, submandibular, preauricular, posterior auricular or occipital adenopathy.      Left side of head: No submental, submandibular, preauricular, posterior auricular or occipital adenopathy.      Cervical: No cervical adenopathy.   Skin:     General: Skin is warm and dry.      Findings: No petechiae or rash.      Nails: There is no clubbing.   Neurological:      Mental Status: He is alert and oriented to person, place, and time.      Cranial Nerves: No cranial nerve deficit.      Sensory: No sensory deficit.      Gait: Gait normal.   Psychiatric:         Speech: Speech normal.         Behavior: Behavior normal. Behavior is cooperative.         Thought Content: Thought content normal.         Judgment: Judgment normal.     Review of audiogram performed 10/29/2024 with prior audiogram for comparison results below:                   Pertinent findings asymmetrical hearing loss with left ear worse and bilateral sensorineural hearing loss,  discrimination is slightly worse than at last audiogram, tympanograms are Type A on the left and Type B on the right.  Patient notified of results.  He is medically cleared for bilateral amplification.      Assessment:       1. Non-recurrent acute suppurative otitis media of left ear with spontaneous rupture of tympanic membrane    2. Non-recurrent acute suppurative otitis media of left ear without spontaneous rupture of tympanic membrane          Plan:       I will have the patient continue with his Augmentin until finished.  He will use it ototopic drops until finished with antibiotics.  I will recheck him in 10 days.  I would like him to undergo comprehensive auditory evaluation before seeing me.                        DISCLAIMER: This note was prepared with [employer] voice recognition transcription software. Garbled syntax, mangled pronouns, and other bizarre constructions may be attributed to that software system. While efforts were made to correct any mistakes made by this voice recognition program, some errors and/or omissions may remain in the note that were missed when the note was originally created.

## 2025-06-18 ENCOUNTER — TELEPHONE (OUTPATIENT)
Dept: PHARMACY | Facility: CLINIC | Age: 65
End: 2025-06-18
Payer: MEDICARE

## 2025-06-18 NOTE — TELEPHONE ENCOUNTER
[facility]/Population Health Chart Review & Patient Outreach Details For Medication Adherence Project    Reason for Outreach Encounter: 3rd Party payor non-compliance report ([employer], [employer], C, etc)  2.  Patient Outreach Method: Reviewed Patient Chart  3.   Medication in question: Mounjaro   LAST FILLED: 6/17/25 for 28 day supply  Diabetes Medications              tirzepatide 12.5 mg/0.5 mL PnIj Inject 12.5 mg into the skin every 7 days.              4.  Reviewed and or Updates Made To: Patient Chart  5. Outreach Outcomes and/or actions taken: Patient filled medication and is on track to be adherent

## 2025-06-23 NOTE — PROGRESS NOTES
Mr. Isaiah Dorsey  was seen in the clinic today for an audiological evaluation.  He reported a blockage of the left ear.  He stated that Dr. Caputo placed him on antibiotics last week.  He has a history of bilateral hearing loss, a perforation of the left TM, and use of binaural amplification.      Audiological testing revealed hearing in the normal sloping to the moderately-severe sensorineural range for the right ear and revealed hearing in the moderate to profound mixed range for the left ear.  A speech reception threshold was obtained at 20 dBHL for the right ear and at 40 dBHL for the left ear.  Speech discrimination scores were 76%% for the right ear and 60%% for the left ear.      Tympanometry testing revealed a Type A tympanogram for the right ear and revealed a Type B (normal ear canal volume) tympanogram for the left ear.      Recommendations:  1. Otologic evaluation  2. Repeat audiological evaluation   3. Hearing protection when in noise   4. Continue daily use of binaural amplification  5. Follow-up PRN with dispensing audiologist

## 2025-06-24 ENCOUNTER — CLINICAL SUPPORT (OUTPATIENT)
Dept: OTOLARYNGOLOGY | Facility: CLINIC | Age: 65
End: 2025-06-24
Payer: MEDICARE

## 2025-06-24 DIAGNOSIS — H90.A32 MIXED CONDUCTIVE AND SENSORINEURAL HEARING LOSS OF LEFT EAR WITH RESTRICTED HEARING OF RIGHT EAR: ICD-10-CM

## 2025-06-24 DIAGNOSIS — Z97.4 HEARING AID WORN: ICD-10-CM

## 2025-06-24 PROCEDURE — 99999 PR PBB SHADOW E&M-EST. PATIENT-LVL I: CPT | Mod: PBBFAC,,, | Performed by: AUDIOLOGIST

## 2025-06-24 PROCEDURE — 92567 TYMPANOMETRY: CPT | Mod: S$GLB,,, | Performed by: AUDIOLOGIST

## 2025-06-24 PROCEDURE — 92557 COMPREHENSIVE HEARING TEST: CPT | Mod: S$GLB,,, | Performed by: AUDIOLOGIST

## 2025-06-25 ENCOUNTER — TELEPHONE (OUTPATIENT)
Dept: OTOLARYNGOLOGY | Facility: CLINIC | Age: 65
End: 2025-06-25
Payer: MEDICARE

## 2025-06-30 DIAGNOSIS — L21.9 SEBORRHEA: ICD-10-CM

## 2025-07-01 ENCOUNTER — OFFICE VISIT (OUTPATIENT)
Dept: OTOLARYNGOLOGY | Facility: CLINIC | Age: 65
End: 2025-07-01
Payer: MEDICARE

## 2025-07-01 ENCOUNTER — TELEPHONE (OUTPATIENT)
Dept: OTOLARYNGOLOGY | Facility: CLINIC | Age: 65
End: 2025-07-01

## 2025-07-01 VITALS
HEART RATE: 83 BPM | WEIGHT: 265 LBS | SYSTOLIC BLOOD PRESSURE: 167 MMHG | BODY MASS INDEX: 35.94 KG/M2 | DIASTOLIC BLOOD PRESSURE: 81 MMHG | OXYGEN SATURATION: 95 %

## 2025-07-01 DIAGNOSIS — Z97.4 HEARING AID WORN: ICD-10-CM

## 2025-07-01 DIAGNOSIS — H66.012 NON-RECURRENT ACUTE SUPPURATIVE OTITIS MEDIA OF LEFT EAR WITH SPONTANEOUS RUPTURE OF TYMPANIC MEMBRANE: Primary | ICD-10-CM

## 2025-07-01 DIAGNOSIS — H90.A32 MIXED CONDUCTIVE AND SENSORINEURAL HEARING LOSS OF LEFT EAR WITH RESTRICTED HEARING OF RIGHT EAR: Primary | ICD-10-CM

## 2025-07-01 DIAGNOSIS — J30.89 NON-SEASONAL ALLERGIC RHINITIS, UNSPECIFIED TRIGGER: ICD-10-CM

## 2025-07-01 DIAGNOSIS — H90.A32 MIXED CONDUCTIVE AND SENSORINEURAL HEARING LOSS OF LEFT EAR WITH RESTRICTED HEARING OF RIGHT EAR: ICD-10-CM

## 2025-07-01 DIAGNOSIS — J34.2 NASAL SEPTAL DEVIATION: ICD-10-CM

## 2025-07-01 PROCEDURE — 87186 SC STD MICRODIL/AGAR DIL: CPT | Performed by: SPECIALIST

## 2025-07-01 PROCEDURE — 99999 PR PBB SHADOW E&M-EST. PATIENT-LVL III: CPT | Mod: PBBFAC,,, | Performed by: SPECIALIST

## 2025-07-01 RX ORDER — CIPROFLOXACIN AND DEXAMETHASONE 3; 1 MG/ML; MG/ML
4 SUSPENSION/ DROPS AURICULAR (OTIC) 2 TIMES DAILY
Qty: 7.5 ML | Refills: 5 | Status: SHIPPED | OUTPATIENT
Start: 2025-07-01 | End: 2025-07-11

## 2025-07-01 RX ORDER — DOXYCYCLINE HYCLATE 100 MG
100 TABLET ORAL 2 TIMES DAILY
Qty: 20 TABLET | Refills: 0 | Status: SHIPPED | OUTPATIENT
Start: 2025-07-01

## 2025-07-01 NOTE — PROGRESS NOTES
"Subjective:       Patient ID: Isaiah Dorsey Jr. is a 65 y.o. male.    Chief Complaint: Follow-up, Otitis Media, and Ear Drainage      The patient is returning for follow-up appointment.  It has been 9 months since I last saw him.  There are multiple issues to discuss.  1.  Sensorineural hearing loss/tinnitus:  The patient is wearing hearing aids in both ears.    2.  Chronic  eczematoid otitis externa:  The patient is using Dermotic oil on an as-needed basis.  His ear symptoms are controlled with the p.r.n. use of the Dermotic.  3. Acute suppurative otitis media, left ear: The patient has finished with his prescription of Augmentin and prescription for sulfa/steroid eyedrops used in the ear.  He still has some moisture and fullness in the ear.  He does not feel that he has had any improvement regarding his hearing.  He does not have drainage from the ear externally; however, or when he touches the inside of his ear with his fingertip it is "sticky".          Review of Systems     Constitutional: Positive for fatigue.  Negative for appetite change, chills, fever and unexpected weight loss.      HENT: Positive for ear discharge, ear infection, ear pain, facial swelling and hearing loss.  Negative for mouth sores, nosebleeds, postnasal drip, ringing in the ears, runny nose, sinus infection, sinus pressure, sore throat, stuffy nose, tonsil infection, dental problems, trouble swallowing and voice change.      Eyes:  Negative for change in eyesight, eye drainage, eye itching and photophobia.     Respiratory:  Positive for sleep apnea and snoring. Negative for cough, shortness of breath and wheezing.      Cardiovascular:  Negative for chest pain, foot swelling, irregular heartbeat and swollen veins.     Gastrointestinal:  Negative for abdominal pain, acid reflux, constipation, diarrhea, heartburn and vomiting.     Genitourinary: Negative for difficulty urinating, sexual problems and frequent urination.     Musc: Negative " for aching joints, aching muscles, back pain and neck pain.     Skin: Positive for rash.     Allergy: Negative for food allergies and seasonal allergies.     Endocrine: Negative for cold intolerance and heat intolerance.  Type 2 diabetes mellitus    Neurological: Negative for dizziness, headaches, light-headedness, seizures and tremors.      Hematologic: Negative for bruises/bleeds easily and swollen glands.      Psychiatric: Positive for decreased concentration. Negative for depression, nervous/anxious and sleep disturbance.                Objective:      Physical Exam  Vitals and nursing note reviewed.   Constitutional:       General: He is awake.      Appearance: Normal appearance. He is well-developed and well-groomed. He is obese.   HENT:      Head: Normocephalic.      Jaw: There is normal jaw occlusion.      Salivary Glands: Right salivary gland is not diffusely enlarged or tender. Left salivary gland is not diffusely enlarged or tender.      Right Ear: Ear canal and external ear normal. Decreased hearing noted. Tympanic membrane is retracted.      Left Ear: Ear canal and external ear normal. Decreased hearing noted. Drainage (mucoid) and swelling present. No tenderness. Tympanic membrane is perforated and retracted.      Ears:        Nose: Septal deviation, mucosal edema (cyanotic, boggy inferior turbinates bilaterally) and rhinorrhea (clear mucus bilaterally) present. No nasal deformity. Rhinorrhea is clear.      Right Turbinates: Enlarged and pale.      Left Turbinates: Enlarged and pale.      Mouth/Throat:      Lips: No lesions.      Mouth: Mucous membranes are moist. No oral lesions.      Dentition: No gum lesions.      Tongue: No lesions.      Palate: No mass and lesions.      Pharynx: Oropharynx is clear. Uvula midline.      Comments: Oral cavity-Bullard class 3, hypertrophy of the base of tongue, long thick palate and long thick uvula with significant narrowing of the oropharyngeal inlet,  Eyes:       General: Lids are normal. Vision grossly intact.         Right eye: No discharge.         Left eye: No discharge.      Extraocular Movements: Extraocular movements intact.      Conjunctiva/sclera: Conjunctivae normal.      Pupils: Pupils are equal, round, and reactive to light.   Neck:      Thyroid: No thyroid mass or thyromegaly.      Trachea: Trachea normal. No tracheal deviation.   Cardiovascular:      Rate and Rhythm: Normal rate and regular rhythm.      Pulses: Normal pulses.      Heart sounds: Normal heart sounds.   Pulmonary:      Effort: Pulmonary effort is normal.      Breath sounds: Normal breath sounds. No stridor. No decreased breath sounds, wheezing, rhonchi or rales.   Abdominal:      General: Bowel sounds are normal.      Palpations: Abdomen is soft.      Tenderness: There is no abdominal tenderness.   Musculoskeletal:      Cervical back: Neck supple. No muscular tenderness. Decreased range of motion.   Lymphadenopathy:      Head:      Right side of head: No submental, submandibular, preauricular, posterior auricular or occipital adenopathy.      Left side of head: No submental, submandibular, preauricular, posterior auricular or occipital adenopathy.      Cervical: No cervical adenopathy.   Skin:     General: Skin is warm and dry.      Findings: No petechiae or rash.      Nails: There is no clubbing.   Neurological:      Mental Status: He is alert and oriented to person, place, and time.      Cranial Nerves: No cranial nerve deficit.      Sensory: No sensory deficit.      Gait: Gait normal.   Psychiatric:         Speech: Speech normal.         Behavior: Behavior normal. Behavior is cooperative.         Thought Content: Thought content normal.         Judgment: Judgment normal.            Assessment:       1. Non-recurrent acute suppurative otitis media of left ear with spontaneous rupture of tympanic membrane    2. Mixed conductive and sensorineural hearing loss of left ear with restricted hearing of  right ear    3. Hearing aid worn    4. Non-seasonal allergic rhinitis, unspecified trigger    5. Nasal septal deviation            Plan:       I performed a direct swab in the left ear canal for aerobic culture and sensitivity.  I am switching him to Ciprodex otic drops and oral doxycycline and will recheck him in 10 days.  Eardrops and antibiotic changes will be made pending results of culture and sensitivity and clinical results.                      DISCLAIMER: This note was prepared with [employer] voice recognition transcription software. Garbled syntax, mangled pronouns, and other bizarre constructions may be attributed to that software system. While efforts were made to correct any mistakes made by this voice recognition program, some errors and/or omissions may remain in the note that were missed when the note was originally created.

## 2025-07-04 LAB — BACTERIA SPEC AEROBE CULT: ABNORMAL

## 2025-07-07 ENCOUNTER — TELEPHONE (OUTPATIENT)
Dept: OTOLARYNGOLOGY | Facility: CLINIC | Age: 65
End: 2025-07-07
Payer: MEDICARE

## 2025-07-07 RX ORDER — KETOCONAZOLE 20 MG/G
CREAM TOPICAL 2 TIMES DAILY
Qty: 60 G | Refills: 0 | Status: SHIPPED | OUTPATIENT
Start: 2025-07-07

## 2025-07-07 NOTE — PROGRESS NOTES
Subjective:       Patient ID: Isaiah Dorsey Jr. is a 65 y.o. male.    Chief Complaint: Otitis Media      The patient is returning for follow-up appointment.  It has been 9 months since I last saw him.  There are multiple issues to discuss.  1.  Sensorineural hearing loss/tinnitus:  The patient is wearing hearing aids in both ears.  With his recent infection he is not hearing as well on the left side as he normally does.  2.  Chronic  eczematoid otitis externa:  The patient is using Dermotic oil on an as-needed basis.  His ear symptoms are controlled with the p.r.n. use of the Dermotic.  3. Acute suppurative otitis media, left ear:  The patient has now had the infection in his left ear for the last 2 weeks.  He has been using doxycycline orally and Ciprodex drops.  The drainage has subsided however he still feels swelling inside the left ear.  He is able to wear his hearing aid.          Review of Systems     Constitutional: Positive for fatigue.  Negative for appetite change, chills, fever and unexpected weight loss.      HENT: Positive for ear discharge, ear infection, ear pain, facial swelling and hearing loss.  Negative for mouth sores, nosebleeds, postnasal drip, ringing in the ears, runny nose, sinus infection, sinus pressure, sore throat, stuffy nose, tonsil infection, dental problems, trouble swallowing and voice change.      Eyes:  Negative for change in eyesight, eye drainage, eye itching and photophobia.     Respiratory:  Positive for sleep apnea and snoring. Negative for cough, shortness of breath and wheezing.      Cardiovascular:  Negative for chest pain, foot swelling, irregular heartbeat and swollen veins.     Gastrointestinal:  Negative for abdominal pain, acid reflux, constipation, diarrhea, heartburn and vomiting.     Genitourinary: Negative for difficulty urinating, sexual problems and frequent urination.     Musc: Negative for aching joints, aching muscles, back pain and neck pain.     Skin:  Positive for rash.     Allergy: Negative for food allergies and seasonal allergies.     Endocrine: Negative for cold intolerance and heat intolerance.  Type 2 diabetes mellitus    Neurological: Negative for dizziness, headaches, light-headedness, seizures and tremors.      Hematologic: Negative for bruises/bleeds easily and swollen glands.      Psychiatric: Positive for decreased concentration. Negative for depression, nervous/anxious and sleep disturbance.                Objective:      Physical Exam  Vitals and nursing note reviewed.   Constitutional:       General: He is awake.      Appearance: Normal appearance. He is well-developed and well-groomed. He is obese.   HENT:      Head: Normocephalic.      Jaw: There is normal jaw occlusion.      Salivary Glands: Right salivary gland is not diffusely enlarged or tender. Left salivary gland is not diffusely enlarged or tender.      Right Ear: Ear canal and external ear normal. Decreased hearing noted. Tympanic membrane is retracted.      Left Ear: Ear canal and external ear normal. Decreased hearing noted. No drainage (mucoid), swelling or tenderness. Tympanic membrane is injected (Mildly injected tympanic membrane), perforated and retracted.      Ears:        Nose: Septal deviation, mucosal edema (cyanotic, boggy inferior turbinates bilaterally) and rhinorrhea (clear mucus bilaterally) present. No nasal deformity. Rhinorrhea is clear.      Right Turbinates: Enlarged and pale.      Left Turbinates: Enlarged and pale.      Mouth/Throat:      Lips: No lesions.      Mouth: Mucous membranes are moist. No oral lesions.      Dentition: No gum lesions.      Tongue: No lesions.      Palate: No mass and lesions.      Pharynx: Oropharynx is clear. Uvula midline.      Comments: Oral cavity-Bullard class 3, hypertrophy of the base of tongue, long thick palate and long thick uvula with significant narrowing of the oropharyngeal inlet,  Eyes:      General: Lids are normal. Vision  grossly intact.         Right eye: No discharge.         Left eye: No discharge.      Extraocular Movements: Extraocular movements intact.      Conjunctiva/sclera: Conjunctivae normal.      Pupils: Pupils are equal, round, and reactive to light.   Neck:      Thyroid: No thyroid mass or thyromegaly.      Trachea: Trachea normal. No tracheal deviation.   Cardiovascular:      Rate and Rhythm: Normal rate and regular rhythm.      Pulses: Normal pulses.      Heart sounds: Normal heart sounds.   Pulmonary:      Effort: Pulmonary effort is normal.      Breath sounds: Normal breath sounds. No stridor. No decreased breath sounds, wheezing, rhonchi or rales.   Abdominal:      General: Bowel sounds are normal.      Palpations: Abdomen is soft.      Tenderness: There is no abdominal tenderness.   Musculoskeletal:      Cervical back: Neck supple. No muscular tenderness. Decreased range of motion.   Lymphadenopathy:      Head:      Right side of head: No submental, submandibular, preauricular, posterior auricular or occipital adenopathy.      Left side of head: No submental, submandibular, preauricular, posterior auricular or occipital adenopathy.      Cervical: No cervical adenopathy.   Skin:     General: Skin is warm and dry.      Findings: No petechiae or rash.      Nails: There is no clubbing.   Neurological:      Mental Status: He is alert and oriented to person, place, and time.      Cranial Nerves: No cranial nerve deficit.      Sensory: No sensory deficit.      Gait: Gait normal.   Psychiatric:         Speech: Speech normal.         Behavior: Behavior normal. Behavior is cooperative.         Thought Content: Thought content normal.         Judgment: Judgment normal.      Contains abnormal data Aerobic culture  Order: [id number]   Status: Final result       Next appt: 07/18/2025 at 08:30 AM in Audiology (Tamar Del Cid, CCC-A)       Dx: Non-recurrent acute suppurative otiti...    Test Result Released: Yes (seen)     Specimen Information: Ear, Left; Abscess   0 Result Notes  CULTURE, AEROBIC Many Pseudomonas aeruginosa Abnormal         Resulting Agency: [facility]     Susceptibility     Pseudomonas aeruginosa     MAXIMUS     Cefepime <=2 µg/ml Sensitive     Ciprofloxacin >2 µg/ml Resistant     Levofloxacin >4 µg/ml Resistant     Meropenem <=1 µg/ml Sensitive     Piperacillin/Tazobactam <=8 µg/ml Sensitive               Linear View               Assessment:       1. Non-recurrent acute suppurative otitis media of left ear with spontaneous rupture of tympanic membrane    2. Mixed conductive and sensorineural hearing loss of left ear with restricted hearing of right ear    3. Hearing aid worn    4. Non-seasonal allergic rhinitis, unspecified trigger    5. Nasal septal deviation    6. Tympanic membrane perforation, left    7. Tinnitus of both ears            Plan:       I  will refill the patient's doxycycline and having continue with Ciprodex drops until they can be replaced with compounded cefepime drops.  I will recheck the patient in 10 days.                    DISCLAIMER: This note was prepared with [employer] voice recognition transcription software. Garbled syntax, mangled pronouns, and other bizarre constructions may be attributed to that software system. While efforts were made to correct any mistakes made by this voice recognition program, some errors and/or omissions may remain in the note that were missed when the note was originally created.

## 2025-07-08 ENCOUNTER — OFFICE VISIT (OUTPATIENT)
Dept: OTOLARYNGOLOGY | Facility: CLINIC | Age: 65
End: 2025-07-08
Payer: MEDICARE

## 2025-07-08 DIAGNOSIS — H93.13 TINNITUS OF BOTH EARS: ICD-10-CM

## 2025-07-08 DIAGNOSIS — J34.2 NASAL SEPTAL DEVIATION: ICD-10-CM

## 2025-07-08 DIAGNOSIS — Z97.4 HEARING AID WORN: ICD-10-CM

## 2025-07-08 DIAGNOSIS — H72.92 TYMPANIC MEMBRANE PERFORATION, LEFT: ICD-10-CM

## 2025-07-08 DIAGNOSIS — H90.A32 MIXED CONDUCTIVE AND SENSORINEURAL HEARING LOSS OF LEFT EAR WITH RESTRICTED HEARING OF RIGHT EAR: ICD-10-CM

## 2025-07-08 DIAGNOSIS — H66.012 NON-RECURRENT ACUTE SUPPURATIVE OTITIS MEDIA OF LEFT EAR WITH SPONTANEOUS RUPTURE OF TYMPANIC MEMBRANE: Primary | ICD-10-CM

## 2025-07-08 DIAGNOSIS — J30.89 NON-SEASONAL ALLERGIC RHINITIS, UNSPECIFIED TRIGGER: ICD-10-CM

## 2025-07-08 PROCEDURE — 99999 PR PBB SHADOW E&M-EST. PATIENT-LVL III: CPT | Mod: PBBFAC,,, | Performed by: SPECIALIST

## 2025-07-08 RX ORDER — DOXYCYCLINE HYCLATE 100 MG
100 TABLET ORAL 2 TIMES DAILY
Qty: 20 TABLET | Refills: 0 | Status: SHIPPED | OUTPATIENT
Start: 2025-07-08

## 2025-07-11 ENCOUNTER — TELEPHONE (OUTPATIENT)
Dept: OTOLARYNGOLOGY | Facility: CLINIC | Age: 65
End: 2025-07-11
Payer: MEDICARE

## 2025-07-11 ENCOUNTER — HOSPITAL ENCOUNTER (OUTPATIENT)
Dept: RADIOLOGY | Facility: HOSPITAL | Age: 65
Discharge: HOME | End: 2025-07-11
Attending: SPECIALIST
Payer: MEDICARE

## 2025-07-11 DIAGNOSIS — H90.A32 MIXED CONDUCTIVE AND SENSORINEURAL HEARING LOSS OF LEFT EAR WITH RESTRICTED HEARING OF RIGHT EAR: ICD-10-CM

## 2025-07-11 PROCEDURE — 70480 CT ORBIT/EAR/FOSSA W/O DYE: CPT | Mod: 26,,, | Performed by: RADIOLOGY

## 2025-07-11 PROCEDURE — 70480 CT ORBIT/EAR/FOSSA W/O DYE: CPT | Mod: TC

## 2025-07-11 NOTE — TELEPHONE ENCOUNTER
Dr. Caputo reviewed your CT scan does show some soft tissue swelling in the left ear around the hearing bones and in the mastoid but there is no evidence of bony destruction or erosion, so I think this is related to the infection in your ear.  You should continue with the cefepime eardrops and follow up with Dr. Caputo as scheduled per Dr. Caputo

## 2025-07-12 NOTE — PROGRESS NOTES
Subjective:       Patient ID: Isaiah Dorsey Jr. is a 65 y.o. male.    Chief Complaint:  2 weeks follow-up visit  History of Present Illness       The patient is returning for follow-up appointment.  It has been 2 weeks since I last saw him.  There are multiple issues to discuss.  1.  Sensorineural hearing loss/tinnitus:  The patient is wearing hearing aids in both ears.  He continues to have a sensation of fullness and slight decreased hearing in his left ear.    2.  Chronic  eczematoid otitis externa:  The patient is using Dermotic oil on an as-needed basis.  His ear symptoms are controlled with the p.r.n. use of the Dermotic.  3. Acute suppurative otitis media, left ear:  The patient is not having otorrhea or pain in his left ear.  He does have a sensation of fullness in the ear.  He did undergo comprehensive auditory evaluation since last visit.  He is able to wear his hearing aid.          Review of Systems     Constitutional: Positive for fatigue.  Negative for appetite change, chills, fever and unexpected weight loss.      HENT: Positive for ear discharge, ear infection, ear pain, facial swelling and hearing loss.  Negative for mouth sores, nosebleeds, postnasal drip, ringing in the ears, runny nose, sinus infection, sinus pressure, sore throat, stuffy nose, tonsil infection, dental problems, trouble swallowing and voice change.      Eyes:  Negative for change in eyesight, eye drainage, eye itching and photophobia.     Respiratory:  Positive for sleep apnea and snoring. Negative for cough, shortness of breath and wheezing.      Cardiovascular:  Negative for chest pain, foot swelling, irregular heartbeat and swollen veins.     Gastrointestinal:  Negative for abdominal pain, acid reflux, constipation, diarrhea, heartburn and vomiting.     Genitourinary: Negative for difficulty urinating, sexual problems and frequent urination.     Musc: Negative for aching joints, aching muscles, back pain and neck pain.      Skin: Positive for rash.     Allergy: Negative for food allergies and seasonal allergies.     Endocrine: Negative for cold intolerance and heat intolerance.  Type 2 diabetes mellitus    Neurological: Negative for dizziness, headaches, light-headedness, seizures and tremors.      Hematologic: Negative for bruises/bleeds easily and swollen glands.      Psychiatric: Positive for decreased concentration. Negative for depression, nervous/anxious and sleep disturbance.                Objective:      Physical Exam  Vitals and nursing note reviewed.   Constitutional:       General: He is awake.      Appearance: Normal appearance. He is well-developed and well-groomed. He is obese.   HENT:      Head: Normocephalic.      Jaw: There is normal jaw occlusion.      Salivary Glands: Right salivary gland is not diffusely enlarged or tender. Left salivary gland is not diffusely enlarged or tender.      Right Ear: Ear canal and external ear normal. Decreased hearing noted. Tympanic membrane is retracted.      Left Ear: Ear canal and external ear normal. Decreased hearing noted. No drainage (mucoid), swelling or tenderness. Tympanic membrane is injected (Mildly injected tympanic membrane), perforated and retracted.      Ears:        Nose: Septal deviation, mucosal edema (cyanotic, boggy inferior turbinates bilaterally) and rhinorrhea (clear mucus bilaterally) present. No nasal deformity. Rhinorrhea is clear.      Right Turbinates: Enlarged and pale.      Left Turbinates: Enlarged and pale.      Mouth/Throat:      Lips: No lesions.      Mouth: Mucous membranes are moist. No oral lesions.      Dentition: No gum lesions.      Tongue: No lesions.      Palate: No mass and lesions.      Pharynx: Oropharynx is clear. Uvula midline.      Comments: Oral cavity-Bullard class 3, hypertrophy of the base of tongue, long thick palate and long thick uvula with significant narrowing of the oropharyngeal inlet,  Eyes:      General: Lids are  normal. Vision grossly intact.         Right eye: No discharge.         Left eye: No discharge.      Extraocular Movements: Extraocular movements intact.      Conjunctiva/sclera: Conjunctivae normal.      Pupils: Pupils are equal, round, and reactive to light.   Neck:      Thyroid: No thyroid mass or thyromegaly.      Trachea: Trachea normal. No tracheal deviation.   Cardiovascular:      Rate and Rhythm: Normal rate and regular rhythm.      Pulses: Normal pulses.      Heart sounds: Normal heart sounds.   Pulmonary:      Effort: Pulmonary effort is normal.      Breath sounds: Normal breath sounds. No stridor. No decreased breath sounds, wheezing, rhonchi or rales.   Abdominal:      General: Bowel sounds are normal.      Palpations: Abdomen is soft.      Tenderness: There is no abdominal tenderness.   Musculoskeletal:      Cervical back: Neck supple. No muscular tenderness. Decreased range of motion.   Lymphadenopathy:      Head:      Right side of head: No submental, submandibular, preauricular, posterior auricular or occipital adenopathy.      Left side of head: No submental, submandibular, preauricular, posterior auricular or occipital adenopathy.      Cervical: No cervical adenopathy.   Skin:     General: Skin is warm and dry.      Findings: No petechiae or rash.      Nails: There is no clubbing.   Neurological:      Mental Status: He is alert and oriented to person, place, and time.      Cranial Nerves: No cranial nerve deficit.      Sensory: No sensory deficit.      Gait: Gait normal.   Psychiatric:         Speech: Speech normal.         Behavior: Behavior normal. Behavior is cooperative.         Thought Content: Thought content normal.         Judgment: Judgment normal.                  I personally reviewed the most recent audiogram and compared it to his previous audiogram.  It I discuss both with the patient during his visit and answered all his questions.  I provided him with a copy of his new audiogram.   Pertinent findings mixed sensorineural and conductive hearing loss left ear with diminished hearing right ear, diminished auditory discrimination at elevated hearing levels, Type A tympanogram on the right and Type B tympanogram on left.  No significant change in current audiogram when compared to previous audiogram        Assessment:       1. Non-recurrent acute suppurative otitis media of left ear with spontaneous rupture of tympanic membrane    2. Mixed conductive and sensorineural hearing loss of left ear with restricted hearing of right ear    3. Hearing aid worn    4. Tympanic membrane perforation, left    5. Non-seasonal allergic rhinitis, unspecified trigger    6. Nasal septal deviation              Plan:       I will have the patient refill the compounded cefepime drops and using for an additional 10 days.  I will recheck him in 2 weeks.                DISCLAIMER: This note was prepared with [employer] voice recognition transcription software. Garbled syntax, mangled pronouns, and other bizarre constructions may be attributed to that software system. While efforts were made to correct any mistakes made by this voice recognition program, some errors and/or omissions may remain in the note that were missed when the note was originally created.

## 2025-07-18 ENCOUNTER — CLINICAL SUPPORT (OUTPATIENT)
Dept: AUDIOLOGY | Facility: CLINIC | Age: 65
End: 2025-07-18
Payer: MEDICARE

## 2025-07-18 DIAGNOSIS — Z97.4 HEARING AID WORN: ICD-10-CM

## 2025-07-18 DIAGNOSIS — H90.A32 MIXED CONDUCTIVE AND SENSORINEURAL HEARING LOSS OF LEFT EAR WITH RESTRICTED HEARING OF RIGHT EAR: Primary | ICD-10-CM

## 2025-07-18 DIAGNOSIS — H90.3 SENSORINEURAL HEARING LOSS, BILATERAL: Primary | ICD-10-CM

## 2025-07-18 NOTE — Clinical Note
Rahul Caputo!  I retested Mr. Dorsey when he came for his hearing aid adjustment and his hearing has improved since his last audiogram (with the active infection). Let me know if you have any questions!  Maura

## 2025-07-18 NOTE — PROGRESS NOTES
Isaiah Dorsey Jr. was seen today in the clinic for an audiologic evaluation.  Patient's main complaint was decreased benefit from hearing aids.  Mr. Dorsey reported he recently had an ear infection treated by Dr. Caputo. He denied any current drainage. Mr. Dorsey has a longstanding perforation in the left TM. He denied any current pain but noted his left ear still feels full.     Otoscopy revealed clear EAC's with visible TM's in both ears. Perforation was visible in the left TM.     Tympanometry revealed Type A in the right ear and Type B (large ECV consistent with perf) in the left ear.     Audiogram results revealed a severe SNHL 1633-9868 Hz in the right ear and a mild to profound mixed hearing loss in the left ear. Thresholds have improved since his audiogram completed with an active ear infection.     Speech reception thresholds were noted at 35 dB in the right ear and 30 dB in the left ear.    Speech discrimination scores were 84% in the right ear and 72% in the left ear. Discrimination has improved since his audiogram last year.     Results were reviewed with patient following testing. Patient wears binaural hearing aids. He was seen for a hearing aid adjustment today.     Recommendations:  Otologic evaluation as needed with ENT  Annual audiogram  Hearing protection when in noise  Daily use of binaural hearing aids

## 2025-07-18 NOTE — PROGRESS NOTES
HEARING AID FOLLOW-UP    Isaiah HEATHER Dorsey Jr. was seen today for a hearing aid follow-up visit.     New audiogram was applied to settings; patient requested retention line on the left hearing aid to match to right. After reviewing patient's programming, it was recommended he is fit with custom molds (M speaker right and P speaker left).     Impressions were taken without incident for custom ear molds.     Patient requested his hearing aids be re-paired to his phone as he got a new phone recently. He is scheduled to  the molds on 8/12/25. He was advised to call us if he needs anything sooner.

## 2025-07-22 ENCOUNTER — OFFICE VISIT (OUTPATIENT)
Dept: OTOLARYNGOLOGY | Facility: CLINIC | Age: 65
End: 2025-07-22
Payer: MEDICARE

## 2025-07-22 ENCOUNTER — PATIENT MESSAGE (OUTPATIENT)
Dept: OTOLARYNGOLOGY | Facility: CLINIC | Age: 65
End: 2025-07-22

## 2025-07-22 VITALS
WEIGHT: 263.31 LBS | SYSTOLIC BLOOD PRESSURE: 134 MMHG | BODY MASS INDEX: 35.72 KG/M2 | DIASTOLIC BLOOD PRESSURE: 84 MMHG | OXYGEN SATURATION: 95 % | HEART RATE: 83 BPM

## 2025-07-22 DIAGNOSIS — Z97.4 HEARING AID WORN: ICD-10-CM

## 2025-07-22 DIAGNOSIS — J34.2 NASAL SEPTAL DEVIATION: ICD-10-CM

## 2025-07-22 DIAGNOSIS — J30.89 NON-SEASONAL ALLERGIC RHINITIS, UNSPECIFIED TRIGGER: ICD-10-CM

## 2025-07-22 DIAGNOSIS — H72.92 TYMPANIC MEMBRANE PERFORATION, LEFT: ICD-10-CM

## 2025-07-22 DIAGNOSIS — H90.A32 MIXED CONDUCTIVE AND SENSORINEURAL HEARING LOSS OF LEFT EAR WITH RESTRICTED HEARING OF RIGHT EAR: ICD-10-CM

## 2025-07-22 DIAGNOSIS — H66.012 NON-RECURRENT ACUTE SUPPURATIVE OTITIS MEDIA OF LEFT EAR WITH SPONTANEOUS RUPTURE OF TYMPANIC MEMBRANE: Primary | ICD-10-CM

## 2025-07-22 PROCEDURE — 99999 PR PBB SHADOW E&M-EST. PATIENT-LVL III: CPT | Mod: PBBFAC,,, | Performed by: SPECIALIST

## 2025-07-24 ENCOUNTER — TELEPHONE (OUTPATIENT)
Dept: OTOLARYNGOLOGY | Facility: CLINIC | Age: 65
End: 2025-07-24
Payer: MEDICARE

## 2025-07-30 DIAGNOSIS — L21.9 SEBORRHEA: ICD-10-CM

## 2025-07-30 RX ORDER — KETOCONAZOLE 20 MG/G
CREAM TOPICAL 2 TIMES DAILY
Qty: 60 G | Refills: 0 | Status: SHIPPED | OUTPATIENT
Start: 2025-07-30

## 2025-08-05 ENCOUNTER — OFFICE VISIT (OUTPATIENT)
Dept: INTERNAL MEDICINE | Facility: CLINIC | Age: 65
End: 2025-08-05
Payer: MEDICARE

## 2025-08-05 VITALS
HEART RATE: 86 BPM | OXYGEN SATURATION: 98 % | WEIGHT: 262.38 LBS | RESPIRATION RATE: 14 BRPM | DIASTOLIC BLOOD PRESSURE: 68 MMHG | SYSTOLIC BLOOD PRESSURE: 130 MMHG | HEIGHT: 72 IN | BODY MASS INDEX: 35.54 KG/M2

## 2025-08-05 DIAGNOSIS — I77.819 DILATATION OF AORTA: ICD-10-CM

## 2025-08-05 DIAGNOSIS — E11.59 HYPERTENSION ASSOCIATED WITH DIABETES: ICD-10-CM

## 2025-08-05 DIAGNOSIS — G47.33 OSA (OBSTRUCTIVE SLEEP APNEA): ICD-10-CM

## 2025-08-05 DIAGNOSIS — Z00.00 ENCOUNTER FOR MEDICARE ANNUAL WELLNESS EXAM: Primary | ICD-10-CM

## 2025-08-05 DIAGNOSIS — I48.0 PAROXYSMAL ATRIAL FIBRILLATION: ICD-10-CM

## 2025-08-05 DIAGNOSIS — I15.2 OBESITY, DIABETES, AND HYPERTENSION SYNDROME: ICD-10-CM

## 2025-08-05 DIAGNOSIS — E11.59 OBESITY, DIABETES, AND HYPERTENSION SYNDROME: ICD-10-CM

## 2025-08-05 DIAGNOSIS — D75.839 THROMBOCYTOSIS: ICD-10-CM

## 2025-08-05 DIAGNOSIS — I50.22 HEART FAILURE WITH MID-RANGE EJECTION FRACTION: ICD-10-CM

## 2025-08-05 DIAGNOSIS — E11.69 OBESITY, DIABETES, AND HYPERTENSION SYNDROME: ICD-10-CM

## 2025-08-05 DIAGNOSIS — E11.69 HYPERLIPIDEMIA ASSOCIATED WITH TYPE 2 DIABETES MELLITUS: ICD-10-CM

## 2025-08-05 DIAGNOSIS — E11.65 TYPE 2 DIABETES MELLITUS WITH HYPERGLYCEMIA, WITHOUT LONG-TERM CURRENT USE OF INSULIN: ICD-10-CM

## 2025-08-05 DIAGNOSIS — E78.5 HYPERLIPIDEMIA ASSOCIATED WITH TYPE 2 DIABETES MELLITUS: ICD-10-CM

## 2025-08-05 DIAGNOSIS — E66.9 OBESITY, DIABETES, AND HYPERTENSION SYNDROME: ICD-10-CM

## 2025-08-05 DIAGNOSIS — I21.4 NON-STEMI (NON-ST ELEVATED MYOCARDIAL INFARCTION): ICD-10-CM

## 2025-08-05 DIAGNOSIS — I15.2 HYPERTENSION ASSOCIATED WITH DIABETES: ICD-10-CM

## 2025-08-05 PROCEDURE — 3078F DIAST BP <80 MM HG: CPT | Mod: CPTII,S$GLB,,

## 2025-08-05 PROCEDURE — 1160F RVW MEDS BY RX/DR IN RCRD: CPT | Mod: CPTII,S$GLB,,

## 2025-08-05 PROCEDURE — 1124F ACP DISCUSS-NO DSCNMKR DOCD: CPT | Mod: CPTII,S$GLB,,

## 2025-08-05 PROCEDURE — 1101F PT FALLS ASSESS-DOCD LE1/YR: CPT | Mod: CPTII,S$GLB,,

## 2025-08-05 PROCEDURE — 3072F LOW RISK FOR RETINOPATHY: CPT | Mod: CPTII,S$GLB,,

## 2025-08-05 PROCEDURE — 1126F AMNT PAIN NOTED NONE PRSNT: CPT | Mod: CPTII,S$GLB,,

## 2025-08-05 PROCEDURE — 3075F SYST BP GE 130 - 139MM HG: CPT | Mod: CPTII,S$GLB,,

## 2025-08-05 PROCEDURE — 1170F FXNL STATUS ASSESSED: CPT | Mod: CPTII,S$GLB,,

## 2025-08-05 PROCEDURE — 3288F FALL RISK ASSESSMENT DOCD: CPT | Mod: CPTII,S$GLB,,

## 2025-08-05 PROCEDURE — 99999 PR PBB SHADOW E&M-EST. PATIENT-LVL V: CPT | Mod: PBBFAC,,,

## 2025-08-05 PROCEDURE — 1159F MED LIST DOCD IN RCRD: CPT | Mod: CPTII,S$GLB,,

## 2025-08-05 PROCEDURE — 3044F HG A1C LEVEL LT 7.0%: CPT | Mod: CPTII,S$GLB,,

## 2025-08-05 PROCEDURE — G0439 PPPS, SUBSEQ VISIT: HCPCS | Mod: S$GLB,,,

## 2025-08-05 NOTE — PROGRESS NOTES
Isaiah Dorsey presented for a follow-up Medicare AWV today. The following components were reviewed and updated:    Medical history  Family History  Social history  Allergies and Current Medications  Health Risk Assessment  Health Maintenance  Care Team    **See Completed Assessments for Annual Wellness visit with in the encounter summary    The following assessments were completed:  Depression Screening  Cognitive function Screening    Timed Get Up Test  Whisper Test      Opioid documentation:      Patient does not have a current opioid prescription.          Vitals:    08/05/25 0735   BP: 130/68   BP Location: Left arm   Patient Position: Sitting   Pulse: 86   Resp: 14   SpO2: 98%   Weight: 119 kg (262 lb 5.6 oz)   Height: 6' (1.829 m)     Body mass index is 35.58 kg/m².       Physical Exam  Vitals reviewed.   Constitutional:       Appearance: Normal appearance.   HENT:      Head: Normocephalic and atraumatic.   Cardiovascular:      Rate and Rhythm: Normal rate and regular rhythm.      Pulses: Normal pulses.           Radial pulses are 2+ on the right side and 2+ on the left side.        Dorsalis pedis pulses are 2+ on the right side and 2+ on the left side.        Posterior tibial pulses are 2+ on the right side and 2+ on the left side.      Heart sounds: Normal heart sounds.   Pulmonary:      Effort: Pulmonary effort is normal.      Breath sounds: Normal breath sounds.   Musculoskeletal:      Right lower leg: No edema.      Left lower leg: No edema.   Skin:     General: Skin is warm and dry.      Capillary Refill: Capillary refill takes less than 2 seconds.   Neurological:      General: No focal deficit present.      Mental Status: He is alert and oriented to person, place, and time.   Psychiatric:         Mood and Affect: Mood normal.         Behavior: Behavior normal.       Diagnoses and health risks identified today and associated recommendations/orders:    1. Encounter for Medicare annual wellness  exam  Assessment and evaluation performed as stated above.  -     Referral to Enhanced Annual Wellness Visit (eAWV) M+7    2. Heart failure with mid-range ejection fraction  3. Dilatation of aorta  4. Non-STEMI (non-ST elevated myocardial infarction)  Stable on aspirin.  Encouraged patient to monitor water intake, and keep all upcoming appointments.  Follow up with Cardiology    5. Type 2 diabetes mellitus with hyperglycemia, without long-term current use of insulin  Stable on diet.  Follow-up with PCP    6. Hyperlipidemia associated with type 2 diabetes mellitus  Stable on atorvastatin.  Follow up with PCP.    7. Hypertension associated with diabetes  Stable on Diovan-HCTZ.  Follow up with PCP and Cardiology    8. Paroxysmal atrial fibrillation  Stable on aspirin.  Follow-up PCP and Cardiology    9. Thrombocytosis  Stable without use of medication.  Follow up with PCP    10. Obesity, diabetes, and hypertension syndrome  Stable on respective regimens.  Follow up with PCP and Cardiology    11. RODRIGUEZ (obstructive sleep apnea)  Stability unknown.  Patient stated he has never been evaluated by sleep Medicine.  Patient stated he did not even know he was diagnosed with sleep apnea in the past.  Referral placed for evaluation and possible sleep study.  -     Ambulatory referral/consult to Sleep Disorders; Future; Expected date: 08/12/2025       Provided Isaiah with a 5-10 year written screening schedule and personal prevention plan. Recommendations were developed using the USPSTF age appropriate recommendations. Education, counseling, and referrals were provided as needed.  After Visit Summary printed and given to patient which includes a list of additional screenings\tests needed.    Follow up in about 1 year (around 8/5/2026), or if symptoms worsen or fail to improve.      Carlie Guo, AGUSTIN    I offered to discuss advanced care planning, including how to pick a person who would make decisions for you if you were unable to  make them for yourself, called a health care power of , and what kind of decisions you might make such as use of life sustaining treatments such as ventilators and tube feeding when faced with a life limiting illness recorded on a living will that they will need to know. (How you want to be cared for as you near the end of your natural life)     X  Patient is unwilling to engage in a discussion regarding advance directives at this time.

## 2025-08-05 NOTE — PATIENT INSTRUCTIONS
Counseling and Referral of Other Preventative  (Italic type indicates deductible and co-insurance are waived)    Patient Name: Isaiah Dorsey  Today's Date: 8/5/2025    Health Maintenance         Date Due Completion Date    TETANUS VACCINE Never done ---    Pneumococcal Vaccines (Age 50+) (1 of 2 - PCV) Never done ---    Shingles Vaccine (1 of 2) Never done ---    RSV Vaccine (Age 60+ and Pregnant patients) (1 - Risk 60-74 years 1-dose series) Never done ---    Abdominal Aortic Aneurysm Screening Never done ---    COVID-19 Vaccine (3 - 2024-25 season) 09/01/2024 8/24/2021    Diabetes Urine Screening 04/17/2025 4/17/2024    Influenza Vaccine (1) 09/01/2025 10/31/2024 (Declined)    Override on 10/31/2024: Declined    Hemoglobin A1c 10/24/2025 4/24/2025    Diabetic Eye Exam 12/03/2025 12/3/2024    Foot Exam 01/28/2026 1/28/2025    Lipid Panel 04/24/2026 4/24/2025    High Dose Statin 08/05/2026 8/5/2025    Aspirin/Antiplatelet Therapy 08/05/2026 8/5/2025    Colorectal Cancer Screening 11/22/2027 11/22/2022    Override on 9/1/2018: Done          No orders of the defined types were placed in this encounter.      The following information is provided to all patients.  This information is to help you find resources for any of the problems found today that may be affecting your health:                  Living healthy guide: www.[facility].[place].gov      Understanding Diabetes: www.diabetes.org      Eating healthy: www.cdc.gov/healthyweight      CDC home safety checklist: www.cdc.gov/steadi/patient.html      Agency on Aging: www.goea.[place].gov      Alcoholics anonymous (AA): www.aa.org      Physical Activity: www.richard.nih.gov/nq3isdx      Tobacco use: www.quitwithusla.org

## 2025-08-08 ENCOUNTER — OFFICE VISIT (OUTPATIENT)
Dept: OTOLARYNGOLOGY | Facility: CLINIC | Age: 65
End: 2025-08-08
Payer: MEDICARE

## 2025-08-08 DIAGNOSIS — H66.012 NON-RECURRENT ACUTE SUPPURATIVE OTITIS MEDIA OF LEFT EAR WITH SPONTANEOUS RUPTURE OF TYMPANIC MEMBRANE: Primary | ICD-10-CM

## 2025-08-08 DIAGNOSIS — H72.92 TYMPANIC MEMBRANE PERFORATION, LEFT: ICD-10-CM

## 2025-08-08 DIAGNOSIS — J30.89 NON-SEASONAL ALLERGIC RHINITIS, UNSPECIFIED TRIGGER: ICD-10-CM

## 2025-08-08 DIAGNOSIS — Z97.4 HEARING AID WORN: ICD-10-CM

## 2025-08-08 DIAGNOSIS — H93.13 TINNITUS OF BOTH EARS: ICD-10-CM

## 2025-08-08 DIAGNOSIS — J34.2 NASAL SEPTAL DEVIATION: ICD-10-CM

## 2025-08-08 DIAGNOSIS — H90.A32 MIXED CONDUCTIVE AND SENSORINEURAL HEARING LOSS OF LEFT EAR WITH RESTRICTED HEARING OF RIGHT EAR: ICD-10-CM

## 2025-08-08 PROCEDURE — 99999 PR PBB SHADOW E&M-EST. PATIENT-LVL III: CPT | Mod: PBBFAC,,, | Performed by: SPECIALIST

## 2025-08-08 PROCEDURE — 1101F PT FALLS ASSESS-DOCD LE1/YR: CPT | Mod: CPTII,S$GLB,, | Performed by: SPECIALIST

## 2025-08-08 PROCEDURE — 1126F AMNT PAIN NOTED NONE PRSNT: CPT | Mod: CPTII,S$GLB,, | Performed by: SPECIALIST

## 2025-08-08 PROCEDURE — 1160F RVW MEDS BY RX/DR IN RCRD: CPT | Mod: CPTII,S$GLB,, | Performed by: SPECIALIST

## 2025-08-08 PROCEDURE — 99213 OFFICE O/P EST LOW 20 MIN: CPT | Mod: S$GLB,,, | Performed by: SPECIALIST

## 2025-08-08 PROCEDURE — 3072F LOW RISK FOR RETINOPATHY: CPT | Mod: CPTII,S$GLB,, | Performed by: SPECIALIST

## 2025-08-08 PROCEDURE — 3044F HG A1C LEVEL LT 7.0%: CPT | Mod: CPTII,S$GLB,, | Performed by: SPECIALIST

## 2025-08-08 PROCEDURE — 3288F FALL RISK ASSESSMENT DOCD: CPT | Mod: CPTII,S$GLB,, | Performed by: SPECIALIST

## 2025-08-08 PROCEDURE — 1159F MED LIST DOCD IN RCRD: CPT | Mod: CPTII,S$GLB,, | Performed by: SPECIALIST

## 2025-08-11 ENCOUNTER — DOCUMENTATION ONLY (OUTPATIENT)
Facility: CLINIC | Age: 65
End: 2025-08-11
Payer: MEDICARE

## 2025-08-12 ENCOUNTER — CLINICAL SUPPORT (OUTPATIENT)
Dept: AUDIOLOGY | Facility: CLINIC | Age: 65
End: 2025-08-12
Payer: MEDICARE

## 2025-08-12 DIAGNOSIS — H90.3 SENSORINEURAL HEARING LOSS, BILATERAL: Primary | ICD-10-CM

## 2025-08-12 PROCEDURE — 99499 UNLISTED E&M SERVICE: CPT | Mod: S$GLB,,, | Performed by: AUDIOLOGIST

## 2025-08-14 ENCOUNTER — OFFICE VISIT (OUTPATIENT)
Facility: CLINIC | Age: 65
End: 2025-08-14
Payer: MEDICARE

## 2025-08-14 VITALS
SYSTOLIC BLOOD PRESSURE: 133 MMHG | BODY MASS INDEX: 34.9 KG/M2 | HEART RATE: 82 BPM | OXYGEN SATURATION: 95 % | WEIGHT: 257.31 LBS | DIASTOLIC BLOOD PRESSURE: 80 MMHG

## 2025-08-14 DIAGNOSIS — G47.00 INSOMNIA, UNSPECIFIED TYPE: Primary | ICD-10-CM

## 2025-08-14 DIAGNOSIS — G47.30 SLEEP APNEA, UNSPECIFIED TYPE: ICD-10-CM

## 2025-08-14 DIAGNOSIS — G47.33 OSA (OBSTRUCTIVE SLEEP APNEA): ICD-10-CM

## 2025-08-14 PROCEDURE — 99999 PR PBB SHADOW E&M-EST. PATIENT-LVL III: CPT | Mod: PBBFAC,,,

## 2025-08-14 RX ORDER — ESZOPICLONE 1 MG/1
1 TABLET, FILM COATED ORAL NIGHTLY
Qty: 30 TABLET | Refills: 0 | Status: SHIPPED | OUTPATIENT
Start: 2025-08-14 | End: 2025-09-13

## 2025-08-28 ENCOUNTER — TELEPHONE (OUTPATIENT)
Dept: SLEEP MEDICINE | Facility: OTHER | Age: 65
End: 2025-08-28
Payer: MEDICARE

## 2025-08-31 DIAGNOSIS — L21.9 SEBORRHEA: ICD-10-CM

## 2025-09-02 RX ORDER — KETOCONAZOLE 20 MG/G
CREAM TOPICAL 2 TIMES DAILY
Qty: 60 G | Refills: 0 | Status: SHIPPED | OUTPATIENT
Start: 2025-09-02

## (undated) DEVICE — SYR 50CC LL

## (undated) DEVICE — ADHESIVE DERMABOND ADVANCED

## (undated) DEVICE — DRESSING TRANS 4X4 TEGADERM

## (undated) DEVICE — DRESSING ADH ISLAND 3.6 X 14

## (undated) DEVICE — SUT PROLENE 4-0 RB-1 BL MO

## (undated) DEVICE — DRAPE INCISE IOBAN 2 23X17IN

## (undated) DEVICE — BLADE ELECTRO EDGE INSULATED

## (undated) DEVICE — CLIP SPRING 6MM

## (undated) DEVICE — PLEDGET SUT SOFT 3/8X3/16X1/16

## (undated) DEVICE — BURR OVAL CUTTING 6 MM

## (undated) DEVICE — CATH DXTERITY PG145 110CM 6FR

## (undated) DEVICE — HEMOSTAT SURGICEL NU-KNIT 6X9

## (undated) DEVICE — COVERS PROBE NR-48 STERILE

## (undated) DEVICE — SOL NACL IRR 3000ML

## (undated) DEVICE — SUT 6/0 4-24IN PROLENE

## (undated) DEVICE — DRESSING N ADH OIL EMUL 3X8

## (undated) DEVICE — BANDAGE ELAS SOFTWRAP ST 4X5YD

## (undated) DEVICE — TENODESIS GRAFT SIZING KIT

## (undated) DEVICE — PAD RADIOLUCENT STAT ADULT

## (undated) DEVICE — CATH ANG PIGTAIL 4FR INFINITY

## (undated) DEVICE — WRAP SHLDR HIP ACCU THRM PACK

## (undated) DEVICE — ELECTRODE REM PLYHSV RETURN 9

## (undated) DEVICE — DRESSING AQUACEL SACRAL 9 X 9

## (undated) DEVICE — TRAY HEART OMC

## (undated) DEVICE — TOWEL OR DISP STRL BLUE 4/PK

## (undated) DEVICE — GLOVE PROTEXIS LTX MICRO 8

## (undated) DEVICE — TRANSDUCER ADULT DISP

## (undated) DEVICE — CONNECTOR Y 3/8X3/8X3/8

## (undated) DEVICE — BANDAGE ACE DOUBLE STER 6IN

## (undated) DEVICE — GAUZE SPONGE 4X4 12PLY

## (undated) DEVICE — SUT SILK BLK BR. 2 2-60

## (undated) DEVICE — TOURNIQUET TOURNIKWIK 2 TB 6IN

## (undated) DEVICE — MARKER SKIN STND TIP BLUE BARR

## (undated) DEVICE — DRAPE STERI-DRAPE 1000 17X11IN

## (undated) DEVICE — BNDG COFLEX FOAM LF2 ST 6X5YD

## (undated) DEVICE — SUT PROLENE 7-0 BV-1 30

## (undated) DEVICE — WIRE GUIDE SAFE-T-J .035 260CM

## (undated) DEVICE — SUT 6 18IN STEEL MONO CCS

## (undated) DEVICE — SET DECANTER MEDICHOICE

## (undated) DEVICE — SYS VIRTUOSAPH PLUS EVM

## (undated) DEVICE — KIT PROBE COVER WITH GEL

## (undated) DEVICE — KIT URINARY CATH URINE METER

## (undated) DEVICE — APPLIER CLIP LIAGCLIP 9.375IN

## (undated) DEVICE — GUIDEWIRE EMERALD 150CM PTFE

## (undated) DEVICE — NDL SPINAL 18GX3.5 SPINOCAN

## (undated) DEVICE — SUT 4-0 12-18IN SILK BLACK

## (undated) DEVICE — Device

## (undated) DEVICE — SEE MEDLINE ITEM 156894

## (undated) DEVICE — SUT MONOCRYL 4-0 PS-1 UND

## (undated) DEVICE — TUBING SUC UNIV W/CONN 12FT

## (undated) DEVICE — SUT ETHILON 3-0 PS2 18 BLK

## (undated) DEVICE — KIT GLIDESHEATH SLEND 6FR 10CM

## (undated) DEVICE — APPLIER LIGACLIP SM 9.38IN

## (undated) DEVICE — NDL 18GA X1 1/2 REG BEVEL

## (undated) DEVICE — WIRE INTRAMYOCARDIAL TEMP

## (undated) DEVICE — PAD ABD 8X10 STERILE

## (undated) DEVICE — PAD K-THERMIA 24IN X 60IN

## (undated) DEVICE — APPLICATOR CHLORAPREP ORN 26ML

## (undated) DEVICE — DRAIN CHEST DRY SUCTION

## (undated) DEVICE — RETRACTOR OCTOBASE INSERT HOLD

## (undated) DEVICE — GOWN POLY REINF BRTH SLV XL

## (undated) DEVICE — SPIKE CONTRAST CONTROLLER

## (undated) DEVICE — SUT SILK 2-0 SH 18IN BLACK

## (undated) DEVICE — BLADE 4IN EDGE INSULATED

## (undated) DEVICE — INSERTS STEALTH FIBRA SIZE 5

## (undated) DEVICE — TAPE SURG MEDIPORE 6X72IN

## (undated) DEVICE — DRESSING TELFA STRL 4X3 LF

## (undated) DEVICE — CANNULA TRIPLEDAM 6MM X 7CM

## (undated) DEVICE — SUT VICRYL CTD 2-0 GI 27 SH

## (undated) DEVICE — DRAPE STERI U-SHAPED 47X51IN

## (undated) DEVICE — BLADE SAW STERN .076MM 6.1MM L

## (undated) DEVICE — SET INFLOW TUBE ARTHSCP

## (undated) DEVICE — GOWN ECLIPSE REINF LVL4 TWL XL

## (undated) DEVICE — CANNULA VESSEL FREE FLOW

## (undated) DEVICE — CATH AL III 4FR

## (undated) DEVICE — NDL BOX COUNTER

## (undated) DEVICE — CONNECTOR TUBING STR 5 IN 1

## (undated) DEVICE — GLOVE BIOGEL PI MICRO SZ 7.5

## (undated) DEVICE — GOWN ECLIPSE REINF LV4 XLNG XL

## (undated) DEVICE — CATH JACKY RADIAL 3.5 110CM

## (undated) DEVICE — DRAPE SLUSH WARMER WITH DISC

## (undated) DEVICE — SUT 1 36IN PDS II VIO MONO

## (undated) DEVICE — SEE MEDLINE ITEM 146292

## (undated) DEVICE — KIT TRACTION SHLDR ST DISP LF

## (undated) DEVICE — WIRE SUTURE PASSING

## (undated) DEVICE — ELECTRODE COOLPULSE 90 W/HAND

## (undated) DEVICE — TUBING HF INSUFFLATION W/ FLTR

## (undated) DEVICE — HEMOSTAT VASC BAND LONG 27CM

## (undated) DEVICE — SUT 2/0 30IN ETHIBOND

## (undated) DEVICE — SUT 2/0 30IN SILK BLK BRAI

## (undated) DEVICE — KIT ACL DISP W/O SAW BLADE

## (undated) DEVICE — DRAPE CVMAX SPLIT ANES SCRN

## (undated) DEVICE — SUT MONOCRYL 3-0 PS-2 UND

## (undated) DEVICE — STABILIZER STABLESOFT 3 FINGER

## (undated) DEVICE — BLADE SAW STERNAL 5/BX

## (undated) DEVICE — SUT PROLENE 4-0 SH BLU 36IN

## (undated) DEVICE — KIT SAHARA DRAPE DRAW/LIFT

## (undated) DEVICE — BANDAGE ELAS SOFTWRAP ST 6X5YD

## (undated) DEVICE — DRESSING TEGADERM 4.4X5IN

## (undated) DEVICE — DRAIN CHANNEL ROUND 19FR

## (undated) DEVICE — SYR ONLY LUER LOCK 20CC

## (undated) DEVICE — TIP YANKAUERS BULB NO VENT

## (undated) DEVICE — SYR IRRIGATION BULB STER 60ML

## (undated) DEVICE — SUT CTD VICRYL 0 UND CR/CTX

## (undated) DEVICE — PUNCH AORTIC 4.8MM

## (undated) DEVICE — DRAPE THREE-QTR REINF 53X77IN

## (undated) DEVICE — SOL NACL IRR 1000ML BTL

## (undated) DEVICE — OMNIPAQUE 350 200ML

## (undated) DEVICE — GLOVE PROTEXIS LIGHT BROWN 8.5

## (undated) DEVICE — BLOWER MISTER

## (undated) DEVICE — KIT CUSTOM MANIFOLD

## (undated) DEVICE — SET EXT MALE LL 34IN

## (undated) DEVICE — PAD DERMAPROX THCK 11X15X1IN